# Patient Record
Sex: FEMALE | Race: BLACK OR AFRICAN AMERICAN | Employment: OTHER | URBAN - METROPOLITAN AREA
[De-identification: names, ages, dates, MRNs, and addresses within clinical notes are randomized per-mention and may not be internally consistent; named-entity substitution may affect disease eponyms.]

---

## 2018-05-14 ENCOUNTER — APPOINTMENT (OUTPATIENT)
Dept: GENERAL RADIOLOGY | Age: 43
End: 2018-05-14
Attending: EMERGENCY MEDICINE
Payer: MEDICARE

## 2018-05-14 ENCOUNTER — HOSPITAL ENCOUNTER (EMERGENCY)
Age: 43
Discharge: HOME | End: 2018-05-14
Attending: EMERGENCY MEDICINE
Payer: MEDICARE

## 2018-05-14 VITALS
DIASTOLIC BLOOD PRESSURE: 72 MMHG | WEIGHT: 293 LBS | OXYGEN SATURATION: 100 % | HEART RATE: 75 BPM | TEMPERATURE: 98 F | BODY MASS INDEX: 44.41 KG/M2 | SYSTOLIC BLOOD PRESSURE: 129 MMHG | HEIGHT: 68 IN | RESPIRATION RATE: 20 BRPM

## 2018-05-14 DIAGNOSIS — S90.122A CONTUSION OF SECOND TOE OF LEFT FOOT, INITIAL ENCOUNTER: Primary | ICD-10-CM

## 2018-05-14 DIAGNOSIS — S60.042A CONTUSION OF LEFT RING FINGER WITHOUT DAMAGE TO NAIL, INITIAL ENCOUNTER: ICD-10-CM

## 2018-05-14 LAB
ALBUMIN SERPL-MCNC: 3.6 G/DL (ref 3.5–5)
ALBUMIN/GLOB SERPL: 0.9 {RATIO} (ref 1.2–3.5)
ALP SERPL-CCNC: 65 U/L (ref 50–136)
ALT SERPL-CCNC: 22 U/L (ref 12–65)
ANION GAP SERPL CALC-SCNC: 9 MMOL/L (ref 7–16)
AST SERPL-CCNC: 15 U/L (ref 15–37)
BASOPHILS # BLD: 0 K/UL (ref 0–0.2)
BASOPHILS NFR BLD: 1 % (ref 0–2)
BILIRUB SERPL-MCNC: 0.8 MG/DL (ref 0.2–1.1)
BUN SERPL-MCNC: 5 MG/DL (ref 6–23)
CALCIUM SERPL-MCNC: 9.1 MG/DL (ref 8.3–10.4)
CHLORIDE SERPL-SCNC: 106 MMOL/L (ref 98–107)
CO2 SERPL-SCNC: 27 MMOL/L (ref 21–32)
CREAT SERPL-MCNC: 1.12 MG/DL (ref 0.6–1)
DIFFERENTIAL METHOD BLD: NORMAL
EOSINOPHIL # BLD: 0.1 K/UL (ref 0–0.8)
EOSINOPHIL NFR BLD: 2 % (ref 0.5–7.8)
ERYTHROCYTE [DISTWIDTH] IN BLOOD BY AUTOMATED COUNT: 14.3 % (ref 11.9–14.6)
GLOBULIN SER CALC-MCNC: 4.1 G/DL (ref 2.3–3.5)
GLUCOSE SERPL-MCNC: 57 MG/DL (ref 65–100)
HCT VFR BLD AUTO: 40.1 % (ref 35.8–46.3)
HGB BLD-MCNC: 13.3 G/DL (ref 11.7–15.4)
IMM GRANULOCYTES # BLD: 0 K/UL (ref 0–0.5)
IMM GRANULOCYTES NFR BLD AUTO: 0 % (ref 0–5)
LYMPHOCYTES # BLD: 1.7 K/UL (ref 0.5–4.6)
LYMPHOCYTES NFR BLD: 35 % (ref 13–44)
MCH RBC QN AUTO: 32.2 PG (ref 26.1–32.9)
MCHC RBC AUTO-ENTMCNC: 33.2 G/DL (ref 31.4–35)
MCV RBC AUTO: 97.1 FL (ref 79.6–97.8)
MONOCYTES # BLD: 0.4 K/UL (ref 0.1–1.3)
MONOCYTES NFR BLD: 8 % (ref 4–12)
NEUTS SEG # BLD: 2.6 K/UL (ref 1.7–8.2)
NEUTS SEG NFR BLD: 54 % (ref 43–78)
PLATELET # BLD AUTO: 266 K/UL (ref 150–450)
PMV BLD AUTO: 12 FL (ref 10.8–14.1)
POTASSIUM SERPL-SCNC: 3.8 MMOL/L (ref 3.5–5.1)
PROT SERPL-MCNC: 7.7 G/DL (ref 6.3–8.2)
RBC # BLD AUTO: 4.13 M/UL (ref 4.05–5.25)
SODIUM SERPL-SCNC: 142 MMOL/L (ref 136–145)
WBC # BLD AUTO: 4.8 K/UL (ref 4.3–11.1)

## 2018-05-14 PROCEDURE — 73630 X-RAY EXAM OF FOOT: CPT

## 2018-05-14 PROCEDURE — 73140 X-RAY EXAM OF FINGER(S): CPT

## 2018-05-14 PROCEDURE — 73610 X-RAY EXAM OF ANKLE: CPT

## 2018-05-14 PROCEDURE — 85025 COMPLETE CBC W/AUTO DIFF WBC: CPT | Performed by: EMERGENCY MEDICINE

## 2018-05-14 PROCEDURE — 99283 EMERGENCY DEPT VISIT LOW MDM: CPT | Performed by: EMERGENCY MEDICINE

## 2018-05-14 PROCEDURE — 80053 COMPREHEN METABOLIC PANEL: CPT | Performed by: EMERGENCY MEDICINE

## 2018-05-14 NOTE — ED TRIAGE NOTES
Patient states became dizzy and tripped and fell onto her left ankle and foot. Patient denies any head trauma and loc.

## 2018-05-15 NOTE — ED PROVIDER NOTES
HPI Comments: Patient states she was walking out of her house down the stairs and fell. States her vision became blurred causing her to fall. Was able to get up and walk back inside. Complains of pain in her left second toe and left ring finger. No LOC. No recent illness. Vision back to normal now. Patient is a 36 y.o. female presenting with fall. The history is provided by the patient and medical records. Fall   The accident occurred 3 to 5 hours ago. The fall occurred while walking. She fell from a height of ground level. The volume of blood lost was minimal. Pertinent negatives include no numbness.         Past Medical History:   Diagnosis Date    Arthritis     Bipolar affective disorder ([address] 11 yrs old    Cellulitis 2010    \"   \"     \"\"    Diabetes ([facility] dx:    Unknown A1C    Diabetes ([facility] 2010    Edema of both legs 2010    hospitalized 1 yr ago same at [facility]    GERD (gastroesophageal reflux disease)     Gout >10 yers    last episode May 09    Hypertension about 10 years    Morbid obesity ([address] lifetime    weight 10/09: 220, current 310    Non compliance with medical treatment long term    Orthopnea 2 weeks    Other ill-defined conditions(799.89)     cellulitis    Peripheral neuropathy 2 years    Peripheral vascular disease ([address]     currently being worked up for same    Psychiatric disorder     bipolar disorder    Renal insufficiency unknown    Renal insufficiency 2010    Unspecified sleep apnea     does not use cpap    Vertigo 3/8/2015       Past Surgical History:   Procedure Laterality Date    DELIVERY   14 and 10 yrs ago    both preemies    HX  SECTION      HX HERNIA REPAIR  age 10   [de-identified] ORTHOPAEDIC  10    left total hip replacement 2011    HX TUBAL LIGATION      10 yrs ago         Family History:   Problem Relation Age of Onset    Other Father      aneurysm age 62, now brain damaged    Other Son      24 week twin, LD    Cancer Sister      cervical    Cancer Sister      hodgkins       Social History     Social History    Marital status: SINGLE     Spouse name: N/A    Number of children: N/A    Years of education: N/A     Occupational History    Not on file. Social History Main Topics    Smoking status: Never Smoker    Smokeless tobacco: Never Used    Alcohol use No    Drug use: No    Sexual activity: Not Currently     Other Topics Concern    Not on file     Social History Narrative    Patient is single, lives with disabled father, 15year old disabled son and 8year old daughter. 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 She reports a lot of stress, including her 12year old daughter living in [place] with an aunt due to some DSS issue. She admits to being non-compliant with meds and diet in part due to stress. ALLERGIES: Ciprofloxacin and Sulfa (sulfonamide antibiotics)    Review of Systems   Constitutional: Negative. HENT: Negative. Eyes: Positive for visual disturbance (brief and resolved). Respiratory: Negative. Cardiovascular: Negative. Gastrointestinal: Negative. Musculoskeletal: Positive for arthralgias and myalgias. Skin: Positive for wound. Neurological: Negative. Negative for weakness and numbness. Hematological: Negative. Vitals:    05/14/18 1847   BP: 133/76   Pulse: 73   Resp: 18   Temp: 98.3 °F (36.8 °C)   SpO2: 100%   Weight: 142.4 kg (314 lb)   Height: 5' 8\" (1.727 m)            Physical Exam   Constitutional: She is oriented to person, place, and time. She appears well-developed and well-nourished. HENT:   Head: Normocephalic and atraumatic. Eyes: Conjunctivae and EOM are normal. Pupils are equal, round, and reactive to light. Neck: Normal range of motion. Neck supple. Cardiovascular: Normal rate, regular rhythm, normal heart sounds and intact distal pulses.     Pulmonary/Chest: Effort normal and breath sounds normal.   Musculoskeletal:   Left ring finger with mild swelling and tenderness of the PIP joint. No deformity. FROM. NV intact. Left second toe with superficial abrasion, swelling, tenderness to palpation. No deformity. NV intact. Foot and ankle with no tenderness or deformity. Right upper and lower extremity uninjured. Neurological: She is alert and oriented to person, place, and time. No cranial nerve deficit. She exhibits normal muscle tone. Nursing note and vitals reviewed.        [facility]      ED Course       Procedures      Contusion and superficial abrasion left second toe  Contusion of left PIP joint ring finger  Xrays no fracture   Labs reviewed  Results and instructions discussed with patient   Follow up with PCP  Return with new or worse symptom  Ice, elevation, ibuprofen

## 2018-05-15 NOTE — ED NOTES
I have reviewed discharge instructions with the patient. The patient verbalized understanding. Patient left ED via Discharge Method: ambulatory to Home with family. Opportunity for questions and clarification provided. Patient given 0 scripts. To continue your aftercare when you leave the hospital, you may receive an automated call from our care team to check in on how you are doing. This is a free service and part of our promise to provide the best care and service to meet your aftercare needs.  If you have questions, or wish to unsubscribe from this service please call [phone]. Thank you for Choosing our [address] [place] [address] Emergency Department.

## 2018-07-10 ENCOUNTER — HOSPITAL ENCOUNTER (EMERGENCY)
Age: 43
Discharge: HOME | End: 2018-07-10
Attending: EMERGENCY MEDICINE
Payer: MEDICARE

## 2018-07-10 VITALS
HEART RATE: 74 BPM | TEMPERATURE: 98 F | WEIGHT: 185 LBS | SYSTOLIC BLOOD PRESSURE: 140 MMHG | BODY MASS INDEX: 28.04 KG/M2 | DIASTOLIC BLOOD PRESSURE: 64 MMHG | HEIGHT: 68 IN | RESPIRATION RATE: 20 BRPM | OXYGEN SATURATION: 98 %

## 2018-07-10 DIAGNOSIS — N39.0 URINARY TRACT INFECTION WITHOUT HEMATURIA, SITE UNSPECIFIED: Primary | ICD-10-CM

## 2018-07-10 DIAGNOSIS — J02.9 ACUTE PHARYNGITIS, UNSPECIFIED ETIOLOGY: ICD-10-CM

## 2018-07-10 LAB
BACTERIA URNS QL MICRO: NORMAL /HPF
CASTS URNS QL MICRO: 0 /LPF
CRYSTALS URNS QL MICRO: 0 /LPF
EPI CELLS #/AREA URNS HPF: 0 /HPF
HCG UR QL: NEGATIVE
MUCOUS THREADS URNS QL MICRO: 0 /LPF
OTHER OBSERVATIONS,UCOM: NORMAL
RBC #/AREA URNS HPF: NORMAL /HPF
WBC URNS QL MICRO: >100 /HPF

## 2018-07-10 PROCEDURE — 99284 EMERGENCY DEPT VISIT MOD MDM: CPT | Performed by: EMERGENCY MEDICINE

## 2018-07-10 PROCEDURE — 81025 URINE PREGNANCY TEST: CPT

## 2018-07-10 PROCEDURE — 81003 URINALYSIS AUTO W/O SCOPE: CPT | Performed by: EMERGENCY MEDICINE

## 2018-07-10 PROCEDURE — 81015 MICROSCOPIC EXAM OF URINE: CPT | Performed by: EMERGENCY MEDICINE

## 2018-07-10 RX ORDER — PHENAZOPYRIDINE HYDROCHLORIDE 200 MG/1
200 TABLET, FILM COATED ORAL
Qty: 10 TAB | Refills: 0 | Status: SHIPPED | OUTPATIENT
Start: 2018-07-10 | End: 2018-11-09

## 2018-07-10 RX ORDER — CEPHALEXIN 500 MG/1
500 CAPSULE ORAL 3 TIMES DAILY
Qty: 21 CAP | Refills: 0 | Status: SHIPPED | OUTPATIENT
Start: 2018-07-10 | End: 2018-07-17

## 2018-07-11 NOTE — DISCHARGE INSTRUCTIONS
Sore Throat: Care Instructions  Your Care Instructions    Infection by bacteria or a virus causes most sore throats. Cigarette smoke, dry air, air pollution, allergies, and yelling can also cause a sore throat. Sore throats can be painful and annoying. Fortunately, most sore throats go away on their own. If you have a bacterial infection, your doctor may prescribe antibiotics. Follow-up care is a key part of your treatment and safety. Be sure to make and go to all appointments, and call your doctor if you are having problems. It's also a good idea to know your test results and keep a list of the medicines you take. How can you care for yourself at home? · If your doctor prescribed antibiotics, take them as directed. Do not stop taking them just because you feel better. You need to take the full course of antibiotics. · Gargle with warm salt water once an hour to help reduce swelling and relieve discomfort. Use 1 teaspoon of salt mixed in 1 cup of warm water. · Take an over-the-counter pain medicine, such as acetaminophen (Tylenol), ibuprofen (Advil, Motrin), or naproxen (Aleve). Read and follow all instructions on the label. · Be careful when taking over-the-counter cold or flu medicines and Tylenol at the same time. Many of these medicines have acetaminophen, which is Tylenol. Read the labels to make sure that you are not taking more than the recommended dose. Too much acetaminophen (Tylenol) can be harmful. · Drink plenty of fluids. Fluids may help soothe an irritated throat. Hot fluids, such as tea or soup, may help decrease throat pain. · Use over-the-counter throat lozenges to soothe pain. Regular cough drops or hard candy may also help. These should not be given to young children because of the risk of choking. · Do not smoke or allow others to smoke around you. If you need help quitting, talk to your doctor about stop-smoking programs and medicines.  These can increase your chances of quitting for good. · Use a vaporizer or humidifier to add moisture to your bedroom. Follow the directions for cleaning the machine. When should you call for help? Call your doctor now or seek immediate medical care if:    · You have new or worse trouble swallowing.     · Your sore throat gets much worse on one side.    Watch closely for changes in your health, and be sure to contact your doctor if you do not get better as expected. Where can you learn more? Go to http://violetta-maya.info/. Enter 062 441 80 [id number] in the search box to learn more about \"Sore Throat: Care Instructions. \"  Current as of: May 12, 2017  Content Version: 11.7  © 1492-1882 [employer]. Care instructions adapted under license by [employer] (which disclaims liability or warranty for this information). If you have questions about a medical condition or this instruction, always ask your healthcare professional. [place] [address] any warranty or liability for your use of this information. Urinary Tract Infection in Women: Care Instructions  Your Care Instructions    A urinary tract infection, or UTI, is a general term for an infection anywhere between the kidneys and the urethra (where urine comes out). Most UTIs are bladder infections. They often cause pain or burning when you urinate. UTIs are caused by bacteria and can be cured with antibiotics. Be sure to complete your treatment so that the infection goes away. Follow-up care is a key part of your treatment and safety. Be sure to make and go to all appointments, and call your doctor if you are having problems. It's also a good idea to know your test results and keep a list of the medicines you take. How can you care for yourself at home? · Take your antibiotics as directed. Do not stop taking them just because you feel better. You need to take the full course of antibiotics. · Drink extra water and other fluids for the next day or two. This may help wash out the bacteria that are causing the infection. (If you have kidney, heart, or liver disease and have to limit fluids, talk with your doctor before you increase your fluid intake.)  · Avoid drinks that are carbonated or have caffeine. They can irritate the bladder. · Urinate often. Try to empty your bladder each time. · To relieve pain, take a hot bath or lay a heating pad set on low over your lower belly or genital area. Never go to sleep with a heating pad in place. To prevent UTIs  · Drink plenty of water each day. This helps you urinate often, which clears bacteria from your system. (If you have kidney, heart, or liver disease and have to limit fluids, talk with your doctor before you increase your fluid intake.)  · Urinate when you need to. · Urinate right after you have sex. · Change sanitary pads often. · Avoid douches, bubble baths, feminine hygiene sprays, and other feminine hygiene products that have deodorants. · After going to the bathroom, wipe from front to back. When should you call for help? Call your doctor now or seek immediate medical care if:    · Symptoms such as fever, chills, nausea, or vomiting get worse or appear for the first time.     · You have new pain in your back just below your rib cage. This is called flank pain.     · There is new blood or pus in your urine.     · You have any problems with your antibiotic medicine.    Watch closely for changes in your health, and be sure to contact your doctor if:    · You are not getting better after taking an antibiotic for 2 days.     · Your symptoms go away but then come back. Where can you learn more? Go to http://violteta-maya.info/. Enter X655 in the search box to learn more about \"Urinary Tract Infection in Women: Care Instructions. \"  Current as of: May 12, 2017  Content Version: 11.7  © 8018-7012 [employer].  Care instructions adapted under license by [employer] (which disclaims liability or warranty for this information). If you have questions about a medical condition or this instruction, always ask your healthcare professional. [address] any warranty or liability for your use of this information.

## 2018-07-11 NOTE — ED PROVIDER NOTES
Patient is a 36 y.o. female presenting with urinary pain. The history is provided by the patient. Urinary Pain    This is a new problem. The current episode started more than 2 days ago. The problem occurs every urination. The problem has been gradually worsening. The quality of the pain is described as burning. The pain is at a severity of 2/10. There has been no fever. She is not sexually active. There is no history of pyelonephritis. Associated symptoms include frequency. Pertinent negatives include no chills, no sweats, no nausea, no vomiting, no discharge, no hematuria, no hesitancy, no urgency, no flank pain, no vaginal discharge, no abdominal pain and no back pain. She has tried nothing for the symptoms. The treatment provided no relief. Her past medical history does not include kidney stones, single kidney, urological procedure, recurrent UTIs, urinary stasis, catheterization or urinary catheter problem.         Past Medical History:   Diagnosis Date    Arthritis     Bipolar affective disorder ([facility] 11 yrs old    Cellulitis 2010    \"   \"     \"\"    Diabetes ([facility] dx:    Unknown A1C    Diabetes ([address] 2010    Edema of both legs 2010    hospitalized 1 yr ago same at [facility]    GERD (gastroesophageal reflux disease)     Gout >10 yers    last episode May 09    Hypertension about 10 years    Morbid obesity ([address] lifetime    weight 10/09: 220, current 310    Non compliance with medical treatment long term    Orthopnea 2 weeks    Other ill-defined conditions(799.89)     cellulitis    Peripheral neuropathy 2 years    Peripheral vascular disease ([facility]     currently being worked up for same    Psychiatric disorder     bipolar disorder    Renal insufficiency unknown    Renal insufficiency 2010    Unspecified sleep apnea     does not use cpap    Vertigo 3/8/2015       Past Surgical History:   Procedure Laterality Date    DELIVERY   14 and 10 yrs ago    both preemies    HX  SECTION      HX HERNIA REPAIR  age 9    HX ORTHOPAEDIC  10    left total hip replacement 2011    HX TUBAL LIGATION      10 yrs ago         Family History:   Problem Relation Age of Onset    Other Father      aneurysm age 62, now brain damaged    Other Son      25 week twin, LD    Cancer Sister      cervical    Cancer Sister      hodgkins       Social History     Social History    Marital status: SINGLE     Spouse name: N/A    Number of children: N/A    Years of education: N/A     Occupational History    Not on file. Social History Main Topics    Smoking status: Never Smoker    Smokeless tobacco: Never Used    Alcohol use No    Drug use: No    Sexual activity: Not Currently     Other Topics Concern    Not on file     Social History Narrative    Patient is single, lives with disabled father, 15year old disabled son and 8year old daughter. 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 She reports a lot of stress, including her 12year old daughter living in [place] [facility] with an aunt due to some DSS issue. She admits to being non-compliant with meds and diet in part due to stress. ALLERGIES: Ciprofloxacin and Sulfa (sulfonamide antibiotics)    Review of Systems   Constitutional: Negative for chills. HENT: Positive for sore throat. Negative for congestion. Gastrointestinal: Negative for abdominal pain, nausea and vomiting. Genitourinary: Positive for dysuria and frequency. Negative for flank pain, hematuria, hesitancy, urgency and vaginal discharge. Musculoskeletal: Negative for back pain. All other systems reviewed and are negative.       Vitals:    07/10/18 2050 07/10/18 2225   BP: 145/72 140/64   Pulse: 66 74   Resp: 16 20   Temp: 97.6 °F (36.4 °C) 98 °F (36.7 °C)   SpO2: 100% 98%   Weight: 83.9 kg (185 lb)    Height: 5' 8\" (1.727 m)             Physical Exam   Constitutional: She is oriented to person, place, and time. She appears well-developed and well-nourished. No distress. HENT:   Head: Normocephalic and atraumatic. Right Ear: Tympanic membrane and external ear normal.   Left Ear: Tympanic membrane and external ear normal.   Mouth/Throat: Oropharynx is clear and moist.   Eyes: Conjunctivae and EOM are normal. Pupils are equal, round, and reactive to light. Neck: Normal range of motion. Neck supple. No tracheal deviation present. Cardiovascular: Normal rate, regular rhythm, normal heart sounds and intact distal pulses. Exam reveals no gallop and no friction rub. No murmur heard. Pulmonary/Chest: Effort normal and breath sounds normal. No respiratory distress. She has no wheezes. Abdominal: Soft. Bowel sounds are normal. She exhibits no shifting dullness, no distension, no pulsatile liver, no fluid wave, no abdominal bruit, no pulsatile midline mass and no mass. There is no hepatosplenomegaly. There is tenderness (mild) in the suprapubic area. There is no rigidity, no rebound, no guarding, no CVA tenderness, no tenderness at McBurney's point and negative Orona's sign. No hernia. Musculoskeletal: Normal range of motion. She exhibits no edema. Lymphadenopathy:     She has no cervical adenopathy. Neurological: She is alert and oriented to person, place, and time. She displays normal reflexes. No cranial nerve deficit. Skin: Skin is warm and dry. No rash noted. She is not diaphoretic. No erythema. Psychiatric: She has a normal mood and affect. Nursing note and vitals reviewed.        MDM  Number of Diagnoses or Management Options  Acute pharyngitis, unspecified etiology: new and does not require workup  Urinary tract infection without hematuria, site unspecified: new and requires workup     Amount and/or Complexity of Data Reviewed  Clinical lab tests: ordered and reviewed  Review and summarize past medical records: yes    Risk of Complications, Morbidity, and/or Mortality  Presenting problems: low  Diagnostic procedures: minimal  Management options: low    Patient Progress  Patient progress: stable        ED Course       Procedures    The patient was observed in the ED. Results Reviewed:      Recent Results (from the past 24 hour(s))   HCG URINE, QL. - POC    Collection Time: 07/10/18  9:03 PM   Result Value Ref Range    Pregnancy test,urine (POC) NEGATIVE  NEG     URINE MICROSCOPIC    Collection Time: 07/10/18  9:09 PM   Result Value Ref Range    WBC >100 0 /hpf    RBC 20-50 0 /hpf    Epithelial cells 0 0 /hpf    Bacteria TRACE 0 /hpf    Casts 0 0 /lpf    Crystals, urine 0 0 /LPF    Mucus 0 0 /lpf    Other observations       MICROSCOPIC PERFORMED ON UNSPUN URINE DUE TO TOO MANY CELLS       I discussed the results of all labs, procedures, radiographs, and treatments with the patient and available family. Treatment plan is agreed upon and the patient is ready for discharge. All voiced understanding of the discharge plan and medication instructions or changes as appropriate. Questions about treatment in the ED were answered. All were encouraged to return should symptoms worsen or new problems develop.

## 2018-07-11 NOTE — ED NOTES
I have reviewed discharge instructions with the patient. The patient verbalized understanding. Patient left ED via Discharge Method: ambulatory to Home with (family). Opportunity for questions and clarification provided. Patient given 2 scripts. To continue your aftercare when you leave the hospital, you may receive an automated call from our care team to check in on how you are doing. This is a free service and part of our promise to provide the best care and service to meet your aftercare needs.  If you have questions, or wish to unsubscribe from this service please call [phone]. Thank you for Choosing our [facility].

## 2018-09-28 ENCOUNTER — HOSPITAL ENCOUNTER (EMERGENCY)
Age: 43
Discharge: HOME | End: 2018-09-28
Attending: STUDENT IN AN ORGANIZED HEALTH CARE EDUCATION/TRAINING PROGRAM
Payer: MEDICARE

## 2018-09-28 VITALS
HEIGHT: 68 IN | HEART RATE: 62 BPM | DIASTOLIC BLOOD PRESSURE: 88 MMHG | RESPIRATION RATE: 16 BRPM | WEIGHT: 220 LBS | OXYGEN SATURATION: 100 % | BODY MASS INDEX: 33.34 KG/M2 | SYSTOLIC BLOOD PRESSURE: 143 MMHG | TEMPERATURE: 98.1 F

## 2018-09-28 DIAGNOSIS — J35.8 TONSIL STONE: Primary | ICD-10-CM

## 2018-09-28 DIAGNOSIS — J02.9 PHARYNGITIS, UNSPECIFIED ETIOLOGY: ICD-10-CM

## 2018-09-28 LAB — DEPRECATED S PYO AG THROAT QL EIA: NEGATIVE

## 2018-09-28 PROCEDURE — 87880 STREP A ASSAY W/OPTIC: CPT

## 2018-09-28 PROCEDURE — 99283 EMERGENCY DEPT VISIT LOW MDM: CPT | Performed by: STUDENT IN AN ORGANIZED HEALTH CARE EDUCATION/TRAINING PROGRAM

## 2018-09-28 PROCEDURE — 74011250637 HC RX REV CODE- 250/637: Performed by: STUDENT IN AN ORGANIZED HEALTH CARE EDUCATION/TRAINING PROGRAM

## 2018-09-28 PROCEDURE — 87081 CULTURE SCREEN ONLY: CPT

## 2018-09-28 RX ORDER — DEXAMETHASONE SODIUM PHOSPHATE 100 MG/10ML
10 INJECTION INTRAMUSCULAR; INTRAVENOUS
Status: COMPLETED | OUTPATIENT
Start: 2018-09-28 | End: 2018-09-28

## 2018-09-28 RX ORDER — IBUPROFEN 800 MG/1
800 TABLET ORAL
Status: COMPLETED | OUTPATIENT
Start: 2018-09-28 | End: 2018-09-28

## 2018-09-28 RX ADMIN — IBUPROFEN 800 MG: 800 TABLET ORAL at 12:16

## 2018-09-28 RX ADMIN — DEXAMETHASONE SODIUM PHOSPHATE 10 MG: 10 INJECTION INTRAMUSCULAR; INTRAVENOUS at 12:16

## 2018-09-28 NOTE — Clinical Note
Drink plenty of clear liquids to drink hydration and help with discomfort in her throat. Perform salt water gargles several times daily as discussed. Treat pain in fever with Tylenol and Motrin as needed.

## 2018-09-28 NOTE — ED PROVIDER NOTES
HPI Comments: 42-year-old female patient presents with reports of sore throat and white patches on her tonsils. Patient states she developed these symptoms 2 days ago. She reports occasional cough and feeling that her right ear is plugged. Patient does report some upper respiratory congestion over the past several days as well. She denies fever, chills, nausea, vomiting or significant headache. Patient denies productivity with her cough. Denies history of recurrent strep throat previous or pharyngeal surgeries. Notes hardened appearing white patches in the throat that she has removed with tweezers. Reports foul smell to these stones. No reports of trouble swallowing, respiratory difficulty. Patient is a 36 y.o. female presenting with sore throat. The history is provided by the patient. Sore Throat This is a new problem. The current episode started more than 2 days ago. The problem has not changed since onset. There has been no fever. Associated symptoms include plugged ear sensation. Pertinent negatives include no diarrhea, no vomiting, no congestion, no drooling, no ear discharge, no ear pain, no headaches, no shortness of breath, no stridor, no swollen glands, no trouble swallowing, no stiff neck and no cough. Past Medical History:  
Diagnosis Date  Arthritis  Bipolar affective disorder ([facility] 11 yrs old  Cellulitis 1/8/2010 \"   \"     \"\"  Diabetes ([address] dx:1/09 Unknown A1C  Diabetes ([facility] 1/8/2010  Edema of both legs 1/8/2010  
 hospitalized 1 yr ago same at [facility]  GERD (gastroesophageal reflux disease)  Gout >10 yers  
 last episode May 09  Hypertension about 10 years  Morbid obesity ([address] lifetime  
 weight 10/09: 220, current 310  
 Non compliance with medical treatment long term  Orthopnea 2 weeks  Other ill-defined conditions(799.89)   
 cellulitis  Peripheral neuropathy 2 years  Peripheral vascular disease ([address] currently being worked up for same  Psychiatric disorder   
 bipolar disorder  Renal insufficiency unknown  Renal insufficiency 2010  Unspecified sleep apnea   
 does not use cpap  Vertigo 3/8/2015 Past Surgical History:  
Procedure Laterality Date  DELIVERY   15 and 10 yrs ago  
 both preemies  HX  SECTION    
 HX HERNIA REPAIR  age 8  
 HX ORTHOPAEDIC  10  
 left total hip replacement 2011  HX TUBAL LIGATION    
 11 yrs ago Family History:  
Problem Relation Age of Onset  Other Father   
  aneurysm age 62, now brain damaged  Other Son   
  24 week twin, LD  
 Cancer Sister   
  cervical  
 Cancer Sister   
  hodgkins Social History Social History  Marital status: SINGLE Spouse name: N/A  
 Number of children: N/A  
 Years of education: N/A Occupational History  Not on file. Social History Main Topics  Smoking status: Never Smoker  Smokeless tobacco: Never Used  Alcohol use No  
 Drug use: No  
 Sexual activity: Not Currently Other Topics Concern  Not on file Social History Narrative Patient is single, lives with disabled father, 15year old disabled son and 8year old daughter. 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 She reports a lot of stress, including her 12year old daughter living in [facility] with an aunt due to some DSS issue. She admits to being non-compliant with meds and diet in part due to stress. ALLERGIES: Ciprofloxacin and Sulfa (sulfonamide antibiotics) Review of Systems HENT: Positive for sore throat. Negative for congestion, drooling, ear discharge, ear pain and trouble swallowing. Respiratory: Negative for cough, shortness of breath and stridor. Gastrointestinal: Negative for diarrhea and vomiting. Neurological: Negative for headaches. All other systems reviewed and are negative. Vitals:  
 09/28/18 1144 09/28/18 1309 BP: 147/90 143/88 Pulse: (!) 59 62 Resp: 16 16 Temp: 97.8 °F (36.6 °C) 98.1 °F (36.7 °C) SpO2: 100% 100% Weight: 99.8 kg (220 lb) Height: 5' 8\" (1.727 m) Physical Exam  
Constitutional: She is oriented to person, place, and time. She appears well-developed and well-nourished. No distress. Alert and oriented to person, place and time. No acute distress. Speaks in clear, fluent sentences. HENT:  
Head: Normocephalic and atraumatic. Right Ear: External ear normal.  
Nose: Nose normal.  
Posterior oropharynx appears reddened and slightly inflamed without significant edema. There is evidence of tonsiliths noted on the right side. Eyes: Conjunctivae and EOM are normal. Pupils are equal, round, and reactive to light. Neck: Normal range of motion. Neck supple. No JVD present. No tracheal deviation present. Cardiovascular: Normal rate, regular rhythm, S1 normal, S2 normal, normal heart sounds and intact distal pulses. Exam reveals no gallop, no distant heart sounds and no friction rub. No murmur heard. Pulmonary/Chest: Effort normal and breath sounds normal. No accessory muscle usage or stridor. No tachypnea and no bradypnea. No respiratory distress. She has no decreased breath sounds. She has no wheezes. She has no rhonchi. She has no rales. She exhibits no tenderness. Abdominal: Soft. Normal appearance. Musculoskeletal: Normal range of motion. She exhibits no edema, tenderness or deformity. Neurological: She is alert and oriented to person, place, and time. No cranial nerve deficit. Skin: Skin is warm and dry. No rash noted. She is not diaphoretic. Psychiatric: She has a normal mood and affect. Her behavior is normal.  
Nursing note and vitals reviewed. MDM Number of Diagnoses or Management Options Pharyngitis, unspecified etiology: new and requires workup Tonsil stone: new and requires workup Diagnosis management comments: Strep negative, patient will be treated symptomatically and referred to ENT. Amount and/or Complexity of Data Reviewed Clinical lab tests: ordered and reviewed Tests in the medicine section of CPT®: ordered and reviewed Risk of Complications, Morbidity, and/or Mortality Presenting problems: low Diagnostic procedures: low Management options: low Patient Progress Patient progress: stable ED Course Procedures

## 2018-09-28 NOTE — ED NOTES
I have reviewed discharge instructions with the patient. The patient verbalized understanding. Patient left ED via Discharge Method: ambulatory to Home in no acute distress. Opportunity for questions and clarification provided. Patient given 0 scripts. To continue your aftercare when you leave the hospital, you may receive an automated call from our care team to check in on how you are doing. This is a free service and part of our promise to provide the best care and service to meet your aftercare needs.  If you have questions, or wish to unsubscribe from this service please call [phone]. Thank you for Choosing our [facility] Emergency Department.

## 2018-09-28 NOTE — ED TRIAGE NOTES
Patient with sore throat and white spots present per patient that she noticed this morning. Patient advises that she was pulling at them with tweezers.

## 2018-09-30 LAB
BACTERIA SPEC CULT: NORMAL
SERVICE CMNT-IMP: NORMAL

## 2018-11-05 DIAGNOSIS — J35.01 CHRONIC TONSILLITIS: Primary | ICD-10-CM

## 2018-11-06 ENCOUNTER — HOSPITAL ENCOUNTER (OUTPATIENT)
Dept: SURGERY | Age: 43
Discharge: HOME | End: 2018-11-06

## 2018-11-09 VITALS — WEIGHT: 185 LBS | HEIGHT: 66 IN | BODY MASS INDEX: 29.73 KG/M2

## 2018-11-09 NOTE — PERIOP NOTES
Patient verified name and . Order for consent found in EHR and matches case posting; patient verifies procedure. Type 1B surgery, phone assessment complete. Orders received. Labs per surgeon: none needed. Labs per anesthesia protocol: none needed. Patient answered medical/surgical history questions at their best of ability. All prior to admission medications documented in [facility]. Patient instructed to take the following medications the day of surgery according to anesthesia guidelines with a small sip of water: none. Hold all vitamins 7 days prior to surgery and NSAIDS 5 days prior to surgery. Medications to be held: motrin. Patient instructed on the following:  Arrive at A Entrance, time of arrival to be called the day before by 1700  NPO after midnight including gum, mints, and ice chips  Responsible adult must drive patient to the hospital, stay during surgery, and patient will need supervision 24 hours after anesthesia  Use dial antibacterial soap in shower 3 nights before surgery and on the morning of surgery  All piercings must be removed prior to arrival.    Leave all valuables (money and jewelry) at home but bring insurance card and ID on  DOS. Do not wear make-up, nail polish, lotions, cologne, perfumes, powders, or oil on skin. Patient teach back successful and patient demonstrates knowledge of instruction.

## 2018-11-12 ENCOUNTER — ANESTHESIA (OUTPATIENT)
Dept: SURGERY | Age: 43
End: 2018-11-12
Payer: MEDICARE

## 2018-11-12 ENCOUNTER — HOSPITAL ENCOUNTER (OUTPATIENT)
Age: 43
Setting detail: OUTPATIENT SURGERY
Discharge: HOME | End: 2018-11-12
Attending: OTOLARYNGOLOGY | Admitting: OTOLARYNGOLOGY
Payer: MEDICARE

## 2018-11-12 ENCOUNTER — ANESTHESIA EVENT (OUTPATIENT)
Dept: SURGERY | Age: 43
End: 2018-11-12
Payer: MEDICARE

## 2018-11-12 VITALS
SYSTOLIC BLOOD PRESSURE: 163 MMHG | RESPIRATION RATE: 16 BRPM | WEIGHT: 273 LBS | OXYGEN SATURATION: 100 % | BODY MASS INDEX: 44.06 KG/M2 | HEART RATE: 63 BPM | TEMPERATURE: 97.8 F | DIASTOLIC BLOOD PRESSURE: 95 MMHG

## 2018-11-12 DIAGNOSIS — J35.01 CHRONIC TONSILLITIS: ICD-10-CM

## 2018-11-12 PROCEDURE — 77030018836 HC SOL IRR NACL ICUM -A: Performed by: OTOLARYNGOLOGY

## 2018-11-12 PROCEDURE — 74011250636 HC RX REV CODE- 250/636: Performed by: ANESTHESIOLOGY

## 2018-11-12 PROCEDURE — 74011250637 HC RX REV CODE- 250/637: Performed by: ANESTHESIOLOGY

## 2018-11-12 PROCEDURE — 76010000154 HC OR TIME FIRST 0.5 HR: Performed by: OTOLARYNGOLOGY

## 2018-11-12 PROCEDURE — 77030020782 HC GWN BAIR PAWS FLX 3M -B: Performed by: ANESTHESIOLOGY

## 2018-11-12 PROCEDURE — 74011250636 HC RX REV CODE- 250/636

## 2018-11-12 PROCEDURE — 74011000250 HC RX REV CODE- 250

## 2018-11-12 PROCEDURE — 77030011267 HC ELECTRD BLD COVD -A: Performed by: OTOLARYNGOLOGY

## 2018-11-12 PROCEDURE — 77030012840 HC ELECTRD COAG SUC CNMD -C: Performed by: OTOLARYNGOLOGY

## 2018-11-12 PROCEDURE — 77030010509 HC AIRWY LMA MSK TELE -A: Performed by: ANESTHESIOLOGY

## 2018-11-12 PROCEDURE — 77030037088 HC TUBE ENDOTRACH ORAL NSL COVD-A: Performed by: ANESTHESIOLOGY

## 2018-11-12 PROCEDURE — 76210000016 HC OR PH I REC 1 TO 1.5 HR: Performed by: OTOLARYNGOLOGY

## 2018-11-12 PROCEDURE — 76060000032 HC ANESTHESIA 0.5 TO 1 HR: Performed by: OTOLARYNGOLOGY

## 2018-11-12 PROCEDURE — 77030039425 HC BLD LARYNG TRULITE DISP TELE -A: Performed by: ANESTHESIOLOGY

## 2018-11-12 RX ORDER — MIDAZOLAM HYDROCHLORIDE 1 MG/ML
2 INJECTION, SOLUTION INTRAMUSCULAR; INTRAVENOUS
Status: COMPLETED | OUTPATIENT
Start: 2018-11-12 | End: 2018-11-12

## 2018-11-12 RX ORDER — OXYCODONE HYDROCHLORIDE 5 MG/1
10 TABLET ORAL
Status: DISCONTINUED | OUTPATIENT
Start: 2018-11-12 | End: 2018-11-12 | Stop reason: HOSPADM

## 2018-11-12 RX ORDER — ROCURONIUM BROMIDE 10 MG/ML
INJECTION, SOLUTION INTRAVENOUS AS NEEDED
Status: DISCONTINUED | OUTPATIENT
Start: 2018-11-12 | End: 2018-11-12 | Stop reason: HOSPADM

## 2018-11-12 RX ORDER — FENTANYL CITRATE 50 UG/ML
INJECTION, SOLUTION INTRAMUSCULAR; INTRAVENOUS AS NEEDED
Status: DISCONTINUED | OUTPATIENT
Start: 2018-11-12 | End: 2018-11-12 | Stop reason: HOSPADM

## 2018-11-12 RX ORDER — SODIUM CHLORIDE 0.9 % (FLUSH) 0.9 %
5-10 SYRINGE (ML) INJECTION EVERY 8 HOURS
Status: DISCONTINUED | OUTPATIENT
Start: 2018-11-12 | End: 2018-11-12 | Stop reason: HOSPADM

## 2018-11-12 RX ORDER — SODIUM CHLORIDE 0.9 % (FLUSH) 0.9 %
5-10 SYRINGE (ML) INJECTION AS NEEDED
Status: DISCONTINUED | OUTPATIENT
Start: 2018-11-12 | End: 2018-11-12 | Stop reason: HOSPADM

## 2018-11-12 RX ORDER — LIDOCAINE HYDROCHLORIDE 20 MG/ML
INJECTION, SOLUTION EPIDURAL; INFILTRATION; INTRACAUDAL; PERINEURAL AS NEEDED
Status: DISCONTINUED | OUTPATIENT
Start: 2018-11-12 | End: 2018-11-12 | Stop reason: HOSPADM

## 2018-11-12 RX ORDER — ACETAMINOPHEN 500 MG
1000 TABLET ORAL ONCE
Status: COMPLETED | OUTPATIENT
Start: 2018-11-12 | End: 2018-11-12

## 2018-11-12 RX ORDER — SODIUM CHLORIDE, SODIUM LACTATE, POTASSIUM CHLORIDE, CALCIUM CHLORIDE 600; 310; 30; 20 MG/100ML; MG/100ML; MG/100ML; MG/100ML
100 INJECTION, SOLUTION INTRAVENOUS CONTINUOUS
Status: DISCONTINUED | OUTPATIENT
Start: 2018-11-12 | End: 2018-11-12 | Stop reason: HOSPADM

## 2018-11-12 RX ORDER — LIDOCAINE HYDROCHLORIDE 10 MG/ML
0.3 INJECTION INFILTRATION; PERINEURAL ONCE
Status: DISCONTINUED | OUTPATIENT
Start: 2018-11-12 | End: 2018-11-12 | Stop reason: HOSPADM

## 2018-11-12 RX ORDER — HYDROMORPHONE HYDROCHLORIDE 2 MG/ML
0.5 INJECTION, SOLUTION INTRAMUSCULAR; INTRAVENOUS; SUBCUTANEOUS
Status: DISCONTINUED | OUTPATIENT
Start: 2018-11-12 | End: 2018-11-12 | Stop reason: HOSPADM

## 2018-11-12 RX ORDER — PROPOFOL 10 MG/ML
INJECTION, EMULSION INTRAVENOUS AS NEEDED
Status: DISCONTINUED | OUTPATIENT
Start: 2018-11-12 | End: 2018-11-12 | Stop reason: HOSPADM

## 2018-11-12 RX ADMIN — LIDOCAINE HYDROCHLORIDE 60 MG: 20 INJECTION, SOLUTION EPIDURAL; INFILTRATION; INTRACAUDAL; PERINEURAL at 15:31

## 2018-11-12 RX ADMIN — SODIUM CHLORIDE, SODIUM LACTATE, POTASSIUM CHLORIDE, AND CALCIUM CHLORIDE: 600; 310; 30; 20 INJECTION, SOLUTION INTRAVENOUS at 15:49

## 2018-11-12 RX ADMIN — SODIUM CHLORIDE, SODIUM LACTATE, POTASSIUM CHLORIDE, AND CALCIUM CHLORIDE 100 ML/HR: 600; 310; 30; 20 INJECTION, SOLUTION INTRAVENOUS at 12:53

## 2018-11-12 RX ADMIN — ROCURONIUM BROMIDE 20 MG: 10 INJECTION, SOLUTION INTRAVENOUS at 15:31

## 2018-11-12 RX ADMIN — PROPOFOL 300 MG: 10 INJECTION, EMULSION INTRAVENOUS at 15:31

## 2018-11-12 RX ADMIN — FENTANYL CITRATE 100 MCG: 50 INJECTION, SOLUTION INTRAMUSCULAR; INTRAVENOUS at 15:31

## 2018-11-12 RX ADMIN — MIDAZOLAM 2 MG: 1 INJECTION INTRAMUSCULAR; INTRAVENOUS at 14:40

## 2018-11-12 RX ADMIN — ACETAMINOPHEN 1000 MG: 500 TABLET, FILM COATED ORAL at 12:53

## 2018-11-12 NOTE — PERIOP NOTES
Friend and ride home from surgery Kenisha Lara, not in building. Called number that Kenisha Lara left in [employer], [place]. Left voicemail message to return to [phone] to review D/C instructions.

## 2018-11-12 NOTE — ANESTHESIA POSTPROCEDURE EVALUATION
Procedure(s): 
TONSILLECTOMY. Anesthesia Post Evaluation Patient location during evaluation: bedside Patient participation: complete - patient participated Level of consciousness: responsive to verbal stimuli Pain management: satisfactory to patient Airway patency: patent Anesthetic complications: no 
Cardiovascular status: hemodynamically stable Respiratory status: spontaneous ventilation Hydration status: stable Visit Vitals /82 Pulse 80 Temp 36.6 °C (97.8 °F) Resp 20 Wt 123.8 kg (273 lb) SpO2 100% BMI 44.06 kg/m²

## 2018-11-12 NOTE — DISCHARGE INSTRUCTIONS
Instructions Following Ambulatory Surgery    Activity  · As tolerated and directed by your doctor  · Bathe or shower as directed by your doctor    Diet  · Clear liquids until no nausea or vomiting; then light diet for the first day  · Advance to regular diet on second day, unless your doctor orders otherwise  · If nausea and vomiting continues, call your doctor    Pain  · Take pain medication as directed by your doctor  ·  Call your doctor if pain is NOT relieved by medication  · DO NOT take aspirin or blood thinners until directed by your doctor    Follow-Up Phone Calls  · Will be made nursing staff  · If you have any problems, call your doctor as needed    Call your doctor if  · Excessive bleeding that does not stop after holding mild pressure over the area  · Temperature of 101 degrees F or above  · Redness,excessive swelling or bruising, and/or green or yellow, smelly discharge from incision     After Anesthesia  · For the first 24 hours: DO NOT Drive, Drink alcoholic beverages, or Make important decisions  · Be aware of dizziness following anesthesia and while taking pain medication        Tonsillectomy: What to Expect at Home  Your Recovery  A tonsillectomy is surgery to remove the tonsils. Sometimes the adenoids are removed during the same surgery. The tonsils and adenoids are in the throat. Your doctor did the surgery through your mouth. Most adults have a lot of throat pain for 1 to 2 weeks or longer. The pain may get worse before it gets better. The pain in your throat can also make your ears hurt. You may have good days and bad days. Most people find that they have the most pain in the first 8 days. You probably will feel tired for 1 to 2 weeks. You may have bad breath for up to 2 weeks. You may be able to go back to work or your usual routine in 1 to 2 weeks. There will be a white coating in your throat where the tonsils were.  The coating is like a scab, and it usually starts to come off in 5 to 10 days. It is usually gone in 10 to 16 days. You may see some blood in your spit as the coating comes off. After surgery, you may snore or breathe through your mouth at night. This usually gets better 1 to 2 weeks after surgery. Mouth breathing can make your mouth and throat dry or sore. Place a humidifier by your bed when you sleep. This may make it easier for you to breathe. Follow the directions for cleaning the machine. At first, your voice may sound different. Your voice probably will return to normal in 2 to 6 weeks. It is common for people to lose weight after this surgery, because it can hurt to swallow food at first. As long as you are drinking plenty of liquids, this is okay. You will probably gain the weight back when you begin to eat normally again. This care sheet gives you a general idea about how long it will take for you to recover. But each person recovers at a different pace. Follow the steps below to get better as quickly as possible. How can you care for yourself at home? Activity    · Rest when you feel tired. Getting enough sleep will help you recover.     · Try to walk each day. Start by walking a little more than you did the day before. Bit by bit, increase the amount you walk. Walking boosts blood flow and helps prevent pneumonia and constipation.     · Avoid strenuous activities, such as bicycle riding, jogging, weight lifting, or aerobic exercise, for 2 weeks or until your doctor says it is okay.     · For 2 weeks, avoid lifting anything that would make you strain. This may include a child, heavy grocery bags and milk containers, a heavy briefcase or backpack, cat litter or dog food bags, or a vacuum .     · Avoid dirt, dust, and heat for 2 weeks after surgery. These things can irritate your throat.     · For about 1 week, try to avoid crowds or people who you know have a cold or the flu.  This can help prevent you from getting an infection.     · You may bathe as usual.     · Ask your doctor when you can drive again.     · You will probably need to take 1 to 2 weeks off from work. It depends on the type of work you do and how you feel. Diet    · Drink plenty of fluids to avoid becoming dehydrated.     · If it is painful to swallow, start out with Popsicles, ice cream, or cold or room-temperature drinks. Do not eat or drink red food items, such as red juice or red Jell-O. The color may make you think you are bleeding. Avoid hot drinks, soda pop, orange or tomato juice, and other acidic foods that can sting the throat. These may make throat pain worse and cause bleeding.     · For 2 weeks, choose soft foods like pudding, yogurt, canned or cooked fruit, scrambled eggs, and mashed potatoes. Avoid eating hard or scratchy foods like chips or raw vegetables.     · You may notice that your bowel movements are not regular right after your surgery. This is common. Try to avoid constipation and straining with bowel movements. You may want to take a fiber supplement every day. If you have not had a bowel movement after a couple of days, ask your doctor about taking a mild laxative. Medicines    · Your doctor will tell you if and when you can restart your medicines. He or she will also give you instructions about taking any new medicines.     · If you take blood thinners, such as warfarin (Coumadin), clopidogrel (Plavix), or aspirin, be sure to talk to your doctor. He or she will tell you if and when to start taking those medicines again. Make sure that you understand exactly what your doctor wants you to do.     · Be safe with medicines. Take pain medicines exactly as directed. ? If the doctor gave you a prescription medicine for pain, take it as prescribed.   ? If you are not taking a prescription pain medicine, ask your doctor if you can take an over-the-counter medicine.     · If you think your pain medicine is making you sick to your stomach:  ? Take your pain medicine after meals (unless your doctor has told you not to). ? Ask your doctor for a different pain medicine.     · If your doctor prescribed antibiotics, take them as directed. Do not stop taking them just because you feel better. You need to take the full course of antibiotics. Follow-up care is a key part of your treatment and safety. Be sure to make and go to all appointments, and call your doctor if you are having problems. It's also a good idea to know your test results and keep a list of the medicines you take. When should you call for help? Call 911 anytime you think you may need emergency care. For example, call if:    · You passed out (lost consciousness).     · You have severe trouble breathing.     · You have a lot of bleeding.    Call your doctor now or seek immediate medical care if:    · You have signs of infection, such as:  ? Increased pain, swelling, warmth, or redness. ? Red streaks leading from the area. ? Pus draining from the area. ? A fever.     · You are bleeding.     · You are too sick to your stomach to drink any fluids.     · You cannot keep down fluids.     · You have new pain, or your pain gets worse.    Watch closely for changes in your health, and be sure to contact your doctor if:    · You do not get better as expected. Where can you learn more? Go to http://violetta-maya.info/. Enter E025 in the search box to learn more about \"Tonsillectomy: What to Expect at Home. \"  Current as of: March 28, 2018  Content Version: 11.8  © 5385-2294 [employer]. Care instructions adapted under license by [employer] (which disclaims liability or warranty for this information). If you have questions about a medical condition or this instruction, always ask your healthcare professional. [address] any warranty or liability for your use of this information.

## 2018-11-12 NOTE — ANESTHESIA PREPROCEDURE EVALUATION
Anesthetic History No history of anesthetic complications Review of Systems / Medical History Patient summary reviewed and pertinent labs reviewed Pulmonary Sleep apnea Comments: No longer uses CPAP Neuro/Psych CVA Comments: H/O multiple \"mini-strokes\" Cardiovascular Hypertension: well controlled GI/Hepatic/Renal 
  
 
 
 
 
 
 Endo/Other Diabetes: well controlled, type 2 Comments: Currently on no meds for DM Other Findings Physical Exam 
 
Airway Mallampati: II 
TM Distance: 4 - 6 cm Neck ROM: normal range of motion Mouth opening: Normal 
 
 Cardiovascular Rhythm: regular Dental 
No notable dental hx Pulmonary Breath sounds clear to auscultation Abdominal 
 
 
 
 Other Findings Anesthetic Plan ASA: 3 Anesthesia type: general 
 
 
 
 
 
Anesthetic plan and risks discussed with: Patient

## 2018-11-12 NOTE — PROGRESS NOTES
Pre-op prayer request received. Prayer and support given to patient and friend. Rev. Ole Goltz Chaplain

## 2018-11-13 NOTE — OP NOTES
[facility] REPORT    Vincent Lopez  MR#: [id number]  : 1975  ACCOUNT #: [de-identified]   DATE OF SERVICE: 2018    PREOPERATIVE DIAGNOSES:  Chronic tonsillitis, tonsillar hypertrophy. POSTOPERATIVE DIAGNOSES: Chronic tonsillitis, tonsillar hypertrophy. PROCEDURE PERFORMED:  Tonsillectomy. SURGEON:  Annie Cruz. DO Frances    ASSISTANT:  None. ANESTHESIA:  General.    COMPLICATIONS:  None. ESTIMATED BLOOD LOSS:  Less than 5 mL. HISTORY:  A 58-year-old female came to see me in the office with a complaint of recurrent tonsillitis and tonsillar hypertrophy. She keeps on getting tonsil stones giving her a bad taste inside her mouth and they smell bad. She can feel like something is stuck in the back of her throat. So, again, just new and worsening tonsil issues. Tonsils were 2-3+ cryptic tonsil stones. No sign of any active infections. Based on the history and physical exam, it was my recommendation that she undergo a tonsillectomy. The procedure, risks and benefits were discussed with her in the office. All questions were answered and she is agreeable to the surgery. SURGERY ITSELF:  The patient was identified in the preoperative waiting area. She was taken back to the operating room where she underwent general anesthesia. The bed was turned 90 degrees. A Al-Donta mouth gag was inserted and opened. A curved Allis was used to medialize the left tonsil which was removed using the Bovie and then tonsil fossa was cauterized using suction cautery. There was minimal bleeding from the left. The same thing was done on the right. A curved Allis was used to medialize the right tonsil which was removed using the Bovie and the tonsillar fossa was cauterized using suction cautery. There was minimal bleeding from the right side.   Once there was good hemostasis and the Al-Donta mouth gag was released and removed, the patient was awakened. She was taken to postop recovery room in stable condition.       DO JANIA Martin / [place]  D: 11/12/2018 15:53     T: 11/12/2018 21:08  JOB #: [id number]

## 2019-12-18 ENCOUNTER — HOSPITAL ENCOUNTER (EMERGENCY)
Age: 44
Discharge: HOME | End: 2019-12-18
Attending: EMERGENCY MEDICINE
Payer: MEDICARE

## 2019-12-18 VITALS
RESPIRATION RATE: 16 BRPM | TEMPERATURE: 97.9 F | HEIGHT: 66 IN | WEIGHT: 275 LBS | BODY MASS INDEX: 44.2 KG/M2 | HEART RATE: 73 BPM | DIASTOLIC BLOOD PRESSURE: 88 MMHG | OXYGEN SATURATION: 100 % | SYSTOLIC BLOOD PRESSURE: 150 MMHG

## 2019-12-18 DIAGNOSIS — M54.12 CERVICAL RADICULITIS: Primary | ICD-10-CM

## 2019-12-18 PROCEDURE — 99282 EMERGENCY DEPT VISIT SF MDM: CPT | Performed by: EMERGENCY MEDICINE

## 2019-12-18 RX ORDER — METHYLPREDNISOLONE 4 MG/1
4 TABLET ORAL
Qty: 1 DOSE PACK | Refills: 0 | Status: SHIPPED | OUTPATIENT
Start: 2019-12-18 | End: 2020-08-31 | Stop reason: ALTCHOICE

## 2019-12-18 NOTE — DISCHARGE INSTRUCTIONS
Ice your neck and shoulder for 20 minutes at a time, 4 times a day for the next 2-3 days. Take tylenol for pain. Use steroids as prescribed.

## 2019-12-18 NOTE — ED TRIAGE NOTES
Pt to ED c/o upper back pain and left arm pain that she noticed while driving about an hour ago. States she came to ED because \"pain just won't stop. \" States it's the same arm that she uses to walk her dog on leash. States when she takes a deep breath, she can feel the muscle pulling. No respiratory distress noted.

## 2019-12-18 NOTE — ED PROVIDER NOTES
[facility]     Radha Herbert is a 38 y.o. female seen on 12/18/2019 at 10:30 AM in the [facility] EMERGENCY DEPT in room ER05/05. Chief Complaint   Patient presents with    Back Pain    Arm Pain     HPI: 76-year-old female presenting to the emergency department complaining of pain beginning in her lower cervical neck radiating down her left arm. Pain began this morning. She thinks she may have pulled a muscle while she was walking her dog if she holds a leash with this arm. Sometimes the dog's will pull on the leash somewhat aggressively. She denies numbness or tingling or weakness in her extremity. She denies similar symptoms in the past.  She states the pain is worse with movement of her left upper extremity. The pain is primarily on the left side of her neck at the base at her shoulder. she states the pain seems to radiate down from her neck to her shoulder and into her arm. The pain is primarily in her upper arm. She has minimal symptoms in the forearm and hand. Historian: Patient    REVIEW OF SYSTEMS     Review of Systems   Constitutional: Negative for fever. HENT: Negative. Eyes: Negative. Respiratory: Negative for cough, chest tightness, shortness of breath and wheezing. Cardiovascular: Negative for chest pain. Gastrointestinal: Negative for abdominal distention, abdominal pain, constipation, diarrhea and vomiting. Endocrine: Negative. Genitourinary: Negative for dysuria, flank pain, frequency and urgency. Neurological: Negative for dizziness, syncope and headaches. Psychiatric/Behavioral: Negative. All other systems reviewed and are negative.       PAST MEDICAL HISTORY     Past Medical History:   Diagnosis Date    Anxiety     Arthritis     Bipolar affective disorder ([facility] 11 yrs old    Cellulitis 01/08/2010    denies currently     Diabetes ([facility] dx:1/09    Unknown A1C    Diabetes ([facility] 01/08/2010    had multiple TIA's from low bs in 2014 and hasn't been on any meds since    Edema of both legs 2010    ankles     GERD (gastroesophageal reflux disease)     denies    Gout     Hypertension     no meds    Morbid obesity (HCC) lifetime    Non compliance with medical treatment long term    Orthopnea     Other ill-defined conditions(799.89)     cellulitis    Peripheral neuropathy 2 years    Peripheral vascular disease ([facility]     Psychiatric disorder     bipolar disorder    Renal insufficiency unknown    Renal insufficiency 2010    Stroke ([facility]     multiple TIA's in ; no residual     Unspecified sleep apnea     does not use cpap    Vertigo 3/8/2015     Past Surgical History:   Procedure Laterality Date    DELIVERY   ,     both preemies    HX HEENT      Tonsillectomy/SFE/18    HX HERNIA REPAIR  age 10   [de-identified] HIP REPLACEMENT Left     Dr. Cristiane Ramachandran    HX TONSILLECTOMY      HX TUBAL LIGATION       Social History     Socioeconomic History    Marital status: SINGLE     Spouse name: Not on file    Number of children: Not on file    Years of education: Not on file    Highest education level: Not on file   Tobacco Use    Smoking status: Never Smoker    Smokeless tobacco: Never Used   Substance and Sexual Activity    Alcohol use: No    Drug use: No    Sexual activity: Not Currently   Social History Narrative    Patient is single, lives with disabled father, 15year old disabled son and 8year old daughter. 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 She reports a lot of stress, including her 12year old daughter living in [place] [place] [facility] with an aunt due to some DSS issue. She admits to being non-compliant with meds and diet in part due to stress. Prior to Admission Medications   Prescriptions Last Dose Informant Patient Reported? Taking?   ibuprofen (MOTRIN IB PO)   Yes No   Sig: Take 5 Tabs by mouth daily as needed for Pain. Facility-Administered Medications: None     Allergies   Allergen Reactions    Ciprofloxacin Itching    Sulfa (Sulfonamide Antibiotics) Itching        PHYSICAL EXAM       Vitals:    12/18/19 0937   BP: 150/88   Pulse: 73   Resp: 16   Temp: 97.9 °F (36.6 °C)   SpO2: 100%    Vital signs were reviewed. Physical Exam  Constitutional:       General: She is not in acute distress. Appearance: She is well-developed. HENT:      Head: Normocephalic and atraumatic. Eyes:      Pupils: Pupils are equal, round, and reactive to light. Neck:      Musculoskeletal: Normal range of motion. Comments: He has no midline tenderness to palpation, tenderness palpation at the base of the neck over the cervical component of the trapezius on the left  Pulmonary:      Effort: Pulmonary effort is normal.   Musculoskeletal: Normal range of motion. General: No swelling, deformity or signs of injury. Left shoulder: She exhibits tenderness. She exhibits normal range of motion, no bony tenderness, no swelling, no effusion, no crepitus, no deformity, no laceration, no spasm, normal pulse and normal strength. Comments: No erythema,    Neurological:      Mental Status: She is alert and oriented to person, place, and time. Cranial Nerves: Cranial nerves are intact. Sensory: Sensation is intact. No sensory deficit. Motor: Motor function is intact. No weakness or atrophy. Coordination: Coordination is intact. Psychiatric:         Behavior: Behavior normal.          MEDICAL DECISION MAKING     ED Course: Patient symptoms appear most consistent with cervical radiculitis. Recommended symptomatic care. The patient does not wish to take ibuprofen due to a history of heavy menstrual cycles and was instructed not to take this by her gynecologist.  She will take Tylenol for pain will place her on a short course of steroids.     Disposition: Discharge home   Diagnosis:  cervical earnesttis  ____________________________________________________________________  A portion of this note was generated using voice recognition dictation software. While the note has been reviewed for accuracy, please note certain words and phrases may not be transcribed as intended and some grammatical and/or typographical errors may be present.

## 2020-05-21 ENCOUNTER — HOSPITAL ENCOUNTER (OUTPATIENT)
Dept: MRI IMAGING | Age: 45
Discharge: HOME | End: 2020-05-21
Attending: ORTHOPAEDIC SURGERY
Payer: MEDICARE

## 2020-05-21 DIAGNOSIS — G89.29 CHRONIC HIP PAIN AFTER TOTAL REPLACEMENT OF LEFT HIP JOINT: ICD-10-CM

## 2020-05-21 DIAGNOSIS — Z96.642 CHRONIC HIP PAIN AFTER TOTAL REPLACEMENT OF LEFT HIP JOINT: ICD-10-CM

## 2020-05-21 DIAGNOSIS — M25.552 CHRONIC HIP PAIN AFTER TOTAL REPLACEMENT OF LEFT HIP JOINT: ICD-10-CM

## 2020-05-21 PROCEDURE — 73721 MRI JNT OF LWR EXTRE W/O DYE: CPT

## 2020-05-27 ENCOUNTER — HOSPITAL ENCOUNTER (OUTPATIENT)
Dept: NUCLEAR MEDICINE | Age: 45
Discharge: HOME | End: 2020-05-27
Attending: ORTHOPAEDIC SURGERY
Payer: MEDICARE

## 2020-05-27 ENCOUNTER — HOSPITAL ENCOUNTER (OUTPATIENT)
Dept: LAB | Age: 45
Discharge: HOME | End: 2020-05-27
Payer: MEDICARE

## 2020-05-27 DIAGNOSIS — G89.29 CHRONIC HIP PAIN AFTER TOTAL REPLACEMENT OF LEFT HIP JOINT: ICD-10-CM

## 2020-05-27 DIAGNOSIS — Z96.642 CHRONIC HIP PAIN AFTER TOTAL REPLACEMENT OF LEFT HIP JOINT: ICD-10-CM

## 2020-05-27 DIAGNOSIS — M25.552 CHRONIC HIP PAIN AFTER TOTAL REPLACEMENT OF LEFT HIP JOINT: ICD-10-CM

## 2020-05-27 LAB
BASOPHILS # BLD: 0 K/UL (ref 0–0.2)
BASOPHILS NFR BLD: 1 % (ref 0–2)
CRP SERPL-MCNC: <0.3 MG/DL (ref 0–0.9)
DIFFERENTIAL METHOD BLD: ABNORMAL
EOSINOPHIL # BLD: 0.1 K/UL (ref 0–0.8)
EOSINOPHIL NFR BLD: 3 % (ref 0.5–7.8)
ERYTHROCYTE [DISTWIDTH] IN BLOOD BY AUTOMATED COUNT: 13.2 % (ref 11.9–14.6)
ERYTHROCYTE [SEDIMENTATION RATE] IN BLOOD: 12 MM/HR (ref 0–20)
HCT VFR BLD AUTO: 35.2 % (ref 35.8–46.3)
HGB BLD-MCNC: 11.5 G/DL (ref 11.7–15.4)
IMM GRANULOCYTES # BLD AUTO: 0 K/UL (ref 0–0.5)
IMM GRANULOCYTES NFR BLD AUTO: 0 % (ref 0–5)
LYMPHOCYTES # BLD: 1.5 K/UL (ref 0.5–4.6)
LYMPHOCYTES NFR BLD: 35 % (ref 13–44)
MCH RBC QN AUTO: 30.8 PG (ref 26.1–32.9)
MCHC RBC AUTO-ENTMCNC: 32.7 G/DL (ref 31.4–35)
MCV RBC AUTO: 94.4 FL (ref 79.6–97.8)
MONOCYTES # BLD: 0.4 K/UL (ref 0.1–1.3)
MONOCYTES NFR BLD: 10 % (ref 4–12)
NEUTS SEG # BLD: 2.2 K/UL (ref 1.7–8.2)
NEUTS SEG NFR BLD: 52 % (ref 43–78)
NRBC # BLD: 0 K/UL (ref 0–0.2)
PLATELET # BLD AUTO: 296 K/UL (ref 150–450)
PMV BLD AUTO: 10.2 FL (ref 9.4–12.3)
RBC # BLD AUTO: 3.73 M/UL (ref 4.05–5.2)
WBC # BLD AUTO: 4.3 K/UL (ref 4.3–11.1)

## 2020-05-27 PROCEDURE — 78306 BONE IMAGING WHOLE BODY: CPT

## 2020-05-27 PROCEDURE — 85025 COMPLETE CBC W/AUTO DIFF WBC: CPT

## 2020-05-27 PROCEDURE — 85652 RBC SED RATE AUTOMATED: CPT

## 2020-05-27 PROCEDURE — 82495 ASSAY OF CHROMIUM: CPT

## 2020-05-27 PROCEDURE — 86140 C-REACTIVE PROTEIN: CPT

## 2020-05-27 PROCEDURE — 36415 COLL VENOUS BLD VENIPUNCTURE: CPT

## 2020-05-27 PROCEDURE — 83018 HEAVY METAL QUAN EACH NES: CPT

## 2020-05-28 LAB
COBALT SERPL-MCNC: 2.5 UG/L (ref 0–0.9)
CR SERPL-MCNC: 1.5 UG/L (ref 0.1–2.1)

## 2020-08-06 ENCOUNTER — APPOINTMENT (OUTPATIENT)
Dept: GENERAL RADIOLOGY | Age: 45
End: 2020-08-06
Attending: EMERGENCY MEDICINE
Payer: MEDICARE

## 2020-08-06 ENCOUNTER — HOSPITAL ENCOUNTER (EMERGENCY)
Age: 45
Discharge: HOME | End: 2020-08-06
Attending: EMERGENCY MEDICINE
Payer: MEDICARE

## 2020-08-06 VITALS
DIASTOLIC BLOOD PRESSURE: 81 MMHG | SYSTOLIC BLOOD PRESSURE: 156 MMHG | RESPIRATION RATE: 18 BRPM | OXYGEN SATURATION: 99 % | HEART RATE: 71 BPM

## 2020-08-06 DIAGNOSIS — I49.3 FREQUENT PVCS: ICD-10-CM

## 2020-08-06 DIAGNOSIS — R07.89 CHEST WALL PAIN: Primary | ICD-10-CM

## 2020-08-06 DIAGNOSIS — D64.9 ANEMIA, UNSPECIFIED TYPE: ICD-10-CM

## 2020-08-06 LAB
ANION GAP SERPL CALC-SCNC: 6 MMOL/L (ref 7–16)
ATRIAL RATE: 71 BPM
BASOPHILS # BLD: 0 K/UL (ref 0–0.2)
BASOPHILS NFR BLD: 1 % (ref 0–2)
BUN SERPL-MCNC: 11 MG/DL (ref 6–23)
CALCIUM SERPL-MCNC: 8.8 MG/DL (ref 8.3–10.4)
CALCULATED P AXIS, ECG09: 61 DEGREES
CALCULATED R AXIS, ECG10: 49 DEGREES
CALCULATED T AXIS, ECG11: 35 DEGREES
CHLORIDE SERPL-SCNC: 107 MMOL/L (ref 98–107)
CO2 SERPL-SCNC: 26 MMOL/L (ref 21–32)
CREAT SERPL-MCNC: 1.04 MG/DL (ref 0.6–1)
DIAGNOSIS, 93000: NORMAL
DIFFERENTIAL METHOD BLD: ABNORMAL
EOSINOPHIL # BLD: 0.2 K/UL (ref 0–0.8)
EOSINOPHIL NFR BLD: 4 % (ref 0.5–7.8)
ERYTHROCYTE [DISTWIDTH] IN BLOOD BY AUTOMATED COUNT: 13.5 % (ref 11.9–14.6)
GLUCOSE SERPL-MCNC: 87 MG/DL (ref 65–100)
HCT VFR BLD AUTO: 33.8 % (ref 35.8–46.3)
HGB BLD-MCNC: 10.8 G/DL (ref 11.7–15.4)
IMM GRANULOCYTES # BLD AUTO: 0 K/UL (ref 0–0.5)
IMM GRANULOCYTES NFR BLD AUTO: 0 % (ref 0–5)
LYMPHOCYTES # BLD: 1.6 K/UL (ref 0.5–4.6)
LYMPHOCYTES NFR BLD: 43 % (ref 13–44)
MCH RBC QN AUTO: 30.9 PG (ref 26.1–32.9)
MCHC RBC AUTO-ENTMCNC: 32 G/DL (ref 31.4–35)
MCV RBC AUTO: 96.8 FL (ref 79.6–97.8)
MONOCYTES # BLD: 0.5 K/UL (ref 0.1–1.3)
MONOCYTES NFR BLD: 13 % (ref 4–12)
NEUTS SEG # BLD: 1.4 K/UL (ref 1.7–8.2)
NEUTS SEG NFR BLD: 39 % (ref 43–78)
NRBC # BLD: 0 K/UL (ref 0–0.2)
P-R INTERVAL, ECG05: 154 MS
PLATELET # BLD AUTO: 220 K/UL (ref 150–450)
PMV BLD AUTO: 10.8 FL (ref 9.4–12.3)
POTASSIUM SERPL-SCNC: 4 MMOL/L (ref 3.5–5.1)
Q-T INTERVAL, ECG07: 424 MS
QRS DURATION, ECG06: 78 MS
QTC CALCULATION (BEZET), ECG08: 460 MS
RBC # BLD AUTO: 3.49 M/UL (ref 4.05–5.2)
SODIUM SERPL-SCNC: 139 MMOL/L (ref 136–145)
VENTRICULAR RATE, ECG03: 71 BPM
WBC # BLD AUTO: 3.6 K/UL (ref 4.3–11.1)

## 2020-08-06 PROCEDURE — 96375 TX/PRO/DX INJ NEW DRUG ADDON: CPT

## 2020-08-06 PROCEDURE — 80048 BASIC METABOLIC PNL TOTAL CA: CPT

## 2020-08-06 PROCEDURE — 99282 EMERGENCY DEPT VISIT SF MDM: CPT

## 2020-08-06 PROCEDURE — 93005 ELECTROCARDIOGRAM TRACING: CPT | Performed by: EMERGENCY MEDICINE

## 2020-08-06 PROCEDURE — 74011250636 HC RX REV CODE- 250/636: Performed by: EMERGENCY MEDICINE

## 2020-08-06 PROCEDURE — 96374 THER/PROPH/DIAG INJ IV PUSH: CPT

## 2020-08-06 PROCEDURE — 71046 X-RAY EXAM CHEST 2 VIEWS: CPT

## 2020-08-06 PROCEDURE — 85025 COMPLETE CBC W/AUTO DIFF WBC: CPT

## 2020-08-06 RX ORDER — HYDROCODONE BITARTRATE AND ACETAMINOPHEN 5; 325 MG/1; MG/1
1 TABLET ORAL
Qty: 15 TAB | Refills: 0 | Status: SHIPPED | OUTPATIENT
Start: 2020-08-06 | End: 2020-08-11

## 2020-08-06 RX ORDER — KETOROLAC TROMETHAMINE 30 MG/ML
15 INJECTION, SOLUTION INTRAMUSCULAR; INTRAVENOUS
Status: COMPLETED | OUTPATIENT
Start: 2020-08-06 | End: 2020-08-06

## 2020-08-06 RX ORDER — CYCLOBENZAPRINE HCL 10 MG
10 TABLET ORAL
Qty: 10 TAB | Refills: 0 | Status: SHIPPED | OUTPATIENT
Start: 2020-08-06 | End: 2020-08-16

## 2020-08-06 RX ORDER — MELOXICAM 15 MG/1
15 TABLET ORAL DAILY
Qty: 30 TAB | Refills: 0 | Status: SHIPPED | OUTPATIENT
Start: 2020-08-06 | End: 2020-11-25 | Stop reason: ALTCHOICE

## 2020-08-06 RX ORDER — MORPHINE SULFATE 4 MG/ML
4 INJECTION INTRAVENOUS
Status: DISCONTINUED | OUTPATIENT
Start: 2020-08-06 | End: 2020-08-06 | Stop reason: HOSPADM

## 2020-08-06 RX ADMIN — KETOROLAC TROMETHAMINE 15 MG: 30 INJECTION, SOLUTION INTRAMUSCULAR at 05:29

## 2020-08-06 NOTE — ED PROVIDER NOTES
Patient is a 48-year-old female presenting to the emergency department today complaining of neck pain and right-sided chest pain. The patient states that the pain started about a week ago and has been bothering her ever since but progressively getting worse. Patient denies any shortness of breath fevers or chills. She states that the pain in the neck is been there for years and she was supposed to be going to physical therapy but due to the coronavirus pandemic has not been able to do the physical therapy as directed. Patient has a history of severe arthritis and had a total hip replacement done 9 years ago at the age of 38. She says that she once was a diabetic but lost weight and is no longer taking diabetic medications. She also tells me that her orthopedic surgeon wants to have her lose 50 pounds so he can replace her total hip again because of increasing pain symptoms. 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            Past Medical History:   Diagnosis Date    Anxiety     Arthritis     Bipolar affective disorder ([address] 11 yrs old    Cellulitis 01/08/2010    denies currently     Diabetes ([address] dx:1/09    Unknown A1C    Diabetes ([address] 01/08/2010    had multiple TIA's from low bs in 2014 and hasn't been on any meds since    Edema of both legs 01/08/2010    ankles     GERD (gastroesophageal reflux disease)     denies    Gout     Hypertension     no meds    Morbid obesity ([address] lifetime    Non compliance with medical treatment long term    Orthopnea     Other ill-defined conditions(799.89)     cellulitis    Peripheral neuropathy 2 years    Peripheral vascular disease ([address]     Psychiatric disorder     bipolar disorder    Renal insufficiency unknown    Renal insufficiency 1/8/2010    Stroke ([address]     multiple TIA's in 2014; no residual     Unspecified sleep apnea     does not use cpap    Vertigo 3/8/2015       Past Surgical History:   Procedure Laterality Date  DELIVERY   ,     both preemies    HX HEENT      Tonsillectomy/SFE/18    HX HERNIA REPAIR  age 9    HX HIP REPLACEMENT Left     Dr. Karissa Mcgrath    HX TONSILLECTOMY      HX TUBAL LIGATION           Family History:   Problem Relation Age of Onset    No Known Problems Mother     Other Father         aneurysm age 62, now brain damaged    Heart Attack Father     Heart Disease Father     Other Son         25 week twin, LD    Cancer Sister         cervical    Cancer Sister         hodgkins       Social History     Socioeconomic History    Marital status: SINGLE     Spouse name: Not on file    Number of children: Not on file    Years of education: Not on file    Highest education level: Not on file   Occupational History    Not on file   Social Needs    Financial resource strain: Not on file    Food insecurity     Worry: Not on file     Inability: Not on file    Transportation needs     Medical: Not on file     Non-medical: Not on file   Tobacco Use    Smoking status: Never Smoker    Smokeless tobacco: Never Used   Substance and Sexual Activity    Alcohol use: No    Drug use: No    Sexual activity: Not Currently   Lifestyle    Physical activity     Days per week: Not on file     Minutes per session: Not on file    Stress: Not on file   Relationships    Social connections     Talks on phone: Not on file     Gets together: Not on file     Attends Druze service: Not on file     Active member of club or organization: Not on file     Attends meetings of clubs or organizations: Not on file     Relationship status: Not on file    Intimate partner violence     Fear of current or ex partner: Not on file     Emotionally abused: Not on file     Physically abused: Not on file     Forced sexual activity: Not on file   Other Topics Concern    Not on file   Social History Narrative    Patient is single, lives with disabled father, 15year old disabled son and 8year old daughter. 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 She reports a lot of stress, including her 12year old daughter living in [place] with an aunt due to some DSS issue. She admits to being non-compliant with meds and diet in part due to stress. ALLERGIES: Ciprofloxacin and Sulfa (sulfonamide antibiotics)    Review of Systems   Cardiovascular: Positive for chest pain. Musculoskeletal: Positive for neck pain. All other systems reviewed and are negative. Vitals:    08/06/20 0500   BP: 156/81   Pulse: 71   Resp: 18   SpO2: 99%            Physical Exam     GENERAL:The patient is morbidly obese, and well-hydrated. VITAL SIGNS: Heart rate, blood pressure, respiratory rate reviewed as recorded in  nurse's notes  EYES: Pupils reactive. Extraocular motion intact. No conjunctival redness or drainage. NECK: Supple, no meningeal signs. Trachea midline. No masses or thyromegaly. LUNGS: Breath sounds clear and equal bilaterally no accessory muscle use  CHEST: No deformity or crepitus. The patient refused to allow me to palpate the right side of her chest wall because it hurts to move take a deep breath or pushes on. CARDIOVASCULAR: Regular rate and rhythm  EXTREMITIES: No clubbing or cyanosis. No joint swelling. Normal muscle tone. No  restricted range of motion appreciated. NEUROLOGIC: Sensation is grossly intact. Cranial nerve exam reveals face is  symmetrical, tongue is midline speech is clear. SKIN: No rash or petechiae. Good skin turgor palpated. PSYCHIATRIC: Alert and oriented. Appropriate behavior and judgment.       MDM  Number of Diagnoses or Management Options  Diagnosis management comments: MI, coronary artery disease, unstable angina, coronary artery disease,    Atrial fibrillation, cardiac arrhythmia, PVC, medication induced palpitations, heart block,  electrolyte induced palpitations,    GERD, musculoskeletal pain, costochondritis, rib fracture, pleurisy,    Chronic neck pain, muscle spasm, pneumonia, rib pain, inhaled rib dysfunction,         Amount and/or Complexity of Data Reviewed  Clinical lab tests: reviewed and ordered  Tests in the radiology section of CPT®: ordered and reviewed  Tests in the medicine section of CPT®: ordered and reviewed  Review and summarize past medical records: yes  Independent visualization of images, tracings, or specimens: yes      ED Course as of Aug 06 0606   Thu Aug 06, 2020   0550 IMPRESSION: No acute process.    XR CHEST PA LAT [KH]      ED Course User Index  [KH] Tata Lu, DO       Procedures

## 2020-08-06 NOTE — ED TRIAGE NOTES
Pt states that she is having right shoulder right side of neck pain, right arm pain and going on around the right side of her breast. States that it has been going for \"GOOD MINUTE\"  States she thinks that this started over a year ago and getting better and then started hurting for the last week

## 2020-08-06 NOTE — DISCHARGE INSTRUCTIONS
I would recommend you following up with cardiology outpatient secondary to your frequent PVCs as you may need to be started on a new medication if the frequency persists. Take the meloxicam once daily in the morning with food for the next week then as needed. Do the stretching exercises like we discussed. Drink 1 to 2 L of water daily. Take the Flexeril at nighttime before going to bed as needed. Talk to your family doctor about getting back to the physical therapy to help decrease muscle tension. Risk of opioid analgesics include, but are not limited to: Overdose they can stop or slow your breathing and lead to death; fractures from falls; drowsiness leading to injury; tolerance, dependence and addiction. You should not operate any motorized vehicles or work from a height greater than ground level when taking opioid analgesics as they increase your fall risks.

## 2020-08-06 NOTE — ED NOTES
I have reviewed discharge instructions with the patient. The patient verbalized understanding. Patient left ED via Discharge Method: ambulatory to Home with  self). Opportunity for questions and clarification provided. Patient given 3 scripts. To continue your aftercare when you leave the hospital, you may receive an automated call from our care team to check in on how you are doing. This is a free service and part of our promise to provide the best care and service to meet your aftercare needs.  If you have questions, or wish to unsubscribe from this service please call [phone]. Thank you for Choosing our [facility] Emergency Department.

## 2020-08-31 PROBLEM — R07.89 OTHER CHEST PAIN: Status: ACTIVE | Noted: 2020-08-31

## 2020-09-30 ENCOUNTER — HOSPITAL ENCOUNTER (OUTPATIENT)
Dept: LAB | Age: 45
Discharge: HOME | End: 2020-09-30
Payer: MEDICARE

## 2020-09-30 DIAGNOSIS — E11.9 DIABETES MELLITUS TYPE II, NON INSULIN DEPENDENT (HCC): Chronic | ICD-10-CM

## 2020-09-30 DIAGNOSIS — I10 ESSENTIAL HYPERTENSION: Chronic | ICD-10-CM

## 2020-09-30 PROBLEM — R93.1 ABNORMAL NUCLEAR CARDIAC IMAGING TEST: Status: ACTIVE | Noted: 2020-09-30

## 2020-09-30 LAB
CHOLEST SERPL-MCNC: 196 MG/DL
HDLC SERPL-MCNC: 66 MG/DL (ref 40–60)
HDLC SERPL: 3 {RATIO}
LDLC SERPL CALC-MCNC: 121.4 MG/DL
LIPID PROFILE,FLP: ABNORMAL
TRIGL SERPL-MCNC: 43 MG/DL (ref 35–150)
VLDLC SERPL CALC-MCNC: 8.6 MG/DL (ref 6–23)

## 2020-09-30 PROCEDURE — 36415 COLL VENOUS BLD VENIPUNCTURE: CPT

## 2020-09-30 PROCEDURE — 80061 LIPID PANEL: CPT

## 2020-10-09 ENCOUNTER — HOSPITAL ENCOUNTER (OUTPATIENT)
Dept: CT IMAGING | Age: 45
Discharge: HOME | End: 2020-10-09
Attending: INTERNAL MEDICINE
Payer: MEDICARE

## 2020-10-09 DIAGNOSIS — R07.89 OTHER CHEST PAIN: ICD-10-CM

## 2020-10-09 DIAGNOSIS — R93.1 ABNORMAL NUCLEAR CARDIAC IMAGING TEST: ICD-10-CM

## 2020-10-09 DIAGNOSIS — E11.9 DIABETES MELLITUS TYPE II, NON INSULIN DEPENDENT (HCC): Chronic | ICD-10-CM

## 2020-10-09 PROCEDURE — 74011000636 HC RX REV CODE- 636: Performed by: INTERNAL MEDICINE

## 2020-10-09 PROCEDURE — 75574 CT ANGIO HRT W/3D IMAGE: CPT

## 2020-10-09 PROCEDURE — 74011000258 HC RX REV CODE- 258: Performed by: INTERNAL MEDICINE

## 2020-10-09 RX ORDER — SODIUM CHLORIDE 0.9 % (FLUSH) 0.9 %
10 SYRINGE (ML) INJECTION
Status: COMPLETED | OUTPATIENT
Start: 2020-10-09 | End: 2020-10-09

## 2020-10-09 RX ADMIN — Medication 10 ML: at 09:39

## 2020-10-09 RX ADMIN — IOPAMIDOL 100 ML: 755 INJECTION, SOLUTION INTRAVENOUS at 09:39

## 2020-10-09 RX ADMIN — SODIUM CHLORIDE 100 ML: 900 INJECTION, SOLUTION INTRAVENOUS at 09:39

## 2021-03-04 ENCOUNTER — APPOINTMENT (OUTPATIENT)
Dept: GENERAL RADIOLOGY | Age: 46
End: 2021-03-04
Attending: EMERGENCY MEDICINE
Payer: MEDICARE

## 2021-03-04 ENCOUNTER — HOSPITAL ENCOUNTER (EMERGENCY)
Age: 46
Discharge: HOME | End: 2021-03-04
Attending: EMERGENCY MEDICINE
Payer: MEDICARE

## 2021-03-04 VITALS
DIASTOLIC BLOOD PRESSURE: 79 MMHG | HEIGHT: 65 IN | TEMPERATURE: 98 F | BODY MASS INDEX: 48.82 KG/M2 | OXYGEN SATURATION: 96 % | SYSTOLIC BLOOD PRESSURE: 170 MMHG | HEART RATE: 96 BPM | RESPIRATION RATE: 16 BRPM | WEIGHT: 293 LBS

## 2021-03-04 DIAGNOSIS — J45.901 MODERATE ASTHMA WITH ACUTE EXACERBATION, UNSPECIFIED WHETHER PERSISTENT: Primary | ICD-10-CM

## 2021-03-04 LAB
ALBUMIN SERPL-MCNC: 3.9 G/DL (ref 3.5–5)
ALBUMIN/GLOB SERPL: 0.9 {RATIO} (ref 1.2–3.5)
ALP SERPL-CCNC: 70 U/L (ref 50–136)
ALT SERPL-CCNC: 23 U/L (ref 12–65)
ANION GAP SERPL CALC-SCNC: 5 MMOL/L (ref 7–16)
AST SERPL-CCNC: 13 U/L (ref 15–37)
ATRIAL RATE: 91 BPM
BASOPHILS # BLD: 0 K/UL (ref 0–0.2)
BASOPHILS NFR BLD: 1 % (ref 0–2)
BILIRUB SERPL-MCNC: 0.7 MG/DL (ref 0.2–1.1)
BNP SERPL-MCNC: 278 PG/ML (ref 5–125)
BUN SERPL-MCNC: 15 MG/DL (ref 6–23)
CALCIUM SERPL-MCNC: 9 MG/DL (ref 8.3–10.4)
CALCULATED P AXIS, ECG09: 65 DEGREES
CALCULATED R AXIS, ECG10: 56 DEGREES
CALCULATED T AXIS, ECG11: 63 DEGREES
CHLORIDE SERPL-SCNC: 107 MMOL/L (ref 98–107)
CO2 SERPL-SCNC: 29 MMOL/L (ref 21–32)
CREAT SERPL-MCNC: 1.18 MG/DL (ref 0.6–1)
DIAGNOSIS, 93000: NORMAL
DIFFERENTIAL METHOD BLD: ABNORMAL
EOSINOPHIL # BLD: 0.7 K/UL (ref 0–0.8)
EOSINOPHIL NFR BLD: 10 % (ref 0.5–7.8)
ERYTHROCYTE [DISTWIDTH] IN BLOOD BY AUTOMATED COUNT: 12.8 % (ref 11.9–14.6)
GLOBULIN SER CALC-MCNC: 4.2 G/DL (ref 2.3–3.5)
GLUCOSE SERPL-MCNC: 83 MG/DL (ref 65–100)
HCT VFR BLD AUTO: 38.7 % (ref 35.8–46.3)
HGB BLD-MCNC: 12.8 G/DL (ref 11.7–15.4)
IMM GRANULOCYTES # BLD AUTO: 0 K/UL (ref 0–0.5)
IMM GRANULOCYTES NFR BLD AUTO: 0 % (ref 0–5)
LYMPHOCYTES # BLD: 1.9 K/UL (ref 0.5–4.6)
LYMPHOCYTES NFR BLD: 29 % (ref 13–44)
MCH RBC QN AUTO: 30.9 PG (ref 26.1–32.9)
MCHC RBC AUTO-ENTMCNC: 33.1 G/DL (ref 31.4–35)
MCV RBC AUTO: 93.5 FL (ref 79.6–97.8)
MONOCYTES # BLD: 0.6 K/UL (ref 0.1–1.3)
MONOCYTES NFR BLD: 9 % (ref 4–12)
NEUTS SEG # BLD: 3.3 K/UL (ref 1.7–8.2)
NEUTS SEG NFR BLD: 51 % (ref 43–78)
NRBC # BLD: 0 K/UL (ref 0–0.2)
P-R INTERVAL, ECG05: 144 MS
PLATELET # BLD AUTO: 244 K/UL (ref 150–450)
PMV BLD AUTO: 10.8 FL (ref 9.4–12.3)
POTASSIUM SERPL-SCNC: 3.6 MMOL/L (ref 3.5–5.1)
PROT SERPL-MCNC: 8.1 G/DL (ref 6.3–8.2)
Q-T INTERVAL, ECG07: 376 MS
QRS DURATION, ECG06: 74 MS
QTC CALCULATION (BEZET), ECG08: 462 MS
RBC # BLD AUTO: 4.14 M/UL (ref 4.05–5.2)
SODIUM SERPL-SCNC: 141 MMOL/L (ref 136–145)
TROPONIN-HIGH SENSITIVITY: 15 PG/ML (ref 0–14)
VENTRICULAR RATE, ECG03: 91 BPM
WBC # BLD AUTO: 6.5 K/UL (ref 4.3–11.1)

## 2021-03-04 PROCEDURE — 80053 COMPREHEN METABOLIC PANEL: CPT

## 2021-03-04 PROCEDURE — 83880 ASSAY OF NATRIURETIC PEPTIDE: CPT

## 2021-03-04 PROCEDURE — 74011250636 HC RX REV CODE- 250/636: Performed by: PHYSICIAN ASSISTANT

## 2021-03-04 PROCEDURE — 94640 AIRWAY INHALATION TREATMENT: CPT

## 2021-03-04 PROCEDURE — 85025 COMPLETE CBC W/AUTO DIFF WBC: CPT

## 2021-03-04 PROCEDURE — 94664 DEMO&/EVAL PT USE INHALER: CPT

## 2021-03-04 PROCEDURE — 71046 X-RAY EXAM CHEST 2 VIEWS: CPT

## 2021-03-04 PROCEDURE — 77030013140 HC MSK NEB VYRM -A

## 2021-03-04 PROCEDURE — 74011000250 HC RX REV CODE- 250: Performed by: PHYSICIAN ASSISTANT

## 2021-03-04 PROCEDURE — 84484 ASSAY OF TROPONIN QUANT: CPT

## 2021-03-04 PROCEDURE — 93005 ELECTROCARDIOGRAM TRACING: CPT | Performed by: EMERGENCY MEDICINE

## 2021-03-04 PROCEDURE — 99284 EMERGENCY DEPT VISIT MOD MDM: CPT

## 2021-03-04 PROCEDURE — 96374 THER/PROPH/DIAG INJ IV PUSH: CPT

## 2021-03-04 RX ORDER — PREDNISONE 50 MG/1
50 TABLET ORAL DAILY
Qty: 3 TAB | Refills: 0 | Status: SHIPPED | OUTPATIENT
Start: 2021-03-04 | End: 2021-03-07

## 2021-03-04 RX ORDER — IPRATROPIUM BROMIDE AND ALBUTEROL SULFATE 2.5; .5 MG/3ML; MG/3ML
3 SOLUTION RESPIRATORY (INHALATION)
Status: COMPLETED | OUTPATIENT
Start: 2021-03-04 | End: 2021-03-04

## 2021-03-04 RX ORDER — ALBUTEROL SULFATE 0.83 MG/ML
2.5 SOLUTION RESPIRATORY (INHALATION) EVERY 8 HOURS
Status: DISCONTINUED | OUTPATIENT
Start: 2021-03-04 | End: 2021-03-05 | Stop reason: HOSPADM

## 2021-03-04 RX ADMIN — IPRATROPIUM BROMIDE AND ALBUTEROL SULFATE 3 ML: .5; 3 SOLUTION RESPIRATORY (INHALATION) at 21:19

## 2021-03-04 RX ADMIN — METHYLPREDNISOLONE SODIUM SUCCINATE 125 MG: 125 INJECTION, POWDER, FOR SOLUTION INTRAMUSCULAR; INTRAVENOUS at 21:28

## 2021-03-04 RX ADMIN — ALBUTEROL SULFATE 2.5 MG: 2.5 SOLUTION RESPIRATORY (INHALATION) at 21:55

## 2021-03-05 NOTE — ED PROVIDER NOTES
To ER for complains of shortness of breath and cough that started today about 1 PM.  Patient was cutting grass started to have symptoms. She is known asthmatic. She continued to finish her longer than used her at home nebulizer that cleared her symptoms. She states she went back outside today at about 7 PM symptoms returned and she came here. She did not use at home medications. She denies any fever. She has been seen by cardiology and had a negative CT of the chest and calcium study    The history is provided by the patient. Shortness of Breath  This is a new problem. The average episode lasts 8 hours. The current episode started 6 to 12 hours ago. The problem has been gradually worsening. Associated symptoms include cough, wheezing and chest pain. Precipitated by: cuttng grass. She has tried ipratropium inhalers for the symptoms. The treatment provided moderate relief. She has had no prior hospitalizations. She has had prior ED visits. She has had no prior ICU admissions. Associated medical issues include asthma. Cough  Associated symptoms include chest pain, shortness of breath and wheezing. Her past medical history is significant for asthma. Chest Pain   Associated symptoms include cough and shortness of breath.         Past Medical History:   Diagnosis Date    Anxiety     Arthritis     Bipolar affective disorder ([address] 11 yrs old    Cellulitis 01/08/2010    denies currently     Diabetes ([address] dx:1/09    Unknown A1C    Diabetes ([address] 01/08/2010    had multiple TIA's from low bs in 2014 and hasn't been on any meds since    Edema of both legs 01/08/2010    ankles     GERD (gastroesophageal reflux disease)     denies    Gout     Hypertension     no meds    Morbid obesity ([address] lifetime    Non compliance with medical treatment long term    Orthopnea     Other ill-defined conditions(799.89)     cellulitis    Peripheral neuropathy 2 years    Peripheral vascular disease ([address]     Psychiatric disorder     bipolar disorder    Renal insufficiency unknown    Renal insufficiency 2010    Stroke [facility])     multiple TIA's in 2014; no residual     Unspecified sleep apnea     does not use cpap    Vertigo 3/8/2015       Past Surgical History:   Procedure Laterality Date    DELIVERY   ,     both preemies    HX HEENT      Tonsillectomy/SFE/18    HX HERNIA REPAIR  age 9    HX HIP REPLACEMENT Left     Dr. Hitesh Lewis    HX TONSILLECTOMY      HX TUBAL LIGATION           Family History:   Problem Relation Age of Onset    No Known Problems Mother     Other Father         aneurysm age 62, now brain damaged    Heart Attack Father     Heart Disease Father     Other Son         25 week twin, LD    Cancer Sister         cervical    Cancer Sister         hodgkins       Social History     Socioeconomic History    Marital status: SINGLE     Spouse name: Not on file    Number of children: Not on file    Years of education: Not on file    Highest education level: Not on file   Occupational History    Not on file   Social Needs    Financial resource strain: Not on file    Food insecurity     Worry: Not on file     Inability: Not on file    Transportation needs     Medical: Not on file     Non-medical: Not on file   Tobacco Use    Smoking status: Never Smoker    Smokeless tobacco: Never Used   Substance and Sexual Activity    Alcohol use: No    Drug use: No    Sexual activity: Not Currently   Lifestyle    Physical activity     Days per week: Not on file     Minutes per session: Not on file    Stress: Not on file   Relationships    Social connections     Talks on phone: Not on file     Gets together: Not on file     Attends Orthodoxy service: Not on file     Active member of club or organization: Not on file     Attends meetings of clubs or organizations: Not on file     Relationship status: Not on file    Intimate partner violence     Fear of current or ex partner: Not on file     Emotionally abused: Not on file     Physically abused: Not on file     Forced sexual activity: Not on file   Other Topics Concern    Not on file   Social History Narrative    Patient is single, lives with disabled father, 15year old disabled son and 8year old daughter. 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 She reports a lot of stress, including her 12year old daughter living in [facility] with an aunt due to some DSS issue. She admits to being non-compliant with meds and diet in part due to stress. ALLERGIES: Ciprofloxacin and Sulfa (sulfonamide antibiotics)    Review of Systems   Respiratory: Positive for cough, shortness of breath and wheezing. Cardiovascular: Positive for chest pain. All other systems reviewed and are negative. Vitals:    03/04/21 2106 03/04/21 2119 03/04/21 2139 03/04/21 2155   BP:   (!) 151/76    Pulse:       Resp:       Temp:       SpO2: 99% 99% 98% 98%   Weight:       Height:                Physical Exam  Vitals signs and nursing note reviewed. Constitutional:       General: She is not in acute distress. Appearance: She is well-developed. She is obese. She is not diaphoretic. HENT:      Head: Normocephalic and atraumatic. Eyes:      Pupils: Pupils are equal, round, and reactive to light. Neck:      Musculoskeletal: Normal range of motion and neck supple. Cardiovascular:      Rate and Rhythm: Normal rate and regular rhythm. Pulmonary:      Effort: Pulmonary effort is normal.      Breath sounds: No stridor. Examination of the right-middle field reveals wheezing. Examination of the left-middle field reveals wheezing. Wheezing present. No rhonchi or rales. Chest:      Chest wall: No tenderness or crepitus. Abdominal:      General: Bowel sounds are normal.      Palpations: Abdomen is soft. Musculoskeletal: Normal range of motion. Right lower leg: No edema.       Left lower leg: No edema. Skin:     General: Skin is warm. Neurological:      General: No focal deficit present. Mental Status: She is alert and oriented to person, place, and time. Psychiatric:         Mood and Affect: Mood normal.         Behavior: Behavior normal.          MDM  Number of Diagnoses or Management Options  Diagnosis management comments: CBC CMP chest x-ray abnormal EKG is normal sinus rhythm no evidence of STEMI  Patient was given Solu-Medrol 125 IV DuoNeb, patient still has slight amount of wheezing she was given repeat albuterol nebulizer after which were clear.   Patient to use at home nebulizers every 4 hours as needed will discharge home short course of prednisone patient to see primary care for routine recheck       Amount and/or Complexity of Data Reviewed  Clinical lab tests: ordered and reviewed  Tests in the radiology section of CPT®: ordered and reviewed  Review and summarize past medical records: yes    Risk of Complications, Morbidity, and/or Mortality  Presenting problems: moderate  Diagnostic procedures: moderate  Management options: low    Patient Progress  Patient progress: improved         Procedures

## 2021-03-05 NOTE — ED TRIAGE NOTES
Pt states she was mowing her lawn today and started to feel SOB with chest tightness, she states it feels like something is pressing on the middle of her chest. Also has had a cough x 3-4 weeks but only starts when she lays down with increased SOB. Pt wheezing in triage. She is scheduled to see Dr Jesús Ferguson on 3/10.

## 2021-03-05 NOTE — ED NOTES
I have reviewed discharge instructions with the patient. The patient verbalized understanding. Patient left ED via Discharge Method: ambulatory to Home with self. Opportunity for questions and clarification provided. Patient given 0 scripts. Medications were escripted. The pt was in no acute distress at [place]. To continue your aftercare when you leave the hospital, you may receive an automated call from our care team to check in on how you are doing. This is a free service and part of our promise to provide the best care and service to meet your aftercare needs.  If you have questions, or wish to unsubscribe from this service please call [phone]. Thank you for Choosing our [facility] Emergency Department.

## 2021-03-05 NOTE — DISCHARGE INSTRUCTIONS
Use at home nebulizer every 4 hours as needed at home MDI if needed prednisone daily for the next 3 days see your primary care physician for routine recheck return to ER if symptoms not relieved with nebulizer treatments

## 2021-04-10 ENCOUNTER — HOSPITAL ENCOUNTER (EMERGENCY)
Age: 46
Discharge: HOME | End: 2021-04-10
Attending: EMERGENCY MEDICINE
Payer: MEDICARE

## 2021-04-10 ENCOUNTER — APPOINTMENT (OUTPATIENT)
Dept: GENERAL RADIOLOGY | Age: 46
End: 2021-04-10
Attending: EMERGENCY MEDICINE
Payer: MEDICARE

## 2021-04-10 VITALS
DIASTOLIC BLOOD PRESSURE: 84 MMHG | BODY MASS INDEX: 50.75 KG/M2 | WEIGHT: 293 LBS | HEART RATE: 92 BPM | TEMPERATURE: 98.3 F | OXYGEN SATURATION: 98 % | RESPIRATION RATE: 16 BRPM | SYSTOLIC BLOOD PRESSURE: 138 MMHG

## 2021-04-10 DIAGNOSIS — J45.901 ACUTE EXACERBATION OF EXTRINSIC ASTHMA: Primary | ICD-10-CM

## 2021-04-10 LAB
ATRIAL RATE: 113 BPM
CALCULATED P AXIS, ECG09: 63 DEGREES
CALCULATED R AXIS, ECG10: 63 DEGREES
CALCULATED T AXIS, ECG11: 36 DEGREES
DIAGNOSIS, 93000: NORMAL
P-R INTERVAL, ECG05: 134 MS
Q-T INTERVAL, ECG07: 364 MS
QRS DURATION, ECG06: 76 MS
QTC CALCULATION (BEZET), ECG08: 499 MS
VENTRICULAR RATE, ECG03: 113 BPM

## 2021-04-10 PROCEDURE — 94644 CONT INHLJ TX 1ST HOUR: CPT

## 2021-04-10 PROCEDURE — 99284 EMERGENCY DEPT VISIT MOD MDM: CPT

## 2021-04-10 PROCEDURE — 71046 X-RAY EXAM CHEST 2 VIEWS: CPT

## 2021-04-10 PROCEDURE — 74011250636 HC RX REV CODE- 250/636: Performed by: EMERGENCY MEDICINE

## 2021-04-10 PROCEDURE — 96372 THER/PROPH/DIAG INJ SC/IM: CPT

## 2021-04-10 PROCEDURE — 93005 ELECTROCARDIOGRAM TRACING: CPT | Performed by: EMERGENCY MEDICINE

## 2021-04-10 PROCEDURE — 74011000250 HC RX REV CODE- 250: Performed by: EMERGENCY MEDICINE

## 2021-04-10 PROCEDURE — 74011250637 HC RX REV CODE- 250/637: Performed by: EMERGENCY MEDICINE

## 2021-04-10 PROCEDURE — 94664 DEMO&/EVAL PT USE INHALER: CPT

## 2021-04-10 RX ORDER — ALBUTEROL SULFATE 0.83 MG/ML
10 SOLUTION RESPIRATORY (INHALATION)
Status: COMPLETED | OUTPATIENT
Start: 2021-04-10 | End: 2021-04-10

## 2021-04-10 RX ORDER — DEXAMETHASONE SODIUM PHOSPHATE 100 MG/10ML
10 INJECTION INTRAMUSCULAR; INTRAVENOUS
Status: COMPLETED | OUTPATIENT
Start: 2021-04-10 | End: 2021-04-10

## 2021-04-10 RX ORDER — MONTELUKAST SODIUM 10 MG/1
10 TABLET ORAL DAILY
Qty: 30 TAB | Refills: 0 | Status: SHIPPED | OUTPATIENT
Start: 2021-04-10

## 2021-04-10 RX ORDER — BUTALBITAL, ACETAMINOPHEN AND CAFFEINE 50; 325; 40 MG/1; MG/1; MG/1
1 TABLET ORAL
Qty: 10 TAB | Refills: 0 | Status: SHIPPED | OUTPATIENT
Start: 2021-04-10 | End: 2021-07-28 | Stop reason: CLARIF

## 2021-04-10 RX ORDER — HYDROCODONE BITARTRATE AND ACETAMINOPHEN 7.5; 325 MG/1; MG/1
1 TABLET ORAL
Status: COMPLETED | OUTPATIENT
Start: 2021-04-10 | End: 2021-04-10

## 2021-04-10 RX ORDER — PREDNISONE 20 MG/1
40 TABLET ORAL DAILY
Qty: 10 TAB | Refills: 0 | Status: SHIPPED | OUTPATIENT
Start: 2021-04-10 | End: 2021-04-15

## 2021-04-10 RX ADMIN — DEXAMETHASONE SODIUM PHOSPHATE 10 MG: 10 INJECTION INTRAMUSCULAR; INTRAVENOUS at 12:27

## 2021-04-10 RX ADMIN — ALBUTEROL SULFATE 10 MG: 2.5 SOLUTION RESPIRATORY (INHALATION) at 13:24

## 2021-04-10 RX ADMIN — HYDROCODONE BITARTRATE AND ACETAMINOPHEN 1 TABLET: 7.5; 325 TABLET ORAL at 12:28

## 2021-04-10 NOTE — ED TRIAGE NOTES
NEPHROLOGY PROGRESS NOTE    Patient: Jami Ivy               Sex: male          DOA: 8/6/2020 12:32 AM   YOB: 1940        Age:  79 y o         LOS:  LOS: 4 days   8/10/2020    REASON FOR THE CONSULTATION:      Chronic kidney disease stage 5    SUBJECTIVE     Patient is seen and examined next to the bedside  He is awake alert and in no distress  Abdominal pain appears to have resolved      CURRENT MEDICATIONS       Current Facility-Administered Medications:     acetaminophen (TYLENOL) tablet 975 mg, 975 mg, Oral, Q6H PRN, Konrad Singer PA-C    aluminum-magnesium hydroxide-simethicone (MYLANTA) 200-200-20 mg/5 mL oral suspension 30 mL, 30 mL, Oral, Q6H PRN, Konrad Singer PA-C, 30 mL at 08/07/20 2106    aspirin (ECOTRIN LOW STRENGTH) EC tablet 81 mg, 81 mg, Oral, Daily, Alberto Munoz PA-C, 81 mg at 08/10/20 0844    atorvastatin (LIPITOR) tablet 40 mg, 40 mg, Oral, QPM, Alberto Munoz PA-C, 40 mg at 08/09/20 1857    b complex-vitamin C-folic acid (NEPHROCAPS) capsule 1 capsule, 1 capsule, Oral, Daily With Dinner, Konrad Singer PA-C, 1 capsule at 08/09/20 1550    cholecalciferol (VITAMIN D3) tablet 1,000 Units, 1,000 Units, Oral, Daily, Konrad Singer PA-C, 1,000 Units at 08/10/20 0844    furosemide (LASIX) tablet 40 mg, 40 mg, Oral, Daily, Nallely Munoz PA-C, 40 mg at 08/10/20 0844    heparin (porcine) subcutaneous injection 5,000 Units, 5,000 Units, Subcutaneous, Q8H [facility], Ady Suggs DO, 5,000 Units at 08/10/20 0612    insulin detemir (LEVEMIR) subcutaneous injection 5 Units, 5 Units, Subcutaneous, HS, Ady Suggs DO, 5 Units at 08/09/20 2112    insulin lispro (HumaLOG) 100 units/mL subcutaneous injection 1-6 Units, 1-6 Units, Subcutaneous, TID AC, 2 Units at 08/10/20 1212 **AND** Fingerstick Glucose (POCT), , , RAEANN LEE, Alberto Munoz PA-C    insulin lispro (HumaLOG) 100 units/mL subcutaneous injection 1-6 Units, 1-6 Units, Subcutaneous, HS, Konrad Singer, Patient co cough and sob since yesterday pt hx asthma, states she has taken neb tx at home with no relief ROBBY, 2 Units at 08/09/20 2111    insulin lispro (HumaLOG) 100 units/mL subcutaneous injection 4 Units, 4 Units, Subcutaneous, TID AC, Burgess Charlene PA-C, 4 Units at 08/10/20 1212    iohexol (OMNIPAQUE) 240 MG/ML solution 50 mL, 50 mL, Oral, Once in imaging, Collette Ket, MD PhD    Lidocaine Viscous HCl (XYLOCAINE) 2 % mucosal solution 15 mL, 15 mL, Swish & Swallow, 4x Daily PRN, Joie Mcclendon PA-C, 15 mL at 08/08/20 0053    magnesium hydroxide (MILK OF MAGNESIA) 400 mg/5 mL oral suspension 30 mL, 30 mL, Oral, Daily PRN, Burgess Charlene PA-C    magnesium oxide (MAG-OX) tablet 400 mg, 400 mg, Oral, BID, Alberto Munoz PA-C, 400 mg at 08/10/20 0844    metoprolol tartrate (LOPRESSOR) partial tablet 12 5 mg, 12 5 mg, Oral, Q12H [address], Burgess Charlene PA-C, 12 5 mg at 08/10/20 0844    NIFEdipine (PROCARDIA XL) 24 hr tablet 60 mg, 60 mg, Oral, Daily, Alberto Munoz PA-C, 60 mg at 08/10/20 0844    ondansetron (ZOFRAN) injection 4 mg, 4 mg, Intravenous, Q6H PRN, Burgess Charlene PA-C    pantoprazole (PROTONIX) EC tablet 40 mg, 40 mg, Oral, Early Morning, Burgess Charlene PA-C, 40 mg at 08/10/20 9830    sodium bicarbonate tablet 650 mg, 650 mg, Oral, TID AC, Alberto Munoz PA-C, 650 mg at 08/10/20 1212    tamsulosin (FLOMAX) capsule 0 4 mg, 0 4 mg, Oral, Daily With Dinner, Burgess Charlene PA-C, 0 4 mg at 08/09/20 1550    REVIEW OF SYSTEMS     Review of Systems   Constitutional: Negative  HENT: Negative  Eyes: Negative  Respiratory: Negative  Cardiovascular: Negative  Gastrointestinal: Negative  Endocrine: Negative  Genitourinary: Negative  Musculoskeletal: Negative  Skin: Negative  Allergic/Immunologic: Negative  Neurological: Negative  Hematological: Negative  All other systems reviewed and are negative        OBJECTIVE     Current Weight: Weight - Scale: 81 2 kg (179 lb 0 2 oz)  Vitals:    08/10/20 0752   BP: 155/72   Pulse: 71   Resp: 18   Temp: 98 5 °F (36 9 °C)   SpO2: 95%     Body mass index is 27 22 kg/m²  Intake/Output Summary (Last 24 hours) at 8/10/2020 1232  Last data filed at 8/10/2020 0900  Gross per 24 hour   Intake 720 ml   Output 2900 ml   Net -2180 ml       PHYSICAL EXAMINATION     Physical Exam  HENT:      Head: Normocephalic and atraumatic  Eyes:      Pupils: Pupils are equal, round, and reactive to light  Neck:      Musculoskeletal: Neck supple  Vascular: No JVD  Cardiovascular:      Rate and Rhythm: Normal rate and regular rhythm  Heart sounds: Murmur present  No friction rub  Pulmonary:      Effort: Pulmonary effort is normal       Breath sounds: Normal breath sounds  Abdominal:      General: Bowel sounds are normal  There is no distension  Palpations: Abdomen is soft  Tenderness: There is no abdominal tenderness  There is no rebound  Musculoskeletal:         General: No tenderness  Skin:     General: Skin is dry  Findings: No rash  Neurological:      Mental Status: He is alert and oriented to person, place, and time             LAB RESULTS     Results from last 7 days   Lab Units 08/10/20  0442 08/09/20  0551 08/08/20  0522 08/07/20  0634 08/06/20  0444 08/06/20  0050   WBC Thousand/uL 6 85 7 72 8 93 7 91 7 76 8 74   HEMOGLOBIN g/dL 8 6* 8 7* 9 5* 8 5* 8 3* 8 6*   HEMATOCRIT % 26 3* 26 8* 29 3* 26 2* 25 2* 25 9*   PLATELETS Thousands/uL 133* 137* 163 139* 124* 136*   POTASSIUM mmol/L 4 1 4 1 4 4 4 2 3 9 3 5   CHLORIDE mmol/L 104 105 102 105 103 102   CO2 mmol/L 33* 30 27 27 28 26   BUN mg/dL 55* 56* 53* 51* 47* 47*   CREATININE mg/dL 4 06* 4 20* 4 15* 4 00* 3 66* 3 59*   EGFR ml/min/1 73sq m 13 12 13 13 15 15   CALCIUM mg/dL 8 3 8 6 8 4 8 1* 8 4 8 3   MAGNESIUM mg/dL  --   --   --   --  1 9  --            RADIOLOGY RESULTS      Results for orders placed during the hospital encounter of 08/06/20   XR chest 1 view portable    Narrative CHEST     INDICATION:   cp     COMPARISON:  4/22/2020    EXAM PERFORMED/VIEWS:  XR CHEST PORTABLE  1:10 AM      FINDINGS:    Cardiomediastinal silhouette appears unremarkable  The lungs are clear  No pneumothorax or pleural effusion  Osseous structures appear within normal limits for patient age  Impression No acute cardiopulmonary disease  Workstation performed: [id number]       Results for orders placed during the hospital encounter of 04/21/20   XR chest pa & lateral    Narrative CHEST     INDICATION:   questionable aspiration event  COMPARISON:  One view chest 4/21/2020    EXAM PERFORMED/VIEWS:  XR CHEST PA & LATERAL      FINDINGS:    Normal cardiac silhouette  Aortic calcification is present  Minimal residual albeit decreased left basilar subsegmental atelectasis  No pneumothorax, pulmonary edema, or gross pleural effusion  Degenerative changes bilateral shoulders  Partially visualized left nephroureteral stent  Impression Minimal residual albeit decreased left basilar subsegmental atelectasis  No other significant interval change  Workstation performed: [id number]         ASSESSMENT/PLAN     79-year-old male with past medical history of chronic kidney disease stage 5, metastatic neuroendocrine tumor, type 2 diabetes mellitus, hypertension, chronic urinary retention status post chronic indwelling Haile catheter who presents with chest pain and shortness of breath  1  Chronic kidney disease stage 5:  Baseline serum creatinine between 3 5-4   -current serum creatinine is 4 0 and stable with estimated GFR of 13   -no uremic symptoms at present time to suggest initiation of hemodialysis  2   Chest pain:  Acute coronary syndrome appears to have ruled out  V/Q scan is low probability for pulmonary embolism  3  Urinary retention:  Currently with the indwelling Haile catheter      4  Hypertension due to CKD stage 4:  Blood pressure fluctuating between 483-490 systolic   -on nifedipine 60 mg once daily as well as metoprolol 12 5 twice daily  5  Insulin-dependent diabetes mellitus:  Currently on Levemir insulin and short-acting NovoLog insulin with sliding scale  6  Anemia due to CKD:  Hemoglobin of 8 6  This is below target   -not a candidate for erythropoietin stimulating agents due to malignancy  Will sign off at this time  Patient should follow up with his nephrologist within a month      Felix Herbert MD  Nephrology  8/10/2020

## 2021-04-10 NOTE — ED PROVIDER NOTES
58-year-old female states she had a history of hypertension diabetes but not any medications as her symptoms doing proved with weight loss and not any medications. History of TIA in the past.  Also has a history of asthma and uses nebulizer and albuterol. Patient started 24 hours ago with cough congestion wheezing and chest pain with coughing. The central.  No back pain. No fever chills or rash. No vomiting or diarrhea. Some shortness of breath not relieved by use of her inhaler or nebulizer. Wheezing is worse today. Persistent coughing to the point where she has a frontal headache. The history is provided by the patient. Cough  This is a new problem. The current episode started 12 to 24 hours ago. The problem occurs every few minutes. The problem has not changed since onset. The cough is non-productive. There has been no fever. Associated symptoms include chest pain, headaches, shortness of breath and wheezing. Pertinent negatives include no chills, no rhinorrhea, no sore throat, no nausea and no vomiting. She has tried inhalers for the symptoms. She is not a smoker. Her past medical history is significant for asthma. Her past medical history does not include pneumonia, COPD or CHF. Shortness of Breath  Associated symptoms include headaches, cough, wheezing and chest pain. Pertinent negatives include no fever, no rhinorrhea, no sore throat, no vomiting and no leg swelling. Associated medical issues include asthma. Associated medical issues do not include COPD or pneumonia.         Past Medical History:   Diagnosis Date    Anxiety     Arthritis     Bipolar affective disorder ([facility] 11 yrs old    Cellulitis 01/08/2010    denies currently     Diabetes ([facility] [address] [facility] dx:1/09    Unknown A1C    Diabetes ([facility] [address] [facility] 01/08/2010    had multiple TIA's from low  in 2014 and hasn't been on any meds since    Edema of both legs 01/08/2010    ankles     GERD (gastroesophageal reflux disease)     denies    Gout     Hypertension     no meds    Morbid obesity (HCC) lifetime    Non compliance with medical treatment long term    Orthopnea     Other ill-defined conditions(799.89)     cellulitis    Peripheral neuropathy 2 years    Peripheral vascular disease ([facility] [address]     Psychiatric disorder     bipolar disorder    Renal insufficiency unknown    Renal insufficiency 2010    Stroke ([facility]     multiple TIA's in 2014; no residual     Unspecified sleep apnea     does not use cpap    Vertigo 3/8/2015       Past Surgical History:   Procedure Laterality Date    DELIVERY   ,     both preemies    HX HEENT      Tonsillectomy/SFE/18    HX HERNIA REPAIR  age 9    HX HIP REPLACEMENT Left     Dr. Александр Martinez    HX TONSILLECTOMY      [address]X [address]         Family History:   Problem Relation Age of Onset    No Known Problems Mother     Other Father         aneurysm age 62, now brain damaged    Heart Attack Father     Heart Disease Father     Other Son         25 week twin, LD    Cancer Sister         cervical    Cancer Sister         hodgkins       Social History     Socioeconomic History    Marital status: SINGLE     Spouse name: Not on file    Number of children: Not on file    Years of education: Not on file    Highest education level: Not on file   Occupational History    Not on file   Social Needs    Financial resource strain: Not on file    Food insecurity     Worry: Not on file     Inability: Not on file    Transportation needs     Medical: Not on file     Non-medical: Not on file   Tobacco Use    Smoking status: Never Smoker    Smokeless tobacco: Never Used   Substance and Sexual Activity    Alcohol use: No    Drug use: No    Sexual activity: Not Currently   Lifestyle    Physical activity     Days per week: Not on file     Minutes per session: Not on file    Stress: Not on file   Relationships    Social connections     Talks on phone: Not on file     Gets together: Not on file     Attends [employer] service: Not on file     Active member of club or organization: Not on file     Attends meetings of clubs or organizations: Not on file     Relationship status: Not on file    Intimate partner violence     Fear of current or ex partner: Not on file     Emotionally abused: Not on file     Physically abused: Not on file     Forced sexual activity: Not on file   Other Topics Concern    Not on file   Social History Narrative    Patient is single, lives with disabled father, 15year old disabled son and 8year old daughter. 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 She reports a lot of stress, including her 12year old daughter living in [facility] with an aunt due to some DSS issue. She admits to being non-compliant with meds and diet in part due to stress. ALLERGIES: Ciprofloxacin and Sulfa (sulfonamide antibiotics)    Review of Systems   Constitutional: Positive for fatigue. Negative for chills and fever. HENT: Negative for rhinorrhea and sore throat. Respiratory: Positive for cough, chest tightness, shortness of breath and wheezing. Cardiovascular: Positive for chest pain. Negative for palpitations and leg swelling. Gastrointestinal: Negative for nausea and vomiting. Neurological: Positive for headaches. All other systems reviewed and are negative. Vitals:    04/10/21 1119   BP: (!) 143/87   Pulse: (!) 101   Resp: 16   Temp: 98.4 °F (36.9 °C)   SpO2: 95%   Weight: 138.3 kg (305 lb)            Physical Exam  Vitals signs and nursing note reviewed. Constitutional:       General: She is not in acute distress. Appearance: She is well-developed. She is not ill-appearing. Comments: Appears uncomfortable   HENT:      Head: Normocephalic and atraumatic. Right Ear: External ear normal.      Left Ear: External ear normal.      Mouth/Throat:      Pharynx: No oropharyngeal exudate.    Eyes: Conjunctiva/sclera: Conjunctivae normal.      Pupils: Pupils are equal, round, and reactive to light. Neck:      Musculoskeletal: Normal range of motion and neck supple. Cardiovascular:      Rate and Rhythm: Normal rate and regular rhythm. Heart sounds: No murmur. Pulmonary:      Effort: No respiratory distress. Breath sounds: Wheezing present. Abdominal:      General: Bowel sounds are normal.      Palpations: Abdomen is soft. There is no mass. Tenderness: There is no abdominal tenderness. There is no guarding or rebound. Hernia: No hernia is present. Skin:     General: Skin is warm and dry. Neurological:      Mental Status: She is alert and oriented to person, place, and time. Gait: Gait normal.      Comments: Nl speech   Psychiatric:         Speech: Speech normal.          MDM  Number of Diagnoses or Management Options  Diagnosis management comments: Suspect exacerbation reactive airway disease. Chest x-ray to check for pneumothorax or infiltrates. Doubt congestive heart failure pulmonary embolism. Headache, I believe is due to coughing. No evidence to suggest meningitis or subarachnoid hemorrhage. Amount and/or Complexity of Data Reviewed  Tests in the radiology section of CPT®: ordered and reviewed  Tests in the medicine section of CPT®: ordered and reviewed  Review and summarize past medical records: yes (Patient had emergency department visit for exacerbation of asthma 1 month ago. Placed on prednisone.)  Independent visualization of images, tracings, or specimens: yes (My interpretation EKG reveals sinus tachycardia 110. Occasional uniform PVCs. No ST-T changes.   Normal QT interval.)    Risk of Complications, Morbidity, and/or Mortality  Presenting problems: moderate  Diagnostic procedures: minimal  Management options: low    Patient Progress  Patient progress: stable         Procedures      Xr Chest Pa Lat    Result Date: 4/10/2021  EXAM: XR CHEST PA LAT INDICATION: cough COMPARISON: 3/4/2021. FINDINGS: PA and lateral radiographs of the chest demonstrate clear lungs. The cardiac and mediastinal contours and pulmonary vascularity are normal. The bones and soft tissues are within normal limits. Normal chest.    I doubt pulmonary embolism.   Suspect exacerbation of asthma

## 2021-04-10 NOTE — DISCHARGE INSTRUCTIONS
Continue use of nebulizer or inhaler. Medication for headache as needed. Take prednisone until gone. Recheck sooner for worse breathing or other concerns.

## 2021-04-10 NOTE — ED NOTES
I have reviewed discharge instructions with the patient. The patient verbalized understanding. Patient left ED via Discharge Method: ambulatory to Home with herself. Opportunity for questions and clarification provided. Patient given 3 scripts. To continue your aftercare when you leave the hospital, you may receive an automated call from our care team to check in on how you are doing. This is a free service and part of our promise to provide the best care and service to meet your aftercare needs.  If you have questions, or wish to unsubscribe from this service please call [phone]. Thank you for Choosing our [address] [place] [address] Emergency Department.

## 2021-08-25 ENCOUNTER — HOSPITAL ENCOUNTER (EMERGENCY)
Age: 46
Discharge: HOME | End: 2021-08-25
Attending: EMERGENCY MEDICINE
Payer: MEDICARE

## 2021-08-25 VITALS
TEMPERATURE: 98.3 F | SYSTOLIC BLOOD PRESSURE: 147 MMHG | WEIGHT: 293 LBS | DIASTOLIC BLOOD PRESSURE: 78 MMHG | HEART RATE: 78 BPM | OXYGEN SATURATION: 99 % | RESPIRATION RATE: 18 BRPM | BODY MASS INDEX: 48.82 KG/M2 | HEIGHT: 65 IN

## 2021-08-25 DIAGNOSIS — N63.11 MASS OF UPPER OUTER QUADRANT OF RIGHT BREAST: Primary | ICD-10-CM

## 2021-08-25 PROCEDURE — 99284 EMERGENCY DEPT VISIT MOD MDM: CPT

## 2021-08-25 PROCEDURE — 99283 EMERGENCY DEPT VISIT LOW MDM: CPT

## 2021-08-25 NOTE — ED PROVIDER NOTES
13-year-old female presents to the ER with complaints of concern over a lump in her right breast.  Patient she found the area on Sunday  She is attempted to contact her doctor to have a follow-up mammogram but patient reports she has been unable to make contact. She has had prior mammograms to [facility]    No fever vomiting or diarrhea  She has noticed no nipple discharge or skin changes to the right breast    Patient reports she has a family history positive for breast cancer    The history is provided by the patient. Breast Mass   This is a new problem. The current episode started more than 2 days ago. The problem has not changed since onset. The problem is associated with an unknown factor. There has been no fever. Affected Location: Right breast. The patient is experiencing no pain. The pain has been constant since onset. Pertinent negatives include no itching, no pain and no weeping. She has tried nothing for the symptoms.         Past Medical History:   Diagnosis Date    Anxiety     Arthritis     Bipolar affective disorder ([address] 11 yrs old    Cellulitis 01/08/2010    denies currently     Diabetes ([address] dx:1/09    Unknown A1C    Diabetes ([address] 01/08/2010    had multiple TIA's from low bs in 2014 and hasn't been on any meds since    Edema of both legs 01/08/2010    ankles     GERD (gastroesophageal reflux disease)     denies    Gout     Hypertension     no meds    Morbid obesity ([address] lifetime    Non compliance with medical treatment long term    Orthopnea     Other ill-defined conditions(799.89)     cellulitis    Peripheral neuropathy 2 years    Peripheral vascular disease ([address]     Psychiatric disorder     bipolar disorder    Renal insufficiency unknown    Renal insufficiency 1/8/2010    Stroke ([address]     multiple TIA's in 2014; no residual     Unspecified sleep apnea     does not use cpap    Vertigo 3/8/2015       Past Surgical History:   Procedure Laterality Date    DELIVERY   ,     both preemies    HX HEENT      Tonsillectomy/SFE/18    HX HERNIA REPAIR  age 9    HX HIP REPLACEMENT Left     Dr. Arnold Ernandez    HX TONSILLECTOMY      [address]         Family History:   Problem Relation Age of Onset    No Known Problems Mother     Other Father         aneurysm age 62, now brain damaged    Heart Attack Father     Heart Disease Father     Other Son         25 week twin, LD    Cancer Sister         cervical    Cancer Sister         hodgkins       Social History     Socioeconomic History    Marital status: SINGLE     Spouse name: Not on file    Number of children: Not on file    Years of education: Not on file    Highest education level: Not on file   Occupational History    Not on file   Tobacco Use    Smoking status: Never Smoker    Smokeless tobacco: Never Used   Substance and Sexual Activity    Alcohol use: No    Drug use: No    Sexual activity: Not Currently   Other Topics Concern    Not on file   Social History Narrative    Patient is single, lives with disabled father, 15year old disabled son and 8year old daughter. Patient worked in [employer] service at [employer] for many years before becoming disabled about 4 years ago. She reports a lot of stress, including her 12year old daughter living in [facility] with an aunt due to some DSS issue. She admits to being non-compliant with meds and diet in part due to stress. Social Determinants of Health     Financial Resource Strain:     Difficulty of Paying Living Expenses:    Food Insecurity:     Worried About Running Out of Food in the Last Year:     [address] in the Last Year:    Transportation Needs:     Lack of Transportation (Medical):      Lack of Transportation (Non-Medical):    Physical Activity:     Days of Exercise per Week:     Minutes of Exercise per Session:    Stress:     Feeling of Stress :    Social Connections:     Frequency of Communication with Friends and Family:     Frequency of Social Gatherings with Friends and Family:     Attends Orthodox Services:     Active Member of Clubs or Organizations:     Attends Club or Organization Meetings:     Marital Status:    Intimate Partner Violence:     Fear of Current or Ex-Partner:     Emotionally Abused:     Physically Abused:     Sexually Abused: ALLERGIES: Ciprofloxacin and Sulfa (sulfonamide antibiotics)    Review of Systems   Constitutional: Negative for chills and fever. HENT: Negative for congestion, ear pain and rhinorrhea. Eyes: Negative for photophobia and discharge. Respiratory: Negative for cough and shortness of breath. Cardiovascular: Negative for chest pain and palpitations. Gastrointestinal: Negative for abdominal pain, constipation, diarrhea and vomiting. Endocrine: Negative for cold intolerance and heat intolerance. Genitourinary: Negative for dysuria and flank pain. Musculoskeletal: Negative for arthralgias, myalgias and neck pain. Skin: Negative for itching, rash and wound. Allergic/Immunologic: Negative for environmental allergies and food allergies. Neurological: Negative for syncope and headaches. Hematological: Negative for adenopathy. Does not bruise/bleed easily. Psychiatric/Behavioral: Negative for dysphoric mood. The patient is nervous/anxious. All other systems reviewed and are negative. Vitals:    08/25/21 0518   Temp: 98.3 °F (36.8 °C)            Physical Exam  Vitals and nursing note reviewed. Exam conducted with a chaperone present. Constitutional:       General: She is in acute distress. Appearance: Normal appearance. She is well-developed. She is obese. HENT:      Head: Normocephalic and atraumatic. Right Ear: External ear normal.      Left Ear: External ear normal.      Mouth/Throat:      Pharynx: No oropharyngeal exudate. Eyes:      Extraocular Movements: Extraocular movements intact.       Conjunctiva/sclera: Conjunctivae normal.      Pupils: Pupils are equal, round, and reactive to light. Neck:      Vascular: No JVD. Cardiovascular:      Rate and Rhythm: Normal rate and regular rhythm. Pulses: Normal pulses. Heart sounds: Normal heart sounds. No murmur heard. No friction rub. No gallop. Pulmonary:      Effort: Pulmonary effort is normal.      Breath sounds: Normal breath sounds. Chest:      Breasts:         Right: Mass present. No bleeding, nipple discharge, skin change or tenderness. Left: Normal.       Musculoskeletal:         General: No deformity or signs of injury. Normal range of motion. Cervical back: Normal range of motion and neck supple. Skin:     General: Skin is warm and dry. Capillary Refill: Capillary refill takes less than 2 seconds. Findings: No rash. Neurological:      General: No focal deficit present. Mental Status: She is alert and oriented to person, place, and time. Cranial Nerves: No cranial nerve deficit. Sensory: No sensory deficit. Gait: Gait normal.   Psychiatric:         Mood and Affect: Mood normal.         Speech: Speech normal.         Behavior: Behavior normal.         Thought Content:  Thought content normal.         Judgment: Judgment normal.          MDM  Number of Diagnoses or Management Options  Mass of upper outer quadrant of right breast: new and does not require workup  Diagnosis management comments: 79-year-old female with concerns of a right breast mass      5:42 AM  Reviewed findings with patient    We will attempt to order outpatient diagnostic mammogram  Strongly encourage patient to stay in close contact with her primary care doctor to continue to steer care for this new mass in the appropriate direction           Amount and/or Complexity of Data Reviewed  Review and summarize past medical records: yes    Risk of Complications, Morbidity, and/or Mortality  Presenting problems: low  Diagnostic procedures: minimal  Management options: low  General comments: Elements of this note have been dictated via voice recognition software. Text and phrases may be limited by the accuracy of the software. The chart has been reviewed, but errors may still be present.       Patient Progress  Patient progress: stable         Procedures

## 2021-08-25 NOTE — ED NOTES
I have reviewed discharge instructions with the patient. The patient verbalized understanding. Patient left ED via Discharge Method: ambulatory to Home with self. Opportunity for questions and clarification provided. Patient given 0 scripts. To continue your aftercare when you leave the hospital, you may receive an automated call from our care team to check in on how you are doing. This is a free service and part of our promise to provide the best care and service to meet your aftercare needs.  If you have questions, or wish to unsubscribe from this service please call [phone]. Thank you for Choosing our [facility] Emergency Department.

## 2021-08-25 NOTE — DISCHARGE INSTRUCTIONS
Radiology department will call you to schedule your mammogram  Please continue to contact your primary care doctor to discuss follow-up care for this breast mass  Monitor for fever, redness, nipple discharge or any other concerning symptoms    Return to ER for any worsening symptoms or new problems which may arise

## 2021-08-27 ENCOUNTER — TRANSCRIBE ORDER (OUTPATIENT)
Dept: SCHEDULING | Age: 46
End: 2021-08-27

## 2021-08-27 DIAGNOSIS — N63.11 MASS OF UPPER OUTER QUADRANT OF RIGHT BREAST: Primary | ICD-10-CM

## 2021-08-27 DIAGNOSIS — N63.10 BREAST MASS, RIGHT: Primary | ICD-10-CM

## 2021-08-27 DIAGNOSIS — N63.11 LUMP IN UPPER OUTER QUADRANT OF RIGHT BREAST: Primary | ICD-10-CM

## 2021-08-27 DIAGNOSIS — N63.10 MASS OF BREAST, RIGHT: Primary | ICD-10-CM

## 2021-08-27 NOTE — ED NOTES
12:37 PM  Fabi from [address] [place] [address] Dr. Hinson Gave radiology scheduler calling regarding order for outpatient mammogram. Pt was given written prescription by ER doctor, Dr. Joe Moise. Will fax mammogram form to number provided.    Fax [phone]

## 2021-09-02 ENCOUNTER — HOSPITAL ENCOUNTER (OUTPATIENT)
Dept: MAMMOGRAPHY | Age: 46
Discharge: HOME | End: 2021-09-02
Attending: PHYSICIAN ASSISTANT
Payer: MEDICARE

## 2021-09-02 DIAGNOSIS — N63.11 MASS OF UPPER OUTER QUADRANT OF RIGHT BREAST: ICD-10-CM

## 2021-09-02 DIAGNOSIS — N63.11 LUMP IN UPPER OUTER QUADRANT OF RIGHT BREAST: ICD-10-CM

## 2021-09-02 PROCEDURE — 76642 ULTRASOUND BREAST LIMITED: CPT

## 2021-09-02 PROCEDURE — 77066 DX MAMMO INCL CAD BI: CPT

## 2021-10-19 PROBLEM — R07.89 OTHER CHEST PAIN: Status: RESOLVED | Noted: 2020-08-31 | Resolved: 2021-10-19

## 2021-10-19 PROBLEM — R93.1 ABNORMAL NUCLEAR CARDIAC IMAGING TEST: Status: RESOLVED | Noted: 2020-09-30 | Resolved: 2021-10-19

## 2021-10-19 PROBLEM — J45.40 MODERATE PERSISTENT ASTHMA WITHOUT COMPLICATION: Status: ACTIVE | Noted: 2021-10-19

## 2022-03-19 PROBLEM — J45.40 MODERATE PERSISTENT ASTHMA WITHOUT COMPLICATION: Status: ACTIVE | Noted: 2021-10-19

## 2022-03-28 ENCOUNTER — HOSPITAL ENCOUNTER (EMERGENCY)
Age: 47
Discharge: HOME | End: 2022-03-28
Attending: EMERGENCY MEDICINE
Payer: MEDICARE

## 2022-03-28 VITALS
OXYGEN SATURATION: 100 % | TEMPERATURE: 97.9 F | DIASTOLIC BLOOD PRESSURE: 89 MMHG | RESPIRATION RATE: 18 BRPM | WEIGHT: 293 LBS | SYSTOLIC BLOOD PRESSURE: 127 MMHG | HEART RATE: 93 BPM | HEIGHT: 67 IN | BODY MASS INDEX: 45.99 KG/M2

## 2022-03-28 DIAGNOSIS — S91.351A DOG BITE OF RIGHT FOOT, INITIAL ENCOUNTER: Primary | ICD-10-CM
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
## 2022-03-28 PROCEDURE — 99283 EMERGENCY DEPT VISIT LOW MDM: CPT

## 2022-03-28 RX ORDER — AMOXICILLIN AND CLAVULANATE POTASSIUM 875; 125 MG/1; MG/1
1 TABLET, FILM COATED ORAL 2 TIMES DAILY
Qty: 14 TABLET | Refills: 0 | Status: SHIPPED | OUTPATIENT
Start: 2022-03-28 | End: 2022-04-04

## 2022-03-28 NOTE — ED TRIAGE NOTES
Patient ambulatory to triage with c/o being bit by her dog on Saturday morning. States she stepped on his paw and he bit her right foot. Patient has 3 marks and it is red and inflamed.

## 2022-03-28 NOTE — ED NOTES
I have reviewed discharge instructions with the patient. The patient verbalized understanding. Patient left ED via Discharge Method: ambulatory to Home with self     Opportunity for questions and clarification provided. Patient given 1 scripts. To continue your aftercare when you leave the hospital, you may receive an automated call from our care team to check in on how you are doing. This is a free service and part of our promise to provide the best care and service to meet your aftercare needs.  If you have questions, or wish to unsubscribe from this service please call [phone]. Thank you for Choosing our [facility] Emergency Department.

## 2022-03-28 NOTE — DISCHARGE INSTRUCTIONS
You need to take the antibiotics to completion. Return here for worsening worrisome symptoms or if you develop warmth, redness at the site.

## 2022-03-28 NOTE — ED PROVIDER NOTES
19-year-old female presents with chief complaint of dog bite to her right foot. This occurred Saturday into  morning. This was her own dog, she actually stepped on rhoades tails, started on it and it bit her foot. She did do a virtual visit however the provider at that time did not prescribe any antibiotics. She presented today with some redness and warmth to the area. Pain is moderate. Nonradiating. Does have some mild discharge from the area that is clear.   Denies fever chills, nausea vomiting diarrhea           Past Medical History:   Diagnosis Date    Anxiety     Arthritis     Bipolar affective disorder ([address] 11 yrs old    Cellulitis 2010    denies currently     Diabetes ([address] dx:    Unknown A1C    Diabetes ([address] 2010    had multiple TIA's from low bs in  and hasn't been on any meds since    Diabetes mellitus type II, non insulin dependent ([address] 3/8/2015    Edema of both legs 2010    ankles     Femur fracture, left (HCC) 3/8/2015    GERD (gastroesophageal reflux disease)     denies    Gout     Hypertension     no meds    Moderate persistent asthma without complication     Morbid obesity (HCC) lifetime    Non compliance with medical treatment long term    Orthopnea     Other ill-defined conditions(799.89)     cellulitis    Peripheral neuropathy 2 years    Peripheral vascular disease ([address]     Psychiatric disorder     bipolar disorder    Renal insufficiency unknown    Renal insufficiency 2010    Stroke ([address]     multiple TIA's in ; no residual     Unspecified sleep apnea     does not use cpap    Vertigo 3/8/2015       Past Surgical History:   Procedure Laterality Date    DELIVERY   ,     both preemies    HX HEENT      Tonsillectomy/SFE/18    HX HERNIA REPAIR  age 10   [facility] HIP REPLACEMENT Left     Dr. Stuart Simpson    HX TONSILLECTOMY      [address]         Family History:   Problem Relation Age of Onset    No Known Problems Mother     Other Father         aneurysm age 62, now brain damaged    Heart Attack Father     Heart Disease Father     Other Son         25 week twin, LD    Cancer Sister         cervical    Breast Cancer Sister     Cancer Sister         hodgkins    Breast Cancer Sister        Social History     Socioeconomic History    Marital status: SINGLE     Spouse name: Not on file    Number of children: Not on file    Years of education: Not on file    Highest education level: Not on file   Occupational History    Not on file   Tobacco Use    Smoking status: Never Smoker    Smokeless tobacco: Never Used   Substance and Sexual Activity    Alcohol use: No    Drug use: Yes     Types: Prescription    Sexual activity: Not Currently   Other Topics Concern    Not on file   Social History Narrative    Patient is single, lives with disabled father, 15year old disabled son and 8year old daughter. Patient worked in [employer] service at [employer] for many years before becoming disabled about 4 years ago. She reports a lot of stress, including her 12year old daughter living in [facility] with an aunt due to some DSS issue. She admits to being non-compliant with meds and diet in part due to stress. Social Determinants of Health     Financial Resource Strain:     Difficulty of Paying Living Expenses: Not on file   Food Insecurity:     Worried About Running Out of Food in the Last Year: Not on file    Naif of Food in the Last Year: Not on file   Transportation Needs:     Lack of Transportation (Medical): Not on file    Lack of Transportation (Non-Medical):  Not on file   Physical Activity:     Days of Exercise per Week: Not on file    Minutes of Exercise per Session: Not on file   Stress:     Feeling of Stress : Not on file   Social Connections:     Frequency of Communication with Friends and Family: Not on file    Frequency of Social Gatherings with Friends and Family: Not on file  Attends Rastafari Services: Not on file    Active Member of Clubs or Organizations: Not on file    Attends Club or Organization Meetings: Not on file    Marital Status: Not on file   Intimate Partner Violence:     Fear of Current or Ex-Partner: Not on file    Emotionally Abused: Not on file    Physically Abused: Not on file    Sexually Abused: Not on file   Housing Stability:     Unable to Pay for Housing in the Last Year: Not on file    Number of [place] in the Last Year: Not on file    Unstable Housing in the Last Year: Not on file         ALLERGIES: Ciprofloxacin and Sulfa (sulfonamide antibiotics)    Review of Systems   Constitutional: Negative for chills. HENT: Negative for sore throat. Respiratory: Negative for cough. Gastrointestinal: Negative for nausea and vomiting. Musculoskeletal: Negative for back pain. Skin: Positive for wound. All other systems reviewed and are negative. Vitals:    03/28/22 1253   BP: 127/89   Pulse: 93   Resp: 18   Temp: 97.9 °F (36.6 °C)   SpO2: 100%   Weight: 139.3 kg (307 lb)   Height: 5' 7\" (1.702 m)            Physical Exam  Vitals and nursing note reviewed. Constitutional:       General: She is not in acute distress. Appearance: Normal appearance. She is normal weight. She is not ill-appearing, toxic-appearing or diaphoretic. HENT:      Head: Normocephalic and atraumatic. Nose: Nose normal.      Mouth/Throat:      Mouth: Mucous membranes are moist.   Eyes:      Pupils: Pupils are equal, round, and reactive to light. Pulmonary:      Effort: Pulmonary effort is normal.      Breath sounds: Normal breath sounds. Abdominal:      General: Abdomen is flat. Skin:     Comments: Small puncture wound to top of foot secondary to dog bite. There is surrounding erythema. See attached media. Neurological:      Mental Status: She is alert.               MDM  Number of Diagnoses or Management Options  Dog bite of right foot, initial encounter  Diagnosis management comments: Will start on Augmentin. Wound cleansed here in the department. Patient understand indications which to return here to the department. Voice dictation software was used during the making of this note. This software is not perfect and grammatical and other typographical errors may be present. This note has been proofread, but may still contain errors.    Bárbara Bowers PA-C     Risk of Complications, Morbidity, and/or Mortality  Presenting problems: low  Diagnostic procedures: low  Management options: low           Procedures

## 2022-05-17 ENCOUNTER — HOSPITAL ENCOUNTER (OUTPATIENT)
Dept: MAMMOGRAPHY | Age: 47
Discharge: HOME | End: 2022-05-17
Attending: OBSTETRICS & GYNECOLOGY
Payer: MEDICARE

## 2022-05-17 DIAGNOSIS — N63.20 MASSES OF BOTH BREASTS: ICD-10-CM

## 2022-05-17 DIAGNOSIS — N63.10 MASSES OF BOTH BREASTS: ICD-10-CM

## 2022-05-17 PROCEDURE — 76642 ULTRASOUND BREAST LIMITED: CPT

## 2022-05-17 PROCEDURE — 77062 BREAST TOMOSYNTHESIS BI: CPT

## 2022-06-02 RX ORDER — OMEPRAZOLE 20 MG/1
20 CAPSULE, DELAYED RELEASE ORAL DAILY
Qty: 90 CAPSULE | Refills: 1 | Status: SHIPPED | OUTPATIENT
Start: 2022-06-02

## 2022-06-02 NOTE — TELEPHONE ENCOUNTER
Pt needs a refill of her omeprazole (PRILOSEC) 20 MG delayed release capsule medication sent to the [facility] on [address]. She has 1 pill left and it was previously prescribed by her last doctor.

## 2022-06-23 ENCOUNTER — TELEPHONE (OUTPATIENT)
Dept: INTERNAL MEDICINE CLINIC | Facility: CLINIC | Age: 47
End: 2022-06-23

## 2022-06-23 NOTE — TELEPHONE ENCOUNTER
----- Message from Pedro Conroy sent at 6/22/2022  4:54 PM EDT -----  Subject: Referral Request    QUESTIONS   Reason for referral request? Tatiana Person called to let the doctor know   that [address] will be faxing an order for new diabetic testing   supplies. Has the physician seen you for this condition before? No   Preferred Specialist (if applicable)? Do you already have an appointment scheduled? No  Additional Information for Provider?   ---------------------------------------------------------------------------  --------------  CALL BACK INFO  What is the best way for the office to contact you? OK to leave message on   voicemail  Preferred Call Back Phone Number? [phone]  ---------------------------------------------------------------------------  --------------  SCRIPT ANSWERS  Relationship to Patient?  Self

## 2022-06-25 ENCOUNTER — TELEPHONE (OUTPATIENT)
Dept: INTERNAL MEDICINE CLINIC | Facility: CLINIC | Age: 47
End: 2022-06-25

## 2022-06-28 ENCOUNTER — OFFICE VISIT (OUTPATIENT)
Dept: OBGYN CLINIC | Age: 47
End: 2022-06-28
Payer: MEDICARE

## 2022-06-28 ENCOUNTER — TELEPHONE (OUTPATIENT)
Dept: INTERNAL MEDICINE CLINIC | Facility: CLINIC | Age: 47
End: 2022-06-28

## 2022-06-28 VITALS — DIASTOLIC BLOOD PRESSURE: 80 MMHG | WEIGHT: 275 LBS | SYSTOLIC BLOOD PRESSURE: 120 MMHG | BODY MASS INDEX: 43.07 KG/M2

## 2022-06-28 DIAGNOSIS — N64.4 MASTALGIA IN FEMALE: ICD-10-CM

## 2022-06-28 DIAGNOSIS — N92.1 MENOMETRORRHAGIA: ICD-10-CM

## 2022-06-28 DIAGNOSIS — N60.02 BREAST CYST, LEFT: Primary | ICD-10-CM

## 2022-06-28 PROCEDURE — G8417 CALC BMI ABV UP PARAM F/U: HCPCS | Performed by: OBSTETRICS & GYNECOLOGY

## 2022-06-28 PROCEDURE — 19000 PUNCTURE ASPIR CYST BREAST: CPT | Performed by: OBSTETRICS & GYNECOLOGY

## 2022-06-28 PROCEDURE — 99214 OFFICE O/P EST MOD 30 MIN: CPT | Performed by: OBSTETRICS & GYNECOLOGY

## 2022-06-28 PROCEDURE — 1036F TOBACCO NON-USER: CPT | Performed by: OBSTETRICS & GYNECOLOGY

## 2022-06-28 PROCEDURE — G8427 DOCREV CUR MEDS BY ELIG CLIN: HCPCS | Performed by: OBSTETRICS & GYNECOLOGY

## 2022-06-28 NOTE — TELEPHONE ENCOUNTER
Pt had sent a message through My chart stating that she was at her OB/GYN this morning and she was having left breast pain along with left arm pain. She states that her OB suggested that she makes an appointment with her PCP just to make sure it was not her heart. I spoke with pt and advised her if she was currently having those symptoms that she needed to proceed to the ER. Pt states that it was a on again and off again pain and that she was at the OB/GYN due to the fact of the matter that she had a cyst on her left breast which they drained this morning. Pt states that after the drainage that she felt some relief. Pt denies any chest pain, or any SOB. Pt stated that she still wanted to make an appointment with Pelon Bird. An appointment was made. But I still advised that pt go to the ER if she developes worsening symptoms before her appointment.  Pt understood the plan of action if this matter gets worse

## 2022-06-29 ENCOUNTER — TELEPHONE (OUTPATIENT)
Dept: INTERNAL MEDICINE CLINIC | Facility: CLINIC | Age: 47
End: 2022-06-29

## 2022-06-29 NOTE — TELEPHONE ENCOUNTER
Called patient to verify that she had requested these supplies. Patient's record show that she is not a diabetic now, but was a long time ago. Last A1C was at a normal level, she was never instructed by Amber Cabrera to check her BS, therfore would not meet the criteria needed for a prescription for diabetic supplies. Per Amber Cabrera NP, she can feel free to purchase a glucometer a supplies by herself, but no prescription will be given at this time. Patient is upset, she wanted to speak to Ramos Smith and not her staff, states she may find another PCP to go to.

## 2022-06-29 NOTE — TELEPHONE ENCOUNTER
Received a call from [address] [place] [address], a fax was supossedly sent last week so that the patient could get her diabetic supplies - they will fax another

## 2022-06-29 NOTE — TELEPHONE ENCOUNTER
I called pt and told her that I was the Clinical Supervisor . She told me that she was told that [facility] would not fill her Diabetic supplies . I informed pt that if she is not diabetic her insurance would not cover them . She can go and buy out of pocket. She states that she is getting her Needles and lancets through [facility] every month. I informed pt that she can go and buy the meter. She states that she doesn't want to deal with Ms Blas Olvera anymore .  I told her when she comes in she will have Ezekiel as the MA for [facility]

## 2022-06-29 NOTE — TELEPHONE ENCOUNTER
[facility] called, patient made a request to speak with supervisor, in [address] absence she is told that the message will be given to our Clinical supervisor. Message given to Layton Cranker.

## 2022-07-06 RX ORDER — PROGESTERONE 100 MG/1
CAPSULE ORAL
Qty: 30 CAPSULE | Refills: 1 | Status: SHIPPED | OUTPATIENT
Start: 2022-07-06 | End: 2022-07-12 | Stop reason: SDUPTHER

## 2022-07-08 ENCOUNTER — TELEPHONE (OUTPATIENT)
Dept: OBGYN CLINIC | Age: 47
End: 2022-07-08

## 2022-07-08 NOTE — TELEPHONE ENCOUNTER
Information sent to pt via [employer] message. Pt responded to previous message on 7/5/22 stating \"No! I'm fine now. \"

## 2022-07-08 NOTE — TELEPHONE ENCOUNTER
----- Message from Emerald Finley MD sent at 6/30/2022  6:31 PM EDT -----  Regarding note below, as we discussed at her recent office visit her arm pain could be any number of things including musculoskeletal issues (h/o chronic pain) but also possibly cardiovascular issues. If her PCP can't see her advise her to go to the ER so she can have an EKG and any other testing  the ER feels is warranted. As we discussed she needs to mention her arm pain to her orthopedist, Dr. Griffin Duffy, see what he advises (could be coming from a disc issue, be arthritis in her shoulder or other back issues). \"Brown stuff when she pees\" could be old blood in her urine or old uterine blood. Advised  D & C hysteroscopy 5/2022, she declined. I still think the D & C hysteroscopy w/ placement of a mirena IUD would alleviate her VB problems and allow me to more completely evaluate the uterus for abnormal cells. I could see her July 7 @ 10:20 if that slot doesn't fill before. Her PCP or the ER can evaluate her urine (especially in light of her h/o CKD stage 3). Thanks. Cheo Haley  to Me      6/30/22 9:42 AM  My arm has really started back hurting me. It started over night and I'm wiping some brown stuff when I pee. It just started!  Could this be coming from the breast? I googled my arm could be hurting as a sign of an infection in the body

## 2022-07-12 ENCOUNTER — HOSPITAL ENCOUNTER (EMERGENCY)
Age: 47
Discharge: HOME | End: 2022-07-12
Attending: STUDENT IN AN ORGANIZED HEALTH CARE EDUCATION/TRAINING PROGRAM
Payer: MEDICARE

## 2022-07-12 ENCOUNTER — HOSPITAL ENCOUNTER (EMERGENCY)
Dept: GENERAL RADIOLOGY | Age: 47
Discharge: HOME | End: 2022-07-15
Payer: MEDICARE

## 2022-07-12 VITALS
TEMPERATURE: 98.4 F | OXYGEN SATURATION: 99 % | BODY MASS INDEX: 43 KG/M2 | HEART RATE: 66 BPM | WEIGHT: 274 LBS | SYSTOLIC BLOOD PRESSURE: 140 MMHG | HEIGHT: 67 IN | DIASTOLIC BLOOD PRESSURE: 83 MMHG | RESPIRATION RATE: 16 BRPM

## 2022-07-12 DIAGNOSIS — R07.89 ATYPICAL CHEST PAIN: Primary | ICD-10-CM

## 2022-07-12 LAB
ALBUMIN SERPL-MCNC: 3.6 G/DL (ref 3.5–5)
ALBUMIN/GLOB SERPL: 0.9 {RATIO} (ref 1.2–3.5)
ALP SERPL-CCNC: 60 U/L (ref 50–136)
ALT SERPL-CCNC: 29 U/L (ref 12–65)
ANION GAP SERPL CALC-SCNC: 6 MMOL/L (ref 7–16)
AST SERPL-CCNC: 17 U/L (ref 15–37)
BILIRUB SERPL-MCNC: 0.6 MG/DL (ref 0.2–1.1)
BUN SERPL-MCNC: 11 MG/DL (ref 6–23)
CALCIUM SERPL-MCNC: 8.9 MG/DL (ref 8.3–10.4)
CHLORIDE SERPL-SCNC: 107 MMOL/L (ref 98–107)
CO2 SERPL-SCNC: 26 MMOL/L (ref 21–32)
CREAT SERPL-MCNC: 1.02 MG/DL (ref 0.6–1)
ERYTHROCYTE [DISTWIDTH] IN BLOOD BY AUTOMATED COUNT: 15.3 % (ref 11.9–14.6)
GLOBULIN SER CALC-MCNC: 3.9 G/DL (ref 2.3–3.5)
GLUCOSE SERPL-MCNC: 86 MG/DL (ref 65–100)
HCT VFR BLD AUTO: 35.9 % (ref 35.8–46.3)
HGB BLD-MCNC: 11.5 G/DL (ref 11.7–15.4)
MCH RBC QN AUTO: 27.8 PG (ref 26.1–32.9)
MCHC RBC AUTO-ENTMCNC: 32 G/DL (ref 31.4–35)
MCV RBC AUTO: 86.9 FL (ref 79.6–97.8)
NRBC # BLD: 0 K/UL (ref 0–0.2)
PLATELET # BLD AUTO: 381 K/UL (ref 150–450)
PMV BLD AUTO: 9.9 FL (ref 9.4–12.3)
POTASSIUM SERPL-SCNC: 3.7 MMOL/L (ref 3.5–5.1)
PROT SERPL-MCNC: 7.5 G/DL (ref 6.3–8.2)
RBC # BLD AUTO: 4.13 M/UL (ref 4.05–5.2)
SODIUM SERPL-SCNC: 139 MMOL/L (ref 136–145)
TROPONIN I SERPL HS-MCNC: 5.8 PG/ML (ref 0–14)
TROPONIN I SERPL HS-MCNC: 6 PG/ML (ref 0–14)
WBC # BLD AUTO: 4.6 K/UL (ref 4.3–11.1)

## 2022-07-12 PROCEDURE — 93005 ELECTROCARDIOGRAM TRACING: CPT | Performed by: STUDENT IN AN ORGANIZED HEALTH CARE EDUCATION/TRAINING PROGRAM

## 2022-07-12 PROCEDURE — 84484 ASSAY OF TROPONIN QUANT: CPT

## 2022-07-12 PROCEDURE — 96374 THER/PROPH/DIAG INJ IV PUSH: CPT

## 2022-07-12 PROCEDURE — 71046 X-RAY EXAM CHEST 2 VIEWS: CPT

## 2022-07-12 PROCEDURE — 6360000002 HC RX W HCPCS: Performed by: PHYSICIAN ASSISTANT

## 2022-07-12 PROCEDURE — 85027 COMPLETE CBC AUTOMATED: CPT

## 2022-07-12 PROCEDURE — 99285 EMERGENCY DEPT VISIT HI MDM: CPT

## 2022-07-12 PROCEDURE — 80053 COMPREHEN METABOLIC PANEL: CPT

## 2022-07-12 RX ORDER — PROGESTERONE 100 MG/1
100 CAPSULE ORAL DAILY
Qty: 30 CAPSULE | Refills: 1 | Status: SHIPPED | OUTPATIENT
Start: 2022-07-12

## 2022-07-12 RX ORDER — NAPROXEN 500 MG/1
500 TABLET ORAL 2 TIMES DAILY WITH MEALS
Qty: 14 TABLET | Refills: 0 | Status: SHIPPED | OUTPATIENT
Start: 2022-07-12 | End: 2022-07-29

## 2022-07-12 RX ORDER — KETOROLAC TROMETHAMINE 30 MG/ML
30 INJECTION, SOLUTION INTRAMUSCULAR; INTRAVENOUS
Status: COMPLETED | OUTPATIENT
Start: 2022-07-12 | End: 2022-07-12

## 2022-07-12 RX ADMIN — KETOROLAC TROMETHAMINE 30 MG: 30 INJECTION, SOLUTION INTRAMUSCULAR at 20:15

## 2022-07-12 ASSESSMENT — PAIN DESCRIPTION - ORIENTATION: ORIENTATION: LEFT

## 2022-07-12 ASSESSMENT — PAIN DESCRIPTION - DESCRIPTORS: DESCRIPTORS: ACHING

## 2022-07-12 ASSESSMENT — PAIN SCALES - GENERAL: PAINLEVEL_OUTOF10: 6

## 2022-07-12 ASSESSMENT — ENCOUNTER SYMPTOMS
RHINORRHEA: 0
VOMITING: 0
SORE THROAT: 0
ABDOMINAL PAIN: 0
SHORTNESS OF BREATH: 0
NAUSEA: 0

## 2022-07-12 ASSESSMENT — PAIN - FUNCTIONAL ASSESSMENT: PAIN_FUNCTIONAL_ASSESSMENT: 0-10

## 2022-07-12 ASSESSMENT — PAIN DESCRIPTION - LOCATION: LOCATION: BREAST

## 2022-07-12 NOTE — ED TRIAGE NOTES
Left side chest, shoulder, back and arm pain that started a couple weeks ago. A few weeks ago OBGYN drained a \"fluid cyst\" from her left breast and told her doctor about the pain and she told her to contact her PCP. Never saw PCP but reports the pain stopped. Today it started really hurting so she took 2 aspirins today and came to the ED today. Denies fevers, cough, SOB. PE  Chest - TTP of entire left side chest wall  Lungs - CTAB, symmetric expansion  Heart - regular rate & rhythm    Patient evaluated initially in triage. Rapid Medical Evaluation was conducted and necessary orders have been placed. I have performed a medical screening exam.  Care will now be transferred to the provider in the back of the emergency department.   Parker Bliss PA-C 3:59 PM

## 2022-07-12 NOTE — ED TRIAGE NOTES
Pt c/o L chest/breast pain to shoulder arm and back pain x3wks that had stopped and returned today. L chest to axillary tender to palpation.   Pt dx with fluid cyst in breast that was drained a few weeks ago  (-)dyspnea, numbness/tingling, confusion, slurred speech  Pt reports @baseline speech without aphasia   A&OX4

## 2022-07-13 LAB
EKG ATRIAL RATE: 81 BPM
EKG DIAGNOSIS: NORMAL
EKG P AXIS: 57 DEGREES
EKG P-R INTERVAL: 142 MS
EKG Q-T INTERVAL: 388 MS
EKG QRS DURATION: 76 MS
EKG QTC CALCULATION (BAZETT): 450 MS
EKG R AXIS: 25 DEGREES
EKG T AXIS: 36 DEGREES
EKG VENTRICULAR RATE: 81 BPM

## 2022-07-13 NOTE — ED NOTES
I have reviewed discharge instructions with the patient. The patient verbalized understanding. Patient left ED via Discharge Method: ambulatory to Home with self. Opportunity for questions and clarification provided. Patient given 1 scripts. To continue your aftercare when you leave the hospital, you may receive an automated call from our care team to check in on how you are doing. This is a free service and part of our promise to provide the best care and service to meet your aftercare needs.  If you have questions, or wish to unsubscribe from this service please call [phone]. Thank you for Choosing our [address] [place] [address] Emergency Department.       David Mckenzie RN  07/12/22 2026

## 2022-07-13 NOTE — ED PROVIDER NOTES
[facility] Provider Note                   PCP:                JOSE Lindo - LIBIA               Age: 51 y.o. Sex: female       ICD-10-CM    1. Atypical chest pain  R07.89        DISPOSITION Decision To Discharge 07/12/2022 07:59:25 PM        MDM  Number of Diagnoses or Management Options  Atypical chest pain  Diagnosis management comments: Patient is a 50-year-old female presents to facility with atypical left-sided chest, shoulder and arm pain. On exam patient is well-appearing in no acute distress afebrile with remainder vitals normal.  Work-up is grossly unremarkable. Pain is reproducible across palpation of the chest wall. When patient would move or reposition in bed she would note the pain would return indicating likely musculoskeletal discomfort. At this time given lack of findings on exam, normal troponin and EKG. Clear chest x-ray and reproducible symptoms we will discharged with anti-inflammatories and encouraged close follow-up with her family doctor. Patient is understanding of the findings here today and agreeable to proceed as discussed. Patient ambulatory upon discharge out of the department in stable condition.        Amount and/or Complexity of Data Reviewed  Clinical lab tests: ordered and reviewed  Tests in the radiology section of CPT®: ordered and reviewed  Tests in the medicine section of CPT®: ordered and reviewed  Review and summarize past medical records: yes  Independent visualization of images, tracings, or specimens: yes    Risk of Complications, Morbidity, and/or Mortality  Presenting problems: moderate  Diagnostic procedures: moderate  Management options: moderate  General comments: ===================================  ED EKG Interpretation  EKG was interpreted in the absence of a cardiologist.    Rate: Rate: Normal  EKG Interpretation: EKG Interpretation: sinus rhythm  ST Segments: Nonspecific ST segments - NO STEMI    Tiffanie Martel PA-C 9:01 PM     XR CHEST (2 VW)   Final Result    No consolidation. The patient was observed in the ED.     Results Reviewed:      Recent Results (from the past 24 hour(s))  -CBC:   Collection Time: 07/12/22  3:48 PM       Result                      Value             Ref Range           WBC                         4.6               4.3 - 11.1 K*       RBC                         4.13              4.05 - 5.2 M*       Hemoglobin                  11.5 (L)          11.7 - 15.4 *       Hematocrit                  35.9              35.8 - 46.3 %       MCV                         86.9              79.6 - 97.8 *       MCH                         27.8              26.1 - 32.9 *       MCHC                        32.0              31.4 - 35.0 *       RDW                         15.3 (H)          11.9 - 14.6 %       Platelets                   381               150 - 450 K/*       MPV                         9.9               9.4 - 12.3 FL       nRBC                        0.00              0.0 - 0.2 K/*  -Comprehensive Metabolic Panel:   Collection Time: 07/12/22  3:48 PM       Result                      Value             Ref Range           Sodium                      139               136 - 145 mm*       Potassium                   3.7               3.5 - 5.1 mm*       Chloride                    107               98 - 107 mmo*       CO2                         26                21 - 32 mmol*       Anion Gap                   6 (L)             7 - 16 mmol/L       Glucose                     86                65 - 100 mg/*       BUN                         11                6 - 23 MG/DL        CREATININE                  1.02 (H)          0.6 - 1.0 MG*       GFR         >60               >60 ml/min/1*       GFR Non- Americ*     >60               >60 ml/min/1*       Calcium                     8.9               8.3 - 10.4 M*       Total Bilirubin             0.6               0.2 - 1.1 MG*       ALT 29                12 - 65 U/L         AST                         17                15 - 37 U/L         Alk Phosphatase             60                50 - 136 U/L        Total Protein               7.5               6.3 - 8.2 g/*       Albumin                     3.6               3.5 - 5.0 g/*       Globulin                    3.9 (H)           2.3 - 3.5 g/*       Albumin/Globulin Ratio      0.9 (L)           1.2 - 3.5      -Troponin:   Collection Time: 07/12/22  3:48 PM       Result                      Value             Ref Range           Troponin, High Sensiti*     5.8               0 - 14 pg/mL   -Troponin:   Collection Time: 07/12/22  6:12 PM       Result                      Value             Ref Range           Troponin, High Sensiti*     6.0               0 - 14 pg/mL     I discussed the results of all labs, procedures, radiographs, and treatments with the patient and available family. Treatment plan is agreed upon and the patient is ready for discharge. All voiced understanding of the discharge plan and medication instructions or changes as appropriate. Questions about treatment in the ED were answered. All were encouraged to return should symptoms worsen or new problems develop. Patient Progress  Patient progress: stable       Orders Placed This Encounter   Procedures    XR CHEST (2 VW)    CBC    Comprehensive Metabolic Panel    Troponin    Cardiac Monitor    Pulse Oximetry    EKG 12 Lead    Saline lock IV        Prescott Brittle is a 51 y.o. female who presents to the Emergency Department with chief complaint of    Chief Complaint   Patient presents with    Chest Pain    Back Pain    Arm Pain      Left side chest, shoulder, back and arm pain that started a couple weeks ago. A few weeks ago OBGYN drained a \"fluid cyst\" from her left breast and told her doctor about the pain and she told her to contact her PCP. Never saw PCP but reports the pain stopped.  Today it started really hurting so she took 2 aspirins today and came to the ED today. Denies fevers, cough, SOB. The history is provided by the patient. Review of Systems   Constitutional: Negative for chills and fever. HENT: Negative for congestion, rhinorrhea and sore throat. Respiratory: Negative for shortness of breath. Cardiovascular: Positive for chest pain. Gastrointestinal: Negative for abdominal pain, nausea and vomiting. Genitourinary: Negative for dysuria, frequency and urgency. Musculoskeletal: Positive for arthralgias (left shoulder). Negative for gait problem. Skin: Negative for rash and wound. Neurological: Negative for dizziness, syncope, weakness, light-headedness, numbness and headaches. Psychiatric/Behavioral: Negative for agitation and behavioral problems. All other systems reviewed and are negative.       Past Medical History:   Diagnosis Date    Anxiety     Arthritis     Bipolar affective disorder ([address] 11 yrs old    Cellulitis 01/08/2010    denies currently     CKD (chronic kidney disease) stage 3, GFR 30-59 ml/min ([facility])     Diabetes ([address] dx:1/09    Unknown A1C    Diabetes ([address] 01/08/2010    had multiple TIA's from low bs in 2014 and hasn't been on any meds since    Diabetes mellitus type II, non insulin dependent ([address] 03/08/2015    Edema of both legs 01/08/2010    ankles     Femur fracture, left ([facility]) 03/08/2015    GERD (gastroesophageal reflux disease)     denies    Gout     Hypertension     no meds    Moderate persistent asthma without complication 06/81/7470    Morbid obesity ([address] lifetime    Non compliance with medical treatment long term    Orthopnea     Other ill-defined conditions(799.89)     cellulitis    Peripheral neuropathy 2 years    Peripheral vascular disease ([address]     Psychiatric disorder     bipolar disorder    Renal insufficiency 01/08/2010    Stroke ([address]     multiple TIA's in 2014; no residual     Unspecified sleep apnea     does not use cpap    Vertigo 2015        Past Surgical History:   Procedure Laterality Date    DELIVERY   ,     both preemies    HEENT      Tonsillectomy/SFE/18    HERNIA REPAIR  age 9    Jamaal Cartwright Left     Dr. Charles Azul        Family History   Problem Relation Age of Onset    Cancer Sister         hodgkins    Breast Cancer Sister     Heart Attack Father     Other Father         aneurysm age 62, now brain damaged    Heart Disease Father     No Known Problems Mother     Breast Cancer Sister     Other Son         24 week twin, LD           Social Connections:     Frequency of Communication with Friends and Family: Not on file    Frequency of Social Gatherings with Friends and Family: Not on file    Attends Spiritism Services: Not on file    Active Member of Clubs or Organizations: Not on file    Attends Club or Organization Meetings: Not on file    Marital Status: Not on file        Allergies   Allergen Reactions    Ciprofloxacin Itching    Sulfa Antibiotics Itching        Vitals signs and nursing note reviewed. Patient Vitals for the past 4 hrs:   Pulse Resp BP SpO2   22 66 16 (!) 140/83 99 %   22 1937 66 14 (!) 147/76 100 %   22 1815 63 -- (!) 142/89 100 %          Physical Exam  Vitals and nursing note reviewed. Constitutional:       General: She is not in acute distress. Appearance: Normal appearance. She is not ill-appearing. HENT:      Head: Normocephalic and atraumatic. Right Ear: External ear normal.      Left Ear: External ear normal.   Eyes:      Extraocular Movements: Extraocular movements intact. Conjunctiva/sclera: Conjunctivae normal.   Cardiovascular:      Rate and Rhythm: Normal rate and regular rhythm. Pulses: Normal pulses. Heart sounds: Normal heart sounds.    Pulmonary:      Effort: Pulmonary effort is normal.      Breath sounds: Normal breath sounds. Abdominal:      General: Abdomen is flat. There is no distension. Tenderness: There is no abdominal tenderness. There is no guarding or rebound. Musculoskeletal:         General: Tenderness (left chest wall) present. No swelling. Normal range of motion. Cervical back: Normal range of motion. Skin:     General: Skin is warm and dry. Capillary Refill: Capillary refill takes less than 2 seconds. Neurological:      General: No focal deficit present. Mental Status: She is alert and oriented to person, place, and time. Psychiatric:         Mood and Affect: Mood normal.         Behavior: Behavior normal.          Procedures      Labs Reviewed   CBC - Abnormal; Notable for the following components:       Result Value    Hemoglobin 11.5 (*)     RDW 15.3 (*)     All other components within normal limits   COMPREHENSIVE METABOLIC PANEL - Abnormal; Notable for the following components:    Anion Gap 6 (*)     CREATININE 1.02 (*)     Globulin 3.9 (*)     Albumin/Globulin Ratio 0.9 (*)     All other components within normal limits   TROPONIN   TROPONIN        XR CHEST (2 VW)   Final Result   No consolidation. ED Course as of 07/12/22 2058 Tue Jul 12, 2022   1542 EKG interpretation: Sinus rhythm, rate of 81, normal axis, no ischemia. [BR]   1603 EKG interpretation: Sinus rhythm, rate of 81, normal axis, no ischemia. [BR]      ED Course User Index  [BR] Gabriella Brandon DO        Voice dictation software was used during the making of this note. This software is not perfect and grammatical and other typographical errors may be present. This note has not been completely proofread for errors.      [employer], PADawsonC  07/12/22 8412

## 2022-07-14 ENCOUNTER — TELEPHONE (OUTPATIENT)
Dept: INTERNAL MEDICINE CLINIC | Facility: CLINIC | Age: 47
End: 2022-07-14

## 2022-07-14 NOTE — TELEPHONE ENCOUNTER
----- Message from \[facility] [place] [facility]\"" CRISTINA Emery sent at 7/13/2022 12:52 PM EDT -----  Subject: Appointment Request    Reason for Call: Established Patient Appointment needed: Routine ED Follow   Up Visit    QUESTIONS    Reason for appointment request? Available appointments did not meet   patient need     Additional Information for Provider? The pt was seen in the [facility] ER   yesterday the 7/12/22 for very bad chest pain and needs to follow up and   be seen in the office within five days. there were no available appt dates   until August when searched.  The pt stated that she was informed she has an   infection around her heart cavity.  ---------------------------------------------------------------------------  --------------  Juan C SEVERINO  [phone]; OK to leave message on voicemail  ---------------------------------------------------------------------------  --------------  SCRIPT ANSWERS  COVID Screen: Madeleine Oates

## 2022-07-19 ENCOUNTER — OFFICE VISIT (OUTPATIENT)
Dept: INTERNAL MEDICINE CLINIC | Facility: CLINIC | Age: 47
End: 2022-07-19
Payer: MEDICARE

## 2022-07-19 VITALS
HEIGHT: 67 IN | BODY MASS INDEX: 42.53 KG/M2 | SYSTOLIC BLOOD PRESSURE: 137 MMHG | OXYGEN SATURATION: 99 % | DIASTOLIC BLOOD PRESSURE: 81 MMHG | WEIGHT: 271 LBS | HEART RATE: 79 BPM | TEMPERATURE: 97.3 F

## 2022-07-19 DIAGNOSIS — B37.0 THRUSH: ICD-10-CM

## 2022-07-19 DIAGNOSIS — Z09 HOSPITAL DISCHARGE FOLLOW-UP: Primary | ICD-10-CM

## 2022-07-19 DIAGNOSIS — N60.02 CYST OF LEFT BREAST: ICD-10-CM

## 2022-07-19 DIAGNOSIS — M25.512 ACUTE PAIN OF LEFT SHOULDER: ICD-10-CM

## 2022-07-19 PROCEDURE — G8419 CALC BMI OUT NRM PARAM NOF/U: HCPCS | Performed by: NURSE PRACTITIONER

## 2022-07-19 PROCEDURE — G8427 DOCREV CUR MEDS BY ELIG CLIN: HCPCS | Performed by: NURSE PRACTITIONER

## 2022-07-19 PROCEDURE — 1036F TOBACCO NON-USER: CPT | Performed by: NURSE PRACTITIONER

## 2022-07-19 PROCEDURE — 99213 OFFICE O/P EST LOW 20 MIN: CPT | Performed by: NURSE PRACTITIONER

## 2022-07-19 SDOH — ECONOMIC STABILITY: FOOD INSECURITY: WITHIN THE PAST 12 MONTHS, THE FOOD YOU BOUGHT JUST DIDN'T LAST AND YOU DIDN'T HAVE MONEY TO GET MORE.: NEVER TRUE

## 2022-07-19 SDOH — ECONOMIC STABILITY: FOOD INSECURITY: WITHIN THE PAST 12 MONTHS, YOU WORRIED THAT YOUR FOOD WOULD RUN OUT BEFORE YOU GOT MONEY TO BUY MORE.: NEVER TRUE

## 2022-07-19 ASSESSMENT — PATIENT HEALTH QUESTIONNAIRE - PHQ9
9. THOUGHTS THAT YOU WOULD BE BETTER OFF DEAD, OR OF HURTING YOURSELF: 0
4. FEELING TIRED OR HAVING LITTLE ENERGY: 0
1. LITTLE INTEREST OR PLEASURE IN DOING THINGS: 0
7. TROUBLE CONCENTRATING ON THINGS, SUCH AS READING THE NEWSPAPER OR WATCHING TELEVISION: 0
6. FEELING BAD ABOUT YOURSELF - OR THAT YOU ARE A FAILURE OR HAVE LET YOURSELF OR YOUR FAMILY DOWN: 0
SUM OF ALL RESPONSES TO PHQ9 QUESTIONS 1 & 2: 0
3. TROUBLE FALLING OR STAYING ASLEEP: 0
5. POOR APPETITE OR OVEREATING: 0
SUM OF ALL RESPONSES TO PHQ QUESTIONS 1-9: 0
2. FEELING DOWN, DEPRESSED OR HOPELESS: 0
10. IF YOU CHECKED OFF ANY PROBLEMS, HOW DIFFICULT HAVE THESE PROBLEMS MADE IT FOR YOU TO DO YOUR WORK, TAKE CARE OF THINGS AT HOME, OR GET ALONG WITH OTHER PEOPLE: 0
SUM OF ALL RESPONSES TO PHQ QUESTIONS 1-9: 0
8. MOVING OR SPEAKING SO SLOWLY THAT OTHER PEOPLE COULD HAVE NOTICED. OR THE OPPOSITE, BEING SO FIGETY OR RESTLESS THAT YOU HAVE BEEN MOVING AROUND A LOT MORE THAN USUAL: 0

## 2022-07-19 ASSESSMENT — ENCOUNTER SYMPTOMS
COUGH: 0
CHEST TIGHTNESS: 0
VOMITING: 0
NAUSEA: 0
WHEEZING: 0
SHORTNESS OF BREATH: 0

## 2022-07-19 ASSESSMENT — SOCIAL DETERMINANTS OF HEALTH (SDOH): HOW HARD IS IT FOR YOU TO PAY FOR THE VERY BASICS LIKE FOOD, HOUSING, MEDICAL CARE, AND HEATING?: NOT HARD AT ALL

## 2022-07-19 NOTE — PROGRESS NOTES
[facility]  Office Visit Note    Subjective:  Chief Complaint   Patient presents with    Follow-up     ER follow up for sharp pain in left breast/ neck/ back and arm       Patient ID: Nicole Burt is a 51 y.o. female presenting to the office for the above. 51year old female for ER follow up. She was seen at ER 7/12/22 with left-sided chest/shoulder/arm pain. Workup was unremarkable, and pain was reproducible with palpation. Normal ECG, CXR, labs. She was given naproxen and advised to follow up with her orthopedist, Dr. Moisés Espinosa. States she had a cyst of her left breast drained at her gynecology office, with improvement in her pain since her ER visit and naproxen. Denies breast pain/soreness, discharge, wounds. No chest pain, shortness of breath. She has orthopedics follow up scheduled. Previous history for reference:     76-year-old female for follow up. Was a patient of Dr. Suszanne Schlatter at [facility]. She has been on disability since 2001 due to mental health/bipolar disorder. Has a son Sunita Hill) with autism. Edgar [place]--  She has thrush. Treat with nystatin rinse. Also requests Diflucan. Discussed risks/benefits, alternatives, potential side effects, proper administration of medication. Hypertension--  Follows with [facility], Dr. Jatin Gallagher. Started losartan 50mg daily in November; tolerating well without report of side effects. Previous treatments: metoprolol  CAC score 0 in 2020. She has checked her BP at home occasionally since last visit, with readings <140/90. Denies chest pain, palpitations, shortness of breath, peripheral edema, severe headaches, dizziness. --Advise low sodium DASH diet, regular exercise, weight loss. Monitor BP regularly at home, and notify office if consistently >130/90. History of diabetes--  A1c 5.5% in October 2021. Diagnosed over 10 years ago. Was on metformin.   Had multiple TIAs from low blood glucose in 2014, and has not been on medication since. No residual deficits from TIAs. Does not check her sugar at home. Denies hypoglycemic episodes or symptoms. Has history of diabetic neuropathy. Gabapentin did not help. Lyrica was helping, but she has not taken it in quite some time; has not had issues with numbness/tingling for a while. --Advise healthy diet, regular exercise, weight loss. Chronic kidney disease--  Stage 3a. Stable. Advise to avoid NSAIDs, sodas, high sodium foods, etc.     Osteoarthritis--  Follows with [facility]. Primarily in knees and hips. Diagnosed with lumbar radiculopathy. Was referred to pain management. Asthma/allergies--  Stable with Singulair, Symbicort BID, prn albuterol. No recent exacerbations. --States she stopped using her Symbicort due to thrush infections. We reviewed procedure for using inhalers, and rinsing mouth afterwards. She would like to try a different inhaler. Will try Advair. Discussed risks/benefits, alternatives, potential side effects, proper administration of medication. GERD--  Stable with omeprazole. No red flag symptoms. Bilateral leg cramps--  HPI October: Worse in left leg. Pain comes to her left hip and down her left leg. Some cramping in bilateral calves. Ibuprofen provides moderate relief. Labs were unremarkable. She has been using compression stockings and leg massager. Leg swelling is gone, and cramping has improved. History of bipolar disorder--  HPI 10/19/21 at establish care visit:  No longer on medication. States she is doing very well off medication. Has not followed with psychiatry in a while, but has had previous admissions to [facility], and mandatory anger management classes. Denies thoughts of hurting herself or others. Declines psychiatry referral today, but will let me know if she opts to do this. 12/14/21:  She sent message on 12/1/21 stating she was having crying spells.   She was offered appointment at the time, but declined it, stating she did not want to talk and only wanted medication. She was advised of importance of seeing psychiatry and offered list of community resources to reach out to but declined this list.  Today she is visibly upset and tearful, does not make eye contact throughout the visit. Denies thoughts of hurting herself. States she does not want to talk about her problems but is upset that medication was not previously prescribed over the phone. Advised that medication cannot be prescribed without proper discussion of her history, symptoms, etc.; she responded that \"I don't want to talk. \"  Huey Dues her that due to her mental health history, it is important for her to reestablish with a mental health care professional for treatment and medication management, but she declines referral today. Again offered a list of community resources [facility], etc.), but she declined to take the list and walked out of the exam room stating she does not want to talk. Supplements: vitamins B12, D, and C, elderberry     Health Maintenance:  *Medicare Wellness: 11/16/21  *Colorectal cancer screening: Cologuard not covered by insurance. Denies family history of colorectal cancer, changes to bowel habits, blood in stool. *Mammogram: September 2021  *Pap: 11/16/21    *TDAP: 12/3/11; advised to get at pharmacy  *Flu: 9/11/21  Covid19: completed 10/10/21      History:   Allergies   Allergen Reactions    Ciprofloxacin Itching    Sulfa Antibiotics Itching       Past Medical History:   Diagnosis Date    Anxiety     Arthritis     Bipolar affective disorder ([facility] [address] 11 yrs old    Cellulitis 01/08/2010    denies currently     CKD (chronic kidney disease) stage 3, GFR 30-59 ml/min ([facility])     Diabetes ([facility] [address] dx:1/09    Unknown A1C    Diabetes ([facility] [address] 01/08/2010    had multiple TIA's from low bs in 2014 and hasn't been on any meds since    Diabetes mellitus type II, non insulin dependent (PROVENTIL) (2.5 MG/3ML) 0.083% nebulizer solution Inhale 2.5 mg into the lungs every 6 hours as needed      ascorbic acid (VITAMIN C) 500 MG tablet Take 500 mg by mouth daily      budesonide-formoterol (SYMBICORT) 160-4.5 MCG/ACT AERO TAKE 2 PUFFS BY MOUTH TWICE A DAY      cetirizine (ZYRTEC) 10 MG tablet TAKE 1 TABLET BY MOUTH EVERY DAY      diclofenac (VOLTAREN) 75 MG EC tablet Take 75 mg by mouth daily as needed      fluticasone (FLONASE) 50 MCG/ACT nasal spray 2 sprays by Nasal route daily      losartan (COZAAR) 50 MG tablet Take 50 mg by mouth daily      methocarbamol (ROBAXIN) 750 MG tablet Take 750 mg by mouth 4 times daily as needed      methylPREDNISolone (MEDROL DOSEPACK) 4 MG tablet FOLLOW PACKAGE INSTRUCTIONS      montelukast (SINGULAIR) 10 MG tablet Take 10 mg by mouth daily      nystatin (MYCOSTATIN) 563861 UNIT/ML suspension Take 500,000 Units by mouth 4 times daily      progesterone (PROMETRIUM) 200 MG CAPS capsule Take 200 mg by mouth daily       No current facility-administered medications for this visit. Review of Systems:  Review of Systems   Constitutional:  Positive for fatigue. Negative for activity change, appetite change and fever. Respiratory:  Negative for cough, chest tightness, shortness of breath and wheezing. Cardiovascular:  Negative for chest pain. Gastrointestinal:  Negative for nausea and vomiting. Musculoskeletal:  Positive for arthralgias. Skin:  Negative for pallor. Neurological:  Negative for seizures, syncope, weakness and headaches. See HPI for other pertinent positives and negatives. Objective:  Vitals:    07/19/22 1116   BP: 137/81   Site: Right Upper Arm   Position: Sitting   Cuff Size: Large Adult   Pulse: 79   Temp: 97.3 °F (36.3 °C)   TempSrc: Temporal   SpO2: 99%   Weight: 271 lb (122.9 kg)   Height: 5' 7\" (1.702 m)       Body mass index is 42.44 kg/m². Physical Exam  Vitals and nursing note reviewed.    Constitutional: General: She is not in acute distress. Appearance: Normal appearance. She is not ill-appearing or diaphoretic. HENT:      Head: Normocephalic and atraumatic. Eyes:      General: No scleral icterus. Right eye: No discharge. Left eye: No discharge. Cardiovascular:      Rate and Rhythm: Normal rate and regular rhythm. Pulses: Normal pulses. Heart sounds: Normal heart sounds. Pulmonary:      Effort: Pulmonary effort is normal. No respiratory distress. Breath sounds: Normal breath sounds. No wheezing, rhonchi or rales. Skin:     General: Skin is warm and dry. Neurological:      Mental Status: She is alert and oriented to person, place, and time.    Psychiatric:         Speech: Speech normal.                Lab Results   Component Value Date    WBC 4.6 07/12/2022    HGB 11.5 (L) 07/12/2022    HCT 35.9 07/12/2022    MCV 86.9 07/12/2022     07/12/2022   ,   Lab Results   Component Value Date/Time     07/12/2022 03:48 PM    K 3.7 07/12/2022 03:48 PM     07/12/2022 03:48 PM    CO2 26 07/12/2022 03:48 PM    BUN 11 07/12/2022 03:48 PM    CREATININE 1.02 07/12/2022 03:48 PM    GLUCOSE 86 07/12/2022 03:48 PM    CALCIUM 8.9 07/12/2022 03:48 PM    ,   Lab Results   Component Value Date    TSH 0.640 10/19/2021     Lab Results   Component Value Date    ALT 29 07/12/2022    AST 17 07/12/2022    ALKPHOS 60 07/12/2022    BILITOT 0.6 07/12/2022   ,   Lab Results   Component Value Date    LABA1C 5.5 10/19/2021     Lab Results   Component Value Date     10/19/2021   ,   Lab Results   Component Value Date    LABMICR Comment 10/19/2021       PHQ-9  7/19/2022   Little interest or pleasure in doing things 0   Feeling down, depressed, or hopeless 0   Trouble falling or staying asleep, or sleeping too much 0   Feeling tired or having little energy 0   Poor appetite or overeating 0   Feeling bad about yourself - or that you are a failure or have let yourself or your family down 0   Trouble concentrating on things, such as reading the newspaper or watching television 0   Moving or speaking so slowly that other people could have noticed. Or the opposite - being so fidgety or restless that you have been moving around a lot more than usual 0   Thoughts that you would be better off dead, or of hurting yourself in some way 0   PHQ-2 Score 0   PHQ-9 Total Score 0   If you checked off any problems, how difficult have these problems made it for you to do your work, take care of things at home, or get along with other people? 0        Assessment/Plan:      Health Maintenance Due   Topic Date Due    Pneumococcal 0-64 years Vaccine (1 - PCV) Never done    Diabetic foot exam  Never done    Depression Monitoring  Never done    HIV screen  Never done    Diabetic retinal exam  Never done    Hepatitis C screen  Never done    Hepatitis B vaccine (1 of 3 - Risk 3-dose series) Never done    Colorectal Cancer Screen  Never done          Alina Holland was seen today for follow-up. Diagnoses and all orders for this visit:    Hospital discharge follow-up    Cyst of left breast    Acute pain of left shoulder      Patient states she is otherwise doing well; has no further questions or concerns at this visit. Encouraged to contact office with any concerns prior to next visit. Return if symptoms worsen or fail to improve, for Next scheduled visit.     Vamsi Haynes, APRN - CNP

## 2022-07-28 ENCOUNTER — TELEPHONE (OUTPATIENT)
Dept: INTERNAL MEDICINE CLINIC | Facility: CLINIC | Age: 47
End: 2022-07-28

## 2022-07-28 NOTE — TELEPHONE ENCOUNTER
Pt came in stating she needed to speak with [facility]. I offered to leave a message for [facility] and the pt just wanted me to relay that she has been losing weight, hasn't had an appetite and that her stool has been black. She did say that she was recently prescribed a new medication to treat neuropathy in her legs. She also stated that she had sent a message to the staff about what was happening at 7:30 this morning. I spoke with Ezekiel on what the best thing for her to do would be and it was decided that she needed an appt to see [facility]. She wanted to be seen by [facility] today but there were no openings so I scheduled her for 8am 7/29/22.

## 2022-07-28 NOTE — PROGRESS NOTES
Mando Fermin  51 y.o.  1975  [id number]    Today:  2022    Patient presents today for:     1)  Breast pain, \"feels sore\":, radiated down her left arm then resolved. No CP, no SOB. Is not breastfeeding, no nipple drainage. 2022 nl mmg and breast US w/ bilateral breast cysts and chronically stable right breast mass @ 4:00, likely fibroadenoma. FH Breast cancer:  2 sisters      2) follow-up menometrorrhagia    1)  Breast pain, 5/10/22 ov PE findings:    Right breast (physical exam)  4:00  -->  2.5 cm nodule    Left breast (physical exam)  11:00-1:00--> Thickened ridge of prominent FC change  9:00 periareolar -->4 cm (likely cyst)  9:00 periareolar---> 6 mm small mass  2:00-3:00 periareolar -->5 cm cyst.    22 diagnostic MMG and breast US:  Dx'tic MMG:    R breast no significant interval change  L breast:  1:00 (subareoler) 5 cm cyst                  9:00 (subareoler)  1.9 cm round mass, likely cyst    Breast US:  R breast  4:00--> 1.8/1/2 likely fibroadenoma  L breast   9:00 (subareolar) --> 2.4/2/1 cyst                  11-12:00  several simple cysts                  1:00 subareolar 5/3/2 cm cyst                  2:00 subareolar simple cyst (no dimensions given)                  3:00  simple cyst (no dimensions given)    Review of Systems   Constitutional:         Cold intolerance   Genitourinary:         Nocturia     Musculoskeletal:  Positive for arthralgias. Breast ROS: positive for - mastalgia     Patient's last menstrual period was 2022 (approximate). Contraception: tubal ligation      3/31/2022 ov:  New pt to me      (twins)  Patient presents today with: Menometrorrhagia 3/1/2022- today (3/31/2022). Variable flow. Requires a maxi long pad, changes her pad q 4-5 times/day. Sometimes overflows. Denies:  Dysmenorrhea, dyspareunia, emotional lability, PCB, night sweats, hot flashes ( is always cold). Does NOT currently take hormones.   Reports occasional + orthostatic sx & equivocal HAs (sometimes takes Tylenol for )     No LMP recorded. Last pap smear 11/2021,  Normal with neg HPV  A/P:  1. Menometrorrhagia, BMI 45:  suboptimal exam, ultrasound and EMB with pre-medication. Prometrium 200 mg qd, increase to bid if vaginal bleeding becomes heavy. Discuss possible tx options at nv.     ? cbc, HCG, fsh  Typical cycles: regular/monthly last ~ 5 d, no menorrhagia, doesn't require double products, no dysmenorrhea. Took depo Provera for years, stopped it ~ 3 y ago. Addendum:  3/31/2022 cbc:  WBCs 4.6, Hgb 11.7, platelets 658K     79/4822  pap smear normal w/ neg HPV-->AE due 11/2022     2. Treatment options briefly discussed:  Hormonal tx (OCs, mirena), hysterectomy. 3.  Stage 3a CKD, 10/2021 Cr 1.15, Consuello Dus, NP    Lab Results   Component Value Date/Time     Creatinine 1.15 (H) 10/19/2021 02:51 PM     Creatinine 1.18 (H) 03/04/2021 08:56 PM     Creatinine 1.04 (H) 08/06/2020 05:19 AM     Creatinine 1.12 (H) 05/14/2018 06:49 PM     Creatinine 1.07 (H) 08/26/2015 03:00 AM     Creatinine 1.09 (H) 08/25/2015 09:58 PM     Creatinine 1.20 (H) 03/07/2015 09:30 PM     Creatinine 1.28 (H) 03/05/2015 09:15 AM      4. Cold intolerance--> tsh  (10/2021 normal:  tsh)     5. Vaginal discharge--> addendum -->Negative Nuswab     6. Clarify Ob hx nv:  Twins &  Son  (boy and girl twins delivered ~ 23 wk, boy survived. 7.  Goes to pain management clinic--> txd w/ KEVIN     8. Sees a hip doctor, has been having problems following a fall on her knee, having knee pain. 9.  Right anterior foot, sig erythema anteriorly, per pt her dog bit her after she accidentally stepped on the dog, was rxd antibiotics. Has 2 labs and a a cocker spaniel. 8.  Fiance had a hip replacement. 11.  Covid, flu and tdap vaccines current    4/28/2022 ov:                CC:  [place]   HPI:    Taking Prometrium (started ~4/1/2020), VB stopped yesterday.         3/31/2022 labs reviewed with patient: WBCs 4.6, Hgb 11.7, PLT 355k  Negative hCG, negative nu swab  FSH ordered (2022), not drawn         2022 US today:  HETEROGENOUS UT 8.4//, vol 80. ENDOMETRIUM= 3.3MM. MULTIPLE , TINY NABOTHIAN CYSTS IN THE CX.  LT OV- 3 SMALL, SIMPLE CYSTS. RT OV- 3 CYSTS: 1) LARGEST: COMPLEX WITH SEPTATIONS (HEMORRHAGIC APPEARANCE). 2.8 X 2.4 X 2.5CM. 2) SEMI-BLOOD FILLED, SEMI-FLUID FILLED CYST. 3) SIMPLE. BLOOD FLOW SEEN IN THE STROMA OF THE OV. NO FF. Left Ovarian Cyst             D1               D2                  D3               Avg. D                   Vol  Ovarian Cyst 1               1.56 cm       0.91 cm          1.26 cm       1.24 cm              0.938 cm³  Ovarian Cyst 2               1.62 cm      1.19 cm          1.25 cm       1.35 cm              1.252 cm³  Ovarian Cyst 3               1.39 cm      1.21 cm          0.92 cm       1.17 cm              0.806 cm³     Right Ov Cyst  Ovarian Cyst 1                2.82 cm      2.38 cm           2.54 cm       2.58 cm            8.927 cm³  Ovarian Cyst 2                2.54 cm      1.53 cm           1.98 cm       2.02 cm            4.020 cm³  Ovarian Cyst 3                1.88 cm      1.43 cm           1.14 cm       1.48 cm            1.600 cm³        A/P  1. Follow up:  50 yo, BMI 43,  (twins), Menometrorrhagia (daily VB 3/1-3/31/2022) on Prometrium. Typical cycles: regular/monthly last ~ 5 d, no menorrhagia, doesn't require double products, no dysmenorrhea. Took depo Provera for years, stopped it ~ 3 y ago. Needs FSH (never drawn). await EMB results, formulate treatment & F/U accordingly  Advised D&C hysteroscopy 2022        3. Patient asked me to check her right dorsal foot, s/p 3/28/2022 ER evaluation following a dog bite prescribed p.o. antibiotics per patient Tdap was \"not indicated\". Patient reports today that the swelling is considerably improved and tenderness has resolved.   Patient encouraged to explained at her recent office visit the benefit of the Mirena IUD is she will get less systemic (head to toe) progesterone. Systemic progesterone probably has a higher risk of breast cancer then the Mirena IUD. Given her BMI  her risk for uterine cancer is elevated so I still advise the D&C hysteroscopy but if she does not want the Mirena IUD then she can have the Depo-Provera shot and understand it may or may not affect her risk for breast cancer. Certainly she  does not have to have a D&C hysteroscopy that is also her choice. Weighing all her options and her clinical situation I think the D&C hysteroscopy with Mirena IUD is the best choice for her but it is her choice. Because she took  Depo-Provera for 16 years it is a good idea to check a bone density scan if she resumes it. 2. Difficult breast exam, marked FC change. Consider referral to [facility] for their opinion. Bilateral breast lumps: Today's exam, possible 2nd large left breast cyst.     FH:  2 sisters w/ breast cancer   Repeat MMG and bilateral breast US, assess for interval change. May benefit from breast MRI      3. Reassessed patient's right foot s/p dog bite, looks good, healing well, no evidence of infection. --> , daughter       --->Her daughter has 5 daughters, is pregnant    --> emergency c-s     --->Son---> survived, legally blind, autistic, needs daily assistance.     Daughter-->   --> c-s, daughter        --->Her daughter has  a 9yo and 3 m old      Allergies   Allergen Reactions    Ciprofloxacin Itching    Sulfa Antibiotics Itching       Current Outpatient Medications   Medication Sig    omeprazole (PRILOSEC) 20 MG delayed release capsule Take 1 capsule by mouth Daily TAKE 1 CAPSULE BY MOUTH EVERY DAY    albuterol sulfate  (90 Base) MCG/ACT inhaler TAKE 2 PUFFS BY MOUTH EVERY 4 HOURS AS NEEDED    albuterol (PROVENTIL) (2.5 MG/3ML) 0.083% complication     Morbid obesity ([facility] [address] lifetime    Non compliance with medical treatment long term    Orthopnea     Other ill-defined conditions(799.89)     cellulitis    Peripheral neuropathy 2 years    Peripheral vascular disease ([facility] [address]     Psychiatric disorder     bipolar disorder    Renal insufficiency 2010    Stroke ([facility] [address]     multiple TIA's in 2014; no residual     Unspecified sleep apnea     does not use cpap    Vertigo 2015       Past Surgical History:   Procedure Laterality Date    DELIVERY   ,     both preemies    HEENT      Tonsillectomy/SFE/18    HERNIA REPAIR  age 10    TONSILLECTOMY      TOTAL HIP ARTHROPLASTY Left     Dr. Ruby Plaza       OB History    Para Term  AB Living   3   1 1 0 3   SAB IAB Ectopic Molar Multiple Live Births                     Social History     Social History Narrative    Patient is single, lives with disabled father, 15year old disabled son and 8year old daughter. 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 She reports a lot of stress, including her 12year old       Family History   Problem Relation Age of Onset    Cancer Sister         hodgkins    Breast Cancer Sister     Heart Attack Father     Other Father         aneurysm age 62, now brain damaged    Heart Disease Father     No Known Problems Mother     Breast Cancer Sister     Other Son         25 week twin, LD         Physical Exam:  /80   Wt 275 lb (124.7 kg)   LMP 2022 (Approximate)   Breastfeeding No   BMI 43.07 kg/m²     Hedy Carrera is a 51 y.o. Janet Karst female who appears pleasant, well developed, well nourished, with good attention to hygiene and body habitus. Oriented to person, place and time. Mood and affect are normal and appropriate to the situation.   Hedy Carrera is in no apparent distress,    Head is normocephalic, atraumatic, without any gross head or neck masses. Neck: Neck exam reveals no abnormalities. Neck lymph nodes are normal.   No supraclavicular lymphadenopathy noted. No axillary lymphadenopathy noted. No groin lymphadenopathy noted. Breast: Breasts are bilaterally symmetric, no dimpling, retractions, adenopathy. No nipple drainage elicited. Bilateral FC change:  marked    Right breast   4:00  -->  2.5 cm nodule    Left breast   11:00-1:00--> Thickened ridge of prominent FC change  9:00 periareolar -->4 cm (likely cyst)  9:00 periareolar---> 6 mm small mass  2:00-3:00 periareolar -->5 cm cyst.  Procedure:  Breast Aspiration:  Left breast prepped w/ betadine. Numbed w/ 2% lidocaine w/o epi  18 gauge need used to aspirate the cyst.  No fluid aspirated h/w the cyst felt like it decompressed thereafter. A/P:  1. Breast problems:  Pain begins in the left breast, feels sore, radiates down her left arm . D/w pt, arm pain could be any number of things including musculoskeletal issues (h/o chronic pain) but also possibly cardiovascular issues. If her PCP can't see her advise her to go to the ER so she can have an EKG/complete work up of sx. As we discussed she needs to mention her arm pain to her orthopedist, Dr. Chapincito Roberts, see what he advises (could be coming from a disc issue,  arthritis in her shoulder or other back issues). FH breast cancer--> 2 sisters    Left breast cyst attempted aspiration w/o success. [place], if cyst reaccumulates-->refer to Dr. Matty Gomez     STUDY:  5/21/2022  Bilateral digital diagnostic mammogram        INDICATION: Upper outer quadrant right breast mass;  palpable periareolar right   breast mass; family history of 2 sisters with breast cancer.        COMPARISON:   Outside mammograms [address] 8/14/2020 and 7/1/2019   and outside right breast ultrasound 7/20/2019       FINDINGS: Bilateral digital diagnostic mammography was performed, and is   interpreted in conjunction with a computer assisted detection (CAD) system. The breast parenchyma is heterogeneously dense, which may limit detection of   small masses. Spot compression views demonstrate a palpable mass in the subareolar outer right   breast and a second stable mass in the medial inferior right breast at middle   depth. Typically benign-appearing calcifications are scattered throughout both   breasts. There is an asymmetry in the subareolar left breast as well. The patient was referred to sonography. BILATERAL BREAST ULTRASOUND: Sonography of the palpable subareolar 9:00 right   breast demonstrates a large 3.4 cm simple cyst. There is a second smoothly   bordered solid mass in the 4:00 right breast at 7 cm from the nipple. This   measures 2.5 cm in maximal diameter today compared 3 cm in maximal diameter on   the comparison ultrasound from July 2019. Imaging features suggest a benign   fibroadenoma. Sonography of the 1:00 subareolar left breast demonstrates a 1.9 cm simple cyst   and sonography of the 2:00 subareolar left breast demonstrates a large 4.8 cm   simple cyst.           1. 3 cm simple cyst in the periareolar right breast corresponding to the area of   palpable concern. Additional periareolar cyst are present in the left breast       2. Slight decrease in size of smoothly bordered 2.3 cm solid mass in the 4:00   right breast. Imaging features suggest a fibroadenoma. BI-RADS Assessment Category 2: Benign finding. A 1 yr screening mammogram is   recommended. A reminder letter will be sent to the patient. 2.  Menometrorrhagia, 5/2022 FSH-->14,   VB resolved on Prometrium. 5/2022--> advised D & C Hysteroscopy. FU 6 wk for recheck w/ pelvic US (FU 4/28/2022 pelvic US).   4/28/2022--> EMB      3. BMI 42.  4.  Chronic pain, Dr. Alana Neumann, had a back MRI  5.   Stage 3 A CKD    Addendum 7/8/2022, pt message-->\"Brown stuff when she pees\" could be old blood in her urine or old uterine blood. Advised  D & C hysteroscopy 5/2022, she declined. I still think the D & C hysteroscopy w/ placement of a mirena IUD would alleviate her VB problems and allow me to more completely evaluate the uterus for abnormal cells. I could see her July 7 @ 10:20 if that slot doesn't fill before. Her PCP or the ER can evaluate her urine. \"    Last pap smear 11/2021,  Normal with neg HPV  AE due  after 11/2022      Re ROS--> non gynecologic concerns referred back to her PCP or appropriate specialist.      Diagnosis Orders   1. Breast cyst, left  RI PUNC/ASPIR BREAST CYST      2. Mastalgia in female        3. Menometrorrhagia          More than 50% of this 30-35+  minute visit was spent addressing the above patient concerns including:  assessment, chart review and counseling the patient about the above concerns including evaluation plan, treatment strategies & documentation. Long conversation with patient, all her questions answered and addressed all her concerns. Dictated using voice recognition software.   Proofread but unrecognized errors may exist.    Mg Fuentes MD, [facility] [place]

## 2022-07-29 ENCOUNTER — OFFICE VISIT (OUTPATIENT)
Dept: INTERNAL MEDICINE CLINIC | Facility: CLINIC | Age: 47
End: 2022-07-29
Payer: MEDICARE

## 2022-07-29 VITALS
HEIGHT: 67 IN | HEART RATE: 64 BPM | DIASTOLIC BLOOD PRESSURE: 82 MMHG | OXYGEN SATURATION: 100 % | SYSTOLIC BLOOD PRESSURE: 129 MMHG | WEIGHT: 262 LBS | TEMPERATURE: 97.3 F | BODY MASS INDEX: 41.12 KG/M2

## 2022-07-29 DIAGNOSIS — I10 PRIMARY HYPERTENSION: ICD-10-CM

## 2022-07-29 DIAGNOSIS — Z11.3 SCREENING EXAMINATION FOR STD (SEXUALLY TRANSMITTED DISEASE): ICD-10-CM

## 2022-07-29 DIAGNOSIS — Z11.4 ENCOUNTER FOR SCREENING FOR HIV: ICD-10-CM

## 2022-07-29 DIAGNOSIS — R11.0 NAUSEA: ICD-10-CM

## 2022-07-29 DIAGNOSIS — G89.4 CHRONIC PAIN SYNDROME: ICD-10-CM

## 2022-07-29 DIAGNOSIS — N18.31 STAGE 3A CHRONIC KIDNEY DISEASE (HCC): ICD-10-CM

## 2022-07-29 DIAGNOSIS — R63.4 WEIGHT LOSS: ICD-10-CM

## 2022-07-29 DIAGNOSIS — I10 PRIMARY HYPERTENSION: Primary | ICD-10-CM

## 2022-07-29 DIAGNOSIS — R63.0 POOR APPETITE: ICD-10-CM

## 2022-07-29 LAB
ALBUMIN SERPL-MCNC: 3.8 G/DL (ref 3.5–5)
ALBUMIN/GLOB SERPL: 1 {RATIO} (ref 1.2–3.5)
ALP SERPL-CCNC: 63 U/L (ref 50–136)
ALT SERPL-CCNC: 38 U/L (ref 12–65)
ANION GAP SERPL CALC-SCNC: 5 MMOL/L (ref 7–16)
APPEARANCE UR: CLEAR
AST SERPL-CCNC: 19 U/L (ref 15–37)
BACTERIA URNS QL MICRO: ABNORMAL /HPF
BASOPHILS # BLD: 0 K/UL (ref 0–0.2)
BASOPHILS NFR BLD: 1 % (ref 0–2)
BILIRUB SERPL-MCNC: 1 MG/DL (ref 0.2–1.1)
BILIRUB UR QL: NEGATIVE
BUN SERPL-MCNC: 11 MG/DL (ref 6–23)
CALCIUM SERPL-MCNC: 9.3 MG/DL (ref 8.3–10.4)
CASTS URNS QL MICRO: 0 /LPF
CHLORIDE SERPL-SCNC: 108 MMOL/L (ref 98–107)
CO2 SERPL-SCNC: 27 MMOL/L (ref 21–32)
COLOR UR: ABNORMAL
CREAT SERPL-MCNC: 1.1 MG/DL (ref 0.6–1)
CRYSTALS URNS QL MICRO: 0 /LPF
DIFFERENTIAL METHOD BLD: ABNORMAL
EOSINOPHIL # BLD: 0.1 K/UL (ref 0–0.8)
EOSINOPHIL NFR BLD: 3 % (ref 0.5–7.8)
EPI CELLS #/AREA URNS HPF: ABNORMAL /HPF
ERYTHROCYTE [DISTWIDTH] IN BLOOD BY AUTOMATED COUNT: 15.8 % (ref 11.9–14.6)
GLOBULIN SER CALC-MCNC: 3.8 G/DL (ref 2.3–3.5)
GLUCOSE SERPL-MCNC: 80 MG/DL (ref 65–100)
GLUCOSE UR STRIP.AUTO-MCNC: NEGATIVE MG/DL
HCT VFR BLD AUTO: 38.8 % (ref 35.8–46.3)
HGB BLD-MCNC: 12 G/DL (ref 11.7–15.4)
HGB UR QL STRIP: ABNORMAL
HIV 1+2 AB+HIV1 P24 AG SERPL QL IA: NONREACTIVE
HIV 1/2 RESULT COMMENT: NORMAL
IMM GRANULOCYTES # BLD AUTO: 0 K/UL (ref 0–0.5)
IMM GRANULOCYTES NFR BLD AUTO: 0 % (ref 0–5)
KETONES UR QL STRIP.AUTO: NEGATIVE MG/DL
LEUKOCYTE ESTERASE UR QL STRIP.AUTO: ABNORMAL
LYMPHOCYTES # BLD: 1 K/UL (ref 0.5–4.6)
LYMPHOCYTES NFR BLD: 26 % (ref 13–44)
MCH RBC QN AUTO: 28.9 PG (ref 26.1–32.9)
MCHC RBC AUTO-ENTMCNC: 30.9 G/DL (ref 31.4–35)
MCV RBC AUTO: 93.5 FL (ref 79.6–97.8)
MONOCYTES # BLD: 0.3 K/UL (ref 0.1–1.3)
MONOCYTES NFR BLD: 9 % (ref 4–12)
MUCOUS THREADS URNS QL MICRO: 0 /LPF
NEUTS SEG # BLD: 2.3 K/UL (ref 1.7–8.2)
NEUTS SEG NFR BLD: 61 % (ref 43–78)
NITRITE UR QL STRIP.AUTO: NEGATIVE
NRBC # BLD: 0 K/UL (ref 0–0.2)
OTHER OBSERVATIONS: ABNORMAL
PH UR STRIP: 6.5 [PH] (ref 5–9)
PLATELET # BLD AUTO: 315 K/UL (ref 150–450)
PMV BLD AUTO: 10.6 FL (ref 9.4–12.3)
POTASSIUM SERPL-SCNC: 3.6 MMOL/L (ref 3.5–5.1)
PROT SERPL-MCNC: 7.6 G/DL (ref 6.3–8.2)
PROT UR STRIP-MCNC: 30 MG/DL
RBC # BLD AUTO: 4.15 M/UL (ref 4.05–5.2)
RBC #/AREA URNS HPF: >100 /HPF
SODIUM SERPL-SCNC: 140 MMOL/L (ref 136–145)
SP GR UR REFRACTOMETRY: 1.01 (ref 1–1.02)
TSH W FREE THYROID IF ABNORMAL: 0.81 UIU/ML (ref 0.36–3.74)
URINE CULTURE IF INDICATED: ABNORMAL
UROBILINOGEN UR QL STRIP.AUTO: 0.2 EU/DL (ref 0.2–1)
WBC # BLD AUTO: 3.6 K/UL (ref 4.3–11.1)
WBC URNS QL MICRO: ABNORMAL /HPF

## 2022-07-29 PROCEDURE — G8427 DOCREV CUR MEDS BY ELIG CLIN: HCPCS | Performed by: NURSE PRACTITIONER

## 2022-07-29 PROCEDURE — 1036F TOBACCO NON-USER: CPT | Performed by: NURSE PRACTITIONER

## 2022-07-29 PROCEDURE — G8417 CALC BMI ABV UP PARAM F/U: HCPCS | Performed by: NURSE PRACTITIONER

## 2022-07-29 PROCEDURE — 99214 OFFICE O/P EST MOD 30 MIN: CPT | Performed by: NURSE PRACTITIONER

## 2022-07-29 RX ORDER — PREGABALIN 75 MG/1
CAPSULE ORAL
COMMUNITY
Start: 2022-07-28

## 2022-07-29 RX ORDER — ONDANSETRON 4 MG/1
4 TABLET, ORALLY DISINTEGRATING ORAL 3 TIMES DAILY PRN
Qty: 30 TABLET | Refills: 0 | Status: SHIPPED | OUTPATIENT
Start: 2022-07-29

## 2022-07-29 RX ORDER — TOPIRAMATE 50 MG/1
50 TABLET, FILM COATED ORAL 2 TIMES DAILY
COMMUNITY
End: 2022-07-29

## 2022-07-29 ASSESSMENT — ENCOUNTER SYMPTOMS
BACK PAIN: 1
ANAL BLEEDING: 0
VOMITING: 0
NAUSEA: 1
BLOOD IN STOOL: 0
ABDOMINAL PAIN: 0
TROUBLE SWALLOWING: 0
COUGH: 0
SHORTNESS OF BREATH: 0
DIARRHEA: 0
WHEEZING: 0
CONSTIPATION: 0
ABDOMINAL DISTENTION: 0
PHOTOPHOBIA: 0
SORE THROAT: 0

## 2022-07-29 NOTE — PROGRESS NOTES
[facility]  Office Visit Note    Subjective:  Chief Complaint   Patient presents with    Weight Loss       Patient ID: Ros Roldan is a 51 y.o. female presenting to the office for the above. 51year old female to discuss weight loss. States she has noticed steady weight loss for past several months. She was 274lb in office on 7/12/22, and is 262lb in office today. Reports poor appetite; states she is able to eat salads/fruits without difficulty, but meats and foods like pasta cause nausea. She does feel hungry, but nausea prevents her from eating these foods. Also reports her stools are green. She has recently started OTC iron supplements; takes them with breakfast in the morning. She denies abdominal pain, fever/chills, vomiting or diarrhea, blood in stools. She saw her pain management physician yesterday, who suggested she may be suffering from depression. She recently stopped Topamax (this medication was not on our medication list, but was prescribed by her pain physician), and is starting Lyrica. --Check labs today. She also requests STD screening. --Advise to stop iron supplements for a week to see if her symptoms improve (she confirms that they did start after she began taking these supplements). --Zofran prn for nausea. --Foods as tolerated, encourage small meals. Stay well hydrated. --She would like to go ahead with a referral to GI. She had an uncle and an aunt pass away from colon cancer. Advised patient to contact office if they do not hear from the referral in the next 7-10 days; they express understanding. --She does have history of bipolar disorder and has been encouraged multiple times to reestablish with psychiatry, but continues to decline. Provided list of community resources and encouraged her to reach out.    --Advised of symptoms that would require reevaluation, or that would require immediate medical attention in the ER; patient expresses understanding. Previous history for reference:     43-year-old female for follow up. Was a patient of Dr. Beba Cristina at [facility]. She has been on disability since 2001 due to mental health/bipolar disorder. Has a son Josesito Michel) with autism. Elizabeth Oseguera--  She has thrush. Treat with nystatin rinse. Also requests Diflucan. Discussed risks/benefits, alternatives, potential side effects, proper administration of medication. Hypertension--  Follows with [facility], Dr. Kamar Finch. Started losartan 50mg daily in November; tolerating well without report of side effects. Previous treatments: metoprolol  CAC score 0 in 2020. She has checked her BP at home occasionally since last visit, with readings <140/90. Denies chest pain, palpitations, shortness of breath, peripheral edema, severe headaches, dizziness. --Advise low sodium DASH diet, regular exercise, weight loss. Monitor BP regularly at home, and notify office if consistently >130/90. History of diabetes--  A1c 5.5% in October 2021. Diagnosed over 10 years ago. Was on metformin. Had multiple TIAs from low blood glucose in 2014, and has not been on medication since. No residual deficits from TIAs. Does not check her sugar at home. Denies hypoglycemic episodes or symptoms. Has history of diabetic neuropathy. Gabapentin did not help. Lyrica was helping, but she has not taken it in quite some time; has not had issues with numbness/tingling for a while. --Advise healthy diet, regular exercise, weight loss. Chronic kidney disease--  Stage 3a. Stable. Advise to avoid NSAIDs, sodas, high sodium foods, etc.     Osteoarthritis--  Follows with [facility]. Primarily in knees and hips. Diagnosed with lumbar radiculopathy. Was referred to pain management. Asthma/allergies--  Stable with Singulair, Symbicort BID, prn albuterol. No recent exacerbations.   --States she stopped using her Symbicort due to thrush infections. We reviewed procedure for using inhalers, and rinsing mouth afterwards. She would like to try a different inhaler. Will try Advair. Discussed risks/benefits, alternatives, potential side effects, proper administration of medication. GERD--  Stable with omeprazole. No red flag symptoms. Bilateral leg cramps--  HPI October: Worse in left leg. Pain comes to her left hip and down her left leg. Some cramping in bilateral calves. Ibuprofen provides moderate relief. Labs were unremarkable. She has been using compression stockings and leg massager. Leg swelling is gone, and cramping has improved. History of bipolar disorder--  HPI 10/19/21 at [facility] care visit:  No longer on medication. States she is doing very well off medication. Has not followed with psychiatry in a while, but has had previous admissions to [facility], and mandatory anger management classes. Denies thoughts of hurting herself or others. Declines psychiatry referral today, but will let me know if she opts to do this. 12/14/21:  She sent message on 12/1/21 stating she was having crying spells. She was offered appointment at the time, but declined it, stating she did not want to talk and only wanted medication. She was advised of importance of seeing psychiatry and offered list of community resources to reach out to but declined this list.  Today she is visibly upset and tearful, does not make eye contact throughout the visit. Denies thoughts of hurting herself. States she does not want to talk about her problems but is upset that medication was not previously prescribed over the phone. Advised that medication cannot be prescribed without proper discussion of her history, symptoms, etc.; she responded that \"I don't want to talk. \"  Víctor Morales her that due to her mental health history, it is important for her to reestablish with a mental health care professional for treatment and medication management, but she Tonsillectomy/SFE/11-12-18    HERNIA REPAIR  age 10    TONSILLECTOMY      TOTAL HIP ARTHROPLASTY Left 2011    Dr. Huang Mcginnis       Family History   Problem Relation Age of Onset    No Known Problems Mother     Heart Attack Father     Other Father         aneurysm age 62, now brain damaged    Heart Disease Father     Cancer Sister         hodgkins    Breast Cancer Sister     Breast Cancer Sister     Diabetes Paternal Grandmother     Other Son         24 week twin, LD       Social History     Tobacco Use   Smoking Status Never   Smokeless Tobacco Never       Social History     Substance and Sexual Activity   Alcohol Use No       Current Outpatient Medications   Medication Sig Dispense Refill    pregabalin (LYRICA) 75 MG capsule       ondansetron (ZOFRAN-ODT) 4 MG disintegrating tablet Take 1 tablet by mouth 3 times daily as needed for Nausea or Vomiting 30 tablet 0    nystatin (MYCOSTATIN) 856330 UNIT/ML suspension Take 5 mLs by mouth in the morning and 5 mLs at noon and 5 mLs in the evening and 5 mLs before bedtime. Do all this for 10 days. Retain in mouth as long as possible.  200 mL 0    progesterone (PROMETRIUM) 100 MG CAPS capsule Take 1 capsule by mouth daily 30 capsule 1    omeprazole (PRILOSEC) 20 MG delayed release capsule Take 1 capsule by mouth Daily TAKE 1 CAPSULE BY MOUTH EVERY DAY 90 capsule 1    albuterol sulfate  (90 Base) MCG/ACT inhaler TAKE 2 PUFFS BY MOUTH EVERY 4 HOURS AS NEEDED      albuterol (PROVENTIL) (2.5 MG/3ML) 0.083% nebulizer solution Inhale 2.5 mg into the lungs every 6 hours as needed      ascorbic acid (VITAMIN C) 500 MG tablet Take 500 mg by mouth daily      budesonide-formoterol (SYMBICORT) 160-4.5 MCG/ACT AERO TAKE 2 PUFFS BY MOUTH TWICE A DAY      cetirizine (ZYRTEC) 10 MG tablet TAKE 1 TABLET BY MOUTH EVERY DAY      diclofenac (VOLTAREN) 75 MG EC tablet Take 75 mg by mouth daily as needed      fluticasone (FLONASE) 50 MCG/ACT nasal spray 2 sprays by Nasal route daily      losartan (COZAAR) 50 MG tablet Take 50 mg by mouth daily      methocarbamol (ROBAXIN) 750 MG tablet Take 750 mg by mouth 4 times daily as needed      montelukast (SINGULAIR) 10 MG tablet Take 10 mg by mouth daily      progesterone (PROMETRIUM) 200 MG CAPS capsule Take 200 mg by mouth daily       No current facility-administered medications for this visit. Review of Systems:  Review of Systems   Constitutional:  Positive for appetite change (decreased due to nausea with certain foods) and fatigue. Negative for fever. HENT:  Negative for congestion, sore throat and trouble swallowing. Eyes:  Negative for photophobia and visual disturbance. Respiratory:  Negative for cough, shortness of breath and wheezing. Cardiovascular:  Negative for chest pain and palpitations. Gastrointestinal:  Positive for nausea. Negative for abdominal distention, abdominal pain, anal bleeding, blood in stool, constipation, diarrhea and vomiting. Endocrine: Negative for polydipsia, polyphagia and polyuria. Genitourinary:  Negative for difficulty urinating, dysuria, flank pain, hematuria and pelvic pain. Musculoskeletal:  Positive for arthralgias, back pain and myalgias. Negative for joint swelling. Skin:  Negative for pallor. Neurological:  Negative for dizziness, seizures, syncope, weakness and headaches. See HPI for other pertinent positives and negatives. Objective:  Vitals:    07/29/22 0847   BP: 129/82   Site: Left Upper Arm   Position: Sitting   Cuff Size: Large Adult   Pulse: 64   Temp: 97.3 °F (36.3 °C)   TempSrc: Temporal   SpO2: 100%   Weight: 262 lb (118.8 kg)   Height: 5' 7\" (1.702 m)       Body mass index is 41.04 kg/m². Physical Exam  Vitals and nursing note reviewed. Constitutional:       General: She is not in acute distress. Appearance: She is obese. She is not diaphoretic. HENT:      Head: Normocephalic and atraumatic.       Mouth/Throat:      Mouth: Mucous membranes are moist.      Pharynx: Oropharynx is clear. No oropharyngeal exudate or posterior oropharyngeal erythema. Eyes:      General: No scleral icterus. Right eye: No discharge. Left eye: No discharge. Pupils: Pupils are equal, round, and reactive to light. Cardiovascular:      Rate and Rhythm: Normal rate and regular rhythm. Heart sounds: Normal heart sounds. Pulmonary:      Effort: Pulmonary effort is normal. No respiratory distress. Breath sounds: Normal breath sounds. No wheezing, rhonchi or rales. Abdominal:      General: Bowel sounds are normal. There is no distension. Palpations: Abdomen is soft. Tenderness: There is no abdominal tenderness. There is no right CVA tenderness, left CVA tenderness or guarding. Skin:     General: Skin is warm and dry. Neurological:      Mental Status: She is alert and oriented to person, place, and time.                 Lab Results   Component Value Date    WBC 4.6 07/12/2022    HGB 11.5 (L) 07/12/2022    HCT 35.9 07/12/2022    MCV 86.9 07/12/2022     07/12/2022   ,   Lab Results   Component Value Date/Time     07/12/2022 03:48 PM    K 3.7 07/12/2022 03:48 PM     07/12/2022 03:48 PM    CO2 26 07/12/2022 03:48 PM    BUN 11 07/12/2022 03:48 PM    CREATININE 1.02 07/12/2022 03:48 PM    GLUCOSE 86 07/12/2022 03:48 PM    CALCIUM 8.9 07/12/2022 03:48 PM    ,   Lab Results   Component Value Date    TSH 0.640 10/19/2021   ,  Lab Results   Component Value Date    ALT 29 07/12/2022    AST 17 07/12/2022    ALKPHOS 60 07/12/2022    BILITOT 0.6 07/12/2022   ,   Lab Results   Component Value Date    LABA1C 5.5 10/19/2021     Lab Results   Component Value Date     10/19/2021   ,   Lab Results   Component Value Date    LABMICR Comment 10/19/2021       PHQ-9  7/19/2022   Little interest or pleasure in doing things 0   Feeling down, depressed, or hopeless 0   Trouble falling or staying asleep, or sleeping too much 0   Feeling tired or having little energy 0   Poor appetite or overeating 0   Feeling bad about yourself - or that you are a failure or have let yourself or your family down 0   Trouble concentrating on things, such as reading the newspaper or watching television 0   Moving or speaking so slowly that other people could have noticed. Or the opposite - being so fidgety or restless that you have been moving around a lot more than usual 0   Thoughts that you would be better off dead, or of hurting yourself in some way 0   PHQ-2 Score 0   PHQ-9 Total Score 0   If you checked off any problems, how difficult have these problems made it for you to do your work, take care of things at home, or get along with other people? 0        Assessment/Plan:      Health Maintenance Due   Topic Date Due    Pneumococcal 0-64 years Vaccine (1 - PCV) Never done    Diabetic foot exam  Never done    HIV screen  Never done    Diabetic retinal exam  Never done    Hepatitis C screen  Never done    Hepatitis B vaccine (1 of 3 - Risk 3-dose series) Never done    Colorectal Cancer Screen  Never done          Peng Keyes was seen today for weight loss. Diagnoses and all orders for this visit:    Primary hypertension  -     CBC with Auto Differential; Future  -     Comprehensive Metabolic Panel; Future  -     TSH with Reflex; Future  -     Vitamin B12; Future  -     Urinalysis with Reflex to Culture; Future    Stage 3a chronic kidney disease (HCC)  -     CBC with Auto Differential; Future  -     Comprehensive Metabolic Panel; Future  -     TSH with Reflex; Future  -     Vitamin B12; Future  -     Urinalysis with Reflex to Culture; Future    Chronic pain syndrome    Weight loss  -     CBC with Auto Differential; Future  -     Comprehensive Metabolic Panel; Future  -     TSH with Reflex; Future  -     Vitamin B12; Future  -     Urinalysis with Reflex to Culture;  Future  -     [facility] - [facility]    Poor appetite  -     CBC with Auto Differential; Future  -     Comprehensive Metabolic Panel; Future  -     TSH with Reflex; Future  -     Vitamin B12; Future  -     Urinalysis with Reflex to Culture; Future  -     HIV 1/2 Ag/Ab, 4TH Generation,W Rflx Confirm; Future  -     RPR; Future  -     Hepatitis C Ab, Rflx to Qt by PCR; Future  -     [facility] - [facility]    Nausea  -     CBC with Auto Differential; Future  -     Comprehensive Metabolic Panel; Future  -     TSH with Reflex; Future  -     Vitamin B12; Future  -     Urinalysis with Reflex to Culture; Future  -     HIV 1/2 Ag/Ab, 4TH Generation,W Rflx Confirm; Future  -     RPR; Future  -     Hepatitis C Ab, Rflx to Qt by PCR; Future  -     [facility] - [facility]  -     ondansetron (ZOFRAN-ODT) 4 MG disintegrating tablet; Take 1 tablet by mouth 3 times daily as needed for Nausea or Vomiting    Screening examination for STD (sexually transmitted disease)  -     HIV 1/2 Ag/Ab, 4TH Generation,W Rflx Confirm; Future  -     RPR; Future  -     Hepatitis C Ab, Rflx to Qt by PCR; Future    Encounter for screening for HIV  -     HIV 1/2 Ag/Ab, 4TH Generation,W Rflx Confirm; Future      Patient states she is otherwise doing well; has no further questions or concerns at this visit. Encouraged to contact office with any concerns prior to next visit. Return if symptoms worsen or fail to improve, for Next scheduled visit.     JOSE Hathaway - CNP

## 2022-07-30 LAB — VIT B12 SERPL-MCNC: 5765 PG/ML (ref 193–986)

## 2022-07-31 LAB
HCV AB S/CO SERPL IA: <0.1 S/CO RATIO (ref 0–0.9)
HCV AB SERPL QL IA: NORMAL

## 2022-08-01 LAB — RPR SER QL: NONREACTIVE

## 2022-08-02 RX ORDER — LOSARTAN POTASSIUM 50 MG/1
50 TABLET ORAL DAILY
Qty: 90 TABLET | Refills: 1 | Status: SHIPPED | OUTPATIENT
Start: 2022-08-02

## 2022-08-02 RX ORDER — MONTELUKAST SODIUM 10 MG/1
10 TABLET ORAL DAILY
Qty: 90 TABLET | Refills: 1 | Status: SHIPPED | OUTPATIENT
Start: 2022-08-02

## 2022-08-09 ENCOUNTER — OFFICE VISIT (OUTPATIENT)
Dept: OBGYN CLINIC | Age: 47
End: 2022-08-09
Payer: MEDICARE

## 2022-08-09 VITALS
DIASTOLIC BLOOD PRESSURE: 76 MMHG | HEIGHT: 67 IN | BODY MASS INDEX: 42.69 KG/M2 | WEIGHT: 272 LBS | SYSTOLIC BLOOD PRESSURE: 122 MMHG

## 2022-08-09 DIAGNOSIS — N92.1 MENOMETRORRHAGIA: Primary | ICD-10-CM

## 2022-08-09 DIAGNOSIS — N60.02 BREAST CYST, LEFT: ICD-10-CM

## 2022-08-09 DIAGNOSIS — E66.01 MORBID OBESITY WITH BODY MASS INDEX (BMI) OF 40.0 TO 49.9 (HCC): ICD-10-CM

## 2022-08-09 DIAGNOSIS — R68.82 DECREASED LIBIDO: ICD-10-CM

## 2022-08-09 DIAGNOSIS — N63.10 UNSPECIFIED LUMP IN THE RIGHT BREAST, UNSPECIFIED QUADRANT: ICD-10-CM

## 2022-08-09 PROCEDURE — 76830 TRANSVAGINAL US NON-OB: CPT | Performed by: OBSTETRICS & GYNECOLOGY

## 2022-08-09 PROCEDURE — 1036F TOBACCO NON-USER: CPT | Performed by: OBSTETRICS & GYNECOLOGY

## 2022-08-09 PROCEDURE — G8427 DOCREV CUR MEDS BY ELIG CLIN: HCPCS | Performed by: OBSTETRICS & GYNECOLOGY

## 2022-08-09 PROCEDURE — 99214 OFFICE O/P EST MOD 30 MIN: CPT | Performed by: OBSTETRICS & GYNECOLOGY

## 2022-08-09 PROCEDURE — G8417 CALC BMI ABV UP PARAM F/U: HCPCS | Performed by: OBSTETRICS & GYNECOLOGY

## 2022-08-09 NOTE — PROGRESS NOTES
8.927 cm³  Ovarian Cyst 2                2.54 cm      1.53 cm           1.98 cm       2.02 cm            4.020 cm³  Ovarian Cyst 3                1.88 cm      1.43 cm           1.14 cm       1.48 cm            1.600 cm³     BC:   Tubal ligation      2022 US today:  HETEROGENOUS UT, ., vol 95  ENDOMETRIUM= 3.9MM. MULTIPLE TINY NABOTHIAN CYSTS IN THE CX.  LT OV- 2 SMALL CYSTS: 1) SIMPLE 2) COMPLEX. RT OV- POLYCYSTIC APPEARANCE. THICKEST SEPTATION= 2.0MM. LARGEST CYST ON THE OV= 2.7 X 1.7 X 2.2CM. NO FF. Left Ovarian Cyst           D1              D2              D3            Avg. D             Vol  Ovarian Cyst 1          1.27 cm         1.04 cm      1.27 cm     1.19 cm          0.873 cm³  Ovarian Cyst 2          1.13 cm         0.63 cm      0.99 cm     0.92 cm          0.369 cm³    Right Ov Cyst    Ovarian Cyst 1          2.67 cm    1.72 cm    2.23 cm    2.20 cm       5.340 cm³    BC:   tubal ligation    OB History          3    Para        Term   1       1    AB   0    Living   3         SAB        IAB        Ectopic        Molar        Multiple        Live Births                  No LMP recorded.   Past Surgical History:   Procedure Laterality Date    DELIVERY   ,     both preemies    HEENT      Tonsillectomy/SFE/18    HERNIA REPAIR  age 10    TONSILLECTOMY      TOTAL HIP ARTHROPLASTY Left     Dr. Ning Farrell     Past Medical History:   Diagnosis Date    Anxiety     Arthritis     Bipolar affective disorder ([facility] [address] 11 yrs old    Cellulitis 2010    denies currently     CKD (chronic kidney disease) stage 3, GFR 30-59 ml/min ([address]     Diabetes ([facility] [address] dx:    Unknown A1C    Diabetes ([facility] [address] 2010    had multiple TIA's from low bs in  and hasn't been on any meds since    Diabetes mellitus type II, non insulin dependent ([address] 2015    Edema of both legs 2010    ankles     Femur fracture, left ([address] 2015    GERD (gastroesophageal reflux disease)     denies    Gout     Hypertension     no meds    Moderate persistent asthma without complication 25/37/2746    Morbid obesity (HCC) lifetime    Non compliance with medical treatment long term    Orthopnea     Other ill-defined conditions(799.89)     cellulitis    Peripheral neuropathy 2 years    Peripheral vascular disease ([facility] [address]     Psychiatric disorder     bipolar disorder    Renal insufficiency 01/08/2010    Stroke ([facility] [address]     multiple TIA's in 2014; no residual     Unspecified sleep apnea     does not use cpap    Vertigo 03/08/2015     Family History   Problem Relation Age of Onset    No Known Problems Mother     Heart Attack Father     Other Father         aneurysm age 62, now brain damaged    Heart Disease Father     Cancer Sister         hodgkins    Breast Cancer Sister     Breast Cancer Sister     Diabetes Paternal Grandmother     Other Son         24 week twin, LD          Physical Exam:   /76   Ht 5' 7\" (1.702 m)   Wt 272 lb (123.4 kg)   BMI 42.60 kg/m²     Patient is a 51 y.o. female who appears pleasant, in no apparent distress, her given age, well developed, well nourished and with good attention to hygiene and body habitus. Oriented to person, place and time. Mood and affect normal and appropriate to situation. Head is normocephalic, atraumatic, without any gross head or neck masses. PE  Breasts:  bilateral marked FC change    Right breast--> 4:00 (~ 7 cm from the nipple--> solid mass 2-2.5 cm (fibroadenoma). Left breast -->4:00 mass feels less fluctuant/more sold      A/P:  1. Follow up:  decreased libido after she started taking Prometrium. Strongly advise mirena IUD for bleeding control so she can d/c Prometrium. 2.  Menorrhagia despite qd Prometrium, pt is leaning toward depo Provera. D/w pt US today is stable, no sig interval change.      4/2022 US--> 8.4/5/4, vol 80, endom'm 3.3 L ov 3 cysts all < 2 cm, R ov 3 cysts, largest 3/2/2 w/septation  8/2022 US--> 9/5.5/4, vol 95, endom'm 3.9, L ov  2 cysts both < 2 cm (simple and complex) 3/2/2,largest septation 2 mm     D/w pt theoretic concerns re depo provera's systemic impact (lipids, bone density and breast cancer risk & BMI-depo provera is metabolized slowly from fat cells) vs mirena IUD. Reiterated I think she'd have a more optimal systemic outcome w/ D & C hysteroscopy w/ myosure and IUD insertion. 3.  Bilateral breast cysts, refer to Dr. Nicolette Silva for opinion. Orders Placed This Encounter   Procedures    AMB POC US, TRANSVAGINAL        Diagnosis Orders   1. Menometrorrhagia  AMB POC US, TRANSVAGINAL        More than 50% of this 35+ minute visit was spent addressing the above patient concerns including:  assessment, extensive chart review and counseling the patient about the above concerns including evaluation plan, treatment strategies & documentation. Long conversation with patient, all her questions answered and addressed all her concerns.     Eva Barker MD, [facility]

## 2022-08-15 ENCOUNTER — CLINICAL DOCUMENTATION (OUTPATIENT)
Dept: OBGYN CLINIC | Age: 47
End: 2022-08-15

## 2022-08-15 ENCOUNTER — NURSE ONLY (OUTPATIENT)
Dept: OBGYN CLINIC | Age: 47
End: 2022-08-15
Payer: MEDICARE

## 2022-08-15 DIAGNOSIS — R30.9 PAINFUL URINATION: Primary | ICD-10-CM

## 2022-08-15 DIAGNOSIS — E66.01 MORBID OBESITY WITH BODY MASS INDEX (BMI) OF 40.0 TO 49.9 (HCC): ICD-10-CM

## 2022-08-15 LAB
BILIRUBIN, URINE, POC: NORMAL
BLOOD URINE, POC: NEGATIVE
CHOLEST SERPL-MCNC: 187 MG/DL
GLUCOSE URINE, POC: NORMAL
HDLC SERPL-MCNC: 73 MG/DL (ref 40–60)
HDLC SERPL: 2.6 {RATIO}
KETONES, URINE, POC: NORMAL
LDLC SERPL CALC-MCNC: 102.8 MG/DL
LEUKOCYTE ESTERASE, URINE, POC: NORMAL
NITRITE, URINE, POC: NEGATIVE
PH, URINE, POC: NORMAL (ref 4.6–8)
PROTEIN,URINE, POC: NORMAL
SPECIFIC GRAVITY, URINE, POC: NORMAL (ref 1–1.03)
TRIGL SERPL-MCNC: 56 MG/DL (ref 35–150)
URINALYSIS CLARITY, POC: NORMAL
URINALYSIS COLOR, POC: NORMAL
UROBILINOGEN, POC: NORMAL
VLDLC SERPL CALC-MCNC: 11.2 MG/DL (ref 6–23)

## 2022-08-15 PROCEDURE — 81003 URINALYSIS AUTO W/O SCOPE: CPT | Performed by: OBSTETRICS & GYNECOLOGY

## 2022-08-15 NOTE — PROGRESS NOTES
Patient here for labs. She also complains of pain when she urinates.     U/A- negative  Will send urine culture

## 2022-08-19 ENCOUNTER — TELEPHONE (OUTPATIENT)
Dept: OBGYN CLINIC | Age: 47
End: 2022-08-19

## 2022-08-19 LAB
BACTERIA SPEC CULT: ABNORMAL
BACTERIA SPEC CULT: ABNORMAL
SERVICE CMNT-IMP: ABNORMAL

## 2022-08-19 RX ORDER — AMOXICILLIN AND CLAVULANATE POTASSIUM 875; 125 MG/1; MG/1
1 TABLET, FILM COATED ORAL 2 TIMES DAILY
Qty: 14 TABLET | Refills: 0 | Status: SHIPPED | OUTPATIENT
Start: 2022-08-19 | End: 2022-08-26

## 2022-08-19 RX ORDER — PHENAZOPYRIDINE HYDROCHLORIDE 200 MG/1
200 TABLET, FILM COATED ORAL 3 TIMES DAILY PRN
Qty: 9 TABLET | Refills: 0 | Status: SHIPPED | OUTPATIENT
Start: 2022-08-19 | End: 2022-08-22

## 2022-08-19 RX ORDER — FLUCONAZOLE 150 MG/1
150 TABLET ORAL
Qty: 2 TABLET | Refills: 0 | Status: SHIPPED | OUTPATIENT
Start: 2022-08-19 | End: 2022-08-25

## 2022-08-19 NOTE — TELEPHONE ENCOUNTER
Phone call from patient inquiring about urine culture results. Instructed patient that she does have UTI and to  antibiotic and pyridium from the pharmacy. Educated on the importance of taking the antibiotic as instructed until gone. Diflucan also sent in per patients request of \"I always get a yeast infection after taking antibiotics\" All questions answered for patient.

## 2022-08-19 NOTE — RESULT ENCOUNTER NOTE
Lo,    Your urine culture did show a UTI.   Dr Paz Vivas has sent in Rx (2) to your pharmacy    If you have any questions, please let us know    Thank you    Blake Rubio CMA

## 2022-08-19 NOTE — PROGRESS NOTES
UTI     Orders Placed This Encounter   Medications    amoxicillin-clavulanate (AUGMENTIN) 875-125 MG per tablet     Sig: Take 1 tablet by mouth 2 times daily for 7 days     Dispense:  14 tablet     Refill:  0    phenazopyridine (PYRIDIUM) 200 MG tablet     Sig: Take 1 tablet by mouth 3 times daily as needed for Pain     Dispense:  9 tablet     Refill:  0

## 2022-08-30 ENCOUNTER — TRANSCRIBE ORDERS (OUTPATIENT)
Dept: SCHEDULING | Age: 47
End: 2022-08-30

## 2022-08-30 DIAGNOSIS — G35 MULTIPLE SCLEROSIS (HCC): Primary | ICD-10-CM

## 2022-08-31 ENCOUNTER — HOSPITAL ENCOUNTER (OUTPATIENT)
Dept: MRI IMAGING | Age: 47
Discharge: HOME | End: 2022-09-03
Payer: MEDICARE

## 2022-08-31 DIAGNOSIS — G35 MULTIPLE SCLEROSIS (HCC): ICD-10-CM

## 2022-08-31 PROCEDURE — A9579 GAD-BASE MR CONTRAST NOS,1ML: HCPCS | Performed by: SPECIALIST

## 2022-08-31 PROCEDURE — 6360000004 HC RX CONTRAST MEDICATION: Performed by: SPECIALIST

## 2022-08-31 PROCEDURE — 70553 MRI BRAIN STEM W/O & W/DYE: CPT

## 2022-08-31 RX ADMIN — GADOTERIDOL 24 ML: 279.3 INJECTION, SOLUTION INTRAVENOUS at 15:50

## 2022-09-09 ENCOUNTER — OFFICE VISIT (OUTPATIENT)
Dept: INTERNAL MEDICINE CLINIC | Facility: CLINIC | Age: 47
End: 2022-09-09
Payer: MEDICARE

## 2022-09-09 VITALS
BODY MASS INDEX: 44.39 KG/M2 | TEMPERATURE: 97.9 F | OXYGEN SATURATION: 100 % | HEIGHT: 67 IN | DIASTOLIC BLOOD PRESSURE: 82 MMHG | WEIGHT: 282.8 LBS | SYSTOLIC BLOOD PRESSURE: 130 MMHG | HEART RATE: 65 BPM

## 2022-09-09 DIAGNOSIS — E66.01 MORBID OBESITY (HCC): ICD-10-CM

## 2022-09-09 DIAGNOSIS — R74.8 ELEVATED VITAMIN B12 LEVEL: ICD-10-CM

## 2022-09-09 DIAGNOSIS — I10 PRIMARY HYPERTENSION: ICD-10-CM

## 2022-09-09 DIAGNOSIS — N18.31 STAGE 3A CHRONIC KIDNEY DISEASE (HCC): ICD-10-CM

## 2022-09-09 DIAGNOSIS — M79.89 LEG SWELLING: ICD-10-CM

## 2022-09-09 DIAGNOSIS — I10 PRIMARY HYPERTENSION: Primary | ICD-10-CM

## 2022-09-09 DIAGNOSIS — J45.40 MODERATE PERSISTENT ASTHMA WITHOUT COMPLICATION: ICD-10-CM

## 2022-09-09 DIAGNOSIS — F31.9 BIPOLAR AFFECTIVE DISORDER, REMISSION STATUS UNSPECIFIED (HCC): ICD-10-CM

## 2022-09-09 LAB — VIT B12 SERPL-MCNC: 1795 PG/ML (ref 193–986)

## 2022-09-09 PROCEDURE — G8417 CALC BMI ABV UP PARAM F/U: HCPCS | Performed by: NURSE PRACTITIONER

## 2022-09-09 PROCEDURE — 99214 OFFICE O/P EST MOD 30 MIN: CPT | Performed by: NURSE PRACTITIONER

## 2022-09-09 PROCEDURE — 1036F TOBACCO NON-USER: CPT | Performed by: NURSE PRACTITIONER

## 2022-09-09 PROCEDURE — G8427 DOCREV CUR MEDS BY ELIG CLIN: HCPCS | Performed by: NURSE PRACTITIONER

## 2022-09-09 RX ORDER — DULOXETIN HYDROCHLORIDE 30 MG/1
CAPSULE, DELAYED RELEASE ORAL
COMMUNITY
Start: 2022-09-09 | End: 2022-11-01 | Stop reason: DRUGHIGH

## 2022-09-09 RX ORDER — TOPIRAMATE 50 MG/1
TABLET, FILM COATED ORAL
COMMUNITY
Start: 2022-08-03

## 2022-09-09 RX ORDER — ALBUTEROL SULFATE 90 UG/1
2 AEROSOL, METERED RESPIRATORY (INHALATION) EVERY 6 HOURS PRN
Qty: 18 G | Refills: 5 | Status: SHIPPED | OUTPATIENT
Start: 2022-09-09

## 2022-09-09 RX ORDER — FLUTICASONE PROPIONATE AND SALMETEROL 100; 50 UG/1; UG/1
1 POWDER RESPIRATORY (INHALATION) EVERY 12 HOURS
Qty: 1 EACH | Refills: 5 | Status: SHIPPED | OUTPATIENT
Start: 2022-09-09

## 2022-09-09 RX ORDER — BUDESONIDE AND FORMOTEROL FUMARATE DIHYDRATE 160; 4.5 UG/1; UG/1
AEROSOL RESPIRATORY (INHALATION)
Qty: 10.2 G | Status: CANCELLED | OUTPATIENT
Start: 2022-09-09

## 2022-09-09 ASSESSMENT — PATIENT HEALTH QUESTIONNAIRE - PHQ9
7. TROUBLE CONCENTRATING ON THINGS, SUCH AS READING THE NEWSPAPER OR WATCHING TELEVISION: 0
SUM OF ALL RESPONSES TO PHQ QUESTIONS 1-9: 0
2. FEELING DOWN, DEPRESSED OR HOPELESS: 0
6. FEELING BAD ABOUT YOURSELF - OR THAT YOU ARE A FAILURE OR HAVE LET YOURSELF OR YOUR FAMILY DOWN: 0
4. FEELING TIRED OR HAVING LITTLE ENERGY: 0
5. POOR APPETITE OR OVEREATING: 0
8. MOVING OR SPEAKING SO SLOWLY THAT OTHER PEOPLE COULD HAVE NOTICED. OR THE OPPOSITE, BEING SO FIGETY OR RESTLESS THAT YOU HAVE BEEN MOVING AROUND A LOT MORE THAN USUAL: 0
1. LITTLE INTEREST OR PLEASURE IN DOING THINGS: 0
SUM OF ALL RESPONSES TO PHQ9 QUESTIONS 1 & 2: 0
SUM OF ALL RESPONSES TO PHQ QUESTIONS 1-9: 0
SUM OF ALL RESPONSES TO PHQ QUESTIONS 1-9: 0
9. THOUGHTS THAT YOU WOULD BE BETTER OFF DEAD, OR OF HURTING YOURSELF: 0
3. TROUBLE FALLING OR STAYING ASLEEP: 0
10. IF YOU CHECKED OFF ANY PROBLEMS, HOW DIFFICULT HAVE THESE PROBLEMS MADE IT FOR YOU TO DO YOUR WORK, TAKE CARE OF THINGS AT HOME, OR GET ALONG WITH OTHER PEOPLE: 0
SUM OF ALL RESPONSES TO PHQ QUESTIONS 1-9: 0

## 2022-09-09 ASSESSMENT — ENCOUNTER SYMPTOMS
SHORTNESS OF BREATH: 0
WHEEZING: 0
COUGH: 0
PHOTOPHOBIA: 0
TROUBLE SWALLOWING: 0
ABDOMINAL PAIN: 0
VOMITING: 0
DIARRHEA: 0
NAUSEA: 0

## 2022-09-09 ASSESSMENT — ANXIETY QUESTIONNAIRES
4. TROUBLE RELAXING: 0
2. NOT BEING ABLE TO STOP OR CONTROL WORRYING: 0
GAD7 TOTAL SCORE: 0
IF YOU CHECKED OFF ANY PROBLEMS ON THIS QUESTIONNAIRE, HOW DIFFICULT HAVE THESE PROBLEMS MADE IT FOR YOU TO DO YOUR WORK, TAKE CARE OF THINGS AT HOME, OR GET ALONG WITH OTHER PEOPLE: NOT DIFFICULT AT ALL
1. FEELING NERVOUS, ANXIOUS, OR ON EDGE: 0
3. WORRYING TOO MUCH ABOUT DIFFERENT THINGS: 0
7. FEELING AFRAID AS IF SOMETHING AWFUL MIGHT HAPPEN: 0
6. BECOMING EASILY ANNOYED OR IRRITABLE: 0
5. BEING SO RESTLESS THAT IT IS HARD TO SIT STILL: 0

## 2022-09-09 NOTE — PROGRESS NOTES
office for UTI. She is feeling better.   -She does have history of bipolar disorder and has been encouraged multiple times to reestablish with psychiatry, but continues to decline. Provided list of community resources and encouraged her to reach out. She is now taking Cymbalta through pain management. [place] [employer]--  She has had intermittent bouts of thrush. Treated with nystatin rinse. Hypertension--  Follows with [facility], Dr. Alisson Uriostegui. Started losartan 50mg daily in November 2021; tolerating well without report of side effects. Previous treatments: metoprolol  CAC score 0 in 2020. She has checked her BP at home occasionally since last visit, with readings <140/90. Denies chest pain, palpitations, shortness of breath, peripheral edema, severe headaches, dizziness. --Advise low sodium DASH diet, regular exercise, weight loss. Monitor BP regularly at home, and notify office if consistently >130/90. --She has intermittent swelling in lower legs, nonpitting. Requests prescription for compression stockings. History of diabetes--  A1c 5.5% in October 2021. Diagnosed over 10 years ago. Was on metformin. Had multiple TIAs from low blood glucose in 2014, and has not been on medication since. No residual deficits from TIAs. Does not check her sugar at home. Denies hypoglycemic episodes or symptoms. Has history of diabetic neuropathy. Gabapentin did not help. Lyrica was helping, but she has not taken it in quite some time; has not had issues with numbness/tingling for a while. --Advise healthy diet, regular exercise, weight loss. Chronic kidney disease--  Stage 3a. Stable. Advise to avoid NSAIDs, sodas, high sodium foods, etc.     Osteoarthritis--  Follows with [facility]. Primarily in knees and hips. Diagnosed with lumbar radiculopathy. Was referred to pain management. Now on Lyrica and Cymbalta.       Asthma/allergies--  Stable with Singulair, Advair, prn albuterol. No recent exacerbations. GERD--  Stable with omeprazole. No red flag symptoms. Bilateral leg cramps--  HPI October: Worse in left leg. Pain comes to her left hip and down her left leg. Some cramping in bilateral calves. Ibuprofen provides moderate relief. Labs were unremarkable. She has been using compression stockings and leg massager. Leg swelling is gone, and cramping has improved. History of bipolar disorder--  HPI 10/19/21 at [facility] care visit:  No longer on medication. States she is doing very well off medication. Has not followed with psychiatry in a while, but has had previous admissions to [facility], and mandatory anger management classes. Denies thoughts of hurting herself or others. Declines psychiatry referral today, but will let me know if she opts to do this. 12/14/21:  She sent message on 12/1/21 stating she was having crying spells. She was offered appointment at the time, but declined it, stating she did not want to talk and only wanted medication. She was advised of importance of seeing psychiatry and offered list of community resources to reach out to but declined this list.  Today she is visibly upset and tearful, does not make eye contact throughout the visit. Denies thoughts of hurting herself. States she does not want to talk about her problems but is upset that medication was not previously prescribed over the phone. Advised that medication cannot be prescribed without proper discussion of her history, symptoms, etc.; she responded that \"I don't want to talk. \"  Jeb Trotter her that due to her mental health history, it is important for her to reestablish with a mental health care professional for treatment and medication management, but she declines referral today.   Again offered a list of community resources [facility], [facility] but she declined to take the list and walked out of the exam room stating she does not want to talk.  22: She was prescribed Cymbalta by pain management. States she is feeling \"10 times better. \"      Supplements: vitamins B12, D, and C, elderberry     Health Maintenance:  *Medicare Wellness: 21  *Colorectal cancer screening: colonoscopy scheduled through GI   *Mammogram: May 2022    *Pap: 21    *TDAP: 12/3/11; advised to get at pharmacy  *Flu: advise to get   Covid19: completed 10/10/21      History:   Allergies   Allergen Reactions    Ciprofloxacin Itching    Sulfa Antibiotics Itching       Past Medical History:   Diagnosis Date    Anxiety     Arthritis     Bipolar affective disorder ([facility] [address] 11 yrs old    Cellulitis 2010    denies currently     CKD (chronic kidney disease) stage 3, GFR 30-59 ml/min ([facility])     Diabetes ([facility] [address] dx:    Unknown A1C    Diabetes ([facility] [address] 2010    had multiple TIA's from low bs in  and hasn't been on any meds since    Diabetes mellitus type II, non insulin dependent ([address] 2015    Edema of both legs 2010    ankles     Femur fracture, left ([address] 2015    GERD (gastroesophageal reflux disease)     denies    Gout     Hypertension     no meds    Moderate persistent asthma without complication     Morbid obesity ([address] lifetime    Non compliance with medical treatment long term    Orthopnea     Other ill-defined conditions(799.89)     cellulitis    Peripheral neuropathy 2 years    Peripheral vascular disease ([address]     Psychiatric disorder     bipolar disorder    Renal insufficiency 2010    Stroke ([address]     multiple TIA's in ; no residual     Unspecified sleep apnea     does not use cpap    Vertigo 2015       Past Surgical History:   Procedure Laterality Date    DELIVERY   ,     both preemies    HEENT      Tonsillectomy/SFE/18    HERNIA REPAIR  age 10    TONSILLECTOMY      TOTAL HIP ARTHROPLASTY Left     Dr. Beba Cheung       Family History   Problem Relation Age of Onset No Known Problems Mother     Heart Attack Father     Other Father         aneurysm age 62, now brain damaged    Heart Disease Father     Cancer Sister         hodgkins    Breast Cancer Sister     Breast Cancer Sister     Diabetes Paternal Grandmother     Other Son         24 week twin, LD       Social History     Tobacco Use   Smoking Status Never   Smokeless Tobacco Never       Social History     Substance and Sexual Activity   Alcohol Use No       Current Outpatient Medications   Medication Sig Dispense Refill    DULoxetine (CYMBALTA) 30 MG extended release capsule       albuterol sulfate HFA (PROVENTIL;VENTOLIN;PROAIR) 108 (90 Base) MCG/ACT inhaler Inhale 2 puffs into the lungs every 6 hours as needed for Wheezing or Shortness of Breath TAKE 2 PUFFS BY MOUTH EVERY 4 HOURS AS NEEDED 18 g 5    fluticasone-salmeterol (ADVIAR) 100-50 MCG/ACT AEPB diskus inhaler Inhale 1 puff into the lungs in the morning and 1 puff in the evening. 1 each 5    Compression Stockings MISC by Does not apply route 2 each 0    losartan (COZAAR) 50 MG tablet Take 1 tablet by mouth in the morning. 90 tablet 1    montelukast (SINGULAIR) 10 MG tablet Take 1 tablet by mouth in the morning.  90 tablet 1    pregabalin (LYRICA) 75 MG capsule       ondansetron (ZOFRAN-ODT) 4 MG disintegrating tablet Take 1 tablet by mouth 3 times daily as needed for Nausea or Vomiting 30 tablet 0    progesterone (PROMETRIUM) 100 MG CAPS capsule Take 1 capsule by mouth daily 30 capsule 1    omeprazole (PRILOSEC) 20 MG delayed release capsule Take 1 capsule by mouth Daily TAKE 1 CAPSULE BY MOUTH EVERY DAY 90 capsule 1    albuterol (PROVENTIL) (2.5 MG/3ML) 0.083% nebulizer solution Inhale 2.5 mg into the lungs every 6 hours as needed      ascorbic acid (VITAMIN C) 500 MG tablet Take 500 mg by mouth daily      cetirizine (ZYRTEC) 10 MG tablet TAKE 1 TABLET BY MOUTH EVERY DAY      diclofenac (VOLTAREN) 75 MG EC tablet Take 75 mg by mouth daily as needed      fluticasone (FLONASE) 50 MCG/ACT nasal spray 2 sprays by Nasal route daily      methocarbamol (ROBAXIN) 750 MG tablet Take 750 mg by mouth 4 times daily as needed      nystatin (MYCOSTATIN) 602278 UNIT/ML suspension PLEASE SEE ATTACHED FOR DETAILED DIRECTIONS      topiramate (TOPAMAX) 50 MG tablet TAKE 1 TABLET BY MOUTH TWICE A DAY       No current facility-administered medications for this visit. Review of Systems:  Review of Systems   Constitutional:  Negative for activity change, appetite change, fever and unexpected weight change. HENT:  Negative for congestion and trouble swallowing. Eyes:  Negative for photophobia and visual disturbance. Respiratory:  Negative for cough, shortness of breath and wheezing. Cardiovascular:  Negative for chest pain, palpitations and leg swelling. Gastrointestinal:  Negative for abdominal pain, diarrhea, nausea and vomiting. Musculoskeletal:  Positive for arthralgias and myalgias. Skin:  Negative for pallor and rash. Neurological:  Negative for dizziness, seizures, syncope, weakness and headaches. See HPI for other pertinent positives and negatives. Objective:  Vitals:    09/09/22 0944 09/09/22 0947 09/09/22 1011   BP: (!) 144/92 (!) 143/88 130/82   Site: Right Upper Arm Right Upper Arm    Position: Sitting Sitting    Cuff Size: Large Adult Large Adult    Pulse: 63 65    Temp: 97.9 °F (36.6 °C)     TempSrc: Temporal     SpO2: 100%     Weight: 282 lb 12.8 oz (128.3 kg)     Height: 5' 7\" (1.702 m)         Body mass index is 44.29 kg/m². Physical Exam  Vitals and nursing note reviewed. Constitutional:       General: She is not in acute distress. Appearance: Normal appearance. She is obese. She is not ill-appearing or diaphoretic. HENT:      Head: Normocephalic and atraumatic. Mouth/Throat:      Mouth: Mucous membranes are moist.      Pharynx: Oropharynx is clear. No oropharyngeal exudate or posterior oropharyngeal erythema.    Eyes: General: No scleral icterus. Right eye: No discharge. Left eye: No discharge. Pupils: Pupils are equal, round, and reactive to light. Cardiovascular:      Rate and Rhythm: Normal rate and regular rhythm. Pulses: Normal pulses. Heart sounds: Normal heart sounds. Pulmonary:      Effort: Pulmonary effort is normal. No respiratory distress. Breath sounds: Normal breath sounds. No wheezing, rhonchi or rales. Abdominal:      General: Bowel sounds are normal.      Palpations: Abdomen is soft. Musculoskeletal:      Cervical back: No rigidity. Lymphadenopathy:      Cervical: No cervical adenopathy. Skin:     General: Skin is warm and dry. Neurological:      Mental Status: She is alert and oriented to person, place, and time.    Psychiatric:         Mood and Affect: Mood normal.         Speech: Speech normal.         Behavior: Behavior normal.                Lab Results   Component Value Date    WBC 3.6 (L) 07/29/2022    HGB 12.0 07/29/2022    HCT 38.8 07/29/2022    MCV 93.5 07/29/2022     07/29/2022   ,   Lab Results   Component Value Date/Time     07/29/2022 08:36 AM    K 3.6 07/29/2022 08:36 AM     07/29/2022 08:36 AM    CO2 27 07/29/2022 08:36 AM    BUN 11 07/29/2022 08:36 AM    CREATININE 1.10 07/29/2022 08:36 AM    GLUCOSE 80 07/29/2022 08:36 AM    CALCIUM 9.3 07/29/2022 08:36 AM    ,   Lab Results   Component Value Date    TSH 0.640 10/19/2021   ,  Lab Results   Component Value Date    ALT 38 07/29/2022    AST 19 07/29/2022    ALKPHOS 63 07/29/2022    BILITOT 1.0 07/29/2022   ,   Lab Results   Component Value Date    LABA1C 5.5 10/19/2021     Lab Results   Component Value Date     10/19/2021   ,   Lab Results   Component Value Date    LABMICR Comment 10/19/2021       PHQ-9  9/9/2022   Little interest or pleasure in doing things 0   Feeling down, depressed, or hopeless 0   Trouble falling or staying asleep, or sleeping too much 0   Feeling tired or having little energy 0   Poor appetite or overeating 0   Feeling bad about yourself - or that you are a failure or have let yourself or your family down 0   Trouble concentrating on things, such as reading the newspaper or watching television 0   Moving or speaking so slowly that other people could have noticed. Or the opposite - being so fidgety or restless that you have been moving around a lot more than usual 0   Thoughts that you would be better off dead, or of hurting yourself in some way 0   PHQ-2 Score 0   PHQ-9 Total Score 0   If you checked off any problems, how difficult have these problems made it for you to do your work, take care of things at home, or get along with other people? 0        Assessment/Plan:      Health Maintenance Due   Topic Date Due    Pneumococcal 0-64 years Vaccine (1 - PCV) Never done    Diabetic foot exam  Never done    Diabetic retinal exam  Never done    Hepatitis B vaccine (1 of 3 - Risk 3-dose series) Never done    Colorectal Cancer Screen  Never done    Flu vaccine (1) 09/01/2022          Hugo Mandel was seen today for hypertension. Diagnoses and all orders for this visit:    Primary hypertension  -     CBC with Auto Differential; Future    Moderate persistent asthma without complication  -     albuterol sulfate HFA (PROVENTIL;VENTOLIN;PROAIR) 108 (90 Base) MCG/ACT inhaler; Inhale 2 puffs into the lungs every 6 hours as needed for Wheezing or Shortness of Breath TAKE 2 PUFFS BY MOUTH EVERY 4 HOURS AS NEEDED  -     fluticasone-salmeterol (ADVIAR) 100-50 MCG/ACT AEPB diskus inhaler; Inhale 1 puff into the lungs in the morning and 1 puff in the evening.     Stage 3a chronic kidney disease (HCC)    Elevated vitamin B12 level  -     Vitamin B12; Future    Bipolar affective disorder, remission status unspecified (HCC)    Morbid obesity (HCC)    Leg swelling  -     Compression Stockings MISC; by Does not apply route      Patient states she is otherwise doing well; has no further questions or concerns at this visit. Encouraged to contact office with any concerns prior to next visit. Return in about 2 months (around 11/9/2022), or if symptoms worsen or fail to improve, for MWV.     Chase De Anda, APRN - CNP

## 2022-09-10 LAB
BASOPHILS # BLD: 0 K/UL (ref 0–0.2)
BASOPHILS NFR BLD: 1 % (ref 0–2)
DIFFERENTIAL METHOD BLD: ABNORMAL
EOSINOPHIL # BLD: 0.2 K/UL (ref 0–0.8)
EOSINOPHIL NFR BLD: 5 % (ref 0.5–7.8)
ERYTHROCYTE [DISTWIDTH] IN BLOOD BY AUTOMATED COUNT: 14 % (ref 11.9–14.6)
HCT VFR BLD AUTO: 37.4 % (ref 35.8–46.3)
HGB BLD-MCNC: 10.9 G/DL (ref 11.7–15.4)
IMM GRANULOCYTES # BLD AUTO: 0 K/UL (ref 0–0.5)
IMM GRANULOCYTES NFR BLD AUTO: 0 % (ref 0–5)
LYMPHOCYTES # BLD: 1.2 K/UL (ref 0.5–4.6)
LYMPHOCYTES NFR BLD: 36 % (ref 13–44)
MCH RBC QN AUTO: 27.9 PG (ref 26.1–32.9)
MCHC RBC AUTO-ENTMCNC: 29.1 G/DL (ref 31.4–35)
MCV RBC AUTO: 95.9 FL (ref 79.6–97.8)
MONOCYTES # BLD: 0.4 K/UL (ref 0.1–1.3)
MONOCYTES NFR BLD: 12 % (ref 4–12)
NEUTS SEG # BLD: 1.5 K/UL (ref 1.7–8.2)
NEUTS SEG NFR BLD: 46 % (ref 43–78)
NRBC # BLD: 0 K/UL (ref 0–0.2)
PLATELET # BLD AUTO: 306 K/UL (ref 150–450)
PMV BLD AUTO: 11.3 FL (ref 9.4–12.3)
RBC # BLD AUTO: 3.9 M/UL (ref 4.05–5.2)
WBC # BLD AUTO: 3.3 K/UL (ref 4.3–11.1)

## 2022-09-13 NOTE — DISCHARGE INSTRUCTIONS
Sore Throat: Care Instructions  Your Care Instructions    Infection by bacteria or a virus causes most sore throats. Cigarette smoke, dry air, air pollution, allergies, and yelling can also cause a sore throat. Sore throats can be painful and annoying. Fortunately, most sore throats go away on their own. If you have a bacterial infection, your doctor may prescribe antibiotics. Follow-up care is a key part of your treatment and safety. Be sure to make and go to all appointments, and call your doctor if you are having problems. It's also a good idea to know your test results and keep a list of the medicines you take. How can you care for yourself at home? · If your doctor prescribed antibiotics, take them as directed. Do not stop taking them just because you feel better. You need to take the full course of antibiotics. · Gargle with warm salt water once an hour to help reduce swelling and relieve discomfort. Use 1 teaspoon of salt mixed in 1 cup of warm water. · Take an over-the-counter pain medicine, such as acetaminophen (Tylenol), ibuprofen (Advil, Motrin), or naproxen (Aleve). Read and follow all instructions on the label. · Be careful when taking over-the-counter cold or flu medicines and Tylenol at the same time. Many of these medicines have acetaminophen, which is Tylenol. Read the labels to make sure that you are not taking more than the recommended dose. Too much acetaminophen (Tylenol) can be harmful. · Drink plenty of fluids. Fluids may help soothe an irritated throat. Hot fluids, such as tea or soup, may help decrease throat pain. · Use over-the-counter throat lozenges to soothe pain. Regular cough drops or hard candy may also help. These should not be given to young children because of the risk of choking. · Do not smoke or allow others to smoke around you. If you need help quitting, talk to your doctor about stop-smoking programs and medicines.  These can increase your chances of quitting for good. · Use a vaporizer or humidifier to add moisture to your bedroom. Follow the directions for cleaning the machine. When should you call for help? Call your doctor now or seek immediate medical care if:    · You have new or worse trouble swallowing.     · Your sore throat gets much worse on one side.    Watch closely for changes in your health, and be sure to contact your doctor if you do not get better as expected. Where can you learn more? Go to http://violetta-maya.info/. Enter [phone] [id number] in the search box to learn more about \"Sore Throat: Care Instructions. \"  Current as of: May 12, 2017  Content Version: 11.7  © 5972-8092 [employer]. Care instructions adapted under license by [employer] (which disclaims liability or warranty for this information). If you have questions about a medical condition or this instruction, always ask your healthcare professional. [address] any warranty or liability for your use of this information. Neuro

## 2022-09-14 ENCOUNTER — TELEPHONE (OUTPATIENT)
Dept: INTERNAL MEDICINE CLINIC | Facility: CLINIC | Age: 47
End: 2022-09-14

## 2022-09-14 ENCOUNTER — TELEPHONE (OUTPATIENT)
Dept: ORTHOPEDIC SURGERY | Age: 47
End: 2022-09-14

## 2022-09-14 DIAGNOSIS — D72.819 LEUKOPENIA, UNSPECIFIED TYPE: Primary | ICD-10-CM

## 2022-09-14 NOTE — TELEPHONE ENCOUNTER
Patient called with the following problems:    1) Patient has a dental appointment to day at 10:30. Lena Cabrera is unable to remember what medication Dr. Faustino Locke wants her to take before dental work and wondered if we could call in what she needed. Informed that Alberta Del Cid would not be in the office until 10am,it might be best for her to call POA. Lena Cabrera states that she has already placed the call. Dental office is unable to prescribe anything for her until she is seen. Advised her togo to appointment since she is in pain, even if they are unable to do any procedures today. 2) Thrush - inquired is patient swished her mouth with water after each inhaler use. Lena Cabrera wasn't aware that she needed to do that with her albuterol. Patient told that message would be given to Alberta Del Cid when she comes in, bit may not be in time for her dental appointment.

## 2022-09-14 NOTE — TELEPHONE ENCOUNTER
Hello Im checking to see if my test results look a little better than before? Are things moving in the right direction?

## 2022-09-14 NOTE — TELEPHONE ENCOUNTER
I was not aware of her requiring premedication for dental procedures. What was the indication for this, and who started her on it?

## 2022-09-14 NOTE — TELEPHONE ENCOUNTER
I have called the patient this afternoon, the patient did go to the dental appointment that she had this morning. She stated that she was put on 3 medications from them and that she goes back Friday for an extraction. She stated that she has an infection and that they are treating her for it.

## 2022-09-19 ENCOUNTER — NURSE ONLY (OUTPATIENT)
Dept: INTERNAL MEDICINE CLINIC | Facility: CLINIC | Age: 47
End: 2022-09-19

## 2022-09-19 ENCOUNTER — HOSPITAL ENCOUNTER (OUTPATIENT)
Dept: MRI IMAGING | Age: 47
Discharge: HOME | End: 2022-09-22
Payer: MEDICARE

## 2022-09-19 DIAGNOSIS — M54.16 LUMBAR RADICULOPATHY: ICD-10-CM

## 2022-09-19 DIAGNOSIS — D72.819 LEUKOPENIA, UNSPECIFIED TYPE: ICD-10-CM

## 2022-09-19 DIAGNOSIS — M50.00 INTERVERTEBRAL CERVICAL DISC DISORDER WITH MYELOPATHY, CERVICAL REGION: ICD-10-CM

## 2022-09-19 DIAGNOSIS — M51.04 INTERVERTEBRAL THORACIC DISC DISORDER WITH MYELOPATHY, THORACIC REGION: ICD-10-CM

## 2022-09-19 LAB
ALBUMIN SERPL-MCNC: 3.5 G/DL (ref 3.5–5)
ALBUMIN/GLOB SERPL: 1.1 {RATIO} (ref 1.2–3.5)
ALP SERPL-CCNC: 60 U/L (ref 50–136)
ALT SERPL-CCNC: 30 U/L (ref 12–65)
AST SERPL-CCNC: 18 U/L (ref 15–37)
BASOPHILS # BLD: 0 K/UL (ref 0–0.2)
BASOPHILS NFR BLD: 1 % (ref 0–2)
BILIRUB DIRECT SERPL-MCNC: 0.2 MG/DL
BILIRUB SERPL-MCNC: 0.6 MG/DL (ref 0.2–1.1)
DIFFERENTIAL METHOD BLD: ABNORMAL
EOSINOPHIL # BLD: 0.1 K/UL (ref 0–0.8)
EOSINOPHIL NFR BLD: 4 % (ref 0.5–7.8)
ERYTHROCYTE [DISTWIDTH] IN BLOOD BY AUTOMATED COUNT: 14.1 % (ref 11.9–14.6)
GLOBULIN SER CALC-MCNC: 3.3 G/DL (ref 2.3–3.5)
HCT VFR BLD AUTO: 36.5 % (ref 35.8–46.3)
HGB BLD-MCNC: 11.1 G/DL (ref 11.7–15.4)
IMM GRANULOCYTES # BLD AUTO: 0 K/UL (ref 0–0.5)
IMM GRANULOCYTES NFR BLD AUTO: 0 % (ref 0–5)
LYMPHOCYTES # BLD: 1.3 K/UL (ref 0.5–4.6)
LYMPHOCYTES NFR BLD: 37 % (ref 13–44)
MCH RBC QN AUTO: 28.5 PG (ref 26.1–32.9)
MCHC RBC AUTO-ENTMCNC: 30.4 G/DL (ref 31.4–35)
MCV RBC AUTO: 93.8 FL (ref 79.6–97.8)
MONOCYTES # BLD: 0.4 K/UL (ref 0.1–1.3)
MONOCYTES NFR BLD: 12 % (ref 4–12)
NEUTS SEG # BLD: 1.6 K/UL (ref 1.7–8.2)
NEUTS SEG NFR BLD: 46 % (ref 43–78)
NRBC # BLD: 0 K/UL (ref 0–0.2)
PLATELET # BLD AUTO: 288 K/UL (ref 150–450)
PMV BLD AUTO: 11.5 FL (ref 9.4–12.3)
PROT SERPL-MCNC: 6.8 G/DL (ref 6.3–8.2)
RBC # BLD AUTO: 3.89 M/UL (ref 4.05–5.2)
WBC # BLD AUTO: 3.6 K/UL (ref 4.3–11.1)

## 2022-09-19 PROCEDURE — 72148 MRI LUMBAR SPINE W/O DYE: CPT

## 2022-09-20 ENCOUNTER — TELEPHONE (OUTPATIENT)
Dept: INTERNAL MEDICINE CLINIC | Facility: CLINIC | Age: 47
End: 2022-09-20

## 2022-09-21 LAB — PATH REV BLD -IMP: NORMAL

## 2022-09-26 ENCOUNTER — HOSPITAL ENCOUNTER (OUTPATIENT)
Dept: MRI IMAGING | Age: 47
Discharge: HOME | End: 2022-09-29
Payer: MEDICARE

## 2022-09-26 DIAGNOSIS — M50.00 INTERVERTEBRAL CERVICAL DISC DISORDER WITH MYELOPATHY, CERVICAL REGION: ICD-10-CM

## 2022-09-26 DIAGNOSIS — M54.16 LUMBAR RADICULOPATHY: ICD-10-CM

## 2022-09-26 DIAGNOSIS — M51.04 INTERVERTEBRAL THORACIC DISC DISORDER WITH MYELOPATHY, THORACIC REGION: ICD-10-CM

## 2022-09-26 PROCEDURE — 72146 MRI CHEST SPINE W/O DYE: CPT

## 2022-09-26 PROCEDURE — 72141 MRI NECK SPINE W/O DYE: CPT

## 2022-10-10 ENCOUNTER — NURSE ONLY (OUTPATIENT)
Dept: INTERNAL MEDICINE CLINIC | Facility: CLINIC | Age: 47
End: 2022-10-10

## 2022-10-10 DIAGNOSIS — D72.819 LEUKOPENIA, UNSPECIFIED TYPE: Primary | ICD-10-CM

## 2022-10-10 DIAGNOSIS — D72.819 LEUKOPENIA, UNSPECIFIED TYPE: ICD-10-CM

## 2022-10-11 ENCOUNTER — TELEPHONE (OUTPATIENT)
Dept: INTERNAL MEDICINE CLINIC | Facility: CLINIC | Age: 47
End: 2022-10-11

## 2022-10-11 DIAGNOSIS — D72.819 LEUKOPENIA, UNSPECIFIED TYPE: Primary | ICD-10-CM

## 2022-10-11 LAB
BASOPHILS # BLD: 0 K/UL (ref 0–0.2)
BASOPHILS NFR BLD: 1 % (ref 0–2)
DIFFERENTIAL METHOD BLD: ABNORMAL
EOSINOPHIL # BLD: 0.2 K/UL (ref 0–0.8)
EOSINOPHIL NFR BLD: 4 % (ref 0.5–7.8)
ERYTHROCYTE [DISTWIDTH] IN BLOOD BY AUTOMATED COUNT: 14.4 % (ref 11.9–14.6)
HCT VFR BLD AUTO: 35.2 % (ref 35.8–46.3)
HGB BLD-MCNC: 10.7 G/DL (ref 11.7–15.4)
IMM GRANULOCYTES # BLD AUTO: 0 K/UL (ref 0–0.5)
IMM GRANULOCYTES NFR BLD AUTO: 0 % (ref 0–5)
LYMPHOCYTES # BLD: 1.5 K/UL (ref 0.5–4.6)
LYMPHOCYTES NFR BLD: 37 % (ref 13–44)
MCH RBC QN AUTO: 28.2 PG (ref 26.1–32.9)
MCHC RBC AUTO-ENTMCNC: 30.4 G/DL (ref 31.4–35)
MCV RBC AUTO: 92.6 FL (ref 79.6–97.8)
MONOCYTES # BLD: 0.4 K/UL (ref 0.1–1.3)
MONOCYTES NFR BLD: 10 % (ref 4–12)
NEUTS SEG # BLD: 1.9 K/UL (ref 1.7–8.2)
NEUTS SEG NFR BLD: 48 % (ref 43–78)
NRBC # BLD: 0 K/UL (ref 0–0.2)
PLATELET # BLD AUTO: 305 K/UL (ref 150–450)
PMV BLD AUTO: 10.7 FL (ref 9.4–12.3)
RBC # BLD AUTO: 3.8 M/UL (ref 4.05–5.2)
WBC # BLD AUTO: 3.9 K/UL (ref 4.3–11.1)

## 2022-10-11 NOTE — RESULT ENCOUNTER NOTE
WBC count remains mildly low, but stable. HIV and hepatitis C screenings were negative July 2022. Pathology review September 2022 showed decreased WBC, but morphologically unremarkable. As it has remained low for several months, I am placing referral to hematology for further evaluation.

## 2022-10-11 NOTE — TELEPHONE ENCOUNTER
I have called the patient this morning and let her know the reasoning on why she was being referred to oncology.

## 2022-10-11 NOTE — TELEPHONE ENCOUNTER
----- Message from Bhargav Yoder sent at 10/11/2022 10:56 AM EDT -----  Subject: Message to Provider    QUESTIONS  Information for Provider? patient would like to discuss her last lab   results. she got a call from a hematologist to schedule but wants to talk   to PCP first.  ---------------------------------------------------------------------------  --------------  [id number]  [phone]; Do not leave any message, patient will call back for answer  ---------------------------------------------------------------------------  --------------  SCRIPT ANSWERS  Relationship to Patient?  Self

## 2022-10-13 NOTE — TELEPHONE ENCOUNTER
----- Message from Eduardo Holliday sent at 10/13/2022  4:49 PM EDT -----  Subject: Refill Request    QUESTIONS  Name of Medication? nystatin (MYCOSTATIN) 193935 UNIT/ML suspension  Patient-reported dosage and instructions? rinse mouth out 4 times a day   How many days do you have left? 0  Preferred Pharmacy? [id number]  Pharmacy phone number (if available)? [phone]  Additional Information for Provider? 7-10 day supply. pt still has Thrush   ---------------------------------------------------------------------------  --------------  CALL BACK INFO  What is the best way for the office to contact you? OK to respond with   electronic message via [employer] portal (only for patients who have   registered [employer] account)  Preferred Call Back Phone Number? [phone]  ---------------------------------------------------------------------------  --------------  SCRIPT ANSWERS  Relationship to Patient?  Self

## 2022-10-31 ENCOUNTER — TELEPHONE (OUTPATIENT)
Dept: INTERNAL MEDICINE CLINIC | Facility: CLINIC | Age: 47
End: 2022-10-31

## 2022-10-31 RX ORDER — PROGESTERONE 100 MG/1
CAPSULE ORAL
Qty: 30 CAPSULE | Refills: 1 | OUTPATIENT
Start: 2022-10-31

## 2022-10-31 NOTE — TELEPHONE ENCOUNTER
Received letter 10/31/2022 stating that the cologuard order that was ordered last year has exceeded 365 days from the initial order date.      Order has

## 2022-10-31 NOTE — PROGRESS NOTES
4.6 3.6 (L) 3.3 (L) 3.6 (L) 3.9 (L)   RBC 3.73 (L) 4.06 4.13 4.15 3.90 (L) 3.89 (L) 3.80 (L)   Hemoglobin Quant 11.0 (L) 11.7 11.5 (L) 12.0 10.9 (L) 11.1 (L) 10.7 (L)   Hematocrit 33.8 (L) 36.2 35.9 38.8 37.4 36.5 35.2 (L)   MCV 91 89 86.9 93.5 95.9 93.8 92.6   MCH 29.5 28.8 27.8 28.9 27.9 28.5 28.2   MCHC 32.5 32.3 32.0 30.9 (L) 29.1 (L) 30.4 (L) 30.4 (L)   MPV   9.9 10.6 11.3 11.5 10.7   RDW 13.5 13.9 15.3 (H) 15.8 (H) 14.0 14.1 14.4   Platelet Count 755 645 381 315 306 288 305   Neutrophils % 58         Lymphocyte % 29         Absolute Mono #    0.3 0.4 0.4 0.4   Monocytes % 9         Eosinophils % 3   3 5 4 4   Basophils % 1         Neutrophils Absolute 2.9         Lymphocytes Absolute 1.5         Monocytes Absolute 0.5         Eosinophils Absolute 0.1         Basophils Absolute 0.0   0.0 0.0 0.0 0.0   Differential Type    AUTOMATED AUTOMATED AUTOMATED AUTOMATED   Seg Neutrophils    61 46 46 48   GRANULOCYTE ABSOLUTE COUNT 0.0         Segs Absolute    2.3 1.5 (L) 1.6 (L) 1.9   Lymphocytes    26 36 37 37   Absolute Lymph #    1.0 1.2 1.3 1.5   Monocytes    9 12 12 10   Absolute Eos #    0.1 0.2 0.1 0.2   Basophils    1 1 1 1   Immature Granulocytes 0   0 0 0 0   Nucleated Red Blood Cells   0.00 0.00 0.00 0.00 0.00   Absolute Immature Granulocyte    0.0 0.0 0.0 0.0     PATHOLOGIST REVIEW 9/19/22  Collected: 09/19/22 0906   Result status: Final   Resulting lab: [facility]   Value: (NOTE)   Comment: RBCs: Normochromic normocytic anemia, no evidence of schistocytes. WBCs: Morphologically unremarkable and decreased in number. PLTs: Morphologically unremarkable.      Pathologist: Fredi Daniels MD, PhD      Notes from Referring Provider: None    Other Pertinent Information:         Presented at Tumor Board: N/A

## 2022-11-01 ENCOUNTER — HOSPITAL ENCOUNTER (OUTPATIENT)
Dept: LAB | Age: 47
Discharge: HOME | End: 2022-11-04
Payer: MEDICARE

## 2022-11-01 ENCOUNTER — OFFICE VISIT (OUTPATIENT)
Dept: ONCOLOGY | Age: 47
End: 2022-11-01
Payer: MEDICARE

## 2022-11-01 VITALS
DIASTOLIC BLOOD PRESSURE: 77 MMHG | TEMPERATURE: 97.9 F | RESPIRATION RATE: 14 BRPM | HEART RATE: 84 BPM | WEIGHT: 291.9 LBS | SYSTOLIC BLOOD PRESSURE: 119 MMHG | OXYGEN SATURATION: 100 % | HEIGHT: 67 IN | BODY MASS INDEX: 45.81 KG/M2

## 2022-11-01 DIAGNOSIS — D47.1 MPN (MYELOPROLIFERATIVE NEOPLASM) (HCC): Primary | ICD-10-CM

## 2022-11-01 DIAGNOSIS — D47.1 MPN (MYELOPROLIFERATIVE NEOPLASM) (HCC): ICD-10-CM

## 2022-11-01 DIAGNOSIS — D64.9 ANEMIA, UNSPECIFIED TYPE: ICD-10-CM

## 2022-11-01 DIAGNOSIS — Z12.12 ENCOUNTER FOR COLORECTAL CANCER SCREENING: Primary | ICD-10-CM

## 2022-11-01 DIAGNOSIS — Z12.11 ENCOUNTER FOR COLORECTAL CANCER SCREENING: Primary | ICD-10-CM

## 2022-11-01 DIAGNOSIS — D72.819 LEUKOPENIA, UNSPECIFIED TYPE: ICD-10-CM

## 2022-11-01 LAB
25(OH)D3 SERPL-MCNC: 33.9 NG/ML (ref 30–100)
ALBUMIN SERPL-MCNC: 3.5 G/DL (ref 3.5–5)
ALBUMIN/GLOB SERPL: 1 {RATIO} (ref 0.4–1.6)
ALP SERPL-CCNC: 59 U/L (ref 50–136)
ALT SERPL-CCNC: 29 U/L (ref 12–65)
ANION GAP SERPL CALC-SCNC: 2 MMOL/L (ref 2–11)
AST SERPL-CCNC: 17 U/L (ref 15–37)
BASOPHILS # BLD: 0 K/UL (ref 0–0.2)
BASOPHILS NFR BLD: 0 % (ref 0–2)
BILIRUB SERPL-MCNC: 0.7 MG/DL (ref 0.2–1.1)
BUN SERPL-MCNC: 14 MG/DL (ref 6–23)
CALCIUM SERPL-MCNC: 9.3 MG/DL (ref 8.3–10.4)
CHLORIDE SERPL-SCNC: 104 MMOL/L (ref 101–110)
CO2 SERPL-SCNC: 31 MMOL/L (ref 21–32)
CREAT SERPL-MCNC: 1.1 MG/DL (ref 0.6–1)
CRP SERPL-MCNC: <0.3 MG/DL (ref 0–0.9)
DAT POLY-SP REAG RBC QL: NORMAL
DIFFERENTIAL METHOD BLD: ABNORMAL
EOSINOPHIL # BLD: 0.1 K/UL (ref 0–0.8)
EOSINOPHIL NFR BLD: 1 % (ref 0.5–7.8)
ERYTHROCYTE [DISTWIDTH] IN BLOOD BY AUTOMATED COUNT: 14.6 % (ref 11.9–14.6)
ERYTHROCYTE [SEDIMENTATION RATE] IN BLOOD: 5 MM/HR (ref 0–20)
FERRITIN SERPL-MCNC: 7 NG/ML (ref 8–388)
GLOBULIN SER CALC-MCNC: 3.4 G/DL (ref 2.8–4.5)
GLUCOSE SERPL-MCNC: 92 MG/DL (ref 65–100)
HAPTOGLOB SERPL-MCNC: 57 MG/DL (ref 30–200)
HCT VFR BLD AUTO: 36 % (ref 35.8–46.3)
HGB BLD-MCNC: 11.1 G/DL (ref 11.7–15.4)
IMM GRANULOCYTES # BLD AUTO: 0 K/UL (ref 0–0.5)
IMM GRANULOCYTES NFR BLD AUTO: 0 % (ref 0–5)
LYMPHOCYTES # BLD: 1.2 K/UL (ref 0.5–4.6)
LYMPHOCYTES NFR BLD: 25 % (ref 13–44)
Lab: NORMAL
Lab: NORMAL
MCH RBC QN AUTO: 28 PG (ref 26.1–32.9)
MCHC RBC AUTO-ENTMCNC: 30.8 G/DL (ref 31.4–35)
MCV RBC AUTO: 90.9 FL (ref 82–102)
MONOCYTES # BLD: 0.5 K/UL (ref 0.1–1.3)
MONOCYTES NFR BLD: 10 % (ref 4–12)
NEUTS SEG # BLD: 3.1 K/UL (ref 1.7–8.2)
NEUTS SEG NFR BLD: 64 % (ref 43–78)
NRBC # BLD: 0 K/UL (ref 0–0.2)
PLATELET # BLD AUTO: 283 K/UL (ref 150–450)
PMV BLD AUTO: 9.5 FL (ref 9.4–12.3)
POTASSIUM SERPL-SCNC: 4.3 MMOL/L (ref 3.5–5.1)
PROT SERPL-MCNC: 6.9 G/DL (ref 6.3–8.2)
RBC # BLD AUTO: 3.96 M/UL (ref 4.05–5.2)
REFERENCE LAB: NORMAL
SODIUM SERPL-SCNC: 137 MMOL/L (ref 133–143)
VIT B12 SERPL-MCNC: 1351 PG/ML (ref 193–986)
WBC # BLD AUTO: 4.9 K/UL (ref 4.3–11.1)

## 2022-11-01 PROCEDURE — 82306 VITAMIN D 25 HYDROXY: CPT

## 2022-11-01 PROCEDURE — 36415 COLL VENOUS BLD VENIPUNCTURE: CPT

## 2022-11-01 PROCEDURE — 99205 OFFICE O/P NEW HI 60 MIN: CPT | Performed by: INTERNAL MEDICINE

## 2022-11-01 PROCEDURE — 85025 COMPLETE CBC W/AUTO DIFF WBC: CPT

## 2022-11-01 PROCEDURE — G8417 CALC BMI ABV UP PARAM F/U: HCPCS | Performed by: INTERNAL MEDICINE

## 2022-11-01 PROCEDURE — 86880 COOMBS TEST DIRECT: CPT

## 2022-11-01 PROCEDURE — 86140 C-REACTIVE PROTEIN: CPT

## 2022-11-01 PROCEDURE — 83010 ASSAY OF HAPTOGLOBIN QUANT: CPT

## 2022-11-01 PROCEDURE — 80053 COMPREHEN METABOLIC PANEL: CPT

## 2022-11-01 PROCEDURE — G8484 FLU IMMUNIZE NO ADMIN: HCPCS | Performed by: INTERNAL MEDICINE

## 2022-11-01 PROCEDURE — 3074F SYST BP LT 130 MM HG: CPT | Performed by: INTERNAL MEDICINE

## 2022-11-01 PROCEDURE — 3078F DIAST BP <80 MM HG: CPT | Performed by: INTERNAL MEDICINE

## 2022-11-01 PROCEDURE — 1036F TOBACCO NON-USER: CPT | Performed by: INTERNAL MEDICINE

## 2022-11-01 PROCEDURE — 82728 ASSAY OF FERRITIN: CPT

## 2022-11-01 PROCEDURE — G8427 DOCREV CUR MEDS BY ELIG CLIN: HCPCS | Performed by: INTERNAL MEDICINE

## 2022-11-01 PROCEDURE — 85652 RBC SED RATE AUTOMATED: CPT

## 2022-11-01 PROCEDURE — 82607 VITAMIN B-12: CPT

## 2022-11-01 RX ORDER — DOCUSATE SODIUM 100 MG/1
100 CAPSULE, LIQUID FILLED ORAL 2 TIMES DAILY
COMMUNITY

## 2022-11-01 RX ORDER — ACETAMINOPHEN 500 MG
500 TABLET ORAL EVERY 6 HOURS PRN
COMMUNITY

## 2022-11-01 RX ORDER — ACETAMINOPHEN 160 MG
TABLET,DISINTEGRATING ORAL
COMMUNITY

## 2022-11-01 RX ORDER — DULOXETIN HYDROCHLORIDE 60 MG/1
CAPSULE, DELAYED RELEASE ORAL
COMMUNITY
Start: 2022-10-10

## 2022-11-01 RX ORDER — GLUCOSAMINE/D3/BOSWELLIA SERRA 1500MG-400
TABLET ORAL
COMMUNITY

## 2022-11-01 RX ORDER — IBUPROFEN 200 MG
200 TABLET ORAL EVERY 6 HOURS PRN
COMMUNITY

## 2022-11-01 ASSESSMENT — PATIENT HEALTH QUESTIONNAIRE - PHQ9
2. FEELING DOWN, DEPRESSED OR HOPELESS: 2
SUM OF ALL RESPONSES TO PHQ QUESTIONS 1-9: 2

## 2022-11-01 NOTE — PATIENT INSTRUCTIONS
Patient Instructions from Today's Visit    Reason for Visit:  New Patient    Diagnosis Information:  [email or web address]    Plan: You will have some blood work done today  We will look for any underlying blood conditions or micronutrient deficiencies. Follow Up: We will bring you back in a few weeks for a follow up with  and lab work prior. Recent Lab Results:  Lab work done today downstairs    Treatment Summary has been discussed and given to patient: n/a        -------------------------------------------------------------------------------------------------------------------  Please call our office at [phone] if you have any  of the following symptoms:   Fever of 100.5 or greater  Chills  Shortness of breath  Swelling or pain in one leg    After office hours an answering service is available and will contact a provider for emergencies or if you are experiencing any of the above symptoms. Patient has My Chart. My Chart log in information explained on the after visit summary printout at the [address] desk.     [facility] [place]

## 2022-11-01 NOTE — PROGRESS NOTES
[address]  Phone: [phone]        11/1/2022  Fabi Estrada  1975  [id number]      Fabi Estrada is a 51year old -American female who has been sent to my clinic by Michelle Cheng NP for evaluation of Leukopenia and Anemia. ALLERGIES:    Ciprofloxacin and sulfa drugs. FAMILY HISTORY:     Her sister had Hodgkin Lymphoma. SOCIAL HISTORY:    She is single and lives with her son. She used to work as a  for [employer]. She denies ever using any tobacco products. PAST MEDICAL HISTORY:     Hypertension, Asthma, Bipolar Disorder, Osteoarthritis, renal insufficiency, CVA, Diabetes Mellitus, GERD, Iron Deficiency Anemia and Neuropathy. ROS:  The patient complained of fatigue and arthralgias; all other systems negative. PHYSICAL EXAM:   The patient was alert, awake and oriented, no acute distress was noted. Oral examination did not reveal any mucosal lesions. Lymph node examination did not reveal any adenopathy. Neck examination revealed a supple neck, no thyromegaly or masses were noted. Chest examination revealed normal vesicular breath sounds. Heart examination revealed S-1 and S-2 without any murmurs. Abdominal examination revealed a non-tender abdomen, bowel sounds were positive, no organomegaly could be appreciated. Examination of the extremities did not reveal any tenderness or erythema, 1+ bilateral pedal edema was noted. Examination of the skin did not reveal any lesions. KPS:     70. LABORATORY INVESTIGATIONS:  CBC showed a WBC count of 4.9, ANC was 3.1, Hemoglobin was 11.1 and Platelets were 648; her Ferritin level was only 7. Medical problems and test results were reviewed with the patient today. ASSESSMENT:    MPN; Leukopenia; Anemia. I spent a total of 62 minutes on the day of the visit, managing the care of this patient.       PLAN:   She may benefit from a course of Venofer, her Myeloid Disorders Profile is pending. Later she will need to undergo an EGD and a Colonoscopy; at her next clinic visit I will re-check her CBC, CMP and Ferritin level. Thank you for allowing me to participate in the care of this patient.       Jaelyn Kimbrough MD  Hematology/BMT

## 2022-11-04 LAB
ALBUMIN SERPL ELPH-MCNC: 3.4 G/DL (ref 2.9–4.4)
ALBUMIN/GLOB SERPL: 1.2 {RATIO} (ref 0.7–1.7)
ALPHA1 GLOB SERPL ELPH-MCNC: 0.2 G/DL (ref 0–0.4)
ALPHA2 GLOB SERPL ELPH-MCNC: 0.5 G/DL (ref 0.4–1)
B-GLOBULIN SERPL ELPH-MCNC: 1.2 G/DL (ref 0.7–1.3)
GAMMA GLOB SERPL ELPH-MCNC: 1 G/DL (ref 0.4–1.8)
GLOBULIN SER-MCNC: 2.9 G/DL (ref 2.2–3.9)
IGA SERPL-MCNC: 273 MG/DL (ref 87–352)
IGG SERPL-MCNC: 1187 MG/DL (ref 586–1602)
IGM SERPL-MCNC: 66 MG/DL (ref 26–217)
INTERPRETATION SERPL IEP-IMP: NORMAL
M PROTEIN SERPL ELPH-MCNC: NORMAL G/DL
PROT SERPL-MCNC: 6.3 G/DL (ref 6–8.5)

## 2022-11-15 RX ORDER — PROGESTERONE 200 MG/1
CAPSULE ORAL
Qty: 30 CAPSULE | Refills: 1 | OUTPATIENT
Start: 2022-11-15

## 2022-11-21 DIAGNOSIS — D72.819 LEUKOPENIA, UNSPECIFIED TYPE: Primary | ICD-10-CM

## 2022-12-01 ENCOUNTER — PATIENT MESSAGE (OUTPATIENT)
Dept: OBGYN CLINIC | Age: 47
End: 2022-12-01

## 2022-12-01 DIAGNOSIS — N92.1 MENOMETRORRHAGIA: Primary | ICD-10-CM

## 2022-12-02 RX ORDER — PROGESTERONE 100 MG/1
100 CAPSULE ORAL DAILY
Qty: 30 CAPSULE | Refills: 1 | OUTPATIENT
Start: 2022-12-02

## 2022-12-08 RX ORDER — OMEPRAZOLE 20 MG/1
20 CAPSULE, DELAYED RELEASE ORAL DAILY
Qty: 90 CAPSULE | Refills: 1 | OUTPATIENT
Start: 2022-12-08

## 2022-12-08 RX ORDER — PROGESTERONE 100 MG/1
100 CAPSULE ORAL NIGHTLY
Qty: 30 CAPSULE | Refills: 1 | Status: SHIPPED | OUTPATIENT
Start: 2022-12-08

## 2022-12-09 NOTE — TELEPHONE ENCOUNTER
I reviewed her chart, needs a FSH, repeat US and a f/u ov. Am concerned that we may be masking worrisome pathology w/  po Prometrium. I will refill her Prometrium to get her to a f/u ov w/ me, won't refill it after that if she has not come in for an ov. Have her come in Jan 19 at 11:20 and put her in the 11:00 US spot.     Please make sure she has her [address] drawn before that ov (schedule lab only ov) for me to review prior to seeing her      Orders Placed This Encounter   Medications    progesterone (PROMETRIUM) 100 MG CAPS capsule     Sig: Take 1 capsule by mouth nightly     Dispense:  30 capsule     Refill:  1

## 2022-12-12 ENCOUNTER — HOSPITAL ENCOUNTER (EMERGENCY)
Dept: GENERAL RADIOLOGY | Age: 47
Discharge: HOME | End: 2022-12-15
Payer: MEDICARE

## 2022-12-12 VITALS
WEIGHT: 290 LBS | HEART RATE: 92 BPM | RESPIRATION RATE: 18 BRPM | DIASTOLIC BLOOD PRESSURE: 82 MMHG | HEIGHT: 67 IN | SYSTOLIC BLOOD PRESSURE: 122 MMHG | BODY MASS INDEX: 45.52 KG/M2 | TEMPERATURE: 99.3 F | OXYGEN SATURATION: 98 %

## 2022-12-12 PROCEDURE — 81025 URINE PREGNANCY TEST: CPT

## 2022-12-12 PROCEDURE — 71045 X-RAY EXAM CHEST 1 VIEW: CPT

## 2022-12-12 ASSESSMENT — PAIN DESCRIPTION - LOCATION: LOCATION: LEG

## 2022-12-12 ASSESSMENT — LIFESTYLE VARIABLES
HOW OFTEN DO YOU HAVE A DRINK CONTAINING ALCOHOL: NEVER
HOW MANY STANDARD DRINKS CONTAINING ALCOHOL DO YOU HAVE ON A TYPICAL DAY: PATIENT DOES NOT DRINK

## 2022-12-12 ASSESSMENT — PAIN DESCRIPTION - ORIENTATION: ORIENTATION: RIGHT;LEFT

## 2022-12-12 ASSESSMENT — PAIN - FUNCTIONAL ASSESSMENT: PAIN_FUNCTIONAL_ASSESSMENT: 0-10

## 2022-12-12 ASSESSMENT — PAIN DESCRIPTION - FREQUENCY: FREQUENCY: CONTINUOUS

## 2022-12-12 ASSESSMENT — PAIN SCALES - GENERAL: PAINLEVEL_OUTOF10: 9

## 2022-12-12 ASSESSMENT — PAIN DESCRIPTION - ONSET: ONSET: SUDDEN

## 2022-12-12 ASSESSMENT — PAIN DESCRIPTION - PAIN TYPE: TYPE: CHRONIC PAIN

## 2022-12-13 ENCOUNTER — HOSPITAL ENCOUNTER (EMERGENCY)
Age: 47
Discharge: LEFT BEFORE DISCHARGE | End: 2022-12-13
Payer: MEDICARE

## 2022-12-13 ENCOUNTER — HOSPITAL ENCOUNTER (EMERGENCY)
Dept: ULTRASOUND IMAGING | Age: 47
Discharge: HOME | End: 2022-12-16
Payer: MEDICARE

## 2022-12-13 ENCOUNTER — HOSPITAL ENCOUNTER (EMERGENCY)
Dept: GENERAL RADIOLOGY | Age: 47
Discharge: HOME | End: 2022-12-16
Payer: MEDICARE

## 2022-12-13 ENCOUNTER — HOSPITAL ENCOUNTER (EMERGENCY)
Age: 47
Discharge: HOME | End: 2022-12-13
Attending: EMERGENCY MEDICINE
Payer: MEDICARE

## 2022-12-13 VITALS
TEMPERATURE: 98.1 F | SYSTOLIC BLOOD PRESSURE: 121 MMHG | RESPIRATION RATE: 18 BRPM | HEART RATE: 98 BPM | BODY MASS INDEX: 45.52 KG/M2 | WEIGHT: 290 LBS | DIASTOLIC BLOOD PRESSURE: 76 MMHG | HEIGHT: 67 IN | OXYGEN SATURATION: 99 %

## 2022-12-13 DIAGNOSIS — I82.4Y2 ACUTE DEEP VEIN THROMBOSIS (DVT) OF PROXIMAL VEIN OF LEFT LOWER EXTREMITY (HCC): Primary | ICD-10-CM

## 2022-12-13 DIAGNOSIS — R60.0 BILATERAL LOWER EXTREMITY EDEMA: ICD-10-CM

## 2022-12-13 DIAGNOSIS — M10.9 ACUTE GOUT OF RIGHT FOOT, UNSPECIFIED CAUSE: ICD-10-CM

## 2022-12-13 DIAGNOSIS — Z53.21 PATIENT LEFT WITHOUT BEING SEEN: Primary | ICD-10-CM

## 2022-12-13 LAB
ALBUMIN SERPL-MCNC: 3.3 G/DL (ref 3.5–5)
ALBUMIN/GLOB SERPL: 0.9 {RATIO} (ref 0.4–1.6)
ALP SERPL-CCNC: 67 U/L (ref 50–136)
ALT SERPL-CCNC: 31 U/L (ref 12–65)
ANION GAP SERPL CALC-SCNC: 4 MMOL/L (ref 2–11)
AST SERPL-CCNC: 21 U/L (ref 15–37)
BASOPHILS # BLD: 0 K/UL (ref 0–0.2)
BASOPHILS NFR BLD: 1 % (ref 0–2)
BILIRUB SERPL-MCNC: 0.5 MG/DL (ref 0.2–1.1)
BUN SERPL-MCNC: 10 MG/DL (ref 6–23)
CALCIUM SERPL-MCNC: 8.6 MG/DL (ref 8.3–10.4)
CHLORIDE SERPL-SCNC: 106 MMOL/L (ref 101–110)
CO2 SERPL-SCNC: 30 MMOL/L (ref 21–32)
CREAT SERPL-MCNC: 1.09 MG/DL (ref 0.6–1)
DIFFERENTIAL METHOD BLD: ABNORMAL
EKG ATRIAL RATE: 67 BPM
EKG DIAGNOSIS: NORMAL
EKG P AXIS: 62 DEGREES
EKG P-R INTERVAL: 152 MS
EKG Q-T INTERVAL: 414 MS
EKG QRS DURATION: 82 MS
EKG QTC CALCULATION (BAZETT): 437 MS
EKG R AXIS: 43 DEGREES
EKG T AXIS: 59 DEGREES
EKG VENTRICULAR RATE: 67 BPM
EOSINOPHIL # BLD: 0.2 K/UL (ref 0–0.8)
EOSINOPHIL NFR BLD: 4 % (ref 0.5–7.8)
ERYTHROCYTE [DISTWIDTH] IN BLOOD BY AUTOMATED COUNT: 14.8 % (ref 11.9–14.6)
GLOBULIN SER CALC-MCNC: 3.6 G/DL (ref 2.8–4.5)
GLUCOSE SERPL-MCNC: 90 MG/DL (ref 65–100)
HCG UR QL: NEGATIVE
HCT VFR BLD AUTO: 33.8 % (ref 35.8–46.3)
HGB BLD-MCNC: 10.5 G/DL (ref 11.7–15.4)
IMM GRANULOCYTES # BLD AUTO: 0 K/UL (ref 0–0.5)
IMM GRANULOCYTES NFR BLD AUTO: 0 % (ref 0–5)
LACTATE SERPL-SCNC: 0.4 MMOL/L (ref 0.4–2)
LYMPHOCYTES # BLD: 1.8 K/UL (ref 0.5–4.6)
LYMPHOCYTES NFR BLD: 36 % (ref 13–44)
MCH RBC QN AUTO: 27.9 PG (ref 26.1–32.9)
MCHC RBC AUTO-ENTMCNC: 31.1 G/DL (ref 31.4–35)
MCV RBC AUTO: 89.9 FL (ref 82–102)
MONOCYTES # BLD: 0.6 K/UL (ref 0.1–1.3)
MONOCYTES NFR BLD: 13 % (ref 4–12)
NEUTS SEG # BLD: 2.3 K/UL (ref 1.7–8.2)
NEUTS SEG NFR BLD: 46 % (ref 43–78)
NRBC # BLD: 0 K/UL (ref 0–0.2)
NT PRO BNP: 89 PG/ML (ref 5–125)
PLATELET # BLD AUTO: 331 K/UL (ref 150–450)
PMV BLD AUTO: 9.7 FL (ref 9.4–12.3)
POTASSIUM SERPL-SCNC: 4.2 MMOL/L (ref 3.5–5.1)
PROCALCITONIN SERPL-MCNC: <0.05 NG/ML (ref 0–0.49)
PROT SERPL-MCNC: 6.9 G/DL (ref 6.3–8.2)
RBC # BLD AUTO: 3.76 M/UL (ref 4.05–5.2)
SODIUM SERPL-SCNC: 140 MMOL/L (ref 133–143)
WBC # BLD AUTO: 5 K/UL (ref 4.3–11.1)

## 2022-12-13 PROCEDURE — 83880 ASSAY OF NATRIURETIC PEPTIDE: CPT

## 2022-12-13 PROCEDURE — 96372 THER/PROPH/DIAG INJ SC/IM: CPT

## 2022-12-13 PROCEDURE — 84145 PROCALCITONIN (PCT): CPT

## 2022-12-13 PROCEDURE — 6370000000 HC RX 637 (ALT 250 FOR IP): Performed by: PHYSICIAN ASSISTANT

## 2022-12-13 PROCEDURE — 80053 COMPREHEN METABOLIC PANEL: CPT

## 2022-12-13 PROCEDURE — 83605 ASSAY OF LACTIC ACID: CPT

## 2022-12-13 PROCEDURE — 87205 SMEAR GRAM STAIN: CPT

## 2022-12-13 PROCEDURE — 85025 COMPLETE CBC W/AUTO DIFF WBC: CPT

## 2022-12-13 PROCEDURE — 73620 X-RAY EXAM OF FOOT: CPT

## 2022-12-13 PROCEDURE — 87040 BLOOD CULTURE FOR BACTERIA: CPT

## 2022-12-13 PROCEDURE — 87150 DNA/RNA AMPLIFIED PROBE: CPT

## 2022-12-13 PROCEDURE — 99284 EMERGENCY DEPT VISIT MOD MDM: CPT

## 2022-12-13 PROCEDURE — 6360000002 HC RX W HCPCS: Performed by: PHYSICIAN ASSISTANT

## 2022-12-13 PROCEDURE — 93005 ELECTROCARDIOGRAM TRACING: CPT | Performed by: EMERGENCY MEDICINE

## 2022-12-13 PROCEDURE — 93970 EXTREMITY STUDY: CPT

## 2022-12-13 RX ORDER — PREDNISONE 50 MG/1
50 TABLET ORAL DAILY
Qty: 5 TABLET | Refills: 0 | Status: SHIPPED | OUTPATIENT
Start: 2022-12-13 | End: 2022-12-18

## 2022-12-13 RX ORDER — KETOROLAC TROMETHAMINE 30 MG/ML
30 INJECTION, SOLUTION INTRAMUSCULAR; INTRAVENOUS ONCE
Status: COMPLETED | OUTPATIENT
Start: 2022-12-13 | End: 2022-12-13

## 2022-12-13 RX ADMIN — KETOROLAC TROMETHAMINE 30 MG: 30 INJECTION, SOLUTION INTRAMUSCULAR; INTRAVENOUS at 16:43

## 2022-12-13 RX ADMIN — RIVAROXABAN 15 MG: 15 TABLET, FILM COATED ORAL at 20:09

## 2022-12-13 ASSESSMENT — PAIN SCALES - GENERAL: PAINLEVEL_OUTOF10: 9

## 2022-12-13 ASSESSMENT — ENCOUNTER SYMPTOMS
GASTROINTESTINAL NEGATIVE: 1
RESPIRATORY NEGATIVE: 1

## 2022-12-13 ASSESSMENT — PAIN DESCRIPTION - ORIENTATION: ORIENTATION: RIGHT;LEFT

## 2022-12-13 ASSESSMENT — PAIN DESCRIPTION - LOCATION: LOCATION: LEG

## 2022-12-13 ASSESSMENT — PAIN - FUNCTIONAL ASSESSMENT: PAIN_FUNCTIONAL_ASSESSMENT: 0-10

## 2022-12-13 NOTE — ED TRIAGE NOTES
Pt arrives A&Ox4 ambulatory. Pt c/o bilateral leg swelling, redness, and pain for several weeks. Pt hx gout. Pt denies chest pain and SOB.

## 2022-12-13 NOTE — ED TRIAGE NOTES
Patient reports bilateral lower extremity swelling and pain, pain worse with ambulation. Patient began visit last night but left prior to being seen by provider.

## 2022-12-13 NOTE — ED PROVIDER NOTES
Emergency Department Provider Note                   PCP:                JOSE Bustamante CNP               Age: 49 y.o. Sex: female       ICD-10-CM    1. Acute deep vein thrombosis (DVT) of proximal vein of left lower extremity (HCC)  I82.4Y2       2. Bilateral lower extremity edema  R60.0       3. Acute gout of right foot, unspecified cause  M10.9           DISPOSITION Decision To Discharge 12/13/2022 07:57:12 PM        MDM  Number of Diagnoses or Management Options  Acute deep vein thrombosis (DVT) of proximal vein of left lower extremity (HCC)  Bilateral lower extremity edema  Diagnosis management comments: Patient is a 26-year-old female who presents with bilateral lower extremity for few weeks. Came in last night but left before being seen. I reviewed her labs and imaging. X-ray of right foot today shows no acute abnormalities. Bilateral Doppler study shows acute left posterior tibial DVT. We will treat with Xarelto as we have free 30-day pack coupon for her. She also feels like she is having acute gouty flare of her right foot. We will treat with prednisone for a few days. She is to follow-up closely with her primary doctor. She not having any chest pain or shortness of breath at this time. Very strict return precautions given if she does develop chest pain or shortness of breath or any worsening symptoms. She expressed understanding. Encouraged her to elevate her legs.        Amount and/or Complexity of Data Reviewed  Discuss the patient with other providers: yes (Dr. Madeline Osuna)    Risk of Complications, Morbidity, and/or Mortality  Presenting problems: moderate  Diagnostic procedures: moderate  Management options: moderate    Patient Progress  Patient progress: stable             Orders Placed This Encounter   Procedures    XR FOOT RIGHT (2 VIEWS)    Vascular duplex lower extremity venous bilateral        Medications   rivaroxaban (XARELTO) tablet 15 mg (has no administration in time range)   ketorolac (TORADOL) injection 30 mg (30 mg IntraMUSCular Given 12/13/22 2673)       New Prescriptions    PREDNISONE (DELTASONE) 50 MG TABLET    Take 1 tablet by mouth daily for 5 days    RIVAROXABAN (XARELTO) 15 MG TABS TABLET    Take 1 tablet by mouth daily (with breakfast) for 21 days        Sathya Schofield is a 49 y.o. female who presents to the Emergency Department with chief complaint of    Chief Complaint   Patient presents with    Leg Pain      Patient 40-year-old female who presents with 3 to 4 weeks of bilateral lower extremity edema. She reports history of gout and says that the pain and swelling in her right foot feels like gout. She says her swelling was way worse around Thanksgiving and it was all the way up to her knees bilaterally but it has gone down because she has been using some type of compression device at home. She says she has had swelling in the past and has been on Lasix in the past, but she is having trouble getting to the bathroom in time due to the pain so she is not sure if she wants to be on Lasix again. She went to see her doctor recently but she was late so she was denied that visit. She denies chest pain or shortness of breath. She does have some redness to the left anterior lower leg that she says feels like burning. Has not had any fever or injuries. She came last night/early this morning and had blood work chest x-ray, but left before being seen. I reviewed all of those labs. Review of Systems   Constitutional: Negative. HENT: Negative. Respiratory: Negative. Cardiovascular:  Positive for leg swelling. Gastrointestinal: Negative. Genitourinary: Negative. All other systems reviewed and are negative.     Past Medical History:   Diagnosis Date    Anxiety     Arthritis     Bipolar affective disorder ([facility] 11 yrs old    Cellulitis 01/08/2010    denies currently     CKD (chronic kidney disease) stage 3, GFR 30-59 ml/min ([facility])     Diabetes [facility]) dx:    Unknown A1C    Diabetes ([facility] 2010    had multiple TIA's from low bs in  and hasn't been on any meds since    Diabetes mellitus type II, non insulin dependent ([facility] 2015    Edema of both legs 2010    ankles     Femur fracture, left ([facility] 2015    GERD (gastroesophageal reflux disease)     denies    Gout     Hypertension     no meds    Moderate persistent asthma without complication     Morbid obesity ([facility] lifetime    Non compliance with medical treatment long term    Orthopnea     Other ill-defined conditions(799.89)     cellulitis    Peripheral neuropathy 2 years    Peripheral vascular disease ([facility]     Psychiatric disorder     bipolar disorder    Renal insufficiency 2010    Stroke ([facility]     multiple TIA's in ; no residual     Unspecified sleep apnea     does not use cpap    Vertigo 2015        Past Surgical History:   Procedure Laterality Date    DELIVERY   ,     both preemies    HEENT      Tonsillectomy/SFE/18    HERNIA REPAIR  age 10    TONSILLECTOMY      TOTAL HIP ARTHROPLASTY Left     Dr. Ava Hinson        Family History   Problem Relation Age of Onset    No Known Problems Mother     Heart Attack Father     Other Father         aneurysm age 62, now brain damaged    Heart Disease Father     Cancer Sister         hodgkins    Breast Cancer Sister     Breast Cancer Sister     Diabetes Paternal Grandmother     Other Son         24 week twin, LD        Social History     Socioeconomic History    Marital status: Single   Tobacco Use    Smoking status: Never    Smokeless tobacco: Never   Substance and Sexual Activity    Alcohol use: No    Drug use: Yes     Types: Prescription   Social History Narrative    Patient is single, lives with disabled father, 15year old disabled son and 8year old daughter. Patient worked in [employer] service at [address] [place] [address] for many years before becoming disabled about 4 years ago. She reports a lot of stress, including her 12year old     Social Determinants of Health     Financial Resource Strain: Low Risk     Difficulty of Paying Living Expenses: Not hard at all   Food Insecurity: No Food Insecurity    Worried About Running Out of Food in the Last Year: Never true    Ran Out of Food in the Last Year: Never true         Ciprofloxacin and Sulfa antibiotics     Previous Medications    ACETAMINOPHEN (TYLENOL) 500 MG TABLET    Take 500 mg by mouth every 6 hours as needed for Pain    ALBUTEROL (PROVENTIL) (2.5 MG/3ML) 0.083% NEBULIZER SOLUTION    Inhale 2.5 mg into the lungs every 6 hours as needed    ALBUTEROL SULFATE HFA (PROVENTIL;VENTOLIN;PROAIR) 108 (90 BASE) MCG/ACT INHALER    Inhale 2 puffs into the lungs every 6 hours as needed for Wheezing or Shortness of Breath TAKE 2 PUFFS BY MOUTH EVERY 4 HOURS AS NEEDED    ASCORBIC ACID (VITAJOY DAILY C GUMMIES) 125 MG CHEW    Take by mouth    ASCORBIC ACID (VITAMIN C) 500 MG TABLET    Take 500 mg by mouth daily    CETIRIZINE (ZYRTEC) 10 MG TABLET    TAKE 1 TABLET BY MOUTH EVERY DAY    CHOLECALCIFEROL (VITAMIN D3) 50 MCG (2000 UT) CAPS    Take by mouth    COMPRESSION STOCKINGS MISC    by Does not apply route    DICLOFENAC (VOLTAREN) 75 MG EC TABLET    Take 75 mg by mouth daily as needed    DOCUSATE SODIUM (COLACE) 100 MG CAPSULE    Take 100 mg by mouth 2 times daily    DULOXETINE (CYMBALTA) 60 MG EXTENDED RELEASE CAPSULE    TAKE 1 CAPSULE (60 MG) BY MOUTH DAILY ONCE DONE WITH 30MG    FLUTICASONE (FLONASE) 50 MCG/ACT NASAL SPRAY    2 sprays by Nasal route daily    FLUTICASONE-SALMETEROL (ADVIAR) 100-50 MCG/ACT AEPB DISKUS INHALER    Inhale 1 puff into the lungs in the morning and 1 puff in the evening. IBUPROFEN (ADVIL;MOTRIN) 200 MG TABLET    Take 200 mg by mouth every 6 hours as needed for Pain    LOSARTAN (COZAAR) 50 MG TABLET    Take 1 tablet by mouth in the morning.     METHOCARBAMOL (ROBAXIN) 750 MG TABLET    Take 750 mg by mouth 4 times daily as needed    MONTELUKAST (SINGULAIR) 10 MG TABLET    Take 1 tablet by mouth in the morning. NYSTATIN (MYCOSTATIN) 428675 UNIT/ML SUSPENSION    Take 5 mLs by mouth 3 times daily as needed (thrush) Swish and spit    OMEPRAZOLE (PRILOSEC) 20 MG DELAYED RELEASE CAPSULE    Take 1 capsule by mouth Daily TAKE 1 CAPSULE BY MOUTH EVERY DAY    ONDANSETRON (ZOFRAN-ODT) 4 MG DISINTEGRATING TABLET    Take 1 tablet by mouth 3 times daily as needed for Nausea or Vomiting    PREGABALIN (LYRICA) 75 MG CAPSULE        PROGESTERONE (PROMETRIUM) 100 MG CAPS CAPSULE    Take 1 capsule by mouth daily    PROGESTERONE (PROMETRIUM) 100 MG CAPS CAPSULE    Take 1 capsule by mouth nightly    TOPIRAMATE (TOPAMAX) 50 MG TABLET    TAKE 1 TABLET BY MOUTH TWICE A DAY        Vitals signs and nursing note reviewed. No data found. Physical Exam  Vitals and nursing note reviewed. Constitutional:       General: She is not in acute distress. Appearance: She is well-developed. She is obese. She is not ill-appearing or toxic-appearing. HENT:      Head: Normocephalic. Eyes:      Extraocular Movements: Extraocular movements intact. Cardiovascular:      Rate and Rhythm: Normal rate and regular rhythm. Pulses: Normal pulses. Heart sounds: Normal heart sounds. Pulmonary:      Effort: Pulmonary effort is normal.      Breath sounds: Normal breath sounds. Musculoskeletal:         General: Normal range of motion. Cervical back: Normal range of motion and neck supple. Right lower leg: Edema present. Left lower leg: Edema present. Lymphadenopathy:      Cervical: No cervical adenopathy. Skin:     General: Skin is warm and dry. Findings: Erythema present. No rash. Comments: Tiny amount of redness to the left anterior lower leg. Appears consistent with venous stasis dermatitis. Tender bilateral lower legs. Right dorsal aspect of the distal forefoot is swollen and tender.    Neurological:      General: No focal deficit present. Mental Status: She is alert. Mental status is at baseline. Psychiatric:         Mood and Affect: Mood normal.         Behavior: Behavior normal.         Thought Content: Thought content normal.        Procedures    Results for orders placed or performed during the hospital encounter of 12/13/22   XR FOOT RIGHT (2 VIEWS)    Narrative    Exam: XR FOOT RIGHT (2 VIEWS) on 12/13/2022 4:40 PM    Clinical History: The Female patient is 49years old  presenting for pain and  swelling. Comparison:  none    Findings:    3 views of the right foot were obtained. No fracture or dislocation is identified. Joint spaces are well-maintained. There is mild hallux valgus deformity. Diffuse soft tissue swelling is noted particularly throughout the dorsum of the  foot. Impression    1. No acute osseous or joint abnormalities. Vascular duplex lower extremity venous bilateral    Narrative    Bilateral lower extremity venous ultrasound    INDICATION:  Pain and swelling, both legs for about 4 weeks, subacute and  moderate, difficulty walking    COMPARISON: 10/31/2008 and 1/7/2010    Technique: Grayscale, color Doppler, and spectral analysis were performed on the  deep veins of each lower extremity. FINDINGS: There is no evidence of Romeo's cyst.     Right: There is normal flow in the common femoral, deep femoral, greater  saphenous, femoral and popliteal veins. Normal compression and augmentation  demonstrated. The proximal calf veins are not well seen due to overlying edema. Left: There is normal flow in the common femoral, deep femoral, greater  saphenous, femoral and popliteal veins. Normal compression and augmentation  demonstrated. The proximal calf veins are notable for nonocclusive thrombus of  the posterior tibial vein, peroneal vein not well visualized. Impression    1. No evidence of above knee deep venous thrombosis in either lower extremity.      2. Nonocclusive left posterior tibial vein thrombus below the knee. Results for orders placed or performed during the hospital encounter of 12/13/22   XR CHEST PORTABLE    Narrative    EXAM: Chest x-ray. INDICATION: Dyspnea and swelling. COMPARISON: Prior chest x-ray on July 12, 2022. TECHNIQUE: Single frontal view. FINDINGS: The lungs are clear. The cardiac size, mediastinal contour and  pulmonary vasculature are normal. No pneumothorax or pleural effusion is seen. The bones are intact. Impression    Normal chest x-ray.    Lactate, Sepsis   Result Value Ref Range    Lactic Acid, Sepsis 0.4 0.4 - 2.0 MMOL/L   CBC with Auto Differential   Result Value Ref Range    WBC 5.0 4.3 - 11.1 K/uL    RBC 3.76 (L) 4.05 - 5.2 M/uL    Hemoglobin 10.5 (L) 11.7 - 15.4 g/dL    Hematocrit 33.8 (L) 35.8 - 46.3 %    MCV 89.9 82.0 - 102.0 FL    MCH 27.9 26.1 - 32.9 PG    MCHC 31.1 (L) 31.4 - 35.0 g/dL    RDW 14.8 (H) 11.9 - 14.6 %    Platelets 904 823 - 290 K/uL    MPV 9.7 9.4 - 12.3 FL    nRBC 0.00 0.0 - 0.2 K/uL    Differential Type AUTOMATED      Seg Neutrophils 46 43 - 78 %    Lymphocytes 36 13 - 44 %    Monocytes 13 (H) 4.0 - 12.0 %    Eosinophils % 4 0.5 - 7.8 %    Basophils 1 0.0 - 2.0 %    Immature Granulocytes 0 0.0 - 5.0 %    Segs Absolute 2.3 1.7 - 8.2 K/UL    Absolute Lymph # 1.8 0.5 - 4.6 K/UL    Absolute Mono # 0.6 0.1 - 1.3 K/UL    Absolute Eos # 0.2 0.0 - 0.8 K/UL    Basophils Absolute 0.0 0.0 - 0.2 K/UL    Absolute Immature Granulocyte 0.0 0.0 - 0.5 K/UL   CMP   Result Value Ref Range    Sodium 140 133 - 143 mmol/L    Potassium 4.2 3.5 - 5.1 mmol/L    Chloride 106 101 - 110 mmol/L    CO2 30 21 - 32 mmol/L    Anion Gap 4 2 - 11 mmol/L    Glucose 90 65 - 100 mg/dL    BUN 10 6 - 23 MG/DL    Creatinine 1.09 (H) 0.6 - 1.0 MG/DL    Est, Glom Filt Rate >60 >60 ml/min/1.73m2    Calcium 8.6 8.3 - 10.4 MG/DL    Total Bilirubin 0.5 0.2 - 1.1 MG/DL    ALT 31 12 - 65 U/L    AST 21 15 - 37 U/L    Alk Phosphatase 67 50 - 136 U/L    Total Protein 6.9 6.3 - 8.2 g/dL    Albumin 3.3 (L) 3.5 - 5.0 g/dL    Globulin 3.6 2.8 - 4.5 g/dL    Albumin/Globulin Ratio 0.9 0.4 - 1.6     Procalcitonin   Result Value Ref Range    Procalcitonin <0.05 0.00 - 0.49 ng/mL   Brain Natriuretic Peptide   Result Value Ref Range    NT Pro-BNP 89 5 - 125 PG/ML   POC Pregnancy Urine Qual   Result Value Ref Range    Preg Test, Ur Negative NEG     EKG 12 Lead   Result Value Ref Range    Ventricular Rate 67 BPM    Atrial Rate 67 BPM    P-R Interval 152 ms    QRS Duration 82 ms    Q-T Interval 414 ms    QTc Calculation (Bazett) 437 ms    P Axis 62 degrees    R Axis 43 degrees    T Axis 59 degrees    Diagnosis Normal sinus rhythm         Vascular duplex lower extremity venous bilateral   Final Result   1. No evidence of above knee deep venous thrombosis in either lower extremity. 2.  Nonocclusive left posterior tibial vein thrombus below the knee. XR FOOT RIGHT (2 VIEWS)   Final Result      1. No acute osseous or joint abnormalities. NIH Stroke Scale  Interval: Baseline  Level of Consciousness (1a): Alert  LOC Questions (1b): Answers both correctly  LOC Commands (1c): Performs both tasks correctly  Best Gaze (2): Normal  Visual (3): No visual loss  Facial Palsy (4): Normal symmetrical movement  Motor Arm, Left (5a): No drift  Motor Arm, Right (5b): No drift  Motor Leg, Left (6a): No drift  Motor Leg, Right (6b): No drift  Limb Ataxia (7): Absent  Sensory (8): Normal  Best Language (9): No aphasia  Dysarthria (10): Normal  Extinction and Inattention (11): No abnormality  Total: 0              Voice dictation software was used during the making of this note. This software is not perfect and grammatical and other typographical errors may be present. This note has not been completely proofread for errors.         Guilherme Nj  12/13/22 2006

## 2022-12-14 LAB
ACCESSION NUMBER, LLC1M: ABNORMAL
ACINETOBACTER CALCOAC BAUMANNII COMPLEX BY PCR: NOT DETECTED
BACTEROIDES FRAGILIS BY PCR: NOT DETECTED
BIOFIRE TEST COMMENT: ABNORMAL
C ALBICANS DNA BLD POS QL NAA+NON-PROBE: NOT DETECTED
C GLABRATA DNA BLD POS QL NAA+NON-PROBE: NOT DETECTED
C KRUSEI DNA BLD POS QL NAA+NON-PROBE: NOT DETECTED
C PARAP DNA BLD POS QL NAA+NON-PROBE: NOT DETECTED
C TROPICLS DNA BLD POS QL NAA+NON-PROBE: NOT DETECTED
CANDIDA AURIS BY PCR: NOT DETECTED
CRYPTOCOCCUS NEOFORMANS/GATTII BY PCR: NOT DETECTED
E CLOAC COMP DNA BLD POS NAA+NON-PROBE: NOT DETECTED
E COLI DNA BLD POS QL NAA+NON-PROBE: NOT DETECTED
ENTEROBACTERALES BY PCR: NOT DETECTED
ENTEROCOCCUS FAECALIS BY PCR: NOT DETECTED
ENTEROCOCCUS FAECIUM BY PCR: NOT DETECTED
GP B STREP DNA BLD POS QL NAA+NON-PROBE: NOT DETECTED
HAEM INFLU DNA BLD POS QL NAA+NON-PROBE: NOT DETECTED
K OXYTOCA DNA BLD POS QL NAA+NON-PROBE: NOT DETECTED
KLEBSIELLA AEROGENES BY PCR: NOT DETECTED
KLEBSIELLA PNEUMONIAE GROUP BY PCR: NOT DETECTED
L MONOCYTOG DNA BLD POS QL NAA+NON-PROBE: NOT DETECTED
MECA+MECC ISLT/SPM QL: NOT DETECTED
N MEN DNA BLD POS QL NAA+NON-PROBE: NOT DETECTED
P AERUGINOSA DNA BLD POS NAA+NON-PROBE: NOT DETECTED
PROTEUS SP DNA BLD POS QL NAA+NON-PROBE: NOT DETECTED
RESISTANT GENE TARGETS: ABNORMAL
S AUREUS DNA BLD POS QL NAA+NON-PROBE: NOT DETECTED
S AUREUS+CONS DNA BLD POS NAA+NON-PROBE: DETECTED
S MARCESCENS DNA BLD POS NAA+NON-PROBE: NOT DETECTED
S PNEUM DNA BLD POS QL NAA+NON-PROBE: NOT DETECTED
S PYO DNA BLD POS QL NAA+NON-PROBE: NOT DETECTED
SALMONELLA SPECIES BY PCR: NOT DETECTED
STAPHYLOCOCCUS EPIDERMIDIS BY PCR: DETECTED
STAPHYLOCOCCUS LUGDUNENSIS BY PCR: NOT DETECTED
STENOTROPHOMONAS MALTOPHILIA BY PCR: NOT DETECTED
STREPTOCOCCUS DNA BLD POS NAA+NON-PROBE: NOT DETECTED

## 2022-12-14 NOTE — DISCHARGE INSTRUCTIONS
Call your doctor in the morning for close follow up. Return if worsening. [employer] Pregnancy And Lactation Text: This medication is Pregnancy Category X and contraindicated in pregnancy and in women who may become pregnant. It is unknown if this medication is excreted in breast milk.

## 2022-12-14 NOTE — ED NOTES
Received call from lab at 4 AM concerning gram-positive cocci in clusters from blood culture. Results forwarded to clinical pharmacist for follow-up.      Roxy Villarreal MD  12/14/22 1988

## 2022-12-14 NOTE — ED NOTES
I have reviewed discharge instructions with the patient. The patient verbalized understanding. Patient left ED via Discharge Method: ambulatory to Home with self. Opportunity for questions and clarification provided. Patient given 2 scripts. To continue your aftercare when you leave the hospital, you may receive an automated call from our care team to check in on how you are doing. This is a free service and part of our promise to provide the best care and service to meet your aftercare needs.  If you have questions, or wish to unsubscribe from this service please call [phone]. Thank you for Choosing our [facility] Emergency Department.         Terell Bonilla RN  12/13/22 2013

## 2022-12-14 NOTE — ED PROVIDER NOTES
Notified of positive blood culture yesterday. Lab reports staph epidermis. Contaminant. Patient with symptoms for many weeks. No fever. Normal white count. Found to have nonocclusive superficial phlebitis along with gout. Try to contact patient. Voicemail box was full. Will reattempt later today.      Asuncion Louis MD  12/14/22 0688

## 2022-12-15 LAB
BACTERIA SPEC CULT: ABNORMAL
BACTERIA SPEC CULT: ABNORMAL
GRAM STN SPEC: ABNORMAL
SERVICE CMNT-IMP: ABNORMAL

## 2022-12-19 ENCOUNTER — TELEPHONE (OUTPATIENT)
Dept: INTERNAL MEDICINE CLINIC | Facility: CLINIC | Age: 47
End: 2022-12-19

## 2022-12-19 DIAGNOSIS — B37.0 ORAL THRUSH: Primary | ICD-10-CM

## 2022-12-19 NOTE — TELEPHONE ENCOUNTER
Refill of nystatin mouth rinse sent to pharmacy. Due to her recurrent infections and mouth pain, I also placed ENT referral for further evaluation.

## 2022-12-19 NOTE — TELEPHONE ENCOUNTER
Patient called and states that she has Thrush from the inhaler that she is using .  She wanted to know can [facility] Tracy Listen send her something in

## 2022-12-21 ENCOUNTER — CLINICAL DOCUMENTATION (OUTPATIENT)
Dept: ORTHOPEDIC SURGERY | Age: 47
End: 2022-12-21

## 2022-12-21 NOTE — PROGRESS NOTES
Telephone call, spoke w/ pt re notes below:  Synopsis:  11/20/2021   normal Pap, negative HPV  3/31/2022 menometrorrhagia 3/1-31/2022, BMI 45, no HRT  Equivocal orthostatic sx/HAs  Prometrium qd-bid  CBC: WBCs 4.6 hemoglobin 11 platelets 603 K  Stage 3a CKD, creatinine 1.15  Took Depo-Provera 16 years--> stopped ~2019 4/28/2022: EMB normal (MINUTE STRIPS AND FRAGMENTS   OF INACTIVE ENDOMETRIUM. NO HYPERPLASIA OR CARCINOMA.), continue Prometrium, advise D&C hysteroscopy  US: Bilateral 3 small ovarian cysts/ovary (6 total)  5/10/2022 FSH  14  6/28/2022 FH: Breast cancer-2 sisters, complains of breast pain  Menorrhagia resolved on Prometrium, discussed again/advised needs D&C hysteroscopy  8/9/2022: C/o decreased sex drive with Prometrium. Dyspareunia & vaginal dryness improved  BC: Tubal  US 9/6/4, 2 small left ovarian cysts, 1 small right ovarian cyst, concerns regarding chronic Depo-Provera use-->systemic effects discussed at length with patient    12/21/2022  All of the above d/w pt. concerns regarding masking worrisome pathology with p.o. Prometrium discussed. Need for D&C hysteroscopy reiterated. At a minimum needs a repeat: Yayo Henley.    12/1/2022  Beatriz Bustamante MD  Gynecology Menometrorrhagia  Dx     Conversation: Medicine   [facility])  December 1, 2022  Apolinar Juarez  to [address] [place] [address] Staff (supporting Keila Torres MD)      8:18 PM  Hello I sent over a request for a refill of my Progesterone 100mg capsule. I am completely out and there was no refill. My number is [phone]. Thank you   December 5, 2022  Enedina Flores, Kareem Linton  to Apolinar Juarez    LN    8:34 AM  Keith Banks,     It looks like this was last prescribed back in July for a 30 day supply with one refill. Also, you are due for your annual exam.  I will ask Dr Peyton Lorenzo about refilling this. Would you like to make your appointment?      Thank you     Paras Blue CMA    Last read by Mani White at 10:06 AM on 12/6/2022. Minus Men  to [address] Staff (supporting Desire Porter MD)      9:27 AM  Yes an appointment can be set for me. Morning is preferred but not first thing about 10 or 11am. I have been taking my medicine faithfully. This week would not be good because my cycle has come on and I dont want to come in to see her right now. Is there any way I can still get the prescription now? December 6, 2022  Yecenia Rodriguez MA  to [facility]    LN    10:06 AM  Sebastián Anderson,     I have a left a message for Dr Braxton Hill. I am waiting on a reply from her. Thank you     Virginia Jean CMA    Last read by Mani White at 10:06 AM on 12/6/2022. Minus Men  to [address] Staff (supporting Desire Porter MD)      10:07 AM  Okay thank you   Yecenia Rodriguez MA  to Me    LN    10:08 AM  Dr Braxton Hill,     This patient is asking for a refill on progesterone. Per her chart, last one was give back in July with one refill. I did inform her she is past due for her annual exam.  Will make an appt. She wants to know if she can get the refill. Thanks     [facility]   December 8, 2022  Me     SK    8:20 PM  Note   Expand All Collapse All  I reviewed her chart, needs a FSH, repeat US and a f/u ov. Am concerned that we may be masking worrisome pathology w/  po Prometrium. I will refill her Prometrium to get her to a f/u ov w/ me, won't refill it after that if she has not come in for an ov. Have her come in Jan 19 at 11:20 and put her in the 11:00 US spot.      Please make sure she has her [address] drawn before that ov (schedule lab only ov) for me to review prior to seeing her       Encounter Medications

## 2022-12-22 ENCOUNTER — TELEPHONE (OUTPATIENT)
Dept: INTERNAL MEDICINE CLINIC | Facility: CLINIC | Age: 47
End: 2022-12-22

## 2022-12-22 PROCEDURE — 99283 EMERGENCY DEPT VISIT LOW MDM: CPT

## 2022-12-22 NOTE — TELEPHONE ENCOUNTER
Pt states she has a yeast infection and asks if something could be sent in to treat it.  If it could be sent to [facility] on [facility]

## 2022-12-22 NOTE — TELEPHONE ENCOUNTER
Patient called again and stated that she talked to her pharmacist and he told her that the itching was not a yeast infection ,but was a side effect caused by the blood thinner she was taking. She wants to know if you can call something in for the itching. She said she has whelps from scratching so bad. She did say that if she takes antibiotics they will cause a yeast infection. Please contact and advise patient @ [phone]  [facility] on [facility].

## 2022-12-23 ENCOUNTER — HOSPITAL ENCOUNTER (EMERGENCY)
Age: 47
Discharge: HOME | End: 2022-12-23
Attending: EMERGENCY MEDICINE
Payer: MEDICARE

## 2022-12-23 VITALS
SYSTOLIC BLOOD PRESSURE: 130 MMHG | HEART RATE: 83 BPM | OXYGEN SATURATION: 99 % | TEMPERATURE: 97.9 F | HEIGHT: 67 IN | DIASTOLIC BLOOD PRESSURE: 65 MMHG | RESPIRATION RATE: 16 BRPM | WEIGHT: 292 LBS | BODY MASS INDEX: 45.83 KG/M2

## 2022-12-23 DIAGNOSIS — R21 RASH AND OTHER NONSPECIFIC SKIN ERUPTION: Primary | ICD-10-CM

## 2022-12-23 LAB
APPEARANCE UR: CLEAR
BILIRUB UR QL: NEGATIVE
COLOR UR: ABNORMAL
GLUCOSE UR STRIP.AUTO-MCNC: NEGATIVE MG/DL
HCG UR QL: NEGATIVE
HGB UR QL STRIP: NEGATIVE
KETONES UR QL STRIP.AUTO: ABNORMAL MG/DL
LEUKOCYTE ESTERASE UR QL STRIP.AUTO: NEGATIVE
NITRITE UR QL STRIP.AUTO: NEGATIVE
PH UR STRIP: 6 [PH] (ref 5–9)
PROT UR STRIP-MCNC: NEGATIVE MG/DL
SP GR UR REFRACTOMETRY: 1.03 (ref 1–1.02)
UROBILINOGEN UR QL STRIP.AUTO: 1 EU/DL (ref 0.2–1)

## 2022-12-23 PROCEDURE — 81003 URINALYSIS AUTO W/O SCOPE: CPT

## 2022-12-23 PROCEDURE — 81025 URINE PREGNANCY TEST: CPT

## 2022-12-23 PROCEDURE — 6370000000 HC RX 637 (ALT 250 FOR IP): Performed by: EMERGENCY MEDICINE

## 2022-12-23 RX ORDER — PREDNISONE 20 MG/1
40 TABLET ORAL DAILY
Qty: 6 TABLET | Refills: 0 | Status: SHIPPED | OUTPATIENT
Start: 2022-12-23 | End: 2022-12-26

## 2022-12-23 RX ORDER — HYDROXYZINE HYDROCHLORIDE 25 MG/1
50 TABLET, FILM COATED ORAL
Status: COMPLETED | OUTPATIENT
Start: 2022-12-23 | End: 2022-12-23

## 2022-12-23 RX ADMIN — HYDROXYZINE HYDROCHLORIDE 50 MG: 25 TABLET, FILM COATED ORAL at 02:23

## 2022-12-23 RX ADMIN — PREDNISONE 60 MG: 10 TABLET ORAL at 02:23

## 2022-12-23 ASSESSMENT — PAIN - FUNCTIONAL ASSESSMENT
PAIN_FUNCTIONAL_ASSESSMENT: NONE - DENIES PAIN
PAIN_FUNCTIONAL_ASSESSMENT: NONE - DENIES PAIN

## 2022-12-23 NOTE — ED TRIAGE NOTES
Pt. Ambulatory with limp to triage. Pt. C/o itching starting today. Pt. Presents with rad rash on arm and legs. Pt. States being told to come to ER by pharmacist due to having a reaction to xarelto prescribed 7days prior. Pt. C/o frequent urination.

## 2022-12-23 NOTE — ED NOTES
I have reviewed discharge instructions with the patient. The patient verbalized understanding. Patient left ED via Discharge Method: ambulatory to Home with self  Opportunity for questions and clarification provided. Patient given 2 scripts. To continue your aftercare when you leave the hospital, you may receive an automated call from our care team to check in on how you are doing. This is a free service and part of our promise to provide the best care and service to meet your aftercare needs.  If you have questions, or wish to unsubscribe from this service please call [phone]. Thank you for Choosing our [facility] Emergency Department.         Inocencia Harper RN  12/23/22 [id number]

## 2022-12-23 NOTE — DISCHARGE INSTRUCTIONS
Switch from Xarelto, to Eliquis twice a day for the blood thinner  Discuss with your doctor how long to take the blood thinner for the blood clot in the leg    Switch from Benadryl to hydroxyzine for the stronger anti-itch medicine    Take 2 prednisone pills for 40 mg a night for the next 3 nights

## 2022-12-24 RX ORDER — HYDROXYZINE 50 MG/1
50 TABLET, FILM COATED ORAL NIGHTLY
Qty: 30 TABLET | Refills: 0 | Status: SHIPPED | OUTPATIENT
Start: 2022-12-24 | End: 2023-01-23

## 2022-12-24 ASSESSMENT — ENCOUNTER SYMPTOMS
TROUBLE SWALLOWING: 0
SHORTNESS OF BREATH: 0
STRIDOR: 0
SORE THROAT: 0
WHEEZING: 0

## 2022-12-24 NOTE — ED PROVIDER NOTES
[facility] Emergency Department Provider Note                   PCP:                JOSE Mares CNP               Age: 49 y.o. Sex: female       ICD-10-CM    1. Rash and other nonspecific skin eruption  R21           DISPOSITION Decision To Discharge 12/23/2022 01:50:58 AM       Discharge Medication List as of 12/23/2022  2:51 AM        START taking these medications    Details   predniSONE (DELTASONE) 20 MG tablet Take 2 tablets by mouth daily for 3 days, Disp-6 tablet, R-0Normal      apixaban (ELIQUIS) 5 MG TABS tablet Take 1 tablet by mouth 2 times daily, Disp-60 tablet, R-0Normal             Orders Placed This Encounter   Procedures    Urinalysis    Pregnancy, Urine         Sera Nuñez is a 49 y.o. female who presents to the Emergency Department with chief complaint of    Chief Complaint   Patient presents with    Pruritis      Chief complaint : Rash    HISTORY OF PRESENT ILLNESS :  Location : Diffuse    Quality : Pruritic, patient describes hives at home though I do not see any here    Quantity : Constant    Timing : Earlier today    Severity : Mild to moderate    Context : Recently started Xarelto about a week ago    Alleviating / exacerbating factors : No help with Benadryl    Associated Symptoms : No breathing or swallowing difficulties    -------------------------------    SOCIAL HISTORY : Single, non-smoker, nondrinker      Review of Systems   Constitutional:  Negative for chills and fever. HENT:  Negative for sore throat and trouble swallowing. Respiratory:  Negative for shortness of breath, wheezing and stridor. Cardiovascular:  Positive for leg swelling. Skin:  Positive for rash. Negative for wound. All other systems reviewed and are negative. All other systems reviewed and are negative.       Past Medical History:   Diagnosis Date    Anxiety     Arthritis     Bipolar affective disorder ([facility] 11 yrs old    Cellulitis 01/08/2010    denies currently     CKD (chronic kidney disease) stage 3, GFR 30-59 ml/min ([facility])     Diabetes ([facility] dx:    Unknown A1C    Diabetes ([facility] 2010    had multiple TIA's from low bs in  and hasn't been on any meds since    Diabetes mellitus type II, non insulin dependent ([facility] 2015    Edema of both legs 2010    ankles     Femur fracture, left ([facility] 2015    GERD (gastroesophageal reflux disease)     denies    Gout     Hypertension     no meds    Moderate persistent asthma without complication     Morbid obesity ([facility] lifetime    Non compliance with medical treatment long term    Orthopnea     Other ill-defined conditions(799.89)     cellulitis    Peripheral neuropathy 2 years    Peripheral vascular disease ([facility]     Psychiatric disorder     bipolar disorder    Renal insufficiency 2010    Stroke ([facility]     multiple TIA's in ; no residual     Unspecified sleep apnea     does not use cpap    Vertigo 2015        Past Surgical History:   Procedure Laterality Date    DELIVERY   ,     both preemies    HEENT      Tonsillectomy/SFE/18    HERNIA REPAIR  age 10    TONSILLECTOMY      TOTAL HIP ARTHROPLASTY Left     Dr. Josefina Sales        Family History   Problem Relation Age of Onset    No Known Problems Mother     Heart Attack Father     Other Father         aneurysm age 62, now brain damaged    Heart Disease Father     Cancer Sister         hodgkins    Breast Cancer Sister     Breast Cancer Sister     Diabetes Paternal Grandmother     Other Son         24 week twin, LD        Social Connections: Not on file        Allergies   Allergen Reactions    Ciprofloxacin Itching    Sulfa Antibiotics Itching        Vitals signs and nursing note reviewed. No data found. Physical Exam  Vitals and nursing note reviewed. Constitutional:       General: She is not in acute distress. Appearance: Normal appearance.  She is not ill-appearing, toxic-appearing or diaphoretic. HENT:      Head: Normocephalic and atraumatic. Right Ear: External ear normal.      Left Ear: External ear normal.      Nose: Nose normal. No rhinorrhea. Eyes:      General: No scleral icterus. Right eye: No discharge. Left eye: No discharge. Extraocular Movements: Extraocular movements intact. Conjunctiva/sclera: Conjunctivae normal.   Cardiovascular:      Rate and Rhythm: Normal rate and regular rhythm. Pulmonary:      Effort: Pulmonary effort is normal. No respiratory distress. Breath sounds: Normal breath sounds. No stridor. No wheezing. Musculoskeletal:         General: No deformity or signs of injury. Normal range of motion. Cervical back: Normal range of motion and neck supple. No rigidity. Skin:     General: Skin is warm and dry. Coloration: Skin is not jaundiced or pale. Findings: Erythema and rash present. Neurological:      General: No focal deficit present. Mental Status: She is alert and oriented to person, place, and time. Psychiatric:         Mood and Affect: Mood normal.         Behavior: Behavior normal.        MDM  Number of Diagnoses or Management Options  Rash and other nonspecific skin eruption: new, no workup  Diagnosis management comments:  With itching, possible allergic reaction, not really urticarial here  Switch from Xarelto to Eliquis give few days of steroids and bump her Benadryl up to hydroxyzine       Amount and/or Complexity of Data Reviewed  Decide to obtain previous medical records or to obtain history from someone other than the patient: yes    Risk of Complications, Morbidity, and/or Mortality  Presenting problems: moderate  Diagnostic procedures: minimal  Management options: low    Patient Progress  Patient progress: improved      Procedures    Labs Reviewed   URINALYSIS - Abnormal; Notable for the following components:       Result Value    Specific Gravity, UA 1.028 (*)     Ketones, Urine TRACE (*)     All other components within normal limits   PREGNANCY, URINE        No orders to display            Katelynn Coma Scale  Eye Opening: Spontaneous  Best Verbal Response: Oriented  Best Motor Response: Obeys commands  Katelynn Coma Scale Score: 15                     Voice dictation software was used during the making of this note. This software is not perfect and grammatical and other typographical errors may be present. This note has not been completely proofread for errors.        Isreal Leon MD  12/24/22 [id number]

## 2022-12-27 ENCOUNTER — TELEPHONE (OUTPATIENT)
Dept: OBGYN CLINIC | Age: 47
End: 2022-12-27

## 2022-12-27 NOTE — TELEPHONE ENCOUNTER
At the request of Dr. Nathan Ortiz called Natalia Luo to schedule an appt. Phone was busy, I will continue to try to call her.

## 2022-12-27 NOTE — TELEPHONE ENCOUNTER
Called, was able to leave message for patient to call office and schedule a follow up appointment with Dr. Deo Pizarro.

## 2022-12-29 NOTE — TELEPHONE ENCOUNTER
I have been unsuccessful in reaching this patient to schedule an appt. I tried again this morning and her voice mail is full. I have left one message on her voice mail, sent one mychart message and today drafted a letter to mail her asking her to call and schedule an appt.

## 2023-01-03 NOTE — TELEPHONE ENCOUNTER
Looks like she went to the ER and was switched from Xarelto to Eliquis, and given steroids and hydroxyzine. She has follow up scheduled here on Thursday.

## 2023-01-09 ENCOUNTER — OFFICE VISIT (OUTPATIENT)
Dept: OBGYN CLINIC | Age: 48
End: 2023-01-09
Payer: MEDICARE

## 2023-01-09 VITALS — BODY MASS INDEX: 45.89 KG/M2 | DIASTOLIC BLOOD PRESSURE: 78 MMHG | WEIGHT: 293 LBS | SYSTOLIC BLOOD PRESSURE: 110 MMHG

## 2023-01-09 DIAGNOSIS — R60.0 BILATERAL LOWER EXTREMITY EDEMA: ICD-10-CM

## 2023-01-09 DIAGNOSIS — R01.1 HEART MURMUR: ICD-10-CM

## 2023-01-09 DIAGNOSIS — N92.1 MENOMETRORRHAGIA: Primary | ICD-10-CM

## 2023-01-09 DIAGNOSIS — Z76.89 ENCOUNTER TO ESTABLISH CARE WITH NEW DOCTOR: ICD-10-CM

## 2023-01-09 DIAGNOSIS — R53.83 FATIGUE, UNSPECIFIED TYPE: ICD-10-CM

## 2023-01-09 DIAGNOSIS — N18.30 STAGE 3 CHRONIC KIDNEY DISEASE, UNSPECIFIED WHETHER STAGE 3A OR 3B CKD (HCC): ICD-10-CM

## 2023-01-09 PROCEDURE — 3078F DIAST BP <80 MM HG: CPT | Performed by: OBSTETRICS & GYNECOLOGY

## 2023-01-09 PROCEDURE — G8427 DOCREV CUR MEDS BY ELIG CLIN: HCPCS | Performed by: OBSTETRICS & GYNECOLOGY

## 2023-01-09 PROCEDURE — 99215 OFFICE O/P EST HI 40 MIN: CPT | Performed by: OBSTETRICS & GYNECOLOGY

## 2023-01-09 PROCEDURE — G8484 FLU IMMUNIZE NO ADMIN: HCPCS | Performed by: OBSTETRICS & GYNECOLOGY

## 2023-01-09 PROCEDURE — 3074F SYST BP LT 130 MM HG: CPT | Performed by: OBSTETRICS & GYNECOLOGY

## 2023-01-09 PROCEDURE — G8417 CALC BMI ABV UP PARAM F/U: HCPCS | Performed by: OBSTETRICS & GYNECOLOGY

## 2023-01-09 PROCEDURE — 1036F TOBACCO NON-USER: CPT | Performed by: OBSTETRICS & GYNECOLOGY

## 2023-01-09 NOTE — PROGRESS NOTES
Promise Tirado  49 y.o.  1975  [id number]    Today:  2023    Chief Complaint   Patient presents with    Medication Refill       Subjective:  Jeny Carbone is a 49 y.o. female,  presents today to refill her Prometrium. Since her last ov-->ER c/o bilateral LE swelling--> nonocclusive left posterior tibial vein thrombosis below the knee  C/o severe fatigue    Patient's last menstrual period was 2022 (approximate). Synopsis:  2021   normal Pap, negative HPV    2022   Tracy, referral to pain mangement    3/31/2022 menometrorrhagia 3/1-, BMI 45, no HRT  Equivocal orthostatic sx/HAs  Prometrium qd-bid  CBC: WBCs 4.6 hemoglobin 11 platelets 765 K  Stage 3a CKD, creatinine 1.15  Took Depo-Provera 16 years--> stopped ~2022 ov & US:  HETEROGENOUS UT 8.4/5/4, vol 80. ENDOMETRIUM= 3.3MM. MULTIPLE , TINY NABOTHIAN CYSTS IN THE CX.  LT OV- 3 SMALL, SIMPLE CYSTS. RT OV- 3 CYSTS: 1) LARGEST: COMPLEX WITH SEPTATIONS (HEMORRHAGIC APPEARANCE). 2.8 X 2.4 X 2.5CM. 2) SEMI-BLOOD FILLED, SEMI-FLUID FILLED CYST. 3) SIMPLE. BLOOD FLOW SEEN IN THE STROMA OF THE OV. NO FF.      Left Ovarian Cyst             D1               D2                  D3               Avg.                    Vol  Ovarian Cyst 1               1.56 cm       0.91 cm          1.26 cm       1.24 cm              0.938 cm³  Ovarian Cyst 2               1.62 cm      1.19 cm          1.25 cm       1.35 cm              1.252 cm³  Ovarian Cyst 3               1.39 cm      1.21 cm          0.92 cm       1.17 cm              0.806 cm³        Right Ov Cyst  Ovarian Cyst 1                2.82 cm      2.38 cm           2.54 cm       2.58 cm            8.927 cm³  Ovarian Cyst 2                2.54 cm      1.53 cm           1.98 cm       2.02 cm            4.020 cm³  Ovarian Cyst 3                1.88 cm      1.43 cm           1.14 cm       1.48 cm            1.600 cm    EMB normal (MINUTE STRIPS AND FRAGMENTS   OF INACTIVE ENDOMETRIUM. NO HYPERPLASIA OR CARCINOMA.), continue Prometrium, advise D&C hysteroscopy  US: Bilateral 3 small ovarian cysts/ovary (6 total)    5/10/2022 FSH  14    6/28/2022 FH: Breast cancer-2 sisters, complains of breast pain  Menorrhagia resolved on Prometrium, discussed again/advised needs D&C hysteroscopy    8/9/2022 US & ov today:  HETEROGENOUS UT, 9/5.5/4, vol 95  ENDOMETRIUM= 3.9MM. MULTIPLE TINY NABOTHIAN CYSTS IN THE CX.  LT OV- 2 SMALL CYSTS: 1) SIMPLE 2) COMPLEX. RT OV- POLYCYSTIC APPEARANCE. THICKEST SEPTATION= 2.0MM. LARGEST CYST ON THE OV= 2.7 X 1.7 X 2.2CM. NO FF. Left Ovarian Cyst           D1              D2              D3            Avg. D             Vol  Ovarian Cyst 1          1.27 cm         1.04 cm      1.27 cm     1.19 cm          0.873 cm³  Ovarian Cyst 2          1.13 cm         0.63 cm      0.99 cm     0.92 cm          0.369 cm³     Right Ov Cyst    Ovarian Cyst 1          2.67 cm    1.72 cm    2.23 cm    2.20 cm       5.340 cm³      C/o decreased sex drive with Prometrium. Dyspareunia & vaginal dryness improved  BC: Tubal  concerns regarding chronic Depo-Provera use-->systemic effects discussed at length with patient    11/1/2022, Dr. Manas Gardner, hematology    CBC:  WBC 4.9  ANC 3.1  Hemoglobin 11.1  Platelets 272  Ferritin  7  Leukopenia; Anemia:  may benefit from a course of Venofer, her Myeloid Disorders Profile is pending. Later she will need to undergo an EGD and a Colonoscopy;     12/13/2022, Dr. Aman Levy to have nonocclusive superficial phlebitis along with gout  Xarelto    12/21/2022  All of the above d/w pt. concerns regarding masking worrisome pathology with p.o. Prometrium discussed. Need for D&C hysteroscopy reiterated. At a minimum needs a repeat: [address]    12/23/2022, ER: pruritis, rash, advised to d/c Xarelto and start taking Eliquis. Also short course of steroids for itching and take hydroxyzine for itching.         OB History  Para Term  AB Living   3 0 1 1 0 3   SAB IAB Ectopic Molar Multiple Live Births   0 0 0 0 0 0   Obstetric Comments   --> , daughter          55, twins --> emergency c-s -->Son---> survived, legally blind, autistic, needs daily assistance. Daughter-->    --> c-s, daughter               Allergies   Allergen Reactions    Ciprofloxacin Itching    Sulfa Antibiotics Itching    Xarelto [Rivaroxaban] Itching       Current Outpatient Medications   Medication Sig    hydrOXYzine HCl (ATARAX) 50 MG tablet Take 1 tablet by mouth nightly    apixaban (ELIQUIS) 5 MG TABS tablet Take 1 tablet by mouth 2 times daily    progesterone (PROMETRIUM) 100 MG CAPS capsule Take 1 capsule by mouth nightly    Cholecalciferol (VITAMIN D3) 50 MCG (2000) CAPS Take by mouth    docusate sodium (COLACE) 100 MG capsule Take 100 mg by mouth 2 times daily    ibuprofen (ADVIL;MOTRIN) 200 MG tablet Take 200 mg by mouth every 6 hours as needed for Pain    acetaminophen (TYLENOL) 500 MG tablet Take 500 mg by mouth every 6 hours as needed for Pain    Ascorbic Acid (VITAJOY DAILY C GUMMIES) 125 MG CHEW Take by mouth    albuterol sulfate HFA (PROVENTIL;VENTOLIN;PROAIR) 108 (90 Base) MCG/ACT inhaler Inhale 2 puffs into the lungs every 6 hours as needed for Wheezing or Shortness of Breath TAKE 2 PUFFS BY MOUTH EVERY 4 HOURS AS NEEDED    fluticasone-salmeterol (ADVIAR) 100-50 MCG/ACT AEPB diskus inhaler Inhale 1 puff into the lungs in the morning and 1 puff in the evening. losartan (COZAAR) 50 MG tablet Take 1 tablet by mouth in the morning.    montelukast (SINGULAIR) 10 MG tablet Take 1 tablet by mouth in the morning.     omeprazole (PRILOSEC) 20 MG delayed release capsule Take 1 capsule by mouth Daily TAKE 1 CAPSULE BY MOUTH EVERY DAY    albuterol (PROVENTIL) (2.5 MG/3ML) 0.083% nebulizer solution Inhale 2.5 mg into the lungs every 6 hours as needed    ascorbic acid (VITAMIN C) 500 MG tablet Take 500 mg by mouth daily cetirizine (ZYRTEC) 10 MG tablet TAKE 1 TABLET BY MOUTH EVERY DAY    fluticasone (FLONASE) 50 MCG/ACT nasal spray 2 sprays by Nasal route daily    methocarbamol (ROBAXIN) 750 MG tablet Take 750 mg by mouth 4 times daily as needed    nystatin (MYCOSTATIN) 960696 UNIT/ML suspension Take 5 mLs by mouth 3 times daily as needed (thrush) Swish and spit    DULoxetine (CYMBALTA) 60 MG extended release capsule TAKE 1 CAPSULE (60 MG) BY MOUTH DAILY ONCE DONE WITH 30MG (Patient not taking: Reported on 1/9/2023)    topiramate (TOPAMAX) 50 MG tablet TAKE 1 TABLET BY MOUTH TWICE A DAY (Patient not taking: Reported on 11/1/2022)    Compression Stockings MISC by Does not apply route    ondansetron (ZOFRAN-ODT) 4 MG disintegrating tablet Take 1 tablet by mouth 3 times daily as needed for Nausea or Vomiting (Patient not taking: Reported on 11/1/2022)    diclofenac (VOLTAREN) 75 MG EC tablet Take 75 mg by mouth daily as needed     No current facility-administered medications for this visit.        Past Medical History:   Diagnosis Date    Anxiety     Arthritis     Bipolar affective disorder ([facility] 11 yrs old    Cellulitis 01/08/2010    denies currently     CKD (chronic kidney disease) stage 3, GFR 30-59 ml/min ([facility])     Diabetes ([address] dx:1/09    Unknown A1C    Diabetes ([facility] 01/08/2010    had multiple TIA's from low  in 2014 and hasn't been on any meds since    Diabetes mellitus type II, non insulin dependent ([address] 03/08/2015    Edema of both legs 01/08/2010    ankles     Femur fracture, left ([facility]) 03/08/2015    GERD (gastroesophageal reflux disease)     denies    Gout     Hypertension     no meds    Moderate persistent asthma without complication 08/02/6582    Morbid obesity ([address] lifetime    Non compliance with medical treatment long term    Orthopnea     Other ill-defined conditions(799.89)     cellulitis    Peripheral neuropathy 2 years    Peripheral vascular disease ([address]     Psychiatric disorder     bipolar disorder    Renal insufficiency 2010    Stroke ([address]     multiple TIA's in 2014; no residual     Unspecified sleep apnea     does not use cpap    Vertigo 2015       Past Surgical History:   Procedure Laterality Date    DELIVERY   ,     both preemies    HEENT      Tonsillectomy/SFE/18    HERNIA REPAIR  age 10    TONSILLECTOMY      TOTAL HIP ARTHROPLASTY Left     Dr. Leahy [facility]       Social History     Socioeconomic History    Marital status: Single     Spouse name: None    Number of children: None    Years of education: None    Highest education level: None   Tobacco Use    Smoking status: Never    Smokeless tobacco: Never   Substance and Sexual Activity    Alcohol use: No    Drug use: Yes     Types: Prescription   Social History Narrative    Patient is single, lives with disabled father, 15year old disabled son and 8year old daughter. 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  She reports a lot of stress, including her 12year old     Social Determinants of Health     Financial Resource Strain: Low Risk     Difficulty of Paying Living Expenses: Not hard at all   Food Insecurity: No Food Insecurity    Worried About Running Out of Food in the Last Year: Never true    Ran Out of Food in the Last Year: Never true       Family History   Problem Relation Age of Onset    No Known Problems Mother     Heart Attack Father     Other Father         aneurysm age 62, now brain damaged    Heart Disease Father     Cancer Sister         hodgkins    Breast Cancer Sister     Breast Cancer Sister     Diabetes Paternal Grandmother     Other Son         24 week twin, LD         Review of Systems      Component Ref Range & Units 22 0020 22 1515 10/10/22 1440 22 0906 22 1019 22 0836 22 1548   WBC 4.3 - 11.1 K/uL 5.0  4.9  3.9 Low   3.6 Low   3.3 Low   3.6 Low   4.6    RBC 4.05 - 5.2 M/uL 3.76 Low   3.96 Low 3.80 Low   3.89 Low   3.90 Low   4.15  4.13    Hemoglobin 11.7 - 15.4 g/dL 10.5 Low   11.1 Low   10.7 Low   11.1 Low   10.9 Low   12.0  11.5 Low     Hematocrit 35.8 - 46.3 % 33.8 Low   36.0  35.2 Low   36.5  37.4  38.8  35.9    MCV 82.0 - 102.0 FL 89.9  90.9  92.6 R  93.8 R  95.9 R  93.5 R  86.9 R    MCH 26.1 - 32.9 PG 27.9  28.0  28.2  28.5  27.9  28.9  27.8    MCHC 31.4 - 35.0 g/dL 31.1 Low   30.8 Low   30.4 Low   30.4 Low   29.1 Low   30.9 Low   32.0    RDW 11.9 - 14.6 % 14.8 High   14.6  14.4  14.1  14.0  15.8 High   15.3 High     Platelets 262 - 437 K/uL 331  283  305  288  306  315  381         Objective: /78   Wt 293 lb (132.9 kg)   LMP 12/30/2022 (Approximate)   BMI 45.89 kg/m²   Ronit Jutsice is a 49 y.o. female who appears fatigued, well developed, well nourished, with good attention to hygiene and body habitus. In no acute distress. Psych:  Oriented to person, place and time. Mood and affect are normal and appropriate to the situation. Short-term memory and understanding intact    Eyes: Sclera are clear. Conjunctiva and lids within normal limits. No icterus. Ears and Nose: no gross deformities to visual inspection, gross hearing intact   Neck: Neck exam reveals no abnormalities. Supple, trachea midline, no appreciable thyromegaly/abnormality. Lymph Nodes:  Neck lymph nodes are normal  No supraclavicular lymphadenopathy noted  No axillary lymphadenopathy noted. No groin lymphadenopathy noted. Skin: No skin rash, subcutaneous nodules, lesions or ulcers observed  Respiratory:   Breathing is non-labored. Lungs clear to auscultation without wheezing or rhonchi   Cardiovascular:  2/6 blowing murmur  Abdomen: Soft.  No masses, nontender, no guarding or rebound  Bilateral lower extremities:  Calves bilaterally symmetric,   edema 1 -2+  External genitalia: normal appearing, minimal erythema, no lesions  Vagina: pink with normal rugations, no lesions  Cervix: normal appearing, no lesions, masses, nontender, no guarding or rebound  Bilateral lower extremities:  Calves bilaterally symmetric,   edema 1 -2+  External genitalia: normal appearing, minimal erythema, no lesions  Vagina: pink with normal rugations, no lesions  Cervix: normal appearing, no lesions,  no exudate, no CMT  Uterus: midline, normal size, shape and contour  Adnexa: bilaterally  no masses, non tender      A/P:    1)  Menometrorrhagia, controlled w/ Prometrium, reiterated need for D&C hysteroscopy with MyoSure possible Mirena IUD insertion versus endometrial ablation. LMP end of December lasted 3-4 days    Consider hysterectomy/definitive tx   Prefer not to do an endometrial ablation on a 79-year-old, CKD, DM, HTN.       2)  Extreme fatigue, low ferritin, 12/13/22 Hgb 10.5 (stable since 9/9/2022)  Bilateral lower extremity swelling, per patient since Thanksgiving  Previously saw Dr. Kimberley Aviles. 2-3/6 blowing murmur, needs to f/u w/ Dr. Jessica Bentley for a possible cardiac component. 3) Needs to f/u w/ Dr. Berny Means (last 2 WBCs wnl)    4)  Patient states she gets up in the morning takes her morning medication and immediately wants to go back to bed. Has been having itching. Patient seemed out of it today, at times stuttered, was moving slowly may have been due to the recent increase in her pain medicine. Her pain management doctor recently increased her   Robaxin 750 mg from 750 mg po qid to    1500 mg in the morning 1500 mg midday and 1500 nightly. Was previously taking 750 mg 4 times daily as needed. Long discussion with Immanuel Taylor the increased Robaxin dose is likely causing her fatigue. Of note she also takes ibuprofen 200 mg every 6 as needed pain. Suggested she discuss that further with her pain management doctor given her diagnosis of stage III chronic kidney disease, typically NSAIDs are contraindicated. Advised Lauren to stop the Motrin and avoid all other NSAIDs for now.     No stroke red flag signs:  Face is symmetric, symmetric smile, no drooping  No tongue deviation  Raises both arms with ease/symmetric    5. States her PCP dismissed her for noncompliance. Patient stated she got mad when she showed up late \"only 2 minutes outside the window\" and was told she could not be seen. Refer to another PCP. Importance of compliance and timely arrival for appointments was discussed. Gary Beltranl that she has multiple medical problems and she needs to establish and maintain care with a PCP. Re ROS--> non gynecologic concerns referred back to her PCP or appropriate specialist.          ICD-10-CM    1. Menometrorrhagia  N92.1 progesterone (PROMETRIUM) 100 MG CAPS capsule      2. Heart murmur  R01.1 CANCELED: [address]      3. Fatigue, unspecified type  R53.83       4. Bilateral lower extremity edema  R60.0 CANCELED: [address]      5. Stage 3 chronic kidney disease, unspecified whether stage 3a or 3b CKD ([facility]  N18.30 CANCELED: [address]      6. Encounter to establish care with new doctor  Z76.89 CANCELED: [address]           More than 50% of this 50+  minute visit was spent addressing the above patient concerns including:  assessment, extensive chart review, physical exam and counseling the patient about the above concerns including evaluation plan and treatment strategies. Long conversation with patient, all her questions answered and addressed all her concerns.   Personally reviewed every single medication (23 medications) she is taking for their side effects with special attention to fatigue    Marivel Enriquez MD, Angelica Youssef

## 2023-01-13 DIAGNOSIS — Z76.89 ENCOUNTER TO ESTABLISH CARE: Primary | ICD-10-CM

## 2023-01-17 RX ORDER — FLUCONAZOLE 150 MG/1
150 TABLET ORAL
Qty: 2 TABLET | Refills: 0 | Status: SHIPPED | OUTPATIENT
Start: 2023-01-17 | End: 2023-01-23

## 2023-01-18 ENCOUNTER — TELEPHONE (OUTPATIENT)
Dept: INTERNAL MEDICINE CLINIC | Facility: CLINIC | Age: 48
End: 2023-01-18

## 2023-01-18 NOTE — TELEPHONE ENCOUNTER
I have called the patient this morning, the number went straight to . I did leave a  for the patient to return my call.

## 2023-01-18 NOTE — TELEPHONE ENCOUNTER
----- Message from [address] sent at 1/18/2023  9:25 AM EST -----  Subject: Message to Provider    QUESTIONS  Information for Provider? new patient needs ED follow-up for blood clots   has 3/15 appointment please call with any earlier appoinments  ---------------------------------------------------------------------------  --------------  [id number]  [phone]; OK to leave message on voicemail  ---------------------------------------------------------------------------  --------------  SCRIPT ANSWERS  Relationship to Patient?  Self

## 2023-01-19 NOTE — TELEPHONE ENCOUNTER
I have called the patient this afternoon, the patient did not answer. I did leave a VM for the patient to return my call.

## 2023-01-22 RX ORDER — PROGESTERONE 100 MG/1
100 CAPSULE ORAL NIGHTLY
Qty: 30 CAPSULE | Refills: 1 | Status: SHIPPED | OUTPATIENT
Start: 2023-01-22 | End: 2023-01-24

## 2023-01-23 ENCOUNTER — PREP FOR PROCEDURE (OUTPATIENT)
Dept: OBGYN CLINIC | Age: 48
End: 2023-01-23

## 2023-01-23 ENCOUNTER — CLINICAL DOCUMENTATION (OUTPATIENT)
Dept: OBGYN CLINIC | Age: 48
End: 2023-01-23

## 2023-01-23 PROBLEM — N92.1 MENOMETRORRHAGIA: Status: ACTIVE | Noted: 2023-01-23

## 2023-01-23 NOTE — PROGRESS NOTES
Name: Ronit Justice     : 1975  Insurance: Address:   Home:   Work:    Today:  2023    Physicians Information    Recommended Surgery:    Fractional D & C hysteroscopy w/ myosure and mirena IUD insertion        Place:     When: coordinate w/ pt    Diagnosis:   Diagnosis Orders   1. Menometrorrhagia  progesterone (PROMETRIUM) 100 MG CAPS capsule      2. Encounter to establish care with new doctor  [address] [place] [address]      3. Stage 3 chronic kidney disease, unspecified whether stage 3a or 3b CKD ([address]  [address]      4. No energy        5. Heart murmur  [address]      6.  Bilateral lower extremity edema  [address]        Time Needed:  1 hour  Shahab Burgess MD, MD  Assistant Needed: none  Special Request:    Surgery Department Information    Date Scheduled: _____________________ Hospital Pre-Op: ______________________        Time Scheduled : ____________________ Surgery Entered: _____________________    Facility: ____________________________ Patient Notified: ______________________    Pre-Op: _______________________ Orders Completed: ___________________    Essence Jain MD, FACOG

## 2023-01-23 NOTE — PROGRESS NOTES
Ramila Tirado  49 y.o.  1975  [id number]    Today:  2023    Chief Complaint   Patient presents with    Medication Refill       Subjective:  Garfield Kent is a 49 y.o. female,  presents today to refill her Prometrium. Since her last ov--> went to the ER c/o bilateral LE swelling--> diagnosed with: Nonocclusive left posterior tibial vein thrombosis below the knee. Complains of pain under her thigh  C/o severe fatigue    Patient's last menstrual period was 2022 (approximate). Synopsis:  2021   normal Pap, negative HPV    2022   Tracy, referral to pain mangement    3/31/2022 menometrorrhagia 3/1-, BMI 45, no HRT  Equivocal orthostatic sx/HAs  Prometrium qd-bid  CBC: WBCs 4.6 hemoglobin 11 platelets 637 K  Stage 3a CKD, creatinine 1.15  Took Depo-Provera 16 years--> stopped ~2022 ov & US:  HETEROGENOUS UT 8.4/5/4, vol 80. ENDOMETRIUM= 3.3MM. MULTIPLE , TINY NABOTHIAN CYSTS IN THE CX.  LT OV- 3 SMALL, SIMPLE CYSTS. RT OV- 3 CYSTS: 1) LARGEST: COMPLEX WITH SEPTATIONS (HEMORRHAGIC APPEARANCE). 2.8 X 2.4 X 2.5CM. 2) SEMI-BLOOD FILLED, SEMI-FLUID FILLED CYST. 3) SIMPLE. BLOOD FLOW SEEN IN THE STROMA OF THE OV. NO FF.      Left Ovarian Cyst             D1               D2                  D3               Avg.                    Vol  Ovarian Cyst 1               1.56 cm       0.91 cm          1.26 cm       1.24 cm              0.938 cm³  Ovarian Cyst 2               1.62 cm      1.19 cm          1.25 cm       1.35 cm              1.252 cm³  Ovarian Cyst 3               1.39 cm      1.21 cm          0.92 cm       1.17 cm              0.806 cm³        Right Ov Cyst  Ovarian Cyst 1                2.82 cm      2.38 cm           2.54 cm       2.58 cm            8.927 cm³  Ovarian Cyst 2                2.54 cm      1.53 cm           1.98 cm       2.02 cm            4.020 cm³  Ovarian Cyst 3                1.88 cm      1.43 cm           1.14 cm       1.48 cm 1.600 cm    EMB normal (MINUTE STRIPS AND FRAGMENTS   OF INACTIVE ENDOMETRIUM. NO HYPERPLASIA OR CARCINOMA.), continue Prometrium, advise D&C hysteroscopy  US: Bilateral 3 small ovarian cysts/ovary (6 total)    5/10/2022 FSH  14    6/28/2022 FH: Breast cancer-2 sisters, complains of breast pain  Menorrhagia resolved on Prometrium, discussed again/advised needs D&C hysteroscopy    8/9/2022 US & ov today:  HETEROGENOUS UT, 9/5.5/4, vol 95  ENDOMETRIUM= 3.9MM. MULTIPLE TINY NABOTHIAN CYSTS IN THE CX.  LT OV- 2 SMALL CYSTS: 1) SIMPLE 2) COMPLEX. RT OV- POLYCYSTIC APPEARANCE. THICKEST SEPTATION= 2.0MM. LARGEST CYST ON THE OV= 2.7 X 1.7 X 2.2CM. NO FF. Left Ovarian Cyst           D1              D2              D3            Avg. D             Vol  Ovarian Cyst 1          1.27 cm         1.04 cm      1.27 cm     1.19 cm          0.873 cm³  Ovarian Cyst 2          1.13 cm         0.63 cm      0.99 cm     0.92 cm          0.369 cm³     Right Ov Cyst    Ovarian Cyst 1          2.67 cm    1.72 cm    2.23 cm    2.20 cm       5.340 cm³      C/o decreased sex drive with Prometrium. Dyspareunia & vaginal dryness improved  BC: Tubal  concerns regarding chronic Depo-Provera use-->systemic effects discussed at length with patient    11/1/2022, Dr. Jamir Gauthier, hematology    CBC:  WBC 4.9  ANC 3.1  Hemoglobin 11.1  Platelets 638  Ferritin  7  Leukopenia; Anemia:  may benefit from a course of Venofer, her Myeloid Disorders Profile is pending. Later she will need to undergo an EGD and a Colonoscopy;     12/13/2022, Dr. Jeri Muñiz to have nonocclusive superficial phlebitis along with gout  Xarelto    12/21/2022  All of the above d/w pt. concerns regarding masking worrisome pathology with p.o. Prometrium discussed. Need for D&C hysteroscopy reiterated. At a minimum needs a repeat: [address]    12/23/2022, ER: pruritis, rash, advised to d/c Xarelto and start taking Eliquis.   Also short course of steroids for itching and take hydroxyzine for itching.        OB History    Para Term  AB Living   3 0 1 1 0 3   SAB IAB Ectopic Molar Multiple Live Births   0 0 0 0 0 0   Obstetric Comments   --> , daughter          , twins --> emergency c-s -->Son---> survived, legally blind, autistic, needs daily assistance.    Daughter-->    --> c-s, daughter               Allergies   Allergen Reactions   • Ciprofloxacin Itching   • Sulfa Antibiotics Itching   • Xarelto [Rivaroxaban] Itching       Current Outpatient Medications   Medication Sig   • progesterone (PROMETRIUM) 100 MG CAPS capsule Take 1 capsule by mouth nightly   • hydrOXYzine HCl (ATARAX) 50 MG tablet Take 1 tablet by mouth nightly   • apixaban (ELIQUIS) 5 MG TABS tablet Take 1 tablet by mouth 2 times daily   • Cholecalciferol (VITAMIN D3) 50 MCG (2000) CAPS Take by mouth   • docusate sodium (COLACE) 100 MG capsule Take 100 mg by mouth 2 times daily   • ibuprofen (ADVIL;MOTRIN) 200 MG tablet Take 200 mg by mouth every 6 hours as needed for Pain   • acetaminophen (TYLENOL) 500 MG tablet Take 500 mg by mouth every 6 hours as needed for Pain   • Ascorbic Acid (VITAJOY DAILY C GUMMIES) 125 MG CHEW Take by mouth   • albuterol sulfate HFA (PROVENTIL;VENTOLIN;PROAIR) 108 (90 Base) MCG/ACT inhaler Inhale 2 puffs into the lungs every 6 hours as needed for Wheezing or Shortness of Breath TAKE 2 PUFFS BY MOUTH EVERY 4 HOURS AS NEEDED   • fluticasone-salmeterol (ADVIAR) 100-50 MCG/ACT AEPB diskus inhaler Inhale 1 puff into the lungs in the morning and 1 puff in the evening.   • losartan (COZAAR) 50 MG tablet Take 1 tablet by mouth in the morning.   • montelukast (SINGULAIR) 10 MG tablet Take 1 tablet by mouth in the morning.   • omeprazole (PRILOSEC) 20 MG delayed release capsule Take 1 capsule by mouth Daily TAKE 1 CAPSULE BY MOUTH EVERY DAY   • albuterol (PROVENTIL) (2.5 MG/3ML) 0.083% nebulizer solution Inhale 2.5 mg into the lungs every 6 hours as needed   ascorbic acid (VITAMIN C) 500 MG tablet Take 500 mg by mouth daily    cetirizine (ZYRTEC) 10 MG tablet TAKE 1 TABLET BY MOUTH EVERY DAY    fluticasone (FLONASE) 50 MCG/ACT nasal spray 2 sprays by Nasal route daily    methocarbamol (ROBAXIN) 750 MG tablet Take 750 mg by mouth 4 times daily as needed    fluconazole (DIFLUCAN) 150 MG tablet Take 1 tablet by mouth every 72 hours for 6 days    nystatin (MYCOSTATIN) 720718 UNIT/ML suspension Take 5 mLs by mouth 3 times daily as needed (thrush) Swish and spit    DULoxetine (CYMBALTA) 60 MG extended release capsule TAKE 1 CAPSULE (60 MG) BY MOUTH DAILY ONCE DONE WITH 30MG (Patient not taking: Reported on 1/9/2023)    topiramate (TOPAMAX) 50 MG tablet TAKE 1 TABLET BY MOUTH TWICE A DAY (Patient not taking: Reported on 11/1/2022)    Compression Stockings MISC by Does not apply route    ondansetron (ZOFRAN-ODT) 4 MG disintegrating tablet Take 1 tablet by mouth 3 times daily as needed for Nausea or Vomiting (Patient not taking: Reported on 11/1/2022)    diclofenac (VOLTAREN) 75 MG EC tablet Take 75 mg by mouth daily as needed     No current facility-administered medications for this visit.        Past Medical History:   Diagnosis Date    Anxiety     Arthritis     Bipolar affective disorder ([facility] 11 yrs old    Cellulitis 01/08/2010    denies currently     CKD (chronic kidney disease) stage 3, GFR 30-59 ml/min ([facility])     Diabetes ([facility] dx:1/09    Unknown A1C    Diabetes ([facility] 01/08/2010    had multiple TIA's from [facility] in 2014 and hasn't been on any meds since    Diabetes mellitus type II, non insulin dependent ([facility] 03/08/2015    Edema of both legs 01/08/2010    ankles     Femur fracture, left ([facility]) 03/08/2015    GERD (gastroesophageal reflux disease)     denies    Gout     Hypertension     no meds    Moderate persistent asthma without complication 63/43/3157    Morbid obesity ([facility]) lifetime    Non compliance with medical treatment long term    Orthopnea     Other ill-defined conditions(799.89)     cellulitis    Peripheral neuropathy 2 years    Peripheral vascular disease ([facility]     Psychiatric disorder     bipolar disorder    Renal insufficiency 2010    Stroke ([facility]     multiple TIA's in ; no residual     Unspecified sleep apnea     does not use cpap    Vertigo 2015       Past Surgical History:   Procedure Laterality Date    DELIVERY   ,     both preemies    HEENT      Tonsillectomy/SFE/18    HERNIA REPAIR  age 8    TONSILLECTOMY      TOTAL HIP ARTHROPLASTY Left     Dr. Pato Gutierrez       Social History     Socioeconomic History    Marital status: Single     Spouse name: None    Number of children: None    Years of education: None    Highest education level: None   Tobacco Use    Smoking status: Never    Smokeless tobacco: Never   Substance and Sexual Activity    Alcohol use: No    Drug use: Yes     Types: Prescription   Social History Narrative    Patient is single, lives with disabled father, 15year old disabled son and 8year old daughter. 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  She reports a lot of stress, including her 12year old        0:  lives with her 31 yo special needs son and her daughter (? Serenity) and her daughter's child      Social Determinants of Health     Financial Resource Strain: Low Risk     Difficulty of Paying Living Expenses: Not hard at all   Food Insecurity: No Food Insecurity    Worried About Running Out of Food in the Last Year: Never true    Lara of Food in the Last Year: Never true       Family History   Problem Relation Age of Onset    No Known Problems Mother     Heart Attack Father     Other Father         aneurysm age 62, now brain damaged    Heart Disease Father     Cancer Sister         hodgkins    Breast Cancer Sister     Breast Cancer Sister    Josefina [facility] Diabetes Paternal Grandmother     Other Son         24 week twin, LD         Review of Systems      Component Ref Range & Units 12/13/22 0020 11/1/22 1515 10/10/22 1440 9/19/22 0906 9/9/22 1019 7/29/22 0836 7/12/22 1548   WBC 4.3 - 11.1 K/uL 5.0  4.9  3.9 Low   3.6 Low   3.3 Low   3.6 Low   4.6    RBC 4.05 - 5.2 M/uL 3.76 Low   3.96 Low   3.80 Low   3.89 Low   3.90 Low   4.15  4.13    Hemoglobin 11.7 - 15.4 g/dL 10.5 Low   11.1 Low   10.7 Low   11.1 Low   10.9 Low   12.0  11.5 Low     Hematocrit 35.8 - 46.3 % 33.8 Low   36.0  35.2 Low   36.5  37.4  38.8  35.9    MCV 82.0 - 102.0 FL 89.9  90.9  92.6 R  93.8 R  95.9 R  93.5 R  86.9 R    MCH 26.1 - 32.9 PG 27.9  28.0  28.2  28.5  27.9  28.9  27.8    MCHC 31.4 - 35.0 g/dL 31.1 Low   30.8 Low   30.4 Low   30.4 Low   29.1 Low   30.9 Low   32.0    RDW 11.9 - 14.6 % 14.8 High   14.6  14.4  14.1  14.0  15.8 High   15.3 High     Platelets 776 - 208 K/uL 331  283  305  288  306  315  381         Objective: /78   Wt 293 lb (132.9 kg)   LMP 12/30/2022 (Approximate)   BMI 45.89 kg/m²   Eugenio Patel is a 49 y.o. female who appears fatigued, well developed, well nourished, with good attention to hygiene and body habitus. In no acute distress. Psych:  Oriented to person, place and time. Mood and affect are normal and appropriate to the situation. Short-term memory and understanding intact    Eyes: Sclera are clear. Conjunctiva and lids within normal limits. No icterus. Ears and Nose: no gross deformities to visual inspection, gross hearing intact   Neck: Neck exam reveals no abnormalities. Supple, trachea midline, no appreciable thyromegaly/abnormality. Lymph Nodes:  Neck lymph nodes are normal  No supraclavicular lymphadenopathy noted  No axillary lymphadenopathy noted. No groin lymphadenopathy noted. Skin: No skin rash, subcutaneous nodules, lesions or ulcers observed  Respiratory:   Breathing is non-labored.  Lungs clear to auscultation without wheezing or rhonchi   Cardiovascular:  2/6 blowing murmur  Abdomen: Soft. No masses, nontender, no guarding or rebound  Bilateral lower extremities:  Calves bilaterally symmetric,   edema 1 -2+  External genitalia: normal appearing, minimal erythema, no lesions  Vagina: pink with normal rugations, no lesions  Cervix: normal appearing, no lesions,  no exudate, no CMT  Uterus: midline, normal size, shape and contour  Adnexa: bilaterally  no masses, non tender      A/P:    1)  Menometrorrhagia, controlled w/ Prometrium, reiterated need for D&C hysteroscopy with [facility] possible Mirena IUD insertion versus endometrial ablation. LMP end of December lasted 3-4 days    Consider hysterectomy/definitive tx   Prefer not to do an endometrial ablation on a 48-year-old, CKD, DM, HTN.       2)  Extreme fatigue, low ferritin, 12/13/22 Hgb 10.5 (stable since 9/9/2022)  Bilateral lower extremity swelling, per patient since Thanksgiving  Previously saw Dr. Jennifer Reddy. 2-3/6 blowing murmur, needs to f/u w/ Dr. Jimmy Avila for a possible cardiac component. 3) Needs to f/u w/ Dr. Mark Dawn (last 2 WBCs wnl)    4)  Patient states she gets up in the morning takes her morning medication and immediately wants to go back to bed. Has been having itching. Patient seemed out of it today, at times stuttered, was moving slowly may have been due to the recent increase in her pain medicine. Her pain management doctor recently increased her   Robaxin 750 mg from 750 mg po qid to    1500 mg in the morning 1500 mg midday and 1500 nightly. Was previously taking 750 mg 4 times daily as needed. Long discussion with Natividad Hilton the increased Robaxin dose is likely causing her fatigue. Of note she also takes ibuprofen 200 mg every 6 as needed pain. Suggested she discuss that further with her pain management doctor given her diagnosis of stage III chronic kidney disease, typically NSAIDs are contraindicated.   Advised Lauren to stop the Motrin and avoid all other NSAIDs for now. No stroke red flag signs:  Face is symmetric, symmetric smile, no drooping  No tongue deviation  Raises both arms with ease/symmetric    5. States her PCP dismissed her for noncompliance. Patient stated she got mad when she showed up late \"only 2 minutes outside the window\" and was told she could not be seen. Refer to another PCP. Importance of compliance and timely arrival for appointments was discussed. Kathya Sosaon that she has multiple medical problems and she needs to establish and maintain care with a PCP. Re ROS--> non gynecologic concerns referred back to her PCP or appropriate specialist.          ICD-10-CM    1. Menometrorrhagia  N92.1 progesterone (PROMETRIUM) 100 MG CAPS capsule      2. Heart murmur  R01.1 CANCELED: [address]      3. Fatigue, unspecified type  R53.83       4. Bilateral lower extremity edema  R60.0 CANCELED: [address]      5. Stage 3 chronic kidney disease, unspecified whether stage 3a or 3b CKD ([facility]  N18.30 CANCELED: [address]      6. Encounter to establish care with new doctor  Z76.89 CANCELED: [address]           More than 50% of this 50+  minute visit was spent addressing the above patient concerns including:  assessment, extensive chart review, physical exam and counseling the patient about the above concerns including evaluation plan and treatment strategies. Long conversation with patient, all her questions answered and addressed all her concerns.       Carrillo Stone MD, Yarelis Fraction

## 2023-01-23 NOTE — PROGRESS NOTES
Attempted to contact patient to discuss possible dates for recommended surgery per Dr Lupillo Mann [facility] with [facility]). N/A, L/M on voicemail for patient to return my call at her earliest convenience.

## 2023-01-24 ENCOUNTER — TELEPHONE (OUTPATIENT)
Dept: ONCOLOGY | Age: 48
End: 2023-01-24

## 2023-01-24 ENCOUNTER — TELEPHONE (OUTPATIENT)
Dept: FAMILY MEDICINE CLINIC | Facility: CLINIC | Age: 48
End: 2023-01-24

## 2023-01-24 ENCOUNTER — OFFICE VISIT (OUTPATIENT)
Dept: FAMILY MEDICINE CLINIC | Facility: CLINIC | Age: 48
End: 2023-01-24
Payer: MEDICARE

## 2023-01-24 VITALS
SYSTOLIC BLOOD PRESSURE: 124 MMHG | HEART RATE: 100 BPM | OXYGEN SATURATION: 98 % | BODY MASS INDEX: 45.99 KG/M2 | DIASTOLIC BLOOD PRESSURE: 70 MMHG | TEMPERATURE: 98.1 F | WEIGHT: 293 LBS | HEIGHT: 67 IN

## 2023-01-24 DIAGNOSIS — E66.01 MORBID OBESITY (HCC): ICD-10-CM

## 2023-01-24 DIAGNOSIS — I10 PRIMARY HYPERTENSION: ICD-10-CM

## 2023-01-24 DIAGNOSIS — D72.819 LEUKOPENIA, UNSPECIFIED TYPE: ICD-10-CM

## 2023-01-24 DIAGNOSIS — N18.31 STAGE 3A CHRONIC KIDNEY DISEASE (HCC): ICD-10-CM

## 2023-01-24 DIAGNOSIS — R25.2 LEG CRAMPS: ICD-10-CM

## 2023-01-24 DIAGNOSIS — I82.4Y2 ACUTE DEEP VEIN THROMBOSIS (DVT) OF PROXIMAL VEIN OF LEFT LOWER EXTREMITY (HCC): Primary | ICD-10-CM

## 2023-01-24 DIAGNOSIS — R25.2 LEG CRAMPS: Primary | ICD-10-CM

## 2023-01-24 LAB
ALBUMIN SERPL-MCNC: 3.5 G/DL (ref 3.5–5)
ALBUMIN/GLOB SERPL: 1 (ref 0.4–1.6)
ALP SERPL-CCNC: 70 U/L (ref 50–136)
ALT SERPL-CCNC: 28 U/L (ref 12–65)
ANION GAP SERPL CALC-SCNC: 8 MMOL/L (ref 2–11)
AST SERPL-CCNC: 22 U/L (ref 15–37)
BASOPHILS # BLD: 0 K/UL (ref 0–0.2)
BASOPHILS NFR BLD: 1 % (ref 0–2)
BILIRUB SERPL-MCNC: 0.7 MG/DL (ref 0.2–1.1)
BUN SERPL-MCNC: 9 MG/DL (ref 6–23)
CALCIUM SERPL-MCNC: 9.7 MG/DL (ref 8.3–10.4)
CHLORIDE SERPL-SCNC: 104 MMOL/L (ref 101–110)
CO2 SERPL-SCNC: 28 MMOL/L (ref 21–32)
CREAT SERPL-MCNC: 1.2 MG/DL (ref 0.6–1)
DIFFERENTIAL METHOD BLD: ABNORMAL
EOSINOPHIL # BLD: 0.1 K/UL (ref 0–0.8)
EOSINOPHIL NFR BLD: 3 % (ref 0.5–7.8)
ERYTHROCYTE [DISTWIDTH] IN BLOOD BY AUTOMATED COUNT: 15.4 % (ref 11.9–14.6)
FERRITIN SERPL-MCNC: 9 NG/ML (ref 8–388)
GLOBULIN SER CALC-MCNC: 3.5 G/DL (ref 2.8–4.5)
GLUCOSE SERPL-MCNC: 83 MG/DL (ref 65–100)
HCT VFR BLD AUTO: 36.7 % (ref 35.8–46.3)
HGB BLD-MCNC: 11.2 G/DL (ref 11.7–15.4)
IMM GRANULOCYTES # BLD AUTO: 0 K/UL (ref 0–0.5)
IMM GRANULOCYTES NFR BLD AUTO: 0 % (ref 0–5)
LYMPHOCYTES # BLD: 1.3 K/UL (ref 0.5–4.6)
LYMPHOCYTES NFR BLD: 29 % (ref 13–44)
MAGNESIUM SERPL-MCNC: 1.9 MG/DL (ref 1.8–2.4)
MCH RBC QN AUTO: 28 PG (ref 26.1–32.9)
MCHC RBC AUTO-ENTMCNC: 30.5 G/DL (ref 31.4–35)
MCV RBC AUTO: 91.8 FL (ref 82–102)
MONOCYTES # BLD: 0.4 K/UL (ref 0.1–1.3)
MONOCYTES NFR BLD: 8 % (ref 4–12)
NEUTS SEG # BLD: 2.6 K/UL (ref 1.7–8.2)
NEUTS SEG NFR BLD: 59 % (ref 43–78)
NRBC # BLD: 0 K/UL (ref 0–0.2)
PLATELET # BLD AUTO: 361 K/UL (ref 150–450)
PMV BLD AUTO: 9.9 FL (ref 9.4–12.3)
POTASSIUM SERPL-SCNC: 4.4 MMOL/L (ref 3.5–5.1)
PROT SERPL-MCNC: 7 G/DL (ref 6.3–8.2)
RBC # BLD AUTO: 4 M/UL (ref 4.05–5.2)
SODIUM SERPL-SCNC: 140 MMOL/L (ref 133–143)
WBC # BLD AUTO: 4.4 K/UL (ref 4.3–11.1)

## 2023-01-24 PROCEDURE — 1036F TOBACCO NON-USER: CPT | Performed by: NURSE PRACTITIONER

## 2023-01-24 PROCEDURE — 3078F DIAST BP <80 MM HG: CPT | Performed by: NURSE PRACTITIONER

## 2023-01-24 PROCEDURE — G8484 FLU IMMUNIZE NO ADMIN: HCPCS | Performed by: NURSE PRACTITIONER

## 2023-01-24 PROCEDURE — G8427 DOCREV CUR MEDS BY ELIG CLIN: HCPCS | Performed by: NURSE PRACTITIONER

## 2023-01-24 PROCEDURE — 99214 OFFICE O/P EST MOD 30 MIN: CPT | Performed by: NURSE PRACTITIONER

## 2023-01-24 PROCEDURE — 3074F SYST BP LT 130 MM HG: CPT | Performed by: NURSE PRACTITIONER

## 2023-01-24 PROCEDURE — G8417 CALC BMI ABV UP PARAM F/U: HCPCS | Performed by: NURSE PRACTITIONER

## 2023-01-24 ASSESSMENT — PATIENT HEALTH QUESTIONNAIRE - PHQ9
SUM OF ALL RESPONSES TO PHQ9 QUESTIONS 1 & 2: 1
SUM OF ALL RESPONSES TO PHQ QUESTIONS 1-9: 1
SUM OF ALL RESPONSES TO PHQ QUESTIONS 1-9: 1
2. FEELING DOWN, DEPRESSED OR HOPELESS: 1
SUM OF ALL RESPONSES TO PHQ QUESTIONS 1-9: 1
SUM OF ALL RESPONSES TO PHQ QUESTIONS 1-9: 1
1. LITTLE INTEREST OR PLEASURE IN DOING THINGS: 0

## 2023-01-24 ASSESSMENT — ENCOUNTER SYMPTOMS
RESPIRATORY NEGATIVE: 1
GASTROINTESTINAL NEGATIVE: 1
EYES NEGATIVE: 1
ALLERGIC/IMMUNOLOGIC NEGATIVE: 1

## 2023-01-24 NOTE — TELEPHONE ENCOUNTER
Please call Dr. Morena Delgado office and get pt in urgently. She is his patient as well and needs a follow-up since recent DVT. Let me know the date please    Thanks

## 2023-01-24 NOTE — TELEPHONE ENCOUNTER
Tried Dr Roddy Leblanc ([phone]) office twice, unable to get through. Last recording stated unable to complete this call and to call back. Called pt and asked pt to call Dr. Lavon Anderson and set up an urgent appt regarding her DVT per [facility]. Gave pt # to call.

## 2023-01-24 NOTE — TELEPHONE ENCOUNTER
Message sent to pod ma on when to schedule. I will call pt back when I know when to schedule her.  KIKO

## 2023-01-24 NOTE — TELEPHONE ENCOUNTER
OBGYN surgery on 2/8. Pt had bloodclot in Dec, been on 2 different blood thinners (taking eloquis now), NP today said pt should see Dr Tanna Cruz in case pt shouldn't have surgery due to the DVT.

## 2023-01-24 NOTE — PROGRESS NOTES
[address]  [address] [place] [address], [address] [facility]   (ph) [phone] (fax) [phone]  Nenita GARCIA, FNP-C      Chief Complaint   Patient presents with    Follow-Up from Hospital    Leg Swelling     Left leg       Patient is a 76-year-old female comes in to establish care with a new provider. She has been to [place] but wants to change to our office. She presented to the ER on 12/13  with bilateral lower extremity for few weeks. .  Bilateral Doppler study showed  acute left posterior tibial DVT. She was started on Xarelto and was to follow-up with her PCP. In the meantime, she is changing PCPs and has not followed up with Primary Care. She then returned to ER on 12/23 with a rash that they contributed to the Xarelto. It was stopped and Eliquis started bid. She is now to have a D&C and Pain management surgery and I am questioning if it is too soon, given her recent DVT. She is a pt of Dr. Marah Miner and we will get her back in to see him to determine cause of the DVT, length of time for Eliquis, and to determine if it can be stopped for surgical procedures.        Allergies   Allergen Reactions    Ciprofloxacin Itching    Sulfa Antibiotics Itching    Xarelto [Rivaroxaban] Itching       Past Medical History:   Diagnosis Date    Anxiety     Arthritis     Bipolar affective disorder ([address] 11 yrs old    Cellulitis 01/08/2010    denies currently     CKD (chronic kidney disease) stage 3, GFR 30-59 ml/min ([facility])     Diabetes ([address] dx:1/09    Unknown A1C    Diabetes ([address] 01/08/2010    had multiple TIA's from low bs in 2014 and hasn't been on any meds since    Diabetes mellitus type II, non insulin dependent ([address] 03/08/2015    Edema of both legs 01/08/2010    ankles     Femur fracture, left ([facility]) 03/08/2015    GERD (gastroesophageal reflux disease)     denies    Gout     Hypertension     no meds    Moderate persistent asthma without complication 26/02/4545    Morbid obesity (HCC) lifetime    Non compliance with medical treatment long term    Orthopnea     Other ill-defined conditions(799.89)     cellulitis    Peripheral neuropathy 2 years    Peripheral vascular disease ([facility]     Psychiatric disorder     bipolar disorder    Renal insufficiency 01/08/2010    Stroke ([facility]     multiple TIA's in 2014; no residual     Unspecified sleep apnea     does not use cpap    Vertigo 03/08/2015       Family History   Problem Relation Age of Onset    No Known Problems Mother     Heart Attack Father     Other Father         aneurysm age 62, now brain damaged    Heart Disease Father     Cancer Sister         hodgkins    Breast Cancer Sister     Breast Cancer Sister     Diabetes Paternal Grandmother     Other Son         24 week twin, LD       Social History     Socioeconomic History    Marital status: Single     Spouse name: Not on file    Number of children: Not on file    Years of education: Not on file    Highest education level: Not on file   Occupational History    Not on file   Tobacco Use    Smoking status: Never    Smokeless tobacco: Never   Substance and Sexual Activity    Alcohol use: No    Drug use: Yes     Types: Prescription    Sexual activity: Not on file   Other Topics Concern    Not on file   Social History Narrative    Patient is single, lives with disabled father, 15year old disabled son and 8year old daughter. 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  She reports a lot of stress, including her 12year old        0:  lives with her 31 yo special needs son and her daughter (? Serenity) and her daughter's child      Social Determinants of Health     Financial Resource Strain: Low Risk     Difficulty of Paying Living Expenses: Not hard at all   Food Insecurity: No Food Insecurity    Worried About Running Out of Food in the Last Year: Never true    [address] in the Last Year: Never true   Transportation Needs: Not on file   Physical Activity: Not on file   Stress: Not on file   Social Connections: Not on file   Intimate Partner Violence: Not on file   Housing Stability: Not on file       OB History          3    Para        Term   1       1    AB   0    Living   3         SAB        IAB        Ectopic        Molar        Multiple        Live Births              Obstetric Comments   --> , daughter         55, twins --> emergency c-s -->Son---> survived, legally blind, autistic, needs daily assistance.     Daughter-->   --> c-s, daughter                         Past Surgical History:   Procedure Laterality Date    DELIVERY   ,     both preemies    HEENT      Tonsillectomy/SFE/18    HERNIA REPAIR  age 10    TONSILLECTOMY      TOTAL HIP ARTHROPLASTY Left     Dr. Deyvi Sands Maintenance   Topic Date Due    Pneumococcal 0-64 years Vaccine (1 - PCV) Never done    Diabetic retinal exam  Never done    Colorectal Cancer Screen  Never done    COVID-19 Vaccine (4 - Booster for Moderna series) 05/10/2022    Breast cancer screen  2023    GFR test (Diabetes, CKD 3-4, OR last GFR 15-59)  2023    Depression Monitoring  2024    Cervical cancer screen  2026    Lipids  08/15/2027    DTaP/Tdap/Td vaccine (3 - Td or Tdap) 11/15/2031    Flu vaccine  Completed    Hepatitis C screen  Completed    HIV screen  Completed    Hepatitis A vaccine  Aged Out    Hib vaccine  Aged Out    Meningococcal (ACWY) vaccine  Aged Out         Current Outpatient Medications:     apixaban (ELIQUIS) 5 MG TABS tablet, Take 1 tablet by mouth 2 times daily, Disp: 60 tablet, Rfl: 1    nystatin (MYCOSTATIN) 155588 UNIT/ML suspension, Take 5 mLs by mouth 3 times daily as needed (thrush) Swish and spit, Disp: 1 each, Rfl: 1    Cholecalciferol (VITAMIN D3) 50 MCG (2000) CAPS, Take by mouth, Disp: , Rfl:     docusate sodium (COLACE) 100 MG capsule, Take 100 mg by mouth 2 times daily, Disp: , Rfl:     acetaminophen (TYLENOL) 500 MG tablet, Take 500 mg by mouth every 6 hours as needed for Pain, Disp: , Rfl:     Ascorbic Acid (VITAJOY DAILY C GUMMIES) 125 MG CHEW, Take by mouth, Disp: , Rfl:     albuterol sulfate HFA (PROVENTIL;VENTOLIN;PROAIR) 108 (90 Base) MCG/ACT inhaler, Inhale 2 puffs into the lungs every 6 hours as needed for Wheezing or Shortness of Breath TAKE 2 PUFFS BY MOUTH EVERY 4 HOURS AS NEEDED, Disp: 18 g, Rfl: 5    fluticasone-salmeterol (ADVIAR) 100-50 MCG/ACT AEPB diskus inhaler, Inhale 1 puff into the lungs in the morning and 1 puff in the evening., Disp: 1 each, Rfl: 5    Compression Stockings MISC, by Does not apply route, Disp: 2 each, Rfl: 0    losartan (COZAAR) 50 MG tablet, Take 1 tablet by mouth in the morning., Disp: 90 tablet, Rfl: 1    montelukast (SINGULAIR) 10 MG tablet, Take 1 tablet by mouth in the morning., Disp: 90 tablet, Rfl: 1    ondansetron (ZOFRAN-ODT) 4 MG disintegrating tablet, Take 1 tablet by mouth 3 times daily as needed for Nausea or Vomiting (Patient not taking: No sig reported), Disp: 30 tablet, Rfl: 0    omeprazole (PRILOSEC) 20 MG delayed release capsule, Take 1 capsule by mouth Daily TAKE 1 CAPSULE BY MOUTH EVERY DAY, Disp: 90 capsule, Rfl: 1    albuterol (PROVENTIL) (2.5 MG/3ML) 0.083% nebulizer solution, Inhale 2.5 mg into the lungs every 6 hours as needed, Disp: , Rfl:     ascorbic acid (VITAMIN C) 500 MG tablet, Take 500 mg by mouth daily, Disp: , Rfl:     cetirizine (ZYRTEC) 10 MG tablet, TAKE 1 TABLET BY MOUTH EVERY DAY, Disp: , Rfl:     fluticasone (FLONASE) 50 MCG/ACT nasal spray, 2 sprays by Nasal route daily, Disp: , Rfl:     methocarbamol (ROBAXIN) 750 MG tablet, Take 750 mg by mouth 4 times daily as needed, Disp: , Rfl:     Review of Systems   Constitutional: Negative. HENT: Negative. Eyes: Negative. Respiratory: Negative. Cardiovascular: Negative. Gastrointestinal: Negative.     Endocrine: Negative. Genitourinary: Negative. Musculoskeletal:         Leg cramps   Skin: Negative. Allergic/Immunologic: Negative. Neurological: Negative. Hematological: Negative. Psychiatric/Behavioral: Negative. Vitals:    01/24/23 1134   BP: 124/70   Pulse: 100   Temp: 98.1 °F (36.7 °C)   SpO2: 98%        Physical Exam  Constitutional:       Appearance: Normal appearance. HENT:      Head: Normocephalic. Nose: Nose normal.   Eyes:      Extraocular Movements: Extraocular movements intact. Pupils: Pupils are equal, round, and reactive to light. Cardiovascular:      Rate and Rhythm: Normal rate and regular rhythm. Pulmonary:      Effort: Pulmonary effort is normal.      Breath sounds: Normal breath sounds. Abdominal:      General: Abdomen is flat. Palpations: Abdomen is soft. Musculoskeletal:         General: Normal range of motion. Cervical back: Normal range of motion and neck supple. Right lower leg: Edema present. Left lower leg: Edema present. Comments: chronic   Skin:     General: Skin is warm and dry. Neurological:      General: No focal deficit present. Mental Status: She is alert and oriented to person, place, and time. Psychiatric:         Mood and Affect: Mood normal.         Behavior: Behavior normal.      PHQ-9=1  12/13/22: Bilateral Doppler study shows acute left posterior tibial DVT      Assessment & Plan:    1. Acute deep vein thrombosis (DVT) of proximal vein of left lower extremity ([address]  Will get her back in with Dr. Sj Webb for evaluation of recent DVT. Remain on Eliquis 5 mg po bid     2. Leg cramps  - Magnesium  - CBC with Auto Differential  - Comprehensive Metabolic Panel    3. Leukopenia, unspecified type  - Ferritin  - Comprehensive Metabolic Panel  - CBC with Auto Differential    4. Primary hypertension  Stable; continue med    5. Stage 3a chronic kidney disease (HCC)  Avoid NSAIDS; remain hydrated    6.  Morbid obesity ([facility]  Weight loss will help    Greater than 50% counseling and/or coordination of care: the treatment regimen is extensive; detailed review. Will notify of labs. Recheck in 2-3 weeks. This note was dictated using dragon voice recognition software. It has been proofread, but there may still exist voice recognition errors that the author did not detect.       Signed By: Dionicio Stewart, JOSE - CNP     January 24, 2023

## 2023-01-27 ENCOUNTER — PATIENT MESSAGE (OUTPATIENT)
Dept: FAMILY MEDICINE CLINIC | Facility: CLINIC | Age: 48
End: 2023-01-27

## 2023-01-27 DIAGNOSIS — K21.9 GASTROESOPHAGEAL REFLUX DISEASE, UNSPECIFIED WHETHER ESOPHAGITIS PRESENT: ICD-10-CM

## 2023-01-27 DIAGNOSIS — L29.9 ITCHING: Primary | ICD-10-CM

## 2023-01-27 RX ORDER — OMEPRAZOLE 20 MG/1
20 CAPSULE, DELAYED RELEASE ORAL DAILY
Qty: 90 CAPSULE | Refills: 1 | Status: CANCELLED | OUTPATIENT
Start: 2023-01-27

## 2023-01-27 RX ORDER — MONTELUKAST SODIUM 10 MG/1
10 TABLET ORAL DAILY
Qty: 90 TABLET | Refills: 1 | Status: CANCELLED | OUTPATIENT
Start: 2023-01-27

## 2023-01-27 RX ORDER — OMEPRAZOLE 20 MG/1
20 CAPSULE, DELAYED RELEASE ORAL DAILY
Qty: 90 CAPSULE | Refills: 0 | Status: SHIPPED | OUTPATIENT
Start: 2023-01-27

## 2023-01-27 RX ORDER — OMEPRAZOLE 20 MG/1
20 CAPSULE, DELAYED RELEASE ORAL DAILY
Qty: 90 CAPSULE | Refills: 1 | Status: SHIPPED | OUTPATIENT
Start: 2023-01-27 | End: 2023-01-27 | Stop reason: SDUPTHER

## 2023-01-27 RX ORDER — HYDROXYZINE 50 MG/1
50 TABLET, FILM COATED ORAL EVERY 8 HOURS PRN
Qty: 30 TABLET | Refills: 0 | Status: SHIPPED | OUTPATIENT
Start: 2023-01-27

## 2023-01-27 NOTE — TELEPHONE ENCOUNTER
From: Shanice Ceron  To: Nenita Beatty  Sent: 1/27/2023 3:42 AM EST  Subject: Refills Urgent     I need a refill on the medicine the ER prescribed me for itching on 12/23/2022 @2:23 am Benadryl was not working at all and he give me this and it has been working great. I need this for itching in my feet and leg still please. I tried to copy and paste but it would not work on here for some reason. And I put in for a refill to and I see it went to Ms. Deloris Bustillo instead of your office. Its for heart burns. I need a refill please.  Thank e

## 2023-01-27 NOTE — TELEPHONE ENCOUNTER
Omeprazole was last sent in 6/2/2022 for 90 days and 1 refill     Next appointment: canceled appointment on 2/6/2023 and is transferring to another provider.  That appointment is 2/23/2023

## 2023-01-30 ENCOUNTER — HOSPITAL ENCOUNTER (OUTPATIENT)
Dept: LAB | Age: 48
Discharge: HOME | End: 2023-02-02
Payer: MEDICARE

## 2023-01-30 ENCOUNTER — OFFICE VISIT (OUTPATIENT)
Dept: ONCOLOGY | Age: 48
End: 2023-01-30

## 2023-01-30 VITALS
HEART RATE: 107 BPM | TEMPERATURE: 98 F | HEIGHT: 67 IN | OXYGEN SATURATION: 100 % | RESPIRATION RATE: 16 BRPM | BODY MASS INDEX: 45.99 KG/M2 | WEIGHT: 293 LBS | SYSTOLIC BLOOD PRESSURE: 136 MMHG | DIASTOLIC BLOOD PRESSURE: 88 MMHG

## 2023-01-30 DIAGNOSIS — D50.8 OTHER IRON DEFICIENCY ANEMIA: Primary | ICD-10-CM

## 2023-01-30 DIAGNOSIS — I82.4Z2 DEEP VEIN THROMBOSIS (DVT) OF DISTAL VEIN OF LEFT LOWER EXTREMITY, UNSPECIFIED CHRONICITY (HCC): ICD-10-CM

## 2023-01-30 DIAGNOSIS — D64.9 ANEMIA, UNSPECIFIED TYPE: Primary | ICD-10-CM

## 2023-01-30 DIAGNOSIS — D64.9 ANEMIA, UNSPECIFIED TYPE: ICD-10-CM

## 2023-01-30 DIAGNOSIS — D50.8 OTHER IRON DEFICIENCY ANEMIA: ICD-10-CM

## 2023-01-30 DIAGNOSIS — K21.9 CHRONIC GERD: ICD-10-CM

## 2023-01-30 DIAGNOSIS — R68.89 OTHER GENERAL SYMPTOMS AND SIGNS: ICD-10-CM

## 2023-01-30 LAB
ALBUMIN SERPL-MCNC: 3.8 G/DL (ref 3.5–5)
ALBUMIN/GLOB SERPL: 0.9 (ref 0.4–1.6)
ALP SERPL-CCNC: 71 U/L (ref 50–136)
ALT SERPL-CCNC: 34 U/L (ref 12–65)
ANION GAP SERPL CALC-SCNC: 7 MMOL/L (ref 2–11)
AST SERPL-CCNC: 20 U/L (ref 15–37)
BASOPHILS # BLD: 0 K/UL (ref 0–0.2)
BASOPHILS NFR BLD: 1 % (ref 0–2)
BILIRUB SERPL-MCNC: 0.6 MG/DL (ref 0.2–1.1)
BUN SERPL-MCNC: 11 MG/DL (ref 6–23)
CALCIUM SERPL-MCNC: 9.3 MG/DL (ref 8.3–10.4)
CHLORIDE SERPL-SCNC: 107 MMOL/L (ref 101–110)
CO2 SERPL-SCNC: 27 MMOL/L (ref 21–32)
CREAT SERPL-MCNC: 1.3 MG/DL (ref 0.6–1)
DIFFERENTIAL METHOD BLD: ABNORMAL
EOSINOPHIL # BLD: 0.2 K/UL (ref 0–0.8)
EOSINOPHIL NFR BLD: 3 % (ref 0.5–7.8)
ERYTHROCYTE [DISTWIDTH] IN BLOOD BY AUTOMATED COUNT: 15.7 % (ref 11.9–14.6)
FERRITIN SERPL-MCNC: 7 NG/ML (ref 8–388)
GLOBULIN SER CALC-MCNC: 4.1 G/DL (ref 2.8–4.5)
GLUCOSE SERPL-MCNC: 90 MG/DL (ref 65–100)
HCT VFR BLD AUTO: 38 % (ref 35.8–46.3)
HGB BLD-MCNC: 11.9 G/DL (ref 11.7–15.4)
IMM GRANULOCYTES # BLD AUTO: 0 K/UL (ref 0–0.5)
IMM GRANULOCYTES NFR BLD AUTO: 0 % (ref 0–5)
LYMPHOCYTES # BLD: 1.7 K/UL (ref 0.5–4.6)
LYMPHOCYTES NFR BLD: 27 % (ref 13–44)
Lab: NORMAL
Lab: NORMAL
MCH RBC QN AUTO: 27.9 PG (ref 26.1–32.9)
MCHC RBC AUTO-ENTMCNC: 31.3 G/DL (ref 31.4–35)
MCV RBC AUTO: 89 FL (ref 82–102)
MONOCYTES # BLD: 0.5 K/UL (ref 0.1–1.3)
MONOCYTES NFR BLD: 8 % (ref 4–12)
NEUTS SEG # BLD: 3.7 K/UL (ref 1.7–8.2)
NEUTS SEG NFR BLD: 61 % (ref 43–78)
NRBC # BLD: 0 K/UL (ref 0–0.2)
PLATELET # BLD AUTO: 350 K/UL (ref 150–450)
PMV BLD AUTO: 9.1 FL (ref 9.4–12.3)
POTASSIUM SERPL-SCNC: 4 MMOL/L (ref 3.5–5.1)
PROT SERPL-MCNC: 7.9 G/DL (ref 6.3–8.2)
RBC # BLD AUTO: 4.27 M/UL (ref 4.05–5.2)
REFERENCE LAB: NORMAL
SODIUM SERPL-SCNC: 141 MMOL/L (ref 133–143)
WBC # BLD AUTO: 6.1 K/UL (ref 4.3–11.1)

## 2023-01-30 PROCEDURE — 36415 COLL VENOUS BLD VENIPUNCTURE: CPT

## 2023-01-30 PROCEDURE — 86147 CARDIOLIPIN ANTIBODY EA IG: CPT

## 2023-01-30 PROCEDURE — 80053 COMPREHEN METABOLIC PANEL: CPT

## 2023-01-30 PROCEDURE — 86148 ANTI-PHOSPHOLIPID ANTIBODY: CPT

## 2023-01-30 PROCEDURE — 83520 IMMUNOASSAY QUANT NOS NONAB: CPT

## 2023-01-30 PROCEDURE — 82728 ASSAY OF FERRITIN: CPT

## 2023-01-30 PROCEDURE — 85025 COMPLETE CBC W/AUTO DIFF WBC: CPT

## 2023-01-30 PROCEDURE — 83090 ASSAY OF HOMOCYSTEINE: CPT

## 2023-01-30 RX ORDER — SODIUM CHLORIDE 0.9 % (FLUSH) 0.9 %
5-40 SYRINGE (ML) INJECTION PRN
OUTPATIENT
Start: 2023-02-07

## 2023-01-30 RX ORDER — SODIUM CHLORIDE 9 MG/ML
INJECTION, SOLUTION INTRAVENOUS CONTINUOUS
OUTPATIENT
Start: 2023-02-07

## 2023-01-30 RX ORDER — ALBUTEROL SULFATE 90 UG/1
4 AEROSOL, METERED RESPIRATORY (INHALATION) PRN
OUTPATIENT
Start: 2023-02-07

## 2023-01-30 RX ORDER — ACETAMINOPHEN 325 MG/1
650 TABLET ORAL
OUTPATIENT
Start: 2023-02-07

## 2023-01-30 RX ORDER — HEPARIN SODIUM (PORCINE) LOCK FLUSH IV SOLN 100 UNIT/ML 100 UNIT/ML
500 SOLUTION INTRAVENOUS PRN
OUTPATIENT
Start: 2023-02-07

## 2023-01-30 RX ORDER — DULOXETIN HYDROCHLORIDE 30 MG/1
CAPSULE, DELAYED RELEASE ORAL
COMMUNITY
Start: 2023-01-11

## 2023-01-30 RX ORDER — DIPHENHYDRAMINE HYDROCHLORIDE 50 MG/ML
50 INJECTION INTRAMUSCULAR; INTRAVENOUS
OUTPATIENT
Start: 2023-02-07

## 2023-01-30 RX ORDER — EPINEPHRINE 1 MG/ML
0.3 INJECTION, SOLUTION, CONCENTRATE INTRAVENOUS PRN
OUTPATIENT
Start: 2023-02-07

## 2023-01-30 RX ORDER — FAMOTIDINE 10 MG/ML
20 INJECTION, SOLUTION INTRAVENOUS
OUTPATIENT
Start: 2023-02-07

## 2023-01-30 RX ORDER — MONTELUKAST SODIUM 10 MG/1
10 TABLET ORAL DAILY
Qty: 90 TABLET | Refills: 0 | Status: SHIPPED | OUTPATIENT
Start: 2023-01-30

## 2023-01-30 RX ORDER — SODIUM CHLORIDE 9 MG/ML
5-250 INJECTION, SOLUTION INTRAVENOUS PRN
OUTPATIENT
Start: 2023-02-07

## 2023-01-30 RX ORDER — ONDANSETRON 2 MG/ML
8 INJECTION INTRAMUSCULAR; INTRAVENOUS
OUTPATIENT
Start: 2023-02-07

## 2023-01-30 ASSESSMENT — PATIENT HEALTH QUESTIONNAIRE - PHQ9
SUM OF ALL RESPONSES TO PHQ QUESTIONS 1-9: 0
2. FEELING DOWN, DEPRESSED OR HOPELESS: 0
SUM OF ALL RESPONSES TO PHQ QUESTIONS 1-9: 0

## 2023-01-30 NOTE — PATIENT INSTRUCTIONS
Patient Instructions from Today's Visit    Reason for Visit:  Follow Up    Diagnosis Information:  [email or web address] net/about-us/asco-answers-patient-education-materials/rdvz-vqkjrcp-bcmn-sheets    Plan:  Lab work looks stable, but Iron level is coming down again  We will schedule you for IV Iron Treatments (2)  We did not find any evidence of a bone marrow issue in past blood work  We would like you to have additional blood work today so that we can figure out why you developed a DVT    Follow Up: We will bring you back in April for a follow up with  and lab work prior.     Recent Lab Results:  Hospital Outpatient Visit on 01/30/2023   Component Date Value Ref Range Status    WBC 01/30/2023 6.1  4.3 - 11.1 K/uL Final    RBC 01/30/2023 4.27  4.05 - 5.2 M/uL Final    Hemoglobin 01/30/2023 11.9  11.7 - 15.4 g/dL Final    Hematocrit 01/30/2023 38.0  35.8 - 46.3 % Final    MCV 01/30/2023 89.0  82.0 - 102.0 FL Final    MCH 01/30/2023 27.9  26.1 - 32.9 PG Final    MCHC 01/30/2023 31.3 (A)  31.4 - 35.0 g/dL Final    RDW 01/30/2023 15.7 (A)  11.9 - 14.6 % Final    Platelets 31/84/7450 350  150 - 450 K/uL Final    MPV 01/30/2023 9.1 (A)  9.4 - 12.3 FL Final    nRBC 01/30/2023 0.00  0.0 - 0.2 K/uL Final    **Note: Absolute NRBC parameter is now reported with Hemogram**    Seg Neutrophils 01/30/2023 61  43 - 78 % Final    Lymphocytes 01/30/2023 27  13 - 44 % Final    Monocytes 01/30/2023 8  4.0 - 12.0 % Final    Eosinophils % 01/30/2023 3  0.5 - 7.8 % Final    Basophils 01/30/2023 1  0.0 - 2.0 % Final    Immature Granulocytes 01/30/2023 0  0.0 - 5.0 % Final    Segs Absolute 01/30/2023 3.7  1.7 - 8.2 K/UL Final    Absolute Lymph # 01/30/2023 1.7  0.5 - 4.6 K/UL Final    Absolute Mono # 01/30/2023 0.5  0.1 - 1.3 K/UL Final    Absolute Eos # 01/30/2023 0.2  0.0 - 0.8 K/UL Final    Basophils Absolute 01/30/2023 0.0  0.0 - 0.2 K/UL Final    Absolute Immature Granulocyte 01/30/2023 0.0  0.0 - 0.5 K/UL Final Differential Type 01/30/2023 AUTOMATED    Final    Sodium 01/30/2023 141  133 - 143 mmol/L Final    Potassium 01/30/2023 4.0  3.5 - 5.1 mmol/L Final    Chloride 01/30/2023 107  101 - 110 mmol/L Final    CO2 01/30/2023 27  21 - 32 mmol/L Final    Anion Gap 01/30/2023 7  2 - 11 mmol/L Final    Glucose 01/30/2023 90  65 - 100 mg/dL Final    BUN 01/30/2023 11  6 - 23 MG/DL Final    Creatinine 01/30/2023 1.30 (A)  0.6 - 1.0 MG/DL Final    Est, Glom Filt Rate 01/30/2023 51 (A)  >60 ml/min/1.73m2 Final    Comment:      Pediatric calculator link: [employer].at. org/professionals/kdoqi/gfr_calculatorped       These results are not intended for use in patients <25years of age. eGFR results are calculated without a race factor using  the 2021 CKD-EPI equation. Careful clinical correlation is recommended, particularly when comparing to results calculated using previous equations. The CKD-EPI equation is less accurate in patients with extremes of muscle mass, extra-renal metabolism of creatinine, excessive creatine ingestion, or following therapy that affects renal tubular secretion.       Calcium 01/30/2023 9.3  8.3 - 10.4 MG/DL Final    Total Bilirubin 01/30/2023 0.6  0.2 - 1.1 MG/DL Final    ALT 01/30/2023 34  12 - 65 U/L Final    AST 01/30/2023 20  15 - 37 U/L Final    Alk Phosphatase 01/30/2023 71  50 - 136 U/L Final    Total Protein 01/30/2023 7.9  6.3 - 8.2 g/dL Final    Albumin 01/30/2023 3.8  3.5 - 5.0 g/dL Final    Globulin 01/30/2023 4.1  2.8 - 4.5 g/dL Final    Albumin/Globulin Ratio 01/30/2023 0.9  0.4 - 1.6   Final    Ferritin 01/30/2023 7 (A)  8 - 388 NG/ML Final         Treatment Summary has been discussed and given to patient: n/a        -------------------------------------------------------------------------------------------------------------------  Please call our office at [phone] if you have any  of the following symptoms:   Fever of 100.5 or greater  Chills  Shortness of breath  Swelling or pain in one leg    After office hours an answering service is available and will contact a provider for emergencies or if you are experiencing any of the above symptoms. Patient has My Chart. My Chart log in information explained on the after visit summary printout at the [facility] [address] desk.     Jer Hayes MA

## 2023-01-30 NOTE — PROGRESS NOTES
[address]  Phone: [phone]           1/30/2023  Parul Riley  1975  [id number]        Parul Riley is a 49year old -American female who has returned to my clinic for a follow-up visit; she was initially referred to me by Ottoniel Brady NP for evaluation of Leukopenia and Iron Deficiency Anemia; her Myeloid Disorders Profile was unremarkable. In 12/2022 she was found to have an unprovoked left leg DVT and is now on Eliquis 5 mg BID. ALLERGIES:    Ciprofloxacin and sulfa drugs. FAMILY HISTORY:     Her sister had Hodgkin Lymphoma. SOCIAL HISTORY:    She is single and lives with her son. She used to work as a  for [employer]. She denies ever using any tobacco products. PAST MEDICAL HISTORY:     Hypertension, Asthma, Bipolar Disorder, Osteoarthritis, renal insufficiency, CVA, left leg DVT, Diabetes Mellitus, GERD, Iron Deficiency Anemia and Neuropathy. ROS:  The patient complained of fatigue and arthralgias; all other systems negative. PHYSICAL EXAM:   The patient was alert, awake and oriented, no acute distress was noted. Oral examination did not reveal any mucosal lesions. Lymph node examination did not reveal any adenopathy. Neck examination revealed a supple neck, no thyromegaly or masses were noted. Chest examination revealed normal vesicular breath sounds. Heart examination revealed S-1 and S-2 without any murmurs. Abdominal examination revealed a non-tender abdomen, bowel sounds were positive, no organomegaly could be appreciated. Examination of the extremities did not reveal any tenderness or erythema, 1+ bilateral pedal edema was noted. Examination of the skin did not reveal any lesions. KPS:     70. LABORATORY INVESTIGATIONS:  CBC showed a WBC count of 6.1, ANC was 3.7, Hemoglobin was 11.9 and Platelets were 149; her Ferritin level was only 7.  Medical problems and test results were reviewed with the patient today. ASSESSMENT:    Iron Deficiency Anemia; left leg DVT; chronic GERD. I spent a total of 42 minutes on the day of the visit, managing the care of this patient. PLAN:   She should continue taking Omeprazole and Eliquis 5 mg BID. I will arrange for her to receive a course of Venofer, her partial Hypercoagulable work-up is pending. Later she will need to undergo an EGD and a Colonoscopy; at her next clinic visit I will re-check her CBC, CMP, D-dimers and Ferritin level.         Anya Hughes MD  Hematology/BMT

## 2023-01-31 LAB — HCYS SERPL-SCNC: 15.7 UMOL/L (ref 0–14.5)

## 2023-02-02 ENCOUNTER — OFFICE VISIT (OUTPATIENT)
Dept: OBGYN CLINIC | Age: 48
End: 2023-02-02
Payer: MEDICARE

## 2023-02-02 VITALS
BODY MASS INDEX: 45.99 KG/M2 | SYSTOLIC BLOOD PRESSURE: 122 MMHG | HEIGHT: 67 IN | WEIGHT: 293 LBS | DIASTOLIC BLOOD PRESSURE: 82 MMHG

## 2023-02-02 DIAGNOSIS — N92.1 MENOMETRORRHAGIA: Primary | ICD-10-CM

## 2023-02-02 DIAGNOSIS — I82.542: ICD-10-CM

## 2023-02-02 LAB — F5 P.R506Q BLD/T QL: NORMAL

## 2023-02-02 PROCEDURE — 1036F TOBACCO NON-USER: CPT | Performed by: OBSTETRICS & GYNECOLOGY

## 2023-02-02 PROCEDURE — G8417 CALC BMI ABV UP PARAM F/U: HCPCS | Performed by: OBSTETRICS & GYNECOLOGY

## 2023-02-02 PROCEDURE — 3078F DIAST BP <80 MM HG: CPT | Performed by: OBSTETRICS & GYNECOLOGY

## 2023-02-02 PROCEDURE — G8427 DOCREV CUR MEDS BY ELIG CLIN: HCPCS | Performed by: OBSTETRICS & GYNECOLOGY

## 2023-02-02 PROCEDURE — 99214 OFFICE O/P EST MOD 30 MIN: CPT | Performed by: OBSTETRICS & GYNECOLOGY

## 2023-02-02 PROCEDURE — 3074F SYST BP LT 130 MM HG: CPT | Performed by: OBSTETRICS & GYNECOLOGY

## 2023-02-02 PROCEDURE — G8484 FLU IMMUNIZE NO ADMIN: HCPCS | Performed by: OBSTETRICS & GYNECOLOGY

## 2023-02-03 ENCOUNTER — CLINICAL DOCUMENTATION (OUTPATIENT)
Dept: OBGYN CLINIC | Age: 48
End: 2023-02-03

## 2023-02-03 NOTE — PROGRESS NOTES
Received message from Dr Cat Brice to cancel surgery scheduled for 2/8/23 due to recent diagnosis of DVT. Pt will be rescheduled when clearance is granted. Surgery has been cancelled.

## 2023-02-07 ENCOUNTER — HOSPITAL ENCOUNTER (OUTPATIENT)
Dept: INFUSION THERAPY | Age: 48
Discharge: HOME | End: 2023-02-07
Payer: MEDICARE

## 2023-02-07 VITALS
HEART RATE: 105 BPM | SYSTOLIC BLOOD PRESSURE: 133 MMHG | TEMPERATURE: 97.6 F | RESPIRATION RATE: 18 BRPM | OXYGEN SATURATION: 97 % | DIASTOLIC BLOOD PRESSURE: 72 MMHG

## 2023-02-07 DIAGNOSIS — D50.8 OTHER IRON DEFICIENCY ANEMIAS: Primary | ICD-10-CM

## 2023-02-07 PROCEDURE — 2580000003 HC RX 258: Performed by: INTERNAL MEDICINE

## 2023-02-07 PROCEDURE — 96365 THER/PROPH/DIAG IV INF INIT: CPT

## 2023-02-07 PROCEDURE — 6360000002 HC RX W HCPCS: Performed by: INTERNAL MEDICINE

## 2023-02-07 PROCEDURE — 96366 THER/PROPH/DIAG IV INF ADDON: CPT

## 2023-02-07 RX ORDER — ACETAMINOPHEN 325 MG/1
650 TABLET ORAL
OUTPATIENT
Start: 2023-02-21

## 2023-02-07 RX ORDER — SODIUM CHLORIDE 9 MG/ML
5-250 INJECTION, SOLUTION INTRAVENOUS PRN
OUTPATIENT
Start: 2023-02-21

## 2023-02-07 RX ORDER — SODIUM CHLORIDE 0.9 % (FLUSH) 0.9 %
5-40 SYRINGE (ML) INJECTION PRN
OUTPATIENT
Start: 2023-02-21

## 2023-02-07 RX ORDER — SODIUM CHLORIDE 0.9 % (FLUSH) 0.9 %
5-40 SYRINGE (ML) INJECTION PRN
Status: DISCONTINUED | OUTPATIENT
Start: 2023-02-07 | End: 2023-02-08 | Stop reason: HOSPADM

## 2023-02-07 RX ORDER — SODIUM CHLORIDE 9 MG/ML
INJECTION, SOLUTION INTRAVENOUS CONTINUOUS
Status: DISCONTINUED | OUTPATIENT
Start: 2023-02-07 | End: 2023-02-08 | Stop reason: HOSPADM

## 2023-02-07 RX ORDER — ALBUTEROL SULFATE 90 UG/1
4 AEROSOL, METERED RESPIRATORY (INHALATION) PRN
OUTPATIENT
Start: 2023-02-21

## 2023-02-07 RX ORDER — SODIUM CHLORIDE 9 MG/ML
INJECTION, SOLUTION INTRAVENOUS CONTINUOUS
OUTPATIENT
Start: 2023-02-21

## 2023-02-07 RX ORDER — EPINEPHRINE 1 MG/ML
0.3 INJECTION, SOLUTION, CONCENTRATE INTRAVENOUS PRN
OUTPATIENT
Start: 2023-02-21

## 2023-02-07 RX ORDER — HEPARIN SODIUM (PORCINE) LOCK FLUSH IV SOLN 100 UNIT/ML 100 UNIT/ML
500 SOLUTION INTRAVENOUS PRN
OUTPATIENT
Start: 2023-02-21

## 2023-02-07 RX ORDER — ONDANSETRON 2 MG/ML
8 INJECTION INTRAMUSCULAR; INTRAVENOUS
OUTPATIENT
Start: 2023-02-21

## 2023-02-07 RX ORDER — DIPHENHYDRAMINE HYDROCHLORIDE 50 MG/ML
50 INJECTION INTRAMUSCULAR; INTRAVENOUS
OUTPATIENT
Start: 2023-02-21

## 2023-02-07 RX ADMIN — SODIUM CHLORIDE, PRESERVATIVE FREE 10 ML: 5 INJECTION INTRAVENOUS at 14:19

## 2023-02-07 RX ADMIN — IRON SUCROSE 500 MG: 20 INJECTION, SOLUTION INTRAVENOUS at 14:29

## 2023-02-07 RX ADMIN — SODIUM CHLORIDE: 9 INJECTION, SOLUTION INTRAVENOUS at 14:20

## 2023-02-07 ASSESSMENT — PAIN DESCRIPTION - LOCATION: LOCATION: GENERALIZED

## 2023-02-07 ASSESSMENT — PAIN SCALES - GENERAL: PAINLEVEL_OUTOF10: 7

## 2023-02-07 ASSESSMENT — PAIN DESCRIPTION - PAIN TYPE: TYPE: CHRONIC PAIN

## 2023-02-07 NOTE — PROGRESS NOTES
Patient ambulatory with cane to infusion. Venofer infusion completed. Monitored x30 min. Discharged ambulatory.

## 2023-02-14 PROBLEM — I82.542: Status: ACTIVE | Noted: 2023-02-14

## 2023-02-14 NOTE — PROGRESS NOTES
Esequiel Daily Candida  49 y.o.  1975  [id number]    Today:  23                *    Chief Complaint   Patient presents with    Follow-up       Subjective:  Guerline Butler is a 49 y.o. female, No obstetric history on file. , presents today with concerns regarding:     Was scheduled for surgery due to 48 yo w/ menometrorrhagia, bmi 49. Recently dxd w/ a dvt, is on Eliquis for 6 m, advised not to under go surgery. Pt stopped progesterone    Sees \"slight blood  w/ wiping\"     Patient's last menstrual period was 2023 (approximate). OB History    Para Term  AB Living   3 0 1 1 0 3   SAB IAB Ectopic Molar Multiple Live Births   0 0 0 0 0 0   Obstetric Comments   --> , daughter          55, twins --> emergency c-s -->Son---> survived, legally blind, autistic, needs daily assistance.     Daughter-->    --> c-s, daughter               Allergies   Allergen Reactions    Ciprofloxacin Itching    Sulfa Antibiotics Itching    Xarelto [Rivaroxaban] Itching       Current Outpatient Medications   Medication Sig    DULoxetine (CYMBALTA) 30 MG extended release capsule     montelukast (SINGULAIR) 10 MG tablet Take 1 tablet by mouth daily    hydrOXYzine HCl (ATARAX) 50 MG tablet Take 1 tablet by mouth every 8 hours as needed for Itching    omeprazole (PRILOSEC) 20 MG delayed release capsule Take 1 capsule by mouth Daily TAKE 1 CAPSULE BY MOUTH EVERY DAY    apixaban (ELIQUIS) 5 MG TABS tablet Take 1 tablet by mouth 2 times daily    Cholecalciferol (VITAMIN D3) 50 MCG ( UT) CAPS Take by mouth    docusate sodium (COLACE) 100 MG capsule Take 100 mg by mouth 2 times daily    acetaminophen (TYLENOL) 500 MG tablet Take 500 mg by mouth every 6 hours as needed for Pain    Ascorbic Acid (VITAJOY DAILY C GUMMIES) 125 MG CHEW Take by mouth    albuterol sulfate HFA (PROVENTIL;VENTOLIN;PROAIR) 108 (90 Base) MCG/ACT inhaler Inhale 2 puffs into the lungs every 6 hours as needed for Wheezing or Shortness of Breath TAKE 2 PUFFS BY MOUTH EVERY 4 HOURS AS NEEDED    fluticasone-salmeterol (ADVIAR) 100-50 MCG/ACT AEPB diskus inhaler Inhale 1 puff into the lungs in the morning and 1 puff in the evening. Compression Stockings MISC by Does not apply route    losartan (COZAAR) 50 MG tablet Take 1 tablet by mouth in the morning. albuterol (PROVENTIL) (2.5 MG/3ML) 0.083% nebulizer solution Inhale 2.5 mg into the lungs every 6 hours as needed    ascorbic acid (VITAMIN C) 500 MG tablet Take 500 mg by mouth daily    cetirizine (ZYRTEC) 10 MG tablet TAKE 1 TABLET BY MOUTH EVERY DAY    fluticasone (FLONASE) 50 MCG/ACT nasal spray 2 sprays by Nasal route daily    methocarbamol (ROBAXIN) 750 MG tablet Take 750 mg by mouth 4 times daily as needed    nystatin (MYCOSTATIN) 144125 UNIT/ML suspension Take 5 mLs by mouth 3 times daily as needed (thrush) Swish and spit (Patient not taking: Reported on 2/2/2023)    ondansetron (ZOFRAN-ODT) 4 MG disintegrating tablet Take 1 tablet by mouth 3 times daily as needed for Nausea or Vomiting (Patient not taking: No sig reported)     No current facility-administered medications for this visit.        Past Medical History:   Diagnosis Date    Anxiety     Arthritis     Bipolar affective disorder ([facility] 11 yrs old    Cellulitis 01/08/2010    denies currently     CKD (chronic kidney disease) stage 3, GFR 30-59 ml/min ([facility])     Diabetes ([facility] dx:1/09    Unknown A1C    Diabetes ([facility] 01/08/2010    had multiple TIA's from low bs in 2014 and hasn't been on any meds since    Diabetes mellitus type II, non insulin dependent ([address] 03/08/2015    Edema of both legs 01/08/2010    ankles     Femur fracture, left ([facility]) 03/08/2015    GERD (gastroesophageal reflux disease)     denies    Gout     Hypertension     no meds    Moderate persistent asthma without complication 69/56/1296    Morbid obesity ([address] lifetime    Non compliance with medical treatment long term    Orthopnea     Other ill-defined conditions(799.89)     cellulitis    Peripheral neuropathy 2 years    Peripheral vascular disease ([facility]     Psychiatric disorder     bipolar disorder    Renal insufficiency 2010    Stroke ([facility]     multiple TIA's in 2014; no residual     Unspecified sleep apnea     does not use cpap    Vertigo 2015       Past Surgical History:   Procedure Laterality Date    DELIVERY   ,     both preemies    HEENT      Tonsillectomy/SFE/18    HERNIA REPAIR  age 10    TONSILLECTOMY      TOTAL HIP ARTHROPLASTY Left 2011    Dr. Funmi Humphries History     Socioeconomic History    Marital status: Single     Spouse name: None    Number of children: None    Years of education: None    Highest education level: None   Tobacco Use    Smoking status: Never    Smokeless tobacco: Never   Substance and Sexual Activity    Alcohol use: No    Drug use: Yes     Types: Prescription   Social History Narrative    Patient is single, lives with disabled father, 15year old disabled son and 8year old daughter. 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  She reports a lot of stress, including her 12year old        0:  lives with her 31 yo special needs son and her daughter (? Serenity) and her daughter's child      Social Determinants of Health     Financial Resource Strain: Low Risk     Difficulty of Paying Living Expenses: Not hard at all   Food Insecurity: No Food Insecurity    Worried About Running Out of Food in the Last Year: Never true    Ran Out of Food in the Last Year: Never true       Family History   Problem Relation Age of Onset    No Known Problems Mother     Heart Attack Father     Other Father         aneurysm age [id number], now brain damaged    Heart Disease Father     Cancer Sister         hodgkins    Breast Cancer Sister     Breast Cancer Sister     Diabetes Paternal Grandmother     Other Son         24 week twin, LD Review of Systems    Objective: /82   Ht 5' 7\" (1.702 m)   Wt (!) 320 lb (145.2 kg)   LMP 01/14/2023 (Approximate)   BMI 50.12 kg/m²   Brittany Gonzalez is a 49 y.o. female who appears pleasant, well developed, well nourished, with good attention to hygiene and body habitus. In no acute distress. Psych:  Oriented to person, place and time. Mood and affect are normal and appropriate to the situation. Short-term memory and understanding intact    Eyes: Sclera are clear. Conjunctiva and lids within normal limits. No icterus. Ears and Nose: no gross deformities to visual inspection, gross hearing intact     PE not repeated. A/P:  12/202 doppler:   1. No evidence of above knee deep venous thrombosis in either lower extremity. 2.  Nonocclusive left posterior tibial vein thrombus below the knee. cannot proceed w/ surgery until ~ 7/2023. May need pre-op cardiac clearance (dig though chart to see if it was done. No pap smear found on pt's chart-->needs AE. Significant comorbidities:        F/u 3m-assess response to tx    Re ROS--> non gynecologic concerns referred back to her PCP or appropriate specialist.            ICD-10-CM    1. Menometrorrhagia  N92.1       2. BMI 50.0-59.9, adult ([facility]  Z68.43       3. 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 Follow up office visit:  2/23/2023    More than 50% of this 35+ minute visit was spent addressing the above patient concerns including:  assessment, extensive chart review, physical exam and counseling the patient about the above concerns including evaluation plan and treatment strategies. Long conversation with patient, all her questions answered and addressed all her concerns.     Mg Fuentes MD, [place] [place]

## 2023-02-22 ENCOUNTER — HOSPITAL ENCOUNTER (OUTPATIENT)
Dept: INFUSION THERAPY | Age: 48
Discharge: HOME | End: 2023-02-22
Payer: MEDICARE

## 2023-02-22 VITALS
TEMPERATURE: 97.3 F | RESPIRATION RATE: 18 BRPM | OXYGEN SATURATION: 98 % | HEART RATE: 104 BPM | SYSTOLIC BLOOD PRESSURE: 144 MMHG | DIASTOLIC BLOOD PRESSURE: 88 MMHG

## 2023-02-22 DIAGNOSIS — D50.8 OTHER IRON DEFICIENCY ANEMIAS: Primary | ICD-10-CM

## 2023-02-22 PROCEDURE — 2580000003 HC RX 258: Performed by: INTERNAL MEDICINE

## 2023-02-22 PROCEDURE — 96366 THER/PROPH/DIAG IV INF ADDON: CPT

## 2023-02-22 PROCEDURE — 96365 THER/PROPH/DIAG IV INF INIT: CPT

## 2023-02-22 PROCEDURE — 6360000002 HC RX W HCPCS: Performed by: INTERNAL MEDICINE

## 2023-02-22 RX ORDER — ACETAMINOPHEN 325 MG/1
650 TABLET ORAL
OUTPATIENT
Start: 2023-02-22

## 2023-02-22 RX ORDER — DIPHENHYDRAMINE HYDROCHLORIDE 50 MG/ML
50 INJECTION INTRAMUSCULAR; INTRAVENOUS
OUTPATIENT
Start: 2023-02-22

## 2023-02-22 RX ORDER — EPINEPHRINE 1 MG/ML
0.3 INJECTION, SOLUTION, CONCENTRATE INTRAVENOUS PRN
OUTPATIENT
Start: 2023-02-22

## 2023-02-22 RX ORDER — ONDANSETRON 2 MG/ML
8 INJECTION INTRAMUSCULAR; INTRAVENOUS
OUTPATIENT
Start: 2023-02-22

## 2023-02-22 RX ORDER — HEPARIN SODIUM (PORCINE) LOCK FLUSH IV SOLN 100 UNIT/ML 100 UNIT/ML
500 SOLUTION INTRAVENOUS PRN
OUTPATIENT
Start: 2023-02-22

## 2023-02-22 RX ORDER — ONDANSETRON 2 MG/ML
8 INJECTION INTRAMUSCULAR; INTRAVENOUS
Status: DISCONTINUED | OUTPATIENT
Start: 2023-02-22 | End: 2023-02-23 | Stop reason: HOSPADM

## 2023-02-22 RX ORDER — SODIUM CHLORIDE 0.9 % (FLUSH) 0.9 %
5-40 SYRINGE (ML) INJECTION PRN
Status: DISCONTINUED | OUTPATIENT
Start: 2023-02-22 | End: 2023-02-23 | Stop reason: HOSPADM

## 2023-02-22 RX ORDER — ALBUTEROL SULFATE 90 UG/1
4 AEROSOL, METERED RESPIRATORY (INHALATION) PRN
OUTPATIENT
Start: 2023-02-22

## 2023-02-22 RX ORDER — SODIUM CHLORIDE 9 MG/ML
INJECTION, SOLUTION INTRAVENOUS CONTINUOUS
Status: DISCONTINUED | OUTPATIENT
Start: 2023-02-22 | End: 2023-02-23 | Stop reason: HOSPADM

## 2023-02-22 RX ORDER — SODIUM CHLORIDE 9 MG/ML
5-250 INJECTION, SOLUTION INTRAVENOUS PRN
OUTPATIENT
Start: 2023-02-22

## 2023-02-22 RX ORDER — SODIUM CHLORIDE 9 MG/ML
INJECTION, SOLUTION INTRAVENOUS CONTINUOUS
OUTPATIENT
Start: 2023-02-22

## 2023-02-22 RX ORDER — DIPHENHYDRAMINE HYDROCHLORIDE 50 MG/ML
50 INJECTION INTRAMUSCULAR; INTRAVENOUS
Status: DISCONTINUED | OUTPATIENT
Start: 2023-02-22 | End: 2023-02-23 | Stop reason: HOSPADM

## 2023-02-22 RX ORDER — SODIUM CHLORIDE 0.9 % (FLUSH) 0.9 %
5-40 SYRINGE (ML) INJECTION PRN
OUTPATIENT
Start: 2023-02-22

## 2023-02-22 RX ADMIN — SODIUM CHLORIDE: 9 INJECTION, SOLUTION INTRAVENOUS at 12:40

## 2023-02-22 RX ADMIN — IRON SUCROSE 500 MG: 20 INJECTION, SOLUTION INTRAVENOUS at 12:58

## 2023-02-22 ASSESSMENT — PAIN DESCRIPTION - PAIN TYPE: TYPE: CHRONIC PAIN

## 2023-02-22 ASSESSMENT — PAIN DESCRIPTION - LOCATION: LOCATION: HIP;LEG

## 2023-02-22 ASSESSMENT — PAIN SCALES - GENERAL: PAINLEVEL_OUTOF10: 7

## 2023-02-22 NOTE — PROGRESS NOTES
Arrived to the infusion center. Venofer completed without signs of adverse reaction. No new issues or complaints offered during the visit. No further infusions scheduled at this time.  Discharged ambulatory with a cane in satisfactory condition

## 2023-02-23 ENCOUNTER — OFFICE VISIT (OUTPATIENT)
Dept: FAMILY MEDICINE CLINIC | Facility: CLINIC | Age: 48
End: 2023-02-23
Payer: MEDICARE

## 2023-02-23 VITALS
HEIGHT: 67 IN | BODY MASS INDEX: 45.99 KG/M2 | TEMPERATURE: 97.8 F | WEIGHT: 293 LBS | OXYGEN SATURATION: 98 % | SYSTOLIC BLOOD PRESSURE: 110 MMHG | DIASTOLIC BLOOD PRESSURE: 60 MMHG | HEART RATE: 85 BPM

## 2023-02-23 DIAGNOSIS — I82.542: ICD-10-CM

## 2023-02-23 DIAGNOSIS — I10 PRIMARY HYPERTENSION: ICD-10-CM

## 2023-02-23 DIAGNOSIS — D64.9 ANEMIA, UNSPECIFIED TYPE: ICD-10-CM

## 2023-02-23 DIAGNOSIS — L29.9 ITCHING: ICD-10-CM

## 2023-02-23 DIAGNOSIS — N18.31 STAGE 3A CHRONIC KIDNEY DISEASE (HCC): ICD-10-CM

## 2023-02-23 DIAGNOSIS — R79.89 ELEVATED SERUM CREATININE: Primary | ICD-10-CM

## 2023-02-23 LAB
ALBUMIN SERPL-MCNC: 3.6 G/DL (ref 3.5–5)
ALBUMIN/GLOB SERPL: 1 (ref 0.4–1.6)
ALP SERPL-CCNC: 71 U/L (ref 50–136)
ALT SERPL-CCNC: 25 U/L (ref 12–65)
ANION GAP SERPL CALC-SCNC: 5 MMOL/L (ref 2–11)
AST SERPL-CCNC: 20 U/L (ref 15–37)
BASOPHILS # BLD: 0 K/UL (ref 0–0.2)
BASOPHILS NFR BLD: 1 % (ref 0–2)
BILIRUB SERPL-MCNC: 0.7 MG/DL (ref 0.2–1.1)
BUN SERPL-MCNC: 8 MG/DL (ref 6–23)
CALCIUM SERPL-MCNC: 9.5 MG/DL (ref 8.3–10.4)
CHLORIDE SERPL-SCNC: 109 MMOL/L (ref 101–110)
CO2 SERPL-SCNC: 26 MMOL/L (ref 21–32)
CREAT SERPL-MCNC: 1.1 MG/DL (ref 0.6–1)
DIFFERENTIAL METHOD BLD: ABNORMAL
EOSINOPHIL # BLD: 0.3 K/UL (ref 0–0.8)
EOSINOPHIL NFR BLD: 6 % (ref 0.5–7.8)
ERYTHROCYTE [DISTWIDTH] IN BLOOD BY AUTOMATED COUNT: 15.9 % (ref 11.9–14.6)
GLOBULIN SER CALC-MCNC: 3.5 G/DL (ref 2.8–4.5)
GLUCOSE SERPL-MCNC: 92 MG/DL (ref 65–100)
HCT VFR BLD AUTO: 40.2 % (ref 35.8–46.3)
HGB BLD-MCNC: 12.3 G/DL (ref 11.7–15.4)
IMM GRANULOCYTES # BLD AUTO: 0 K/UL (ref 0–0.5)
IMM GRANULOCYTES NFR BLD AUTO: 0 % (ref 0–5)
LYMPHOCYTES # BLD: 1.3 K/UL (ref 0.5–4.6)
LYMPHOCYTES NFR BLD: 29 % (ref 13–44)
MCH RBC QN AUTO: 28.5 PG (ref 26.1–32.9)
MCHC RBC AUTO-ENTMCNC: 30.6 G/DL (ref 31.4–35)
MCV RBC AUTO: 93.1 FL (ref 82–102)
MONOCYTES # BLD: 0.5 K/UL (ref 0.1–1.3)
MONOCYTES NFR BLD: 10 % (ref 4–12)
NEUTS SEG # BLD: 2.4 K/UL (ref 1.7–8.2)
NEUTS SEG NFR BLD: 54 % (ref 43–78)
NRBC # BLD: 0 K/UL (ref 0–0.2)
PLATELET # BLD AUTO: 297 K/UL (ref 150–450)
PMV BLD AUTO: 10 FL (ref 9.4–12.3)
POTASSIUM SERPL-SCNC: 4.2 MMOL/L (ref 3.5–5.1)
PROT SERPL-MCNC: 7.1 G/DL (ref 6.3–8.2)
RBC # BLD AUTO: 4.32 M/UL (ref 4.05–5.2)
SODIUM SERPL-SCNC: 140 MMOL/L (ref 133–143)
WBC # BLD AUTO: 4.5 K/UL (ref 4.3–11.1)

## 2023-02-23 PROCEDURE — 99214 OFFICE O/P EST MOD 30 MIN: CPT | Performed by: NURSE PRACTITIONER

## 2023-02-23 PROCEDURE — G8427 DOCREV CUR MEDS BY ELIG CLIN: HCPCS | Performed by: NURSE PRACTITIONER

## 2023-02-23 PROCEDURE — G8417 CALC BMI ABV UP PARAM F/U: HCPCS | Performed by: NURSE PRACTITIONER

## 2023-02-23 PROCEDURE — 3078F DIAST BP <80 MM HG: CPT | Performed by: NURSE PRACTITIONER

## 2023-02-23 PROCEDURE — 1036F TOBACCO NON-USER: CPT | Performed by: NURSE PRACTITIONER

## 2023-02-23 PROCEDURE — G8484 FLU IMMUNIZE NO ADMIN: HCPCS | Performed by: NURSE PRACTITIONER

## 2023-02-23 PROCEDURE — 3074F SYST BP LT 130 MM HG: CPT | Performed by: NURSE PRACTITIONER

## 2023-02-23 RX ORDER — HYDROXYZINE HYDROCHLORIDE 25 MG/1
25 TABLET, FILM COATED ORAL EVERY 8 HOURS PRN
Qty: 30 TABLET | Refills: 0 | Status: SHIPPED | OUTPATIENT
Start: 2023-02-23 | End: 2023-03-05

## 2023-02-23 RX ORDER — HYDROXYZINE 50 MG/1
50 TABLET, FILM COATED ORAL EVERY 8 HOURS PRN
Qty: 30 TABLET | Refills: 0 | Status: CANCELLED | OUTPATIENT
Start: 2023-02-23

## 2023-02-23 SDOH — ECONOMIC STABILITY: HOUSING INSECURITY
IN THE LAST 12 MONTHS, WAS THERE A TIME WHEN YOU DID NOT HAVE A STEADY PLACE TO SLEEP OR SLEPT IN A SHELTER (INCLUDING NOW)?: NO

## 2023-02-23 SDOH — ECONOMIC STABILITY: FOOD INSECURITY: WITHIN THE PAST 12 MONTHS, YOU WORRIED THAT YOUR FOOD WOULD RUN OUT BEFORE YOU GOT MONEY TO BUY MORE.: NEVER TRUE

## 2023-02-23 SDOH — ECONOMIC STABILITY: FOOD INSECURITY: WITHIN THE PAST 12 MONTHS, THE FOOD YOU BOUGHT JUST DIDN'T LAST AND YOU DIDN'T HAVE MONEY TO GET MORE.: NEVER TRUE

## 2023-02-23 SDOH — ECONOMIC STABILITY: INCOME INSECURITY: HOW HARD IS IT FOR YOU TO PAY FOR THE VERY BASICS LIKE FOOD, HOUSING, MEDICAL CARE, AND HEATING?: NOT HARD AT ALL

## 2023-02-23 ASSESSMENT — ENCOUNTER SYMPTOMS
ROS SKIN COMMENTS: URTICARIA
RESPIRATORY NEGATIVE: 1
EYES NEGATIVE: 1
ALLERGIC/IMMUNOLOGIC NEGATIVE: 1
GASTROINTESTINAL NEGATIVE: 1

## 2023-02-23 ASSESSMENT — PATIENT HEALTH QUESTIONNAIRE - PHQ9
1. LITTLE INTEREST OR PLEASURE IN DOING THINGS: 0
SUM OF ALL RESPONSES TO PHQ QUESTIONS 1-9: 0

## 2023-02-23 NOTE — PROGRESS NOTES
[address]  [address] [place] [address], [address] [facility]   (ph) [phone] (fax) [phone]  Nenita GARCIA, FNP-C      Chief Complaint   Patient presents with    Follow-up    Medication Refill       Patient is a 41-year-old female comes in as a recheck. She presented to the ER on 12/13  with bilateral lower extremity for few weeks. .  Bilateral Doppler study showed  acute left posterior tibial DVT. She was started on Xarelto and was to follow-up with her PCP. In the meantime, she iwas changing PCPs and had not followed up with Primary Care. She then returned to ER on 12/23 with a rash that they contributed to the Xarelto. It was stopped and Eliquis started bid. At last visit, she was contemplating having a D&C, I advised that she see hematology first.  She is now to postpone surgeries and have a D&C and Pain management surgery later this year 07/23 after discussing with Dr. Jinny Welsh. Dr. Mile Sutherland is ok with this and has seen pt recently.      Medication Refill      Allergies   Allergen Reactions    Ciprofloxacin Itching    Sulfa Antibiotics Itching    Xarelto [Rivaroxaban] Itching       Past Medical History:   Diagnosis Date    Anxiety     Arthritis     Bipolar affective disorder ([address] 11 yrs old    Cellulitis 01/08/2010    denies currently     CKD (chronic kidney disease) stage 3, GFR 30-59 ml/min ([facility]     Diabetes ([address] dx:1/09    Unknown A1C    Diabetes ([address] 01/08/2010    had multiple TIA's from low bs in 2014 and hasn't been on any meds since    Diabetes mellitus type II, non insulin dependent ([address] 03/08/2015    Edema of both legs 01/08/2010    ankles     Femur fracture, left ([facility]) 03/08/2015    GERD (gastroesophageal reflux disease)     denies    Gout     Hypertension     no meds    Moderate persistent asthma without complication 83/16/5522    Morbid obesity ([address] lifetime    Non compliance with medical treatment long term    Orthopnea     Other ill-defined conditions(799.89)     cellulitis    Peripheral neuropathy 2 years    Peripheral vascular disease ([facility]     Psychiatric disorder     bipolar disorder    Renal insufficiency 01/08/2010    Stroke ([facility]     multiple TIA's in 2014; no residual     Unspecified sleep apnea     does not use cpap    Vertigo 03/08/2015       Family History   Problem Relation Age of Onset    No Known Problems Mother     Heart Attack Father     Other Father         aneurysm age 62, now brain damaged    Heart Disease Father     Cancer Sister         hodgkins    Breast Cancer Sister     Breast Cancer Sister     Diabetes Paternal Grandmother     Other Son         24 week twin, LD       Social History     Socioeconomic History    Marital status: Single     Spouse name: Not on file    Number of children: Not on file    Years of education: Not on file    Highest education level: Not on file   Occupational History    Not on file   Tobacco Use    Smoking status: Never    Smokeless tobacco: Never   Substance and Sexual Activity    Alcohol use: No    Drug use: Yes     Types: Prescription    Sexual activity: Not on file   Other Topics Concern    Not on file   Social History Narrative    Patient is single, lives with disabled father, 15year old disabled son and 8year old daughter. 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 She reports a lot of stress, including her 12year old        0:  lives with her 33 yo special needs son and her daughter (? Serenity) and her daughter's child      Social Determinants of Health     Financial Resource Strain: Low Risk     Difficulty of Paying Living Expenses: Not hard at all   Food Insecurity: No Food Insecurity    Worried About Running Out of Food in the Last Year: Never true    [address] in the Last Year: Never true   Transportation Needs: Unknown    Lack of Transportation (Medical): Not on file    Lack of Transportation (Non-Medical):  No Physical Activity: Not on file   Stress: Not on file   Social Connections: Not on file   Intimate Partner Violence: Not on file   Housing Stability: Unknown    Unable to Pay for Housing in the Last Year: Not on file    Number of Places Lived in the Last Year: Not on file    Unstable Housing in the Last Year: No       OB History          3    Para        Term   1       1    AB   0    Living   3         SAB        IAB        Ectopic        Molar        Multiple        Live Births              Obstetric Comments   --> , daughter         55, twins --> emergency c-s -->Son---> survived, legally blind, autistic, needs daily assistance.     Daughter-->   --> c-s, daughter                         Past Surgical History:   Procedure Laterality Date    DELIVERY   ,     both preemies    HEENT      Tonsillectomy/SFE/-    HERNIA REPAIR  age 10    TONSILLECTOMY      TOTAL HIP ARTHROPLASTY Left     Dr. Archie Tovar Maintenance   Topic Date Due    Pneumococcal 0-64 years Vaccine (1 - PCV) Never done    Diabetic retinal exam  Never done    Colorectal Cancer Screen  Never done    COVID-19 Vaccine (4 - Booster for Judithann Headings series) 05/10/2022    Breast cancer screen  2023    Depression Monitoring  2024    GFR test (Diabetes, CKD 3-4, OR last GFR 15-59)  2024    Cervical cancer screen  2026    Lipids  08/15/2027    DTaP/Tdap/Td vaccine (3 - Td or Tdap) 11/15/2031    Flu vaccine  Completed    Hepatitis C screen  Completed    HIV screen  Completed    Hepatitis A vaccine  Aged Out    Hib vaccine  Aged Out    Meningococcal (ACWY) vaccine  Aged Out         Current Outpatient Medications:     hydrOXYzine HCl (ATARAX) 25 MG tablet, Take 1 tablet by mouth every 8 hours as needed for Itching, Disp: 30 tablet, Rfl: 0    apixaban (ELIQUIS) 5 MG TABS tablet, Take 1 tablet by mouth 2 times daily, Disp: 60 tablet, Rfl: 3    DULoxetine (CYMBALTA) 30 MG extended release capsule, , Disp: , Rfl:     montelukast (SINGULAIR) 10 MG tablet, Take 1 tablet by mouth daily, Disp: 90 tablet, Rfl: 0    omeprazole (PRILOSEC) 20 MG delayed release capsule, Take 1 capsule by mouth Daily TAKE 1 CAPSULE BY MOUTH EVERY DAY, Disp: 90 capsule, Rfl: 0    Cholecalciferol (VITAMIN D3) 50 MCG (2000 UT) CAPS, Take by mouth, Disp: , Rfl:     docusate sodium (COLACE) 100 MG capsule, Take 100 mg by mouth 2 times daily, Disp: , Rfl:     Ascorbic Acid (VITAJOY DAILY C GUMMIES) 125 MG CHEW, Take by mouth, Disp: , Rfl:     albuterol sulfate HFA (PROVENTIL;VENTOLIN;PROAIR) 108 (90 Base) MCG/ACT inhaler, Inhale 2 puffs into the lungs every 6 hours as needed for Wheezing or Shortness of Breath TAKE 2 PUFFS BY MOUTH EVERY 4 HOURS AS NEEDED, Disp: 18 g, Rfl: 5    fluticasone-salmeterol (ADVIAR) 100-50 MCG/ACT AEPB diskus inhaler, Inhale 1 puff into the lungs in the morning and 1 puff in the evening., Disp: 1 each, Rfl: 5    losartan (COZAAR) 50 MG tablet, Take 1 tablet by mouth in the morning., Disp: 90 tablet, Rfl: 1    albuterol (PROVENTIL) (2.5 MG/3ML) 0.083% nebulizer solution, Inhale 2.5 mg into the lungs every 6 hours as needed, Disp: , Rfl:     cetirizine (ZYRTEC) 10 MG tablet, TAKE 1 TABLET BY MOUTH EVERY DAY, Disp: , Rfl:     fluticasone (FLONASE) 50 MCG/ACT nasal spray, 2 sprays by Nasal route daily, Disp: , Rfl:     methocarbamol (ROBAXIN) 750 MG tablet, Take 750 mg by mouth 4 times daily as needed, Disp: , Rfl:     nystatin (MYCOSTATIN) 517776 UNIT/ML suspension, Take 5 mLs by mouth 3 times daily as needed (thrush) Swish and spit (Patient not taking: No sig reported), Disp: 1 each, Rfl: 1    acetaminophen (TYLENOL) 500 MG tablet, Take 500 mg by mouth every 6 hours as needed for Pain, Disp: , Rfl:     Compression Stockings MISC, by Does not apply route, Disp: 2 each, Rfl: 0    ondansetron (ZOFRAN-ODT) 4 MG disintegrating tablet, Take 1 tablet by mouth 3 times daily as needed for Nausea or Vomiting (Patient not taking: No sig reported), Disp: 30 tablet, Rfl: 0    ascorbic acid (VITAMIN C) 500 MG tablet, Take 500 mg by mouth daily, Disp: , Rfl:     Review of Systems   Constitutional: Negative. HENT: Negative. Eyes: Negative. Respiratory: Negative. Cardiovascular: Negative. Gastrointestinal: Negative. Endocrine: Negative. Genitourinary: Negative. Musculoskeletal: Negative. Skin: Negative. urticaria   Allergic/Immunologic: Negative. Neurological: Negative. Hematological: Negative. Psychiatric/Behavioral: Negative. Vitals:    02/23/23 1436   BP: 110/60   Pulse: 85   Temp: 97.8 °F (36.6 °C)   SpO2: 98%        Physical Exam  Constitutional:       Appearance: Normal appearance. HENT:      Head: Normocephalic. Nose: Nose normal.   Eyes:      Extraocular Movements: Extraocular movements intact. Pupils: Pupils are equal, round, and reactive to light. Cardiovascular:      Rate and Rhythm: Normal rate and regular rhythm. Pulmonary:      Effort: Pulmonary effort is normal.      Breath sounds: Normal breath sounds. Abdominal:      General: Abdomen is flat. Palpations: Abdomen is soft. Musculoskeletal:         General: Normal range of motion. Cervical back: Normal range of motion and neck supple. Comments: Uses cane for stability   Skin:     General: Skin is warm and dry. Neurological:      General: No focal deficit present. Mental Status: She is alert and oriented to person, place, and time. Psychiatric:         Mood and Affect: Mood normal.         Behavior: Behavior normal.          PHQ-9=0    Assessment & Plan:    1. Itching  - hydrOXYzine HCl (ATARAX) 25 MG tablet; Take 1 tablet by mouth every 8 hours as needed for Itching  Dispense: 30 tablet; Refill: 0    2. Elevated serum creatinine  Avoid NSAIDS and remain hydrated- Comprehensive Metabolic Panel; Future  - Comprehensive Metabolic Panel    3. Stage 3a chronic kidney disease (HCC)  Avoid NSAIDS and remain hydrated    4. Primary hypertension  Continue med and DASH diet    5. 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 Robby Piper  - apixaban (ELIQUIS) 5 MG TABS; Take 1 tablet by mouth 2 times daily  Dispense: 60 tablet; Refill: 3    6. Anemia, unspecified type  - CBC with Auto Differential; Future  - CBC with Auto Differential    Greater than 50% counseling and/or coordination of care: the treatment regimen is extensive; detailed review. Will notify of labs. Recheck in 3 months. This note was dictated using dragon voice recognition software. It has been proofread, but there may still exist voice recognition errors that the author did not detect.       Signed By: Shashi Gutierrez APRN - CNP     February 23, 2023

## 2023-02-24 DIAGNOSIS — R79.89 ELEVATED SERUM CREATININE: Primary | ICD-10-CM

## 2023-02-24 NOTE — RESULT ENCOUNTER NOTE
Results to patient please. Creatinine is elevated. Please avoid NSAIDs and remain hydrated. Has she ever seen nephrology?   Liver enzymes are normal.  Hemoglobin is back to normal.  Thanks

## 2023-02-28 RX ORDER — HYDROXYZINE 50 MG/1
50 TABLET, FILM COATED ORAL NIGHTLY
Qty: 30 TABLET | Refills: 0 | Status: SHIPPED | OUTPATIENT
Start: 2023-02-28 | End: 2023-04-06

## 2023-03-08 ENCOUNTER — OFFICE VISIT (OUTPATIENT)
Dept: FAMILY MEDICINE CLINIC | Facility: CLINIC | Age: 48
End: 2023-03-08

## 2023-03-08 ENCOUNTER — TELEPHONE (OUTPATIENT)
Dept: FAMILY MEDICINE CLINIC | Facility: CLINIC | Age: 48
End: 2023-03-08

## 2023-03-08 VITALS
HEIGHT: 67 IN | SYSTOLIC BLOOD PRESSURE: 130 MMHG | BODY MASS INDEX: 52.12 KG/M2 | TEMPERATURE: 98.6 F | HEART RATE: 86 BPM | DIASTOLIC BLOOD PRESSURE: 86 MMHG | OXYGEN SATURATION: 99 %

## 2023-03-08 DIAGNOSIS — R79.89 ELEVATED SERUM CREATININE: ICD-10-CM

## 2023-03-08 DIAGNOSIS — E11.9 TYPE 2 DIABETES MELLITUS WITHOUT COMPLICATION, WITHOUT LONG-TERM CURRENT USE OF INSULIN (HCC): Primary | ICD-10-CM
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
## 2023-03-08 LAB — HBA1C MFR BLD: 5.5 %

## 2023-03-08 RX ORDER — AMOXICILLIN AND CLAVULANATE POTASSIUM 875; 125 MG/1; MG/1
1 TABLET, FILM COATED ORAL 2 TIMES DAILY
Qty: 20 TABLET | Refills: 0 | Status: SHIPPED | OUTPATIENT
Start: 2023-03-08 | End: 2023-03-18

## 2023-03-08 RX ORDER — FLUCONAZOLE 150 MG/1
150 TABLET ORAL ONCE
Qty: 1 TABLET | Refills: 0 | Status: SHIPPED | OUTPATIENT
Start: 2023-03-08 | End: 2023-03-09 | Stop reason: SDUPTHER

## 2023-03-08 ASSESSMENT — ENCOUNTER SYMPTOMS
RESPIRATORY NEGATIVE: 1
EYES NEGATIVE: 1
GASTROINTESTINAL NEGATIVE: 1
ALLERGIC/IMMUNOLOGIC NEGATIVE: 1

## 2023-03-08 NOTE — PROGRESS NOTES
[address]  [address] [facility] [address], [address] [facility]   (ph) [phone] (fax) [phone]  Nenita GARCIA, FNP-C      Chief Complaint   Patient presents with    Animal Bite     Dog bite on pt right inside ankle, bit on 3-7-23  Hurts when pressure on foot  Dog is up-to-date on shots    Chills     Chills started once pt got out of shower today    Dizziness       50 yo female comes in c/o a dog bite to her right ankle. She reports it happened last evening and now her ankle and lower leg is red and swollen. Reports the dog is hers and that it has had all of his vaccinations. Tetanus was given in 2021.      Animal Bite     Dizziness      Allergies   Allergen Reactions    Ciprofloxacin Itching    Sulfa Antibiotics Itching    Xarelto [Rivaroxaban] Itching       Past Medical History:   Diagnosis Date    Anxiety     Arthritis     Bipolar affective disorder ([address] 11 yrs old    Cellulitis 01/08/2010    denies currently     CKD (chronic kidney disease) stage 3, GFR 30-59 ml/min ([facility])     Diabetes ([address] dx:1/09    Unknown A1C    Diabetes ([address] 01/08/2010    had multiple TIA's from low bs in 2014 and hasn't been on any meds since    Diabetes mellitus type II, non insulin dependent ([address] 03/08/2015    Edema of both legs 01/08/2010    ankles     Femur fracture, left ([address] 03/08/2015    GERD (gastroesophageal reflux disease)     denies    Gout     Hypertension     no meds    Moderate persistent asthma without complication 08/35/3934    Morbid obesity ([address] lifetime    Non compliance with medical treatment long term    Orthopnea     Other ill-defined conditions(799.89)     cellulitis    Peripheral neuropathy 2 years    Peripheral vascular disease ([address]     Psychiatric disorder     bipolar disorder    Renal insufficiency 01/08/2010    Stroke ([address]     multiple TIA's in 2014; no residual     Unspecified sleep apnea     does not use cpap    Vertigo 03/08/2015       Family History Problem Relation Age of Onset    No Known Problems Mother     Heart Attack Father     Other Father         aneurysm age 62, now brain damaged    Heart Disease Father     Cancer Sister         hodgkins    Breast Cancer Sister     Breast Cancer Sister     Diabetes Paternal Grandmother     Other Son         24 week twin, LD       Social History     Socioeconomic History    Marital status: Single     Spouse name: Not on file    Number of children: Not on file    Years of education: Not on file    Highest education level: Not on file   Occupational History    Not on file   Tobacco Use    Smoking status: Never    Smokeless tobacco: Never   Substance and Sexual Activity    Alcohol use: No    Drug use: Yes     Types: Prescription    Sexual activity: Not on file   Other Topics Concern    Not on file   Social History Narrative    Patient is single, lives with disabled father, 15year old disabled son and 8year old daughter. Patient worked in [employer] at [employer] for many years before becoming disabled about 4 years ago. She reports a lot of stress, including her 12year old        0:  lives with her 31 yo special needs son and her daughter (? Serenity) and her daughter's child      Social Determinants of Health     Financial Resource Strain: Low Risk     Difficulty of Paying Living Expenses: Not hard at all   Food Insecurity: No Food Insecurity    Worried About Running Out of Food in the Last Year: Never true    [address] in the Last Year: Never true   Transportation Needs: Unknown    Lack of Transportation (Medical): Not on file    Lack of Transportation (Non-Medical):  No   Physical Activity: Not on file   Stress: Not on file   Social Connections: Not on file   Intimate Partner Violence: Not on file   Housing Stability: Unknown    Unable to Pay for Housing in the Last Year: Not on file    Number of Places Lived in the Last Year: Not on file    Unstable Housing in the Last Year: No       OB History    3    Para        Term   1       1    AB   0    Living   3         SAB        IAB        Ectopic        Molar        Multiple        Live Births              Obstetric Comments   --> , daughter         55, twins --> emergency c-s -->Son---> survived, legally blind, autistic, needs daily assistance.     Daughter-->   --> c-s, daughter                         Past Surgical History:   Procedure Laterality Date    DELIVERY   ,     both preemies    HEENT      Tonsillectomy/SFE/--17    HERNIA REPAIR  age 10    TONSILLECTOMY      TOTAL HIP ARTHROPLASTY Left     Dr. Schuler Splinter Maintenance   Topic Date Due    Pneumococcal 0-64 years Vaccine (1 - PCV) Never done    Diabetic retinal exam  Never done    Colorectal Cancer Screen  Never done    COVID-19 Vaccine (4 - Booster for Moderna series) 05/10/2022    Diabetic Alb to Cr ratio (uACR) test  10/19/2022    Diabetic foot exam  Never done    Breast cancer screen  2023    Depression Monitoring  2024    GFR test (Diabetes, CKD 3-4, OR last GFR 15-59)  2024    Cervical cancer screen  2026    Lipids  08/15/2027    DTaP/Tdap/Td vaccine (3 - Td or Tdap) 11/15/2031    Flu vaccine  Completed    Hepatitis C screen  Completed    HIV screen  Completed    Hepatitis A vaccine  Aged Out    Hib vaccine  Aged Out    Meningococcal (ACWY) vaccine  Aged Out         Current Outpatient Medications:     amoxicillin-clavulanate (AUGMENTIN) 875-125 MG per tablet, Take 1 tablet by mouth 2 times daily for 10 days, Disp: 20 tablet, Rfl: 0    fluconazole (DIFLUCAN) 150 MG tablet, Take 1 tablet by mouth once for 1 dose, Disp: 1 tablet, Rfl: 0    hydrOXYzine HCl (ATARAX) 50 MG tablet, Take 1 tablet by mouth nightly, Disp: 30 tablet, Rfl: 0    apixaban (ELIQUIS) 5 MG TABS tablet, Take 1 tablet by mouth 2 times daily, Disp: 60 tablet, Rfl: 3    DULoxetine (CYMBALTA) 30 MG extended release capsule, , Disp: , Rfl:     montelukast (SINGULAIR) 10 MG tablet, Take 1 tablet by mouth daily, Disp: 90 tablet, Rfl: 0    omeprazole (PRILOSEC) 20 MG delayed release capsule, Take 1 capsule by mouth Daily TAKE 1 CAPSULE BY MOUTH EVERY DAY, Disp: 90 capsule, Rfl: 0    nystatin (MYCOSTATIN) 871829 UNIT/ML suspension, Take 5 mLs by mouth 3 times daily as needed (thrush) Swish and spit (Patient not taking: No sig reported), Disp: 1 each, Rfl: 1    Cholecalciferol (VITAMIN D3) 50 MCG (2000 UT) CAPS, Take by mouth, Disp: , Rfl:     docusate sodium (COLACE) 100 MG capsule, Take 100 mg by mouth 2 times daily, Disp: , Rfl:     acetaminophen (TYLENOL) 500 MG tablet, Take 500 mg by mouth every 6 hours as needed for Pain, Disp: , Rfl:     Ascorbic Acid (VITAJOY DAILY C GUMMIES) 125 MG CHEW, Take by mouth, Disp: , Rfl:     albuterol sulfate HFA (PROVENTIL;VENTOLIN;PROAIR) 108 (90 Base) MCG/ACT inhaler, Inhale 2 puffs into the lungs every 6 hours as needed for Wheezing or Shortness of Breath TAKE 2 PUFFS BY MOUTH EVERY 4 HOURS AS NEEDED, Disp: 18 g, Rfl: 5    fluticasone-salmeterol (ADVIAR) 100-50 MCG/ACT AEPB diskus inhaler, Inhale 1 puff into the lungs in the morning and 1 puff in the evening., Disp: 1 each, Rfl: 5    Compression Stockings MISC, by Does not apply route, Disp: 2 each, Rfl: 0    losartan (COZAAR) 50 MG tablet, Take 1 tablet by mouth in the morning., Disp: 90 tablet, Rfl: 1    ondansetron (ZOFRAN-ODT) 4 MG disintegrating tablet, Take 1 tablet by mouth 3 times daily as needed for Nausea or Vomiting (Patient not taking: No sig reported), Disp: 30 tablet, Rfl: 0    albuterol (PROVENTIL) (2.5 MG/3ML) 0.083% nebulizer solution, Inhale 2.5 mg into the lungs every 6 hours as needed, Disp: , Rfl:     ascorbic acid (VITAMIN C) 500 MG tablet, Take 500 mg by mouth daily, Disp: , Rfl:     cetirizine (ZYRTEC) 10 MG tablet, TAKE 1 TABLET BY MOUTH EVERY DAY, Disp: , Rfl:     fluticasone (FLONASE) 50 MCG/ACT nasal spray, 2 sprays by Nasal route daily, Disp: , Rfl:     methocarbamol (ROBAXIN) 750 MG tablet, Take 750 mg by mouth 4 times daily as needed, Disp: , Rfl:     Review of Systems   Constitutional: Negative. HENT: Negative. Eyes: Negative. Respiratory: Negative. Cardiovascular: Negative. Gastrointestinal: Negative. Endocrine: Negative. Genitourinary: Negative. Musculoskeletal: Negative. Skin:  Positive for wound. Allergic/Immunologic: Negative. Neurological:  Positive for dizziness. Hematological: Negative. Psychiatric/Behavioral: Negative. Vitals:    03/08/23 1601   BP: 130/86   Pulse: 86   Temp: 98.6 °F (37 °C)   SpO2: 99%        Physical Exam  Constitutional:       Appearance: Normal appearance. HENT:      Head: Normocephalic. Nose: Nose normal.   Eyes:      Extraocular Movements: Extraocular movements intact. Pupils: Pupils are equal, round, and reactive to light. Cardiovascular:      Rate and Rhythm: Normal rate and regular rhythm. Pulmonary:      Effort: Pulmonary effort is normal.      Breath sounds: Normal breath sounds. Abdominal:      General: Abdomen is flat. Palpations: Abdomen is soft. Musculoskeletal:         General: Normal range of motion. Cervical back: Normal range of motion and neck supple. Skin:     General: Skin is warm and dry. Findings: Erythema present. Comments: Right ankle with puncture wound. Site very warm to touch and erythematous. Wound cleansed well and sterile bandage applied with KAY. Neurological:      General: No focal deficit present. Mental Status: She is alert and oriented to person, place, and time. Psychiatric:         Mood and Affect: Mood normal.         Behavior: Behavior normal.          PHQ Scores 2/23/2023 1/30/2023 1/24/2023 11/1/2022 9/9/2022 7/19/2022 5/10/2022   PHQ2 Score - - 1 - 0 0 0   PHQ9 Score 0 0 1 2 0 0 0        Assessment & Plan:    1.  Type 2 diabetes mellitus without complication, without long-term current use of insulin (HCC)  A1C=5.5; continue diet  - AMB POC HEMOGLOBIN A1C    2. Elevated serum creatinine  Avoid NSAIDS and remain hydrated  - [facility] - Marion Victoria MD, Nephrology, [place]    3. Dog bite, initial encounter  Monitor for improvement; call for worsening symptoms. - amoxicillin-clavulanate (AUGMENTIN) 875-125 MG per tablet; Take 1 tablet by mouth 2 times daily for 10 days  Dispense: 20 tablet; Refill: 0  - fluconazole (DIFLUCAN) 150 MG tablet; Take 1 tablet by mouth once for 1 dose  Dispense: 1 tablet; Refill: 0      [facility] form will be filled out and sent in. Greater than 50% counseling and/or coordination of care: the treatment regimen is extensive; detailed review. Recheck in 1 week. Keep site cleansed. This note was dictated using dragon voice recognition software. It has been proofread, but there may still exist voice recognition errors that the author did not detect.       Signed By: Dimas Kline, JOSE - CNP     March 8, 2023

## 2023-03-09 ENCOUNTER — TELEPHONE (OUTPATIENT)
Dept: FAMILY MEDICINE CLINIC | Facility: CLINIC | Age: 48
End: 2023-03-09
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
## 2023-03-09 RX ORDER — FLUCONAZOLE 150 MG/1
TABLET ORAL
Qty: 1 TABLET | Refills: 0 | Status: SHIPPED | OUTPATIENT
Start: 2023-03-09

## 2023-03-09 NOTE — TELEPHONE ENCOUNTER
Pt states that she usually takes (2) diflucan tabs for her yeast infections. Takes 1st dose as scheduled and takes 2nd dose 72 hrs after 1st dose. Please advised.

## 2023-03-09 NOTE — TELEPHONE ENCOUNTER
Faxed Animal Incident Report to [facility]. Confirmation received and sent to scanning.

## 2023-03-09 NOTE — TELEPHONE ENCOUNTER
[address]  [address] [facility], [address] [facility]   (ph) [phone] (fax) [phone]  GIOVANNY Bruce-C      Chief Complaint   Patient presents with    Animal Bite       @HPI@    Allergies   Allergen Reactions    Ciprofloxacin Itching    Sulfa Antibiotics Itching    Xarelto [Rivaroxaban] Itching       Past Medical History:   Diagnosis Date    Anxiety     Arthritis     Bipolar affective disorder ([facility] 11 yrs old    Cellulitis 01/08/2010    denies currently     CKD (chronic kidney disease) stage 3, GFR 30-59 ml/min ([facility])     Diabetes ([facility] dx:1/09    Unknown A1C    Diabetes ([facility] 01/08/2010    had multiple TIA's from low bs in 2014 and hasn't been on any meds since    Diabetes mellitus type II, non insulin dependent ([facility] 03/08/2015    Edema of both legs 01/08/2010    ankles     Femur fracture, left ([facility]) 03/08/2015    GERD (gastroesophageal reflux disease)     denies    Gout     Hypertension     no meds    Moderate persistent asthma without complication 76/83/4030    Morbid obesity ([facility]) lifetime    Non compliance with medical treatment long term    Orthopnea     Other ill-defined conditions(799.89)     cellulitis    Peripheral neuropathy 2 years    Peripheral vascular disease ([facility]     Psychiatric disorder     bipolar disorder    Renal insufficiency 01/08/2010    Stroke ([facility]     multiple TIA's in 2014; no residual     Unspecified sleep apnea     does not use cpap    Vertigo 03/08/2015       Family History   Problem Relation Age of Onset    No Known Problems Mother     Heart Attack Father     Other Father         aneurysm age 62, now brain damaged    Heart Disease Father     Cancer Sister         hodgkins    Breast Cancer Sister     Breast Cancer Sister     Diabetes Paternal Grandmother     Other Son         24 week twin, LD       Social History     Socioeconomic History    Marital status: Single     Spouse name: Not on file    Number of children: Not on file    Years of education: Not on file    Highest education level: Not on file   Occupational History    Not on file   Tobacco Use    Smoking status: Never    Smokeless tobacco: Never   Substance and Sexual Activity    Alcohol use: No    Drug use: Yes     Types: Prescription    Sexual activity: Not on file   Other Topics Concern    Not on file   Social History Narrative    Patient is single, lives with disabled father, 15year old disabled son and 8year old daughter. 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 She reports a lot of stress, including her 12year old        0:  lives with her 31 yo special needs son and her daughter (? Serenity) and her daughter's child      Social Determinants of Health     Financial Resource Strain: Low Risk     Difficulty of Paying Living Expenses: Not hard at all   Food Insecurity: No Food Insecurity    Worried About Running Out of Food in the Last Year: Never true    [address] in the Last Year: Never true   Transportation Needs: Unknown    Lack of Transportation (Medical): Not on file    Lack of Transportation (Non-Medical): No   Physical Activity: Not on file   Stress: Not on file   Social Connections: Not on file   Intimate Partner Violence: Not on file   Housing Stability: Unknown    Unable to Pay for Housing in the Last Year: Not on file    Number of Places Lived in the Last Year: Not on file    Unstable Housing in the Last Year: No       OB History          3    Para        Term   1       1    AB   0    Living   3         SAB        IAB        Ectopic        Molar        Multiple        Live Births              Obstetric Comments   --> , daughter         55, twins --> emergency c-s -->Son---> survived, legally blind, autistic, needs daily assistance.     Daughter-->   --> c-s, daughter                         Past Surgical History:   Procedure Laterality Date    DELIVERY   ,     both preemies    HEENT      Tonsillectomy/SFE/18    HERNIA REPAIR  age 10    TONSILLECTOMY      TOTAL HIP ARTHROPLASTY Left     Dr. Stuart Allison Maintenance   Topic Date Due    Pneumococcal 0-64 years Vaccine (1 - PCV) Never done    Diabetic retinal exam  Never done    Colorectal Cancer Screen  Never done    COVID-19 Vaccine (4 - Booster for Moderna series) 05/10/2022    Diabetic Alb to Cr ratio (uACR) test  10/19/2022    Diabetic foot exam  Never done    Breast cancer screen  2023    Depression Monitoring  2024    GFR test (Diabetes, CKD 3-4, OR last GFR 15-59)  2024    Cervical cancer screen  2026    Lipids  08/15/2027    DTaP/Tdap/Td vaccine (3 - Td or Tdap) 11/15/2031    Flu vaccine  Completed    Hepatitis C screen  Completed    HIV screen  Completed    Hepatitis A vaccine  Aged Out    Hib vaccine  Aged Out    Meningococcal (ACWY) vaccine  Aged Out         Current Outpatient Medications:     amoxicillin-clavulanate (AUGMENTIN) 875-125 MG per tablet, Take 1 tablet by mouth 2 times daily for 10 days, Disp: 20 tablet, Rfl: 0    fluconazole (DIFLUCAN) 150 MG tablet, Take 1 tablet by mouth once for 1 dose, Disp: 1 tablet, Rfl: 0    hydrOXYzine HCl (ATARAX) 50 MG tablet, Take 1 tablet by mouth nightly, Disp: 30 tablet, Rfl: 0    apixaban (ELIQUIS) 5 MG TABS tablet, Take 1 tablet by mouth 2 times daily, Disp: 60 tablet, Rfl: 3    DULoxetine (CYMBALTA) 30 MG extended release capsule, , Disp: , Rfl:     montelukast (SINGULAIR) 10 MG tablet, Take 1 tablet by mouth daily, Disp: 90 tablet, Rfl: 0    omeprazole (PRILOSEC) 20 MG delayed release capsule, Take 1 capsule by mouth Daily TAKE 1 CAPSULE BY MOUTH EVERY DAY, Disp: 90 capsule, Rfl: 0    nystatin (MYCOSTATIN) 226456 UNIT/ML suspension, Take 5 mLs by mouth 3 times daily as needed (thrush) Swish and spit (Patient not taking: No sig reported), Disp: 1 each, Rfl: 1    Cholecalciferol (VITAMIN D3) 50 MCG (2000 UT) CAPS, Take by mouth, Disp: , Rfl:     docusate sodium (COLACE) 100 MG capsule, Take 100 mg by mouth 2 times daily, Disp: , Rfl:     acetaminophen (TYLENOL) 500 MG tablet, Take 500 mg by mouth every 6 hours as needed for Pain, Disp: , Rfl:     Ascorbic Acid (VITAJOY DAILY C GUMMIES) 125 MG CHEW, Take by mouth, Disp: , Rfl:     albuterol sulfate HFA (PROVENTIL;VENTOLIN;PROAIR) 108 (90 Base) MCG/ACT inhaler, Inhale 2 puffs into the lungs every 6 hours as needed for Wheezing or Shortness of Breath TAKE 2 PUFFS BY MOUTH EVERY 4 HOURS AS NEEDED, Disp: 18 g, Rfl: 5    fluticasone-salmeterol (ADVIAR) 100-50 MCG/ACT AEPB diskus inhaler, Inhale 1 puff into the lungs in the morning and 1 puff in the evening., Disp: 1 each, Rfl: 5    Compression Stockings MISC, by Does not apply route, Disp: 2 each, Rfl: 0    losartan (COZAAR) 50 MG tablet, Take 1 tablet by mouth in the morning., Disp: 90 tablet, Rfl: 1    ondansetron (ZOFRAN-ODT) 4 MG disintegrating tablet, Take 1 tablet by mouth 3 times daily as needed for Nausea or Vomiting (Patient not taking: No sig reported), Disp: 30 tablet, Rfl: 0    albuterol (PROVENTIL) (2.5 MG/3ML) 0.083% nebulizer solution, Inhale 2.5 mg into the lungs every 6 hours as needed, Disp: , Rfl:     ascorbic acid (VITAMIN C) 500 MG tablet, Take 500 mg by mouth daily, Disp: , Rfl:     cetirizine (ZYRTEC) 10 MG tablet, TAKE 1 TABLET BY MOUTH EVERY DAY, Disp: , Rfl:     fluticasone (FLONASE) 50 MCG/ACT nasal spray, 2 sprays by Nasal route daily, Disp: , Rfl:     methocarbamol (ROBAXIN) 750 MG tablet, Take 750 mg by mouth 4 times daily as needed, Disp: , Rfl:     [unfilled]     [unfilled]     [unfilled]       [facility] 2/23/2023 1/30/2023 1/24/2023 11/1/2022 9/9/2022 7/19/2022 5/10/2022   PHQ2 Score - - 1 - 0 0 0   PHQ9 Score 0 0 1 2 0 0 0        Assessment & Plan:    There are no diagnoses linked to this encounter.     \"\"\" minutes face to face time spent with the patient; Greater than 50% counseling and/or coordination of care: the treatment regimen is extensive; detailed review. Will notify of labs. This note was dictated using dragon voice recognition software. It has been proofread, but there may still exist voice recognition errors that the author did not detect.       Signed By: JOSE Sosa - CNP     March 8, 2023

## 2023-03-16 ENCOUNTER — HOSPITAL ENCOUNTER (EMERGENCY)
Age: 48
Discharge: HOME | End: 2023-03-16
Attending: EMERGENCY MEDICINE
Payer: MEDICARE

## 2023-03-16 ENCOUNTER — APPOINTMENT (OUTPATIENT)
Dept: GENERAL RADIOLOGY | Age: 48
End: 2023-03-16
Payer: MEDICARE

## 2023-03-16 VITALS
RESPIRATION RATE: 20 BRPM | HEIGHT: 66 IN | TEMPERATURE: 97.2 F | SYSTOLIC BLOOD PRESSURE: 142 MMHG | OXYGEN SATURATION: 99 % | WEIGHT: 293 LBS | DIASTOLIC BLOOD PRESSURE: 88 MMHG | BODY MASS INDEX: 47.09 KG/M2 | HEART RATE: 87 BPM
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
## 2023-03-16 DIAGNOSIS — L03.115 CELLULITIS OF RIGHT ANKLE: Primary | ICD-10-CM

## 2023-03-16 LAB
ANION GAP SERPL CALC-SCNC: 7 MMOL/L (ref 2–11)
BASOPHILS # BLD: 0 K/UL (ref 0–0.2)
BASOPHILS NFR BLD: 1 % (ref 0–2)
BUN SERPL-MCNC: 6 MG/DL (ref 6–23)
CALCIUM SERPL-MCNC: 9.3 MG/DL (ref 8.3–10.4)
CHLORIDE SERPL-SCNC: 104 MMOL/L (ref 101–110)
CO2 SERPL-SCNC: 28 MMOL/L (ref 21–32)
CREAT SERPL-MCNC: 1.04 MG/DL (ref 0.6–1)
DIFFERENTIAL METHOD BLD: ABNORMAL
EOSINOPHIL # BLD: 0.3 K/UL (ref 0–0.8)
EOSINOPHIL NFR BLD: 5 % (ref 0.5–7.8)
ERYTHROCYTE [DISTWIDTH] IN BLOOD BY AUTOMATED COUNT: 15 % (ref 11.9–14.6)
GLUCOSE SERPL-MCNC: 100 MG/DL (ref 65–100)
HCT VFR BLD AUTO: 35.3 % (ref 35.8–46.3)
HGB BLD-MCNC: 11.1 G/DL (ref 11.7–15.4)
IMM GRANULOCYTES # BLD AUTO: 0 K/UL (ref 0–0.5)
IMM GRANULOCYTES NFR BLD AUTO: 1 % (ref 0–5)
LYMPHOCYTES # BLD: 1.6 K/UL (ref 0.5–4.6)
LYMPHOCYTES NFR BLD: 26 % (ref 13–44)
MCH RBC QN AUTO: 28.3 PG (ref 26.1–32.9)
MCHC RBC AUTO-ENTMCNC: 31.4 G/DL (ref 31.4–35)
MCV RBC AUTO: 90.1 FL (ref 82–102)
MONOCYTES # BLD: 0.6 K/UL (ref 0.1–1.3)
MONOCYTES NFR BLD: 10 % (ref 4–12)
NEUTS SEG # BLD: 3.5 K/UL (ref 1.7–8.2)
NEUTS SEG NFR BLD: 57 % (ref 43–78)
NRBC # BLD: 0 K/UL (ref 0–0.2)
PLATELET # BLD AUTO: 395 K/UL (ref 150–450)
PMV BLD AUTO: 8.8 FL (ref 9.4–12.3)
POTASSIUM SERPL-SCNC: 3.7 MMOL/L (ref 3.5–5.1)
RBC # BLD AUTO: 3.92 M/UL (ref 4.05–5.2)
SODIUM SERPL-SCNC: 139 MMOL/L (ref 133–143)
WBC # BLD AUTO: 6.1 K/UL (ref 4.3–11.1)

## 2023-03-16 PROCEDURE — 99284 EMERGENCY DEPT VISIT MOD MDM: CPT

## 2023-03-16 PROCEDURE — 80048 BASIC METABOLIC PNL TOTAL CA: CPT

## 2023-03-16 PROCEDURE — 96365 THER/PROPH/DIAG IV INF INIT: CPT

## 2023-03-16 PROCEDURE — 2500000003 HC RX 250 WO HCPCS: Performed by: EMERGENCY MEDICINE

## 2023-03-16 PROCEDURE — 73610 X-RAY EXAM OF ANKLE: CPT

## 2023-03-16 PROCEDURE — 85025 COMPLETE CBC W/AUTO DIFF WBC: CPT

## 2023-03-16 RX ORDER — CLINDAMYCIN HYDROCHLORIDE 300 MG/1
300 CAPSULE ORAL 2 TIMES DAILY
Qty: 14 CAPSULE | Refills: 0 | Status: SHIPPED | OUTPATIENT
Start: 2023-03-16 | End: 2023-03-23

## 2023-03-16 RX ORDER — CLINDAMYCIN PHOSPHATE 900 MG/50ML
900 INJECTION INTRAVENOUS ONCE
Status: COMPLETED | OUTPATIENT
Start: 2023-03-16 | End: 2023-03-16

## 2023-03-16 RX ORDER — CLINDAMYCIN PHOSPHATE 900 MG/50ML
900 INJECTION, SOLUTION INTRAVENOUS
Status: DISCONTINUED | OUTPATIENT
Start: 2023-03-16 | End: 2023-03-16 | Stop reason: SDUPTHER

## 2023-03-16 RX ADMIN — CLINDAMYCIN PHOSPHATE 900 MG: 900 INJECTION, SOLUTION INTRAVENOUS at 02:37

## 2023-03-16 NOTE — ED PROVIDER NOTES
Emergency Department Provider Note                   PCP:                JOSE Stout CNP               Age: 49 y.o. Sex: female     DISPOSITION       No diagnosis found. MEDICAL DECISION MAKING  Complexity of Problems Addressed:  Given the history of animal bite without improvement with antibiotics as well as history of diabetes IV antibiotic will be given and evaluation for possible foreign body and significant infection with CBC and BMP. Patient is currently taking an anticoagulant so further evaluation for DVT will not be performed. Data Reviewed and Analyzed:  Category 1:     I ordered each unique test.  I reviewed the results of each unique test.        Category 2:       Category 3: Discussion of management or test interpretation. Blood test results are within normal limits. X-ray per my interpretation showing no evidence of foreign body in the area of the bite wound. No evidence of bony injury and radiologist notes vascular calcifications distal from the site of injury. Risk of Complications and/or Morbidity of Patient Management:  Prescription drug management performed     Penny Smith is a 49 y.o. female who presents to the Emergency Department with chief complaint of    Chief Complaint   Patient presents with    Animal Bite      Patient presents complaining of pain and swelling and redness of the right leg and ankle. She was bitten by her pet dog a week ago and has been taking amoxicillin and states that the symptoms are not improving. She has been on amoxicillin for about 5 days now. She denies any fever or chills. Pain is worse with standing. The patient is currently also on an anticoagulant, Eliquis for history of venous thrombosis. Medical records were reviewed and ultrasound performed in December shows venous thrombosis in the left lower extremity distal to the knee only. No evidence of venous thrombosis noted in the right lower extremity.     The history is provided by the patient and medical records.      Review of Systems   Constitutional:  Negative for chills and fever.   Cardiovascular:  Positive for leg swelling.   Skin:  Positive for wound.   All other systems reviewed and are negative.    Vitals signs and nursing note reviewed.   Patient Vitals for the past 4 hrs:   Temp Pulse Resp BP SpO2   03/16/23 0122 97.2 °F (36.2 °C) 87 20 (!) 142/88 99 %          Physical Exam  Vitals and nursing note reviewed.   Constitutional:       General: She is not in acute distress.     Appearance: Normal appearance. She is not ill-appearing, toxic-appearing or diaphoretic.   HENT:      Head: Normocephalic and atraumatic.   Eyes:      Extraocular Movements: Extraocular movements intact.      Conjunctiva/sclera: Conjunctivae normal.      Pupils: Pupils are equal, round, and reactive to light.   Musculoskeletal:         General: Tenderness and signs of injury present.      Comments: Examination of the right lower extremity demonstrates edema that is pitting in nature also pitting edema noted in the left lower extremity.  Tenderness noted around the medial malleolus where there is a puncture wound.  Skin is erythematous in this area as well.  No significant warmth is noted however in comparison to the left side.   Skin:     General: Skin is warm and dry.      Capillary Refill: Capillary refill takes less than 2 seconds.      Findings: Erythema present.   Neurological:      General: No focal deficit present.      Mental Status: She is alert and oriented to person, place, and time. Mental status is at baseline.   Psychiatric:         Mood and Affect: Mood normal.         Behavior: Behavior normal.        Procedures     Orders Placed This Encounter   Procedures    XR ANKLE RIGHT (MIN 3 VIEWS)    CBC with Auto Differential    Basic Metabolic Panel        Medications   clindamycin (CLEOCIN) 900 mg in dextrose 5 % 50 mL IVPB (900 mg IntraVENous New Bag 3/16/23 0237)       New  Prescriptions    No medications on file        Past Medical History:   Diagnosis Date    Anxiety     Arthritis     Bipolar affective disorder ([facility] 11 yrs old    Cellulitis 2010    denies currently     CKD (chronic kidney disease) stage 3, GFR 30-59 ml/min ([facility])     Diabetes ([facility] dx:    Unknown A1C    Diabetes ([facility] 2010    had multiple TIA's from low bs in  and hasn't been on any meds since    Diabetes mellitus type II, non insulin dependent ([facility] 2015    Disease of blood and blood forming organ     Edema of both legs 2010    ankles     Femur fracture, left ([facility] 2015    GERD (gastroesophageal reflux disease)     denies    Gout     Hypertension     no meds    Moderate persistent asthma without complication     Morbid obesity ([facility] lifetime    Non compliance with medical treatment long term    Orthopnea     Other ill-defined conditions(799.89)     cellulitis    Peripheral neuropathy 2 years    Peripheral vascular disease ([facility]     Psychiatric disorder     bipolar disorder    Renal insufficiency 2010    Stroke ([facility]     multiple TIA's in ; no residual     Unspecified sleep apnea     does not use cpap    Vertigo 2015        Past Surgical History:   Procedure Laterality Date    DELIVERY   ,     both preemies    HEENT      Tonsillectomy/SFE/18    HERNIA REPAIR  age 10    TONSILLECTOMY      TOTAL HIP ARTHROPLASTY Left     Dr. Ava Hinson        Family History   Problem Relation Age of Onset    No Known Problems Mother     Heart Attack Father     Other Father         aneurysm age 62, now brain damaged    Heart Disease Father     Cancer Sister         hodgkins    Breast Cancer Sister     Breast Cancer Sister     Diabetes Paternal Grandmother     Other Son         24 week twin, LD        Social History     Socioeconomic History    Marital status: Single   Tobacco Use    Smoking status: Never    Smokeless tobacco: Never   Substance and Sexual Activity    Alcohol use: No    Drug use: Yes     Types: Prescription   Social History Narrative    Patient is single, lives with disabled father, 15year old disabled son and 8year old daughter. Patient worked in [employer] at [address] [place] [address] for many years before becoming disabled about 4 years ago.  She reports a lot of stress, including her 12year old        0:  lives with her 33 yo special needs son and her daughter (? Serenity) and her daughter's child      Social Determinants of Health     Financial Resource Strain: Low Risk     Difficulty of Paying Living Expenses: Not hard at all   Food Insecurity: No Food Insecurity    Worried About Running Out of Food in the Last Year: Never true    Ran Out of Food in the Last Year: Never true   Transportation Needs: Unknown    Lack of Transportation (Non-Medical): No   Housing Stability: Unknown    Unstable Housing in the Last Year: No        Allergies: Ciprofloxacin, Sulfa antibiotics, and Xarelto [rivaroxaban]    Previous Medications    ACETAMINOPHEN (TYLENOL) 500 MG TABLET    Take 500 mg by mouth every 6 hours as needed for Pain    ALBUTEROL (PROVENTIL) (2.5 MG/3ML) 0.083% NEBULIZER SOLUTION    Inhale 2.5 mg into the lungs every 6 hours as needed    ALBUTEROL SULFATE HFA (PROVENTIL;VENTOLIN;PROAIR) 108 (90 BASE) MCG/ACT INHALER    Inhale 2 puffs into the lungs every 6 hours as needed for Wheezing or Shortness of Breath TAKE 2 PUFFS BY MOUTH EVERY 4 HOURS AS NEEDED    AMOXICILLIN-CLAVULANATE (AUGMENTIN) 875-125 MG PER TABLET    Take 1 tablet by mouth 2 times daily for 10 days    APIXABAN (ELIQUIS) 5 MG TABS TABLET    Take 1 tablet by mouth 2 times daily    ASCORBIC ACID (VITAJOY DAILY C GUMMIES) 125 MG CHEW    Take by mouth    ASCORBIC ACID (VITAMIN C) 500 MG TABLET    Take 500 mg by mouth daily    CETIRIZINE (ZYRTEC) 10 MG TABLET    TAKE 1 TABLET BY MOUTH EVERY DAY    CHOLECALCIFEROL (VITAMIN D3) 50 MCG (2000 UT) CAPS    Take by mouth COMPRESSION STOCKINGS MISC    by Does not apply route    DOCUSATE SODIUM (COLACE) 100 MG CAPSULE    Take 100 mg by mouth 2 times daily    DULOXETINE (CYMBALTA) 30 MG EXTENDED RELEASE CAPSULE        FLUCONAZOLE (DIFLUCAN) 150 MG TABLET    Take at least 72 hrs after first dose; preferably at completion of ATB    FLUTICASONE (FLONASE) 50 MCG/ACT NASAL SPRAY    2 sprays by Nasal route daily    FLUTICASONE-SALMETEROL (ADVIAR) 100-50 MCG/ACT AEPB DISKUS INHALER    Inhale 1 puff into the lungs in the morning and 1 puff in the evening. HYDROXYZINE HCL (ATARAX) 50 MG TABLET    Take 1 tablet by mouth nightly    LOSARTAN (COZAAR) 50 MG TABLET    Take 1 tablet by mouth in the morning. METHOCARBAMOL (ROBAXIN) 750 MG TABLET    Take 750 mg by mouth 4 times daily as needed    MONTELUKAST (SINGULAIR) 10 MG TABLET    Take 1 tablet by mouth daily    NYSTATIN (MYCOSTATIN) 371486 UNIT/ML SUSPENSION    Take 5 mLs by mouth 3 times daily as needed (thrush) Swish and spit    OMEPRAZOLE (PRILOSEC) 20 MG DELAYED RELEASE CAPSULE    Take 1 capsule by mouth Daily TAKE 1 CAPSULE BY MOUTH EVERY DAY    ONDANSETRON (ZOFRAN-ODT) 4 MG DISINTEGRATING TABLET    Take 1 tablet by mouth 3 times daily as needed for Nausea or Vomiting        Results for orders placed or performed during the hospital encounter of 03/16/23   XR ANKLE RIGHT (MIN 3 VIEWS)    Narrative    3 views right ankle    Indication:  Dog bite, wound, nonhealing    Comparison:  None    Findings:    No acute fracture or dislocation seen. There is prominent soft tissue swelling   of the ankle. There are multiple soft tissue calcifications. There is a   corticated, lobulated appearance of the distal tip of the fibula, consistent   with old injury. There is Achilles enthesopathy on the calcaneus. Impression    Impression:    No acute focal bony abnormality seen. Nonacute bony findings as above. Prominent soft tissue swelling of the ankle with multiple small calcifications. Given the reported nonhealing wound, consider further evaluation with   cross-sectional imaging with CT with IV contrast or MRI with and without IV   contrast.     Zahra Rizzo M.D.   3/16/2023 2:35:00 AM   CBC with Auto Differential   Result Value Ref Range    WBC 6.1 4.3 - 11.1 K/uL    RBC 3.92 (L) 4.05 - 5.2 M/uL    Hemoglobin 11.1 (L) 11.7 - 15.4 g/dL    Hematocrit 35.3 (L) 35.8 - 46.3 %    MCV 90.1 82.0 - 102.0 FL    MCH 28.3 26.1 - 32.9 PG    MCHC 31.4 31.4 - 35.0 g/dL    RDW 15.0 (H) 11.9 - 14.6 %    Platelets 819 356 - 233 K/uL    MPV 8.8 (L) 9.4 - 12.3 FL    nRBC 0.00 0.0 - 0.2 K/uL    Differential Type AUTOMATED      Seg Neutrophils 57 43 - 78 %    Lymphocytes 26 13 - 44 %    Monocytes 10 4.0 - 12.0 %    Eosinophils % 5 0.5 - 7.8 %    Basophils 1 0.0 - 2.0 %    Immature Granulocytes 1 0.0 - 5.0 %    Segs Absolute 3.5 1.7 - 8.2 K/UL    Absolute Lymph # 1.6 0.5 - 4.6 K/UL    Absolute Mono # 0.6 0.1 - 1.3 K/UL    Absolute Eos # 0.3 0.0 - 0.8 K/UL    Basophils Absolute 0.0 0.0 - 0.2 K/UL    Absolute Immature Granulocyte 0.0 0.0 - 0.5 K/UL   Basic Metabolic Panel   Result Value Ref Range    Sodium 139 133 - 143 mmol/L    Potassium 3.7 3.5 - 5.1 mmol/L    Chloride 104 101 - 110 mmol/L    CO2 28 21 - 32 mmol/L    Anion Gap 7 2 - 11 mmol/L    Glucose 100 65 - 100 mg/dL    BUN 6 6 - 23 MG/DL    Creatinine 1.04 (H) 0.6 - 1.0 MG/DL    Est, Glom Filt Rate >60 >60 ml/min/1.73m2    Calcium 9.3 8.3 - 10.4 MG/DL        XR ANKLE RIGHT (MIN 3 VIEWS)   Final Result   Impression:      No acute focal bony abnormality seen. Nonacute bony findings as above. Prominent soft tissue swelling of the ankle with multiple small calcifications.        Given the reported nonhealing wound, consider further evaluation with    cross-sectional imaging with CT with IV contrast or MRI with and without IV    contrast.       Zahra Rizzo M.D.    3/16/2023 2:35:00 AM                        Voice dictation software was used during the making of this note. This software is not perfect and grammatical and other typographical errors may be present. This note has not been completely proofread for errors.      Marbella Schneider DO  03/16/23 0591

## 2023-03-16 NOTE — ED NOTES
I have reviewed discharge instructions with the patient. The patient verbalized understanding. Patient left ED via Discharge Method: ambulatory to Home with self    Opportunity for questions and clarification provided. Patient given 0 scripts. Px sent to pharmacy          To continue your aftercare when you leave the hospital, you may receive an automated call from our care team to check in on how you are doing. This is a free service and part of our promise to provide the best care and service to meet your aftercare needs.  If you have questions, or wish to unsubscribe from this service please call [phone]. Thank you for Choosing our [facility] Emergency Department.         Benny Pack RN  03/16/23 2908

## 2023-03-16 NOTE — ED TRIAGE NOTES
Pt bit by dog left ankle 3/8, has been on Amoxicillian since 3/9, states wound is not getting better

## 2023-03-30 DIAGNOSIS — N92.1 MENOMETRORRHAGIA: ICD-10-CM

## 2023-03-30 RX ORDER — PROGESTERONE 100 MG/1
CAPSULE ORAL
Qty: 30 CAPSULE | Refills: 1 | OUTPATIENT
Start: 2023-03-30

## 2023-03-31 ENCOUNTER — TELEPHONE (OUTPATIENT)
Dept: FAMILY MEDICINE CLINIC | Facility: CLINIC | Age: 48
End: 2023-03-31

## 2023-03-31 NOTE — TELEPHONE ENCOUNTER
----- Message from Nay Crawford sent at 3/31/2023  9:37 AM EDT -----  Subject: Refill Request    QUESTIONS  Name of Medication? methocarbamol (ROBAXIN) 750 MG tablet  Patient-reported dosage and instructions? twice a day  How many days do you have left? 2  Preferred Pharmacy? [id number]  Pharmacy phone number (if available)? [phone]  ---------------------------------------------------------------------------  --------------  Matt SEVERINO  What is the best way for the office to contact you? OK to leave message on   voicemail  Preferred Call Back Phone Number? [phone]  ---------------------------------------------------------------------------  --------------  SCRIPT ANSWERS  Relationship to Patient?  Self

## 2023-03-31 NOTE — TELEPHONE ENCOUNTER
Patient called back with correct information needs her Atarax  filled she is on her last dosage.  Thank you

## 2023-03-31 NOTE — TELEPHONE ENCOUNTER
----- Message from Brenton Chang sent at 3/31/2023  9:37 AM EDT -----  Subject: Refill Request    QUESTIONS  Name of Medication? methocarbamol (ROBAXIN) 750 MG tablet  Patient-reported dosage and instructions? twice a day  How many days do you have left? 2  Preferred Pharmacy? [id number]  Pharmacy phone number (if available)? [phone]  ---------------------------------------------------------------------------  --------------  Elizabeth SEVERINO  What is the best way for the office to contact you? OK to leave message on   voicemail  Preferred Call Back Phone Number? [phone]  ---------------------------------------------------------------------------  --------------  SCRIPT ANSWERS  Relationship to Patient?  Self

## 2023-03-31 NOTE — TELEPHONE ENCOUNTER
Patient last seen 3/8/23. Methocarbamol 750mg QID prn verified in that office note. Do not see where our office has filled that for patient. Called [facility] pharmacy & spoke to Tatiana, who states that this medication was last sent in by Diego Fontanez on 9/1/22 for #180 w/ 1 RF. Called patient, no answer. [facility] requesting a return call. [employer] also sent to patient, notifying her that she should contact Fransico's office to obtain refill.

## 2023-04-02 RX ORDER — METHOCARBAMOL 750 MG/1
750 TABLET, FILM COATED ORAL 3 TIMES DAILY
Qty: 60 TABLET | Refills: 0 | Status: SHIPPED | OUTPATIENT
Start: 2023-04-02

## 2023-04-04 ENCOUNTER — TELEPHONE (OUTPATIENT)
Dept: FAMILY MEDICINE CLINIC | Facility: CLINIC | Age: 48
End: 2023-04-04

## 2023-04-05 NOTE — TELEPHONE ENCOUNTER
Called patient, no answer. [facility] requesting return call. SPC number was left on voicemail.

## 2023-04-06 DIAGNOSIS — D50.8 OTHER IRON DEFICIENCY ANEMIA: Primary | ICD-10-CM

## 2023-04-06 DIAGNOSIS — I82.4Z2 DEEP VEIN THROMBOSIS (DVT) OF DISTAL VEIN OF LEFT LOWER EXTREMITY, UNSPECIFIED CHRONICITY (HCC): ICD-10-CM

## 2023-04-06 RX ORDER — CETIRIZINE HYDROCHLORIDE 10 MG/1
10 TABLET ORAL DAILY PRN
Qty: 30 TABLET | Refills: 1 | Status: SHIPPED | OUTPATIENT
Start: 2023-04-06

## 2023-04-06 RX ORDER — HYDROXYZINE 50 MG/1
50 TABLET, FILM COATED ORAL NIGHTLY
Qty: 30 TABLET | Refills: 1 | Status: SHIPPED | OUTPATIENT
Start: 2023-04-06

## 2023-04-07 NOTE — TELEPHONE ENCOUNTER
Called patient, no answer. 2nd message left, requesting a return call. SPC number was left on voicemail.

## 2023-04-11 ENCOUNTER — TELEPHONE (OUTPATIENT)
Dept: ONCOLOGY | Age: 48
End: 2023-04-11

## 2023-04-13 ENCOUNTER — HOSPITAL ENCOUNTER (OUTPATIENT)
Dept: LAB | Age: 48
Discharge: HOME | End: 2023-04-16
Payer: MEDICARE

## 2023-04-13 DIAGNOSIS — I82.4Z2 DEEP VEIN THROMBOSIS (DVT) OF DISTAL VEIN OF LEFT LOWER EXTREMITY, UNSPECIFIED CHRONICITY (HCC): ICD-10-CM

## 2023-04-13 DIAGNOSIS — D50.8 OTHER IRON DEFICIENCY ANEMIA: ICD-10-CM

## 2023-04-13 PROBLEM — E11.9 TYPE 2 DIABETES MELLITUS (HCC): Status: ACTIVE | Noted: 2023-04-13

## 2023-04-13 PROBLEM — D47.1 MPN (MYELOPROLIFERATIVE NEOPLASM) (HCC): Status: ACTIVE | Noted: 2023-04-13

## 2023-04-13 LAB
ALBUMIN SERPL-MCNC: 3.4 G/DL (ref 3.5–5)
ALBUMIN/GLOB SERPL: 0.9 (ref 0.4–1.6)
ALP SERPL-CCNC: 73 U/L (ref 50–136)
ALT SERPL-CCNC: 22 U/L (ref 12–65)
ANION GAP SERPL CALC-SCNC: 5 MMOL/L (ref 2–11)
AST SERPL-CCNC: 12 U/L (ref 15–37)
BASOPHILS # BLD: 0 K/UL (ref 0–0.2)
BASOPHILS NFR BLD: 1 % (ref 0–2)
BILIRUB SERPL-MCNC: 0.4 MG/DL (ref 0.2–1.1)
BUN SERPL-MCNC: 11 MG/DL (ref 6–23)
CALCIUM SERPL-MCNC: 9.2 MG/DL (ref 8.3–10.4)
CHLORIDE SERPL-SCNC: 108 MMOL/L (ref 101–110)
CO2 SERPL-SCNC: 27 MMOL/L (ref 21–32)
CREAT SERPL-MCNC: 1.1 MG/DL (ref 0.6–1)
D DIMER PPP FEU-MCNC: 2.61 UG/ML(FEU)
DIFFERENTIAL METHOD BLD: ABNORMAL
EOSINOPHIL # BLD: 0.2 K/UL (ref 0–0.8)
EOSINOPHIL NFR BLD: 5 % (ref 0.5–7.8)
ERYTHROCYTE [DISTWIDTH] IN BLOOD BY AUTOMATED COUNT: 15.3 % (ref 11.9–14.6)
FERRITIN SERPL-MCNC: 42 NG/ML (ref 8–388)
GLOBULIN SER CALC-MCNC: 4 G/DL (ref 2.8–4.5)
GLUCOSE SERPL-MCNC: 95 MG/DL (ref 65–100)
HCT VFR BLD AUTO: 33.8 % (ref 35.8–46.3)
HGB BLD-MCNC: 10.7 G/DL (ref 11.7–15.4)
IMM GRANULOCYTES # BLD AUTO: 0 K/UL (ref 0–0.5)
IMM GRANULOCYTES NFR BLD AUTO: 1 % (ref 0–5)
LYMPHOCYTES # BLD: 1 K/UL (ref 0.5–4.6)
LYMPHOCYTES NFR BLD: 22 % (ref 13–44)
MCH RBC QN AUTO: 28.5 PG (ref 26.1–32.9)
MCHC RBC AUTO-ENTMCNC: 31.7 G/DL (ref 31.4–35)
MCV RBC AUTO: 89.9 FL (ref 82–102)
MONOCYTES # BLD: 0.4 K/UL (ref 0.1–1.3)
MONOCYTES NFR BLD: 9 % (ref 4–12)
NEUTS SEG # BLD: 2.9 K/UL (ref 1.7–8.2)
NEUTS SEG NFR BLD: 63 % (ref 43–78)
NRBC # BLD: 0 K/UL (ref 0–0.2)
PLATELET # BLD AUTO: 291 K/UL (ref 150–450)
PMV BLD AUTO: 9.3 FL (ref 9.4–12.3)
POTASSIUM SERPL-SCNC: 4 MMOL/L (ref 3.5–5.1)
PROT SERPL-MCNC: 7.4 G/DL (ref 6.3–8.2)
RBC # BLD AUTO: 3.76 M/UL (ref 4.05–5.2)
SODIUM SERPL-SCNC: 140 MMOL/L (ref 133–143)
WBC # BLD AUTO: 4.6 K/UL (ref 4.3–11.1)

## 2023-04-13 PROCEDURE — 36415 COLL VENOUS BLD VENIPUNCTURE: CPT

## 2023-04-13 PROCEDURE — 82728 ASSAY OF FERRITIN: CPT

## 2023-04-13 PROCEDURE — 85379 FIBRIN DEGRADATION QUANT: CPT

## 2023-04-13 PROCEDURE — 80053 COMPREHEN METABOLIC PANEL: CPT

## 2023-04-13 PROCEDURE — 85025 COMPLETE CBC W/AUTO DIFF WBC: CPT

## 2023-05-06 ENCOUNTER — HOSPITAL ENCOUNTER (OUTPATIENT)
Dept: INFUSION THERAPY | Age: 48
Discharge: HOME | End: 2023-05-06
Payer: MEDICARE

## 2023-05-06 VITALS
DIASTOLIC BLOOD PRESSURE: 66 MMHG | TEMPERATURE: 97.7 F | HEART RATE: 85 BPM | RESPIRATION RATE: 18 BRPM | OXYGEN SATURATION: 98 % | SYSTOLIC BLOOD PRESSURE: 127 MMHG

## 2023-05-06 DIAGNOSIS — D50.8 OTHER IRON DEFICIENCY ANEMIAS: Primary | ICD-10-CM

## 2023-05-06 PROCEDURE — 2580000003 HC RX 258: Performed by: INTERNAL MEDICINE

## 2023-05-06 PROCEDURE — 6360000002 HC RX W HCPCS: Performed by: INTERNAL MEDICINE

## 2023-05-06 PROCEDURE — 96365 THER/PROPH/DIAG IV INF INIT: CPT

## 2023-05-06 PROCEDURE — 96366 THER/PROPH/DIAG IV INF ADDON: CPT

## 2023-05-06 RX ORDER — SODIUM CHLORIDE 0.9 % (FLUSH) 0.9 %
5-40 SYRINGE (ML) INJECTION PRN
Status: DISCONTINUED | OUTPATIENT
Start: 2023-05-06 | End: 2023-05-07 | Stop reason: HOSPADM

## 2023-05-06 RX ORDER — ALBUTEROL SULFATE 90 UG/1
4 AEROSOL, METERED RESPIRATORY (INHALATION) PRN
OUTPATIENT
Start: 2023-05-20

## 2023-05-06 RX ORDER — SODIUM CHLORIDE 9 MG/ML
INJECTION, SOLUTION INTRAVENOUS CONTINUOUS
OUTPATIENT
Start: 2023-05-20

## 2023-05-06 RX ORDER — SODIUM CHLORIDE 9 MG/ML
5-250 INJECTION, SOLUTION INTRAVENOUS PRN
OUTPATIENT
Start: 2023-05-20

## 2023-05-06 RX ORDER — SODIUM CHLORIDE 9 MG/ML
5-250 INJECTION, SOLUTION INTRAVENOUS PRN
Status: DISCONTINUED | OUTPATIENT
Start: 2023-05-06 | End: 2023-05-07 | Stop reason: HOSPADM

## 2023-05-06 RX ORDER — SODIUM CHLORIDE 0.9 % (FLUSH) 0.9 %
5-40 SYRINGE (ML) INJECTION PRN
OUTPATIENT
Start: 2023-05-20

## 2023-05-06 RX ORDER — ONDANSETRON 2 MG/ML
8 INJECTION INTRAMUSCULAR; INTRAVENOUS
OUTPATIENT
Start: 2023-05-20

## 2023-05-06 RX ORDER — HEPARIN SODIUM (PORCINE) LOCK FLUSH IV SOLN 100 UNIT/ML 100 UNIT/ML
500 SOLUTION INTRAVENOUS PRN
OUTPATIENT
Start: 2023-05-20

## 2023-05-06 RX ORDER — EPINEPHRINE 1 MG/ML
0.3 INJECTION, SOLUTION, CONCENTRATE INTRAVENOUS PRN
OUTPATIENT
Start: 2023-05-20

## 2023-05-06 RX ORDER — DIPHENHYDRAMINE HYDROCHLORIDE 50 MG/ML
50 INJECTION INTRAMUSCULAR; INTRAVENOUS
OUTPATIENT
Start: 2023-05-20

## 2023-05-06 RX ORDER — ACETAMINOPHEN 325 MG/1
650 TABLET ORAL
OUTPATIENT
Start: 2023-05-20

## 2023-05-06 RX ADMIN — IRON SUCROSE 500 MG: 20 INJECTION, SOLUTION INTRAVENOUS at 12:35

## 2023-05-06 RX ADMIN — SODIUM CHLORIDE, PRESERVATIVE FREE 10 ML: 5 INJECTION INTRAVENOUS at 12:30

## 2023-05-06 NOTE — PROGRESS NOTES
Arrived to the [facility]. 500 mg Venofer infusion over 3.5 hr and 30-min wait completed. Patient tolerated well. Any issues or concerns during appointment: no  Patient aware of next infusion appointment on 5/13/2023 (date) at 80 (time). Patient instructed to call provider with temperature of 100.4 or greater or nausea/vomiting/ diarrhea or pain not controlled by medications  Discharged via wheelchair.

## 2023-05-09 RX ORDER — MONTELUKAST SODIUM 10 MG/1
TABLET ORAL
Qty: 90 TABLET | Refills: 0 | Status: SHIPPED | OUTPATIENT
Start: 2023-05-09

## 2023-05-18 ENCOUNTER — PATIENT MESSAGE (OUTPATIENT)
Dept: OBGYN CLINIC | Age: 48
End: 2023-05-18

## 2023-05-18 DIAGNOSIS — B36.9 FUNGAL INFECTION OF SKIN: Primary | ICD-10-CM

## 2023-05-19 RX ORDER — FLUCONAZOLE 150 MG/1
TABLET ORAL
Qty: 2 TABLET | Refills: 0 | Status: SHIPPED | OUTPATIENT
Start: 2023-05-19

## 2023-05-19 NOTE — TELEPHONE ENCOUNTER
Rx sent to pharmacy. Pt advised to come in for appt if not cleared with treatment. Gastroenterology Consultation:    Patient is a 68y old  Male who presents with a chief complaint of       Admitted on: 10-06-22      HPI: Patient is a 67 y/o with PMHx of DM, HTN, Obesity, Glaucoma, Covid PNA c/b DVT, CKD, prior Nephrectomy, and HLD who presented to [facility] with abdominal pain, nausea, vomiting. patient tachycardic, afebrile.  labs showed leukocytosis 17K, Hct 40, metabolic acidosis present. K 6.2 hemolyzed, BATOOL on top of CKD, LFTs showed ALP: 117, , . Bili: 1, lactate 3.5, lipase 2000. TG normal. US abd Very limited study. Large fatty liver. Sludge with echogenic material   within the gallbladder. CT abdomen  1. No evidence of acute intra-abdominal pathology.    2. Status post right nephrectomy. Unchanged left staghorn calculus,   measuring approximately 3 x 2.5 x 1 cm.    Patient seen 1 year ago by GI team for choledocholithiasis. at that time MRCP was done and showed No biliary distention, filling defect, or stricture. Cholelithiasis. post gastric lap band surgery.    Patient given broad spectrum antibiotics. he is on /hr        Prior EGD:    Prior Colonoscopy:      PAST MEDICAL & SURGICAL HISTORY:  Diabetes mellitus      HTN (hypertension)      Glaucoma      DVT (deep venous thrombosis)      CKD (chronic kidney disease)      HLD (hyperlipidemia)      H/O right nephrectomy            FAMILY HISTORY:  FH: lung cancer  Father        Social History:  Tobacco:  Alcohol:  Drugs:    Home Medications:  amlodipine-olmesartan 5 mg-40 mg oral tablet: 1 tab(s) orally once a day (06 Jul 2021 04:22)  atorvastatin 20 mg oral tablet: 1 tab(s) orally once a day (17 Dec 2020 18:32)  Cosopt 2.23%-0.68% ophthalmic solution: 1 drop(s) to each affected eye 2 times a day (17 Dec 2020 18:32)  HumaLOG 100 units/mL subcutaneous solution: 30 unit(s) subcutaneous 3 times a day (before meals) (17 Dec 2020 18:32)  insulin glargine 100 units/mL subcutaneous solution: 70 units in morning and 40 units at night (17 Dec 2020 18:32)  Lumigan 0.01% ophthalmic solution: 1 drop(s) to each affected eye once a day (in the evening) (06 Jul 2021 04:23)        MEDICATIONS  (STANDING):  lactated ringers Bolus 250 milliLiter(s) IV Bolus once    MEDICATIONS  (PRN):      Allergies  No Known Allergies      Review of Systems:   Constitutional:  No Fever, No Chills  ENT/Mouth:  No Hearing Changes,  No Difficulty Swallowing  Eyes:  No Eye Pain, No Vision Changes  Cardiovascular:  No Chest Pain, No Palpitations  Respiratory:  No Cough, No Dyspnea  Gastrointestinal:  As described in HPI  Musculoskeletal:  No Joint Swelling, No Back Pain  Skin:  No Skin Lesions, No Jaundice  Neuro:  No Syncope, No Dizziness  Heme/Lymph:  No Bruising, No Bleeding.          Physical Examination:  T(C): 36.8 (10-06-22 @ 06:57), Max: 36.8 (10-06-22 @ 06:57)  HR: 115 (10-06-22 @ 06:57) (115 - 115)  BP: 136/68 (10-06-22 @ 06:57) (136/68 - 136/68)  RR: 18 (10-06-22 @ 06:57) (18 - 18)  SpO2: 98% (10-06-22 @ 06:57) (98% - 98%)  Height (cm): 177.8 (10-06-22 @ 06:57)        GENERAL:  Appears stated age, well-groomed, well-nourished, no distress  HEENT:  NC/AT,  conjunctivae clear and pink, no thyromegaly, nodules, adenopathy, sclera -anicteric  CHEST:  Full & symmetric excursion, no increased effort, breath sounds clear  HEART:  Regular rhythm, S1, S2, no murmur/rub/S3/S4, no abdominal bruit, no edema  ABDOMEN:  Soft, non-tender, non-distended, normoactive bowel sounds,  no masses ,no hepato-splenomegaly, no signs of chronic liver disease  EXTEREMITIES:  no cyanosis,clubbing or edema  SKIN:  No rash/erythema/ecchymoses/petechiae/wounds/abscess/warm/dry  NEURO:  Alert, oriented, no asterixis, no tremor, no encephalopathy          Data:                        13.8   17.44 )-----------( 168      ( 06 Oct 2022 07:56 )             40.8     Hgb Trend:  13.8  10-06-22 @ 07:56      10-06    133<L>  |  100  |  51<H>  ----------------------------<  440<H>  6.2<HH>   |  17  |  2.4<H>    Ca    8.9      06 Oct 2022 07:56    TPro  7.2  /  Alb  4.0  /  TBili  1.0  /  DBili  <0.2  /  AST  141<H>  /  ALT  140<H>  /  AlkPhos  117<H>  10-06    Liver panel trend:  TBili 1.0   /      /      /   AlkP 117   /   Tptn 7.2   /   Alb 4.0    /   DBili <0.2      10-06      PT/INR - ( 06 Oct 2022 07:56 )   PT: 12.30 sec;   INR: 1.08 ratio         PTT - ( 06 Oct 2022 07:56 )  PTT:28.8 sec        Radiology:  CT Abdomen and Pelvis w/ Oral Cont:   ACC: [id number] EXAM:  CT ABDOMEN AND PELVIS OC                          PROCEDURE DATE:  10/06/2022          INTERPRETATION:  CLINICAL STATEMENT: Abdominal pain.    TECHNIQUE: Contiguous axial CT images were obtained from the lower chest   to the pubic symphysis without intravenous contrast.  Oral contrast was   administered.  Reformatted images in the coronal and sagittal planes were   acquired.    Comparison made with CT abdomen and pelvis 7/5/2021, MR abdomen 7/9/2021.    FINDINGS:    LOWER CHEST: Calcified coronary arteries. Partially imaged calcified   right hilar lymph nodes. Elevated right hemidiaphragm with subsegmental   atelectasis.    HEPATOBILIARY: Cholelithiasis. Unenhanced liver unremarkable. No biliary   dilation.    SPLEEN: Unchanged splenic artery embolization coils.    PANCREAS: Unremarkable.    ADRENAL GLANDS: Unremarkable.    KIDNEYS: Status post right nephrectomy. Unchanged left staghorn calculus,   measuring approximately 3 x 2.5 x 1 cm (average Hounsfield units 500). No   left hydronephrosis. Left lower pole cyst.    ABDOMINOPELVIC NODES: No lymphadenopathy.    PELVIC ORGANS: Unremarkable.    PERITONEUM/MESENTERY/BOWEL: Post gastric banding with associated tubings   and port within the right anterior mid abdominal wall. No bowel   obstruction, ascites or intraperitoneal free air. Normal appendix.    BONES/SOFT TISSUES: Degenerative change of the spine and bilateral hips.   Small fat-containing umbilical and bilateral inguinal hernias.    OTHER: Atherosclerotic calcification.    IMPRESSION:    1. No evidence of acute intra-abdominal pathology.    2. Status post right nephrectomy. Unchanged left staghorn calculus,   measuring approximately 3 x 2.5 x 1 cm.    --- End of Report ---            RORY VALADEZ MD; Attending Radiologist  This document has been electronically signed. Oct  6 2022 10:21AM (10-06-22 @ 10:14)    US Abdomen Upper Quadrant Right:   ACC: [id number] EXAM:  US ABDOMEN RT UPR QUADRANT                          PROCEDURE DATE:  10/06/2022          INTERPRETATION:  CLINICAL INFORMATION: Right upper quadrant pain    COMPARISON: Abdominal ultrasound from July 7, 2021    TECHNIQUE: Sonography of the right upper quadrant.    FINDINGS:    Very limited study due to patient body habitus.  Liver: Large fatty liver. Liver measures 24 cm in length.  Bile ducts: Normal caliber. Common bile duct measures 5 mm.  Gallbladder: Large with possible small stones within the gallbladder.  Pancreas: Obscured by overlying bowel gas.  Right kidney: Status post right nephrectomy. Not applicable.  Ascites: None.  IVC: Not well visualized    IMPRESSION:  Very limited study. Large fatty liver. Sludge with echogenic material   within the gallbladder.        --- End of Report ---            GERARDO NJ MD; Attending Radiologist  This document has been electronically signed. Oct  6 2022  9:03AM (10-06-22 @ 08:59)     Gastroenterology Consultation:    Patient is a 68y old  Male who presents with a chief complaint of       Admitted on: 10-06-22      HPI: Patient is a 67 y/o with PMHx of DM, HTN, Obesity, Glaucoma, Covid PNA c/b DVT, CKD, prior Nephrectomy, HLD, hx of gastric band insertion many years ago who presented to [facility] with abdominal pain, nausea, vomiting. Patient reports that last night after dinner, he started having epigastric pain radiating to RUQ and LUQ associated with nausea and 3 episodes of nonbloody nonbilious vomiting. no other symptoms. no fever. patient tachycardic, afebrile.  labs showed leukocytosis 17K, Hct 40, metabolic acidosis present. K 6.2 hemolyzed, BATOOL on top of CKD, LFTs showed ALP: 117, , . Bili: 1, lactate 3.5, lipase 2000. TG normal. US abd very limited study. Large fatty liver. Sludge with echogenic material within the gallbladder. CT abdomen No evidence of acute intra-abdominal pathology. Status post right nephrectomy. Unchanged left staghorn calculus, measuring approximately 3 x 2.5 x 1 cm.    Patient seen 1 year ago by GI team for choledocholithiasis. at that time MRCP was done and showed No biliary distention, filling defect, or stricture. Cholelithiasis present. Patient was supposed to get cholecystectomy but it was not done yet. Patient is also supposed to get his gastric band removed due to dysphagia but procedure not done yet    In ED, Patient given broad spectrum antibiotics. he is on /hr after 2 liters fluid resuscitation.        Prior EGD: per patient 5 years ago normal    Prior Colonoscopy: per patient 5 years ago normal      PAST MEDICAL & SURGICAL HISTORY:  Diabetes mellitus      HTN (hypertension)      Glaucoma      DVT (deep venous thrombosis)      CKD (chronic kidney disease)      HLD (hyperlipidemia)      H/O right nephrectomy            FAMILY HISTORY:  FH: lung cancer  Father        Social History:  Tobacco:  Alcohol:  Drugs:    Home Medications:  amlodipine-olmesartan 5 mg-40 mg oral tablet: 1 tab(s) orally once a day (06 Jul 2021 04:22)  atorvastatin 20 mg oral tablet: 1 tab(s) orally once a day (17 Dec 2020 18:32)  Cosopt 2.23%-0.68% ophthalmic solution: 1 drop(s) to each affected eye 2 times a day (17 Dec 2020 18:32)  HumaLOG 100 units/mL subcutaneous solution: 30 unit(s) subcutaneous 3 times a day (before meals) (17 Dec 2020 18:32)  insulin glargine 100 units/mL subcutaneous solution: 70 units in morning and 40 units at night (17 Dec 2020 18:32)  Lumigan 0.01% ophthalmic solution: 1 drop(s) to each affected eye once a day (in the evening) (06 Jul 2021 04:23)        MEDICATIONS  (STANDING):  lactated ringers Bolus 250 milliLiter(s) IV Bolus once    MEDICATIONS  (PRN):      Allergies  No Known Allergies      Review of Systems:   Constitutional:  No Fever, No Chills  ENT/Mouth:  No Hearing Changes,  No Difficulty Swallowing  Eyes:  No Eye Pain, No Vision Changes  Cardiovascular:  No Chest Pain, No Palpitations  Respiratory:  No Cough, No Dyspnea  Gastrointestinal:  As described in HPI  Musculoskeletal:  No Joint Swelling, No Back Pain  Skin:  No Skin Lesions, No Jaundice  Neuro:  No Syncope, No Dizziness  Heme/Lymph:  No Bruising, No Bleeding.          Physical Examination:  T(C): 36.8 (10-06-22 @ 06:57), Max: 36.8 (10-06-22 @ 06:57)  HR: 115 (10-06-22 @ 06:57) (115 - 115)  BP: 136/68 (10-06-22 @ 06:57) (136/68 - 136/68)  RR: 18 (10-06-22 @ 06:57) (18 - 18)  SpO2: 98% (10-06-22 @ 06:57) (98% - 98%)  Height (cm): 177.8 (10-06-22 @ 06:57)        GENERAL:  Appears stated age, well-groomed, well-nourished, no distress  HEENT:  NC/AT,  conjunctivae clear and pink, no thyromegaly, nodules, adenopathy, sclera -anicteric  CHEST:  Full & symmetric excursion, no increased effort, breath sounds clear  HEART:  Regular rhythm, S1, S2, no murmur/rub/S3/S4, no abdominal bruit, no edema  ABDOMEN:  Soft, non-tender, non-distended, normoactive bowel sounds,  no masses ,no hepato-splenomegaly, no signs of chronic liver disease  EXTEREMITIES:  no cyanosis,clubbing or edema  SKIN:  No rash/erythema/ecchymoses/petechiae/wounds/abscess/warm/dry  NEURO:  Alert, oriented, no asterixis, no tremor, no encephalopathy          Data:                        13.8   17.44 )-----------( 168      ( 06 Oct 2022 07:56 )             40.8     Hgb Trend:  13.8  10-06-22 @ 07:56      10-06    133<L>  |  100  |  51<H>  ----------------------------<  440<H>  6.2<HH>   |  17  |  2.4<H>    Ca    8.9      06 Oct 2022 07:56    TPro  7.2  /  Alb  4.0  /  TBili  1.0  /  DBili  <0.2  /  AST  141<H>  /  ALT  140<H>  /  AlkPhos  117<H>  10-06    Liver panel trend:  TBili 1.0   /      /      /   AlkP 117   /   Tptn 7.2   /   Alb 4.0    /   DBili <0.2      10-06      PT/INR - ( 06 Oct 2022 07:56 )   PT: 12.30 sec;   INR: 1.08 ratio         PTT - ( 06 Oct 2022 07:56 )  PTT:28.8 sec        Radiology:  CT Abdomen and Pelvis w/ Oral Cont:   ACC: [id number] EXAM:  CT ABDOMEN AND PELVIS OC                          PROCEDURE DATE:  10/06/2022          INTERPRETATION:  CLINICAL STATEMENT: Abdominal pain.    TECHNIQUE: Contiguous axial CT images were obtained from the lower chest   to the pubic symphysis without intravenous contrast.  Oral contrast was   administered.  Reformatted images in the coronal and sagittal planes were   acquired.    Comparison made with CT abdomen and pelvis 7/5/2021, MR abdomen 7/9/2021.    FINDINGS:    LOWER CHEST: Calcified coronary arteries. Partially imaged calcified   right hilar lymph nodes. Elevated right hemidiaphragm with subsegmental   atelectasis.    HEPATOBILIARY: Cholelithiasis. Unenhanced liver unremarkable. No biliary   dilation.    SPLEEN: Unchanged splenic artery embolization coils.    PANCREAS: Unremarkable.    ADRENAL GLANDS: Unremarkable.    KIDNEYS: Status post right nephrectomy. Unchanged left staghorn calculus,   measuring approximately 3 x 2.5 x 1 cm (average Hounsfield units 500). No   left hydronephrosis. Left lower pole cyst.    ABDOMINOPELVIC NODES: No lymphadenopathy.    PELVIC ORGANS: Unremarkable.    PERITONEUM/MESENTERY/BOWEL: Post gastric banding with associated tubings   and port within the right anterior mid abdominal wall. No bowel   obstruction, ascites or intraperitoneal free air. Normal appendix.    BONES/SOFT TISSUES: Degenerative change of the spine and bilateral hips.   Small fat-containing umbilical and bilateral inguinal hernias.    OTHER: Atherosclerotic calcification.    IMPRESSION:    1. No evidence of acute intra-abdominal pathology.    2. Status post right nephrectomy. Unchanged left staghorn calculus,   measuring approximately 3 x 2.5 x 1 cm.    --- End of Report ---            RORY VALADEZ MD; Attending Radiologist  This document has been electronically signed. Oct  6 2022 10:21AM (10-06-22 @ 10:14)    US Abdomen Upper Quadrant Right:   ACC: [id number] EXAM:  US ABDOMEN RT UPR QUADRANT                          PROCEDURE DATE:  10/06/2022          INTERPRETATION:  CLINICAL INFORMATION: Right upper quadrant pain    COMPARISON: Abdominal ultrasound from July 7, 2021    TECHNIQUE: Sonography of the right upper quadrant.    FINDINGS:    Very limited study due to patient body habitus.  Liver: Large fatty liver. Liver measures 24 cm in length.  Bile ducts: Normal caliber. Common bile duct measures 5 mm.  Gallbladder: Large with possible small stones within the gallbladder.  Pancreas: Obscured by overlying bowel gas.  Right kidney: Status post right nephrectomy. Not applicable.  Ascites: None.  IVC: Not well visualized    IMPRESSION:  Very limited study. Large fatty liver. Sludge with echogenic material   within the gallbladder.        --- End of Report ---            GERARDO NJ MD; Attending Radiologist  This document has been electronically signed. Oct  6 2022  9:03AM (10-06-22 @ 08:59)     Gastroenterology Consultation:    Patient is a 68y old  Male who presents with a chief complaint of       Admitted on: 10-06-22      HPI: Patient is a 69 y/o with PMHx of DM, HTN, Obesity, Glaucoma, Covid PNA c/b DVT, CKD, prior Nephrectomy, HLD, hx of gastric band insertion many years ago who presented to [facility] with abdominal pain, nausea, vomiting. Patient reports that last night after dinner, he started having epigastric pain radiating to RUQ and LUQ associated with nausea and 3 episodes of nonbloody nonbilious vomiting. no other symptoms. no fever. patient tachycardic, afebrile.  labs showed leukocytosis 17K, Hct 40, metabolic acidosis present. K 6.2 hemolyzed, BATOOL on top of CKD, LFTs showed ALP: 117, , . Bili: 1, lactate 3.5, lipase 2000. TG normal. US abd very limited study. Large fatty liver. Sludge with echogenic material within the gallbladder. CT abdomen No evidence of acute intra-abdominal pathology. Status post right nephrectomy. Unchanged left staghorn calculus, measuring approximately 3 x 2.5 x 1 cm.    Patient seen 1 year ago by GI team for choledocholithiasis. at that time MRCP was done and showed No biliary distention, filling defect, or stricture. Cholelithiasis present. Patient was supposed to get cholecystectomy but it was not done yet. Patient is also supposed to get his gastric band removed due to dysphagia but procedure not done yet    In ED, Patient given broad spectrum antibiotics. he is on /hr after 2 liters fluid resuscitation.        Prior EGD: per patient 5 years ago normal    Prior Colonoscopy: per patient 5 years ago normal      PAST MEDICAL & SURGICAL HISTORY:  Diabetes mellitus      HTN (hypertension)      Glaucoma      DVT (deep venous thrombosis)      CKD (chronic kidney disease)      HLD (hyperlipidemia)      H/O right nephrectomy            FAMILY HISTORY:  FH: lung cancer  Father        Social History:  Tobacco:  Alcohol:  Drugs:    Home Medications:  amlodipine-olmesartan 5 mg-40 mg oral tablet: 1 tab(s) orally once a day (06 Jul 2021 04:22)  atorvastatin 20 mg oral tablet: 1 tab(s) orally once a day (17 Dec 2020 18:32)  Cosopt 2.23%-0.68% ophthalmic solution: 1 drop(s) to each affected eye 2 times a day (17 Dec 2020 18:32)  HumaLOG 100 units/mL subcutaneous solution: 30 unit(s) subcutaneous 3 times a day (before meals) (17 Dec 2020 18:32)  insulin glargine 100 units/mL subcutaneous solution: 70 units in morning and 40 units at night (17 Dec 2020 18:32)  Lumigan 0.01% ophthalmic solution: 1 drop(s) to each affected eye once a day (in the evening) (06 Jul 2021 04:23)        MEDICATIONS  (STANDING):  lactated ringers Bolus 250 milliLiter(s) IV Bolus once    MEDICATIONS  (PRN):      Allergies  No Known Allergies      Review of Systems:   Constitutional:  No Fever, No Chills  ENT/Mouth:  No Hearing Changes,  No Difficulty Swallowing  Eyes:  No Eye Pain, No Vision Changes  Cardiovascular:  No Chest Pain, No Palpitations  Respiratory:  No Cough, No Dyspnea  Gastrointestinal:  As described in HPI  Musculoskeletal:  No Joint Swelling, No Back Pain  Skin:  No Skin Lesions, No Jaundice  Neuro:  No Syncope, No Dizziness  Heme/Lymph:  No Bruising, No Bleeding.          Physical Examination:  T(C): 36.8 (10-06-22 @ 06:57), Max: 36.8 (10-06-22 @ 06:57)  HR: 115 (10-06-22 @ 06:57) (115 - 115)  BP: 136/68 (10-06-22 @ 06:57) (136/68 - 136/68)  RR: 18 (10-06-22 @ 06:57) (18 - 18)  SpO2: 98% (10-06-22 @ 06:57) (98% - 98%)  Height (cm): 177.8 (10-06-22 @ 06:57)        GENERAL:  Appears stated age, well-groomed, well-nourished, no distress. obese  HEENT:  NC/AT,  conjunctivae clear and pink, no thyromegaly, nodules, adenopathy, sclera -anicteric  CHEST:  Full & symmetric excursion, no increased effort, breath sounds clear  HEART:  Regular rhythm, S1, S2, no murmur/rub/S3/S4, no abdominal bruit, no edema  ABDOMEN:  Soft, epigastric tenderness. juvencio negative  EXTEREMITIES:  no cyanosis,clubbing or edema  SKIN:  No rash/erythema/ecchymoses/petechiae/wounds/abscess/warm/dry  NEURO:  Alert, oriented, no asterixis, no tremor, no encephalopathy          Data:                        13.8   17.44 )-----------( 168      ( 06 Oct 2022 07:56 )             40.8     Hgb Trend:  13.8  10-06-22 @ 07:56      10-06    133<L>  |  100  |  51<H>  ----------------------------<  440<H>  6.2<HH>   |  17  |  2.4<H>    Ca    8.9      06 Oct 2022 07:56    TPro  7.2  /  Alb  4.0  /  TBili  1.0  /  DBili  <0.2  /  AST  141<H>  /  ALT  140<H>  /  AlkPhos  117<H>  10-06    Liver panel trend:  TBili 1.0   /      /      /   AlkP 117   /   Tptn 7.2   /   Alb 4.0    /   DBili <0.2      10-06      PT/INR - ( 06 Oct 2022 07:56 )   PT: 12.30 sec;   INR: 1.08 ratio         PTT - ( 06 Oct 2022 07:56 )  PTT:28.8 sec        Radiology:  CT Abdomen and Pelvis w/ Oral Cont:   ACC: [id number] EXAM:  CT ABDOMEN AND PELVIS OC                          PROCEDURE DATE:  10/06/2022          INTERPRETATION:  CLINICAL STATEMENT: Abdominal pain.    TECHNIQUE: Contiguous axial CT images were obtained from the lower chest   to the pubic symphysis without intravenous contrast.  Oral contrast was   administered.  Reformatted images in the coronal and sagittal planes were   acquired.    Comparison made with CT abdomen and pelvis 7/5/2021, MR abdomen 7/9/2021.    FINDINGS:    LOWER CHEST: Calcified coronary arteries. Partially imaged calcified   right hilar lymph nodes. Elevated right hemidiaphragm with subsegmental   atelectasis.    HEPATOBILIARY: Cholelithiasis. Unenhanced liver unremarkable. No biliary   dilation.    SPLEEN: Unchanged splenic artery embolization coils.    PANCREAS: Unremarkable.    ADRENAL GLANDS: Unremarkable.    KIDNEYS: Status post right nephrectomy. Unchanged left staghorn calculus,   measuring approximately 3 x 2.5 x 1 cm (average Hounsfield units 500). No   left hydronephrosis. Left lower pole cyst.    ABDOMINOPELVIC NODES: No lymphadenopathy.    PELVIC ORGANS: Unremarkable.    PERITONEUM/MESENTERY/BOWEL: Post gastric banding with associated tubings   and port within the right anterior mid abdominal wall. No bowel   obstruction, ascites or intraperitoneal free air. Normal appendix.    BONES/SOFT TISSUES: Degenerative change of the spine and bilateral hips.   Small fat-containing umbilical and bilateral inguinal hernias.    OTHER: Atherosclerotic calcification.    IMPRESSION:    1. No evidence of acute intra-abdominal pathology.    2. Status post right nephrectomy. Unchanged left staghorn calculus,   measuring approximately 3 x 2.5 x 1 cm.    --- End of Report ---            RORY VALADEZ MD; Attending Radiologist  This document has been electronically signed. Oct  6 2022 10:21AM (10-06-22 @ 10:14)    US Abdomen Upper Quadrant Right:   ACC: [id number] EXAM:  US ABDOMEN RT UPR QUADRANT                          PROCEDURE DATE:  10/06/2022          INTERPRETATION:  CLINICAL INFORMATION: Right upper quadrant pain    COMPARISON: Abdominal ultrasound from July 7, 2021    TECHNIQUE: Sonography of the right upper quadrant.    FINDINGS:    Very limited study due to patient body habitus.  Liver: Large fatty liver. Liver measures 24 cm in length.  Bile ducts: Normal caliber. Common bile duct measures 5 mm.  Gallbladder: Large with possible small stones within the gallbladder.  Pancreas: Obscured by overlying bowel gas.  Right kidney: Status post right nephrectomy. Not applicable.  Ascites: None.  IVC: Not well visualized    IMPRESSION:  Very limited study. Large fatty liver. Sludge with echogenic material   within the gallbladder.        --- End of Report ---            GERARDO NJ MD; Attending Radiologist  This document has been electronically signed. Oct  6 2022  9:03AM (10-06-22 @ 08:59)     Gastroenterology Consultation:    Patient is a 68y old  Male who presents with a chief complaint of       Admitted on: 10-06-22      HPI: Patient is a 69 y/o with PMHx of DM, HTN, Obesity, Glaucoma, Covid PNA c/b DVT, CKD, prior Nephrectomy, HLD, hx of gastric band insertion many years ago who presented to [facility] with abdominal pain, nausea, vomiting. Patient reports that last night after dinner, he started having epigastric pain radiating to RUQ and LUQ associated with nausea and 3 episodes of nonbloody nonbilious vomiting. no other symptoms. no fever. patient tachycardic, afebrile.  labs showed leukocytosis 17K, Hct 40, metabolic acidosis present. K 6.2 hemolyzed, BATOOL on top of CKD, LFTs showed ALP: 117, , . Bili: 1, lactate 3.5, lipase 2000. TG normal. US abd very limited study. Large fatty liver. Sludge with echogenic material within the gallbladder. CT abdomen No evidence of acute intra-abdominal pathology. Status post right nephrectomy. Unchanged left staghorn calculus, measuring approximately 3 x 2.5 x 1 cm.    Patient seen 1 year ago by GI team to rule out choledocholithiasis,  at that time MRCP was done and showed No biliary distention, filling defect, or stricture. Cholelithiasis present. Patient was supposed to get cholecystectomy but it was not done yet. Patient is also supposed to get his gastric band removed due to dysphagia but procedure not done yet    In ED, Patient given broad spectrum antibiotics, he is on /hr after 2 liters fluid resuscitation.        Prior EGD: per patient 5 years ago normal    Prior Colonoscopy: per patient 5 years ago normal      PAST MEDICAL & SURGICAL HISTORY:  Diabetes mellitus      HTN (hypertension)      Glaucoma      DVT (deep venous thrombosis)      CKD (chronic kidney disease)      HLD (hyperlipidemia)      H/O right nephrectomy            FAMILY HISTORY:  FH: lung cancer  Father        Social History:  Tobacco: denies   Drugs: denies     Home Medications:  amlodipine-olmesartan 5 mg-40 mg oral tablet: 1 tab(s) orally once a day (06 Jul 2021 04:22)  atorvastatin 20 mg oral tablet: 1 tab(s) orally once a day (17 Dec 2020 18:32)  Cosopt 2.23%-0.68% ophthalmic solution: 1 drop(s) to each affected eye 2 times a day (17 Dec 2020 18:32)  HumaLOG 100 units/mL subcutaneous solution: 30 unit(s) subcutaneous 3 times a day (before meals) (17 Dec 2020 18:32)  insulin glargine 100 units/mL subcutaneous solution: 70 units in morning and 40 units at night (17 Dec 2020 18:32)  Lumigan 0.01% ophthalmic solution: 1 drop(s) to each affected eye once a day (in the evening) (06 Jul 2021 04:23)        MEDICATIONS  (STANDING):  lactated ringers Bolus 250 milliLiter(s) IV Bolus once    MEDICATIONS  (PRN):      Allergies  No Known Allergies      Review of Systems:   Constitutional:  No Fever, No Chills  ENT/Mouth:  No Hearing Changes,  No Difficulty Swallowing  Eyes:  No Eye Pain, No Vision Changes  Cardiovascular:  No Chest Pain, No Palpitations  Respiratory:  No Cough, No Dyspnea  Gastrointestinal:  As described in HPI  Musculoskeletal:  No Joint Swelling, No Back Pain  Skin:  No Skin Lesions, No Jaundice  Neuro:  No Syncope, No Dizziness  Heme/Lymph:  No Bruising, No Bleeding.          Physical Examination:  T(C): 36.8 (10-06-22 @ 06:57), Max: 36.8 (10-06-22 @ 06:57)  HR: 115 (10-06-22 @ 06:57) (115 - 115)  BP: 136/68 (10-06-22 @ 06:57) (136/68 - 136/68)  RR: 18 (10-06-22 @ 06:57) (18 - 18)  SpO2: 98% (10-06-22 @ 06:57) (98% - 98%)  Height (cm): 177.8 (10-06-22 @ 06:57)        GENERAL:  Appears stated age, well-groomed, well-nourished, no distress, obese  HEENT:   conjunctivae clear and pink, sclera -anicteric  CHEST:  Full & symmetric excursion, no increased effort, breath sounds clear  HEART:  Regular rhythm, S1, S2, no murmur  ABDOMEN:  Soft, epigastric tenderness. juvencio negative  EXTEREMITIES:  no cyanosis,clubbing or edema  SKIN:  No rash/erythema  NEURO:  Alert, oriented, no asterixis, no tremor, no encephalopathy          Data:                        13.8   17.44 )-----------( 168      ( 06 Oct 2022 07:56 )             40.8     Hgb Trend:  13.8  10-06-22 @ 07:56      10-06    133<L>  |  100  |  51<H>  ----------------------------<  440<H>  6.2<HH>   |  17  |  2.4<H>    Ca    8.9      06 Oct 2022 07:56    TPro  7.2  /  Alb  4.0  /  TBili  1.0  /  DBili  <0.2  /  AST  141<H>  /  ALT  140<H>  /  AlkPhos  117<H>  10-06    Liver panel trend:  TBili 1.0   /      /      /   AlkP 117   /   Tptn 7.2   /   Alb 4.0    /   DBili <0.2      10-06      PT/INR - ( 06 Oct 2022 07:56 )   PT: 12.30 sec;   INR: 1.08 ratio         PTT - ( 06 Oct 2022 07:56 )  PTT:28.8 sec        Radiology:  CT Abdomen and Pelvis w/ Oral Cont:   ACC: [id number] EXAM:  CT ABDOMEN AND PELVIS OC                          PROCEDURE DATE:  10/06/2022          INTERPRETATION:  CLINICAL STATEMENT: Abdominal pain.    TECHNIQUE: Contiguous axial CT images were obtained from the lower chest   to the pubic symphysis without intravenous contrast.  Oral contrast was   administered.  Reformatted images in the coronal and sagittal planes were   acquired.    Comparison made with CT abdomen and pelvis 7/5/2021, MR abdomen 7/9/2021.    FINDINGS:    LOWER CHEST: Calcified coronary arteries. Partially imaged calcified   right hilar lymph nodes. Elevated right hemidiaphragm with subsegmental   atelectasis.    HEPATOBILIARY: Cholelithiasis. Unenhanced liver unremarkable. No biliary   dilation.    SPLEEN: Unchanged splenic artery embolization coils.    PANCREAS: Unremarkable.    ADRENAL GLANDS: Unremarkable.    KIDNEYS: Status post right nephrectomy. Unchanged left staghorn calculus,   measuring approximately 3 x 2.5 x 1 cm (average Hounsfield units 500). No   left hydronephrosis. Left lower pole cyst.    ABDOMINOPELVIC NODES: No lymphadenopathy.    PELVIC ORGANS: Unremarkable.    PERITONEUM/MESENTERY/BOWEL: Post gastric banding with associated tubings   and port within the right anterior mid abdominal wall. No bowel   obstruction, ascites or intraperitoneal free air. Normal appendix.    BONES/SOFT TISSUES: Degenerative change of the spine and bilateral hips.   Small fat-containing umbilical and bilateral inguinal hernias.    OTHER: Atherosclerotic calcification.    IMPRESSION:    1. No evidence of acute intra-abdominal pathology.    2. Status post right nephrectomy. Unchanged left staghorn calculus,   measuring approximately 3 x 2.5 x 1 cm.    --- End of Report ---            RORY VALADEZ MD; Attending Radiologist  This document has been electronically signed. Oct  6 2022 10:21AM (10-06-22 @ 10:14)    US Abdomen Upper Quadrant Right:   ACC: [id number] EXAM:  US ABDOMEN RT UPR QUADRANT                          PROCEDURE DATE:  10/06/2022          INTERPRETATION:  CLINICAL INFORMATION: Right upper quadrant pain    COMPARISON: Abdominal ultrasound from July 7, 2021    TECHNIQUE: Sonography of the right upper quadrant.    FINDINGS:    Very limited study due to patient body habitus.  Liver: Large fatty liver. Liver measures 24 cm in length.  Bile ducts: Normal caliber. Common bile duct measures 5 mm.  Gallbladder: Large with possible small stones within the gallbladder.  Pancreas: Obscured by overlying bowel gas.  Right kidney: Status post right nephrectomy. Not applicable.  Ascites: None.  IVC: Not well visualized    IMPRESSION:  Very limited study. Large fatty liver. Sludge with echogenic material   within the gallbladder.        --- End of Report ---            GERARDO NJ MD; Attending Radiologist  This document has been electronically signed. Oct  6 2022  9:03AM (10-06-22 @ 08:59)

## 2023-05-19 NOTE — TELEPHONE ENCOUNTER
From: Millie Jimenez  To: Dr. Cole Flatness: 5/18/2023 2:25 PM EDT  Subject: Yeast infection     Hello this is Shayan Reason I am itching under my breast up under my stomach and my vagina area. I need something as soon as possible please     Thank you!

## 2023-05-25 ENCOUNTER — OFFICE VISIT (OUTPATIENT)
Dept: ONCOLOGY | Age: 48
End: 2023-05-25
Payer: MEDICARE

## 2023-05-25 ENCOUNTER — HOSPITAL ENCOUNTER (OUTPATIENT)
Dept: LAB | Age: 48
Discharge: HOME | End: 2023-05-25
Payer: MEDICARE

## 2023-05-25 VITALS
SYSTOLIC BLOOD PRESSURE: 142 MMHG | BODY MASS INDEX: 45.99 KG/M2 | OXYGEN SATURATION: 100 % | RESPIRATION RATE: 14 BRPM | WEIGHT: 293 LBS | TEMPERATURE: 97.9 F | DIASTOLIC BLOOD PRESSURE: 79 MMHG | HEART RATE: 82 BPM | HEIGHT: 67 IN

## 2023-05-25 DIAGNOSIS — D50.8 OTHER IRON DEFICIENCY ANEMIA: ICD-10-CM

## 2023-05-25 DIAGNOSIS — E88.89 OTHER SPECIFIED METABOLIC DISORDERS (HCC): ICD-10-CM

## 2023-05-25 DIAGNOSIS — D50.8 OTHER IRON DEFICIENCY ANEMIA: Primary | ICD-10-CM

## 2023-05-25 DIAGNOSIS — K21.9 CHRONIC GERD: ICD-10-CM

## 2023-05-25 DIAGNOSIS — I82.4Z2 DEEP VEIN THROMBOSIS (DVT) OF DISTAL VEIN OF LEFT LOWER EXTREMITY, UNSPECIFIED CHRONICITY (HCC): ICD-10-CM

## 2023-05-25 DIAGNOSIS — D68.59 PRIMARY HYPERCOAGULABLE STATE (HCC): Primary | ICD-10-CM

## 2023-05-25 LAB
ALBUMIN SERPL-MCNC: 3.7 G/DL (ref 3.5–5)
ALBUMIN/GLOB SERPL: 0.9 (ref 0.4–1.6)
ALP SERPL-CCNC: 73 U/L (ref 50–136)
ALT SERPL-CCNC: 28 U/L (ref 12–65)
ANION GAP SERPL CALC-SCNC: 5 MMOL/L (ref 2–11)
AST SERPL-CCNC: 15 U/L (ref 15–37)
BASOPHILS # BLD: 0 K/UL (ref 0–0.2)
BASOPHILS NFR BLD: 1 % (ref 0–2)
BILIRUB SERPL-MCNC: 0.6 MG/DL (ref 0.2–1.1)
BUN SERPL-MCNC: 13 MG/DL (ref 6–23)
CALCIUM SERPL-MCNC: 9.3 MG/DL (ref 8.3–10.4)
CHLORIDE SERPL-SCNC: 106 MMOL/L (ref 101–110)
CO2 SERPL-SCNC: 27 MMOL/L (ref 21–32)
CREAT SERPL-MCNC: 1.2 MG/DL (ref 0.6–1)
D DIMER PPP FEU-MCNC: 0.56 UG/ML(FEU)
DIFFERENTIAL METHOD BLD: ABNORMAL
EOSINOPHIL # BLD: 0.1 K/UL (ref 0–0.8)
EOSINOPHIL NFR BLD: 3 % (ref 0.5–7.8)
ERYTHROCYTE [DISTWIDTH] IN BLOOD BY AUTOMATED COUNT: 15.2 % (ref 11.9–14.6)
FERRITIN SERPL-MCNC: 112 NG/ML (ref 8–388)
GLOBULIN SER CALC-MCNC: 4.2 G/DL (ref 2.8–4.5)
GLUCOSE SERPL-MCNC: 98 MG/DL (ref 65–100)
HCT VFR BLD AUTO: 41.4 % (ref 35.8–46.3)
HGB BLD-MCNC: 13.1 G/DL (ref 11.7–15.4)
IMM GRANULOCYTES # BLD AUTO: 0 K/UL (ref 0–0.5)
IMM GRANULOCYTES NFR BLD AUTO: 0 % (ref 0–5)
LYMPHOCYTES # BLD: 1.4 K/UL (ref 0.5–4.6)
LYMPHOCYTES NFR BLD: 36 % (ref 13–44)
MCH RBC QN AUTO: 28.5 PG (ref 26.1–32.9)
MCHC RBC AUTO-ENTMCNC: 31.6 G/DL (ref 31.4–35)
MCV RBC AUTO: 90 FL (ref 82–102)
MONOCYTES # BLD: 0.4 K/UL (ref 0.1–1.3)
MONOCYTES NFR BLD: 10 % (ref 4–12)
NEUTS SEG # BLD: 2 K/UL (ref 1.7–8.2)
NEUTS SEG NFR BLD: 50 % (ref 43–78)
NRBC # BLD: 0 K/UL (ref 0–0.2)
PLATELET # BLD AUTO: 258 K/UL (ref 150–450)
PMV BLD AUTO: 10.3 FL (ref 9.4–12.3)
POTASSIUM SERPL-SCNC: 4.2 MMOL/L (ref 3.5–5.1)
PROT SERPL-MCNC: 7.9 G/DL (ref 6.3–8.2)
RBC # BLD AUTO: 4.6 M/UL (ref 4.05–5.2)
SODIUM SERPL-SCNC: 138 MMOL/L (ref 133–143)
WBC # BLD AUTO: 3.8 K/UL (ref 4.3–11.1)

## 2023-05-25 PROCEDURE — 85379 FIBRIN DEGRADATION QUANT: CPT

## 2023-05-25 PROCEDURE — 83090 ASSAY OF HOMOCYSTEINE: CPT

## 2023-05-25 PROCEDURE — 3078F DIAST BP <80 MM HG: CPT | Performed by: INTERNAL MEDICINE

## 2023-05-25 PROCEDURE — 99215 OFFICE O/P EST HI 40 MIN: CPT | Performed by: INTERNAL MEDICINE

## 2023-05-25 PROCEDURE — 82728 ASSAY OF FERRITIN: CPT

## 2023-05-25 PROCEDURE — 36415 COLL VENOUS BLD VENIPUNCTURE: CPT

## 2023-05-25 PROCEDURE — 85025 COMPLETE CBC W/AUTO DIFF WBC: CPT

## 2023-05-25 PROCEDURE — 80053 COMPREHEN METABOLIC PANEL: CPT

## 2023-05-25 PROCEDURE — G8427 DOCREV CUR MEDS BY ELIG CLIN: HCPCS | Performed by: INTERNAL MEDICINE

## 2023-05-25 PROCEDURE — 3077F SYST BP >= 140 MM HG: CPT | Performed by: INTERNAL MEDICINE

## 2023-05-25 PROCEDURE — G8417 CALC BMI ABV UP PARAM F/U: HCPCS | Performed by: INTERNAL MEDICINE

## 2023-05-25 PROCEDURE — 1036F TOBACCO NON-USER: CPT | Performed by: INTERNAL MEDICINE

## 2023-05-25 ASSESSMENT — PATIENT HEALTH QUESTIONNAIRE - PHQ9
SUM OF ALL RESPONSES TO PHQ QUESTIONS 1-9: 0
SUM OF ALL RESPONSES TO PHQ9 QUESTIONS 1 & 2: 0
1. LITTLE INTEREST OR PLEASURE IN DOING THINGS: 0
2. FEELING DOWN, DEPRESSED OR HOPELESS: 0
SUM OF ALL RESPONSES TO PHQ QUESTIONS 1-9: 0

## 2023-05-25 NOTE — PROGRESS NOTES
[address], [address] [place] [address]  Phone: [phone]           5/25/2023  Rita Nevarez  1975  [id number]        Rita Nevarez is a 49year old -American female who has returned to my clinic for a follow-up visit; she was initially referred to me by Jaime De La Cruz NP for evaluation of Leukopenia and Iron Deficiency Anemia; her Myeloid Disorders Profile was unremarkable. In 12/2022 she was found to have an unprovoked left leg DVT, her partial Hypercoagulable work-up revealed Hyperhomocysteinemia and is now on Eliquis 5 mg BID ( till 8/2023 ). She received parenteral Iron infusions in 2/2023 and 4/2023. ALLERGIES:    Ciprofloxacin and sulfa drugs. FAMILY HISTORY:     Her sister had Hodgkin Lymphoma. SOCIAL HISTORY:    She is single and lives with her son. She used to work as a  for [employer]. She denies ever using any tobacco products. PAST MEDICAL HISTORY:     Hypertension, Asthma, Bipolar Disorder, Osteoarthritis, renal insufficiency, CVA, left leg DVT, Hypercoagulable Disorder, Diabetes Mellitus, GERD, Iron Deficiency Anemia and Neuropathy. ROS:  The patient complained of fatigue and arthralgias; all other systems negative. PHYSICAL EXAM:   The patient was alert, awake and oriented, no acute distress was noted. Oral examination did not reveal any mucosal lesions. Lymph node examination did not reveal any adenopathy. Neck examination revealed a supple neck, no thyromegaly or masses were noted. Chest examination revealed normal vesicular breath sounds. Heart examination revealed S-1 and S-2 without any murmurs. Abdominal examination revealed a non-tender abdomen, bowel sounds were positive, no organomegaly could be appreciated. Examination of the extremities did not reveal any tenderness or erythema, 1+ bilateral pedal edema was noted. Examination of the skin did not reveal any lesions.         KPS:

## 2023-05-25 NOTE — PATIENT INSTRUCTIONS
Patient Instructions from Today's Visit    Reason for Visit:  Follow Up    Diagnosis Information:  [email or web address].com/. net/about-us/asco-answers-patient-education-materials/xdqp-zisddur-ehqd-sheets    Plan:  Lab work looks stable today  You responded really well to the IV Iron  We will refer you to Gastroenterology for an EGD and Colonoscopy  We will send in a prescription for Foltix  We will discuss taking you off of the blood thinner at your next follow up  Follow Up: We will bring you back in August for a follow up with  and lab work prior.     Recent Lab Results:  Hospital Outpatient Visit on 05/25/2023   Component Date Value Ref Range Status    WBC 05/25/2023 3.8 (L)  4.3 - 11.1 K/uL Final    RBC 05/25/2023 4.60  4.05 - 5.2 M/uL Final    Hemoglobin 05/25/2023 13.1  11.7 - 15.4 g/dL Final    Hematocrit 05/25/2023 41.4  35.8 - 46.3 % Final    MCV 05/25/2023 90.0  82.0 - 102.0 FL Final    MCH 05/25/2023 28.5  26.1 - 32.9 PG Final    MCHC 05/25/2023 31.6  31.4 - 35.0 g/dL Final    RDW 05/25/2023 15.2 (H)  11.9 - 14.6 % Final    Platelets 71/55/0862 258  150 - 450 K/uL Final    MPV 05/25/2023 10.3  9.4 - 12.3 FL Final    nRBC 05/25/2023 0.00  0.0 - 0.2 K/uL Final    **Note: Absolute NRBC parameter is now reported with Hemogram**    Neutrophils % 05/25/2023 50  43 - 78 % Final    Lymphocytes % 05/25/2023 36  13 - 44 % Final    Monocytes % 05/25/2023 10  4.0 - 12.0 % Final    Eosinophils % 05/25/2023 3  0.5 - 7.8 % Final    Basophils % 05/25/2023 1  0.0 - 2.0 % Final    Immature Granulocytes 05/25/2023 0  0.0 - 5.0 % Final    Neutrophils Absolute 05/25/2023 2.0  1.7 - 8.2 K/UL Final    Lymphocytes Absolute 05/25/2023 1.4  0.5 - 4.6 K/UL Final    Monocytes Absolute 05/25/2023 0.4  0.1 - 1.3 K/UL Final    Eosinophils Absolute 05/25/2023 0.1  0.0 - 0.8 K/UL Final    Basophils Absolute 05/25/2023 0.0  0.0 - 0.2 K/UL Final    Absolute Immature Granulocyte 05/25/2023 0.0  0.0 - 0.5 K/UL Final    Differential Type

## 2023-05-27 LAB — HCYS SERPL-SCNC: 13.2 UMOL/L (ref 0–14.5)

## 2023-06-05 DIAGNOSIS — B36.9 FUNGAL INFECTION OF SKIN: ICD-10-CM

## 2023-06-06 DIAGNOSIS — B37.31 YEAST VAGINITIS: Primary | ICD-10-CM

## 2023-06-06 DIAGNOSIS — B36.9 FUNGAL INFECTION OF SKIN: ICD-10-CM

## 2023-06-06 RX ORDER — CETIRIZINE HYDROCHLORIDE 10 MG/1
TABLET ORAL
Qty: 30 TABLET | Refills: 0 | OUTPATIENT
Start: 2023-06-06

## 2023-06-06 RX ORDER — HYDROXYZINE 50 MG/1
50 TABLET, FILM COATED ORAL NIGHTLY
Qty: 30 TABLET | Refills: 0 | OUTPATIENT
Start: 2023-06-06

## 2023-06-07 RX ORDER — FLUCONAZOLE 150 MG/1
TABLET ORAL
Qty: 2 TABLET | Refills: 0 | OUTPATIENT
Start: 2023-06-07

## 2023-06-07 RX ORDER — FLUCONAZOLE 200 MG/1
TABLET ORAL
Qty: 2 TABLET | Refills: 0 | Status: SHIPPED | OUTPATIENT
Start: 2023-06-07

## 2023-06-08 NOTE — TELEPHONE ENCOUNTER
Please let pt know I have been out of town, got back to work today. In the future it is better for her to call the office if she sends a message in and it is not responded to. Orders Placed This Encounter   Medications    terconazole (TERAZOL 7) 0.4 % vaginal cream     Si applicator intravaginal nightly x 7 nights. Also apply it externally to the outer and inner labia, just inside the vagina, the perineum (area between the vagina and anus) and the perianal region     Dispense:  45 g     Refill:  2    fluconazole (DIFLUCAN) 200 MG tablet     Si po then repeat in 3 days.      Dispense:  2 tablet     Refill:  0

## 2023-06-09 RX ORDER — HYDROXYZINE 50 MG/1
50 TABLET, FILM COATED ORAL NIGHTLY
Qty: 30 TABLET | Refills: 0 | OUTPATIENT
Start: 2023-06-09

## 2023-06-09 RX ORDER — CETIRIZINE HYDROCHLORIDE 10 MG/1
TABLET ORAL
Qty: 30 TABLET | Refills: 0 | OUTPATIENT
Start: 2023-06-09

## 2023-06-09 NOTE — TELEPHONE ENCOUNTER
Patient last seen 3/8/23. Medications verified in that office note. Patient has an appt next week. Provider to refill medications at that visit.

## 2023-06-20 ENCOUNTER — TELEMEDICINE (OUTPATIENT)
Dept: FAMILY MEDICINE CLINIC | Facility: CLINIC | Age: 48
End: 2023-06-20
Payer: MEDICARE

## 2023-06-20 DIAGNOSIS — L03.115 CELLULITIS OF RIGHT LOWER EXTREMITY: Primary | ICD-10-CM

## 2023-06-20 DIAGNOSIS — L50.9 URTICARIA: ICD-10-CM

## 2023-06-20 PROCEDURE — 99213 OFFICE O/P EST LOW 20 MIN: CPT | Performed by: NURSE PRACTITIONER

## 2023-06-20 PROCEDURE — G8427 DOCREV CUR MEDS BY ELIG CLIN: HCPCS | Performed by: NURSE PRACTITIONER

## 2023-06-20 ASSESSMENT — PATIENT HEALTH QUESTIONNAIRE - PHQ9
SUM OF ALL RESPONSES TO PHQ QUESTIONS 1-9: 4
SUM OF ALL RESPONSES TO PHQ QUESTIONS 1-9: 4
2. FEELING DOWN, DEPRESSED OR HOPELESS: 0
SUM OF ALL RESPONSES TO PHQ QUESTIONS 1-9: 4
SUM OF ALL RESPONSES TO PHQ QUESTIONS 1-9: 4
1. LITTLE INTEREST OR PLEASURE IN DOING THINGS: 0
3. TROUBLE FALLING OR STAYING ASLEEP: 0
8. MOVING OR SPEAKING SO SLOWLY THAT OTHER PEOPLE COULD HAVE NOTICED. OR THE OPPOSITE, BEING SO FIGETY OR RESTLESS THAT YOU HAVE BEEN MOVING AROUND A LOT MORE THAN USUAL: 0
10. IF YOU CHECKED OFF ANY PROBLEMS, HOW DIFFICULT HAVE THESE PROBLEMS MADE IT FOR YOU TO DO YOUR WORK, TAKE CARE OF THINGS AT HOME, OR GET ALONG WITH OTHER PEOPLE: 0
SUM OF ALL RESPONSES TO PHQ9 QUESTIONS 1 & 2: 0
4. FEELING TIRED OR HAVING LITTLE ENERGY: 3
6. FEELING BAD ABOUT YOURSELF - OR THAT YOU ARE A FAILURE OR HAVE LET YOURSELF OR YOUR FAMILY DOWN: 0
9. THOUGHTS THAT YOU WOULD BE BETTER OFF DEAD, OR OF HURTING YOURSELF: 0
7. TROUBLE CONCENTRATING ON THINGS, SUCH AS READING THE NEWSPAPER OR WATCHING TELEVISION: 0
5. POOR APPETITE OR OVEREATING: 1

## 2023-06-20 NOTE — PROGRESS NOTES
[address]  [address] [facility], [address] [facility]   (ph) [phone] (fax) [phone]  Nenita GARCIA, MIRIAMP-C      Chief Complaint   Patient presents with    Follow-up     1 week fu    Diabetes       Patient is a 54-year-old female VV to follow-up on cellulitis. She was started on Keflex at last visit and reports cellulitis has almost cleared. She reports getting a yeast infection; however, has a diflucan to take. She is waiting until she completes ATB and then will take diflucan.      Diabetes      Allergies   Allergen Reactions    Ciprofloxacin Itching    Sulfa Antibiotics Itching    Xarelto [Rivaroxaban] Itching       Past Medical History:   Diagnosis Date    Anxiety     Arthritis     Bipolar affective disorder ([address] 11 yrs old    Cellulitis 01/08/2010    denies currently     CKD (chronic kidney disease) stage 3, GFR 30-59 ml/min ([facility])     Diabetes ([address] dx:1/09    Unknown A1C    Diabetes ([address] 01/08/2010    had multiple TIA's from low bs in 2014 and hasn't been on any meds since    Diabetes mellitus type II, non insulin dependent ([address] 03/08/2015    Disease of blood and blood forming organ     Edema of both legs 01/08/2010    ankles     Femur fracture, left ([facility]) 03/08/2015    GERD (gastroesophageal reflux disease)     denies    Gout     Hypertension     no meds    Moderate persistent asthma without complication 42/17/2609    Morbid obesity (HCC) lifetime    Non compliance with medical treatment long term    Orthopnea     Other ill-defined conditions(799.89)     cellulitis    Peripheral neuropathy 2 years    Peripheral vascular disease ([address]     Psychiatric disorder     bipolar disorder    Renal insufficiency 01/08/2010    Stroke ([address]     multiple TIA's in 2014; no residual     Unspecified sleep apnea     does not use cpap    Vertigo 03/08/2015       Family History   Problem Relation Age of Onset    No Known Problems Mother     Heart Attack Father

## 2023-06-21 DIAGNOSIS — R79.89 ELEVATED SERUM CREATININE: ICD-10-CM

## 2023-06-21 LAB
ALBUMIN SERPL-MCNC: 3.5 G/DL (ref 3.5–5)
ALBUMIN/GLOB SERPL: 0.9 (ref 0.4–1.6)
ALP SERPL-CCNC: 80 U/L (ref 50–136)
ALT SERPL-CCNC: 32 U/L (ref 12–65)
ANION GAP SERPL CALC-SCNC: 3 MMOL/L (ref 2–11)
AST SERPL-CCNC: 19 U/L (ref 15–37)
BILIRUB SERPL-MCNC: 0.7 MG/DL (ref 0.2–1.1)
BUN SERPL-MCNC: 9 MG/DL (ref 6–23)
CALCIUM SERPL-MCNC: 9 MG/DL (ref 8.3–10.4)
CHLORIDE SERPL-SCNC: 105 MMOL/L (ref 101–110)
CO2 SERPL-SCNC: 30 MMOL/L (ref 21–32)
CREAT SERPL-MCNC: 1.1 MG/DL (ref 0.6–1)
GLOBULIN SER CALC-MCNC: 3.8 G/DL (ref 2.8–4.5)
GLUCOSE SERPL-MCNC: 87 MG/DL (ref 65–100)
POTASSIUM SERPL-SCNC: 4.1 MMOL/L (ref 3.5–5.1)
PROT SERPL-MCNC: 7.3 G/DL (ref 6.3–8.2)
SODIUM SERPL-SCNC: 138 MMOL/L (ref 133–143)

## 2023-06-27 ENCOUNTER — TELEPHONE (OUTPATIENT)
Dept: FAMILY MEDICINE CLINIC | Facility: CLINIC | Age: 48
End: 2023-06-27

## 2023-06-27 DIAGNOSIS — R32 URINARY INCONTINENCE, UNSPECIFIED TYPE: Primary | ICD-10-CM

## 2023-06-29 ENCOUNTER — OFFICE VISIT (OUTPATIENT)
Dept: OBGYN CLINIC | Age: 48
End: 2023-06-29
Payer: MEDICARE

## 2023-06-29 VITALS
WEIGHT: 293 LBS | HEIGHT: 66 IN | DIASTOLIC BLOOD PRESSURE: 86 MMHG | SYSTOLIC BLOOD PRESSURE: 138 MMHG | BODY MASS INDEX: 47.09 KG/M2

## 2023-06-29 DIAGNOSIS — N92.1 MENOMETRORRHAGIA: Primary | ICD-10-CM

## 2023-06-29 DIAGNOSIS — R32 URINARY INCONTINENCE, UNSPECIFIED TYPE: ICD-10-CM

## 2023-06-29 PROCEDURE — 1036F TOBACCO NON-USER: CPT | Performed by: OBSTETRICS & GYNECOLOGY

## 2023-06-29 PROCEDURE — 76830 TRANSVAGINAL US NON-OB: CPT | Performed by: OBSTETRICS & GYNECOLOGY

## 2023-06-29 PROCEDURE — 3078F DIAST BP <80 MM HG: CPT | Performed by: OBSTETRICS & GYNECOLOGY

## 2023-06-29 PROCEDURE — G8427 DOCREV CUR MEDS BY ELIG CLIN: HCPCS | Performed by: OBSTETRICS & GYNECOLOGY

## 2023-06-29 PROCEDURE — 3074F SYST BP LT 130 MM HG: CPT | Performed by: OBSTETRICS & GYNECOLOGY

## 2023-06-29 PROCEDURE — G8417 CALC BMI ABV UP PARAM F/U: HCPCS | Performed by: OBSTETRICS & GYNECOLOGY

## 2023-06-29 PROCEDURE — 99214 OFFICE O/P EST MOD 30 MIN: CPT | Performed by: OBSTETRICS & GYNECOLOGY

## 2023-07-20 ENCOUNTER — TELEMEDICINE (OUTPATIENT)
Dept: FAMILY MEDICINE CLINIC | Facility: CLINIC | Age: 48
End: 2023-07-20
Payer: MEDICARE

## 2023-07-20 ENCOUNTER — TELEPHONE (OUTPATIENT)
Dept: FAMILY MEDICINE CLINIC | Facility: CLINIC | Age: 48
End: 2023-07-20

## 2023-07-20 ENCOUNTER — HOSPITAL ENCOUNTER (OUTPATIENT)
Dept: GENERAL RADIOLOGY | Age: 48
Discharge: HOME | End: 2023-07-20
Payer: MEDICARE

## 2023-07-20 DIAGNOSIS — M25.562 LEFT KNEE PAIN, UNSPECIFIED CHRONICITY: ICD-10-CM

## 2023-07-20 DIAGNOSIS — J30.9 ALLERGIC RHINITIS, UNSPECIFIED SEASONALITY, UNSPECIFIED TRIGGER: ICD-10-CM

## 2023-07-20 DIAGNOSIS — K21.9 GASTROESOPHAGEAL REFLUX DISEASE, UNSPECIFIED WHETHER ESOPHAGITIS PRESENT: ICD-10-CM

## 2023-07-20 DIAGNOSIS — L50.9 URTICARIA: Primary | ICD-10-CM

## 2023-07-20 DIAGNOSIS — Z12.11 COLON CANCER SCREENING: ICD-10-CM

## 2023-07-20 DIAGNOSIS — Z12.39 BREAST SCREENING: ICD-10-CM

## 2023-07-20 PROCEDURE — 73562 X-RAY EXAM OF KNEE 3: CPT

## 2023-07-20 PROCEDURE — G8427 DOCREV CUR MEDS BY ELIG CLIN: HCPCS | Performed by: NURSE PRACTITIONER

## 2023-07-20 PROCEDURE — 99213 OFFICE O/P EST LOW 20 MIN: CPT | Performed by: NURSE PRACTITIONER

## 2023-07-20 RX ORDER — HYDROXYZINE 50 MG/1
50 TABLET, FILM COATED ORAL NIGHTLY
Qty: 30 TABLET | Refills: 2 | Status: SHIPPED | OUTPATIENT
Start: 2023-07-20

## 2023-07-20 RX ORDER — CETIRIZINE HYDROCHLORIDE 10 MG/1
10 TABLET ORAL DAILY PRN
Qty: 30 TABLET | Refills: 2 | Status: SHIPPED | OUTPATIENT
Start: 2023-07-20 | End: 2023-07-20 | Stop reason: ALTCHOICE

## 2023-07-20 RX ORDER — METHOCARBAMOL 750 MG/1
750 TABLET, FILM COATED ORAL 3 TIMES DAILY
Qty: 60 TABLET | Refills: 0 | Status: CANCELLED | OUTPATIENT
Start: 2023-07-20

## 2023-07-20 RX ORDER — LORATADINE 10 MG/1
TABLET ORAL
Qty: 90 TABLET | Refills: 0 | Status: SHIPPED | OUTPATIENT
Start: 2023-07-20

## 2023-07-20 RX ORDER — OMEPRAZOLE 20 MG/1
20 CAPSULE, DELAYED RELEASE ORAL DAILY
Qty: 90 CAPSULE | Refills: 0 | Status: SHIPPED | OUTPATIENT
Start: 2023-07-20

## 2023-07-20 RX ORDER — MONTELUKAST SODIUM 10 MG/1
10 TABLET ORAL DAILY
Qty: 90 TABLET | Refills: 0 | Status: SHIPPED | OUTPATIENT
Start: 2023-07-20

## 2023-07-20 ASSESSMENT — PATIENT HEALTH QUESTIONNAIRE - PHQ9
6. FEELING BAD ABOUT YOURSELF - OR THAT YOU ARE A FAILURE OR HAVE LET YOURSELF OR YOUR FAMILY DOWN: 0
1. LITTLE INTEREST OR PLEASURE IN DOING THINGS: 0
SUM OF ALL RESPONSES TO PHQ9 QUESTIONS 1 & 2: 0
10. IF YOU CHECKED OFF ANY PROBLEMS, HOW DIFFICULT HAVE THESE PROBLEMS MADE IT FOR YOU TO DO YOUR WORK, TAKE CARE OF THINGS AT HOME, OR GET ALONG WITH OTHER PEOPLE: 0
SUM OF ALL RESPONSES TO PHQ QUESTIONS 1-9: 5
2. FEELING DOWN, DEPRESSED OR HOPELESS: 0
5. POOR APPETITE OR OVEREATING: 1
SUM OF ALL RESPONSES TO PHQ QUESTIONS 1-9: 5
9. THOUGHTS THAT YOU WOULD BE BETTER OFF DEAD, OR OF HURTING YOURSELF: 0
8. MOVING OR SPEAKING SO SLOWLY THAT OTHER PEOPLE COULD HAVE NOTICED. OR THE OPPOSITE, BEING SO FIGETY OR RESTLESS THAT YOU HAVE BEEN MOVING AROUND A LOT MORE THAN USUAL: 0
7. TROUBLE CONCENTRATING ON THINGS, SUCH AS READING THE NEWSPAPER OR WATCHING TELEVISION: 0
SUM OF ALL RESPONSES TO PHQ QUESTIONS 1-9: 5
3. TROUBLE FALLING OR STAYING ASLEEP: 1
SUM OF ALL RESPONSES TO PHQ QUESTIONS 1-9: 5
4. FEELING TIRED OR HAVING LITTLE ENERGY: 3

## 2023-07-20 NOTE — PROGRESS NOTES
voice recognition errors that the author did not detect. Heladio Louis was evaluated through a synchronous (real-time) audio-video encounter. The patient (or guardian if applicable) is aware that this is a billable service, which includes applicable co-pays. This Virtual Visit was conducted with patient's (and/or legal guardian's) consent. Patient identification was verified, and a caregiver was present when appropriate. The patient was located at Home: [address] [place] [address]  Provider was located at [facility] ([address] [place], [place] [address]t): [address] [place] [address]      Total Time: 15.        Signed By: JOSE Heredia - LIBIA     July 20, 2023

## 2023-07-20 NOTE — TELEPHONE ENCOUNTER
Please call pharmacy for patient on loratadine (CLARITIN) 10 MG tablet . The pharmacy has questions.  Thanks

## 2023-07-31 DIAGNOSIS — K21.9 GASTROESOPHAGEAL REFLUX DISEASE, UNSPECIFIED WHETHER ESOPHAGITIS PRESENT: ICD-10-CM

## 2023-07-31 RX ORDER — OMEPRAZOLE 20 MG/1
20 CAPSULE, DELAYED RELEASE ORAL DAILY
Qty: 90 CAPSULE | Refills: 1 | OUTPATIENT
Start: 2023-07-31

## 2023-08-07 DIAGNOSIS — J30.9 ALLERGIC RHINITIS, UNSPECIFIED SEASONALITY, UNSPECIFIED TRIGGER: ICD-10-CM

## 2023-08-07 RX ORDER — MONTELUKAST SODIUM 10 MG/1
TABLET ORAL
Qty: 90 TABLET | Refills: 0 | OUTPATIENT
Start: 2023-08-07

## 2023-08-07 NOTE — TELEPHONE ENCOUNTER
Patient's most recent visit was on 7/20/23. This was a virtual visit. Montelukast 10mg QD was verified in that office note. Per our record, this medication was sent in on that date for a 90 day supply w/ 0 RF. She is not currently due for a refill. RX denied.

## 2023-08-16 ENCOUNTER — TELEPHONE (OUTPATIENT)
Dept: ONCOLOGY | Age: 48
End: 2023-08-16

## 2023-08-25 ENCOUNTER — TELEPHONE (OUTPATIENT)
Dept: FAMILY MEDICINE CLINIC | Facility: CLINIC | Age: 48
End: 2023-08-25

## 2023-08-25 DIAGNOSIS — I10 PRIMARY HYPERTENSION: Primary | ICD-10-CM

## 2023-08-25 RX ORDER — LOSARTAN POTASSIUM 50 MG/1
50 TABLET ORAL DAILY
Qty: 90 TABLET | Refills: 1 | Status: SHIPPED | OUTPATIENT
Start: 2023-08-25

## 2023-08-25 NOTE — TELEPHONE ENCOUNTER
Pt called- Suzanne Oseguera pt- out of meds- requesting refill  losartan (COZAAR) 50 MG tablet [[id number]]     Order Details  Dose: 50 mg Route: Oral Frequency: DAILY   Dispense Quantity: 90 tablet Refills: 1

## 2023-08-28 ENCOUNTER — TELEPHONE (OUTPATIENT)
Dept: FAMILY MEDICINE CLINIC | Facility: CLINIC | Age: 48
End: 2023-08-28

## 2023-08-28 DIAGNOSIS — I82.542: ICD-10-CM

## 2023-08-28 NOTE — TELEPHONE ENCOUNTER
Patient called requesting a work in appt for sores on her legs that she has had for over a week. Please advise work in date/time.  Thanks

## 2023-08-28 NOTE — TELEPHONE ENCOUNTER
Pt called requesting a refill on Eliquis, she is out and needs one to take tonight. Kaitlin Treviño is out of the office today.

## 2023-08-29 ENCOUNTER — HOSPITAL ENCOUNTER (EMERGENCY)
Age: 48
Discharge: HOME | End: 2023-08-29
Attending: EMERGENCY MEDICINE
Payer: MEDICARE

## 2023-08-29 VITALS
TEMPERATURE: 98.5 F | OXYGEN SATURATION: 98 % | BODY MASS INDEX: 45.99 KG/M2 | RESPIRATION RATE: 18 BRPM | SYSTOLIC BLOOD PRESSURE: 125 MMHG | HEIGHT: 67 IN | WEIGHT: 293 LBS | HEART RATE: 94 BPM | DIASTOLIC BLOOD PRESSURE: 83 MMHG

## 2023-08-29 DIAGNOSIS — R60.0 BILATERAL LEG EDEMA: Primary | ICD-10-CM

## 2023-08-29 LAB
ALBUMIN SERPL-MCNC: 3.5 G/DL (ref 3.5–5)
ALBUMIN/GLOB SERPL: 0.8 (ref 0.4–1.6)
ALP SERPL-CCNC: 92 U/L (ref 50–130)
ALT SERPL-CCNC: 27 U/L (ref 12–65)
ANION GAP SERPL CALC-SCNC: 4 MMOL/L (ref 2–11)
AST SERPL-CCNC: 17 U/L (ref 15–37)
BILIRUB SERPL-MCNC: 0.4 MG/DL (ref 0.2–1.1)
BUN SERPL-MCNC: 5 MG/DL (ref 6–23)
CALCIUM SERPL-MCNC: 8.9 MG/DL (ref 8.3–10.4)
CHLORIDE SERPL-SCNC: 104 MMOL/L (ref 101–110)
CO2 SERPL-SCNC: 32 MMOL/L (ref 21–32)
CREAT SERPL-MCNC: 1.11 MG/DL (ref 0.6–1)
ERYTHROCYTE [DISTWIDTH] IN BLOOD BY AUTOMATED COUNT: 14.9 % (ref 11.9–14.6)
GLOBULIN SER CALC-MCNC: 4.5 G/DL (ref 2.8–4.5)
GLUCOSE SERPL-MCNC: 95 MG/DL (ref 65–100)
HCT VFR BLD AUTO: 40.1 % (ref 35.8–46.3)
HGB BLD-MCNC: 13 G/DL (ref 11.7–15.4)
MCH RBC QN AUTO: 29.7 PG (ref 26.1–32.9)
MCHC RBC AUTO-ENTMCNC: 32.4 G/DL (ref 31.4–35)
MCV RBC AUTO: 91.6 FL (ref 82–102)
NRBC # BLD: 0 K/UL (ref 0–0.2)
NT PRO BNP: 114 PG/ML (ref 5–125)
PLATELET # BLD AUTO: 321 K/UL (ref 150–450)
PMV BLD AUTO: 9.5 FL (ref 9.4–12.3)
POTASSIUM SERPL-SCNC: 4.1 MMOL/L (ref 3.5–5.1)
PROT SERPL-MCNC: 8 G/DL (ref 6.3–8.2)
RBC # BLD AUTO: 4.38 M/UL (ref 4.05–5.2)
SODIUM SERPL-SCNC: 140 MMOL/L (ref 133–143)
WBC # BLD AUTO: 4.9 K/UL (ref 4.3–11.1)

## 2023-08-29 PROCEDURE — 83880 ASSAY OF NATRIURETIC PEPTIDE: CPT

## 2023-08-29 PROCEDURE — 85027 COMPLETE CBC AUTOMATED: CPT

## 2023-08-29 PROCEDURE — 80053 COMPREHEN METABOLIC PANEL: CPT

## 2023-08-29 PROCEDURE — 99283 EMERGENCY DEPT VISIT LOW MDM: CPT

## 2023-08-29 ASSESSMENT — PAIN - FUNCTIONAL ASSESSMENT
PAIN_FUNCTIONAL_ASSESSMENT: 0-10
PAIN_FUNCTIONAL_ASSESSMENT: 0-10

## 2023-08-29 ASSESSMENT — PAIN SCALES - GENERAL
PAINLEVEL_OUTOF10: 5
PAINLEVEL_OUTOF10: 9

## 2023-08-29 ASSESSMENT — PAIN DESCRIPTION - ORIENTATION: ORIENTATION: RIGHT;LEFT

## 2023-08-29 ASSESSMENT — LIFESTYLE VARIABLES
HOW MANY STANDARD DRINKS CONTAINING ALCOHOL DO YOU HAVE ON A TYPICAL DAY: PATIENT DOES NOT DRINK
HOW OFTEN DO YOU HAVE A DRINK CONTAINING ALCOHOL: NEVER

## 2023-08-29 ASSESSMENT — PAIN DESCRIPTION - LOCATION: LOCATION: LEG

## 2023-08-30 ASSESSMENT — ENCOUNTER SYMPTOMS: SHORTNESS OF BREATH: 0

## 2023-08-30 NOTE — ED PROVIDER NOTES
[facility] Provider Note                   PCP:                Rosa Davis, APRN - LIBIA               Age: 49 y.o. Sex: female     MEDICAL DECISION MAKING  Complexity of Problems Addressed:   1 or more chronic illness with an exacerbation or progression    Data Reviewed and Analyzed:  Category 1:    I have reviewed outside records from an external source for any pertinent PMH, ED visits, primary care visits, specialist visits, labs, EKG, and/or radiologic studies. Category 2:       I independently ordered and reviewed the labs. There was no radiologic studies ordered. Category 3:     Discussion of management or test interpretation:    MDM  Number of Diagnoses or Management Options  Bilateral leg edema  Diagnosis management comments: Patient who has chronic peripheral edema and known DVTs presents for peripheral edema and pain for the past 3 months. Patient's CBC, CMP, and BMP showed no significant abnormalities. Patient is afebrile with a normal white count and I do not suspect cellulitis or any other infection. Patient has a prescription for Lasix but is not taking it on a regular basis so I recommend she start taking it daily for the next week. Patient is also with pain management and is on Lyrica, muscle relaxer, and hydrocodone for pain. She has known DVTs but is anticoagulated. I do not feel that she needs repeat ultrasound today in the ED. Patient was discharged home with hematology follow-up. Based on patient's symptoms, exam, and a thorough evaluation, I do not suspect vascular ischemia, necrotizing fasciitis, deep space abscess, fracture, septic arthritis, compartment syndrome, rhabdomyolysis, or any other acute, emergent extremity process.              Rock Randall is a 49 y.o. female who presents to the Emergency Department with chief complaint of    Chief Complaint   Patient presents with    Leg Swelling      Patient presents for evaluation

## 2023-08-30 NOTE — ED TRIAGE NOTES
Legs swelling and oozing. Is on blood thinners for DVT's.  Swelling been going on for over 1 month.  1-2+ edema

## 2023-09-05 ENCOUNTER — OFFICE VISIT (OUTPATIENT)
Dept: FAMILY MEDICINE CLINIC | Facility: CLINIC | Age: 48
End: 2023-09-05
Payer: MEDICARE

## 2023-09-05 VITALS
SYSTOLIC BLOOD PRESSURE: 124 MMHG | DIASTOLIC BLOOD PRESSURE: 82 MMHG | WEIGHT: 293 LBS | HEIGHT: 67 IN | OXYGEN SATURATION: 93 % | HEART RATE: 86 BPM | BODY MASS INDEX: 45.99 KG/M2 | TEMPERATURE: 97.8 F

## 2023-09-05 DIAGNOSIS — E11.9 TYPE 2 DIABETES MELLITUS WITHOUT COMPLICATION, WITHOUT LONG-TERM CURRENT USE OF INSULIN (HCC): ICD-10-CM

## 2023-09-05 DIAGNOSIS — L03.119 CELLULITIS OF LOWER EXTREMITY, UNSPECIFIED LATERALITY: Primary | ICD-10-CM

## 2023-09-05 DIAGNOSIS — N18.31 STAGE 3A CHRONIC KIDNEY DISEASE (HCC): ICD-10-CM

## 2023-09-05 LAB — HBA1C MFR BLD: 5.7 %

## 2023-09-05 PROCEDURE — 3046F HEMOGLOBIN A1C LEVEL >9.0%: CPT | Performed by: NURSE PRACTITIONER

## 2023-09-05 PROCEDURE — G8417 CALC BMI ABV UP PARAM F/U: HCPCS | Performed by: NURSE PRACTITIONER

## 2023-09-05 PROCEDURE — G8427 DOCREV CUR MEDS BY ELIG CLIN: HCPCS | Performed by: NURSE PRACTITIONER

## 2023-09-05 PROCEDURE — 1036F TOBACCO NON-USER: CPT | Performed by: NURSE PRACTITIONER

## 2023-09-05 PROCEDURE — 2022F DILAT RTA XM EVC RTNOPTHY: CPT | Performed by: NURSE PRACTITIONER

## 2023-09-05 PROCEDURE — 83036 HEMOGLOBIN GLYCOSYLATED A1C: CPT | Performed by: NURSE PRACTITIONER

## 2023-09-05 PROCEDURE — 99214 OFFICE O/P EST MOD 30 MIN: CPT | Performed by: NURSE PRACTITIONER

## 2023-09-05 PROCEDURE — 3079F DIAST BP 80-89 MM HG: CPT | Performed by: NURSE PRACTITIONER

## 2023-09-05 PROCEDURE — 3074F SYST BP LT 130 MM HG: CPT | Performed by: NURSE PRACTITIONER

## 2023-09-05 RX ORDER — FLUCONAZOLE 150 MG/1
150 TABLET ORAL ONCE
Qty: 1 TABLET | Refills: 0 | Status: SHIPPED | OUTPATIENT
Start: 2023-09-05 | End: 2023-09-05

## 2023-09-05 RX ORDER — CEPHALEXIN 500 MG/1
500 CAPSULE ORAL 3 TIMES DAILY
Qty: 21 CAPSULE | Refills: 0 | Status: SHIPPED | OUTPATIENT
Start: 2023-09-05 | End: 2023-09-12

## 2023-09-05 RX ORDER — NYSTATIN 100000 [USP'U]/G
POWDER TOPICAL
Qty: 15 G | Refills: 0 | Status: SHIPPED | OUTPATIENT
Start: 2023-09-05

## 2023-09-05 ASSESSMENT — ENCOUNTER SYMPTOMS
RESPIRATORY NEGATIVE: 1
GASTROINTESTINAL NEGATIVE: 1
ALLERGIC/IMMUNOLOGIC NEGATIVE: 1
EYES NEGATIVE: 1

## 2023-09-05 NOTE — PROGRESS NOTES
[address] [facility]  [address] [facility], [address] [place] [address]   () [phone] (fax) [phone]  GIOVANNY Bruce-AUSTIN      Chief Complaint   Patient presents with    Follow-up     Pt presents today for an ED F/U. Pt has bilateral leg swelling. 50 yo patient presents for evaluation of bilateral leg pain, swelling, and redness. Patient states that she has been having pain and swelling to her bilateral feet and lower legs since June. She has been having some redness with some open sores that leak clear fluid. Patient has known bilateral DVTs and is taking a blood thinner. Patient denies any recent fever, chest pain, or shortness of breath. Patient has been treated with an antibiotic without any improvement of her leg swelling. Patient states that she used to be on Lasix on a regular basis but it was discontinued. ER physician told her to restart her Lasix.        Allergies   Allergen Reactions    Ciprofloxacin Itching    Sulfa Antibiotics Itching    Xarelto [Rivaroxaban] Itching       Past Medical History:   Diagnosis Date    Anxiety     Arthritis     Bipolar affective disorder ([address] [facility]) 11 yrs old    Cellulitis 01/08/2010    denies currently     CKD (chronic kidney disease) stage 3, GFR 30-59 ml/min ([facility])     Deep vein thrombosis ([address]) 12/13/2022    Depression     Diabetes ([address] [facility]) dx:1/09    Unknown A1C    Diabetes ([address] [facility]) 01/08/2010    had multiple TIA's from low bs in 2014 and hasn't been on any meds since    Diabetes mellitus type II, non insulin dependent ([address]) 03/08/2015    Disease of blood and blood forming organ     Edema of both legs 01/08/2010    ankles     Femur fracture, left ([facility]) 03/08/2015    GERD (gastroesophageal reflux disease)     denies    Gout     Hypertension     no meds    Moderate persistent asthma without complication 76/03/0307    Morbid obesity ([address] ) lifetime    Non compliance with medical treatment long term    Orthopnea     Other

## 2023-09-07 ENCOUNTER — TELEPHONE (OUTPATIENT)
Dept: FAMILY MEDICINE CLINIC | Facility: CLINIC | Age: 48
End: 2023-09-07

## 2023-09-15 ENCOUNTER — TELEPHONE (OUTPATIENT)
Dept: ONCOLOGY | Age: 48
End: 2023-09-15

## 2023-09-15 DIAGNOSIS — D68.59 PRIMARY HYPERCOAGULABLE STATE (HCC): ICD-10-CM

## 2023-09-15 DIAGNOSIS — I82.4Z2 DEEP VEIN THROMBOSIS (DVT) OF DISTAL VEIN OF LEFT LOWER EXTREMITY, UNSPECIFIED CHRONICITY (HCC): ICD-10-CM

## 2023-09-15 DIAGNOSIS — D50.8 OTHER IRON DEFICIENCY ANEMIA: Primary | ICD-10-CM

## 2023-09-15 DIAGNOSIS — E88.89 OTHER SPECIFIED METABOLIC DISORDERS (HCC): ICD-10-CM

## 2023-09-28 ENCOUNTER — TELEPHONE (OUTPATIENT)
Dept: ONCOLOGY | Age: 48
End: 2023-09-28

## 2023-10-18 ENCOUNTER — HOSPITAL ENCOUNTER (EMERGENCY)
Age: 48
Discharge: HOME | End: 2023-10-19
Attending: EMERGENCY MEDICINE
Payer: MEDICARE

## 2023-10-18 ENCOUNTER — APPOINTMENT (OUTPATIENT)
Dept: GENERAL RADIOLOGY | Age: 48
End: 2023-10-18
Payer: MEDICARE

## 2023-10-18 DIAGNOSIS — L03.116 CELLULITIS OF LEFT LOWER EXTREMITY: Primary | ICD-10-CM

## 2023-10-18 DIAGNOSIS — N30.00 ACUTE CYSTITIS WITHOUT HEMATURIA: ICD-10-CM

## 2023-10-18 LAB
ALBUMIN SERPL-MCNC: 3.7 G/DL (ref 3.5–5)
ALBUMIN/GLOB SERPL: 0.9 (ref 0.4–1.6)
ALP SERPL-CCNC: 76 U/L (ref 50–136)
ALT SERPL-CCNC: 25 U/L (ref 12–65)
ANION GAP SERPL CALC-SCNC: 5 MMOL/L (ref 2–11)
APPEARANCE UR: CLEAR
AST SERPL-CCNC: 18 U/L (ref 15–37)
BACTERIA URNS QL MICRO: ABNORMAL /HPF
BASOPHILS # BLD: 0 K/UL (ref 0–0.2)
BASOPHILS NFR BLD: 1 % (ref 0–2)
BILIRUB SERPL-MCNC: 0.5 MG/DL (ref 0.2–1.1)
BILIRUB UR QL: NEGATIVE
BUN SERPL-MCNC: 11 MG/DL (ref 6–23)
CALCIUM SERPL-MCNC: 9.5 MG/DL (ref 8.3–10.4)
CHLORIDE SERPL-SCNC: 107 MMOL/L (ref 101–110)
CO2 SERPL-SCNC: 28 MMOL/L (ref 21–32)
COLOR UR: ABNORMAL
CREAT SERPL-MCNC: 1.13 MG/DL (ref 0.6–1)
DIFFERENTIAL METHOD BLD: ABNORMAL
EOSINOPHIL # BLD: 0.2 K/UL (ref 0–0.8)
EOSINOPHIL NFR BLD: 4 % (ref 0.5–7.8)
EPI CELLS #/AREA URNS HPF: ABNORMAL /HPF
ERYTHROCYTE [DISTWIDTH] IN BLOOD BY AUTOMATED COUNT: 13.8 % (ref 11.9–14.6)
GLOBULIN SER CALC-MCNC: 4.2 G/DL (ref 2.8–4.5)
GLUCOSE SERPL-MCNC: 94 MG/DL (ref 65–100)
GLUCOSE UR STRIP.AUTO-MCNC: NEGATIVE MG/DL
HCT VFR BLD AUTO: 37.3 % (ref 35.8–46.3)
HGB BLD-MCNC: 11.8 G/DL (ref 11.7–15.4)
HGB UR QL STRIP: NEGATIVE
IMM GRANULOCYTES # BLD AUTO: 0 K/UL (ref 0–0.5)
IMM GRANULOCYTES NFR BLD AUTO: 0 % (ref 0–5)
KETONES UR QL STRIP.AUTO: NEGATIVE MG/DL
LACTATE SERPL-SCNC: 1.7 MMOL/L (ref 0.4–2)
LEUKOCYTE ESTERASE UR QL STRIP.AUTO: ABNORMAL
LYMPHOCYTES # BLD: 1.6 K/UL (ref 0.5–4.6)
LYMPHOCYTES NFR BLD: 33 % (ref 13–44)
MCH RBC QN AUTO: 29.7 PG (ref 26.1–32.9)
MCHC RBC AUTO-ENTMCNC: 31.6 G/DL (ref 31.4–35)
MCV RBC AUTO: 94 FL (ref 82–102)
MONOCYTES # BLD: 0.6 K/UL (ref 0.1–1.3)
MONOCYTES NFR BLD: 11 % (ref 4–12)
NEUTS SEG # BLD: 2.5 K/UL (ref 1.7–8.2)
NEUTS SEG NFR BLD: 51 % (ref 43–78)
NITRITE UR QL STRIP.AUTO: POSITIVE
NRBC # BLD: 0 K/UL (ref 0–0.2)
PH UR STRIP: 6.5 (ref 5–9)
PLATELET # BLD AUTO: 298 K/UL (ref 150–450)
PMV BLD AUTO: 9.8 FL (ref 9.4–12.3)
POTASSIUM SERPL-SCNC: 4.7 MMOL/L (ref 3.5–5.1)
PROCALCITONIN SERPL-MCNC: <0.05 NG/ML (ref 0–0.49)
PROT SERPL-MCNC: 7.9 G/DL (ref 6.3–8.2)
PROT UR STRIP-MCNC: NEGATIVE MG/DL
RBC # BLD AUTO: 3.97 M/UL (ref 4.05–5.2)
RBC #/AREA URNS HPF: ABNORMAL /HPF
SODIUM SERPL-SCNC: 140 MMOL/L (ref 133–143)
SP GR UR REFRACTOMETRY: 1.02 (ref 1–1.02)
UROBILINOGEN UR QL STRIP.AUTO: 1 EU/DL (ref 0.2–1)
WBC # BLD AUTO: 5 K/UL (ref 4.3–11.1)
WBC URNS QL MICRO: >100 /HPF

## 2023-10-18 PROCEDURE — 90714 TD VACC NO PRESV 7 YRS+ IM: CPT

## 2023-10-18 PROCEDURE — 80053 COMPREHEN METABOLIC PANEL: CPT

## 2023-10-18 PROCEDURE — 84145 PROCALCITONIN (PCT): CPT

## 2023-10-18 PROCEDURE — 72170 X-RAY EXAM OF PELVIS: CPT

## 2023-10-18 PROCEDURE — 81001 URINALYSIS AUTO W/SCOPE: CPT

## 2023-10-18 PROCEDURE — 6360000002 HC RX W HCPCS

## 2023-10-18 PROCEDURE — 99285 EMERGENCY DEPT VISIT HI MDM: CPT

## 2023-10-18 PROCEDURE — 2580000003 HC RX 258

## 2023-10-18 PROCEDURE — 73590 X-RAY EXAM OF LOWER LEG: CPT

## 2023-10-18 PROCEDURE — 87086 URINE CULTURE/COLONY COUNT: CPT

## 2023-10-18 PROCEDURE — 85025 COMPLETE CBC W/AUTO DIFF WBC: CPT

## 2023-10-18 PROCEDURE — 96365 THER/PROPH/DIAG IV INF INIT: CPT

## 2023-10-18 PROCEDURE — 83605 ASSAY OF LACTIC ACID: CPT

## 2023-10-18 PROCEDURE — 71045 X-RAY EXAM CHEST 1 VIEW: CPT

## 2023-10-18 PROCEDURE — 93005 ELECTROCARDIOGRAM TRACING: CPT

## 2023-10-18 PROCEDURE — 90471 IMMUNIZATION ADMIN: CPT

## 2023-10-18 PROCEDURE — 87040 BLOOD CULTURE FOR BACTERIA: CPT

## 2023-10-18 RX ORDER — TETANUS AND DIPHTHERIA TOXOIDS ADSORBED 2; 2 [LF]/.5ML; [LF]/.5ML
0.5 INJECTION INTRAMUSCULAR
Status: COMPLETED | OUTPATIENT
Start: 2023-10-18 | End: 2023-10-18

## 2023-10-18 RX ORDER — FLUCONAZOLE 150 MG/1
150 TABLET ORAL ONCE
Qty: 1 TABLET | Refills: 0 | Status: SHIPPED | OUTPATIENT
Start: 2023-10-18 | End: 2023-10-18

## 2023-10-18 RX ORDER — SULFAMETHOXAZOLE AND TRIMETHOPRIM 800; 160 MG/1; MG/1
1 TABLET ORAL 2 TIMES DAILY
Qty: 20 TABLET | Refills: 0 | Status: SHIPPED | OUTPATIENT
Start: 2023-10-18 | End: 2023-10-28

## 2023-10-18 RX ADMIN — CEFTRIAXONE 1000 MG: 1 INJECTION, POWDER, FOR SOLUTION INTRAMUSCULAR; INTRAVENOUS at 21:37

## 2023-10-18 RX ADMIN — TETANUS AND DIPHTHERIA TOXOIDS ADSORBED 0.5 ML: 2; 2 INJECTION INTRAMUSCULAR at 21:21

## 2023-10-18 ASSESSMENT — PAIN SCALES - GENERAL
PAINLEVEL_OUTOF10: 10
PAINLEVEL_OUTOF10: 10

## 2023-10-18 ASSESSMENT — ENCOUNTER SYMPTOMS
SHORTNESS OF BREATH: 0
VOMITING: 0
NAUSEA: 0
ABDOMINAL PAIN: 0
COUGH: 0

## 2023-10-18 NOTE — ED PROVIDER NOTES
tablet, R-0Normal      loratadine (CLARITIN) 10 MG tablet Use instead of zyrtec for allergies, Disp-90 tablet, R-0Normal      omeprazole (PRILOSEC) 20 MG delayed release capsule Take 1 capsule by mouth Daily, Disp-90 capsule, R-0Normal      terconazole (TERAZOL 7) 0.4 % vaginal cream 1 applicator intravaginal nightly x 7 nights. Also apply it externally to the outer and inner labia, just inside the vagina, the perineum (area between the vagina and anus) and the perianal region, Disp-45 g, R-2Normal      !! fluconazole (DIFLUCAN) 200 MG tablet 1 po then repeat in 3 days. , Disp-2 tablet, R-0Normal      LYRICA 75 MG capsule TAKE 2 CAPSULES BY MOUTH TWICE DAILY AND 3 EVERY DAY AT BEDTIME, DAWHistorical Med      furosemide (LASIX) 20 MG tablet Take 1 tablet by mouth as needed (take 1 pill as needed), Disp-20 tablet, [id number]      folic acid-pyridoxine-cyancobalamin 2.5-25-2 mg tablet (FOLBIC) 2.5-25-2 MG TABS Take 1 tablet by mouth daily, Disp-30 tablet, R-11Normal      methocarbamol (ROBAXIN) 750 MG tablet Take 1 tablet by mouth 3 times daily, Disp-60 tablet, R-0Normal      DULoxetine (CYMBALTA) 30 MG extended release capsule Historical Med      Cholecalciferol (VITAMIN D3) 50 MCG (2000 UT) CAPS Take by mouthHistorical Med      docusate sodium (COLACE) 100 MG capsule Take 1 capsule by mouth 2 times dailyHistorical Med      albuterol sulfate HFA (PROVENTIL;VENTOLIN;PROAIR) 108 (90 Base) MCG/ACT inhaler Inhale 2 puffs into the lungs every 6 hours as needed for Wheezing or Shortness of Breath TAKE 2 PUFFS BY MOUTH EVERY 4 HOURS AS NEEDED, Disp-18 g, R-5Normal      ascorbic acid (VITAMIN C) 500 MG tablet Take 1 tablet by mouth dailyHistorical Med      fluticasone (FLONASE) 50 MCG/ACT nasal spray 2 sprays by Nasal route dailyHistorical Med       !! - Potential duplicate medications found. Please discuss with provider.            Results for orders placed or performed during the hospital encounter of 10/18/23   Blood Culture 1    Specimen: Blood   Result Value Ref Range    Special Requests RIGHT  Antecubital        Culture NO GROWTH 2 DAYS     Blood Culture 2    Specimen: Blood   Result Value Ref Range    Special Requests NO SPECIAL REQUESTS  RIGHT  Antecubital        Culture NO GROWTH 2 DAYS     Culture, Urine    Specimen: Urine, clean catch   Result Value Ref Range    Special Requests NO SPECIAL REQUESTS      Culture <10,000 COLONIES/mL NORMAL SKIN KELLI ISOLATED     XR CHEST PORTABLE    Narrative    Exam:  Single view chest    Date: October 18, 2023    History: Sepsis    Comparison: December 12, 2022    Technique: Single frontal radiograph of the chest was obtained    Findings: The heart is borderline in size. Mediastinum and pulmonary vasculature are   unremarkable. The lungs are free of focal airspace consolidation. There is no   pneumothorax or sizable pleural fluid collection. Impression    1. No acute intrathoracic process. Matthew Parker M.D.   10/18/2023 9:11:00 PM   XR PELVIS (1-2 VIEWS)    Narrative    EXAM: Pelvis radiographs    DATE: October 18, 2023    HISTORY: Pelvic pain    COMPARISON: None available    TECHNIQUE: 2 radiographs are obtained of the pelvis    FINDINGS:    The osseous structures are demineralized. There is a well-positioned left total   hip arthroplasty. There is no acute fracture or dislocation. Impression    1. No acute osseous abnormality. Matthew Parker M.D.   10/18/2023 9:10:00 PM   XR TIBIA FIBULA LEFT (2 VIEWS)    Narrative    EXAM: Left tibia and fibula radiographs    DATE: October 18, 2023    HISTORY: Left leg pain    COMPARISON: July 28, 2021    TECHNIQUE: 4 radiographs are obtained of the left tibia and fibula    FINDINGS:    The osseous structures are demineralized. There are scattered soft tissue   calcifications. There are moderate tricompartmental degenerative changes in the   left knee. There is generalized mottling of the subcutaneous fat.  There is no   acute

## 2023-10-18 NOTE — ED TRIAGE NOTES
Pt presents to ED with c/o left leg redness, swelling and blisters. Pt states she also fell earlier when trying to get ready to come to the ED so she has some generalized pain.

## 2023-10-19 ENCOUNTER — PATIENT MESSAGE (OUTPATIENT)
Dept: FAMILY MEDICINE CLINIC | Facility: CLINIC | Age: 48
End: 2023-10-19

## 2023-10-19 VITALS
SYSTOLIC BLOOD PRESSURE: 127 MMHG | DIASTOLIC BLOOD PRESSURE: 72 MMHG | TEMPERATURE: 98.8 F | HEIGHT: 67 IN | BODY MASS INDEX: 45.99 KG/M2 | RESPIRATION RATE: 20 BRPM | WEIGHT: 293 LBS | HEART RATE: 94 BPM | OXYGEN SATURATION: 100 %

## 2023-10-19 LAB
EKG ATRIAL RATE: 86 BPM
EKG DIAGNOSIS: NORMAL
EKG P AXIS: 67 DEGREES
EKG P-R INTERVAL: 144 MS
EKG Q-T INTERVAL: 383 MS
EKG QRS DURATION: 78 MS
EKG QTC CALCULATION (BAZETT): 456 MS
EKG R AXIS: 66 DEGREES
EKG T AXIS: 54 DEGREES
EKG VENTRICULAR RATE: 85 BPM

## 2023-10-19 PROCEDURE — 93010 ELECTROCARDIOGRAM REPORT: CPT | Performed by: INTERNAL MEDICINE

## 2023-10-19 NOTE — DISCHARGE INSTRUCTIONS
Please take all the antibiotics as discussed even if you are feeling better. You may take Benadryl or other allergy medication to help with any kind of itching. Keep the wound clean and dry. Wash daily with unscented Dove soap. Pat dry afterwards and place clean bandage over top. If your symptoms change or worsen, return immediately to the emergency department. Please make an appointment with your primary care doctor for close follow-up.

## 2023-10-20 NOTE — TELEPHONE ENCOUNTER
I spoke with pt and informed pt we will need a face to face to refer for home health. Pt has an apt on 10/24 and can get it moved up if any availability next week. Per Aquiles Bhakta ok to give pt supplies until she's able to come in.

## 2023-10-21 LAB
BACTERIA SPEC CULT: NORMAL
SERVICE CMNT-IMP: NORMAL

## 2023-10-22 LAB
BACTERIA SPEC CULT: NORMAL
BACTERIA SPEC CULT: NORMAL
SERVICE CMNT-IMP: NORMAL
SERVICE CMNT-IMP: NORMAL

## 2023-10-24 ENCOUNTER — OFFICE VISIT (OUTPATIENT)
Dept: FAMILY MEDICINE CLINIC | Facility: CLINIC | Age: 48
End: 2023-10-24
Payer: MEDICARE

## 2023-10-24 VITALS
OXYGEN SATURATION: 99 % | SYSTOLIC BLOOD PRESSURE: 136 MMHG | HEART RATE: 107 BPM | HEIGHT: 67 IN | WEIGHT: 293 LBS | BODY MASS INDEX: 45.99 KG/M2 | TEMPERATURE: 98.2 F | DIASTOLIC BLOOD PRESSURE: 66 MMHG

## 2023-10-24 DIAGNOSIS — I83.008 VENOUS STASIS ULCER OF OTHER PART OF LOWER LEG, UNSPECIFIED LATERALITY, UNSPECIFIED ULCER STAGE, UNSPECIFIED WHETHER VARICOSE VEINS PRESENT (HCC): Primary | ICD-10-CM

## 2023-10-24 DIAGNOSIS — J30.9 ALLERGIC RHINITIS, UNSPECIFIED SEASONALITY, UNSPECIFIED TRIGGER: ICD-10-CM

## 2023-10-24 DIAGNOSIS — R94.31 ABNORMAL EKG: ICD-10-CM

## 2023-10-24 DIAGNOSIS — Z12.39 BREAST SCREENING: ICD-10-CM

## 2023-10-24 DIAGNOSIS — L97.809 VENOUS STASIS ULCER OF OTHER PART OF LOWER LEG, UNSPECIFIED LATERALITY, UNSPECIFIED ULCER STAGE, UNSPECIFIED WHETHER VARICOSE VEINS PRESENT (HCC): Primary | ICD-10-CM

## 2023-10-24 DIAGNOSIS — R06.02 SOB (SHORTNESS OF BREATH): Primary | ICD-10-CM

## 2023-10-24 DIAGNOSIS — Z86.718 HX OF DEEP VENOUS THROMBOSIS: ICD-10-CM

## 2023-10-24 DIAGNOSIS — R60.9 EDEMA, UNSPECIFIED TYPE: ICD-10-CM

## 2023-10-24 DIAGNOSIS — R06.02 SOB (SHORTNESS OF BREATH): ICD-10-CM

## 2023-10-24 PROCEDURE — G8417 CALC BMI ABV UP PARAM F/U: HCPCS | Performed by: NURSE PRACTITIONER

## 2023-10-24 PROCEDURE — 3075F SYST BP GE 130 - 139MM HG: CPT | Performed by: NURSE PRACTITIONER

## 2023-10-24 PROCEDURE — 1036F TOBACCO NON-USER: CPT | Performed by: NURSE PRACTITIONER

## 2023-10-24 PROCEDURE — G8484 FLU IMMUNIZE NO ADMIN: HCPCS | Performed by: NURSE PRACTITIONER

## 2023-10-24 PROCEDURE — 3078F DIAST BP <80 MM HG: CPT | Performed by: NURSE PRACTITIONER

## 2023-10-24 PROCEDURE — 99214 OFFICE O/P EST MOD 30 MIN: CPT | Performed by: NURSE PRACTITIONER

## 2023-10-24 PROCEDURE — G8427 DOCREV CUR MEDS BY ELIG CLIN: HCPCS | Performed by: NURSE PRACTITIONER

## 2023-10-24 RX ORDER — FUROSEMIDE 20 MG/1
20 TABLET ORAL PRN
Qty: 30 TABLET | Refills: 1 | Status: SHIPPED | OUTPATIENT
Start: 2023-10-24

## 2023-10-24 RX ORDER — LORATADINE 10 MG/1
TABLET ORAL
Qty: 90 TABLET | Refills: 0 | Status: SHIPPED | OUTPATIENT
Start: 2023-10-24

## 2023-10-24 ASSESSMENT — ENCOUNTER SYMPTOMS
EYES NEGATIVE: 1
GASTROINTESTINAL NEGATIVE: 1
ALLERGIC/IMMUNOLOGIC NEGATIVE: 1
SHORTNESS OF BREATH: 1

## 2023-10-24 NOTE — PROGRESS NOTES
pulses. Heart sounds: Normal heart sounds. No murmur heard. No friction rub. No gallop. Pulmonary:      Effort: Pulmonary effort is normal. No respiratory distress. Breath sounds: Normal breath sounds. No wheezing or rales. Abdominal:      General: Abdomen is flat. There is no distension. Palpations: Abdomen is soft. Tenderness: There is no abdominal tenderness. There is no right CVA tenderness, left CVA tenderness or guarding. Musculoskeletal:         General: Normal range of motion. Cervical back: Normal range of motion and neck supple. Right lower leg: weeping edema present. Left lower leg: Weeping edema present. Skin:     General: Skin is warm and dry. Capillary Refill: Capillary refill takes less than 2 seconds. Findings: Rash present. Comments: Large open draining areas of cellulitis to patient's left calf. No crepitus or blisters are currently present. Neurological:      General: No focal deficit present. Mental Status: She is alert and oriented to person, place, and time. Mental status is at baseline. Psychiatric:         Mood and Affect: Mood normal.         Behavior: Behavior normal.         Thought Content: Thought content normal.         Judgment: Judgment normal.    Diabetic foot exam:           7/20/2023    11:01 AM 6/20/2023     2:32 PM 6/14/2023    11:51 AM 6/12/2023    11:50 AM 5/25/2023     4:16 PM 2/23/2023     2:41 PM 1/30/2023     4:06 PM   PHQ Scores   PHQ2 Score 0 0 0 0 0     PHQ9 Score 5 4 12 12 0 0 0      EKG at ER recently showed NSR with possible left atrial enlargement  Assessment & Plan:    1. Venous stasis ulcer of other part of lower leg, unspecified laterality, unspecified ulcer stage, unspecified whether varicose veins present ([address])  Legs wrapped and urgent referral to the wound clinic  - [facility] - [address]    2.  SOB (shortness of breath)  Lab was not done prior to pt leaving; therefore, can do at cardiology

## 2023-10-25 ENCOUNTER — TELEPHONE (OUTPATIENT)
Dept: FAMILY MEDICINE CLINIC | Facility: CLINIC | Age: 48
End: 2023-10-25

## 2023-10-25 NOTE — TELEPHONE ENCOUNTER
Pt states that her provider sent in a referral for her to go to a wound clinic, but they don't have an opening until next week. Pt states that she doesn't want to wait that long so she wants to know what is the next step for her.

## 2023-10-25 NOTE — TELEPHONE ENCOUNTER
Was not able to reach wound care. Pt will like to stay within [facility]. Will call wound clinic in a.m. to try getting a sooner appt.

## 2023-10-26 ENCOUNTER — TELEPHONE (OUTPATIENT)
Dept: FAMILY MEDICINE CLINIC | Facility: CLINIC | Age: 48
End: 2023-10-26

## 2023-10-26 DIAGNOSIS — L03.119 CELLULITIS OF LOWER EXTREMITY, UNSPECIFIED LATERALITY: Primary | ICD-10-CM

## 2023-10-26 NOTE — TELEPHONE ENCOUNTER
Called the [address] and asked them to look at [facility] of pt.'s legs. They recommended ABD pads for drainage, keep legs elevated, and wrap with Kerlix. Go forward with HH ([facility]) in case we need lymphedema treatment as later date. Please  notify pt and referral to [facility] placed. She is to keep appt on 11/01/23 with Wound Center.     Thanks

## 2023-10-26 NOTE — TELEPHONE ENCOUNTER
Called the [address] and asked them to look at [facility] of pt.'s legs. They recommended ABD pads for drainage, keep legs elevated, and wrap with Kerlix. Go forward with HH ([facility]) in case we need lymphedema treatment as later date. Will notify pt and referral to [facility] placed. She is to keep appt on 11/01/23 with Wound Center.

## 2023-10-30 ENCOUNTER — PATIENT MESSAGE (OUTPATIENT)
Dept: FAMILY MEDICINE CLINIC | Facility: CLINIC | Age: 48
End: 2023-10-30

## 2023-10-30 ENCOUNTER — TRANSCRIBE ORDERS (OUTPATIENT)
Dept: SCHEDULING | Age: 48
End: 2023-10-30

## 2023-10-30 DIAGNOSIS — L97.809 VENOUS STASIS ULCER OF OTHER PART OF LOWER LEG, UNSPECIFIED LATERALITY, UNSPECIFIED ULCER STAGE, UNSPECIFIED WHETHER VARICOSE VEINS PRESENT (HCC): Primary | ICD-10-CM

## 2023-10-30 DIAGNOSIS — I83.008 VENOUS STASIS ULCER OF OTHER PART OF LOWER LEG, UNSPECIFIED LATERALITY, UNSPECIFIED ULCER STAGE, UNSPECIFIED WHETHER VARICOSE VEINS PRESENT (HCC): Primary | ICD-10-CM

## 2023-10-30 DIAGNOSIS — Z12.31 SCREENING MAMMOGRAM FOR BREAST CANCER: Primary | ICD-10-CM

## 2023-10-30 RX ORDER — DOXYCYCLINE 100 MG/1
100 CAPSULE ORAL 2 TIMES DAILY
Qty: 20 CAPSULE | Refills: 0 | Status: SHIPPED | OUTPATIENT
Start: 2023-10-30

## 2023-10-30 NOTE — TELEPHONE ENCOUNTER
Diagnoses and all orders for this visit:    Venous stasis ulcer of other part of lower leg, unspecified laterality, unspecified ulcer stage, unspecified whether varicose veins present (HCC)  -     doxycycline monohydrate (MONODOX) 100 MG capsule;  Take 1 capsule by mouth 2 times daily

## 2023-11-01 ENCOUNTER — TELEPHONE (OUTPATIENT)
Dept: ONCOLOGY | Age: 48
End: 2023-11-01

## 2023-11-01 ENCOUNTER — HOSPITAL ENCOUNTER (OUTPATIENT)
Dept: WOUND CARE | Age: 48
Discharge: HOME | End: 2023-11-01
Payer: MEDICARE

## 2023-11-01 VITALS
WEIGHT: 293 LBS | SYSTOLIC BLOOD PRESSURE: 134 MMHG | DIASTOLIC BLOOD PRESSURE: 75 MMHG | HEIGHT: 67 IN | BODY MASS INDEX: 45.99 KG/M2 | TEMPERATURE: 99.3 F

## 2023-11-01 DIAGNOSIS — L97.922 CHRONIC ULCER OF LEFT LOWER EXTREMITY WITH FAT LAYER EXPOSED (HCC): ICD-10-CM

## 2023-11-01 DIAGNOSIS — I89.0 LYMPHEDEMA OF BOTH LOWER EXTREMITIES: ICD-10-CM

## 2023-11-01 DIAGNOSIS — L97.312 CHRONIC ULCER OF RIGHT ANKLE WITH FAT LAYER EXPOSED (HCC): ICD-10-CM

## 2023-11-01 DIAGNOSIS — I87.303 CHRONIC VENOUS HYPERTENSION INVOLVING BOTH SIDES: ICD-10-CM

## 2023-11-01 PROBLEM — E11.9 TYPE 2 DIABETES MELLITUS (HCC): Chronic | Status: ACTIVE | Noted: 2023-04-13

## 2023-11-01 PROCEDURE — 99203 OFFICE O/P NEW LOW 30 MIN: CPT

## 2023-11-01 PROCEDURE — 99213 OFFICE O/P EST LOW 20 MIN: CPT

## 2023-11-01 ASSESSMENT — PAIN DESCRIPTION - DESCRIPTORS: DESCRIPTORS: BURNING;THROBBING

## 2023-11-01 ASSESSMENT — PAIN DESCRIPTION - FREQUENCY: FREQUENCY: CONTINUOUS

## 2023-11-01 ASSESSMENT — PAIN DESCRIPTION - ORIENTATION: ORIENTATION: RIGHT;LEFT

## 2023-11-01 ASSESSMENT — PAIN DESCRIPTION - LOCATION: LOCATION: LEG

## 2023-11-01 ASSESSMENT — PAIN DESCRIPTION - PAIN TYPE: TYPE: CHRONIC PAIN

## 2023-11-01 ASSESSMENT — PAIN SCALES - GENERAL: PAINLEVEL_OUTOF10: 10

## 2023-11-01 NOTE — FLOWSHEET NOTE
Mechanically debrided using gauze & saline     11/01/23 0948   Right Leg Edema Point of Measurement   Leg circumference 59 cm   Ankle circumference 30 cm   Foot circumference 26.5 cm   Compression Therapy Compression ordered   Left Leg Edema Point of Measurement   Leg circumference 58.5 cm   Ankle circumference 32 cm   Foot circumference 25.5 cm   Compression Therapy Compression ordered   RLE Neurovascular Assessment   Capillary Refill Less than/Equal to 3 seconds   Color Appropriate for Ethnicity   Temperature Cool   RLE Sensation  Full sensation;Numbness;Tingling   R Pedal Pulse +2   LLE Neurovascular Assessment   Capillary Refill Less than/Equal to 3 seconds   Color Appropriate for Ethnicity   Temperature Cool   LLE Sensation  Full sensation;Numbness;Tingling   L Pedal Pulse +2   Wound 11/01/23 Leg Left;Posterior #2   Date First Assessed/Time First Assessed: 11/01/23 0954   Present on Original Admission: Yes  Wound Approximate Age at First Assessment (Weeks): 12 weeks  Primary Wound Type: Venous Ulcer  Location: Leg  Wound Location Orientation: Left;Posterior  Woun. .. Wound Image    Wound Etiology Venous   Dressing Status New dressing applied   Wound Cleansed Soap and water;Cleansed with saline; Vashe   Wound Length (cm) 10 cm   Wound Width (cm) 10 cm   Wound Depth (cm) 0.1 cm   Wound Surface Area (cm^2) 100 cm^2   Wound Volume (cm^3) 10 cm^3   Wound Assessment Fluid filled blister;Pink/red   Drainage Amount Copious (>75 % saturated)   Drainage Description Serous   Odor None   Pati-wound Assessment Edematous   Margins Attached edges   Wound Thickness Description not for Pressure Injury Full thickness   Wound 11/01/23 Leg Posterior;Right #1   Date First Assessed/Time First Assessed: 11/01/23 0954   Present on Original Admission: Yes  Wound Approximate Age at First Assessment (Weeks): 12 weeks  Primary Wound Type: Venous Ulcer  Location: Leg  Wound Location Orientation: Posterior;Right  Wou. ..    Wound Image

## 2023-11-01 NOTE — DISCHARGE INSTRUCTIONS
Jody Glez RN  Registered Nurse  Wound Care     Signed  Date of Service:  11/1/2023  9:00 AM     Signed                                                                                                                                                                              Discharge Instructions for  Allan Cruz nayeli  [address], [address] [place] [address]  Phone [phone]   Fax [phone]        NAME:  Gentry Ireland  YOB: 1975  MEDICAL RECORD NUMBER:  [id number]  DATE:  11/1/2023     Return Appointment:   2 weeks with Lizet Ignacio DO     Instructions: Bilateral lower legs:  OK to shower with Dove soap. Apply alginate. Cut alginate sheet to wound size and apply to wound bed. Cover with XL Exudry pad or super absorptive dressing, rolled gauze, & Tubigrip stocking size \"G\". Please wear Tubigrip stockinet from toes to knees- applies light compression to leg to reduce swelling. Goal is to reduce circulatory congestion, discomfort,  & resistance to wound closure. May wear 24 hours per day. Wash, dry, & reuse stockinet. Elevate legs when sitting to reduce swelling. Swelling interferes with wound healing. [facility] to change dressing 3 times per week until next appointment date. Please increase dietary protein to at least 60 grams per day to support cell rejuvenation. May use supplements such as Ensure Max, Jase, & Glucerna (samples & coupons provided at first visit). Be sure to spread intake throughout the day for improved absorption. Should you experience increased redness, swelling, pain, foul odor, size of wound(s), or have a temperature over 101 degrees please contact the [id number] at [phone] or if after hours contact your primary care physician or go to the hospital emergency department.      PLEASE NOTE: IF YOU ARE UNABLE TO OBTAIN WOUND SUPPLIES, CONTINUE TO USE THE SUPPLIES YOU HAVE

## 2023-11-01 NOTE — WOUND CARE
Discharge Instructions for  [address], [address]  Phone [phone]   Fax [phone]      NAME:  Giovana Marina  YOB: 1975  MEDICAL RECORD NUMBER:  [id number]  DATE:  11/1/2023    Return Appointment:   2 weeks with Robbie Bonner DO    Instructions: Bilateral lower legs:  OK to shower with Dove soap. Apply alginate. Cut alginate sheet to wound size and apply to wound bed. Cover with XL Exudry pad or super absorptive dressing, rolled gauze, & Tubigrip stocking size \"G\". Please wear Tubigrip stockinet from toes to knees- applies light compression to leg to reduce swelling. Goal is to reduce circulatory congestion, discomfort,  & resistance to wound closure. May wear 24 hours per day. Wash, dry, & reuse stockinet. Elevate legs when sitting to reduce swelling. Swelling interferes with wound healing. [facility] to change dressing 3 times per week until next appointment date. Please increase dietary protein to at least 60 grams per day to support cell rejuvenation. May use supplements such as Ensure Max, Jase, & Glucerna (samples & coupons provided at first visit). Be sure to spread intake throughout the day for improved absorption. Should you experience increased redness, swelling, pain, foul odor, size of wound(s), or have a temperature over 101 degrees please contact the [facility] [address] at [phone] or if after hours contact your primary care physician or go to the hospital emergency department. PLEASE NOTE: IF YOU ARE UNABLE TO OBTAIN WOUND SUPPLIES, CONTINUE TO USE THE SUPPLIES YOU HAVE AVAILABLE UNTIL YOU ARE ABLE TO REACH US. IT IS MOST IMPORTANT TO KEEP THE WOUND COVERED AT ALL TIMES.     Electronically signed Andrzej Brito RN on 11/1/2023 at 10:02 AM

## 2023-11-07 ENCOUNTER — TELEPHONE (OUTPATIENT)
Dept: WOUND CARE | Age: 48
End: 2023-11-07

## 2023-11-07 ENCOUNTER — HOSPITAL ENCOUNTER (INPATIENT)
Age: 48
LOS: 3 days | Discharge: HOME | End: 2023-11-10
Attending: EMERGENCY MEDICINE | Admitting: FAMILY MEDICINE
Payer: MEDICARE

## 2023-11-07 DIAGNOSIS — L03.116 BILATERAL LOWER LEG CELLULITIS: ICD-10-CM

## 2023-11-07 DIAGNOSIS — S81.801A OPEN WOUND OF BOTH LOWER EXTREMITIES, INITIAL ENCOUNTER: ICD-10-CM

## 2023-11-07 DIAGNOSIS — L03.115 BILATERAL LOWER LEG CELLULITIS: ICD-10-CM

## 2023-11-07 DIAGNOSIS — L03.119 CELLULITIS OF LOWER EXTREMITY, UNSPECIFIED LATERALITY: Primary | ICD-10-CM

## 2023-11-07 DIAGNOSIS — S81.802A OPEN WOUND OF BOTH LOWER EXTREMITIES, INITIAL ENCOUNTER: ICD-10-CM

## 2023-11-07 LAB
ALBUMIN SERPL-MCNC: 3.6 G/DL (ref 3.5–5)
ALBUMIN/GLOB SERPL: 0.8 (ref 0.4–1.6)
ALP SERPL-CCNC: 97 U/L (ref 50–136)
ALT SERPL-CCNC: 23 U/L (ref 12–65)
ANION GAP SERPL CALC-SCNC: 5 MMOL/L (ref 2–11)
AST SERPL-CCNC: 19 U/L (ref 15–37)
BASOPHILS # BLD: 0 K/UL (ref 0–0.2)
BASOPHILS NFR BLD: 1 % (ref 0–2)
BILIRUB SERPL-MCNC: 0.6 MG/DL (ref 0.2–1.1)
BUN SERPL-MCNC: 10 MG/DL (ref 6–23)
CALCIUM SERPL-MCNC: 9.3 MG/DL (ref 8.3–10.4)
CHLORIDE SERPL-SCNC: 105 MMOL/L (ref 101–110)
CO2 SERPL-SCNC: 28 MMOL/L (ref 21–32)
CREAT SERPL-MCNC: 1.08 MG/DL (ref 0.6–1)
D DIMER PPP FEU-MCNC: 0.62 UG/ML(FEU)
DIFFERENTIAL METHOD BLD: ABNORMAL
EOSINOPHIL # BLD: 0.2 K/UL (ref 0–0.8)
EOSINOPHIL NFR BLD: 5 % (ref 0.5–7.8)
ERYTHROCYTE [DISTWIDTH] IN BLOOD BY AUTOMATED COUNT: 13.5 % (ref 11.9–14.6)
GLOBULIN SER CALC-MCNC: 4.5 G/DL (ref 2.8–4.5)
GLUCOSE SERPL-MCNC: 105 MG/DL (ref 65–100)
HCT VFR BLD AUTO: 37 % (ref 35.8–46.3)
HGB BLD-MCNC: 12 G/DL (ref 11.7–15.4)
IMM GRANULOCYTES # BLD AUTO: 0 K/UL (ref 0–0.5)
IMM GRANULOCYTES NFR BLD AUTO: 0 % (ref 0–5)
LACTATE SERPL-SCNC: 0.9 MMOL/L (ref 0.4–2)
LYMPHOCYTES # BLD: 1.7 K/UL (ref 0.5–4.6)
LYMPHOCYTES NFR BLD: 34 % (ref 13–44)
MCH RBC QN AUTO: 29.7 PG (ref 26.1–32.9)
MCHC RBC AUTO-ENTMCNC: 32.4 G/DL (ref 31.4–35)
MCV RBC AUTO: 91.6 FL (ref 82–102)
MONOCYTES # BLD: 0.6 K/UL (ref 0.1–1.3)
MONOCYTES NFR BLD: 11 % (ref 4–12)
NEUTS SEG # BLD: 2.4 K/UL (ref 1.7–8.2)
NEUTS SEG NFR BLD: 49 % (ref 43–78)
NRBC # BLD: 0 K/UL (ref 0–0.2)
NT PRO BNP: 47 PG/ML (ref 5–125)
PLATELET # BLD AUTO: 345 K/UL (ref 150–450)
PMV BLD AUTO: 9.4 FL (ref 9.4–12.3)
POTASSIUM SERPL-SCNC: 3.6 MMOL/L (ref 3.5–5.1)
PROCALCITONIN SERPL-MCNC: <0.05 NG/ML (ref 0–0.49)
PROT SERPL-MCNC: 8.1 G/DL (ref 6.3–8.2)
RBC # BLD AUTO: 4.04 M/UL (ref 4.05–5.2)
SODIUM SERPL-SCNC: 138 MMOL/L (ref 133–143)
WBC # BLD AUTO: 4.8 K/UL (ref 4.3–11.1)

## 2023-11-07 PROCEDURE — 6360000002 HC RX W HCPCS: Performed by: EMERGENCY MEDICINE

## 2023-11-07 PROCEDURE — 83605 ASSAY OF LACTIC ACID: CPT

## 2023-11-07 PROCEDURE — 2580000003 HC RX 258: Performed by: EMERGENCY MEDICINE

## 2023-11-07 PROCEDURE — 96374 THER/PROPH/DIAG INJ IV PUSH: CPT

## 2023-11-07 PROCEDURE — 84145 PROCALCITONIN (PCT): CPT

## 2023-11-07 PROCEDURE — 1100000000 HC RM PRIVATE

## 2023-11-07 PROCEDURE — 80053 COMPREHEN METABOLIC PANEL: CPT

## 2023-11-07 PROCEDURE — 87040 BLOOD CULTURE FOR BACTERIA: CPT

## 2023-11-07 PROCEDURE — 96375 TX/PRO/DX INJ NEW DRUG ADDON: CPT

## 2023-11-07 PROCEDURE — 85025 COMPLETE CBC W/AUTO DIFF WBC: CPT

## 2023-11-07 PROCEDURE — 99285 EMERGENCY DEPT VISIT HI MDM: CPT

## 2023-11-07 PROCEDURE — 83880 ASSAY OF NATRIURETIC PEPTIDE: CPT

## 2023-11-07 PROCEDURE — 85379 FIBRIN DEGRADATION QUANT: CPT

## 2023-11-07 RX ORDER — FLUTICASONE PROPIONATE 50 MCG
2 SPRAY, SUSPENSION (ML) NASAL DAILY
Status: DISCONTINUED | OUTPATIENT
Start: 2023-11-08 | End: 2023-11-10 | Stop reason: HOSPADM

## 2023-11-07 RX ORDER — SODIUM CHLORIDE 9 MG/ML
INJECTION, SOLUTION INTRAVENOUS CONTINUOUS
Status: ACTIVE | OUTPATIENT
Start: 2023-11-08 | End: 2023-11-10

## 2023-11-07 RX ORDER — MONTELUKAST SODIUM 10 MG/1
10 TABLET ORAL DAILY
Status: DISCONTINUED | OUTPATIENT
Start: 2023-11-08 | End: 2023-11-10 | Stop reason: HOSPADM

## 2023-11-07 RX ORDER — POTASSIUM CHLORIDE 20 MEQ/1
40 TABLET, EXTENDED RELEASE ORAL PRN
Status: DISCONTINUED | OUTPATIENT
Start: 2023-11-07 | End: 2023-11-10 | Stop reason: HOSPADM

## 2023-11-07 RX ORDER — ONDANSETRON 2 MG/ML
4 INJECTION INTRAMUSCULAR; INTRAVENOUS EVERY 6 HOURS PRN
Status: DISCONTINUED | OUTPATIENT
Start: 2023-11-07 | End: 2023-11-10 | Stop reason: HOSPADM

## 2023-11-07 RX ORDER — 0.9 % SODIUM CHLORIDE 0.9 %
500 INTRAVENOUS SOLUTION INTRAVENOUS
Status: COMPLETED | OUTPATIENT
Start: 2023-11-07 | End: 2023-11-07

## 2023-11-07 RX ORDER — ENOXAPARIN SODIUM 100 MG/ML
40 INJECTION SUBCUTANEOUS 2 TIMES DAILY
Status: DISCONTINUED | OUTPATIENT
Start: 2023-11-08 | End: 2023-11-08 | Stop reason: SDUPTHER

## 2023-11-07 RX ORDER — ONDANSETRON 4 MG/1
4 TABLET, ORALLY DISINTEGRATING ORAL EVERY 8 HOURS PRN
Status: DISCONTINUED | OUTPATIENT
Start: 2023-11-07 | End: 2023-11-10 | Stop reason: HOSPADM

## 2023-11-07 RX ORDER — MAGNESIUM SULFATE IN WATER 40 MG/ML
2000 INJECTION, SOLUTION INTRAVENOUS PRN
Status: DISCONTINUED | OUTPATIENT
Start: 2023-11-07 | End: 2023-11-10 | Stop reason: HOSPADM

## 2023-11-07 RX ORDER — CLINDAMYCIN PHOSPHATE 600 MG/50ML
600 INJECTION INTRAVENOUS EVERY 8 HOURS
Status: DISCONTINUED | OUTPATIENT
Start: 2023-11-07 | End: 2023-11-08 | Stop reason: SDUPTHER

## 2023-11-07 RX ORDER — KETOROLAC TROMETHAMINE 30 MG/ML
30 INJECTION, SOLUTION INTRAMUSCULAR; INTRAVENOUS
Status: COMPLETED | OUTPATIENT
Start: 2023-11-07 | End: 2023-11-07

## 2023-11-07 RX ORDER — MORPHINE SULFATE 2 MG/ML
2 INJECTION, SOLUTION INTRAMUSCULAR; INTRAVENOUS
Status: COMPLETED | OUTPATIENT
Start: 2023-11-07 | End: 2023-11-07

## 2023-11-07 RX ORDER — PANTOPRAZOLE SODIUM 40 MG/1
40 TABLET, DELAYED RELEASE ORAL
Status: DISCONTINUED | OUTPATIENT
Start: 2023-11-08 | End: 2023-11-10 | Stop reason: HOSPADM

## 2023-11-07 RX ORDER — SODIUM CHLORIDE 0.9 % (FLUSH) 0.9 %
5-40 SYRINGE (ML) INJECTION PRN
Status: DISCONTINUED | OUTPATIENT
Start: 2023-11-07 | End: 2023-11-10 | Stop reason: HOSPADM

## 2023-11-07 RX ORDER — LOSARTAN POTASSIUM 50 MG/1
50 TABLET ORAL DAILY
Status: DISCONTINUED | OUTPATIENT
Start: 2023-11-08 | End: 2023-11-10 | Stop reason: HOSPADM

## 2023-11-07 RX ORDER — METHOCARBAMOL 750 MG/1
750 TABLET, FILM COATED ORAL 3 TIMES DAILY
Status: DISCONTINUED | OUTPATIENT
Start: 2023-11-08 | End: 2023-11-10 | Stop reason: HOSPADM

## 2023-11-07 RX ORDER — POTASSIUM CHLORIDE 7.45 MG/ML
10 INJECTION INTRAVENOUS PRN
Status: DISCONTINUED | OUTPATIENT
Start: 2023-11-07 | End: 2023-11-10 | Stop reason: HOSPADM

## 2023-11-07 RX ORDER — HYDROXYZINE HYDROCHLORIDE 25 MG/1
50 TABLET, FILM COATED ORAL NIGHTLY
Status: DISCONTINUED | OUTPATIENT
Start: 2023-11-08 | End: 2023-11-10 | Stop reason: HOSPADM

## 2023-11-07 RX ORDER — POLYETHYLENE GLYCOL 3350 17 G/17G
17 POWDER, FOR SOLUTION ORAL DAILY PRN
Status: DISCONTINUED | OUTPATIENT
Start: 2023-11-07 | End: 2023-11-10 | Stop reason: HOSPADM

## 2023-11-07 RX ORDER — SODIUM CHLORIDE 9 MG/ML
INJECTION, SOLUTION INTRAVENOUS PRN
Status: DISCONTINUED | OUTPATIENT
Start: 2023-11-07 | End: 2023-11-10 | Stop reason: HOSPADM

## 2023-11-07 RX ORDER — SODIUM CHLORIDE 0.9 % (FLUSH) 0.9 %
5-40 SYRINGE (ML) INJECTION EVERY 12 HOURS SCHEDULED
Status: DISCONTINUED | OUTPATIENT
Start: 2023-11-08 | End: 2023-11-10 | Stop reason: HOSPADM

## 2023-11-07 RX ORDER — ACETAMINOPHEN 650 MG/1
650 SUPPOSITORY RECTAL EVERY 6 HOURS PRN
Status: DISCONTINUED | OUTPATIENT
Start: 2023-11-07 | End: 2023-11-10 | Stop reason: HOSPADM

## 2023-11-07 RX ORDER — DOCUSATE SODIUM 100 MG/1
100 CAPSULE, LIQUID FILLED ORAL 2 TIMES DAILY
Status: DISCONTINUED | OUTPATIENT
Start: 2023-11-08 | End: 2023-11-10 | Stop reason: HOSPADM

## 2023-11-07 RX ORDER — PREGABALIN 50 MG/1
50 CAPSULE ORAL 3 TIMES DAILY
Status: DISCONTINUED | OUTPATIENT
Start: 2023-11-08 | End: 2023-11-08

## 2023-11-07 RX ORDER — ACETAMINOPHEN 325 MG/1
650 TABLET ORAL EVERY 6 HOURS PRN
Status: DISCONTINUED | OUTPATIENT
Start: 2023-11-07 | End: 2023-11-10 | Stop reason: HOSPADM

## 2023-11-07 RX ORDER — FUROSEMIDE 20 MG/1
20 TABLET ORAL PRN
Status: DISCONTINUED | OUTPATIENT
Start: 2023-11-07 | End: 2023-11-10 | Stop reason: HOSPADM

## 2023-11-07 RX ORDER — CETIRIZINE HYDROCHLORIDE 10 MG/1
10 TABLET ORAL DAILY
Status: DISCONTINUED | OUTPATIENT
Start: 2023-11-08 | End: 2023-11-10 | Stop reason: HOSPADM

## 2023-11-07 RX ADMIN — PIPERACILLIN AND TAZOBACTAM 4500 MG: 4; .5 INJECTION, POWDER, LYOPHILIZED, FOR SOLUTION INTRAVENOUS at 23:21

## 2023-11-07 RX ADMIN — SODIUM CHLORIDE 500 ML: 9 INJECTION, SOLUTION INTRAVENOUS at 21:51

## 2023-11-07 RX ADMIN — Medication 2500 MG: at 20:15

## 2023-11-07 RX ADMIN — MORPHINE SULFATE 2 MG: 2 INJECTION, SOLUTION INTRAMUSCULAR; INTRAVENOUS at 21:43

## 2023-11-07 RX ADMIN — KETOROLAC TROMETHAMINE 30 MG: 30 INJECTION, SOLUTION INTRAMUSCULAR at 21:43

## 2023-11-07 ASSESSMENT — ENCOUNTER SYMPTOMS
COUGH: 0
CONSTIPATION: 0
SORE THROAT: 0
BACK PAIN: 0
DIARRHEA: 0
COLOR CHANGE: 1
SHORTNESS OF BREATH: 0
VOMITING: 0
NAUSEA: 0
ABDOMINAL PAIN: 0
RHINORRHEA: 0

## 2023-11-07 ASSESSMENT — PAIN DESCRIPTION - ONSET: ONSET: ON-GOING

## 2023-11-07 ASSESSMENT — PAIN DESCRIPTION - FREQUENCY: FREQUENCY: CONTINUOUS

## 2023-11-07 ASSESSMENT — PAIN SCALES - GENERAL: PAINLEVEL_OUTOF10: 10

## 2023-11-07 ASSESSMENT — PAIN DESCRIPTION - ORIENTATION: ORIENTATION: LEFT;RIGHT

## 2023-11-07 ASSESSMENT — PAIN - FUNCTIONAL ASSESSMENT: PAIN_FUNCTIONAL_ASSESSMENT: 0-10

## 2023-11-07 NOTE — ED TRIAGE NOTES
Pt arrives A&Ox4 ambulatory w/ cane. Pt has bilateral ongoing cellulitis in lower extremities. Pt states drainage has changed colors. Pt reports on 3rd round of abx. Pt concerned for blood clots.

## 2023-11-07 NOTE — TELEPHONE ENCOUNTER
Pt called concerned due to increased drainage, redness, increased warm and swelling to legs, pt states she is currently on ant biotics but is finished this week. Pt has history of DVT and has not taken her temperature today to see if she has a fever. Recommended patient go to the ER for evaluation. Pt verbalized understanding.

## 2023-11-08 LAB
ALBUMIN SERPL-MCNC: 3 G/DL (ref 3.5–5)
ALBUMIN/GLOB SERPL: 0.8 (ref 0.4–1.6)
ALP SERPL-CCNC: 84 U/L (ref 50–136)
ALT SERPL-CCNC: 19 U/L (ref 12–65)
ANION GAP SERPL CALC-SCNC: 7 MMOL/L (ref 2–11)
AST SERPL-CCNC: 12 U/L (ref 15–37)
BASOPHILS # BLD: 0 K/UL (ref 0–0.2)
BASOPHILS NFR BLD: 1 % (ref 0–2)
BILIRUB SERPL-MCNC: 0.5 MG/DL (ref 0.2–1.1)
BUN SERPL-MCNC: 11 MG/DL (ref 6–23)
CALCIUM SERPL-MCNC: 8.4 MG/DL (ref 8.3–10.4)
CHLORIDE SERPL-SCNC: 109 MMOL/L (ref 101–110)
CO2 SERPL-SCNC: 27 MMOL/L (ref 21–32)
CREAT SERPL-MCNC: 1.11 MG/DL (ref 0.6–1)
DIFFERENTIAL METHOD BLD: ABNORMAL
EOSINOPHIL # BLD: 0.3 K/UL (ref 0–0.8)
EOSINOPHIL NFR BLD: 5 % (ref 0.5–7.8)
ERYTHROCYTE [DISTWIDTH] IN BLOOD BY AUTOMATED COUNT: 13.5 % (ref 11.9–14.6)
GLOBULIN SER CALC-MCNC: 3.9 G/DL (ref 2.8–4.5)
GLUCOSE SERPL-MCNC: 99 MG/DL (ref 65–100)
HCT VFR BLD AUTO: 33.2 % (ref 35.8–46.3)
HGB BLD-MCNC: 10.6 G/DL (ref 11.7–15.4)
IMM GRANULOCYTES # BLD AUTO: 0 K/UL (ref 0–0.5)
IMM GRANULOCYTES NFR BLD AUTO: 0 % (ref 0–5)
LYMPHOCYTES # BLD: 1.5 K/UL (ref 0.5–4.6)
LYMPHOCYTES NFR BLD: 26 % (ref 13–44)
MCH RBC QN AUTO: 29.6 PG (ref 26.1–32.9)
MCHC RBC AUTO-ENTMCNC: 31.9 G/DL (ref 31.4–35)
MCV RBC AUTO: 92.7 FL (ref 82–102)
MONOCYTES # BLD: 1 K/UL (ref 0.1–1.3)
MONOCYTES NFR BLD: 17 % (ref 4–12)
NEUTS SEG # BLD: 3 K/UL (ref 1.7–8.2)
NEUTS SEG NFR BLD: 51 % (ref 43–78)
NRBC # BLD: 0 K/UL (ref 0–0.2)
PLATELET # BLD AUTO: 289 K/UL (ref 150–450)
PMV BLD AUTO: 9.2 FL (ref 9.4–12.3)
POTASSIUM SERPL-SCNC: 3.7 MMOL/L (ref 3.5–5.1)
PROT SERPL-MCNC: 6.9 G/DL (ref 6.3–8.2)
RBC # BLD AUTO: 3.58 M/UL (ref 4.05–5.2)
SODIUM SERPL-SCNC: 143 MMOL/L (ref 133–143)
WBC # BLD AUTO: 5.9 K/UL (ref 4.3–11.1)

## 2023-11-08 PROCEDURE — 2580000003 HC RX 258: Performed by: STUDENT IN AN ORGANIZED HEALTH CARE EDUCATION/TRAINING PROGRAM

## 2023-11-08 PROCEDURE — 2580000003 HC RX 258: Performed by: FAMILY MEDICINE

## 2023-11-08 PROCEDURE — 1100000000 HC RM PRIVATE

## 2023-11-08 PROCEDURE — 36415 COLL VENOUS BLD VENIPUNCTURE: CPT

## 2023-11-08 PROCEDURE — 6370000000 HC RX 637 (ALT 250 FOR IP): Performed by: STUDENT IN AN ORGANIZED HEALTH CARE EDUCATION/TRAINING PROGRAM

## 2023-11-08 PROCEDURE — 6360000002 HC RX W HCPCS: Performed by: STUDENT IN AN ORGANIZED HEALTH CARE EDUCATION/TRAINING PROGRAM

## 2023-11-08 PROCEDURE — 85025 COMPLETE CBC W/AUTO DIFF WBC: CPT

## 2023-11-08 PROCEDURE — 80053 COMPREHEN METABOLIC PANEL: CPT

## 2023-11-08 PROCEDURE — 6360000002 HC RX W HCPCS: Performed by: FAMILY MEDICINE

## 2023-11-08 PROCEDURE — 6370000000 HC RX 637 (ALT 250 FOR IP): Performed by: FAMILY MEDICINE

## 2023-11-08 RX ORDER — PREGABALIN 150 MG/1
150 CAPSULE ORAL 3 TIMES DAILY
Status: DISCONTINUED | OUTPATIENT
Start: 2023-11-08 | End: 2023-11-10 | Stop reason: HOSPADM

## 2023-11-08 RX ORDER — CLINDAMYCIN PHOSPHATE 600 MG/50ML
600 INJECTION INTRAVENOUS EVERY 8 HOURS
Status: DISCONTINUED | OUTPATIENT
Start: 2023-11-08 | End: 2023-11-08 | Stop reason: SDUPTHER

## 2023-11-08 RX ORDER — MORPHINE SULFATE 2 MG/ML
2 INJECTION, SOLUTION INTRAMUSCULAR; INTRAVENOUS
Status: DISCONTINUED | OUTPATIENT
Start: 2023-11-08 | End: 2023-11-10 | Stop reason: HOSPADM

## 2023-11-08 RX ORDER — CLINDAMYCIN PHOSPHATE 600 MG/50ML
600 INJECTION, SOLUTION INTRAVENOUS EVERY 8 HOURS
Status: DISCONTINUED | OUTPATIENT
Start: 2023-11-08 | End: 2023-11-08

## 2023-11-08 RX ADMIN — METHOCARBAMOL 750 MG: 750 TABLET ORAL at 09:29

## 2023-11-08 RX ADMIN — LOSARTAN POTASSIUM 50 MG: 50 TABLET, FILM COATED ORAL at 09:28

## 2023-11-08 RX ADMIN — SODIUM CHLORIDE: 9 INJECTION, SOLUTION INTRAVENOUS at 00:41

## 2023-11-08 RX ADMIN — SODIUM CHLORIDE, PRESERVATIVE FREE 10 ML: 5 INJECTION INTRAVENOUS at 22:26

## 2023-11-08 RX ADMIN — MORPHINE SULFATE 2 MG: 2 INJECTION, SOLUTION INTRAMUSCULAR; INTRAVENOUS at 06:27

## 2023-11-08 RX ADMIN — MONTELUKAST 10 MG: 10 TABLET, FILM COATED ORAL at 09:30

## 2023-11-08 RX ADMIN — APIXABAN 5 MG: 5 TABLET, FILM COATED ORAL at 22:04

## 2023-11-08 RX ADMIN — VANCOMYCIN HYDROCHLORIDE 1000 MG: 1 INJECTION, POWDER, LYOPHILIZED, FOR SOLUTION INTRAVENOUS at 22:26

## 2023-11-08 RX ADMIN — CLINDAMYCIN PHOSPHATE 600 MG: 600 INJECTION, SOLUTION INTRAVENOUS at 01:21

## 2023-11-08 RX ADMIN — DOCUSATE SODIUM 100 MG: 100 CAPSULE, LIQUID FILLED ORAL at 22:04

## 2023-11-08 RX ADMIN — VANCOMYCIN HYDROCHLORIDE 1000 MG: 1 INJECTION, POWDER, LYOPHILIZED, FOR SOLUTION INTRAVENOUS at 09:58

## 2023-11-08 RX ADMIN — MORPHINE SULFATE 2 MG: 2 INJECTION, SOLUTION INTRAMUSCULAR; INTRAVENOUS at 01:05

## 2023-11-08 RX ADMIN — PREGABALIN 150 MG: 150 CAPSULE ORAL at 13:13

## 2023-11-08 RX ADMIN — MORPHINE SULFATE 2 MG: 2 INJECTION, SOLUTION INTRAMUSCULAR; INTRAVENOUS at 22:04

## 2023-11-08 RX ADMIN — MORPHINE SULFATE 2 MG: 2 INJECTION, SOLUTION INTRAMUSCULAR; INTRAVENOUS at 13:13

## 2023-11-08 RX ADMIN — PREGABALIN 150 MG: 150 CAPSULE ORAL at 22:04

## 2023-11-08 RX ADMIN — HYDROXYZINE HYDROCHLORIDE 50 MG: 25 TABLET, FILM COATED ORAL at 22:04

## 2023-11-08 RX ADMIN — FLUTICASONE PROPIONATE 2 SPRAY: 50 SPRAY, METERED NASAL at 09:36

## 2023-11-08 RX ADMIN — PREGABALIN 50 MG: 50 CAPSULE ORAL at 09:30

## 2023-11-08 RX ADMIN — DOCUSATE SODIUM 100 MG: 100 CAPSULE, LIQUID FILLED ORAL at 09:29

## 2023-11-08 RX ADMIN — METHOCARBAMOL 750 MG: 750 TABLET ORAL at 22:04

## 2023-11-08 RX ADMIN — APIXABAN 5 MG: 5 TABLET, FILM COATED ORAL at 09:29

## 2023-11-08 RX ADMIN — PANTOPRAZOLE SODIUM 40 MG: 40 TABLET, DELAYED RELEASE ORAL at 06:00

## 2023-11-08 RX ADMIN — CETIRIZINE HYDROCHLORIDE 10 MG: 10 TABLET, FILM COATED ORAL at 09:29

## 2023-11-08 RX ADMIN — METHOCARBAMOL 750 MG: 750 TABLET ORAL at 13:13

## 2023-11-08 RX ADMIN — SODIUM CHLORIDE, PRESERVATIVE FREE 10 ML: 5 INJECTION INTRAVENOUS at 09:35

## 2023-11-08 RX ADMIN — MORPHINE SULFATE 2 MG: 2 INJECTION, SOLUTION INTRAMUSCULAR; INTRAVENOUS at 10:32

## 2023-11-08 RX ADMIN — SODIUM CHLORIDE: 9 INJECTION, SOLUTION INTRAVENOUS at 22:25

## 2023-11-08 ASSESSMENT — PAIN DESCRIPTION - LOCATION
LOCATION: LEG

## 2023-11-08 ASSESSMENT — PAIN DESCRIPTION - DESCRIPTORS
DESCRIPTORS: ACHING
DESCRIPTORS: ACHING

## 2023-11-08 ASSESSMENT — PAIN SCALES - GENERAL
PAINLEVEL_OUTOF10: 10
PAINLEVEL_OUTOF10: 10
PAINLEVEL_OUTOF10: 7
PAINLEVEL_OUTOF10: 7

## 2023-11-08 ASSESSMENT — PAIN DESCRIPTION - ORIENTATION
ORIENTATION: RIGHT;LEFT
ORIENTATION: RIGHT;LEFT

## 2023-11-08 NOTE — ED NOTES
This RN called to give report, was told they would call back.      Virginia Seaman RN  11/07/23 9248

## 2023-11-08 NOTE — PROGRESS NOTES
Occupational Therapy Note:    Attempted to see patient this AM for occupational therapy evaluation session. Patient was waiting on wound care to dress wounds (reported to be coming in 30 min), noted by RN to have weeping of cellulitis. OT will hold until after wound care. Will follow and re-attempt as schedule permits/patient available.  Thank you,    Mario Alberto Brown, OT    Rehab Caseload Tracker

## 2023-11-08 NOTE — CARE COORDINATION
CM spoke with patient at bedside for discharge planning. Demographics verified. Patient lives in home w/ her adult son. She is independent w/ ADLs; ambulates with cane; drives. Patient is current with [facility]  for wound care and with [address] [place] [address] (next appt 11/17). Resumption orders sent to [facility]. PT/OT evals are pending. CM following. 11/08/23 1402   Service Assessment   Patient Orientation Alert and Oriented   Cognition Alert   History Provided By Patient   Primary [facility] Family Members;Friends/Neighbors   PCP Verified by CM Yes   Prior Functional Level Independent in ADLs/IADLs   Current Functional Level Independent in ADLs/IADLs   Can patient return to prior living arrangement Yes   Ability to make needs known: Good   Family able to assist with home care needs: Yes   Would you like for me to discuss the discharge plan with any other family members/significant others, and if so, who?  No

## 2023-11-08 NOTE — PROGRESS NOTES
TRANSFER - IN REPORT:    Verbal report received from [facility] on Doralee Clubs  being received from [facility] ED for routine progression of patient care      Report consisted of patient's Situation, Background, Assessment and   Recommendations(SBAR). Information from the following report(s) ED SBAR was reviewed with the receiving nurse. Opportunity for questions and clarification was provided. Assessment completed upon patient's arrival to unit and care assumed.

## 2023-11-08 NOTE — WOUND CARE
Pt seen for BLE wounds. Pt is current with wound clinic at [facility]. Recommend to apply xeroform to all open areas cover with ABD pads and secure with rolled gauze  Recommend change dressings every shift and as needed for drainage. Will hold off on compression at this time due to infection. Wound team will continue to follow while in acute care setting.

## 2023-11-08 NOTE — ED NOTES
TRANSFER - OUT REPORT:    Verbal report given to [facility] RN on [employer]  being transferred to [address] [place] [address] for routine progression of patient care       Report consisted of patient's Situation, Background, Assessment and   Recommendations(SBAR). Information from the following report(s) ED SBAR was reviewed with the receiving nurse. Lines:   Peripheral IV 11/07/23 Right Antecubital (Active)       Peripheral IV 11/07/23 Left;Posterior Hand (Active)   Site Assessment Clean, dry & intact 11/07/23 2042   Line Status Flushed;Brisk blood return 11/07/23 2042   Phlebitis Assessment No symptoms 11/07/23 2042   Infiltration Assessment 0 11/07/23 2042        Opportunity for questions and clarification was provided.       Patient transported with:  Registered Nurse      Laurel Ricci RN  11/07/23 6038

## 2023-11-08 NOTE — H&P
[facility] Hospitalist Initial History and Physical Note    Patient: Elmer Roy Date: 11/8/2023  female, 49 y.o. Admit Date: 11/7/2023  Attending: Nat Taveras MD     ASSESSMENT AND PLAN:     Principal Problem:    Bilateral lower leg cellulitis  Plan: In context of chronic leg wound. Refractory to treatment with doxycycline. IV Clinda. Blood cultures pending. Wound consult. DVT Prophylaxis: Lovenox      Code Status: FULL CODE      Disposition: Admit to med/surg for evaluation and treatment as per above.       Anticipated discharge: 2-3 days     CHIEF COMPLAINT:  leg redness    HISTORY OF PRESENT ILLNESS:      Patient Active Problem List   Diagnosis    Non compliance with medical treatment    Vertigo    Status post hip replacement    Peripheral neuropathy    CKD (chronic kidney disease) stage 3, GFR 30-59 ml/min ([facility])    Osteoarthritis of hip    Hypertension    Morbid obesity ([facility])    Moderate persistent asthma without complication    Bipolar affective disorder ([facility])    Menometrorrhagia    Other iron deficiency anemias    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 female, with a history of  has a past medical history of Anxiety, Arthritis, Bipolar affective disorder ([address]), Cellulitis, CKD (chronic kidney disease) stage 3, GFR 30-59 ml/min ([facility]), Deep vein thrombosis ([address]), Depression, Diabetes ([address]), Diabetes ([address]), Diabetes mellitus type II, non insulin dependent ([address]), Disease of blood and blood forming organ, Edema of both legs, Femur fracture, left ([address]), GERD (gastroesophageal reflux disease), Gout, Hypertension, Moderate persistent asthma without complication, Morbid obesity ([address]), Non compliance with medical treatment, Orthopnea, Other ill-defined conditions(799.89), Peripheral neuropathy, Peripheral vascular disease ([address]),  delivery,  labor, Psychiatric disorder, Renal insufficiency, Stroke ([address]), Unspecified sleep apnea, and Vertigo. ,  has a past surgical history that includes hernia repair (age 10); Total hip arthroplasty (Left, 2011); heent; delivery  (, ); Tubal ligation (); Tonsillectomy; and  section. who presents to the ER with report of worsening bilateal lower extremity redness and swelling for the past week. She has chronic draining wounds over her bilateral posterior legs and was started on doxycyclin about 6 days ago for cellulitis but has had no improvement. Denies any fevers, chills, nausea, vomiting. Allergy  Allergies   Allergen Reactions    Ciprofloxacin Itching    Sulfa Antibiotics Itching    Xarelto [Rivaroxaban] Itching       Medication list  Medications Prior to Admission: doxycycline monohydrate (MONODOX) 100 MG capsule, Take 1 capsule by mouth 2 times daily  furosemide (LASIX) 20 MG tablet, Take 1 tablet by mouth as needed (take 1 pill as needed)  loratadine (CLARITIN) 10 MG tablet, Use instead of zyrtec for allergies  nystatin (MYCOSTATIN) 487919 UNIT/GM powder, Apply 3 times daily. apixaban (ELIQUIS) 5 MG TABS tablet, Take 1 tablet by mouth 2 times daily  losartan (COZAAR) 50 MG tablet, Take 1 tablet by mouth daily  hydrOXYzine HCl (ATARAX) 50 MG tablet, Take 1 tablet by mouth nightly  montelukast (SINGULAIR) 10 MG tablet, Take 1 tablet by mouth daily  omeprazole (PRILOSEC) 20 MG delayed release capsule, Take 1 capsule by mouth Daily  terconazole (TERAZOL 7) 0.4 % vaginal cream, 1 applicator intravaginal nightly x 7 nights.   Also apply it externally to the outer and inner labia, just inside the vagina, the perineum (area between the vagina and anus) and the

## 2023-11-08 NOTE — PROGRESS NOTES
Hospitalist Progress Note   Admit Date:  2023  6:52 PM   Name:  Promise Gomez   Age:  49 y.o. Sex:  female  :  1975   MRN:  [id number]   Room:  [facility]    Presenting/Chief Complaint: Leg Pain     Reason(s) for Admission: Bilateral lower leg cellulitis [L03.116, L03.115]  Cellulitis of lower extremity, unspecified laterality [L16.519]  Open wound of both lower extremities, initial encounter [S81.801A, S81.802A]     Hospital Course:   Promise Gomez is a 49 y.o. female with medical history of anxiety, bipolar disorder, CKD stage III, diabetes in remission, GERD, hypertension, peripheral neuropathy, stroke without residual deficit, sleep apnea, vertigo who presented with bilateral open leg wounds. Patient was seen in outpatient setting and is currently on third round of antibiotics currently taking doxycycline. Did states she saw some improvement to her wounds first round of antibiotics. With doxycycline patient stated she did not see any improvement and felt things were getting worse. Patient stated she had increased drainage and swelling from both lower extremities including increasing pain and erythema and warmth. Patient started on IV antibiotics for possible cellulitis with open leg wounds. Follows with wound clinic in outpatient setting. Subjective & 24hr Events:   Patient was seen and examined at bedside. No overnight events. Family at bedside. Patient states feeling slightly better this morning. Still with some pain to bilateral lower extremities. Wound care to follow-up with patient.       Assessment & Plan:   Bilateral lower leg cellulitis  -Failed outpatient antibiotic therapy  - Initially started on IV clindamycin, changed to vancomycin   - Follow-up blood cultures  - Wound care consulted, appreciate recommendations    History of A-fib  - Continue Eliquis  - patient currently rate controlled  -We will need follow-up with cardiology outpatient    Hypertension  -

## 2023-11-08 NOTE — ED PROVIDER NOTES
Overall Financial Resource Strain (CARDIA)     Difficulty of Paying Living Expenses: Not hard at all   Food Insecurity: No Food Insecurity (2/23/2023)    Hunger Vital Sign     Worried About Running Out of Food in the Last Year: Never true     Ran Out of Food in the Last Year: Never true   Transportation Needs: Unknown (2/23/2023)    PRAPARE - Transportation     Lack of Transportation (Non-Medical): No   Housing Stability: Unknown (2/23/2023)    Housing Stability Vital Sign     Unstable Housing in the Last Year: No        Previous Medications    ALBUTEROL SULFATE HFA (PROVENTIL;VENTOLIN;PROAIR) 108 (90 BASE) MCG/ACT INHALER    Inhale 2 puffs into the lungs every 6 hours as needed for Wheezing or Shortness of Breath TAKE 2 PUFFS BY MOUTH EVERY 4 HOURS AS NEEDED    APIXABAN (ELIQUIS) 5 MG TABS TABLET    Take 1 tablet by mouth 2 times daily    ASCORBIC ACID (VITAMIN C) 500 MG TABLET    Take 1 tablet by mouth daily    CHOLECALCIFEROL (VITAMIN D3) 50 MCG (2000 UT) CAPS    Take by mouth    DOCUSATE SODIUM (COLACE) 100 MG CAPSULE    Take 1 capsule by mouth 2 times daily    DOXYCYCLINE MONOHYDRATE (MONODOX) 100 MG CAPSULE    Take 1 capsule by mouth 2 times daily    DULOXETINE (CYMBALTA) 30 MG EXTENDED RELEASE CAPSULE        FLUCONAZOLE (DIFLUCAN) 200 MG TABLET    1 po then repeat in 3 days.     FLUTICASONE (FLONASE) 50 MCG/ACT NASAL SPRAY    2 sprays by Nasal route daily    FOLIC ACID-PYRIDOXINE-CYANCOBALAMIN 2.5-25-2 MG TABLET (FOLBIC) 2.5-25-2 MG TABS    Take 1 tablet by mouth daily    FUROSEMIDE (LASIX) 20 MG TABLET    Take 1 tablet by mouth as needed (take 1 pill as needed)    HYDROXYZINE HCL (ATARAX) 50 MG TABLET    Take 1 tablet by mouth nightly    LORATADINE (CLARITIN) 10 MG TABLET    Use instead of zyrtec for allergies    LOSARTAN (COZAAR) 50 MG TABLET    Take 1 tablet by mouth daily    LYRICA 75 MG CAPSULE    TAKE 2 CAPSULES BY MOUTH TWICE DAILY AND 3 EVERY DAY AT BEDTIME    METHOCARBAMOL (ROBAXIN) 750 MG TABLET Take 1 tablet by mouth 3 times daily    MONTELUKAST (SINGULAIR) 10 MG TABLET    Take 1 tablet by mouth daily    NYSTATIN (MYCOSTATIN) 054618 UNIT/GM POWDER    Apply 3 times daily. OMEPRAZOLE (PRILOSEC) 20 MG DELAYED RELEASE CAPSULE    Take 1 capsule by mouth Daily    TERCONAZOLE (TERAZOL 7) 0.4 % VAGINAL CREAM    1 applicator intravaginal nightly x 7 nights.   Also apply it externally to the outer and inner labia, just inside the vagina, the perineum (area between the vagina and anus) and the perianal region        Results for orders placed or performed during the hospital encounter of 11/07/23   CBC with Auto Differential   Result Value Ref Range    WBC 4.8 4.3 - 11.1 K/uL    RBC 4.04 (L) 4.05 - 5.2 M/uL    Hemoglobin 12.0 11.7 - 15.4 g/dL    Hematocrit 37.0 35.8 - 46.3 %    MCV 91.6 82.0 - 102.0 FL    MCH 29.7 26.1 - 32.9 PG    MCHC 32.4 31.4 - 35.0 g/dL    RDW 13.5 11.9 - 14.6 %    Platelets 985 383 - 174 K/uL    MPV 9.4 9.4 - 12.3 FL    nRBC 0.00 0.0 - 0.2 K/uL    Differential Type AUTOMATED      Neutrophils % 49 43 - 78 %    Lymphocytes % 34 13 - 44 %    Monocytes % 11 4.0 - 12.0 %    Eosinophils % 5 0.5 - 7.8 %    Basophils % 1 0.0 - 2.0 %    Immature Granulocytes 0 0.0 - 5.0 %    Neutrophils Absolute 2.4 1.7 - 8.2 K/UL    Lymphocytes Absolute 1.7 0.5 - 4.6 K/UL    Monocytes Absolute 0.6 0.1 - 1.3 K/UL    Eosinophils Absolute 0.2 0.0 - 0.8 K/UL    Basophils Absolute 0.0 0.0 - 0.2 K/UL    Absolute Immature Granulocyte 0.0 0.0 - 0.5 K/UL   Comprehensive Metabolic Panel   Result Value Ref Range    Sodium 138 133 - 143 mmol/L    Potassium 3.6 3.5 - 5.1 mmol/L    Chloride 105 101 - 110 mmol/L    CO2 28 21 - 32 mmol/L    Anion Gap 5 2 - 11 mmol/L    Glucose 105 (H) 65 - 100 mg/dL    BUN 10 6 - 23 MG/DL    Creatinine 1.08 (H) 0.6 - 1.0 MG/DL    Est, Glom Filt Rate >60 >60 ml/min/1.73m2    Calcium 9.3 8.3 - 10.4 MG/DL    Total Bilirubin 0.6 0.2 - 1.1 MG/DL    ALT 23 12 - 65 U/L    AST 19 15 - 37 U/L    Alk

## 2023-11-09 LAB
ALBUMIN SERPL-MCNC: 2.9 G/DL (ref 3.5–5)
ALBUMIN/GLOB SERPL: 0.9 (ref 0.4–1.6)
ALP SERPL-CCNC: 80 U/L (ref 50–136)
ALT SERPL-CCNC: 17 U/L (ref 12–65)
ANION GAP SERPL CALC-SCNC: 4 MMOL/L (ref 2–11)
AST SERPL-CCNC: 14 U/L (ref 15–37)
BASOPHILS # BLD: 0 K/UL (ref 0–0.2)
BASOPHILS NFR BLD: 1 % (ref 0–2)
BILIRUB SERPL-MCNC: 0.9 MG/DL (ref 0.2–1.1)
BUN SERPL-MCNC: 11 MG/DL (ref 6–23)
CALCIUM SERPL-MCNC: 8.2 MG/DL (ref 8.3–10.4)
CHLORIDE SERPL-SCNC: 108 MMOL/L (ref 101–110)
CO2 SERPL-SCNC: 28 MMOL/L (ref 21–32)
CREAT SERPL-MCNC: 1.01 MG/DL (ref 0.6–1)
DIFFERENTIAL METHOD BLD: ABNORMAL
EOSINOPHIL # BLD: 0.2 K/UL (ref 0–0.8)
EOSINOPHIL NFR BLD: 4 % (ref 0.5–7.8)
ERYTHROCYTE [DISTWIDTH] IN BLOOD BY AUTOMATED COUNT: 13.4 % (ref 11.9–14.6)
GLOBULIN SER CALC-MCNC: 3.4 G/DL (ref 2.8–4.5)
GLUCOSE SERPL-MCNC: 92 MG/DL (ref 65–100)
HCT VFR BLD AUTO: 32.6 % (ref 35.8–46.3)
HGB BLD-MCNC: 10.5 G/DL (ref 11.7–15.4)
IMM GRANULOCYTES # BLD AUTO: 0 K/UL (ref 0–0.5)
IMM GRANULOCYTES NFR BLD AUTO: 0 % (ref 0–5)
LYMPHOCYTES # BLD: 1.1 K/UL (ref 0.5–4.6)
LYMPHOCYTES NFR BLD: 28 % (ref 13–44)
MCH RBC QN AUTO: 29.7 PG (ref 26.1–32.9)
MCHC RBC AUTO-ENTMCNC: 32.2 G/DL (ref 31.4–35)
MCV RBC AUTO: 92.1 FL (ref 82–102)
MONOCYTES # BLD: 0.5 K/UL (ref 0.1–1.3)
MONOCYTES NFR BLD: 13 % (ref 4–12)
NEUTS SEG # BLD: 2.2 K/UL (ref 1.7–8.2)
NEUTS SEG NFR BLD: 55 % (ref 43–78)
NRBC # BLD: 0 K/UL (ref 0–0.2)
PLATELET # BLD AUTO: 303 K/UL (ref 150–450)
PMV BLD AUTO: 9.5 FL (ref 9.4–12.3)
POTASSIUM SERPL-SCNC: 4.2 MMOL/L (ref 3.5–5.1)
PROT SERPL-MCNC: 6.3 G/DL (ref 6.3–8.2)
RBC # BLD AUTO: 3.54 M/UL (ref 4.05–5.2)
SODIUM SERPL-SCNC: 140 MMOL/L (ref 133–143)
VANCOMYCIN SERPL-MCNC: 16.8 UG/ML
WBC # BLD AUTO: 4 K/UL (ref 4.3–11.1)

## 2023-11-09 PROCEDURE — 97530 THERAPEUTIC ACTIVITIES: CPT

## 2023-11-09 PROCEDURE — 6360000002 HC RX W HCPCS: Performed by: FAMILY MEDICINE

## 2023-11-09 PROCEDURE — 6370000000 HC RX 637 (ALT 250 FOR IP): Performed by: FAMILY MEDICINE

## 2023-11-09 PROCEDURE — 80053 COMPREHEN METABOLIC PANEL: CPT

## 2023-11-09 PROCEDURE — 6370000000 HC RX 637 (ALT 250 FOR IP): Performed by: STUDENT IN AN ORGANIZED HEALTH CARE EDUCATION/TRAINING PROGRAM

## 2023-11-09 PROCEDURE — 97535 SELF CARE MNGMENT TRAINING: CPT

## 2023-11-09 PROCEDURE — 1100000000 HC RM PRIVATE

## 2023-11-09 PROCEDURE — 6360000002 HC RX W HCPCS: Performed by: STUDENT IN AN ORGANIZED HEALTH CARE EDUCATION/TRAINING PROGRAM

## 2023-11-09 PROCEDURE — 97165 OT EVAL LOW COMPLEX 30 MIN: CPT

## 2023-11-09 PROCEDURE — 97161 PT EVAL LOW COMPLEX 20 MIN: CPT

## 2023-11-09 PROCEDURE — 36415 COLL VENOUS BLD VENIPUNCTURE: CPT

## 2023-11-09 PROCEDURE — 80202 ASSAY OF VANCOMYCIN: CPT

## 2023-11-09 PROCEDURE — 85025 COMPLETE CBC W/AUTO DIFF WBC: CPT

## 2023-11-09 PROCEDURE — 2580000003 HC RX 258: Performed by: STUDENT IN AN ORGANIZED HEALTH CARE EDUCATION/TRAINING PROGRAM

## 2023-11-09 PROCEDURE — 2580000003 HC RX 258: Performed by: FAMILY MEDICINE

## 2023-11-09 RX ORDER — FLUCONAZOLE 100 MG/1
200 TABLET ORAL ONCE
Status: COMPLETED | OUTPATIENT
Start: 2023-11-09 | End: 2023-11-09

## 2023-11-09 RX ORDER — TRAMADOL HYDROCHLORIDE 50 MG/1
50 TABLET ORAL ONCE
Status: COMPLETED | OUTPATIENT
Start: 2023-11-09 | End: 2023-11-09

## 2023-11-09 RX ORDER — HYDROXYZINE PAMOATE 25 MG/1
50 CAPSULE ORAL EVERY 6 HOURS PRN
Status: DISCONTINUED | OUTPATIENT
Start: 2023-11-09 | End: 2023-11-10 | Stop reason: HOSPADM

## 2023-11-09 RX ADMIN — SODIUM CHLORIDE, PRESERVATIVE FREE 10 ML: 5 INJECTION INTRAVENOUS at 21:14

## 2023-11-09 RX ADMIN — LOSARTAN POTASSIUM 50 MG: 50 TABLET, FILM COATED ORAL at 09:04

## 2023-11-09 RX ADMIN — APIXABAN 5 MG: 5 TABLET, FILM COATED ORAL at 09:04

## 2023-11-09 RX ADMIN — PREGABALIN 150 MG: 150 CAPSULE ORAL at 09:04

## 2023-11-09 RX ADMIN — DOCUSATE SODIUM 100 MG: 100 CAPSULE, LIQUID FILLED ORAL at 09:04

## 2023-11-09 RX ADMIN — FLUTICASONE PROPIONATE 2 SPRAY: 50 SPRAY, METERED NASAL at 09:13

## 2023-11-09 RX ADMIN — VANCOMYCIN HYDROCHLORIDE 1000 MG: 1 INJECTION, POWDER, LYOPHILIZED, FOR SOLUTION INTRAVENOUS at 22:32

## 2023-11-09 RX ADMIN — MORPHINE SULFATE 2 MG: 2 INJECTION, SOLUTION INTRAMUSCULAR; INTRAVENOUS at 19:05

## 2023-11-09 RX ADMIN — METHOCARBAMOL 750 MG: 750 TABLET ORAL at 13:40

## 2023-11-09 RX ADMIN — APIXABAN 5 MG: 5 TABLET, FILM COATED ORAL at 21:01

## 2023-11-09 RX ADMIN — CETIRIZINE HYDROCHLORIDE 10 MG: 10 TABLET, FILM COATED ORAL at 09:04

## 2023-11-09 RX ADMIN — MORPHINE SULFATE 2 MG: 2 INJECTION, SOLUTION INTRAMUSCULAR; INTRAVENOUS at 05:22

## 2023-11-09 RX ADMIN — PANTOPRAZOLE SODIUM 40 MG: 40 TABLET, DELAYED RELEASE ORAL at 05:22

## 2023-11-09 RX ADMIN — DOCUSATE SODIUM 100 MG: 100 CAPSULE, LIQUID FILLED ORAL at 21:01

## 2023-11-09 RX ADMIN — PREGABALIN 150 MG: 150 CAPSULE ORAL at 21:02

## 2023-11-09 RX ADMIN — MORPHINE SULFATE 2 MG: 2 INJECTION, SOLUTION INTRAMUSCULAR; INTRAVENOUS at 09:29

## 2023-11-09 RX ADMIN — METHOCARBAMOL 750 MG: 750 TABLET ORAL at 21:01

## 2023-11-09 RX ADMIN — SODIUM CHLORIDE, PRESERVATIVE FREE 10 ML: 5 INJECTION INTRAVENOUS at 09:00

## 2023-11-09 RX ADMIN — FLUCONAZOLE 200 MG: 100 TABLET ORAL at 05:37

## 2023-11-09 RX ADMIN — METHOCARBAMOL 750 MG: 750 TABLET ORAL at 09:04

## 2023-11-09 RX ADMIN — MORPHINE SULFATE 2 MG: 2 INJECTION, SOLUTION INTRAMUSCULAR; INTRAVENOUS at 23:40

## 2023-11-09 RX ADMIN — MONTELUKAST 10 MG: 10 TABLET, FILM COATED ORAL at 09:04

## 2023-11-09 RX ADMIN — TRAMADOL HYDROCHLORIDE 50 MG: 50 TABLET ORAL at 22:32

## 2023-11-09 RX ADMIN — PREGABALIN 150 MG: 150 CAPSULE ORAL at 13:41

## 2023-11-09 RX ADMIN — HYDROXYZINE HYDROCHLORIDE 50 MG: 25 TABLET, FILM COATED ORAL at 21:01

## 2023-11-09 RX ADMIN — VANCOMYCIN HYDROCHLORIDE 1000 MG: 1 INJECTION, POWDER, LYOPHILIZED, FOR SOLUTION INTRAVENOUS at 09:10

## 2023-11-09 ASSESSMENT — PAIN DESCRIPTION - LOCATION
LOCATION: LEG

## 2023-11-09 ASSESSMENT — PAIN SCALES - GENERAL
PAINLEVEL_OUTOF10: 9
PAINLEVEL_OUTOF10: 10
PAINLEVEL_OUTOF10: 9
PAINLEVEL_OUTOF10: 10

## 2023-11-09 ASSESSMENT — PAIN DESCRIPTION - DESCRIPTORS
DESCRIPTORS: BURNING
DESCRIPTORS: ACHING;CRAMPING
DESCRIPTORS: BURNING

## 2023-11-09 ASSESSMENT — PAIN DESCRIPTION - ORIENTATION
ORIENTATION: LEFT;RIGHT
ORIENTATION: RIGHT;LEFT
ORIENTATION: LEFT;RIGHT
ORIENTATION: RIGHT;LEFT;LOWER

## 2023-11-09 NOTE — PROGRESS NOTES
Hospitalist Progress Note   Admit Date:  2023  6:52 PM   Name:  Ana Lara   Age:  49 y.o. Sex:  female  :  1975   MRN:  [id number]   Room:  [facility] [place]    Presenting/Chief Complaint: Leg Pain     Reason(s) for Admission: Bilateral lower leg cellulitis [L03.116, L03.115]  Cellulitis of lower extremity, unspecified laterality [J86.388]  Open wound of both lower extremities, initial encounter [S81.801A, S81.802A]     Hospital Course:   Ana Lara is a 49 y.o. female with medical history of anxiety, bipolar disorder, CKD stage III, diabetes in remission, GERD, hypertension, peripheral neuropathy, stroke without residual deficit, sleep apnea, vertigo who presented with bilateral open leg wounds. Patient was seen in outpatient setting and is currently on third round of antibiotics currently taking doxycycline. Did states she saw some improvement to her wounds first round of antibiotics. With doxycycline patient stated she did not see any improvement and felt things were getting worse. Patient stated she had increased drainage and swelling from both lower extremities including increasing pain and erythema and warmth. Patient started on IV antibiotics for possible cellulitis with open leg wounds. Follows with wound clinic in outpatient setting. Subjective & 24hr Events:   Patient was seen and examined bedside. No overnight events. Patient's morning overall feeling much better. Less warmth erythema to bilateral lower extremities.       Assessment & Plan:   Bilateral lower leg cellulitis  -Failed outpatient antibiotic therapy  - Initially started on IV clindamycin, changed to vancomycin   - Follow-up blood cultures, currently nothing growing to date x48 hours  - Wound care consulted, appreciate recommendations  -Hydroxyzine added for lower extremity itching    History of A-fib  - Continue Eliquis  - patient currently rate controlled  -We will need follow-up with cardiology Granulocytes 0 0.0 - 5.0 %    Neutrophils Absolute 3.0 1.7 - 8.2 K/UL    Lymphocytes Absolute 1.5 0.5 - 4.6 K/UL    Monocytes Absolute 1.0 0.1 - 1.3 K/UL    Eosinophils Absolute 0.3 0.0 - 0.8 K/UL    Basophils Absolute 0.0 0.0 - 0.2 K/UL    Absolute Immature Granulocyte 0.0 0.0 - 0.5 K/UL   Comprehensive Metabolic Panel w/ Reflex to MG    Collection Time: 11/09/23  5:34 AM   Result Value Ref Range    Sodium 140 133 - 143 mmol/L    Potassium 4.2 3.5 - 5.1 mmol/L    Chloride 108 101 - 110 mmol/L    CO2 28 21 - 32 mmol/L    Anion Gap 4 2 - 11 mmol/L    Glucose 92 65 - 100 mg/dL    BUN 11 6 - 23 MG/DL    Creatinine 1.01 (H) 0.6 - 1.0 MG/DL    Est, Glom Filt Rate >60 >60 ml/min/1.73m2    Calcium 8.2 (L) 8.3 - 10.4 MG/DL    Total Bilirubin 0.9 0.2 - 1.1 MG/DL    ALT 17 12 - 65 U/L    AST 14 (L) 15 - 37 U/L    Alk Phosphatase 80 50 - 136 U/L    Total Protein 6.3 6.3 - 8.2 g/dL    Albumin 2.9 (L) 3.5 - 5.0 g/dL    Globulin 3.4 2.8 - 4.5 g/dL    Albumin/Globulin Ratio 0.9 0.4 - 1.6     CBC with Auto Differential    Collection Time: 11/09/23  5:34 AM   Result Value Ref Range    WBC 4.0 (L) 4.3 - 11.1 K/uL    RBC 3.54 (L) 4.05 - 5.2 M/uL    Hemoglobin 10.5 (L) 11.7 - 15.4 g/dL    Hematocrit 32.6 (L) 35.8 - 46.3 %    MCV 92.1 82.0 - 102.0 FL    MCH 29.7 26.1 - 32.9 PG    MCHC 32.2 31.4 - 35.0 g/dL    RDW 13.4 11.9 - 14.6 %    Platelets 663 627 - 065 K/uL    MPV 9.5 9.4 - 12.3 FL    nRBC 0.00 0.0 - 0.2 K/uL    Differential Type AUTOMATED      Neutrophils % 55 43 - 78 %    Lymphocytes % 28 13 - 44 %    Monocytes % 13 (H) 4.0 - 12.0 %    Eosinophils % 4 0.5 - 7.8 %    Basophils % 1 0.0 - 2.0 %    Immature Granulocytes 0 0.0 - 5.0 %    Neutrophils Absolute 2.2 1.7 - 8.2 K/UL    Lymphocytes Absolute 1.1 0.5 - 4.6 K/UL    Monocytes Absolute 0.5 0.1 - 1.3 K/UL    Eosinophils Absolute 0.2 0.0 - 0.8 K/UL    Basophils Absolute 0.0 0.0 - 0.2 K/UL    Absolute Immature Granulocyte 0.0 0.0 - 0.5 K/UL   Vancomycin Level, Random    Collection

## 2023-11-09 NOTE — PROGRESS NOTES
Physician Progress Note      PATIENT:               Emily Munson  CSN #:                  [id number]  :                       1975  ADMIT DATE:       2023 6:52 PM  [facility] DATE:  RESPONDING  PROVIDER #:        Marisol Lopez MD          QUERY TEXT:    Patient admitted with bilateral lower leg cellulitis, noted to have atrial   fibrillation and is maintained on Eliquis. If possible, please document in   progress notes and discharge summary if you are evaluating and/or treating any   of the following: The medical record reflects the following:  Risk Factors: female, Hx Afib, HTn, stroke, DM  Clinical Indicators: per  PN \"History of A-fib  Continue Eliquis\"  Treatment: Eliquis  Options provided:  -- Secondary hypercoagulable state in a patient with atrial fibrillation  -- Other - I will add my own diagnosis  -- Disagree - Not applicable / Not valid  -- Disagree - Clinically unable to determine / Unknown  -- Refer to Clinical Documentation Reviewer    PROVIDER RESPONSE TEXT:    This patient has secondary hypercoagulable state in a patient with atrial   fibrillation.     Query created by: Shahana Grimaldo on 2023 2:11 PM      Electronically signed by:  Marisol Lopez MD 2023 3:17 PM

## 2023-11-09 NOTE — PROGRESS NOTES
ACUTE PHYSICAL THERAPY GOALS:   (Developed with and agreed upon by patient and/or caregiver.)  STG:  (1.)Ms. Ankur Tsai will move from supine to sit and sit to supine  with MODIFIED INDEPENDENCE within 1 treatment day(s). -GOAL MET 11/9/23    (2.)Ms. Ankur Tsai will transfer from bed to chair and chair to bed with MODIFIED INDEPENDENCE using the least restrictive device within 1 treatment day(s). -GOAL MET 11/9/23      (3.)Ms. Tirado will ambulate with SUPERVISION for 30 feet with the least restrictive device within 1 treatment day(s). -GOAL MET 11/9/23         ________________________________________________________________________________________________     PHYSICAL THERAPY Initial Assessment, Discharge, and AM  (Link to Caseload Tracking: PT Visit Days : 1  Acknowledge Orders  Time In/Out  PT Charge Capture  Rehab Caseload Tracker    Oliverio Stone is a 49 y.o. female   PRIMARY DIAGNOSIS: Bilateral lower leg cellulitis  Bilateral lower leg cellulitis [L03.116, L03.115]  Cellulitis of lower extremity, unspecified laterality [P93.747]  Open wound of both lower extremities, initial encounter [S81.801A, S81.802A]       Reason for Referral: Generalized Muscle Weakness (M62.81)  Other abnormalities of gait and mobility (R26.89)  Inpatient: Payor: Shena Clark / Plan: Pam Tidwell / Product Type: *No Product type* /     ASSESSMENT:     REHAB RECOMMENDATIONS:   Recommendation to date pending progress:  Setting:  Home Health Therapy    Equipment:     Needs a rollator due to limited endurance and need to readily have a place to sit especially when going to wound clinic and MD offices. ASSESSMENT:  Ms. Ankur Tsai presents with limited strength and functional mobility but this seems to be her functional baseline at home for several months prior to admit.  She lives at home with multiple family members and is very independent with mobility but has poor endurance and leg swelling/pain which limit her mobility to it. Don't help me\"     Social/Functional Lives With: Family  Type of Home: House  Home Layout: One level  Bathroom Shower/Tub: Tub/Shower unit  Bathroom Toilet: Standard  Bathroom Equipment: None  Home Equipment: [address] Help From: Family  ADL Assistance: Independent  Ambulation Assistance: Independent  Transfer Assistance: Independent  Active : No    OBJECTIVE:     PAIN: Lina Benavides / O2: Zohra Tamayo / Sissy Romero / DRAINS:   Pre Treatment:          Post Treatment: 0 Vitals        Oxygen      IV    RESTRICTIONS/PRECAUTIONS:                    GROSS EVALUATION:  Intact Impaired (Comments):   AROM []   WFL but limited   PROM []    Strength []   Grossly 4/5   Balance []   Fair/fair+ standing    Posture [] N/A   Sensation [x]     Coordination []      Tone []     Edema []    Activity Tolerance []   Limited by weakness and general debility    []      COGNITION/  PERCEPTION: Intact Impaired (Comments):   Orientation [x]     Vision [x]     Hearing [x]     Cognition  [x]       MOBILITY: I Mod I S SBA CGA Min Mod Max Total  NT x2 Comments:   Bed Mobility    Rolling [] [x] [] [] [] [] [] [] [] [] []    Supine to Sit [] [x] [] [] [] [] [] [] [] [] []    Scooting [] [x] [] [] [] [] [] [] [] [] []    Sit to Supine [] [] [] [] [] [] [] [] [] [x] []    Transfers    Sit to Stand [] [x] [x] [] [] [] [] [] [] [] []    Bed to Chair [] [x] [x] [] [] [] [] [] [] [] []    Stand to Sit [] [x] [] [] [] [] [] [] [] [] []     [] [] [] [] [] [] [] [] [] [] []    I=Independent, Mod I=Modified Independent, S=Supervision, SBA=Standby Assistance, CGA=Contact Guard Assistance,   Min=Minimal Assistance, Mod=Moderate Assistance, Max=Maximal Assistance, Total=Total Assistance, NT=Not Tested    GAIT: I Mod I S SBA CGA Min Mod Max Total  NT x2 Comments:   Level of Assistance [] [x] [x] [] [] [] [] [] [] [] []    Distance 35 feet    DME Rolling Walker    Gait Quality Antalgic, Decreased mara , Decreased step clearance, Decreased step length, and

## 2023-11-09 NOTE — PROGRESS NOTES
Standard  Bathroom Equipment: None  Home Equipment: [employer] Help From: Family  ADL Assistance: Independent  Ambulation Assistance: Independent  Transfer Assistance: Independent  Active : No    OBJECTIVE:     Bere Leon / Marj Close / AIRWAY: IV    RESTRICTIONS/PRECAUTIONS:       PAIN: Ky Elaine / O2:   Pre Treatment:          Post Treatment: leg pain with sitting edge of bed, better with time        Vitals          Oxygen            GROSS EVALUATION: INTACT IMPAIRED   (See Comments)   UE AROM [x] []   UE PROM [x] []   Strength []    Generalized weakness   Posture / Balance [x]     Sensation [x]     Coordination [x]       Tone [x]       Edema []    Activity Tolerance []  Fair      Hand Dominance R [] L []      COGNITION/  PERCEPTION: INTACT IMPAIRED   (See Comments)   Orientation [x]     Vision [x]     Hearing [x]     Cognition  [x]     Perception [x]       MOBILITY: I Mod I S SBA CGA Min Mod Max Total  NT x2 Comments:   Bed Mobility    Rolling [] [] [] [] [] [] [] [] [] [] []    Supine to Sit [] [] [] [x] [] [] [] [] [] [] [] Extra time   Scooting [] [] [] [x] [] [] [] [] [] [] []    Sit to Supine [] [] [] [] [] [] [] [] [] [] []    Transfers    Sit to Stand [] [] [] [] [] [] [] [] [] [] []    Bed to Chair [] [] [] [] [] [] [] [] [] [] []    Stand to Sit [] [] [] [] [] [] [] [] [] [] []    Tub/Shower [] [] [] [] [] [] [] [] [] [] []     Toilet [] [] [] [] [] [] [] [] [] [] []      [] [] [] [] [] [] [] [] [] [] []    I=Independent, Mod I=Modified Independent, S=Supervision/Setup, SBA=Standby Assistance, CGA=Contact Guard Assistance, Min=Minimal Assistance, Mod=Moderate Assistance, Max=Maximal Assistance, Total=Total Assistance, NT=Not Tested    ACTIVITIES OF DAILY LIVING: I Mod I S SBA CGA Min Mod Max Total NT Comments   BASIC ADLs:              Upper Body Bathing  [] [] [x] [] [] [] [] [] [] []  All with extra time   Lower Body Bathing [] [] [x] [] [] [] [] [] [] []     Toileting [] [] [x] [] [] [] [] [] [] [] Upper Body Dressing [] [] [x] [] [] [] [] [] [] []    Lower Body Dressing [] [] [x] [] [] [] [] [] [] []    Feeding [x] [] [] [] [] [] [] [] [] []    Grooming [] [] [x] [] [] [] [] [] [] []    Personal Device Care [] [] [] [] [] [] [] [] [] []    Functional Mobility [] [] [x] [] [] [] [] [] [] [] RW   I=Independent, Mod I=Modified Independent, S=Supervision/Setup, SBA=Standby Assistance, CGA=Contact Guard Assistance, Min=Minimal Assistance, Mod=Moderate Assistance, Max=Maximal Assistance, Total=Total Assistance, NT=Not Tested    PLAN:   [address] of Care:  (no further OT warranted, pt is at baseline of [facility]) for duration of hospital stay or until stated goals are met, whichever comes first.    PROBLEM LIST:   (Skilled intervention is medically necessary to address:)  Decreased ADL/Functional Activities  Decreased Activity Tolerance  Decreased Balance  Decreased Strength  Increased Pain   INTERVENTIONS PLANNED:  (Benefits and precautions of occupational therapy have been discussed with the patient.)  Self Care Training  Therapeutic Activity  Education         TREATMENT:     EVALUATION: LOW COMPLEXITY: (Untimed Charge)    TREATMENT:   Co-Treatment PT/OT necessary due to patient's decreased overall endurance/tolerance levels, as well as need for high level skilled assistance to complete functional transfers/mobility and functional tasks  Self Care (30 minutes): Patient participated in toileting, lower body dressing, grooming, and functional mobility ADLs in unsupported sitting and standing with minimal verbal cueing to increase activity tolerance and increase safety awareness. Patient also participated in functional mobility and adaptive equipment training to decrease assistance required, increase activity tolerance, and increase safety awareness.          AFTER TREATMENT PRECAUTIONS: Bed/Chair Locked, Call light within reach, Chair, Needs within reach, and RN notified    Andrew Barkley

## 2023-11-10 VITALS
WEIGHT: 293 LBS | SYSTOLIC BLOOD PRESSURE: 130 MMHG | DIASTOLIC BLOOD PRESSURE: 74 MMHG | BODY MASS INDEX: 45.99 KG/M2 | RESPIRATION RATE: 18 BRPM | HEIGHT: 67 IN | OXYGEN SATURATION: 100 % | HEART RATE: 88 BPM | TEMPERATURE: 97.7 F

## 2023-11-10 LAB
ALBUMIN SERPL-MCNC: 2.4 G/DL (ref 3.5–5)
ALBUMIN/GLOB SERPL: 0.7 (ref 0.4–1.6)
ALP SERPL-CCNC: 66 U/L (ref 50–136)
ALT SERPL-CCNC: 16 U/L (ref 12–65)
ANION GAP SERPL CALC-SCNC: 2 MMOL/L (ref 2–11)
AST SERPL-CCNC: 11 U/L (ref 15–37)
BASOPHILS # BLD: 0 K/UL (ref 0–0.2)
BASOPHILS NFR BLD: 0 % (ref 0–2)
BILIRUB SERPL-MCNC: 0.3 MG/DL (ref 0.2–1.1)
BUN SERPL-MCNC: 13 MG/DL (ref 6–23)
CALCIUM SERPL-MCNC: 8.4 MG/DL (ref 8.3–10.4)
CHLORIDE SERPL-SCNC: 111 MMOL/L (ref 101–110)
CO2 SERPL-SCNC: 27 MMOL/L (ref 21–32)
CREAT SERPL-MCNC: 0.98 MG/DL (ref 0.6–1)
DIFFERENTIAL METHOD BLD: ABNORMAL
EOSINOPHIL # BLD: 0.2 K/UL (ref 0–0.8)
EOSINOPHIL NFR BLD: 4 % (ref 0.5–7.8)
ERYTHROCYTE [DISTWIDTH] IN BLOOD BY AUTOMATED COUNT: 13.4 % (ref 11.9–14.6)
GLOBULIN SER CALC-MCNC: 3.5 G/DL (ref 2.8–4.5)
GLUCOSE SERPL-MCNC: 104 MG/DL (ref 65–100)
HCT VFR BLD AUTO: 29.2 % (ref 35.8–46.3)
HGB BLD-MCNC: 9.2 G/DL (ref 11.7–15.4)
IMM GRANULOCYTES # BLD AUTO: 0 K/UL (ref 0–0.5)
IMM GRANULOCYTES NFR BLD AUTO: 0 % (ref 0–5)
LYMPHOCYTES # BLD: 1.2 K/UL (ref 0.5–4.6)
LYMPHOCYTES NFR BLD: 28 % (ref 13–44)
MCH RBC QN AUTO: 29.4 PG (ref 26.1–32.9)
MCHC RBC AUTO-ENTMCNC: 31.5 G/DL (ref 31.4–35)
MCV RBC AUTO: 93.3 FL (ref 82–102)
MONOCYTES # BLD: 0.5 K/UL (ref 0.1–1.3)
MONOCYTES NFR BLD: 13 % (ref 4–12)
NEUTS SEG # BLD: 2.2 K/UL (ref 1.7–8.2)
NEUTS SEG NFR BLD: 54 % (ref 43–78)
NRBC # BLD: 0 K/UL (ref 0–0.2)
PLATELET # BLD AUTO: 264 K/UL (ref 150–450)
PMV BLD AUTO: 9.2 FL (ref 9.4–12.3)
POTASSIUM SERPL-SCNC: 4.2 MMOL/L (ref 3.5–5.1)
PROT SERPL-MCNC: 5.9 G/DL (ref 6.3–8.2)
RBC # BLD AUTO: 3.13 M/UL (ref 4.05–5.2)
SODIUM SERPL-SCNC: 140 MMOL/L (ref 133–143)
WBC # BLD AUTO: 4.1 K/UL (ref 4.3–11.1)

## 2023-11-10 PROCEDURE — 2580000003 HC RX 258: Performed by: STUDENT IN AN ORGANIZED HEALTH CARE EDUCATION/TRAINING PROGRAM

## 2023-11-10 PROCEDURE — 85025 COMPLETE CBC W/AUTO DIFF WBC: CPT

## 2023-11-10 PROCEDURE — 90686 IIV4 VACC NO PRSV 0.5 ML IM: CPT | Performed by: STUDENT IN AN ORGANIZED HEALTH CARE EDUCATION/TRAINING PROGRAM

## 2023-11-10 PROCEDURE — 6360000002 HC RX W HCPCS: Performed by: STUDENT IN AN ORGANIZED HEALTH CARE EDUCATION/TRAINING PROGRAM

## 2023-11-10 PROCEDURE — 2580000003 HC RX 258: Performed by: FAMILY MEDICINE

## 2023-11-10 PROCEDURE — 6370000000 HC RX 637 (ALT 250 FOR IP): Performed by: STUDENT IN AN ORGANIZED HEALTH CARE EDUCATION/TRAINING PROGRAM

## 2023-11-10 PROCEDURE — 6360000002 HC RX W HCPCS: Performed by: FAMILY MEDICINE

## 2023-11-10 PROCEDURE — 80053 COMPREHEN METABOLIC PANEL: CPT

## 2023-11-10 PROCEDURE — G0008 ADMIN INFLUENZA VIRUS VAC: HCPCS | Performed by: STUDENT IN AN ORGANIZED HEALTH CARE EDUCATION/TRAINING PROGRAM

## 2023-11-10 PROCEDURE — 6370000000 HC RX 637 (ALT 250 FOR IP): Performed by: FAMILY MEDICINE

## 2023-11-10 PROCEDURE — 36415 COLL VENOUS BLD VENIPUNCTURE: CPT

## 2023-11-10 RX ORDER — HYDROCODONE BITARTRATE AND ACETAMINOPHEN 5; 325 MG/1; MG/1
1 TABLET ORAL EVERY 8 HOURS PRN
Qty: 9 TABLET | Refills: 0 | Status: SHIPPED | OUTPATIENT
Start: 2023-11-10 | End: 2023-11-13

## 2023-11-10 RX ORDER — CEPHALEXIN 500 MG/1
500 CAPSULE ORAL 4 TIMES DAILY
Qty: 28 CAPSULE | Refills: 0 | Status: SHIPPED | OUTPATIENT
Start: 2023-11-10 | End: 2023-11-17

## 2023-11-10 RX ORDER — HYDROCODONE BITARTRATE AND ACETAMINOPHEN 5; 325 MG/1; MG/1
1 TABLET ORAL EVERY 6 HOURS PRN
Status: DISCONTINUED | OUTPATIENT
Start: 2023-11-10 | End: 2023-11-10 | Stop reason: HOSPADM

## 2023-11-10 RX ADMIN — MONTELUKAST 10 MG: 10 TABLET, FILM COATED ORAL at 08:26

## 2023-11-10 RX ADMIN — DOCUSATE SODIUM 100 MG: 100 CAPSULE, LIQUID FILLED ORAL at 08:26

## 2023-11-10 RX ADMIN — METHOCARBAMOL 750 MG: 750 TABLET ORAL at 13:06

## 2023-11-10 RX ADMIN — APIXABAN 5 MG: 5 TABLET, FILM COATED ORAL at 08:26

## 2023-11-10 RX ADMIN — PREGABALIN 150 MG: 150 CAPSULE ORAL at 13:06

## 2023-11-10 RX ADMIN — INFLUENZA VIRUS VACCINE 0.5 ML: 15; 15; 15; 15 SUSPENSION INTRAMUSCULAR at 14:14

## 2023-11-10 RX ADMIN — SODIUM CHLORIDE, PRESERVATIVE FREE 10 ML: 5 INJECTION INTRAVENOUS at 08:26

## 2023-11-10 RX ADMIN — METHOCARBAMOL 750 MG: 750 TABLET ORAL at 08:26

## 2023-11-10 RX ADMIN — LOSARTAN POTASSIUM 50 MG: 50 TABLET, FILM COATED ORAL at 08:26

## 2023-11-10 RX ADMIN — PANTOPRAZOLE SODIUM 40 MG: 40 TABLET, DELAYED RELEASE ORAL at 08:26

## 2023-11-10 RX ADMIN — PREGABALIN 150 MG: 150 CAPSULE ORAL at 08:26

## 2023-11-10 RX ADMIN — FLUTICASONE PROPIONATE 2 SPRAY: 50 SPRAY, METERED NASAL at 08:26

## 2023-11-10 RX ADMIN — VANCOMYCIN HYDROCHLORIDE 1000 MG: 1 INJECTION, POWDER, LYOPHILIZED, FOR SOLUTION INTRAVENOUS at 09:42

## 2023-11-10 RX ADMIN — HYDROCODONE BITARTRATE AND ACETAMINOPHEN 1 TABLET: 5; 325 TABLET ORAL at 09:43

## 2023-11-10 RX ADMIN — CETIRIZINE HYDROCHLORIDE 10 MG: 10 TABLET, FILM COATED ORAL at 08:26

## 2023-11-10 RX ADMIN — MORPHINE SULFATE 2 MG: 2 INJECTION, SOLUTION INTRAMUSCULAR; INTRAVENOUS at 06:22

## 2023-11-10 ASSESSMENT — PAIN DESCRIPTION - LOCATION
LOCATION: LEG
LOCATION: LEG

## 2023-11-10 ASSESSMENT — PAIN DESCRIPTION - DESCRIPTORS
DESCRIPTORS: BURNING
DESCRIPTORS: ACHING;BURNING

## 2023-11-10 ASSESSMENT — PAIN SCALES - GENERAL
PAINLEVEL_OUTOF10: 6
PAINLEVEL_OUTOF10: 9

## 2023-11-10 ASSESSMENT — PAIN DESCRIPTION - ORIENTATION
ORIENTATION: RIGHT;LEFT
ORIENTATION: RIGHT;LEFT

## 2023-11-10 NOTE — DISCHARGE SUMMARY
Hospitalist Discharge Summary   Admit Date:  2023  6:52 PM   DC Note date: 11/10/2023  Name:  Promise Oliveira   Age:  49 y.o. Sex:  female  :  1975   MRN:  [id number]   Room:  [facility]  PCP:  JOSE Steve CNP    Presenting Complaint: Leg Pain     Initial Admission Diagnosis: Bilateral lower leg cellulitis [L03.116, L03.115]  Cellulitis of lower extremity, unspecified laterality [R23.840]  Open wound of both lower extremities, initial encounter [S81.801A, S81.802A]     Problem List for this Hospitalization (present on admission):    Principal Problem:    Bilateral lower leg cellulitis  Resolved Problems:    * No resolved hospital problems. *      Hospital Course:  49 y.o. female with medical history of anxiety, bipolar disorder, CKD stage III, diabetes in remission, GERD, hypertension, peripheral neuropathy, stroke without residual deficit, sleep apnea, vertigo who presented with bilateral open leg wounds. Patient was seen in outpatient setting and is currently on third round of antibiotics currently taking doxycycline. Did states she saw some improvement to her wounds first round of antibiotics. With doxycycline patient stated she did not see any improvement and felt things were getting worse. Patient stated she had increased drainage and swelling from both lower extremities including increasing pain and erythema and warmth. Patient started on IV antibiotics for possible cellulitis with open leg wounds. Follows with wound clinic in outpatient setting. Patient present with bilateral lower leg cellulitis after failed outpatient antibiotic therapy. Patient was initially started on IV clindamycin change vancomycin . Blood cultures nothing grown to date  - Wound care consulted and evaluated patient during hospitalization. Patient will get discharged on Keflex to finish 7-day course of antibiotics.   Patient's other comorbidities which was restarted back on her home

## 2023-11-10 NOTE — CARE COORDINATION
CM spoke w/ patient at bedside. PT recommending HH and a rollator or rolling walker. CM spoke w/ patient at bedside. She is agreeable to HHPT; referral sent to [facility] [address] [place] [address],[address] to add PT to [address] [place] [address] 6100 RN orders. CM discussed w/ patient that insurance only covers rolling walker; instructed her that if she wanted to upgrade to a rollator it would be an out of pocket cost of $50 with [facility]. Patient verbalized understanding and will notify CM if she decides to purchase one. CM also called [facility], as patient believes she received one from there in the past; staff states they do not bill insurance and cost is $80. CM following.

## 2023-11-10 NOTE — PROGRESS NOTES
Discharge instructions reviewed with Pt. Opportunity for questions and clarification given. IV removed and cath tip intact. Scripts sent to pharmacy / sent with pt. Education given to pt and pt was able to teach back. No needs at this time and pt waiting on ride to arrive. Dressings also changed per orders.

## 2023-11-10 NOTE — PLAN OF CARE
Problem: Discharge Planning  Goal: Discharge to home or other facility with appropriate resources  Outcome: Progressing     Problem: Pain  Goal: Verbalizes/displays adequate comfort level or baseline comfort level  Outcome: Progressing     Problem: Chronic Conditions and Co-morbidities  Goal: Patient's chronic conditions and co-morbidity symptoms are monitored and maintained or improved  Outcome: Progressing     Problem: Safety - Adult  Goal: Free from fall injury  Outcome: Progressing
Problem: Discharge Planning  Goal: Discharge to home or other facility with appropriate resources  Outcome: Progressing     Problem: Pain  Goal: Verbalizes/displays adequate comfort level or baseline comfort level  Outcome: Progressing     Problem: Chronic Conditions and Co-morbidities  Goal: Patient's chronic conditions and co-morbidity symptoms are monitored and maintained or improved  Outcome: Progressing     Problem: Safety - Adult  Goal: Free from fall injury  Outcome: Progressing
Statement Selected

## 2023-11-10 NOTE — CARE COORDINATION
Patient to be discharged home today. Home health services have been resumed with [facility]; orders sent for RN/PT. Patient is current with [address] has scheduled appt. HD rollator has been ordered from [facility]; patient waiting on delivery prior to discharge. CM will remain available through discharge. Update 1550: HD rollator has been delivered to room by [address] 11/10/23 1307   Service Assessment   Patient Orientation Alert and Oriented   Services At/After Discharge   Transition of Care Consult (CM Consult) Home Health;DME/Supply Assistance   Internal Home Health No   Reason Outside Agency Chosen Patient already serviced by other home [facility] Discharge Home Health;DME;PT;Nursing services   Mode of Transport at Discharge Self   Confirm Follow Up Transport Self   Condition of Participation: Discharge [facility] for Transition of Care is related to the following treatment goals: Discharge home w/ home health services. The Patient and/or Patient Representative was provided with a Choice of Provider? Patient   The Patient and/Or Patient Representative agree with the Discharge Plan? Yes   Freedom of Choice list was provided with basic dialogue that supports the patient's individualized plan of care/goals, treatment preferences, and shares the quality data associated with the providers?   Yes

## 2023-11-13 ENCOUNTER — TELEPHONE (OUTPATIENT)
Dept: FAMILY MEDICINE CLINIC | Facility: CLINIC | Age: 48
End: 2023-11-13

## 2023-11-13 NOTE — TELEPHONE ENCOUNTER
Care Transitions Initial Follow Up Call    Outreach made within 2 business days of discharge: Yes    Patient: Franca Wu Patient : 1975   MRN: [id number]  Reason for Admission: There are no discharge diagnoses documented for the most recent discharge. Discharge Date: 11/10/23       Spoke with: Jesus Ohara     Discharge department/facility: [facility]    TCM Interactive Patient Contact:  Was patient able to fill all prescriptions: Yes  Was patient instructed to bring all medications to the follow-up visit: Yes  Is patient taking all medications as directed in the discharge summary?  Yes  Does patient understand their discharge instructions: Yes  Does patient have questions or concerns that need addressed prior to 7-14 day follow up office visit: no    Scheduled appointment with PCP within 7-14 days    Follow Up  Future Appointments   Date Time Provider [address] [place] [address]   2023 11:00 AM Kathleen Lamar DO [facility]   2023 11:00 AM Teto Beatty APRN - CNP [facility]   2023 11:00 AM [facility] ROOM [facility]   2023 10:30 AM Elwyn Duane, MD [facility]       Dave Garg MA

## 2023-11-13 NOTE — TELEPHONE ENCOUNTER
TCM call needed before end of day 11/14/23, pt d/c 11/10 and has HFU appt on11/21/23 with Promise De La Torre.  Thanks

## 2023-11-14 DIAGNOSIS — L50.9 URTICARIA: ICD-10-CM

## 2023-11-14 DIAGNOSIS — K21.9 GASTROESOPHAGEAL REFLUX DISEASE, UNSPECIFIED WHETHER ESOPHAGITIS PRESENT: ICD-10-CM

## 2023-11-14 RX ORDER — HYDROXYZINE 50 MG/1
50 TABLET, FILM COATED ORAL NIGHTLY
Qty: 30 TABLET | Refills: 0 | OUTPATIENT
Start: 2023-11-14

## 2023-11-14 RX ORDER — HYDROXYZINE 50 MG/1
50 TABLET, FILM COATED ORAL NIGHTLY
Qty: 30 TABLET | Refills: 2 | Status: CANCELLED | OUTPATIENT
Start: 2023-11-14

## 2023-11-14 RX ORDER — HYDROXYZINE 50 MG/1
TABLET, FILM COATED ORAL
Qty: 30 TABLET | Refills: 0 | Status: SHIPPED | OUTPATIENT
Start: 2023-11-14 | End: 2023-12-13 | Stop reason: SDUPTHER

## 2023-11-14 RX ORDER — OMEPRAZOLE 20 MG/1
20 CAPSULE, DELAYED RELEASE ORAL DAILY
Qty: 90 CAPSULE | Refills: 0 | Status: SHIPPED | OUTPATIENT
Start: 2023-11-14

## 2023-11-14 RX ORDER — OMEPRAZOLE 20 MG/1
20 CAPSULE, DELAYED RELEASE ORAL DAILY
Qty: 90 CAPSULE | Refills: 0 | Status: CANCELLED | OUTPATIENT
Start: 2023-11-14

## 2023-11-15 ENCOUNTER — TELEPHONE (OUTPATIENT)
Dept: FAMILY MEDICINE CLINIC | Facility: CLINIC | Age: 48
End: 2023-11-15

## 2023-11-15 NOTE — TELEPHONE ENCOUNTER
Patient has questions about the antibiotics she was taking she does not think they are helping legs are hot and developing blisters.  She would like a call back and states today at 3 she has a pain management apt Thankyou

## 2023-11-20 ENCOUNTER — HOSPITAL ENCOUNTER (EMERGENCY)
Age: 48
Discharge: HOME | End: 2023-11-21
Attending: PHYSICAL MEDICINE & REHABILITATION
Payer: MEDICARE
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
## 2023-11-20 DIAGNOSIS — G89.29 CHRONIC PAIN OF BOTH LOWER EXTREMITIES: ICD-10-CM

## 2023-11-20 DIAGNOSIS — M79.604 CHRONIC PAIN OF BOTH LOWER EXTREMITIES: ICD-10-CM

## 2023-11-20 DIAGNOSIS — M79.605 CHRONIC PAIN OF BOTH LOWER EXTREMITIES: ICD-10-CM

## 2023-11-20 DIAGNOSIS — S60.221A CONTUSION OF RIGHT HAND, INITIAL ENCOUNTER: ICD-10-CM

## 2023-11-20 PROCEDURE — 99284 EMERGENCY DEPT VISIT MOD MDM: CPT

## 2023-11-20 ASSESSMENT — PAIN - FUNCTIONAL ASSESSMENT: PAIN_FUNCTIONAL_ASSESSMENT: 0-10

## 2023-11-21 ENCOUNTER — APPOINTMENT (OUTPATIENT)
Dept: GENERAL RADIOLOGY | Age: 48
End: 2023-11-21
Payer: MEDICARE

## 2023-11-21 ENCOUNTER — APPOINTMENT (OUTPATIENT)
Dept: CT IMAGING | Age: 48
End: 2023-11-21
Payer: MEDICARE

## 2023-11-21 VITALS
RESPIRATION RATE: 16 BRPM | HEART RATE: 94 BPM | OXYGEN SATURATION: 99 % | DIASTOLIC BLOOD PRESSURE: 78 MMHG | SYSTOLIC BLOOD PRESSURE: 132 MMHG | TEMPERATURE: 98.6 F

## 2023-11-21 LAB
ALBUMIN SERPL-MCNC: 3 G/DL (ref 3.5–5)
ALBUMIN/GLOB SERPL: 0.6 (ref 0.4–1.6)
ALP SERPL-CCNC: 90 U/L (ref 50–136)
ALT SERPL-CCNC: 25 U/L (ref 12–65)
ANION GAP SERPL CALC-SCNC: 5 MMOL/L (ref 2–11)
APPEARANCE UR: CLEAR
ARTERIAL PATENCY WRIST A: ABNORMAL
AST SERPL-CCNC: 17 U/L (ref 15–37)
BASE EXCESS BLDV CALC-SCNC: 4.7 MMOL/L
BASOPHILS # BLD: 0 K/UL (ref 0–0.2)
BASOPHILS NFR BLD: 1 % (ref 0–2)
BILIRUB SERPL-MCNC: 0.5 MG/DL (ref 0.2–1.1)
BILIRUB UR QL: NEGATIVE
BUN SERPL-MCNC: 12 MG/DL (ref 6–23)
CALCIUM SERPL-MCNC: 9 MG/DL (ref 8.3–10.4)
CHLORIDE SERPL-SCNC: 101 MMOL/L (ref 101–110)
CO2 SERPL-SCNC: 30 MMOL/L (ref 21–32)
COLOR UR: NORMAL
CREAT SERPL-MCNC: 1.12 MG/DL (ref 0.6–1)
DIFFERENTIAL METHOD BLD: ABNORMAL
EOSINOPHIL # BLD: 0.3 K/UL (ref 0–0.8)
EOSINOPHIL NFR BLD: 6 % (ref 0.5–7.8)
ERYTHROCYTE [DISTWIDTH] IN BLOOD BY AUTOMATED COUNT: 13.2 % (ref 11.9–14.6)
GLOBULIN SER CALC-MCNC: 4.8 G/DL (ref 2.8–4.5)
GLUCOSE SERPL-MCNC: 105 MG/DL (ref 65–100)
GLUCOSE UR STRIP.AUTO-MCNC: NEGATIVE MG/DL
HCO3 BLDV-SCNC: 31.3 MMOL/L (ref 23–28)
HCT VFR BLD AUTO: 34.3 % (ref 35.8–46.3)
HGB BLD-MCNC: 11 G/DL (ref 11.7–15.4)
HGB UR QL STRIP: NEGATIVE
IMM GRANULOCYTES # BLD AUTO: 0.1 K/UL (ref 0–0.5)
IMM GRANULOCYTES NFR BLD AUTO: 1 % (ref 0–5)
KETONES UR QL STRIP.AUTO: NEGATIVE MG/DL
LACTATE SERPL-SCNC: 0.9 MMOL/L (ref 0.4–2)
LEUKOCYTE ESTERASE UR QL STRIP.AUTO: NEGATIVE
LYMPHOCYTES # BLD: 1.6 K/UL (ref 0.5–4.6)
LYMPHOCYTES NFR BLD: 29 % (ref 13–44)
MCH RBC QN AUTO: 29.1 PG (ref 26.1–32.9)
MCHC RBC AUTO-ENTMCNC: 32.1 G/DL (ref 31.4–35)
MCV RBC AUTO: 90.7 FL (ref 82–102)
MONOCYTES # BLD: 0.8 K/UL (ref 0.1–1.3)
MONOCYTES NFR BLD: 14 % (ref 4–12)
NEUTS SEG # BLD: 2.7 K/UL (ref 1.7–8.2)
NEUTS SEG NFR BLD: 49 % (ref 43–78)
NITRITE UR QL STRIP.AUTO: NEGATIVE
NRBC # BLD: 0 K/UL (ref 0–0.2)
PCO2 BLDV: 55.1 MMHG (ref 41–51)
PH BLDV: 7.36 (ref 7.32–7.42)
PH UR STRIP: 5.5 (ref 5–9)
PLATELET # BLD AUTO: 392 K/UL (ref 150–450)
PMV BLD AUTO: 9.4 FL (ref 9.4–12.3)
PO2 BLDV: 22 MMHG
POTASSIUM SERPL-SCNC: 4.2 MMOL/L (ref 3.5–5.1)
PROCALCITONIN SERPL-MCNC: <0.05 NG/ML (ref 0–0.49)
PROT SERPL-MCNC: 7.8 G/DL (ref 6.3–8.2)
PROT UR STRIP-MCNC: NEGATIVE MG/DL
RBC # BLD AUTO: 3.78 M/UL (ref 4.05–5.2)
SAO2 % BLDV: 34.4 % (ref 65–88)
SERVICE CMNT-IMP: ABNORMAL
SODIUM SERPL-SCNC: 136 MMOL/L (ref 133–143)
SP GR UR REFRACTOMETRY: 1.02 (ref 1–1.02)
SPECIMEN TYPE: ABNORMAL
UROBILINOGEN UR QL STRIP.AUTO: 0.2 EU/DL (ref 0.2–1)
WBC # BLD AUTO: 5.5 K/UL (ref 4.3–11.1)

## 2023-11-21 PROCEDURE — 82803 BLOOD GASES ANY COMBINATION: CPT

## 2023-11-21 PROCEDURE — 81003 URINALYSIS AUTO W/O SCOPE: CPT

## 2023-11-21 PROCEDURE — 87040 BLOOD CULTURE FOR BACTERIA: CPT

## 2023-11-21 PROCEDURE — 2580000003 HC RX 258: Performed by: PHYSICIAN ASSISTANT

## 2023-11-21 PROCEDURE — 6370000000 HC RX 637 (ALT 250 FOR IP): Performed by: STUDENT IN AN ORGANIZED HEALTH CARE EDUCATION/TRAINING PROGRAM

## 2023-11-21 PROCEDURE — 85025 COMPLETE CBC W/AUTO DIFF WBC: CPT

## 2023-11-21 PROCEDURE — 71046 X-RAY EXAM CHEST 2 VIEWS: CPT

## 2023-11-21 PROCEDURE — 73130 X-RAY EXAM OF HAND: CPT

## 2023-11-21 PROCEDURE — 83605 ASSAY OF LACTIC ACID: CPT

## 2023-11-21 PROCEDURE — 70450 CT HEAD/BRAIN W/O DYE: CPT

## 2023-11-21 PROCEDURE — 80053 COMPREHEN METABOLIC PANEL: CPT

## 2023-11-21 PROCEDURE — 84145 PROCALCITONIN (PCT): CPT

## 2023-11-21 RX ORDER — ACETAMINOPHEN 500 MG
1000 TABLET ORAL
Status: COMPLETED | OUTPATIENT
Start: 2023-11-21 | End: 2023-11-21

## 2023-11-21 RX ORDER — DOXYCYCLINE HYCLATE 100 MG
100 TABLET ORAL 2 TIMES DAILY
Qty: 20 TABLET | Refills: 0 | Status: SHIPPED | OUTPATIENT
Start: 2023-11-21 | End: 2023-12-01

## 2023-11-21 RX ORDER — SODIUM CHLORIDE 0.9 % (FLUSH) 0.9 %
5-40 SYRINGE (ML) INJECTION EVERY 12 HOURS SCHEDULED
Status: DISCONTINUED | OUTPATIENT
Start: 2023-11-21 | End: 2023-11-21 | Stop reason: HOSPADM

## 2023-11-21 RX ORDER — SODIUM CHLORIDE 0.9 % (FLUSH) 0.9 %
5-40 SYRINGE (ML) INJECTION PRN
Status: DISCONTINUED | OUTPATIENT
Start: 2023-11-21 | End: 2023-11-21 | Stop reason: HOSPADM

## 2023-11-21 RX ORDER — OXYCODONE HYDROCHLORIDE 5 MG/1
5 TABLET ORAL
Status: COMPLETED | OUTPATIENT
Start: 2023-11-21 | End: 2023-11-21

## 2023-11-21 RX ORDER — SODIUM CHLORIDE 9 MG/ML
INJECTION, SOLUTION INTRAVENOUS PRN
Status: DISCONTINUED | OUTPATIENT
Start: 2023-11-21 | End: 2023-11-21 | Stop reason: HOSPADM

## 2023-11-21 RX ORDER — 0.9 % SODIUM CHLORIDE 0.9 %
1000 INTRAVENOUS SOLUTION INTRAVENOUS ONCE
Status: COMPLETED | OUTPATIENT
Start: 2023-11-21 | End: 2023-11-21

## 2023-11-21 RX ADMIN — SODIUM CHLORIDE 1000 ML: 9 INJECTION, SOLUTION INTRAVENOUS at 01:30

## 2023-11-21 RX ADMIN — ACETAMINOPHEN 1000 MG: 500 TABLET, FILM COATED ORAL at 02:22

## 2023-11-21 RX ADMIN — OXYCODONE HYDROCHLORIDE 5 MG: 5 TABLET ORAL at 02:15

## 2023-11-21 ASSESSMENT — PAIN DESCRIPTION - LOCATION: LOCATION: LEG

## 2023-11-21 ASSESSMENT — PAIN DESCRIPTION - ORIENTATION: ORIENTATION: RIGHT;LEFT

## 2023-11-21 ASSESSMENT — PAIN SCALES - GENERAL: PAINLEVEL_OUTOF10: 10

## 2023-11-21 NOTE — ED PROVIDER NOTES
Emergency Department Provider Signout / Continuation of Care Note     DISPOSITION Decision To Discharge 11/21/2023 02:40:31 AM       ICD-10-CM    1. Fall, initial encounter  Extension Annmarie Wilkins. XXXA       2. Chronic pain of both lower extremities  M79.604     M79.605     G89.29       3. Contusion of right hand, initial encounter  H49.078A         The patient's care was signed out to me at shift change. Final Plan      Received handoff of care from [facility]. Blood work within normal limits with negative lactic and procalcitonin; no significant leukocytosis. CT head negative. X-ray imaging negative for injury. Did obtain carboxyhemoglobin level which was normal.  She appears back to baseline per family. She has not noted much improvement following the antibiotics so we will transition her to doxycycline until she can follow-up with wound care this next week. She is requesting discharge home. Encouraged to follow-up with PCP.   Return precautions given        Jorge A Dodge MD  11/21/23 5297
Pain at base of thumb after fall. Findings: There is no fracture, subluxation, or dislocation. The joint spaces are within normal limits. Impression    Impression:    No fracture, subluxation, or dislocation. Lalita Shipman D.O.   11/21/2023 12:57:00 AM   CT HEAD WO CONTRAST    Narrative    EXAMINATION: CT HEAD WITHOUT CONTRAST    DATE: 11/21/2023 12:47 AM    INDICATION: Ground-level fall, pain    COMPARISON: None available at the time of this dictation. TECHNIQUE: Noncontrast imaging obtained from the vertex to the skull base. CT   dose lowering techniques were used, to include: automated exposure control,   adjustment for patient size, and or use of iterative reconstruction. ?    FINDINGS:    Soft Tissues: No significant soft tissue abnormality. Skull: No underlying skull fracture or radiopaque foreign body. Sinuses: Paranasal sinuses are clear. Mastoids: Mastoid air cells are clear. Globes and Orbits: Globes and orbits are intact. Brain: No acute hemorrhage. No midline shift, masses, or mass effect. No   evidence of acute infarct by noncontrast CT. Ventricles and Cisterns: Ventricular size and configuration is within normal   limits. Basal cisterns are patent. No abnormal extra-axial fluid collection. Senescent Changes: None. Impression    No acute intracranial abnormality.        Abimbola Villafuerte M.D.   11/21/2023 1:14:00 AM   Lactate, Sepsis   Result Value Ref Range    Lactic Acid, Sepsis 0.9 0.4 - 2.0 MMOL/L   Procalcitonin   Result Value Ref Range    Procalcitonin <0.05 0.00 - 0.49 ng/mL   CBC with Auto Differential   Result Value Ref Range    WBC 5.5 4.3 - 11.1 K/uL    RBC 3.78 (L) 4.05 - 5.2 M/uL    Hemoglobin 11.0 (L) 11.7 - 15.4 g/dL    Hematocrit 34.3 (L) 35.8 - 46.3 %    MCV 90.7 82.0 - 102.0 FL    MCH 29.1 26.1 - 32.9 PG    MCHC 32.1 31.4 - 35.0 g/dL    RDW 13.2 11.9 - 14.6 %    Platelets 260 635 - 704 K/uL    MPV 9.4 9.4 - 12.3 FL    nRBC 0.00

## 2023-11-21 NOTE — DISCHARGE INSTRUCTIONS
Continue to take your medications as directed. Elevate your legs when sitting or lying down. Please follow-up with your primary care doctor in the next 1 to 2 weeks for recheck of your symptoms. I recommend NOT using the oven to assist in heating your house as this can lead to carbon monoxide poisoning.   Return to the ER if any new or worsening symptoms

## 2023-11-21 NOTE — ED TRIAGE NOTES
Per ems report pt fell asleep at the kitchen table and fell on floor, pt daughter states she was slow to wake up, pt c/o leg pain, pt recetly d/c from hospital for abscess

## 2023-11-22 ENCOUNTER — OFFICE VISIT (OUTPATIENT)
Dept: FAMILY MEDICINE CLINIC | Facility: CLINIC | Age: 48
End: 2023-11-22

## 2023-11-22 VITALS
WEIGHT: 293 LBS | OXYGEN SATURATION: 98 % | HEART RATE: 118 BPM | DIASTOLIC BLOOD PRESSURE: 88 MMHG | HEIGHT: 67 IN | BODY MASS INDEX: 45.99 KG/M2 | SYSTOLIC BLOOD PRESSURE: 141 MMHG | TEMPERATURE: 98.1 F

## 2023-11-22 DIAGNOSIS — R79.89 ELEVATED SERUM CREATININE: ICD-10-CM

## 2023-11-22 DIAGNOSIS — L03.119 CELLULITIS OF LOWER EXTREMITY, UNSPECIFIED LATERALITY: ICD-10-CM

## 2023-11-22 DIAGNOSIS — I87.2 VENOUS STASIS ULCER OF OTHER PART OF LOWER LEG WITHOUT VARICOSE VEINS, UNSPECIFIED LATERALITY, UNSPECIFIED ULCER STAGE (HCC): ICD-10-CM

## 2023-11-22 DIAGNOSIS — G47.30 SLEEP APNEA, UNSPECIFIED TYPE: ICD-10-CM

## 2023-11-22 DIAGNOSIS — L97.809 VENOUS STASIS ULCER OF OTHER PART OF LOWER LEG WITHOUT VARICOSE VEINS, UNSPECIFIED LATERALITY, UNSPECIFIED ULCER STAGE (HCC): ICD-10-CM

## 2023-11-22 DIAGNOSIS — Z09 HOSPITAL DISCHARGE FOLLOW-UP: Primary | ICD-10-CM

## 2023-11-22 LAB
ALBUMIN SERPL-MCNC: 3.2 G/DL (ref 3.5–5)
ALBUMIN/GLOB SERPL: 0.8 (ref 0.4–1.6)
ALP SERPL-CCNC: 89 U/L (ref 50–136)
ALT SERPL-CCNC: 25 U/L (ref 12–65)
ANION GAP SERPL CALC-SCNC: 6 MMOL/L (ref 2–11)
AST SERPL-CCNC: 18 U/L (ref 15–37)
BILIRUB SERPL-MCNC: 0.5 MG/DL (ref 0.2–1.1)
BUN SERPL-MCNC: 9 MG/DL (ref 6–23)
CALCIUM SERPL-MCNC: 9 MG/DL (ref 8.3–10.4)
CHLORIDE SERPL-SCNC: 100 MMOL/L (ref 101–110)
CO2 SERPL-SCNC: 31 MMOL/L (ref 21–32)
CREAT SERPL-MCNC: 1.1 MG/DL (ref 0.6–1)
GLOBULIN SER CALC-MCNC: 4.2 G/DL (ref 2.8–4.5)
GLUCOSE SERPL-MCNC: 88 MG/DL (ref 65–100)
POTASSIUM SERPL-SCNC: 4.3 MMOL/L (ref 3.5–5.1)
PROT SERPL-MCNC: 7.4 G/DL (ref 6.3–8.2)
SODIUM SERPL-SCNC: 137 MMOL/L (ref 133–143)

## 2023-11-22 RX ORDER — LACTOSE-REDUCED FOOD
1 POWDER (GRAM) ORAL 2 TIMES DAILY
Qty: 5 BOX | Refills: 0 | Status: SHIPPED | OUTPATIENT
Start: 2023-11-22 | End: 2023-11-28 | Stop reason: SDUPTHER

## 2023-11-22 ASSESSMENT — PATIENT HEALTH QUESTIONNAIRE - PHQ9
SUM OF ALL RESPONSES TO PHQ9 QUESTIONS 1 & 2: 0
8. MOVING OR SPEAKING SO SLOWLY THAT OTHER PEOPLE COULD HAVE NOTICED. OR THE OPPOSITE, BEING SO FIGETY OR RESTLESS THAT YOU HAVE BEEN MOVING AROUND A LOT MORE THAN USUAL: 1
5. POOR APPETITE OR OVEREATING: 1
6. FEELING BAD ABOUT YOURSELF - OR THAT YOU ARE A FAILURE OR HAVE LET YOURSELF OR YOUR FAMILY DOWN: 0
SUM OF ALL RESPONSES TO PHQ QUESTIONS 1-9: 0
10. IF YOU CHECKED OFF ANY PROBLEMS, HOW DIFFICULT HAVE THESE PROBLEMS MADE IT FOR YOU TO DO YOUR WORK, TAKE CARE OF THINGS AT HOME, OR GET ALONG WITH OTHER PEOPLE: 1
7. TROUBLE CONCENTRATING ON THINGS, SUCH AS READING THE NEWSPAPER OR WATCHING TELEVISION: 0
3. TROUBLE FALLING OR STAYING ASLEEP: 2
SUM OF ALL RESPONSES TO PHQ QUESTIONS 1-9: 0
2. FEELING DOWN, DEPRESSED OR HOPELESS: 0
1. LITTLE INTEREST OR PLEASURE IN DOING THINGS: 0
SUM OF ALL RESPONSES TO PHQ QUESTIONS 1-9: 8
SUM OF ALL RESPONSES TO PHQ QUESTIONS 1-9: 0
9. THOUGHTS THAT YOU WOULD BE BETTER OFF DEAD, OR OF HURTING YOURSELF: 0
2. FEELING DOWN, DEPRESSED OR HOPELESS: 1
SUM OF ALL RESPONSES TO PHQ QUESTIONS 1-9: 8
SUM OF ALL RESPONSES TO PHQ QUESTIONS 1-9: 8
SUM OF ALL RESPONSES TO PHQ QUESTIONS 1-9: 0
SUM OF ALL RESPONSES TO PHQ QUESTIONS 1-9: 8
4. FEELING TIRED OR HAVING LITTLE ENERGY: 3

## 2023-11-22 ASSESSMENT — COLUMBIA-SUICIDE SEVERITY RATING SCALE - C-SSRS
5. HAVE YOU STARTED TO WORK OUT OR WORKED OUT THE DETAILS OF HOW TO KILL YOURSELF? DO YOU INTEND TO CARRY OUT THIS PLAN?: NO
3. HAVE YOU BEEN THINKING ABOUT HOW YOU MIGHT KILL YOURSELF?: NO
4. HAVE YOU HAD THESE THOUGHTS AND HAD SOME INTENTION OF ACTING ON THEM?: NO
7. DID THIS OCCUR IN THE LAST THREE MONTHS: NO

## 2023-11-22 NOTE — PROGRESS NOTES
[facility]  [address] [facility], [address]  [facility]   () [phone] (fax) [phone]  ROLAND Bruce       Post-Discharge Transitional Care Follow Up      Sangeetha Harris   YOB: 1975    Date of Office Visit:  11/22/2023  Date of Hospital Admission: 11/20/23  Date of Hospital Discharge: 11/21/23  Readmission Risk Score (high >=14%. Medium >=10%):Readmission Risk Score: 14.1      Care management risk score Rising risk (score 2-5) and Complex Care (Scores >=6): No Risk Score On File     Non face to face  following discharge, date last encounter closed (first attempt may have been earlier): 11/13/2023     Call initiated 2 business days of discharge: Yes     Hospital discharge follow-up  -     [facility] DISCHARGE MEDS RECONCILED W/ CURRENT OUTPATIENT MED LIST  Sleep apnea, unspecified type  -     [facility]  Elevated serum creatinine  -     Comprehensive Metabolic Panel; Future  Cellulitis of lower extremity, unspecified laterality  Venous stasis ulcer of other part of lower leg without varicose veins, unspecified laterality, unspecified ulcer stage [facility])    Medical Decision Making: moderate complexity  Return in about 3 months (around 2/22/2024). Subjective:   49 y.o. female with medical history of anxiety, bipolar disorder, CKD stage III, diabetes in remission, GERD, hypertension, peripheral neuropathy, stroke without residual deficit, sleep apnea, vertigo who presented  to ER on 11/20 with bilateral open leg wounds. Patient had been seen in outpatient setting and was currently on third round of antibiotics, was currently taking doxycycline. Per hospital notes, stated she saw some improvement to her wounds first round of antibiotics. With doxycycline patient stated she did not see any improvement and felt things were getting worse.   Patient stated she had increased drainage and swelling from both

## 2023-11-25 DIAGNOSIS — L50.9 URTICARIA: ICD-10-CM

## 2023-11-25 DIAGNOSIS — I82.542: ICD-10-CM

## 2023-11-25 DIAGNOSIS — I10 PRIMARY HYPERTENSION: ICD-10-CM

## 2023-11-27 RX ORDER — LACTOSE-REDUCED FOOD
1 POWDER (GRAM) ORAL 2 TIMES DAILY
Qty: 5 BOX | Refills: 0 | OUTPATIENT
Start: 2023-11-27

## 2023-11-27 RX ORDER — FUROSEMIDE 20 MG/1
20 TABLET ORAL PRN
Qty: 30 TABLET | Refills: 1 | OUTPATIENT
Start: 2023-11-27

## 2023-11-27 RX ORDER — HYDROXYZINE 50 MG/1
TABLET, FILM COATED ORAL
Qty: 30 TABLET | Refills: 0 | OUTPATIENT
Start: 2023-11-27

## 2023-11-27 RX ORDER — LOSARTAN POTASSIUM 50 MG/1
50 TABLET ORAL DAILY
Qty: 90 TABLET | Refills: 1 | OUTPATIENT
Start: 2023-11-27

## 2023-11-27 NOTE — TELEPHONE ENCOUNTER
Losartan sent in on 8/25/23 for a 6 months supply. Should have enough until next appt in December. Eliquis was sent to pharmacy on 8/28/23 for a 4 month supply & this should last until her appt next month as well. RX denied.

## 2023-11-27 NOTE — TELEPHONE ENCOUNTER
Medications were prescribed during last visit with enough refills to last until patient's December appt. RX denied.

## 2023-11-28 RX ORDER — LACTOSE-REDUCED FOOD
1 POWDER (GRAM) ORAL 2 TIMES DAILY
Qty: 5 BOX | Refills: 0 | Status: SHIPPED | OUTPATIENT
Start: 2023-11-28

## 2023-11-29 NOTE — RESULT ENCOUNTER NOTE
To pt. Cr+ still slightly elevated. Please continue to avoid NSAIDS and remain hydrated. Will continue to monitor.   Thanks

## 2023-11-30 RX ORDER — NUTRITIONAL SUPPLEMENT
1 POWDER (GRAM) ORAL 2 TIMES DAILY
Qty: 60 EACH | Refills: 1 | Status: SHIPPED | OUTPATIENT
Start: 2023-11-30 | End: 2023-12-13 | Stop reason: SDUPTHER

## 2023-12-06 ENCOUNTER — TELEPHONE (OUTPATIENT)
Dept: RADIATION ONCOLOGY | Age: 48
End: 2023-12-06

## 2023-12-06 ENCOUNTER — HOSPITAL ENCOUNTER (OUTPATIENT)
Dept: WOUND CARE | Age: 48
Discharge: HOME | End: 2023-12-06
Payer: MEDICARE

## 2023-12-06 VITALS
TEMPERATURE: 97.9 F | SYSTOLIC BLOOD PRESSURE: 126 MMHG | HEIGHT: 67 IN | BODY MASS INDEX: 45.99 KG/M2 | WEIGHT: 293 LBS | RESPIRATION RATE: 18 BRPM | DIASTOLIC BLOOD PRESSURE: 62 MMHG | HEART RATE: 107 BPM

## 2023-12-06 DIAGNOSIS — I82.542: ICD-10-CM

## 2023-12-06 PROCEDURE — 99213 OFFICE O/P EST LOW 20 MIN: CPT

## 2023-12-06 RX ORDER — APIXABAN 5 MG/1
5 TABLET, FILM COATED ORAL 2 TIMES DAILY
Qty: 60 TABLET | Refills: 0 | OUTPATIENT
Start: 2023-12-06

## 2023-12-06 ASSESSMENT — PAIN SCALES - GENERAL: PAINLEVEL_OUTOF10: 10

## 2023-12-06 ASSESSMENT — PAIN DESCRIPTION - DESCRIPTORS: DESCRIPTORS: ACHING;BURNING

## 2023-12-06 ASSESSMENT — PAIN DESCRIPTION - ORIENTATION: ORIENTATION: RIGHT;LEFT

## 2023-12-06 ASSESSMENT — PAIN DESCRIPTION - LOCATION: LOCATION: LEG

## 2023-12-06 NOTE — TELEPHONE ENCOUNTER
Pt states she has cramping/burning pain in both legs below the knee, she wanted to go to ER last night but couldn't drive.

## 2023-12-06 NOTE — WOUND CARE
[facility]  [facility]  [address], [address]  Phone [phone]   Fax [phone]      NAME:  Devin Nunez  YOB: 1975  MEDICAL RECORD NUMBER:  [id number]  DATE:  12/6/2023    Return Appointment:   2 weeks with [place] OfficerDO    Instructions: Bilateral lower legs:  OK to shower with Dove soap. Vashe Wound Solution (hypochlorous acid) soak placed on wound bed for a minimum of 60 seconds prior to dressing application. This solution removes pathogens, bioburden, and biofilms from wounds, but is not cytotoxic. Xeroform- apply to wound bed. Cover with ABD or absorptive dressing, rolled gauze, & Tubigrip stocking size \"G\". Change 3 times a week. Please wear Tubigrip stockinet from toes to knees- applies light compression to leg to reduce swelling. Goal is to reduce circulatory congestion, discomfort,  & resistance to wound closure. May wear 24 hours per day. Wash, dry, & reuse stockinet. Elevate legs when sitting to reduce swelling. Swelling interferes with wound healing. Interim HH to change dressing 3 times per week until next appointment date. OK for Physical Therapy to order shower chair. Please increase dietary protein to at least 60 grams per day to support cell rejuvenation. May use supplements such as Ensure Max, Jase, & Glucerna (samples & coupons provided at first visit). Be sure to spread intake throughout the day for improved absorption. Should you experience increased redness, swelling, pain, foul odor, size of wound(s), or have a temperature over 101 degrees please contact the [address] at [phone] or if after hours contact your primary care physician or go to the hospital emergency department. PLEASE NOTE: IF YOU ARE UNABLE TO OBTAIN WOUND SUPPLIES, CONTINUE TO USE THE SUPPLIES YOU HAVE AVAILABLE UNTIL YOU ARE ABLE TO REACH US.  IT IS MOST IMPORTANT TO

## 2023-12-06 NOTE — TELEPHONE ENCOUNTER
Subjective    Chief Complaint: BLE pain  Details of Complaint: Patient called regarding extreme BLE pain. Patients legs are currently wrapped and patient seen by wound clinic. Patient states she's also on Lasix. Objective    Date of last Office Visit: 5/25/23   Date of last labs: 11/22/23   Date of last imaging: na  Date of last treatment, if applicable:na      Plan/Intervention    Proposed Plan: Instructed patient to hydrate well with electrolyte replacement due to cramping may be r/t hypokalemia. Instructed to follow-up with PCP and also to make a f/u appt with Dr. Louisa Maradiaga due to patient has been cancelling appointments. Message sent to  to facilitate.   Patient verbalized understanding of plan: YES/NO: Yes  Patient voiced intended compliance with plan: Verbalized/ Refused: Verbalized intended compliance

## 2023-12-06 NOTE — FLOWSHEET NOTE
12/06/23 1428   Peripheral Vascular   RLE Edema Pitting   LLE Edema Pitting   RLE Neurovascular Assessment   Color Red   RLE Sensation  Pain;Full sensation   LLE Neurovascular Assessment   Color Red   LLE Sensation  Pain;Full sensation   Musculoskeletal   RL Extremity Weakness   LL Extremity Weakness   Wound 11/01/23 Leg Left;Posterior #2   Date First Assessed/Time First Assessed: 11/01/23 0954   Present on Original Admission: Yes  Wound Approximate Age at First Assessment (Weeks): 12 weeks  Primary Wound Type: Venous Ulcer  Location: Leg  Wound Location Orientation: Left;Posterior  Woun. .. Wound Image     Wound Etiology Venous   Dressing Status Old drainage noted   Wound Cleansed Not Cleansed  (Too Pinful)   Dressing/Treatment Alginate   Wound Length (cm) 11 cm   Wound Width (cm) 10 cm   Wound Depth (cm) 0.1 cm   Wound Surface Area (cm^2) 110 cm^2   Change in Wound Size % (l*w) -10   Wound Volume (cm^3) 11 cm^3   Wound Healing % -10   Wound Assessment Hector/red;Slough   Drainage Amount Moderate (25-50%)   Drainage Description Serosanguinous   Odor None   Pati-wound Assessment Fragile;Edematous   Wound Thickness Description not for Pressure Injury Full thickness   Wound 11/01/23 Leg Posterior;Right #1   Date First Assessed/Time First Assessed: 11/01/23 0954   Present on Original Admission: Yes  Wound Approximate Age at First Assessment (Weeks): 12 weeks  Primary Wound Type: Venous Ulcer  Location: Leg  Wound Location Orientation: Posterior;Right  Wou. ..    Wound Image     Wound Etiology Venous   Dressing Status Old drainage noted   Wound Cleansed Not Cleansed  (Too painful)   Dressing/Treatment Alginate   Wound Length (cm) 7 cm   Wound Width (cm) 15 cm   Wound Depth (cm) 0.1 cm   Wound Surface Area (cm^2) 105 cm^2   Change in Wound Size % (l*w) -87.5   Wound Volume (cm^3) 10.5 cm^3   Wound Healing % -88   Wound Assessment Hector/red;Slough   Drainage Amount Moderate (25-50%)   Drainage Description Serosanguinous

## 2023-12-12 ENCOUNTER — OFFICE VISIT (OUTPATIENT)
Dept: ONCOLOGY | Age: 48
End: 2023-12-12
Payer: MEDICARE

## 2023-12-12 ENCOUNTER — HOSPITAL ENCOUNTER (OUTPATIENT)
Dept: LAB | Age: 48
Discharge: HOME | End: 2023-12-15
Payer: MEDICARE

## 2023-12-12 VITALS
WEIGHT: 293 LBS | HEART RATE: 104 BPM | OXYGEN SATURATION: 96 % | TEMPERATURE: 97.9 F | BODY MASS INDEX: 45.99 KG/M2 | RESPIRATION RATE: 16 BRPM | SYSTOLIC BLOOD PRESSURE: 125 MMHG | DIASTOLIC BLOOD PRESSURE: 76 MMHG | HEIGHT: 67 IN

## 2023-12-12 DIAGNOSIS — D50.8 OTHER IRON DEFICIENCY ANEMIA: Primary | ICD-10-CM

## 2023-12-12 DIAGNOSIS — D50.8 OTHER IRON DEFICIENCY ANEMIA: ICD-10-CM

## 2023-12-12 DIAGNOSIS — I82.4Z2 DEEP VEIN THROMBOSIS (DVT) OF DISTAL VEIN OF LEFT LOWER EXTREMITY, UNSPECIFIED CHRONICITY (HCC): ICD-10-CM

## 2023-12-12 DIAGNOSIS — K21.9 CHRONIC GERD: ICD-10-CM

## 2023-12-12 DIAGNOSIS — D68.59 PRIMARY HYPERCOAGULABLE STATE (HCC): Primary | ICD-10-CM

## 2023-12-12 DIAGNOSIS — E88.89 OTHER SPECIFIED METABOLIC DISORDERS (HCC): ICD-10-CM

## 2023-12-12 DIAGNOSIS — D68.59 PRIMARY HYPERCOAGULABLE STATE (HCC): ICD-10-CM

## 2023-12-12 LAB
ALBUMIN SERPL-MCNC: 3 G/DL (ref 3.5–5)
ALBUMIN/GLOB SERPL: 0.6 (ref 0.4–1.6)
ALP SERPL-CCNC: 109 U/L (ref 50–136)
ALT SERPL-CCNC: 20 U/L (ref 12–65)
ANION GAP SERPL CALC-SCNC: 4 MMOL/L (ref 2–11)
AST SERPL-CCNC: 14 U/L (ref 15–37)
BASOPHILS # BLD: 0 K/UL (ref 0–0.2)
BASOPHILS NFR BLD: 1 % (ref 0–2)
BILIRUB SERPL-MCNC: 0.3 MG/DL (ref 0.2–1.1)
BUN SERPL-MCNC: 12 MG/DL (ref 6–23)
CALCIUM SERPL-MCNC: 9 MG/DL (ref 8.3–10.4)
CHLORIDE SERPL-SCNC: 105 MMOL/L (ref 103–113)
CO2 SERPL-SCNC: 29 MMOL/L (ref 21–32)
CREAT SERPL-MCNC: 1.1 MG/DL (ref 0.6–1)
D DIMER PPP FEU-MCNC: 0.58 UG/ML(FEU)
DIFFERENTIAL METHOD BLD: ABNORMAL
EOSINOPHIL # BLD: 0.2 K/UL (ref 0–0.8)
EOSINOPHIL NFR BLD: 3 % (ref 0.5–7.8)
ERYTHROCYTE [DISTWIDTH] IN BLOOD BY AUTOMATED COUNT: 13.5 % (ref 11.9–14.6)
FERRITIN SERPL-MCNC: 53 NG/ML (ref 8–388)
GLOBULIN SER CALC-MCNC: 5.1 G/DL (ref 2.8–4.5)
GLUCOSE SERPL-MCNC: 118 MG/DL (ref 65–100)
HCT VFR BLD AUTO: 37.2 % (ref 35.8–46.3)
HGB BLD-MCNC: 11.5 G/DL (ref 11.7–15.4)
IMM GRANULOCYTES # BLD AUTO: 0 K/UL (ref 0–0.5)
IMM GRANULOCYTES NFR BLD AUTO: 0 % (ref 0–5)
LYMPHOCYTES # BLD: 1.5 K/UL (ref 0.5–4.6)
LYMPHOCYTES NFR BLD: 24 % (ref 13–44)
MCH RBC QN AUTO: 28.2 PG (ref 26.1–32.9)
MCHC RBC AUTO-ENTMCNC: 30.9 G/DL (ref 31.4–35)
MCV RBC AUTO: 91.2 FL (ref 82–102)
MONOCYTES # BLD: 0.5 K/UL (ref 0.1–1.3)
MONOCYTES NFR BLD: 8 % (ref 4–12)
NEUTS SEG # BLD: 3.9 K/UL (ref 1.7–8.2)
NEUTS SEG NFR BLD: 64 % (ref 43–78)
NRBC # BLD: 0 K/UL (ref 0–0.2)
PLATELET # BLD AUTO: 492 K/UL (ref 150–450)
PMV BLD AUTO: 9 FL (ref 9.4–12.3)
POTASSIUM SERPL-SCNC: 4.3 MMOL/L (ref 3.5–5.1)
PROT SERPL-MCNC: 8.1 G/DL (ref 6.3–8.2)
RBC # BLD AUTO: 4.08 M/UL (ref 4.05–5.2)
SODIUM SERPL-SCNC: 138 MMOL/L (ref 136–146)
WBC # BLD AUTO: 6.1 K/UL (ref 4.3–11.1)

## 2023-12-12 PROCEDURE — 3078F DIAST BP <80 MM HG: CPT | Performed by: INTERNAL MEDICINE

## 2023-12-12 PROCEDURE — G8427 DOCREV CUR MEDS BY ELIG CLIN: HCPCS | Performed by: INTERNAL MEDICINE

## 2023-12-12 PROCEDURE — 83090 ASSAY OF HOMOCYSTEINE: CPT

## 2023-12-12 PROCEDURE — G8482 FLU IMMUNIZE ORDER/ADMIN: HCPCS | Performed by: INTERNAL MEDICINE

## 2023-12-12 PROCEDURE — 1036F TOBACCO NON-USER: CPT | Performed by: INTERNAL MEDICINE

## 2023-12-12 PROCEDURE — 80053 COMPREHEN METABOLIC PANEL: CPT

## 2023-12-12 PROCEDURE — 82728 ASSAY OF FERRITIN: CPT

## 2023-12-12 PROCEDURE — 85025 COMPLETE CBC W/AUTO DIFF WBC: CPT

## 2023-12-12 PROCEDURE — 99215 OFFICE O/P EST HI 40 MIN: CPT | Performed by: INTERNAL MEDICINE

## 2023-12-12 PROCEDURE — 85379 FIBRIN DEGRADATION QUANT: CPT

## 2023-12-12 PROCEDURE — G8417 CALC BMI ABV UP PARAM F/U: HCPCS | Performed by: INTERNAL MEDICINE

## 2023-12-12 PROCEDURE — 36415 COLL VENOUS BLD VENIPUNCTURE: CPT

## 2023-12-12 PROCEDURE — 3074F SYST BP LT 130 MM HG: CPT | Performed by: INTERNAL MEDICINE

## 2023-12-12 RX ORDER — HYDROCODONE BITARTRATE AND ACETAMINOPHEN 7.5; 325 MG/1; MG/1
TABLET ORAL
COMMUNITY
Start: 2023-11-15

## 2023-12-12 RX ORDER — B12/LEVOMEFOLATE CALCIUM/B-6 2-1.13-25
1 TABLET ORAL DAILY
Qty: 30 TABLET | Refills: 11 | Status: SHIPPED | OUTPATIENT
Start: 2023-12-12

## 2023-12-12 ASSESSMENT — PATIENT HEALTH QUESTIONNAIRE - PHQ9
SUM OF ALL RESPONSES TO PHQ QUESTIONS 1-9: 0
2. FEELING DOWN, DEPRESSED OR HOPELESS: 0
SUM OF ALL RESPONSES TO PHQ QUESTIONS 1-9: 0
SUM OF ALL RESPONSES TO PHQ9 QUESTIONS 1 & 2: 0
1. LITTLE INTEREST OR PLEASURE IN DOING THINGS: 0

## 2023-12-12 NOTE — PROGRESS NOTES
[address] [facility], [facility] [address]  Phone: [phone]           12/12/2023  Lupillo Israel  1975  [id number]        Lupillo Israel is a 51year old -American female who has returned to my clinic for a follow-up visit; she was initially referred to me by Cordell Holman NP for evaluation of Leukopenia and Iron Deficiency Anemia; her Myeloid Disorders Profile was unremarkable. In 12/2022 she was found to have an unprovoked left leg DVT, her partial Hypercoagulable work-up revealed Hyperhomocysteinemia and is now on Eliquis 5 mg BID ( till 8/2023 ). She received parenteral Iron infusions in 2/2023 and 4/2023. ALLERGIES:    Ciprofloxacin and sulfa drugs. FAMILY HISTORY:     Her sister had Hodgkin Lymphoma. SOCIAL HISTORY:    She is single and lives with her son. She used to work as a  for [employer]. She denies ever using any tobacco products. PAST MEDICAL HISTORY:     Hypertension, Asthma, Bipolar Disorder, Osteoarthritis, renal insufficiency, CVA, left leg DVT, Hypercoagulable Disorder, Diabetes Mellitus, GERD, Iron Deficiency Anemia and Neuropathy. ROS:  The patient complained of fatigue and arthralgias; all other systems negative. PHYSICAL EXAM:   The patient was alert, awake and oriented, no acute distress was noted. Oral examination did not reveal any mucosal lesions. Lymph node examination did not reveal any adenopathy. Neck examination revealed a supple neck, no thyromegaly or masses were noted. Chest examination revealed normal vesicular breath sounds. Heart examination revealed S-1 and S-2 without any murmurs. Abdominal examination revealed a non-tender abdomen, bowel sounds were positive, no organomegaly could be appreciated. Examination of the extremities did not reveal any tenderness or erythema, 1+ bilateral pedal edema was noted. Examination of the skin did not reveal any lesions.         KPS:

## 2023-12-12 NOTE — PATIENT INSTRUCTIONS
Patient Instructions from Today's Visit    Reason for Visit:  Follow Up    Diagnosis Information:  [email or web address]    Plan:  Lab work looks stable today  Hemoglobin and Iron levels are still at a good level  We will send in a prescription for Foltix    -this should be a prescription that is affordable   We would like you to have a Colonoscopy and EGD at some point to see where you are losing blood    Follow Up: We will bring you back in April for a follow up with  and lab work prior.     Recent Lab Results:  Hospital Outpatient Visit on 12/12/2023   Component Date Value Ref Range Status    WBC 12/12/2023 6.1  4.3 - 11.1 K/uL Final    RBC 12/12/2023 4.08  4.05 - 5.2 M/uL Final    Hemoglobin 12/12/2023 11.5 (L)  11.7 - 15.4 g/dL Final    Hematocrit 12/12/2023 37.2  35.8 - 46.3 % Final    MCV 12/12/2023 91.2  82.0 - 102.0 FL Final    MCH 12/12/2023 28.2  26.1 - 32.9 PG Final    MCHC 12/12/2023 30.9 (L)  31.4 - 35.0 g/dL Final    RDW 12/12/2023 13.5  11.9 - 14.6 % Final    Platelets 23/07/7728 492 (H)  150 - 450 K/uL Final    MPV 12/12/2023 9.0 (L)  9.4 - 12.3 FL Final    nRBC 12/12/2023 0.00  0.0 - 0.2 K/uL Final    **Note: Absolute NRBC parameter is now reported with Hemogram**    Differential Type 12/12/2023 AUTOMATED    Final    Neutrophils % 12/12/2023 64  43 - 78 % Final    Lymphocytes % 12/12/2023 24  13 - 44 % Final    Monocytes % 12/12/2023 8  4.0 - 12.0 % Final    Eosinophils % 12/12/2023 3  0.5 - 7.8 % Final    Basophils % 12/12/2023 1  0.0 - 2.0 % Final    Immature Granulocytes 12/12/2023 0  0.0 - 5.0 % Final    Neutrophils Absolute 12/12/2023 3.9  1.7 - 8.2 K/UL Final    Lymphocytes Absolute 12/12/2023 1.5  0.5 - 4.6 K/UL Final    Monocytes Absolute 12/12/2023 0.5  0.1 - 1.3 K/UL Final    Eosinophils Absolute 12/12/2023 0.2  0.0 - 0.8 K/UL Final    Basophils Absolute 12/12/2023 0.0  0.0 - 0.2 K/UL Final    Absolute

## 2023-12-13 DIAGNOSIS — L50.9 URTICARIA: ICD-10-CM

## 2023-12-13 DIAGNOSIS — I83.009 VENOUS STASIS ULCER, UNSPECIFIED SITE, UNSPECIFIED ULCER STAGE, UNSPECIFIED WHETHER VARICOSE VEINS PRESENT (HCC): Primary | ICD-10-CM

## 2023-12-13 DIAGNOSIS — L97.909 VENOUS STASIS ULCER, UNSPECIFIED SITE, UNSPECIFIED ULCER STAGE, UNSPECIFIED WHETHER VARICOSE VEINS PRESENT (HCC): Primary | ICD-10-CM

## 2023-12-13 LAB — HCYS SERPL-SCNC: 17.2 UMOL/L (ref 0–14.5)

## 2023-12-13 RX ORDER — NUTRITIONAL SUPPLEMENT
1 POWDER (GRAM) ORAL 2 TIMES DAILY
Qty: 60 EACH | Refills: 1 | Status: SHIPPED | OUTPATIENT
Start: 2023-12-13 | End: 2024-01-23

## 2023-12-13 RX ORDER — HYDROXYZINE 50 MG/1
TABLET, FILM COATED ORAL
Qty: 30 TABLET | Refills: 0 | Status: SHIPPED | OUTPATIENT
Start: 2023-12-13 | End: 2023-12-21 | Stop reason: SDUPTHER

## 2023-12-13 RX ORDER — HYDROXYZINE 50 MG/1
TABLET, FILM COATED ORAL
Qty: 30 TABLET | Refills: 0 | Status: CANCELLED | OUTPATIENT
Start: 2023-12-13

## 2023-12-26 RX ORDER — FLUCONAZOLE 150 MG/1
TABLET ORAL
Qty: 2 TABLET | Refills: 0 | OUTPATIENT
Start: 2023-12-26

## 2023-12-28 DIAGNOSIS — J30.9 ALLERGIC RHINITIS, UNSPECIFIED SEASONALITY, UNSPECIFIED TRIGGER: ICD-10-CM

## 2023-12-28 RX ORDER — MONTELUKAST SODIUM 10 MG/1
10 TABLET ORAL DAILY
Qty: 90 TABLET | Refills: 0 | Status: SHIPPED | OUTPATIENT
Start: 2023-12-28

## 2024-01-03 ENCOUNTER — APPOINTMENT (OUTPATIENT)
Dept: CT IMAGING | Age: 49
End: 2024-01-03
Payer: MEDICARE

## 2024-01-03 ENCOUNTER — HOSPITAL ENCOUNTER (EMERGENCY)
Age: 49
Discharge: HOME | End: 2024-01-03
Attending: EMERGENCY MEDICINE
Payer: MEDICARE

## 2024-01-03 ENCOUNTER — APPOINTMENT (OUTPATIENT)
Dept: GENERAL RADIOLOGY | Age: 49
End: 2024-01-03
Payer: MEDICARE

## 2024-01-03 VITALS
OXYGEN SATURATION: 95 % | TEMPERATURE: 97.5 F | DIASTOLIC BLOOD PRESSURE: 68 MMHG | BODY MASS INDEX: 45.99 KG/M2 | HEIGHT: 67 IN | WEIGHT: 293 LBS | RESPIRATION RATE: 18 BRPM | HEART RATE: 88 BPM | SYSTOLIC BLOOD PRESSURE: 113 MMHG

## 2024-01-03 DIAGNOSIS — S70.02XA CONTUSION OF LEFT HIP, INITIAL ENCOUNTER: ICD-10-CM

## 2024-01-03 DIAGNOSIS — S46.912A SHOULDER STRAIN, LEFT, INITIAL ENCOUNTER: ICD-10-CM

## 2024-01-03 DIAGNOSIS — S16.1XXA ACUTE STRAIN OF NECK MUSCLE, INITIAL ENCOUNTER: ICD-10-CM

## 2024-01-03 DIAGNOSIS — S86.912A KNEE STRAIN, LEFT, INITIAL ENCOUNTER: ICD-10-CM

## 2024-01-03 DIAGNOSIS — L03.116 CELLULITIS OF LEFT LOWER EXTREMITY: ICD-10-CM
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
## 2024-01-03 LAB
APPEARANCE UR: CLEAR
BILIRUB UR QL: NEGATIVE
COLOR UR: NORMAL
GLUCOSE UR STRIP.AUTO-MCNC: NEGATIVE MG/DL
HGB UR QL STRIP: NEGATIVE
KETONES UR QL STRIP.AUTO: NEGATIVE MG/DL
LEUKOCYTE ESTERASE UR QL STRIP.AUTO: NEGATIVE
NITRITE UR QL STRIP.AUTO: NEGATIVE
PH UR STRIP: 5.5 (ref 5–9)
PROT UR STRIP-MCNC: NEGATIVE MG/DL
SP GR UR REFRACTOMETRY: 1.02 (ref 1–1.02)
UROBILINOGEN UR QL STRIP.AUTO: 0.2 EU/DL (ref 0.2–1)

## 2024-01-03 PROCEDURE — 2500000003 HC RX 250 WO HCPCS: Performed by: EMERGENCY MEDICINE

## 2024-01-03 PROCEDURE — 99284 EMERGENCY DEPT VISIT MOD MDM: CPT

## 2024-01-03 PROCEDURE — 73030 X-RAY EXAM OF SHOULDER: CPT

## 2024-01-03 PROCEDURE — 70450 CT HEAD/BRAIN W/O DYE: CPT

## 2024-01-03 PROCEDURE — 96375 TX/PRO/DX INJ NEW DRUG ADDON: CPT

## 2024-01-03 PROCEDURE — 6360000002 HC RX W HCPCS: Performed by: EMERGENCY MEDICINE

## 2024-01-03 PROCEDURE — 96374 THER/PROPH/DIAG INJ IV PUSH: CPT

## 2024-01-03 PROCEDURE — 73562 X-RAY EXAM OF KNEE 3: CPT

## 2024-01-03 PROCEDURE — 81003 URINALYSIS AUTO W/O SCOPE: CPT

## 2024-01-03 PROCEDURE — 73502 X-RAY EXAM HIP UNI 2-3 VIEWS: CPT

## 2024-01-03 PROCEDURE — 72125 CT NECK SPINE W/O DYE: CPT

## 2024-01-03 PROCEDURE — 73080 X-RAY EXAM OF ELBOW: CPT

## 2024-01-03 RX ORDER — HYDROMORPHONE HYDROCHLORIDE 1 MG/ML
0.5 INJECTION, SOLUTION INTRAMUSCULAR; INTRAVENOUS; SUBCUTANEOUS ONCE
Status: COMPLETED | OUTPATIENT
Start: 2024-01-03 | End: 2024-01-03

## 2024-01-03 RX ORDER — ONDANSETRON 2 MG/ML
4 INJECTION INTRAMUSCULAR; INTRAVENOUS
Status: COMPLETED | OUTPATIENT
Start: 2024-01-03 | End: 2024-01-03

## 2024-01-03 RX ORDER — HYDROMORPHONE HYDROCHLORIDE 1 MG/ML
1 INJECTION, SOLUTION INTRAMUSCULAR; INTRAVENOUS; SUBCUTANEOUS
Status: DISCONTINUED | OUTPATIENT
Start: 2024-01-03 | End: 2024-01-03

## 2024-01-03 RX ORDER — ONDANSETRON 4 MG/1
4 TABLET, ORALLY DISINTEGRATING ORAL
Status: DISCONTINUED | OUTPATIENT
Start: 2024-01-03 | End: 2024-01-03

## 2024-01-03 RX ORDER — CEPHALEXIN 500 MG/1
500 CAPSULE ORAL 4 TIMES DAILY
Qty: 28 CAPSULE | Refills: 0 | Status: SHIPPED | OUTPATIENT
Start: 2024-01-03 | End: 2024-01-10

## 2024-01-03 RX ADMIN — HYDROMORPHONE HYDROCHLORIDE 0.5 MG: 1 INJECTION, SOLUTION INTRAMUSCULAR; INTRAVENOUS; SUBCUTANEOUS at 02:39

## 2024-01-03 RX ADMIN — ONDANSETRON 4 MG: 2 INJECTION INTRAMUSCULAR; INTRAVENOUS at 02:39

## 2024-01-03 ASSESSMENT — PAIN DESCRIPTION - LOCATION
LOCATION: LEG
LOCATION: LEG
LOCATION: LEG;ARM

## 2024-01-03 ASSESSMENT — PAIN DESCRIPTION - DESCRIPTORS: DESCRIPTORS: THROBBING;ACHING

## 2024-01-03 ASSESSMENT — PAIN DESCRIPTION - ORIENTATION
ORIENTATION: LEFT
ORIENTATION: LEFT;RIGHT
ORIENTATION: RIGHT;LEFT

## 2024-01-03 ASSESSMENT — ENCOUNTER SYMPTOMS
NAUSEA: 0
VOMITING: 0
ABDOMINAL PAIN: 0

## 2024-01-03 ASSESSMENT — PAIN DESCRIPTION - PAIN TYPE: TYPE: ACUTE PAIN

## 2024-01-03 ASSESSMENT — PAIN - FUNCTIONAL ASSESSMENT: PAIN_FUNCTIONAL_ASSESSMENT: 0-10

## 2024-01-03 ASSESSMENT — PAIN SCALES - GENERAL
PAINLEVEL_OUTOF10: 8
PAINLEVEL_OUTOF10: 10
PAINLEVEL_OUTOF10: 3

## 2024-01-03 NOTE — ED PROVIDER NOTES
I discussed the results of all labs, procedures, radiographs, and treatments with the patient and available family.  Treatment plan is agreed upon and the patient is ready for discharge.  All voiced understanding of the discharge plan and medication instructions or changes as appropriate.  Questions about treatment in the ED were answered.  All were encouraged to return should symptoms worsen or new problems develop.     Voice dictation software was used during the making of this note.  This software is not perfect and grammatical and other typographical errors may be present.  This note has not been completely proofread for errors.     Osito Boone MD  01/03/24 043

## 2024-01-03 NOTE — ED TRIAGE NOTES
Pt arrives to ed via [facility] from home after fall. Pt c/o left arm pain and leg. + blood thinners, negative LOC, negative head strike. Pt has baseline bilateral leg pain.

## 2024-01-03 NOTE — ED NOTES
I have reviewed discharge instructions with the patient.  The patient verbalized understanding.    Patient left ED via Discharge Method: stretcher to Home with [facility].    Opportunity for questions and clarification provided.       Patient given 0 scripts.         To continue your aftercare when you leave the hospital, you may receive an automated call from our care team to check in on how you are doing.  This is a free service and part of our promise to provide the best care and service to meet your aftercare needs.” If you have questions, or wish to unsubscribe from this service please call [phone].  Thank you for Choosing our [facility] Emergency Department.        Jaiden Pineda, RN  01/03/24 2519

## 2024-01-14 ENCOUNTER — APPOINTMENT (OUTPATIENT)
Dept: GENERAL RADIOLOGY | Age: 49
End: 2024-01-14
Payer: MEDICARE

## 2024-01-14 ENCOUNTER — HOSPITAL ENCOUNTER (EMERGENCY)
Age: 49
Discharge: HOME | End: 2024-01-15
Payer: MEDICARE

## 2024-01-14 ENCOUNTER — HOSPITAL ENCOUNTER (EMERGENCY)
Dept: ULTRASOUND IMAGING | Age: 49
Discharge: HOME | End: 2024-01-17
Payer: MEDICARE

## 2024-01-14 DIAGNOSIS — L03.116 CELLULITIS OF LEFT LOWER EXTREMITY: Primary | ICD-10-CM

## 2024-01-14 DIAGNOSIS — M79.89 LEG SWELLING: ICD-10-CM

## 2024-01-14 DIAGNOSIS — J81.0 ACUTE PULMONARY EDEMA (HCC): ICD-10-CM

## 2024-01-14 LAB
ANION GAP SERPL CALC-SCNC: 1 MMOL/L (ref 2–11)
BASOPHILS # BLD: 0 K/UL (ref 0–0.2)
BASOPHILS NFR BLD: 0 % (ref 0–2)
BUN SERPL-MCNC: 11 MG/DL (ref 6–23)
CALCIUM SERPL-MCNC: 9 MG/DL (ref 8.3–10.4)
CHLORIDE SERPL-SCNC: 102 MMOL/L (ref 103–113)
CO2 SERPL-SCNC: 29 MMOL/L (ref 21–32)
CREAT SERPL-MCNC: 1 MG/DL (ref 0.6–1)
DIFFERENTIAL METHOD BLD: ABNORMAL
EOSINOPHIL # BLD: 0.1 K/UL (ref 0–0.8)
EOSINOPHIL NFR BLD: 1 % (ref 0.5–7.8)
ERYTHROCYTE [DISTWIDTH] IN BLOOD BY AUTOMATED COUNT: 14.6 % (ref 11.9–14.6)
GLUCOSE SERPL-MCNC: 94 MG/DL (ref 65–100)
HCT VFR BLD AUTO: 30.1 % (ref 35.8–46.3)
HGB BLD-MCNC: 9.7 G/DL (ref 11.7–15.4)
IMM GRANULOCYTES # BLD AUTO: 0 K/UL (ref 0–0.5)
IMM GRANULOCYTES NFR BLD AUTO: 0 % (ref 0–5)
LACTATE SERPL-SCNC: 1 MMOL/L (ref 0.4–2)
LYMPHOCYTES # BLD: 1.4 K/UL (ref 0.5–4.6)
LYMPHOCYTES NFR BLD: 13 % (ref 13–44)
MCH RBC QN AUTO: 28.3 PG (ref 26.1–32.9)
MCHC RBC AUTO-ENTMCNC: 32.2 G/DL (ref 31.4–35)
MCV RBC AUTO: 87.8 FL (ref 82–102)
MONOCYTES # BLD: 0.7 K/UL (ref 0.1–1.3)
MONOCYTES NFR BLD: 6 % (ref 4–12)
NEUTS SEG # BLD: 8.8 K/UL (ref 1.7–8.2)
NEUTS SEG NFR BLD: 80 % (ref 43–78)
NRBC # BLD: 0 K/UL (ref 0–0.2)
NT PRO BNP: 220 PG/ML (ref 5–125)
PLATELET # BLD AUTO: 480 K/UL (ref 150–450)
PMV BLD AUTO: 9.1 FL (ref 9.4–12.3)
POTASSIUM SERPL-SCNC: 4.7 MMOL/L (ref 3.5–5.1)
PROCALCITONIN SERPL-MCNC: 0.09 NG/ML (ref 0–0.49)
RBC # BLD AUTO: 3.43 M/UL (ref 4.05–5.2)
SODIUM SERPL-SCNC: 132 MMOL/L (ref 136–146)
WBC # BLD AUTO: 11.1 K/UL (ref 4.3–11.1)

## 2024-01-14 PROCEDURE — 96365 THER/PROPH/DIAG IV INF INIT: CPT

## 2024-01-14 PROCEDURE — 87040 BLOOD CULTURE FOR BACTERIA: CPT

## 2024-01-14 PROCEDURE — 2580000003 HC RX 258

## 2024-01-14 PROCEDURE — 93971 EXTREMITY STUDY: CPT

## 2024-01-14 PROCEDURE — 6360000002 HC RX W HCPCS

## 2024-01-14 PROCEDURE — 93005 ELECTROCARDIOGRAM TRACING: CPT

## 2024-01-14 PROCEDURE — 84145 PROCALCITONIN (PCT): CPT

## 2024-01-14 PROCEDURE — 84132 ASSAY OF SERUM POTASSIUM: CPT

## 2024-01-14 PROCEDURE — 96375 TX/PRO/DX INJ NEW DRUG ADDON: CPT

## 2024-01-14 PROCEDURE — 83605 ASSAY OF LACTIC ACID: CPT

## 2024-01-14 PROCEDURE — 96366 THER/PROPH/DIAG IV INF ADDON: CPT

## 2024-01-14 PROCEDURE — 80048 BASIC METABOLIC PNL TOTAL CA: CPT

## 2024-01-14 PROCEDURE — 83880 ASSAY OF NATRIURETIC PEPTIDE: CPT

## 2024-01-14 PROCEDURE — 71045 X-RAY EXAM CHEST 1 VIEW: CPT

## 2024-01-14 PROCEDURE — 96368 THER/DIAG CONCURRENT INF: CPT

## 2024-01-14 PROCEDURE — 85025 COMPLETE CBC W/AUTO DIFF WBC: CPT

## 2024-01-14 PROCEDURE — 99285 EMERGENCY DEPT VISIT HI MDM: CPT

## 2024-01-14 RX ORDER — MORPHINE SULFATE 4 MG/ML
4 INJECTION INTRAVENOUS
Status: DISCONTINUED | OUTPATIENT
Start: 2024-01-14 | End: 2024-01-15

## 2024-01-14 RX ORDER — KETOROLAC TROMETHAMINE 15 MG/ML
15 INJECTION, SOLUTION INTRAMUSCULAR; INTRAVENOUS ONCE
Status: COMPLETED | OUTPATIENT
Start: 2024-01-15 | End: 2024-01-15

## 2024-01-14 RX ADMIN — MORPHINE SULFATE 4 MG: 4 INJECTION INTRAVENOUS at 22:45

## 2024-01-14 RX ADMIN — PIPERACILLIN AND TAZOBACTAM 4500 MG: 4; .5 INJECTION, POWDER, FOR SOLUTION INTRAVENOUS at 22:51

## 2024-01-14 RX ADMIN — Medication 2500 MG: at 22:58

## 2024-01-14 ASSESSMENT — PAIN DESCRIPTION - ORIENTATION
ORIENTATION: RIGHT;LEFT
ORIENTATION: LEFT

## 2024-01-14 ASSESSMENT — PAIN DESCRIPTION - LOCATION
LOCATION: LEG
LOCATION: LEG

## 2024-01-14 ASSESSMENT — PAIN DESCRIPTION - DESCRIPTORS
DESCRIPTORS: THROBBING;BURNING
DESCRIPTORS: TIGHTNESS

## 2024-01-14 ASSESSMENT — PAIN SCALES - GENERAL
PAINLEVEL_OUTOF10: 10
PAINLEVEL_OUTOF10: 10

## 2024-01-14 ASSESSMENT — PAIN - FUNCTIONAL ASSESSMENT: PAIN_FUNCTIONAL_ASSESSMENT: 0-10

## 2024-01-15 VITALS
HEIGHT: 67 IN | WEIGHT: 293 LBS | BODY MASS INDEX: 45.99 KG/M2 | OXYGEN SATURATION: 94 % | TEMPERATURE: 98.3 F | DIASTOLIC BLOOD PRESSURE: 74 MMHG | SYSTOLIC BLOOD PRESSURE: 107 MMHG | HEART RATE: 94 BPM | RESPIRATION RATE: 18 BRPM

## 2024-01-15 LAB
EKG ATRIAL RATE: 96 BPM
EKG DIAGNOSIS: NORMAL
EKG P AXIS: 51 DEGREES
EKG P-R INTERVAL: 145 MS
EKG Q-T INTERVAL: 359 MS
EKG QRS DURATION: 77 MS
EKG QTC CALCULATION (BAZETT): 454 MS
EKG R AXIS: 42 DEGREES
EKG T AXIS: 58 DEGREES
EKG VENTRICULAR RATE: 96 BPM
POTASSIUM SERPL-SCNC: 4.3 MMOL/L (ref 3.5–5.1)

## 2024-01-15 PROCEDURE — 93010 ELECTROCARDIOGRAM REPORT: CPT | Performed by: INTERNAL MEDICINE

## 2024-01-15 PROCEDURE — 6370000000 HC RX 637 (ALT 250 FOR IP)

## 2024-01-15 PROCEDURE — 6360000002 HC RX W HCPCS

## 2024-01-15 RX ORDER — FUROSEMIDE 10 MG/ML
40 INJECTION INTRAMUSCULAR; INTRAVENOUS
Status: COMPLETED | OUTPATIENT
Start: 2024-01-15 | End: 2024-01-15

## 2024-01-15 RX ORDER — CLINDAMYCIN HYDROCHLORIDE 150 MG/1
450 CAPSULE ORAL 3 TIMES DAILY
Qty: 63 CAPSULE | Refills: 0 | Status: SHIPPED | OUTPATIENT
Start: 2024-01-15 | End: 2024-01-22

## 2024-01-15 RX ORDER — FUROSEMIDE 40 MG/1
40 TABLET ORAL DAILY
Qty: 3 TABLET | Refills: 0 | Status: SHIPPED | OUTPATIENT
Start: 2024-01-15 | End: 2024-01-18

## 2024-01-15 RX ORDER — HYDROCODONE BITARTRATE AND ACETAMINOPHEN 5; 325 MG/1; MG/1
1 TABLET ORAL
Status: COMPLETED | OUTPATIENT
Start: 2024-01-15 | End: 2024-01-15

## 2024-01-15 RX ADMIN — HYDROCODONE BITARTRATE AND ACETAMINOPHEN 1 TABLET: 5; 325 TABLET ORAL at 00:29

## 2024-01-15 RX ADMIN — FUROSEMIDE 40 MG: 10 INJECTION, SOLUTION INTRAMUSCULAR; INTRAVENOUS at 00:29

## 2024-01-15 RX ADMIN — KETOROLAC TROMETHAMINE 15 MG: 15 INJECTION, SOLUTION INTRAMUSCULAR; INTRAVENOUS at 00:00

## 2024-01-15 ASSESSMENT — PAIN DESCRIPTION - LOCATION: LOCATION: LEG

## 2024-01-15 ASSESSMENT — PAIN SCALES - GENERAL: PAINLEVEL_OUTOF10: 9

## 2024-01-15 ASSESSMENT — PAIN DESCRIPTION - ORIENTATION: ORIENTATION: RIGHT;LEFT

## 2024-01-15 ASSESSMENT — PAIN DESCRIPTION - DESCRIPTORS: DESCRIPTORS: BURNING

## 2024-01-15 NOTE — ED PROVIDER NOTES
restarted on any diabetic medications.  She states she has severe pain in the left lower and left upper leg and her mobility has been severely affected.    The history is provided by the patient.        Review of Systems   Constitutional:  Positive for activity change. Negative for fever.   Respiratory:  Positive for shortness of breath.    Cardiovascular:  Positive for leg swelling. Negative for chest pain.   Skin:  Positive for wound.   All other systems reviewed and are negative.      Physical Exam     Vitals signs and nursing note reviewed:  Vitals:    01/15/24 0210 01/15/24 0228 01/15/24 0258 01/15/24 0318   BP: 109/64 131/87 117/85 107/74   Pulse: 94 99 95 94   Resp: 13 14 13 18   Temp:       TempSrc:       SpO2: 94% 94%     Weight:       Height:          Physical Exam  Vitals and nursing note reviewed.   Constitutional:       Appearance: She is obese.   HENT:      Head: Normocephalic.   Cardiovascular:      Rate and Rhythm: Regular rhythm. Tachycardia present.      Pulses: Normal pulses.      Heart sounds: Normal heart sounds.   Pulmonary:      Effort: Pulmonary effort is normal. No respiratory distress.      Breath sounds: Normal breath sounds. No wheezing, rhonchi or rales.   Musculoskeletal:         General: Swelling and tenderness present.      Left lower leg: 3+ Edema present.   Skin:     General: Skin is warm and dry.      Findings: Ecchymosis, erythema, lesion and wound present.          Neurological:      General: No focal deficit present.      Mental Status: She is alert and oriented to person, place, and time.   Psychiatric:         Mood and Affect: Mood normal.         Behavior: Behavior normal.          Procedures     Procedures    Orders Placed This Encounter   Procedures    Blood Culture 1    Blood Culture 2    XR CHEST PORTABLE    BMP    CBC with Auto Differential    Lactate, Sepsis    Procalcitonin    Brain Natriuretic Peptide    Potassium    [facility] [place]    Pharmacy to

## 2024-01-15 NOTE — ED NOTES
Assumed care of pt at this time. Pt c/o bilateral leg pain. Pt resting in bed, attached to cardiac monitor. Pt denies questions and concerns at this time. Warm blankets provided.      Coco Spencer, RN  01/14/24 0242

## 2024-01-15 NOTE — DISCHARGE INSTRUCTIONS
Please follow-up with wound care as scheduled.  Also, please follow-up with cardiology.  Take Lasix once daily in the morning for the next 3 days.  Take clindamycin 3 times daily for 7 days as prescribed.  Return to the emergency department for shortness of breath, chest pain, or any other symptoms that concern you.

## 2024-01-15 NOTE — ED TRIAGE NOTES
Pt reports bilateral leg swelling and hx of DVT. Pt recently taken off her blood thinners. Redness and heat noted to L leg area.    Post-Care Instructions: I reviewed with the patient in detail post-care instructions. Patient is to wear sunprotection, and avoid picking at any of the treated lesions. Pt may apply Vaseline to crusted or scabbing areas. Consent: The patient's consent was obtained including but not limited to risks of crusting, scabbing, blistering, scarring, darker or lighter pigmentary change, recurrence, incomplete removal and infection. The patient understands that the procedure is cosmetic in nature and is not covered by insurance. Detail Level: Simple Price (Use Numbers Only, No Special Characters Or $): 0 Render Post-Care Instructions In Note?: no

## 2024-01-16 ENCOUNTER — HOSPITAL ENCOUNTER (OUTPATIENT)
Dept: WOUND CARE | Age: 49
Discharge: HOME | End: 2024-01-16
Payer: MEDICARE

## 2024-01-16 VITALS
WEIGHT: 293 LBS | BODY MASS INDEX: 45.99 KG/M2 | RESPIRATION RATE: 18 BRPM | HEART RATE: 108 BPM | SYSTOLIC BLOOD PRESSURE: 122 MMHG | DIASTOLIC BLOOD PRESSURE: 74 MMHG | TEMPERATURE: 98.2 F | HEIGHT: 67 IN

## 2024-01-16 PROCEDURE — 29581 APPL MULTLAYER CMPRN SYS LEG: CPT

## 2024-01-16 ASSESSMENT — PAIN DESCRIPTION - DESCRIPTORS: DESCRIPTORS: BURNING

## 2024-01-16 ASSESSMENT — PAIN DESCRIPTION - LOCATION: LOCATION: LEG

## 2024-01-16 ASSESSMENT — PAIN DESCRIPTION - ORIENTATION: ORIENTATION: RIGHT;LEFT

## 2024-01-16 ASSESSMENT — PAIN SCALES - GENERAL: PAINLEVEL_OUTOF10: 10

## 2024-01-16 NOTE — FLOWSHEET NOTE
01/16/24 0957   Right Leg Edema Point of Measurement   Leg circumference 60 cm   Ankle circumference 32 cm   Foot circumference 25 cm   Compression Therapy Tubular elastic support bandage   Left Leg Edema Point of Measurement   Leg circumference 70 cm   Ankle circumference 32 cm   Foot circumference 25 cm   Compression Therapy Tubular elastic support bandage   Wound 11/01/23 Leg Left;Posterior #2   Date First Assessed/Time First Assessed: 11/01/23 0954   Present on Original Admission: Yes  Wound Approximate Age at First Assessment (Weeks): 12 weeks  Primary Wound Type: Venous Ulcer  Location: Leg  Wound Location Orientation: Left;Posterior  Woun...   Wound Image    Wound Etiology Venous   Dressing Status Old drainage noted   Wound Cleansed Cleansed with saline   Dressing/Treatment Xeroform   Wound Length (cm) 11 cm   Wound Width (cm) 14 cm   Wound Depth (cm) 0.1 cm   Wound Surface Area (cm^2) 154 cm^2   Change in Wound Size % (l*w) -54   Wound Volume (cm^3) 15.4 cm^3   Wound Healing % -54   Wound Assessment [place]/red;Slough   Drainage Amount Large (50-75% saturated)   Drainage Description Serosanguinous   Odor None   Pati-wound Assessment Fragile;Edematous   Wound Thickness Description not for Pressure Injury Full thickness   Wound 11/01/23 Leg Posterior;Right #1   Date First Assessed/Time First Assessed: 11/01/23 0954   Present on Original Admission: Yes  Wound Approximate Age at First Assessment (Weeks): 12 weeks  Primary Wound Type: Venous Ulcer  Location: Leg  Wound Location Orientation: Posterior;Right  Wou...   Wound Image    Wound Etiology Venous   Dressing Status Old drainage noted   Wound Cleansed Cleansed with saline   Dressing/Treatment Xeroform   Wound Length (cm) 7 cm   Wound Width (cm) 15 cm   Wound Depth (cm) 0.1 cm   Wound Surface Area (cm^2) 105 cm^2   Change in Wound Size % (l*w) -87.5   Wound Volume (cm^3) 10.5 cm^3   Wound Healing % -88   Wound Assessment Nunica/red;Slough   Drainage Amount

## 2024-01-16 NOTE — WOUND CARE
Discharge Instructions for  [facility]  [address]  [address]  [place] [id number]  Phone [phone]   Fax [phone]      NAME:  Lo Tirado  YOB: 1975  MEDICAL RECORD NUMBER:  [id number]  DATE:  1/16/2024    Return Appointment:   1 week with Alexi Tabor DO    Instructions: Bilateral lower legs:    Vashe Wound Solution (hypochlorous acid) soak placed on wound bed for a minimum of 60 seconds prior to dressing application. This solution removes pathogens, bioburden, and biofilms from wounds, but is not cytotoxic.     Xeroform- apply to wound bed.   Cover with ABD or absorptive dressing, rolled gauze  2 layer compression wrap  Change on Thursday or Friday this week  Then next week HH to change on Wednesday and Friday.  Wound center to change on Monday's      Elevate legs when sitting to reduce swelling.   Swelling interferes with wound healing.  Do not cut compression wraps off. If wraps become too loose or slide down, call wound center to make an appointment for dressing change. If wraps become too tight, try elevating your legs to assist fluid drainage.  Elevate legs when sitting.  Avoid prolonged standing or sitting with legs in dependent position.  Be cautious of increased salt intake as this promotes fluid retention and swelling.  Take diuretic (fluid pill) as instructed by your doctor.  Increase protein intake to promote wound healing. Jase and Ensure are examples of protein supplements.  Do not get legs wet, use cast cover to shower.     Interim HH to change dressing 2 times per week until next appointment date.    Please increase dietary protein to at least 60 grams per day to support cell rejuvenation.   May use supplements such as Ensure Max, Jase, & Glucerna (samples & coupons provided at first visit).   Be sure to spread intake throughout the day for improved absorption.     Should you experience increased redness, swelling, pain, foul

## 2024-01-16 NOTE — WOUND CARE
Multilayer Compression Wrap   (Not Unna) Below the Knee    NAME:  Lo Tirado  YOB: 1975  MEDICAL RECORD NUMBER:  [id number]  DATE:  1/16/2024    Multilayer compression wrap: Removed old Multilayer wrap if indicated and wash leg with mild soap/water.  Applied moisturizing agent to dry skin as needed.   Applied primary and secondary dressing as ordered.  Applied multilayered dressing below the knee to right lower leg.  Applied multilayered dressing below the knee to left lower leg.  Instructed patient/caregiver not to remove dressing and to keep it clean and dry.   Instructed patient/caregiver on complications to report to provider, such as pain, numbness in toes, heavy drainage, and slippage of dressing.  Instructed patient on purpose of compression dressing and on activity and exercise recommendations.      Electronically signed by Tequila Hudson RN on 1/16/2024 at 12:06 PM

## 2024-01-18 ENCOUNTER — TELEPHONE (OUTPATIENT)
Dept: FAMILY MEDICINE CLINIC | Facility: CLINIC | Age: 49
End: 2024-01-18

## 2024-01-18 DIAGNOSIS — L03.119 CELLULITIS OF LOWER EXTREMITY, UNSPECIFIED LATERALITY: ICD-10-CM

## 2024-01-18 RX ORDER — NYSTATIN 100000 [USP'U]/G
POWDER TOPICAL
Qty: 30 G | Refills: 0 | Status: SHIPPED | OUTPATIENT
Start: 2024-01-18

## 2024-01-18 NOTE — TELEPHONE ENCOUNTER
Patient called states she has been taking antibiotics from her last ER for her cellulitis visit. She is having yeast under her breast and would like to know if you can call something in for her. Please advise

## 2024-01-19 ENCOUNTER — PATIENT MESSAGE (OUTPATIENT)
Dept: FAMILY MEDICINE CLINIC | Facility: CLINIC | Age: 49
End: 2024-01-19

## 2024-01-19 DIAGNOSIS — L30.4 INTERTRIGO: Primary | ICD-10-CM

## 2024-01-19 RX ORDER — FLUCONAZOLE 150 MG/1
TABLET ORAL
Qty: 1 TABLET | Refills: 0 | OUTPATIENT
Start: 2024-01-19

## 2024-01-19 RX ORDER — FLUCONAZOLE 150 MG/1
TABLET ORAL
Qty: 4 TABLET | Refills: 0 | Status: SHIPPED | OUTPATIENT
Start: 2024-01-19

## 2024-01-19 NOTE — TELEPHONE ENCOUNTER
From: Lo Tirado  Sent: 1/19/2024 9:47 AM EST  To: [facility] Clinical Staff  Subject: Nystatin powder    Yes that’s what they told me. I need the pill form for all over. This yeast infection has me itching all over. And I can’t put that powder in i between my legs anyway. Yes I have it under my breast and under my stomach also itching in between my legs and behind my legs.

## 2024-01-19 NOTE — TELEPHONE ENCOUNTER
1. Intertrigo  -     fluconazole (DIFLUCAN) 150 MG tablet; Take one tab PO weekly for four weeks, Disp-4 tablet, R-0Normal

## 2024-01-23 ENCOUNTER — HOSPITAL ENCOUNTER (OUTPATIENT)
Dept: WOUND CARE | Age: 49
Discharge: HOME | End: 2024-01-23
Payer: MEDICARE

## 2024-01-23 ENCOUNTER — INITIAL CONSULT (OUTPATIENT)
Age: 49
End: 2024-01-23
Payer: MEDICARE

## 2024-01-23 VITALS
WEIGHT: 293 LBS | BODY MASS INDEX: 45.99 KG/M2 | DIASTOLIC BLOOD PRESSURE: 85 MMHG | SYSTOLIC BLOOD PRESSURE: 147 MMHG | HEART RATE: 117 BPM | HEIGHT: 67 IN

## 2024-01-23 VITALS
HEIGHT: 67 IN | DIASTOLIC BLOOD PRESSURE: 84 MMHG | WEIGHT: 293 LBS | BODY MASS INDEX: 45.99 KG/M2 | SYSTOLIC BLOOD PRESSURE: 136 MMHG | HEART RATE: 64 BPM

## 2024-01-23 DIAGNOSIS — I89.0 LYMPHEDEMA OF BOTH LOWER EXTREMITIES: Primary | Chronic | ICD-10-CM

## 2024-01-23 PROCEDURE — 99213 OFFICE O/P EST LOW 20 MIN: CPT

## 2024-01-23 PROCEDURE — 1036F TOBACCO NON-USER: CPT | Performed by: INTERNAL MEDICINE

## 2024-01-23 PROCEDURE — G8427 DOCREV CUR MEDS BY ELIG CLIN: HCPCS | Performed by: INTERNAL MEDICINE

## 2024-01-23 PROCEDURE — 99204 OFFICE O/P NEW MOD 45 MIN: CPT | Performed by: INTERNAL MEDICINE

## 2024-01-23 PROCEDURE — G8482 FLU IMMUNIZE ORDER/ADMIN: HCPCS | Performed by: INTERNAL MEDICINE

## 2024-01-23 PROCEDURE — 3079F DIAST BP 80-89 MM HG: CPT | Performed by: INTERNAL MEDICINE

## 2024-01-23 PROCEDURE — 3075F SYST BP GE 130 - 139MM HG: CPT | Performed by: INTERNAL MEDICINE

## 2024-01-23 PROCEDURE — G8417 CALC BMI ABV UP PARAM F/U: HCPCS | Performed by: INTERNAL MEDICINE

## 2024-01-23 ASSESSMENT — PAIN SCALES - GENERAL: PAINLEVEL_OUTOF10: 10

## 2024-01-23 ASSESSMENT — ENCOUNTER SYMPTOMS
ORTHOPNEA: 0
ABDOMINAL PAIN: 0
SHORTNESS OF BREATH: 0

## 2024-01-23 NOTE — FLOWSHEET NOTE
01/23/24 1442   Right Leg Edema Point of Measurement   Leg circumference 60 cm   Ankle circumference 33 cm   Foot circumference 25 cm   Compression Therapy Tubular elastic support bandage   Left Leg Edema Point of Measurement   Leg circumference 71 cm   Ankle circumference 33 cm   Foot circumference 25 cm   Compression Therapy Tubular elastic support bandage   Wound 11/01/23 Leg Left;Posterior #2   Date First Assessed/Time First Assessed: 11/01/23 0954   Present on Original Admission: Yes  Wound Approximate Age at First Assessment (Weeks): 12 weeks  Primary Wound Type: Venous Ulcer  Location: Leg  Wound Location Orientation: Left;Posterior  Woun...   Wound Image    Wound Etiology Venous   Dressing Status Old drainage noted   Wound Cleansed Cleansed with saline   Dressing/Treatment Xeroform   Wound Length (cm) 11 cm   Wound Width (cm) 14 cm   Wound Depth (cm) 0.1 cm   Wound Surface Area (cm^2) 154 cm^2   Change in Wound Size % (l*w) -54   Wound Volume (cm^3) 15.4 cm^3   Wound Healing % -54   Wound Assessment [place]/red;Slough   Drainage Amount Large (50-75% saturated)   Drainage Description Serosanguinous   Odor None   Pati-wound Assessment Fragile;Edematous   Wound Thickness Description not for Pressure Injury Full thickness   Wound 11/01/23 Leg Posterior;Right #1   Date First Assessed/Time First Assessed: 11/01/23 0954   Present on Original Admission: Yes  Wound Approximate Age at First Assessment (Weeks): 12 weeks  Primary Wound Type: Venous Ulcer  Location: Leg  Wound Location Orientation: Posterior;Right  Wou...   Wound Image    Wound Etiology Venous   Dressing Status Old drainage noted   Wound Cleansed Cleansed with saline   Dressing/Treatment Xeroform   Wound Length (cm) 7 cm   Wound Width (cm) 15 cm   Wound Depth (cm) 0.1 cm   Wound Surface Area (cm^2) 105 cm^2   Change in Wound Size % (l*w) -87.5   Wound Volume (cm^3) 10.5 cm^3   Wound Healing % -88   Wound Assessment [place]/red;Slough   Drainage Amount  No

## 2024-01-23 NOTE — DISCHARGE INSTRUCTIONS
Discharge Instructions for  [facility]  [address]  [address]  [place] [id number]  Phone [phone]   Fax [phone]      NAME:  Lo Tiraod  YOB: 1975  MEDICAL RECORD NUMBER:  [id number]  DATE:  @ED@    Return Appointment:   2 weeks with Alexi Tabor DO      Instructions: Bilateral lower legs:     Vashe Wound Solution (hypochlorous acid) soak placed on wound bed for a minimum of 60 seconds prior to dressing application. This solution removes pathogens, bioburden, and biofilms from wounds, but is not cytotoxic.   Apply adaptic to wound bed then cover with hydrofera blue  Hydrofera Blue: Cut to wound size, moisten with saline, and apply to wound bed.      Cover with Exudry and rolled gauze then tubigrip         Then next week HH to change on Wednesday and Friday.   Elevate legs when sitting to reduce swelling.   Swelling interferes with wound healing.  Do not cut compression wraps off. If wraps become too loose or slide down, call wound center to make an appointment for dressing change. If wraps become too tight, try elevating your legs to assist fluid drainage.  Elevate legs when sitting.  Avoid prolonged standing or sitting with legs in dependent position.  Be cautious of increased salt intake as this promotes fluid retention and swelling.  Take diuretic (fluid pill) as instructed by your doctor.  Increase protein intake to promote wound healing. Jase and Ensure are examples of protein supplements.  Do not get legs wet, use cast cover to shower.      Interim HH to change dressing 2 times per week until next appointment date.     Please increase dietary protein to at least 60 grams per day to support cell rejuvenation.   May use supplements such as Ensure Max, Jase, & Glucerna (samples & coupons provided at first visit).   Be sure to spread intake throughout the day for improved absorption.   Arterial studies ordered. Please call [phone] to

## 2024-01-23 NOTE — WOUND CARE
Discharge Instructions for  [facility]  [address]  [address]  [place] [address]  Phone [phone]   Fax [phone]      NAME:  Lo Tirado  YOB: 1975  MEDICAL RECORD NUMBER:  [id number]  DATE:  1/23/2024    Return Appointment:   1 week with Alexi Tabor DO    Instructions: Bilateral lower legs:    Vashe Wound Solution (hypochlorous acid) soak placed on wound bed for a minimum of 60 seconds prior to dressing application. This solution removes pathogens, bioburden, and biofilms from wounds, but is not cytotoxic.   Apply adaptic to wound bed then cover with hydrofera blue  Hydrofera Blue: Cut to wound size, moisten with saline, and apply to wound bed.     Cover with Exudry and rolled gauze then tubigrip        Then next week HH to change on Wednesday and Friday.   Elevate legs when sitting to reduce swelling.   Swelling interferes with wound healing.  Do not cut compression wraps off. If wraps become too loose or slide down, call wound center to make an appointment for dressing change. If wraps become too tight, try elevating your legs to assist fluid drainage.  Elevate legs when sitting.  Avoid prolonged standing or sitting with legs in dependent position.  Be cautious of increased salt intake as this promotes fluid retention and swelling.  Take diuretic (fluid pill) as instructed by your doctor.  Increase protein intake to promote wound healing. Jase and Ensure are examples of protein supplements.  Do not get legs wet, use cast cover to shower.     Interim HH to change dressing 2 times per week until next appointment date.    Please increase dietary protein to at least 60 grams per day to support cell rejuvenation.   May use supplements such as Ensure Max, Jase, & Glucerna (samples & coupons provided at first visit).   Be sure to spread intake throughout the day for improved absorption.   Arterial studies ordered. Please call [phone] to speak

## 2024-01-23 NOTE — PROGRESS NOTES
[facility] CARDIOLOGY  [address] [place] [address], [address]  [place] [address]  PHONE: [phone]      24    NAME:  Lo Tirado  : 1975  MRN: [id number]         SUBJECTIVE:   Lo Tirado is a 48 y.o. female seen for a consultation visit regarding the following:     Chief Complaint   Patient presents with    Consultation     Pulmonary edema and LLE cellulitis             HPI:  Consultation is requested by Nenita Beatty APRN - CNP for evaluation of Consultation (Pulmonary edema and LLE cellulitis )   .    Ms. Tirado presents today for evaluation of cardiomegaly.  Patient was in the emergency room for some shortness of breath.  Had a chest x-ray which showed cardiac enlargement as well as some mild pulmonary vascular congestion.  Patient has a history of hypertension as well as obesity and chronic lymphedema.  Has had no heart history in the past.  No significant family history of early onset CAD.  Patient was started on Lasix, her breathing seems to have improved.  She does remain edematous although this been a chronic problem.  No chest pain.  She is non-smoker.  She denies orthopnea, PND, palpitations, syncope.          Key CAD CHF Meds            furosemide (LASIX) 40 MG tablet (Taking)    Sig - Route: Take 1 tablet by mouth daily for 3 days - Oral    losartan (COZAAR) 50 MG tablet (Taking)    Sig - Route: Take 1 tablet by mouth daily - Oral          Key Antihyperglycemic Medications       Patient is on no antihyperglycemic meds.              Past Medical History, Past Surgical History, Family history, Social History, and Medications were all reviewed with the patient today and updated as necessary.     Prior to Admission medications    Medication Sig Start Date End Date Taking? Authorizing Provider   fluconazole (DIFLUCAN) 150 MG tablet Take one tab PO weekly for four weeks 24  Yes Delon Rodriguez MD   nystatin (MYCOSTATIN) 475429 UNIT/GM powder Apply 3 times daily.

## 2024-01-26 ENCOUNTER — TELEPHONE (OUTPATIENT)
Dept: FAMILY MEDICINE CLINIC | Facility: CLINIC | Age: 49
End: 2024-01-26

## 2024-01-26 ENCOUNTER — TELEPHONE (OUTPATIENT)
Dept: ONCOLOGY | Age: 49
End: 2024-01-26

## 2024-01-26 NOTE — TELEPHONE ENCOUNTER
Pt called asking to Cx 04.11.24 appts. Pt declined to RS and no stated \"no RS is required w/Dr Clark.\"

## 2024-01-26 NOTE — TELEPHONE ENCOUNTER
Pt called stating she would like to speak with  due to Nenita not being in office. Would like to discuss what was done at an office visit today. Pt was upset and states she doesn't want any doctors and that she should take care of herself or just kill herself.  Spoke with Michael and Michael will give pt a call back.

## 2024-01-28 ENCOUNTER — HOSPITAL ENCOUNTER (EMERGENCY)
Age: 49
Discharge: HOME | End: 2024-01-28
Attending: EMERGENCY MEDICINE
Payer: MEDICARE

## 2024-01-28 VITALS
TEMPERATURE: 97.6 F | DIASTOLIC BLOOD PRESSURE: 99 MMHG | WEIGHT: 293 LBS | RESPIRATION RATE: 12 BRPM | OXYGEN SATURATION: 97 % | HEART RATE: 80 BPM | BODY MASS INDEX: 45.99 KG/M2 | HEIGHT: 67 IN | SYSTOLIC BLOOD PRESSURE: 152 MMHG
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
## 2024-01-28 DIAGNOSIS — M79.605 BILATERAL LEG PAIN: Primary | ICD-10-CM

## 2024-01-28 DIAGNOSIS — M79.604 BILATERAL LEG PAIN: Primary | ICD-10-CM

## 2024-01-28 LAB
ANION GAP SERPL CALC-SCNC: 0 MMOL/L (ref 2–11)
BASOPHILS # BLD: 0 K/UL (ref 0–0.2)
BASOPHILS NFR BLD: 1 % (ref 0–2)
BUN SERPL-MCNC: 7 MG/DL (ref 6–23)
CALCIUM SERPL-MCNC: 9 MG/DL (ref 8.3–10.4)
CHLORIDE SERPL-SCNC: 108 MMOL/L (ref 103–113)
CO2 SERPL-SCNC: 29 MMOL/L (ref 21–32)
CREAT SERPL-MCNC: 1 MG/DL (ref 0.6–1)
CRP SERPL-MCNC: 1 MG/DL (ref 0–0.9)
DIFFERENTIAL METHOD BLD: ABNORMAL
EOSINOPHIL # BLD: 0.3 K/UL (ref 0–0.8)
EOSINOPHIL NFR BLD: 7 % (ref 0.5–7.8)
ERYTHROCYTE [DISTWIDTH] IN BLOOD BY AUTOMATED COUNT: 15.5 % (ref 11.9–14.6)
ERYTHROCYTE [SEDIMENTATION RATE] IN BLOOD: 101 MM/HR (ref 0–20)
GLUCOSE SERPL-MCNC: 94 MG/DL (ref 65–100)
HCT VFR BLD AUTO: 32.9 % (ref 35.8–46.3)
HGB BLD-MCNC: 10.3 G/DL (ref 11.7–15.4)
IMM GRANULOCYTES # BLD AUTO: 0 K/UL (ref 0–0.5)
IMM GRANULOCYTES NFR BLD AUTO: 0 % (ref 0–5)
LACTATE SERPL-SCNC: 1.2 MMOL/L (ref 0.4–2)
LYMPHOCYTES # BLD: 1.3 K/UL (ref 0.5–4.6)
LYMPHOCYTES NFR BLD: 26 % (ref 13–44)
MCH RBC QN AUTO: 27.7 PG (ref 26.1–32.9)
MCHC RBC AUTO-ENTMCNC: 31.3 G/DL (ref 31.4–35)
MCV RBC AUTO: 88.4 FL (ref 82–102)
MONOCYTES # BLD: 0.8 K/UL (ref 0.1–1.3)
MONOCYTES NFR BLD: 16 % (ref 4–12)
NEUTS SEG # BLD: 2.5 K/UL (ref 1.7–8.2)
NEUTS SEG NFR BLD: 50 % (ref 43–78)
NRBC # BLD: 0 K/UL (ref 0–0.2)
PLATELET # BLD AUTO: 399 K/UL (ref 150–450)
PMV BLD AUTO: 9.7 FL (ref 9.4–12.3)
POTASSIUM SERPL-SCNC: 4.7 MMOL/L (ref 3.5–5.1)
RBC # BLD AUTO: 3.72 M/UL (ref 4.05–5.2)
SODIUM SERPL-SCNC: 137 MMOL/L (ref 136–146)
WBC # BLD AUTO: 4.9 K/UL (ref 4.3–11.1)

## 2024-01-28 PROCEDURE — 85025 COMPLETE CBC W/AUTO DIFF WBC: CPT

## 2024-01-28 PROCEDURE — 99284 EMERGENCY DEPT VISIT MOD MDM: CPT

## 2024-01-28 PROCEDURE — 2500000003 HC RX 250 WO HCPCS: Performed by: EMERGENCY MEDICINE

## 2024-01-28 PROCEDURE — 80048 BASIC METABOLIC PNL TOTAL CA: CPT

## 2024-01-28 PROCEDURE — 96375 TX/PRO/DX INJ NEW DRUG ADDON: CPT

## 2024-01-28 PROCEDURE — 86140 C-REACTIVE PROTEIN: CPT

## 2024-01-28 PROCEDURE — 96374 THER/PROPH/DIAG INJ IV PUSH: CPT

## 2024-01-28 PROCEDURE — 6360000002 HC RX W HCPCS: Performed by: EMERGENCY MEDICINE

## 2024-01-28 PROCEDURE — 85652 RBC SED RATE AUTOMATED: CPT

## 2024-01-28 PROCEDURE — 83605 ASSAY OF LACTIC ACID: CPT

## 2024-01-28 RX ORDER — HYDROMORPHONE HYDROCHLORIDE 1 MG/ML
1 INJECTION, SOLUTION INTRAMUSCULAR; INTRAVENOUS; SUBCUTANEOUS
Status: COMPLETED | OUTPATIENT
Start: 2024-01-28 | End: 2024-01-28

## 2024-01-28 RX ORDER — ONDANSETRON 2 MG/ML
4 INJECTION INTRAMUSCULAR; INTRAVENOUS
Status: COMPLETED | OUTPATIENT
Start: 2024-01-28 | End: 2024-01-28

## 2024-01-28 RX ORDER — AMOXICILLIN AND CLAVULANATE POTASSIUM 875; 125 MG/1; MG/1
1 TABLET, FILM COATED ORAL 2 TIMES DAILY
Qty: 14 TABLET | Refills: 0 | Status: SHIPPED | OUTPATIENT
Start: 2024-01-28 | End: 2024-02-04

## 2024-01-28 RX ADMIN — HYDROMORPHONE HYDROCHLORIDE 1 MG: 1 INJECTION, SOLUTION INTRAMUSCULAR; INTRAVENOUS; SUBCUTANEOUS at 08:25

## 2024-01-28 RX ADMIN — ONDANSETRON 4 MG: 2 INJECTION INTRAMUSCULAR; INTRAVENOUS at 08:25

## 2024-01-28 ASSESSMENT — PAIN DESCRIPTION - ORIENTATION: ORIENTATION: RIGHT;LEFT

## 2024-01-28 ASSESSMENT — PAIN - FUNCTIONAL ASSESSMENT: PAIN_FUNCTIONAL_ASSESSMENT: 0-10

## 2024-01-28 ASSESSMENT — PAIN SCALES - GENERAL: PAINLEVEL_OUTOF10: 10

## 2024-01-28 ASSESSMENT — PAIN DESCRIPTION - LOCATION: LOCATION: LEG

## 2024-01-28 ASSESSMENT — PAIN DESCRIPTION - DESCRIPTORS: DESCRIPTORS: SHOOTING

## 2024-01-28 NOTE — DISCHARGE INSTRUCTIONS
"""Follow ERM w/o surgery. Call if vision decreases or distortion increases.  """ Follow-up with pain management to discuss better control of pain.  Follow-up with home health tomorrow for dressing changes and wound care.  Monitor dog bite closely and start Augmentin for any signs of infection.  Return for any worsening or concerning symptoms.   Pt to ct

## 2024-01-28 NOTE — ED NOTES
I have reviewed discharge instructions with the patient.  The patient verbalized understanding.    Patient left ED via Discharge Method: stretcher to Home with self.    Opportunity for questions and clarification provided.       Patient given 1 scripts.         To continue your aftercare when you leave the hospital, you may receive an automated call from our care team to check in on how you are doing.  This is a free service and part of our promise to provide the best care and service to meet your aftercare needs.” If you have questions, or wish to unsubscribe from this service please call [phone].  Thank you for Choosing our [facility] Emergency Department.        Pino Redd RN  01/28/24 4385

## 2024-01-28 NOTE — ED TRIAGE NOTES
Patient coming from home via ems.    Patient has shooting pain in her legs that has gotten intermittently worst through the night (legs swollen +3 weeping).  Patient was supposed to get ecko Friday.    Per patient primary care provider said to hold Lasixs.    Patient has history of neuropathy, DM2.    Alert oriented x4

## 2024-02-05 ENCOUNTER — TELEPHONE (OUTPATIENT)
Dept: WOUND CARE | Age: 49
End: 2024-02-05

## 2024-02-05 ENCOUNTER — TELEPHONE (OUTPATIENT)
Dept: FAMILY MEDICINE CLINIC | Facility: CLINIC | Age: 49
End: 2024-02-05

## 2024-02-05 NOTE — TELEPHONE ENCOUNTER
Patient called states she is starting to get pain in her heels and would like a podiatry referral. Please advise

## 2024-02-05 NOTE — TELEPHONE ENCOUNTER
Patient called wound center today and left a message stating she hadn't been able to walk all weekend due to terrible pain in her heels and legs. She expressed how badly her legs and feet hurt and she didn't know what she could do for the pain. Zeynep Hudson PT spoke to patient and expressed that we could not prescribe her pain medicine due to her being seen at pain management. Zeynep gave her the vascular office phone number for her to follow up with. Patient stated she would call them since she has not heard from them yet.  This RN also called the patient and left a VM to touch base with wound center if she needed any other guidance and we would see her tomorrow for her appointment.

## 2024-02-07 ENCOUNTER — OFFICE VISIT (OUTPATIENT)
Dept: FAMILY MEDICINE CLINIC | Facility: CLINIC | Age: 49
End: 2024-02-07
Payer: MEDICARE

## 2024-02-07 VITALS
BODY MASS INDEX: 45.99 KG/M2 | OXYGEN SATURATION: 99 % | HEIGHT: 67 IN | SYSTOLIC BLOOD PRESSURE: 122 MMHG | DIASTOLIC BLOOD PRESSURE: 78 MMHG | HEART RATE: 112 BPM | TEMPERATURE: 97.7 F | WEIGHT: 293 LBS

## 2024-02-07 DIAGNOSIS — I89.0 LYMPHEDEMA: ICD-10-CM

## 2024-02-07 DIAGNOSIS — L97.929 ULCERS OF BOTH LOWER LEGS (HCC): Primary | ICD-10-CM

## 2024-02-07 DIAGNOSIS — K21.9 GASTROESOPHAGEAL REFLUX DISEASE, UNSPECIFIED WHETHER ESOPHAGITIS PRESENT: ICD-10-CM

## 2024-02-07 DIAGNOSIS — L97.919 ULCERS OF BOTH LOWER LEGS (HCC): Primary | ICD-10-CM

## 2024-02-07 PROCEDURE — G8482 FLU IMMUNIZE ORDER/ADMIN: HCPCS | Performed by: NURSE PRACTITIONER

## 2024-02-07 PROCEDURE — G8427 DOCREV CUR MEDS BY ELIG CLIN: HCPCS | Performed by: NURSE PRACTITIONER

## 2024-02-07 PROCEDURE — 99214 OFFICE O/P EST MOD 30 MIN: CPT | Performed by: NURSE PRACTITIONER

## 2024-02-07 PROCEDURE — 1036F TOBACCO NON-USER: CPT | Performed by: NURSE PRACTITIONER

## 2024-02-07 PROCEDURE — 3078F DIAST BP <80 MM HG: CPT | Performed by: NURSE PRACTITIONER

## 2024-02-07 PROCEDURE — G8417 CALC BMI ABV UP PARAM F/U: HCPCS | Performed by: NURSE PRACTITIONER

## 2024-02-07 PROCEDURE — 3074F SYST BP LT 130 MM HG: CPT | Performed by: NURSE PRACTITIONER

## 2024-02-07 ASSESSMENT — PATIENT HEALTH QUESTIONNAIRE - PHQ9
9. THOUGHTS THAT YOU WOULD BE BETTER OFF DEAD, OR OF HURTING YOURSELF: SEVERAL DAYS
8. MOVING OR SPEAKING SO SLOWLY THAT OTHER PEOPLE COULD HAVE NOTICED. OR THE OPPOSITE, BEING SO FIGETY OR RESTLESS THAT YOU HAVE BEEN MOVING AROUND A LOT MORE THAN USUAL: 0
1. LITTLE INTEREST OR PLEASURE IN DOING THINGS: SEVERAL DAYS
SUM OF ALL RESPONSES TO PHQ QUESTIONS 1-9: 11
SUM OF ALL RESPONSES TO PHQ QUESTIONS 1-9: 8
9. THOUGHTS THAT YOU WOULD BE BETTER OFF DEAD, OR OF HURTING YOURSELF: 0
2. FEELING DOWN, DEPRESSED OR HOPELESS: 1
8. MOVING OR SPEAKING SO SLOWLY THAT OTHER PEOPLE COULD HAVE NOTICED. OR THE OPPOSITE - BEING SO FIDGETY OR RESTLESS THAT YOU HAVE BEEN MOVING AROUND A LOT MORE THAN USUAL: NOT AT ALL
10. IF YOU CHECKED OFF ANY PROBLEMS, HOW DIFFICULT HAVE THESE PROBLEMS MADE IT FOR YOU TO DO YOUR WORK, TAKE CARE OF THINGS AT HOME, OR GET ALONG WITH OTHER PEOPLE: 2
6. FEELING BAD ABOUT YOURSELF - OR THAT YOU ARE A FAILURE OR HAVE LET YOURSELF OR YOUR FAMILY DOWN: 0
7. TROUBLE CONCENTRATING ON THINGS, SUCH AS READING THE NEWSPAPER OR WATCHING TELEVISION: NOT AT ALL
2. FEELING DOWN, DEPRESSED OR HOPELESS: 1
4. FEELING TIRED OR HAVING LITTLE ENERGY: 3
7. TROUBLE CONCENTRATING ON THINGS, SUCH AS READING THE NEWSPAPER OR WATCHING TELEVISION: 0
SUM OF ALL RESPONSES TO PHQ9 QUESTIONS 1 & 2: 2
SUM OF ALL RESPONSES TO PHQ QUESTIONS 1-9: 8
5. POOR APPETITE OR OVEREATING: 2
SUM OF ALL RESPONSES TO PHQ QUESTIONS 1-9: 8
1. LITTLE INTEREST OR PLEASURE IN DOING THINGS: 1
8. MOVING OR SPEAKING SO SLOWLY THAT OTHER PEOPLE COULD HAVE NOTICED. OR THE OPPOSITE, BEING SO FIGETY OR RESTLESS THAT YOU HAVE BEEN MOVING AROUND A LOT MORE THAN USUAL: 0
9. THOUGHTS THAT YOU WOULD BE BETTER OFF DEAD, OR OF HURTING YOURSELF: 1
2. FEELING DOWN, DEPRESSED OR HOPELESS: SEVERAL DAYS
SUM OF ALL RESPONSES TO PHQ QUESTIONS 1-9: 8
1. LITTLE INTEREST OR PLEASURE IN DOING THINGS: 0
3. TROUBLE FALLING OR STAYING ASLEEP: NEARLY EVERY DAY
10. IF YOU CHECKED OFF ANY PROBLEMS, HOW DIFFICULT HAVE THESE PROBLEMS MADE IT FOR YOU TO DO YOUR WORK, TAKE CARE OF THINGS AT HOME, OR GET ALONG WITH OTHER PEOPLE: 1
5. POOR APPETITE OR OVEREATING: MORE THAN HALF THE DAYS
7. TROUBLE CONCENTRATING ON THINGS, SUCH AS READING THE NEWSPAPER OR WATCHING TELEVISION: 0
5. POOR APPETITE OR OVEREATING: 1
SUM OF ALL RESPONSES TO PHQ9 QUESTIONS 1 & 2: 1
SUM OF ALL RESPONSES TO PHQ QUESTIONS 1-9: 10
3. TROUBLE FALLING OR STAYING ASLEEP: 3
3. TROUBLE FALLING OR STAYING ASLEEP: 3
4. FEELING TIRED OR HAVING LITTLE ENERGY: 2
6. FEELING BAD ABOUT YOURSELF - OR THAT YOU ARE A FAILURE OR HAVE LET YOURSELF OR YOUR FAMILY DOWN: SEVERAL DAYS
10. IF YOU CHECKED OFF ANY PROBLEMS, HOW DIFFICULT HAVE THESE PROBLEMS MADE IT FOR YOU TO DO YOUR WORK, TAKE CARE OF THINGS AT HOME, OR GET ALONG WITH OTHER PEOPLE: VERY DIFFICULT
SUM OF ALL RESPONSES TO PHQ QUESTIONS 1-9: 11
6. FEELING BAD ABOUT YOURSELF - OR THAT YOU ARE A FAILURE OR HAVE LET YOURSELF OR YOUR FAMILY DOWN: 1
4. FEELING TIRED OR HAVING LITTLE ENERGY: MORE THAN HALF THE DAYS
SUM OF ALL RESPONSES TO PHQ QUESTIONS 1-9: 11
SUM OF ALL RESPONSES TO PHQ QUESTIONS 1-9: 11

## 2024-02-07 ASSESSMENT — ENCOUNTER SYMPTOMS: COUGH: 1

## 2024-02-07 ASSESSMENT — COLUMBIA-SUICIDE SEVERITY RATING SCALE - C-SSRS
6. IN YOUR LIFETIME, HAVE YOU EVER DONE ANYTHING, STARTED TO DO ANYTHING, OR PREPARED TO DO ANYTHING TO END YOUR LIFE?: YES
2. IN THE PAST MONTH, HAVE YOU ACTUALLY HAD ANY THOUGHTS OF KILLING YOURSELF?: NO
7. DID THIS OCCUR IN THE LAST THREE MONTHS: NO
1. IN THE PAST MONTH, HAVE YOU WISHED YOU WERE DEAD OR WISHED YOU COULD GO TO SLEEP AND NOT WAKE UP?: YES

## 2024-02-07 NOTE — PROGRESS NOTES
[facility] [address]  [place] [address]   () [phone] (fax) [phone]  ROLAND Bruce      Chief Complaint   Patient presents with    Pain     R foot pain; all over generalized pain    Cough       48  y.o. female with medical history of anxiety, bipolar disorder, CKD stage III, diabetes in remission, GERD, hypertension, peripheral neuropathy, stroke without residual deficit, sleep apnea, and vertigo.  She is still under wound management and still has PT coming out as well. She went to [facility] ER on 02/05/24 with bilateral leg pain and weeping wounds, as well as increasing pain. No fevers or chills. She reports that they re-wrapped her legs and told her to follow up with [facility]. She is very frustrated and feels no one is listening. She is requesting to be sent to [facility] wound management. She feels that [facility] is not healing  her wounds. She has also been referred to vascular and needs to schedule appt.     Pain  Associated symptoms include coughing.   Cough        Allergies   Allergen Reactions    Ciprofloxacin Itching    Sulfa Antibiotics Itching    Xarelto [Rivaroxaban] Itching       Past Medical History:   Diagnosis Date    Anxiety     Arthritis     Bipolar affective disorder (HCC) 15 yrs old    Cellulitis 01/08/2010    denies currently     Cerebrovascular disease     Chronic back pain     CKD (chronic kidney disease) stage 3, GFR 30-59 ml/min ([facility])     Deep vein thrombosis ([facility]) 12/13/2022    Depression     Diabetes ([facility]) dx:1/09    Unknown A1C    Diabetes ([facility]) 01/08/2010    had multiple TIA's from low bs in 2014 and hasn't been on any meds since    Diabetes mellitus type II, non insulin dependent ([facility]) 03/08/2015    Disease of blood and blood forming organ     Edema of both legs 01/08/2010    ankles     Femur fracture, left ([facility]) 03/08/2015    GERD (gastroesophageal reflux disease)

## 2024-02-08 RX ORDER — OMEPRAZOLE 20 MG/1
20 CAPSULE, DELAYED RELEASE ORAL DAILY
Qty: 90 CAPSULE | Refills: 0 | OUTPATIENT
Start: 2024-02-08

## 2024-02-13 DIAGNOSIS — K21.9 GASTROESOPHAGEAL REFLUX DISEASE, UNSPECIFIED WHETHER ESOPHAGITIS PRESENT: ICD-10-CM

## 2024-02-13 DIAGNOSIS — L50.9 URTICARIA: ICD-10-CM

## 2024-02-13 DIAGNOSIS — J30.9 ALLERGIC RHINITIS, UNSPECIFIED SEASONALITY, UNSPECIFIED TRIGGER: ICD-10-CM

## 2024-02-13 DIAGNOSIS — I10 PRIMARY HYPERTENSION: ICD-10-CM

## 2024-02-13 RX ORDER — LOSARTAN POTASSIUM 50 MG/1
50 TABLET ORAL DAILY
Qty: 90 TABLET | Refills: 1 | OUTPATIENT
Start: 2024-02-13

## 2024-02-13 RX ORDER — LOSARTAN POTASSIUM 50 MG/1
50 TABLET ORAL DAILY
Qty: 90 TABLET | Refills: 0 | Status: SHIPPED | OUTPATIENT
Start: 2024-02-13

## 2024-02-13 RX ORDER — LORATADINE 10 MG/1
TABLET ORAL
Qty: 90 TABLET | Refills: 0 | OUTPATIENT
Start: 2024-02-13

## 2024-02-13 RX ORDER — OMEPRAZOLE 20 MG/1
20 CAPSULE, DELAYED RELEASE ORAL DAILY
Qty: 90 CAPSULE | Refills: 0 | OUTPATIENT
Start: 2024-02-13

## 2024-02-13 RX ORDER — HYDROXYZINE HYDROCHLORIDE 25 MG/1
25 TABLET, FILM COATED ORAL NIGHTLY
Qty: 30 TABLET | Refills: 1 | Status: SHIPPED | OUTPATIENT
Start: 2024-02-13 | End: 2024-04-13

## 2024-02-13 RX ORDER — HYDROXYZINE HYDROCHLORIDE 25 MG/1
25 TABLET, FILM COATED ORAL NIGHTLY
Qty: 30 TABLET | Refills: 1 | Status: CANCELLED | OUTPATIENT
Start: 2024-02-13 | End: 2024-04-13

## 2024-02-16 ENCOUNTER — HOSPITAL ENCOUNTER (EMERGENCY)
Age: 49
Discharge: HOME | End: 2024-02-16
Attending: EMERGENCY MEDICINE
Payer: MEDICARE

## 2024-02-16 ENCOUNTER — APPOINTMENT (OUTPATIENT)
Dept: GENERAL RADIOLOGY | Age: 49
End: 2024-02-16
Payer: MEDICARE

## 2024-02-16 VITALS
TEMPERATURE: 97.8 F | DIASTOLIC BLOOD PRESSURE: 99 MMHG | WEIGHT: 293 LBS | HEIGHT: 67 IN | OXYGEN SATURATION: 98 % | RESPIRATION RATE: 18 BRPM | SYSTOLIC BLOOD PRESSURE: 156 MMHG | BODY MASS INDEX: 45.99 KG/M2 | HEART RATE: 102 BPM

## 2024-02-16 DIAGNOSIS — I83.009 VENOUS STASIS ULCER, UNSPECIFIED SITE, UNSPECIFIED ULCER STAGE, UNSPECIFIED WHETHER VARICOSE VEINS PRESENT (HCC): ICD-10-CM

## 2024-02-16 DIAGNOSIS — G89.4 CHRONIC PAIN SYNDROME: ICD-10-CM

## 2024-02-16 DIAGNOSIS — M25.551 BILATERAL HIP PAIN: ICD-10-CM
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
## 2024-02-16 DIAGNOSIS — M25.552 BILATERAL HIP PAIN: ICD-10-CM

## 2024-02-16 DIAGNOSIS — L97.909 VENOUS STASIS ULCER, UNSPECIFIED SITE, UNSPECIFIED ULCER STAGE, UNSPECIFIED WHETHER VARICOSE VEINS PRESENT (HCC): ICD-10-CM

## 2024-02-16 DIAGNOSIS — G60.9 PERIPHERAL NEUROPATHY, IDIOPATHIC: ICD-10-CM

## 2024-02-16 LAB
ALBUMIN SERPL-MCNC: 3.1 G/DL (ref 3.5–5)
ALBUMIN/GLOB SERPL: 0.6 (ref 0.4–1.6)
ALP SERPL-CCNC: 98 U/L (ref 50–136)
ALT SERPL-CCNC: 41 U/L (ref 12–65)
ANION GAP SERPL CALC-SCNC: 3 MMOL/L (ref 2–11)
APAP SERPL-MCNC: <2 UG/ML (ref 10–30)
APPEARANCE UR: CLEAR
AST SERPL-CCNC: 108 U/L (ref 15–37)
BASOPHILS # BLD: 0 K/UL (ref 0–0.2)
BASOPHILS NFR BLD: 0 % (ref 0–2)
BILIRUB SERPL-MCNC: 0.6 MG/DL (ref 0.2–1.1)
BILIRUB UR QL: NEGATIVE
BUN SERPL-MCNC: 10 MG/DL (ref 6–23)
CALCIUM SERPL-MCNC: 8.8 MG/DL (ref 8.3–10.4)
CHLORIDE SERPL-SCNC: 105 MMOL/L (ref 103–113)
CO2 SERPL-SCNC: 25 MMOL/L (ref 21–32)
COLOR UR: ABNORMAL
CREAT SERPL-MCNC: 0.9 MG/DL (ref 0.6–1)
DIFFERENTIAL METHOD BLD: ABNORMAL
EKG ATRIAL RATE: 87 BPM
EKG DIAGNOSIS: NORMAL
EKG P AXIS: 65 DEGREES
EKG P-R INTERVAL: 157 MS
EKG Q-T INTERVAL: 408 MS
EKG QRS DURATION: 87 MS
EKG QTC CALCULATION (BAZETT): 488 MS
EKG R AXIS: 56 DEGREES
EKG T AXIS: 62 DEGREES
EKG VENTRICULAR RATE: 86 BPM
EOSINOPHIL # BLD: 0.2 K/UL (ref 0–0.8)
EOSINOPHIL NFR BLD: 3 % (ref 0.5–7.8)
ERYTHROCYTE [DISTWIDTH] IN BLOOD BY AUTOMATED COUNT: 16.6 % (ref 11.9–14.6)
GLOBULIN SER CALC-MCNC: 5.5 G/DL (ref 2.8–4.5)
GLUCOSE SERPL-MCNC: 85 MG/DL (ref 65–100)
GLUCOSE UR STRIP.AUTO-MCNC: NEGATIVE MG/DL
HCT VFR BLD AUTO: 34.5 % (ref 35.8–46.3)
HGB BLD-MCNC: 10.5 G/DL (ref 11.7–15.4)
HGB UR QL STRIP: NEGATIVE
IMM GRANULOCYTES # BLD AUTO: 0 K/UL (ref 0–0.5)
IMM GRANULOCYTES NFR BLD AUTO: 0 % (ref 0–5)
KETONES UR QL STRIP.AUTO: 15 MG/DL
LACTATE SERPL-SCNC: 1 MMOL/L (ref 0.4–2)
LEUKOCYTE ESTERASE UR QL STRIP.AUTO: NEGATIVE
LYMPHOCYTES # BLD: 1 K/UL (ref 0.5–4.6)
LYMPHOCYTES NFR BLD: 15 % (ref 13–44)
MCH RBC QN AUTO: 26.1 PG (ref 26.1–32.9)
MCHC RBC AUTO-ENTMCNC: 30.4 G/DL (ref 31.4–35)
MCV RBC AUTO: 85.8 FL (ref 82–102)
MONOCYTES # BLD: 0.5 K/UL (ref 0.1–1.3)
MONOCYTES NFR BLD: 8 % (ref 4–12)
NEUTS SEG # BLD: 4.9 K/UL (ref 1.7–8.2)
NEUTS SEG NFR BLD: 74 % (ref 43–78)
NITRITE UR QL STRIP.AUTO: NEGATIVE
NRBC # BLD: 0 K/UL (ref 0–0.2)
NT PRO BNP: 114 PG/ML (ref 5–125)
PH UR STRIP: 5.5 (ref 5–9)
PLATELET # BLD AUTO: 475 K/UL (ref 150–450)
PMV BLD AUTO: 9.2 FL (ref 9.4–12.3)
POTASSIUM SERPL-SCNC: 4.8 MMOL/L (ref 3.5–5.1)
POTASSIUM SERPL-SCNC: 6 MMOL/L (ref 3.5–5.1)
PROCALCITONIN SERPL-MCNC: <0.05 NG/ML (ref 0–0.49)
PROT SERPL-MCNC: 8.6 G/DL (ref 6.3–8.2)
PROT UR STRIP-MCNC: NEGATIVE MG/DL
RBC # BLD AUTO: 4.02 M/UL (ref 4.05–5.2)
SODIUM SERPL-SCNC: 133 MMOL/L (ref 136–146)
SP GR UR REFRACTOMETRY: 1.02 (ref 1–1.02)
UROBILINOGEN UR QL STRIP.AUTO: 0.2 EU/DL (ref 0.2–1)
WBC # BLD AUTO: 6.6 K/UL (ref 4.3–11.1)

## 2024-02-16 PROCEDURE — 6360000002 HC RX W HCPCS: Performed by: EMERGENCY MEDICINE

## 2024-02-16 PROCEDURE — 71045 X-RAY EXAM CHEST 1 VIEW: CPT

## 2024-02-16 PROCEDURE — 93005 ELECTROCARDIOGRAM TRACING: CPT | Performed by: EMERGENCY MEDICINE

## 2024-02-16 PROCEDURE — 87040 BLOOD CULTURE FOR BACTERIA: CPT

## 2024-02-16 PROCEDURE — 6370000000 HC RX 637 (ALT 250 FOR IP): Performed by: EMERGENCY MEDICINE

## 2024-02-16 PROCEDURE — 72170 X-RAY EXAM OF PELVIS: CPT

## 2024-02-16 PROCEDURE — 83605 ASSAY OF LACTIC ACID: CPT

## 2024-02-16 PROCEDURE — 96375 TX/PRO/DX INJ NEW DRUG ADDON: CPT

## 2024-02-16 PROCEDURE — 96374 THER/PROPH/DIAG INJ IV PUSH: CPT

## 2024-02-16 PROCEDURE — 2500000003 HC RX 250 WO HCPCS: Performed by: EMERGENCY MEDICINE

## 2024-02-16 PROCEDURE — 80143 DRUG ASSAY ACETAMINOPHEN: CPT

## 2024-02-16 PROCEDURE — 96376 TX/PRO/DX INJ SAME DRUG ADON: CPT

## 2024-02-16 PROCEDURE — 83880 ASSAY OF NATRIURETIC PEPTIDE: CPT

## 2024-02-16 PROCEDURE — 80053 COMPREHEN METABOLIC PANEL: CPT

## 2024-02-16 PROCEDURE — 84132 ASSAY OF SERUM POTASSIUM: CPT

## 2024-02-16 PROCEDURE — 36415 COLL VENOUS BLD VENIPUNCTURE: CPT

## 2024-02-16 PROCEDURE — 93010 ELECTROCARDIOGRAM REPORT: CPT | Performed by: INTERNAL MEDICINE

## 2024-02-16 PROCEDURE — 84145 PROCALCITONIN (PCT): CPT

## 2024-02-16 PROCEDURE — 73610 X-RAY EXAM OF ANKLE: CPT

## 2024-02-16 PROCEDURE — 99285 EMERGENCY DEPT VISIT HI MDM: CPT

## 2024-02-16 PROCEDURE — 2580000003 HC RX 258: Performed by: EMERGENCY MEDICINE

## 2024-02-16 PROCEDURE — 85025 COMPLETE CBC W/AUTO DIFF WBC: CPT

## 2024-02-16 PROCEDURE — 81003 URINALYSIS AUTO W/O SCOPE: CPT

## 2024-02-16 RX ORDER — PREGABALIN 150 MG/1
150 CAPSULE ORAL
Status: COMPLETED | OUTPATIENT
Start: 2024-02-16 | End: 2024-02-16

## 2024-02-16 RX ORDER — OXYCODONE HYDROCHLORIDE 5 MG/1
5 TABLET ORAL
Status: COMPLETED | OUTPATIENT
Start: 2024-02-16 | End: 2024-02-16

## 2024-02-16 RX ORDER — ONDANSETRON 2 MG/ML
4 INJECTION INTRAMUSCULAR; INTRAVENOUS
Status: COMPLETED | OUTPATIENT
Start: 2024-02-16 | End: 2024-02-16

## 2024-02-16 RX ORDER — HYDROMORPHONE HYDROCHLORIDE 1 MG/ML
0.5 INJECTION, SOLUTION INTRAMUSCULAR; INTRAVENOUS; SUBCUTANEOUS
Status: COMPLETED | OUTPATIENT
Start: 2024-02-16 | End: 2024-02-16

## 2024-02-16 RX ORDER — 0.9 % SODIUM CHLORIDE 0.9 %
500 INTRAVENOUS SOLUTION INTRAVENOUS
Status: COMPLETED | OUTPATIENT
Start: 2024-02-16 | End: 2024-02-16

## 2024-02-16 RX ADMIN — PREGABALIN 150 MG: 150 CAPSULE ORAL at 10:33

## 2024-02-16 RX ADMIN — SODIUM CHLORIDE 500 ML: 9 INJECTION, SOLUTION INTRAVENOUS at 08:21

## 2024-02-16 RX ADMIN — HYDROMORPHONE HYDROCHLORIDE 0.5 MG: 1 INJECTION, SOLUTION INTRAMUSCULAR; INTRAVENOUS; SUBCUTANEOUS at 10:32

## 2024-02-16 RX ADMIN — ONDANSETRON 4 MG: 2 INJECTION INTRAMUSCULAR; INTRAVENOUS at 08:15

## 2024-02-16 RX ADMIN — OXYCODONE 5 MG: 5 TABLET ORAL at 13:14

## 2024-02-16 RX ADMIN — HYDROMORPHONE HYDROCHLORIDE 0.5 MG: 1 INJECTION, SOLUTION INTRAMUSCULAR; INTRAVENOUS; SUBCUTANEOUS at 08:15

## 2024-02-16 ASSESSMENT — PAIN - FUNCTIONAL ASSESSMENT: PAIN_FUNCTIONAL_ASSESSMENT: 0-10

## 2024-02-16 ASSESSMENT — PAIN SCALES - GENERAL
PAINLEVEL_OUTOF10: 8
PAINLEVEL_OUTOF10: 10

## 2024-02-16 ASSESSMENT — PAIN DESCRIPTION - LOCATION: LOCATION: LEG;GENERALIZED

## 2024-02-16 ASSESSMENT — PAIN DESCRIPTION - ORIENTATION: ORIENTATION: RIGHT

## 2024-02-16 NOTE — ED TRIAGE NOTES
Patient arrives via [facility] from home with [place] multiple falls. Last fall 0100 last night. Intermittent confusion with EMS. Reports pain in legs, with chronic weeping wounds. Patient denies getting dizzy or LOC reports severe right leg pain, pt reports taking \"6 extra strength tylenol every 6 hours\" pt denies fevers.

## 2024-02-16 NOTE — ED PROVIDER NOTES
Normal rate and regular rhythm.      Heart sounds: No murmur heard.  Pulmonary:      Effort: Pulmonary effort is normal.      Breath sounds: Normal breath sounds.   Abdominal:      General: Abdomen is flat.      Palpations: There is no mass.      Tenderness: There is no abdominal tenderness. There is no right CVA tenderness or left CVA tenderness.   Musculoskeletal:         General: Normal range of motion.      Cervical back: Normal range of motion and neck supple.      Right lower leg: 3+ Pitting Edema present.      Left lower leg: 3+ Pitting Edema present.      Comments: Left hand reveals moderate swelling, minimal erythema and no warmth with no evidence of discharge from a small abrasion/puncture wound from her dog apparently.  Patient states she has been squeezing it trying to get something out but nothing is coming out.  No evidence of streaking up the arm.   Skin:     General: Skin is warm and dry.      Comments: Chronic venous stasis changes bilateral lower extremities with open sores to bilateral lower extremities, right lower extremity very painful with any movement.   Neurological:      General: No focal deficit present.      Mental Status: She is alert and oriented to person, place, and time.   Psychiatric:         Mood and Affect: Mood and affect normal.         Speech: Speech normal.          Procedures     Procedures    Orders Placed This Encounter   Procedures    Blood Culture 1    Blood Culture 2    XR CHEST PORTABLE    XR PELVIS (1-2 VIEWS)    XR ANKLE RIGHT (MIN 3 VIEWS)    Comprehensive Metabolic Panel    CBC with Auto Differential    Lactate, Sepsis    Procalcitonin    Urinalysis w/Reflex to Micro    Acetaminophen Level    Brain Natriuretic Peptide    Potassium    Neurologic Status Assessment    Strict intake and output    Vital Signs Per Unit Routine    EKG 12 Lead    Saline lock IV        Medications given during this emergency department visit:  Medications   oxyCODONE (ROXICODONE) immediate  Leukocyte Esterase, Urine Negative NEG     Acetaminophen Level    Collection Time: 02/16/24  8:32 AM   Result Value Ref Range    Acetaminophen Level <2 (L) 10.0 - 30.0 ug/mL   Brain Natriuretic Peptide    Collection Time: 02/16/24  8:32 AM   Result Value Ref Range    NT Pro- 5 - 125 PG/ML   Potassium    Collection Time: 02/16/24 10:31 AM   Result Value Ref Range    Potassium 4.8 3.5 - 5.1 mmol/L     XR CHEST PORTABLE   Final Result      Bibasilar subsegmental atelectasis         XR PELVIS (1-2 VIEWS)   Final Result   No radiographic evidence of acute osseous pelvic injury.      Findings as above.      XR ANKLE RIGHT (MIN 3 VIEWS)   Final Result   Soft tissue swelling.      No other radiographic evidence of acute right ankle injury.      Follow-up if the patient remains symptomatic.          I discussed the results of all labs, procedures, radiographs, and treatments with the patient and available family.  Treatment plan is agreed upon and the patient is ready for discharge.  All voiced understanding of the discharge plan and medication instructions or changes as appropriate.  Questions about treatment in the ED were answered.  All were encouraged to return should symptoms worsen or new problems develop.   Voice dictation software was used during the making of this note.  This software is not perfect and grammatical and other typographical errors may be present.  This note has not been completely proofread for errors.     Osito Boone MD  02/16/24 9374

## 2024-02-16 NOTE — ED NOTES
Patient helped to change into home clothes. Discharge instructions reviewed and and patient accompanied by staff to vehicle with family to take patient home. All questions answered and pt verbalized understanding     Andreina Pascual RN  02/16/24 7545

## 2024-02-23 ENCOUNTER — OFFICE VISIT (OUTPATIENT)
Dept: VASCULAR SURGERY | Age: 49
End: 2024-02-23
Payer: MEDICARE

## 2024-02-23 VITALS
DIASTOLIC BLOOD PRESSURE: 68 MMHG | HEIGHT: 67 IN | WEIGHT: 293 LBS | HEART RATE: 110 BPM | BODY MASS INDEX: 45.99 KG/M2 | OXYGEN SATURATION: 93 % | SYSTOLIC BLOOD PRESSURE: 132 MMHG

## 2024-02-23 DIAGNOSIS — I89.0 LYMPHEDEMA OF BOTH LOWER EXTREMITIES: Chronic | ICD-10-CM

## 2024-02-23 DIAGNOSIS — I73.9 PVD (PERIPHERAL VASCULAR DISEASE) WITH CLAUDICATION (HCC): Primary | ICD-10-CM

## 2024-02-23 PROCEDURE — G8427 DOCREV CUR MEDS BY ELIG CLIN: HCPCS | Performed by: SURGERY

## 2024-02-23 PROCEDURE — 3075F SYST BP GE 130 - 139MM HG: CPT | Performed by: SURGERY

## 2024-02-23 PROCEDURE — 1036F TOBACCO NON-USER: CPT | Performed by: SURGERY

## 2024-02-23 PROCEDURE — 99204 OFFICE O/P NEW MOD 45 MIN: CPT | Performed by: SURGERY

## 2024-02-23 PROCEDURE — G8417 CALC BMI ABV UP PARAM F/U: HCPCS | Performed by: SURGERY

## 2024-02-23 PROCEDURE — 3078F DIAST BP <80 MM HG: CPT | Performed by: SURGERY

## 2024-02-23 PROCEDURE — G8482 FLU IMMUNIZE ORDER/ADMIN: HCPCS | Performed by: SURGERY

## 2024-02-23 NOTE — PROGRESS NOTES
Other Son         24 week twin, LD    Colon Cancer Other     Other Cousin         ? femur fracture     Past Surgical History:   Procedure Laterality Date     SECTION      DELIVERY   ,     both preemies    HEENT      Tonsillectomy/SFE/18    HERNIA REPAIR  age 4    JOINT REPLACEMENT      TONSILLECTOMY      TOTAL HIP ARTHROPLASTY Left     Dr. Andersen    TUBAL LIGATION       Social History     Tobacco Use    Smoking status: Never    Smokeless tobacco: Never   Substance Use Topics    Alcohol use: No        Review of Systems  Constitutional: Negative for fever and chills.   HENT: Negative for congestion and sore throat.    Skin: Negative for rash and itching.  Eyes: Negative for blurred vision and double vision.   Respiratory: Negative for cough and shortness of breath.    Cardiovascular: Negative for chest pain, palpitations, claudication and leg swelling.   Gastrointestinal: Negative for nausea, vomiting and abdominal pain.   Genitourinary: Negative for dysuria, hematuria and flank pain.  Musculoskeletal: Negative for joint pain and falls.  Neurological: Negative for dizziness, sensory change, focal weakness, loss of consciousness and headaches.     PHYSICAL EXAM   [unfilled]     Constitutional: she appears well-developed. No distress.   HENT: Hearing intact.  Head: Atraumatic.   Eyes: Pupils are equal, round, and reactive to light.   Neck: Normal range of motion.   Cardiovascular: Regular rhythm.    Pulmonary/Chest: Effort normal and breath sounds normal. No respiratory distress.   Abdominal: Soft. Bowel sounds are normal. she exhibits no distension. There is no tenderness. There is no guarding. No hernia.   Musculoskeletal: Normal range of motion.   Neurological: She is alert. CN II- XII grossly intact  Skin: Warm and dry.  Vascular: Superficial abrasions bilateral legs      Imaging Results  Duplex study showed triphasic flow down to the tibial vessels and toe pressures

## 2024-02-27 ENCOUNTER — OFFICE VISIT (OUTPATIENT)
Dept: FAMILY MEDICINE CLINIC | Facility: CLINIC | Age: 49
End: 2024-02-27
Payer: MEDICARE

## 2024-02-27 VITALS
WEIGHT: 293 LBS | HEART RATE: 97 BPM | BODY MASS INDEX: 45.99 KG/M2 | DIASTOLIC BLOOD PRESSURE: 78 MMHG | TEMPERATURE: 98.3 F | HEIGHT: 67 IN | OXYGEN SATURATION: 100 % | SYSTOLIC BLOOD PRESSURE: 128 MMHG

## 2024-02-27 DIAGNOSIS — I10 PRIMARY HYPERTENSION: Primary | ICD-10-CM

## 2024-02-27 DIAGNOSIS — L50.9 URTICARIA: ICD-10-CM
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
## 2024-02-27 DIAGNOSIS — K21.9 GASTROESOPHAGEAL REFLUX DISEASE, UNSPECIFIED WHETHER ESOPHAGITIS PRESENT: ICD-10-CM

## 2024-02-27 DIAGNOSIS — J30.9 ALLERGIC RHINITIS, UNSPECIFIED SEASONALITY, UNSPECIFIED TRIGGER: ICD-10-CM

## 2024-02-27 PROCEDURE — G8482 FLU IMMUNIZE ORDER/ADMIN: HCPCS | Performed by: NURSE PRACTITIONER

## 2024-02-27 PROCEDURE — 3074F SYST BP LT 130 MM HG: CPT | Performed by: NURSE PRACTITIONER

## 2024-02-27 PROCEDURE — G8427 DOCREV CUR MEDS BY ELIG CLIN: HCPCS | Performed by: NURSE PRACTITIONER

## 2024-02-27 PROCEDURE — 99214 OFFICE O/P EST MOD 30 MIN: CPT | Performed by: NURSE PRACTITIONER

## 2024-02-27 PROCEDURE — G8417 CALC BMI ABV UP PARAM F/U: HCPCS | Performed by: NURSE PRACTITIONER

## 2024-02-27 PROCEDURE — 3078F DIAST BP <80 MM HG: CPT | Performed by: NURSE PRACTITIONER

## 2024-02-27 PROCEDURE — 1036F TOBACCO NON-USER: CPT | Performed by: NURSE PRACTITIONER

## 2024-02-27 RX ORDER — HYDROXYZINE HYDROCHLORIDE 25 MG/1
25 TABLET, FILM COATED ORAL NIGHTLY
Qty: 30 TABLET | Refills: 1 | Status: CANCELLED | OUTPATIENT
Start: 2024-02-27 | End: 2024-04-27

## 2024-02-27 RX ORDER — LOSARTAN POTASSIUM 50 MG/1
50 TABLET ORAL DAILY
Qty: 90 TABLET | Refills: 0 | Status: SHIPPED | OUTPATIENT
Start: 2024-02-27

## 2024-02-27 RX ORDER — AMOXICILLIN AND CLAVULANATE POTASSIUM 875; 125 MG/1; MG/1
1 TABLET, FILM COATED ORAL 2 TIMES DAILY
Qty: 14 TABLET | Refills: 0 | Status: SHIPPED | OUTPATIENT
Start: 2024-02-27 | End: 2024-03-05

## 2024-02-27 RX ORDER — OMEPRAZOLE 20 MG/1
20 CAPSULE, DELAYED RELEASE ORAL DAILY
Qty: 90 CAPSULE | Refills: 0 | Status: SHIPPED | OUTPATIENT
Start: 2024-02-27

## 2024-02-27 RX ORDER — LORATADINE 10 MG/1
TABLET ORAL
Qty: 90 TABLET | Refills: 0 | Status: SHIPPED | OUTPATIENT
Start: 2024-02-27

## 2024-02-27 RX ORDER — LACTOSE-REDUCED FOOD
1 POWDER (GRAM) ORAL 2 TIMES DAILY
Qty: 5 BOX | Refills: 0 | Status: SHIPPED | OUTPATIENT
Start: 2024-02-27

## 2024-02-27 SDOH — ECONOMIC STABILITY: FOOD INSECURITY: WITHIN THE PAST 12 MONTHS, THE FOOD YOU BOUGHT JUST DIDN'T LAST AND YOU DIDN'T HAVE MONEY TO GET MORE.: NEVER TRUE

## 2024-02-27 SDOH — ECONOMIC STABILITY: INCOME INSECURITY: HOW HARD IS IT FOR YOU TO PAY FOR THE VERY BASICS LIKE FOOD, HOUSING, MEDICAL CARE, AND HEATING?: NOT HARD AT ALL

## 2024-02-27 SDOH — ECONOMIC STABILITY: FOOD INSECURITY: WITHIN THE PAST 12 MONTHS, YOU WORRIED THAT YOUR FOOD WOULD RUN OUT BEFORE YOU GOT MONEY TO BUY MORE.: NEVER TRUE

## 2024-02-27 ASSESSMENT — ENCOUNTER SYMPTOMS
RESPIRATORY NEGATIVE: 1
EYES NEGATIVE: 1
ALLERGIC/IMMUNOLOGIC NEGATIVE: 1
GASTROINTESTINAL NEGATIVE: 1

## 2024-02-27 NOTE — PROGRESS NOTES
medical treatment long term    Orthopnea     Osteoarthritis     Other ill-defined conditions(799.89)     cellulitis    Peripheral neuropathy 2 years    Peripheral vascular disease (HCC)      delivery 10/4/1996    Birth of my twins 24wks     labor 10/4/1996    Psychiatric disorder     bipolar disorder    Renal insufficiency 2010    Restless legs syndrome     Stroke (HCC)     multiple TIA's in 2014; no residual     Unspecified sleep apnea     does not use cpap    Vertigo 2015       Family History   Problem Relation Age of Onset    No Known Problems Mother     Heart Attack Father     Other Father         aneurysm age 58, now brain damaged    Heart Disease Father     Cancer Sister         hodgkins    Breast Cancer Sister     Breast Cancer Sister     Diabetes Paternal Grandmother     Other Son         24 week twin, LD    Colon Cancer Other     Other Cousin         ? femur fracture       Social History     Socioeconomic History    Marital status: Single     Spouse name: Not on file    Number of children: Not on file    Years of education: Not on file    Highest education level: Not on file   Occupational History    Not on file   Tobacco Use    Smoking status: Never    Smokeless tobacco: Never   Substance and Sexual Activity    Alcohol use: No    Drug use: Yes     Types: Prescription    Sexual activity: Not Currently     Partners: Male   Other Topics Concern    Not on file   Social History Narrative    Patient is single, lives with disabled father, 13 year old disabled son and 10 year old daughter.  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 She reports a lot of stress, including her 16 year old        :  lives with her 25 yo special needs son and her daughter (? Serenity) and her daughter's child      Social Determinants of Health     Financial Resource Strain: Low Risk  (2024)    Overall Financial Resource Strain (CARDIA)

## 2024-02-29 DIAGNOSIS — I83.009 VENOUS STASIS ULCER, UNSPECIFIED SITE, UNSPECIFIED ULCER STAGE, UNSPECIFIED WHETHER VARICOSE VEINS PRESENT (HCC): ICD-10-CM

## 2024-02-29 DIAGNOSIS — G62.9 NEUROPATHY: ICD-10-CM

## 2024-02-29 DIAGNOSIS — L97.509 TYPE 2 DIABETES MELLITUS WITH FOOT ULCER, WITHOUT LONG-TERM CURRENT USE OF INSULIN (HCC): Primary | ICD-10-CM

## 2024-02-29 DIAGNOSIS — L97.909 VENOUS STASIS ULCER, UNSPECIFIED SITE, UNSPECIFIED ULCER STAGE, UNSPECIFIED WHETHER VARICOSE VEINS PRESENT (HCC): ICD-10-CM

## 2024-02-29 DIAGNOSIS — E11.621 TYPE 2 DIABETES MELLITUS WITH FOOT ULCER, WITHOUT LONG-TERM CURRENT USE OF INSULIN (HCC): Primary | ICD-10-CM

## 2024-03-06 ENCOUNTER — TELEPHONE (OUTPATIENT)
Dept: FAMILY MEDICINE CLINIC | Facility: CLINIC | Age: 49
End: 2024-03-06

## 2024-03-06 NOTE — TELEPHONE ENCOUNTER
Patient called stating that the DME order sent for the shoes was sent to a facility no longer doing that.  Please send DME order to another location and call her with the information of where it was sent please.  Thanks

## 2024-03-09 ENCOUNTER — TELEPHONE (OUTPATIENT)
Dept: FAMILY MEDICINE CLINIC | Facility: CLINIC | Age: 49
End: 2024-03-09

## 2024-03-09 DIAGNOSIS — K62.5 BRBPR (BRIGHT RED BLOOD PER RECTUM): Primary | ICD-10-CM

## 2024-03-09 DIAGNOSIS — I89.0 LYMPHEDEMA OF BOTH LOWER EXTREMITIES: Chronic | ICD-10-CM

## 2024-03-12 ENCOUNTER — TELEPHONE (OUTPATIENT)
Dept: FAMILY MEDICINE CLINIC | Facility: CLINIC | Age: 49
End: 2024-03-12

## 2024-03-12 DIAGNOSIS — L30.4 INTERTRIGO: ICD-10-CM

## 2024-03-12 RX ORDER — FLUCONAZOLE 150 MG/1
TABLET ORAL
Qty: 1 TABLET | Refills: 0 | Status: SHIPPED | OUTPATIENT
Start: 2024-03-12

## 2024-03-13 ENCOUNTER — TELEPHONE (OUTPATIENT)
Dept: FAMILY MEDICINE CLINIC | Facility: CLINIC | Age: 49
End: 2024-03-13

## 2024-03-13 ENCOUNTER — TELEPHONE (OUTPATIENT)
Dept: WOUND CARE | Age: 49
End: 2024-03-13

## 2024-03-13 NOTE — TELEPHONE ENCOUNTER
Patient called crying this morning stating that her legs are causing her extreme pain and are more red then before as well. Pt almost unable to talk due to crying in pain. Explained to patient that if she is having so much pain and increased redness we recommend to go to the ER asap for eval for possible need for IV antibiotics. Pt was not very happy with this recommendation stating that \" you are not going to help me.\" Calmly explained to patient that we do not give pain medication prescriptions at the outpatient office and would like patient to get a full evaluation at the ER to see what could possibly be causing the pain. Pt has follow up with us on Friday. Pt states she has been to the ER \"too much.\" Still recommend patient go to ER for full workup prior to coming to outpatient clinic. Pt very reluctant at this response and states \" you do not care about me. \" Reassured patient that we do care and this recommendation to go to the ER is for her best interest and best care for her problems she states she has at this time. Pt hung up phone after this.

## 2024-03-13 NOTE — TELEPHONE ENCOUNTER
Patient called crying and upset. Her legs are hurting so bad, she has called all her doctors office and no one is helping her. She states she will not go to the ER. Patient would like a call back, Please advise

## 2024-03-14 ENCOUNTER — HOSPITAL ENCOUNTER (EMERGENCY)
Age: 49
Discharge: HOME | End: 2024-03-14
Attending: EMERGENCY MEDICINE
Payer: MEDICARE

## 2024-03-14 VITALS
RESPIRATION RATE: 18 BRPM | OXYGEN SATURATION: 98 % | SYSTOLIC BLOOD PRESSURE: 131 MMHG | HEIGHT: 67 IN | DIASTOLIC BLOOD PRESSURE: 82 MMHG | BODY MASS INDEX: 45.99 KG/M2 | WEIGHT: 293 LBS | TEMPERATURE: 98.6 F | HEART RATE: 77 BPM

## 2024-03-14 DIAGNOSIS — N39.0 URINARY TRACT INFECTION WITHOUT HEMATURIA, SITE UNSPECIFIED: Primary | ICD-10-CM

## 2024-03-14 DIAGNOSIS — L97.909 CHRONIC CUTANEOUS VENOUS STASIS ULCER (HCC): ICD-10-CM

## 2024-03-14 DIAGNOSIS — I83.009 CHRONIC CUTANEOUS VENOUS STASIS ULCER (HCC): ICD-10-CM

## 2024-03-14 DIAGNOSIS — L03.119 CELLULITIS OF LOWER EXTREMITY, UNSPECIFIED LATERALITY: ICD-10-CM

## 2024-03-14 LAB
ALBUMIN SERPL-MCNC: 3.1 G/DL (ref 3.5–5)
ALBUMIN/GLOB SERPL: 0.6 (ref 0.4–1.6)
ALP SERPL-CCNC: 96 U/L (ref 50–136)
ALT SERPL-CCNC: 26 U/L (ref 12–65)
ANION GAP SERPL CALC-SCNC: 5 MMOL/L (ref 2–11)
APPEARANCE UR: ABNORMAL
AST SERPL-CCNC: 20 U/L (ref 15–37)
BACTERIA URNS QL MICRO: ABNORMAL /HPF
BASOPHILS # BLD: 0 K/UL (ref 0–0.2)
BASOPHILS NFR BLD: 1 % (ref 0–2)
BILIRUB SERPL-MCNC: 0.6 MG/DL (ref 0.2–1.1)
BILIRUB UR QL: NEGATIVE
BUN SERPL-MCNC: 10 MG/DL (ref 6–23)
CALCIUM SERPL-MCNC: 9.6 MG/DL (ref 8.3–10.4)
CHLORIDE SERPL-SCNC: 105 MMOL/L (ref 103–113)
CO2 SERPL-SCNC: 30 MMOL/L (ref 21–32)
COLOR UR: ABNORMAL
CREAT SERPL-MCNC: 1.1 MG/DL (ref 0.6–1)
DIFFERENTIAL METHOD BLD: ABNORMAL
EOSINOPHIL # BLD: 0.4 K/UL (ref 0–0.8)
EOSINOPHIL NFR BLD: 6 % (ref 0.5–7.8)
EPI CELLS #/AREA URNS HPF: ABNORMAL /HPF
ERYTHROCYTE [DISTWIDTH] IN BLOOD BY AUTOMATED COUNT: 18.3 % (ref 11.9–14.6)
GLOBULIN SER CALC-MCNC: 5.6 G/DL (ref 2.8–4.5)
GLUCOSE SERPL-MCNC: 92 MG/DL (ref 65–100)
GLUCOSE UR STRIP.AUTO-MCNC: NEGATIVE MG/DL
HCT VFR BLD AUTO: 35.7 % (ref 35.8–46.3)
HGB BLD-MCNC: 10.9 G/DL (ref 11.7–15.4)
HGB UR QL STRIP: ABNORMAL
IMM GRANULOCYTES # BLD AUTO: 0 K/UL (ref 0–0.5)
IMM GRANULOCYTES NFR BLD AUTO: 0 % (ref 0–5)
KETONES UR QL STRIP.AUTO: NEGATIVE MG/DL
LACTATE SERPL-SCNC: 1 MMOL/L (ref 0.4–2)
LEUKOCYTE ESTERASE UR QL STRIP.AUTO: ABNORMAL
LYMPHOCYTES # BLD: 1.3 K/UL (ref 0.5–4.6)
LYMPHOCYTES NFR BLD: 22 % (ref 13–44)
MCH RBC QN AUTO: 26.3 PG (ref 26.1–32.9)
MCHC RBC AUTO-ENTMCNC: 30.5 G/DL (ref 31.4–35)
MCV RBC AUTO: 86 FL (ref 82–102)
MONOCYTES # BLD: 0.5 K/UL (ref 0.1–1.3)
MONOCYTES NFR BLD: 9 % (ref 4–12)
NEUTS SEG # BLD: 3.8 K/UL (ref 1.7–8.2)
NEUTS SEG NFR BLD: 62 % (ref 43–78)
NITRITE UR QL STRIP.AUTO: POSITIVE
NRBC # BLD: 0 K/UL (ref 0–0.2)
OTHER OBSERVATIONS: ABNORMAL
PH UR STRIP: 6 (ref 5–9)
PLATELET # BLD AUTO: 476 K/UL (ref 150–450)
PMV BLD AUTO: 8.6 FL (ref 9.4–12.3)
POTASSIUM SERPL-SCNC: 4 MMOL/L (ref 3.5–5.1)
PROCALCITONIN SERPL-MCNC: <0.05 NG/ML (ref 0–0.49)
PROT SERPL-MCNC: 8.7 G/DL (ref 6.3–8.2)
PROT UR STRIP-MCNC: 30 MG/DL
RBC # BLD AUTO: 4.15 M/UL (ref 4.05–5.2)
RBC #/AREA URNS HPF: ABNORMAL /HPF
SODIUM SERPL-SCNC: 140 MMOL/L (ref 136–146)
SP GR UR REFRACTOMETRY: 1.01 (ref 1–1.02)
UROBILINOGEN UR QL STRIP.AUTO: 0.2 EU/DL (ref 0.2–1)
WBC # BLD AUTO: 6 K/UL (ref 4.3–11.1)
WBC URNS QL MICRO: >100 /HPF

## 2024-03-14 PROCEDURE — 81001 URINALYSIS AUTO W/SCOPE: CPT

## 2024-03-14 PROCEDURE — 85025 COMPLETE CBC W/AUTO DIFF WBC: CPT

## 2024-03-14 PROCEDURE — 87040 BLOOD CULTURE FOR BACTERIA: CPT

## 2024-03-14 PROCEDURE — 6370000000 HC RX 637 (ALT 250 FOR IP): Performed by: PHYSICIAN ASSISTANT

## 2024-03-14 PROCEDURE — 83605 ASSAY OF LACTIC ACID: CPT

## 2024-03-14 PROCEDURE — 99283 EMERGENCY DEPT VISIT LOW MDM: CPT

## 2024-03-14 PROCEDURE — 87086 URINE CULTURE/COLONY COUNT: CPT

## 2024-03-14 PROCEDURE — 84145 PROCALCITONIN (PCT): CPT

## 2024-03-14 PROCEDURE — 6360000002 HC RX W HCPCS: Performed by: PHYSICIAN ASSISTANT

## 2024-03-14 PROCEDURE — 80053 COMPREHEN METABOLIC PANEL: CPT

## 2024-03-14 RX ORDER — MORPHINE SULFATE 4 MG/ML
2 INJECTION INTRAVENOUS ONCE
Status: DISCONTINUED | OUTPATIENT
Start: 2024-03-14 | End: 2024-03-14 | Stop reason: HOSPADM

## 2024-03-14 RX ORDER — KETOROLAC TROMETHAMINE 15 MG/ML
15 INJECTION, SOLUTION INTRAMUSCULAR; INTRAVENOUS ONCE
Status: DISCONTINUED | OUTPATIENT
Start: 2024-03-14 | End: 2024-03-14 | Stop reason: HOSPADM

## 2024-03-14 RX ORDER — HYDROCODONE BITARTRATE AND ACETAMINOPHEN 5; 325 MG/1; MG/1
1 TABLET ORAL
Status: DISCONTINUED | OUTPATIENT
Start: 2024-03-14 | End: 2024-03-14

## 2024-03-14 RX ORDER — DOXYCYCLINE HYCLATE 100 MG
100 TABLET ORAL 2 TIMES DAILY
Qty: 14 TABLET | Refills: 0 | Status: SHIPPED | OUTPATIENT
Start: 2024-03-14 | End: 2024-03-21

## 2024-03-14 RX ORDER — BACITRACIN ZINC AND POLYMYXIN B SULFATE 500; 1000 [USP'U]/G; [USP'U]/G
OINTMENT TOPICAL
Qty: 4 EACH | Refills: 1 | Status: SHIPPED | OUTPATIENT
Start: 2024-03-14 | End: 2024-03-21

## 2024-03-14 RX ORDER — CEPHALEXIN 500 MG/1
500 CAPSULE ORAL 4 TIMES DAILY
Qty: 28 CAPSULE | Refills: 0 | Status: SHIPPED | OUTPATIENT
Start: 2024-03-14 | End: 2024-03-21

## 2024-03-14 RX ORDER — HYDROCODONE BITARTRATE AND ACETAMINOPHEN 7.5; 325 MG/1; MG/1
1 TABLET ORAL
Status: COMPLETED | OUTPATIENT
Start: 2024-03-14 | End: 2024-03-14

## 2024-03-14 RX ADMIN — HYDROCODONE BITARTRATE AND ACETAMINOPHEN 1 TABLET: 7.5; 325 TABLET ORAL at 16:40

## 2024-03-14 RX ADMIN — MORPHINE SULFATE 2 MG: 4 INJECTION INTRAVENOUS at 18:07

## 2024-03-14 ASSESSMENT — ENCOUNTER SYMPTOMS
EYE REDNESS: 0
VOMITING: 0
BACK PAIN: 0
COUGH: 0
RHINORRHEA: 0
DIARRHEA: 0
CHEST TIGHTNESS: 0
COLOR CHANGE: 1
NAUSEA: 0
SORE THROAT: 0
SHORTNESS OF BREATH: 0
ABDOMINAL DISTENTION: 0

## 2024-03-14 ASSESSMENT — PAIN DESCRIPTION - LOCATION
LOCATION: LEG

## 2024-03-14 ASSESSMENT — PAIN DESCRIPTION - ORIENTATION
ORIENTATION: LEFT;RIGHT
ORIENTATION: RIGHT;LEFT
ORIENTATION: RIGHT;LEFT

## 2024-03-14 ASSESSMENT — PAIN SCALES - GENERAL
PAINLEVEL_OUTOF10: 10

## 2024-03-14 ASSESSMENT — PAIN DESCRIPTION - DESCRIPTORS: DESCRIPTORS: GNAWING

## 2024-03-14 NOTE — ED NOTES
I have reviewed discharge instructions with the patient.  The patient verbalized understanding.    Patient left ED via Discharge Method: wheelchair to Home with family.    Opportunity for questions and clarification provided.       Patient given 2 scripts.         To continue your aftercare when you leave the hospital, you may receive an automated call from our care team to check in on how you are doing.  This is a free service and part of our promise to provide the best care and service to meet your aftercare needs.” If you have questions, or wish to unsubscribe from this service please call [phone].  Thank you for Choosing our [facility] Emergency Department.        Jaylin Jones LPN  03/14/24 6212

## 2024-03-14 NOTE — ED PROVIDER NOTES
Emergency Department Provider Note       PCP: Nenita Beatty, JOSE - LIBIA   Age: 48 y.o.   Sex: female     DISPOSITION  03/14/2024 05:50:26 PM       ICD-10-CM    1. Urinary tract infection without hematuria, site unspecified  N39.0       2. Cellulitis of lower extremity, unspecified laterality  L03.119       3. Chronic cutaneous venous stasis ulcer (HCC)  I83.009     L97.909           Medical Decision Making     Patient is a 48-year-old female presenting with worsening pain in her bilateral lower extremities.  She has history of venous stasis with lymphedema and chronic ulcers on her legs for which she follows with vascular and wound care.  She states that for the last couple of days they have become more painful she describes the pain as burning in nature.  She does take Lyrica and has history of neuropathy but does not feel like it is helping.  She states that she was diabetic in the past but is not diabetic now currently.  She has not had any fever or chills.  She also notes that she has had Cloudy urine for the last couple of days with increased urgency.  She is afebrile, mildly tachycardic with a heart rate of 111 otherwise vital signs within appropriate limits.  She does have leakage from the wounds to both of her legs with slight redness along the border.  Given appearance of legs and elevated heart rate will check labs per sepsis protocol.  Will check CBC, CMP, lactic acid, Pro-Wilbur and blood cultures.  She was given Norco and Toradol for pain.    Labs Reviewed   CBC WITH AUTO DIFFERENTIAL - Abnormal; Notable for the following components:       Result Value    Hemoglobin 10.9 (*)     Hematocrit 35.7 (*)     MCHC 30.5 (*)     RDW 18.3 (*)     Platelets 476 (*)     MPV 8.6 (*)     All other components within normal limits   COMPREHENSIVE METABOLIC PANEL - Abnormal; Notable for the following components:    Creatinine 1.10 (*)     Total Protein 8.7 (*)     Albumin 3.1 (*)     Globulin 5.6 (*)     All

## 2024-03-16 LAB
BACTERIA SPEC CULT: NORMAL
BACTERIA SPEC CULT: NORMAL
SERVICE CMNT-IMP: NORMAL

## 2024-03-17 LAB
BACTERIA SPEC CULT: NORMAL
SERVICE CMNT-IMP: NORMAL

## 2024-03-19 LAB
BACTERIA SPEC CULT: NORMAL
SERVICE CMNT-IMP: NORMAL

## 2024-03-20 ENCOUNTER — TELEPHONE (OUTPATIENT)
Dept: FAMILY MEDICINE CLINIC | Facility: CLINIC | Age: 49
End: 2024-03-20

## 2024-03-28 ENCOUNTER — APPOINTMENT (OUTPATIENT)
Dept: GENERAL RADIOLOGY | Age: 49
End: 2024-03-28
Payer: MEDICARE

## 2024-03-28 ENCOUNTER — HOSPITAL ENCOUNTER (EMERGENCY)
Age: 49
Discharge: HOME | End: 2024-03-28
Attending: EMERGENCY MEDICINE
Payer: MEDICARE

## 2024-03-28 VITALS
HEIGHT: 67 IN | OXYGEN SATURATION: 97 % | BODY MASS INDEX: 45.99 KG/M2 | WEIGHT: 293 LBS | RESPIRATION RATE: 18 BRPM | SYSTOLIC BLOOD PRESSURE: 160 MMHG | DIASTOLIC BLOOD PRESSURE: 91 MMHG | TEMPERATURE: 97.7 F | HEART RATE: 121 BPM

## 2024-03-28 DIAGNOSIS — S81.809A OPEN WOUND OF LOWER LEG, UNSPECIFIED LATERALITY, INITIAL ENCOUNTER: Primary | ICD-10-CM

## 2024-03-28 DIAGNOSIS — G89.29 OTHER CHRONIC PAIN: ICD-10-CM

## 2024-03-28 LAB
ALBUMIN SERPL-MCNC: 2.8 G/DL (ref 3.5–5)
ALBUMIN/GLOB SERPL: 0.5 (ref 0.4–1.6)
ALP SERPL-CCNC: 100 U/L (ref 50–136)
ALT SERPL-CCNC: 28 U/L (ref 12–65)
ANION GAP SERPL CALC-SCNC: 5 MMOL/L (ref 2–11)
APPEARANCE UR: CLEAR
AST SERPL-CCNC: 39 U/L (ref 15–37)
BASOPHILS # BLD: 0 K/UL (ref 0–0.2)
BASOPHILS NFR BLD: 1 % (ref 0–2)
BILIRUB SERPL-MCNC: 0.8 MG/DL (ref 0.2–1.1)
BILIRUB UR QL: NEGATIVE
BUN SERPL-MCNC: 8 MG/DL (ref 6–23)
CALCIUM SERPL-MCNC: 9.5 MG/DL (ref 8.3–10.4)
CHLORIDE SERPL-SCNC: 103 MMOL/L (ref 103–113)
CK SERPL-CCNC: 487 U/L (ref 21–215)
CO2 SERPL-SCNC: 28 MMOL/L (ref 21–32)
COLOR UR: ABNORMAL
CREAT SERPL-MCNC: 0.8 MG/DL (ref 0.6–1)
DIFFERENTIAL METHOD BLD: ABNORMAL
EKG ATRIAL RATE: 103 BPM
EKG DIAGNOSIS: NORMAL
EKG P AXIS: 61 DEGREES
EKG P-R INTERVAL: 133 MS
EKG Q-T INTERVAL: 366 MS
EKG QRS DURATION: 87 MS
EKG QTC CALCULATION (BAZETT): 482 MS
EKG R AXIS: 64 DEGREES
EKG T AXIS: 57 DEGREES
EKG VENTRICULAR RATE: 104 BPM
EOSINOPHIL # BLD: 0.3 K/UL (ref 0–0.8)
EOSINOPHIL NFR BLD: 5 % (ref 0.5–7.8)
ERYTHROCYTE [DISTWIDTH] IN BLOOD BY AUTOMATED COUNT: 17.6 % (ref 11.9–14.6)
GLOBULIN SER CALC-MCNC: 5.3 G/DL (ref 2.8–4.5)
GLUCOSE SERPL-MCNC: 95 MG/DL (ref 65–100)
GLUCOSE UR STRIP.AUTO-MCNC: NEGATIVE MG/DL
HCT VFR BLD AUTO: 34.5 % (ref 35.8–46.3)
HGB BLD-MCNC: 10.9 G/DL (ref 11.7–15.4)
HGB UR QL STRIP: NEGATIVE
IMM GRANULOCYTES # BLD AUTO: 0.1 K/UL (ref 0–0.5)
IMM GRANULOCYTES NFR BLD AUTO: 1 % (ref 0–5)
KETONES UR QL STRIP.AUTO: 15 MG/DL
LACTATE SERPL-SCNC: 1.1 MMOL/L (ref 0.4–2)
LEUKOCYTE ESTERASE UR QL STRIP.AUTO: NEGATIVE
LYMPHOCYTES # BLD: 0.8 K/UL (ref 0.5–4.6)
LYMPHOCYTES NFR BLD: 14 % (ref 13–44)
MCH RBC QN AUTO: 26.5 PG (ref 26.1–32.9)
MCHC RBC AUTO-ENTMCNC: 31.6 G/DL (ref 31.4–35)
MCV RBC AUTO: 83.9 FL (ref 82–102)
MONOCYTES # BLD: 0.5 K/UL (ref 0.1–1.3)
MONOCYTES NFR BLD: 9 % (ref 4–12)
NEUTS SEG # BLD: 4.2 K/UL (ref 1.7–8.2)
NEUTS SEG NFR BLD: 70 % (ref 43–78)
NITRITE UR QL STRIP.AUTO: NEGATIVE
NRBC # BLD: 0 K/UL (ref 0–0.2)
PH UR STRIP: 6.5 (ref 5–9)
PLATELET # BLD AUTO: 459 K/UL (ref 150–450)
PMV BLD AUTO: 9.6 FL (ref 9.4–12.3)
POTASSIUM SERPL-SCNC: 4.6 MMOL/L (ref 3.5–5.1)
PROCALCITONIN SERPL-MCNC: <0.05 NG/ML (ref 0–0.49)
PROT SERPL-MCNC: 8.1 G/DL (ref 6.3–8.2)
PROT UR STRIP-MCNC: NEGATIVE MG/DL
RBC # BLD AUTO: 4.11 M/UL (ref 4.05–5.2)
SODIUM SERPL-SCNC: 136 MMOL/L (ref 136–146)
SP GR UR REFRACTOMETRY: 1.02 (ref 1–1.02)
UROBILINOGEN UR QL STRIP.AUTO: 0.2 EU/DL (ref 0.2–1)
WBC # BLD AUTO: 5.9 K/UL (ref 4.3–11.1)

## 2024-03-28 PROCEDURE — 6360000002 HC RX W HCPCS: Performed by: EMERGENCY MEDICINE

## 2024-03-28 PROCEDURE — 71045 X-RAY EXAM CHEST 1 VIEW: CPT

## 2024-03-28 PROCEDURE — 81003 URINALYSIS AUTO W/O SCOPE: CPT

## 2024-03-28 PROCEDURE — 87040 BLOOD CULTURE FOR BACTERIA: CPT

## 2024-03-28 PROCEDURE — 82550 ASSAY OF CK (CPK): CPT

## 2024-03-28 PROCEDURE — 80053 COMPREHEN METABOLIC PANEL: CPT

## 2024-03-28 PROCEDURE — 6370000000 HC RX 637 (ALT 250 FOR IP): Performed by: EMERGENCY MEDICINE

## 2024-03-28 PROCEDURE — 96374 THER/PROPH/DIAG INJ IV PUSH: CPT

## 2024-03-28 PROCEDURE — 83605 ASSAY OF LACTIC ACID: CPT

## 2024-03-28 PROCEDURE — 84145 PROCALCITONIN (PCT): CPT

## 2024-03-28 PROCEDURE — 99285 EMERGENCY DEPT VISIT HI MDM: CPT

## 2024-03-28 PROCEDURE — 85025 COMPLETE CBC W/AUTO DIFF WBC: CPT

## 2024-03-28 RX ORDER — MORPHINE SULFATE 4 MG/ML
4 INJECTION INTRAVENOUS ONCE
Status: COMPLETED | OUTPATIENT
Start: 2024-03-28 | End: 2024-03-28

## 2024-03-28 RX ORDER — PREGABALIN 150 MG/1
150 CAPSULE ORAL
Status: COMPLETED | OUTPATIENT
Start: 2024-03-28 | End: 2024-03-28

## 2024-03-28 RX ORDER — PREGABALIN 150 MG/1
150 CAPSULE ORAL 3 TIMES DAILY
Qty: 21 CAPSULE | Refills: 0 | Status: SHIPPED | OUTPATIENT
Start: 2024-03-28 | End: 2024-04-04

## 2024-03-28 RX ADMIN — MORPHINE SULFATE 4 MG: 4 INJECTION INTRAVENOUS at 09:09

## 2024-03-28 RX ADMIN — PREGABALIN 150 MG: 150 CAPSULE ORAL at 09:11

## 2024-03-28 ASSESSMENT — PAIN SCALES - GENERAL
PAINLEVEL_OUTOF10: 10
PAINLEVEL_OUTOF10: 10

## 2024-03-28 ASSESSMENT — PAIN DESCRIPTION - FREQUENCY: FREQUENCY: CONTINUOUS

## 2024-03-28 ASSESSMENT — PAIN DESCRIPTION - PAIN TYPE: TYPE: ACUTE PAIN

## 2024-03-28 ASSESSMENT — PAIN DESCRIPTION - ORIENTATION
ORIENTATION: LEFT
ORIENTATION: LEFT;RIGHT

## 2024-03-28 ASSESSMENT — PAIN - FUNCTIONAL ASSESSMENT: PAIN_FUNCTIONAL_ASSESSMENT: 0-10

## 2024-03-28 ASSESSMENT — PAIN DESCRIPTION - LOCATION
LOCATION: LEG
LOCATION: LEG

## 2024-03-28 NOTE — ED NOTES
Emergency Department Provider Note       PCP: Nenita Beatty, APRN - LIBIA   Age: 48 y.o.   Sex: female     DISPOSITION Decision To Discharge 03/28/2024 09:32:06 AM       ICD-10-CM    1. Open wound of lower leg, unspecified laterality, initial encounter  S81.809A       2. Other chronic pain  G89.29         Will follow-up with wound care.  Continue taking Lyrica, will refill at this time.    Medical Decision Making     Differential diagnosis:    Fall  Chronic wound  Cellulitis  Chronic pain       1 or more acute illnesses that pose a threat to life or bodily function.   Over the counter drug management performed.    I independently ordered and reviewed each unique test.                     History     Patient is a 48-year-old female who presents to the emergency department following a fall and bilateral cellulitis on the lower extremity.  Patient has a past medical history of hypertension type 2 diabetes stroke chronic kidney disease stage III.  Patient endorses falling on the floor yesterday morning due to weakness and losing control when walking.  Patient states that she was able to sleep last night on the floor and only wanted to get up because she had to use the restroom. She stayed on the ground since yesterday until this morning when she came in via EMS.  Patient denies hitting her head.  Patient has been using a cane for years.    Patient uses depends.  Patient states that she has had cellulitis bilaterally in the lower extremity since August.  She is also had increasing falls within the last 3 months.  Patient endorses burning pain bilateral lower extremity and bilateral wounds on her heels.  Wounds were covered by EMS and cleaned.    The history is provided by the patient.       ROS     Review of Systems   Musculoskeletal:  Positive for gait problem.   Skin:  Positive for wound.   Neurological:  Positive for numbness. Negative for headaches.        Physical Exam     Vitals signs and nursing note

## 2024-03-28 NOTE — ED TRIAGE NOTES
To ED from home, pt fell yesterday morning from chair when she got up to use restroom, didn't hit head or LOC, EMS reports that pt still in floor this morning when they arrived. Pt stated unable to get out of floor, family unable to get out of floor, pt refused to call for help. EMS was called out this am approx 24 hours after fall. Dx of cellulitis BLE with LE worse, cellulitis since August and does have wound nurse come to her home. Family called for fall but pt encouraged to come due to cellulitis. FSBS 149. Given 300 of NS en route. Pt A&O x4.

## 2024-03-28 NOTE — DISCHARGE INSTRUCTIONS
Continue your normal medications as prescribed.  Since you are out of your Lyrica I will write for 1 week of the medication but you need to get further refills from your family doctor or pain management specialist.

## 2024-03-28 NOTE — ED PROVIDER NOTES
Emergency Department Provider Note       PCP: Nenita Beatty, APRN - CNP   Age: 48 y.o.   Sex: female     DISPOSITION Decision To Discharge 03/28/2024 09:32:06 AM       ICD-10-CM    1. Open wound of lower leg, unspecified laterality, initial encounter  S81.809A pregabalin (LYRICA) 150 MG capsule      2. Other chronic pain  G89.29           Medical Decision Making     DDX:    Chronic wounds, cellulitis, acute on chronic pain exacerbation, medication noncompliance,         1 acute complicated illness or injury.  Prescription drug management performed.  Shared medical decision making was utilized in creating the patients health plan today.    I independently ordered and reviewed each unique test.  I reviewed external records: provider visit note from PCP.  I reviewed external records: provider visit note from outside specialist.  I reviewed external records: previous EKG including cardiologist interpretation.    I reviewed external records: previous lab results from outside ED.  I reviewed external records: previous imaging study including radiologist interpretation.                   History     Patient is a 48-year-old female who presents to the emergency department following a fall and bilateral cellulitis on the lower extremity.  Patient has a past medical history of hypertension type 2 diabetes stroke chronic kidney disease stage III.  Patient endorses falling on the floor yesterday morning due to weakness and losing control when walking.  Patient states that she was able to sleep last night on the floor and only wanted to get up because she had to use the restroom. She stayed on the ground since yesterday until this morning when she came in via EMS.  Patient denies hitting her head.  Patient has been using a cane for years.    Patient uses depends.  Patient states that she has had cellulitis bilaterally in the lower extremity since August.  She is also had increasing falls within the last 3 months.

## 2024-03-28 NOTE — ED NOTES
Patient mobility status  Pt ambulates with difficulty and asst device at baseline,  Pt requires ambulance transport home. Provider aware     I have reviewed discharge instructions with the patient.  The patient verbalized understanding.    Patient left ED via Discharge Method: stretcher to Home with [facility].    Opportunity for questions and clarification provided.     Patient given 1 scripts.           Hawa Jones, STEWART  03/28/24 5203       Hawa Jones RN  03/28/24 4665

## 2024-03-29 DIAGNOSIS — J30.9 ALLERGIC RHINITIS, UNSPECIFIED SEASONALITY, UNSPECIFIED TRIGGER: ICD-10-CM

## 2024-03-29 DIAGNOSIS — L30.4 INTERTRIGO: ICD-10-CM

## 2024-03-29 RX ORDER — MONTELUKAST SODIUM 10 MG/1
10 TABLET ORAL DAILY
Qty: 90 TABLET | Refills: 0 | Status: SHIPPED | OUTPATIENT
Start: 2024-03-29

## 2024-03-29 RX ORDER — FLUCONAZOLE 150 MG/1
TABLET ORAL
Qty: 1 TABLET | Refills: 0 | Status: SHIPPED | OUTPATIENT
Start: 2024-03-29

## 2024-03-31 LAB
BACTERIA SPEC CULT: NORMAL
SERVICE CMNT-IMP: NORMAL

## 2024-04-02 LAB
BACTERIA SPEC CULT: NORMAL
SERVICE CMNT-IMP: NORMAL

## 2024-04-03 ENCOUNTER — HOSPITAL ENCOUNTER (OUTPATIENT)
Dept: WOUND CARE | Age: 49
Discharge: HOME | End: 2024-04-03
Payer: MEDICARE

## 2024-04-03 VITALS
TEMPERATURE: 97.9 F | BODY MASS INDEX: 45.99 KG/M2 | WEIGHT: 293 LBS | RESPIRATION RATE: 16 BRPM | DIASTOLIC BLOOD PRESSURE: 61 MMHG | OXYGEN SATURATION: 95 % | SYSTOLIC BLOOD PRESSURE: 129 MMHG | HEART RATE: 104 BPM | HEIGHT: 67 IN

## 2024-04-03 PROCEDURE — 29581 APPL MULTLAYER CMPRN SYS LEG: CPT

## 2024-04-03 NOTE — WOUND CARE
Multilayer Compression Wrap   (Not Unna) Below the Knee    NAME:  Lo Tirado  YOB: 1975  MEDICAL RECORD NUMBER:  [id number]  DATE:  4/3/2024    Multilayer compression wrap: Removed old Multilayer wrap if indicated and wash leg with mild soap/water.  Applied moisturizing agent to dry skin as needed.   Applied primary and secondary dressing as ordered.  Applied multilayered dressing below the knee to right lower leg.  Applied multilayered dressing below the knee to left lower leg.  Instructed patient/caregiver not to remove dressing and to keep it clean and dry.   Instructed patient/caregiver on complications to report to provider, such as pain, numbness in toes, heavy drainage, and slippage of dressing.  Instructed patient on purpose of compression dressing and on activity and exercise recommendations.      Electronically signed by Jen Flores RN on 4/3/2024 at 6:17 PM

## 2024-04-03 NOTE — WOUND CARE
Discharge Instructions for  [facility]  [address]  [address]  [place] [address]  Phone [phone]   Fax [phone]      NAME:  Lo Tirado  YOB: 1975  MEDICAL RECORD NUMBER:  [id number]  DATE:  4/3/2024    Return Appointment:   2 weeks with Alexi Tabor DO      Instructions:   Bilateral lower legs:  Cleanse wounds with Normal Saline.   Follow with Vashe moistened gauze for 1 minute.  Apply Oil Emulsion Gauze such as Adaptic to wound bed.  Follow with Aquacel Ag.    Cover with ABD.  2 layer compression with wound and lower extremity assessment.  If there is any problem with the dressing (too tight, slides down, etc.) Patient to return to wound clinic to have re-wrapped by clinician.    Dressing change 3x weekly for 2 weeks.     Please readmit to [facility] for wound assessment and wound care 3x weekly with start date of April 5 or as soon as possible.   Elevate lower legs to the level of your heart 1/2 hr out of every hour.  Do not cut compression wraps off. If wraps become too loose or slide down, call wound center to make an appointment for dressing change. If wraps become too tight, try elevating your legs to assist fluid drainage.  Avoid prolonged standing or sitting with legs in dependent position.  Be cautious of increased salt intake as this promotes fluid retention and swelling.  Take diuretic (fluid pill) as instructed by your doctor.  Increase protein intake to promote wound healing. Jase and Ensure are examples of protein supplements.  Keep dressings dry and intact; use cast covers for showering       Please increase dietary protein to at least 60 grams per day to support cell rejuvenation.   May use supplements such as Ensure Max, Jase, & Glucerna (samples & coupons provided at first visit).   Be sure to spread intake throughout the day for improved absorption.       Lymphedema Pump Therapy 1 hr, 2x daily.    Should you

## 2024-04-03 NOTE — FLOWSHEET NOTE
04/03/24 1354   Right Leg Edema Point of Measurement   Leg circumference 56 cm   Ankle circumference 31 cm   Foot circumference 25 cm   Compression Therapy Tubular elastic support bandage   Left Leg Edema Point of Measurement   Leg circumference 58 cm   Ankle circumference 31 cm   Foot circumference 25 cm   Compression Therapy Tubular elastic support bandage   Wound 11/01/23 Leg Left;Posterior #2   Date First Assessed/Time First Assessed: 11/01/23 0954   Present on Original Admission: Yes  Wound Approximate Age at First Assessment (Weeks): 12 weeks  Primary Wound Type: Venous Ulcer  Location: Leg  Wound Location Orientation: Left;Posterior  Woun...   Wound Image    Wound Etiology Venous   Dressing Status Old drainage noted   Wound Cleansed Cleansed with saline   Dressing/Treatment   (Vaseline or Aquafor)   Wound Length (cm) 19 cm   Wound Width (cm) 26.5 cm   Wound Depth (cm) 0.1 cm   Wound Surface Area (cm^2) 503.5 cm^2   Change in Wound Size % (l*w) -403.5   Wound Volume (cm^3) 50.35 cm^3   Wound Healing % -404   Wound Assessment Slough;Pink/red   Drainage Amount Moderate (25-50%)   Drainage Description Serosanguinous   Odor None   Pati-wound Assessment Edematous   Margins Attached edges   Wound Thickness Description not for Pressure Injury Full thickness   Wound 11/01/23 Leg Posterior;Right #1   Date First Assessed/Time First Assessed: 11/01/23 0954   Present on Original Admission: Yes  Wound Approximate Age at First Assessment (Weeks): 12 weeks  Primary Wound Type: Venous Ulcer  Location: Leg  Wound Location Orientation: Posterior;Right  Wou...   Wound Image    Wound Etiology Venous   Dressing Status Old drainage noted   Wound Cleansed Cleansed with saline   Dressing/Treatment Other (comment)  (Vaseline, Aquafor)   Wound Length (cm) 9 cm   Wound Width (cm) 15 cm   Wound Depth (cm) 0.1 cm   Wound Surface Area (cm^2) 135 cm^2   Change in Wound Size % (l*w) -141.07   Wound Volume (cm^3) 13.5 cm^3   Wound Healing

## 2024-04-09 DIAGNOSIS — L50.9 URTICARIA: ICD-10-CM

## 2024-04-09 RX ORDER — HYDROXYZINE HYDROCHLORIDE 25 MG/1
25 TABLET, FILM COATED ORAL NIGHTLY
Qty: 30 TABLET | Refills: 0 | OUTPATIENT
Start: 2024-04-09 | End: 2024-06-08

## 2024-04-10 ENCOUNTER — APPOINTMENT (OUTPATIENT)
Dept: GENERAL RADIOLOGY | Age: 49
DRG: 603 | End: 2024-04-10
Payer: MEDICARE

## 2024-04-10 ENCOUNTER — HOSPITAL ENCOUNTER (INPATIENT)
Age: 49
LOS: 5 days | Discharge: HOME | DRG: 603 | End: 2024-04-15
Attending: EMERGENCY MEDICINE | Admitting: HOSPITALIST
Payer: MEDICARE

## 2024-04-10 ENCOUNTER — TELEPHONE (OUTPATIENT)
Dept: FAMILY MEDICINE CLINIC | Facility: CLINIC | Age: 49
End: 2024-04-10

## 2024-04-10 DIAGNOSIS — R52 INTRACTABLE PAIN: ICD-10-CM

## 2024-04-10 DIAGNOSIS — L03.115 BILATERAL LOWER LEG CELLULITIS: ICD-10-CM

## 2024-04-10 DIAGNOSIS — L03.116 CELLULITIS OF LEFT LOWER EXTREMITY: Primary | ICD-10-CM

## 2024-04-10 DIAGNOSIS — G60.3 IDIOPATHIC PROGRESSIVE NEUROPATHY: ICD-10-CM

## 2024-04-10 DIAGNOSIS — K21.9 GASTROESOPHAGEAL REFLUX DISEASE, UNSPECIFIED WHETHER ESOPHAGITIS PRESENT: ICD-10-CM

## 2024-04-10 DIAGNOSIS — L03.116 BILATERAL LOWER LEG CELLULITIS: ICD-10-CM

## 2024-04-10 DIAGNOSIS — L97.312 CHRONIC ULCER OF RIGHT ANKLE WITH FAT LAYER EXPOSED (HCC): Chronic | ICD-10-CM

## 2024-04-10 DIAGNOSIS — I87.303 CHRONIC VENOUS HYPERTENSION INVOLVING BOTH SIDES: Chronic | ICD-10-CM

## 2024-04-10 DIAGNOSIS — L97.922 CHRONIC ULCER OF LEFT LOWER EXTREMITY WITH FAT LAYER EXPOSED (HCC): Chronic | ICD-10-CM

## 2024-04-10 DIAGNOSIS — R26.81 UNSTEADY GAIT: Primary | ICD-10-CM

## 2024-04-10 DIAGNOSIS — L03.116 BILATERAL CELLULITIS OF LOWER LEG: ICD-10-CM

## 2024-04-10 DIAGNOSIS — L03.115 BILATERAL CELLULITIS OF LOWER LEG: ICD-10-CM

## 2024-04-10 DIAGNOSIS — L50.9 URTICARIA: ICD-10-CM

## 2024-04-10 LAB
ALBUMIN SERPL-MCNC: 2.7 G/DL (ref 3.5–5)
ALBUMIN/GLOB SERPL: 0.5 (ref 0.4–1.6)
ALP SERPL-CCNC: 84 U/L (ref 50–136)
ALT SERPL-CCNC: 26 U/L (ref 12–65)
ANION GAP SERPL CALC-SCNC: 3 MMOL/L (ref 2–11)
APPEARANCE UR: CLEAR
AST SERPL-CCNC: 18 U/L (ref 15–37)
BASOPHILS # BLD: 0 K/UL (ref 0–0.2)
BASOPHILS NFR BLD: 0 % (ref 0–2)
BILIRUB SERPL-MCNC: 0.4 MG/DL (ref 0.2–1.1)
BILIRUB UR QL: NEGATIVE
BUN SERPL-MCNC: 12 MG/DL (ref 6–23)
CALCIUM SERPL-MCNC: 9.2 MG/DL (ref 8.3–10.4)
CHLORIDE SERPL-SCNC: 105 MMOL/L (ref 103–113)
CO2 SERPL-SCNC: 29 MMOL/L (ref 21–32)
COLOR UR: NORMAL
CREAT SERPL-MCNC: 1 MG/DL (ref 0.6–1)
DIFFERENTIAL METHOD BLD: ABNORMAL
EOSINOPHIL # BLD: 0.5 K/UL (ref 0–0.8)
EOSINOPHIL NFR BLD: 7 % (ref 0.5–7.8)
ERYTHROCYTE [DISTWIDTH] IN BLOOD BY AUTOMATED COUNT: 17.7 % (ref 11.9–14.6)
EST. AVERAGE GLUCOSE BLD GHB EST-MCNC: 111 MG/DL
GLOBULIN SER CALC-MCNC: 5 G/DL (ref 2.8–4.5)
GLUCOSE SERPL-MCNC: 90 MG/DL (ref 65–100)
GLUCOSE UR STRIP.AUTO-MCNC: NEGATIVE MG/DL
HBA1C MFR BLD: 5.5 % (ref 4.8–5.6)
HCT VFR BLD AUTO: 31.6 % (ref 35.8–46.3)
HGB BLD-MCNC: 9.9 G/DL (ref 11.7–15.4)
HGB UR QL STRIP: NEGATIVE
IMM GRANULOCYTES # BLD AUTO: 0 K/UL (ref 0–0.5)
IMM GRANULOCYTES NFR BLD AUTO: 0 % (ref 0–5)
KETONES UR QL STRIP.AUTO: NEGATIVE MG/DL
LACTATE SERPL-SCNC: 0.6 MMOL/L (ref 0.4–2)
LEUKOCYTE ESTERASE UR QL STRIP.AUTO: NEGATIVE
LYMPHOCYTES # BLD: 1.4 K/UL (ref 0.5–4.6)
LYMPHOCYTES NFR BLD: 21 % (ref 13–44)
MCH RBC QN AUTO: 26.7 PG (ref 26.1–32.9)
MCHC RBC AUTO-ENTMCNC: 31.3 G/DL (ref 31.4–35)
MCV RBC AUTO: 85.2 FL (ref 82–102)
MONOCYTES # BLD: 0.6 K/UL (ref 0.1–1.3)
MONOCYTES NFR BLD: 9 % (ref 4–12)
NEUTS SEG # BLD: 4.3 K/UL (ref 1.7–8.2)
NEUTS SEG NFR BLD: 63 % (ref 43–78)
NITRITE UR QL STRIP.AUTO: NEGATIVE
NRBC # BLD: 0 K/UL (ref 0–0.2)
PH UR STRIP: 6.5 (ref 5–9)
PLATELET # BLD AUTO: 485 K/UL (ref 150–450)
PMV BLD AUTO: 8.7 FL (ref 9.4–12.3)
POTASSIUM SERPL-SCNC: 4.2 MMOL/L (ref 3.5–5.1)
PROCALCITONIN SERPL-MCNC: <0.05 NG/ML (ref 0–0.49)
PROT SERPL-MCNC: 7.7 G/DL (ref 6.3–8.2)
PROT UR STRIP-MCNC: NEGATIVE MG/DL
RBC # BLD AUTO: 3.71 M/UL (ref 4.05–5.2)
SODIUM SERPL-SCNC: 137 MMOL/L (ref 136–146)
SP GR UR REFRACTOMETRY: 1.01 (ref 1–1.02)
UROBILINOGEN UR QL STRIP.AUTO: 0.2 EU/DL (ref 0.2–1)
WBC # BLD AUTO: 6.9 K/UL (ref 4.3–11.1)

## 2024-04-10 PROCEDURE — 99285 EMERGENCY DEPT VISIT HI MDM: CPT

## 2024-04-10 PROCEDURE — 97161 PT EVAL LOW COMPLEX 20 MIN: CPT

## 2024-04-10 PROCEDURE — 84145 PROCALCITONIN (PCT): CPT

## 2024-04-10 PROCEDURE — 2580000003 HC RX 258: Performed by: HOSPITALIST

## 2024-04-10 PROCEDURE — 87040 BLOOD CULTURE FOR BACTERIA: CPT

## 2024-04-10 PROCEDURE — 36415 COLL VENOUS BLD VENIPUNCTURE: CPT

## 2024-04-10 PROCEDURE — 71045 X-RAY EXAM CHEST 1 VIEW: CPT

## 2024-04-10 PROCEDURE — 97165 OT EVAL LOW COMPLEX 30 MIN: CPT

## 2024-04-10 PROCEDURE — 80053 COMPREHEN METABOLIC PANEL: CPT

## 2024-04-10 PROCEDURE — 81003 URINALYSIS AUTO W/O SCOPE: CPT

## 2024-04-10 PROCEDURE — 97112 NEUROMUSCULAR REEDUCATION: CPT

## 2024-04-10 PROCEDURE — 97530 THERAPEUTIC ACTIVITIES: CPT

## 2024-04-10 PROCEDURE — 85025 COMPLETE CBC W/AUTO DIFF WBC: CPT

## 2024-04-10 PROCEDURE — 6360000002 HC RX W HCPCS: Performed by: EMERGENCY MEDICINE

## 2024-04-10 PROCEDURE — 96375 TX/PRO/DX INJ NEW DRUG ADDON: CPT

## 2024-04-10 PROCEDURE — 6360000002 HC RX W HCPCS: Performed by: HOSPITALIST

## 2024-04-10 PROCEDURE — 2500000003 HC RX 250 WO HCPCS: Performed by: EMERGENCY MEDICINE

## 2024-04-10 PROCEDURE — 83036 HEMOGLOBIN GLYCOSYLATED A1C: CPT

## 2024-04-10 PROCEDURE — A4216 STERILE WATER/SALINE, 10 ML: HCPCS | Performed by: HOSPITALIST

## 2024-04-10 PROCEDURE — 2580000003 HC RX 258: Performed by: EMERGENCY MEDICINE

## 2024-04-10 PROCEDURE — 83605 ASSAY OF LACTIC ACID: CPT

## 2024-04-10 PROCEDURE — 96374 THER/PROPH/DIAG INJ IV PUSH: CPT

## 2024-04-10 PROCEDURE — 6370000000 HC RX 637 (ALT 250 FOR IP): Performed by: HOSPITALIST

## 2024-04-10 PROCEDURE — 1100000000 HC RM PRIVATE

## 2024-04-10 PROCEDURE — 2500000003 HC RX 250 WO HCPCS: Performed by: HOSPITALIST

## 2024-04-10 RX ORDER — LACTOBACILLUS RHAMNOSUS GG 10B CELL
1 CAPSULE ORAL
Status: DISCONTINUED | OUTPATIENT
Start: 2024-04-11 | End: 2024-04-15 | Stop reason: HOSPADM

## 2024-04-10 RX ORDER — MORPHINE SULFATE 4 MG/ML
4 INJECTION INTRAVENOUS ONCE
Status: COMPLETED | OUTPATIENT
Start: 2024-04-10 | End: 2024-04-10

## 2024-04-10 RX ORDER — DULOXETIN HYDROCHLORIDE 30 MG/1
30 CAPSULE, DELAYED RELEASE ORAL DAILY
Status: DISCONTINUED | OUTPATIENT
Start: 2024-04-10 | End: 2024-04-15 | Stop reason: HOSPADM

## 2024-04-10 RX ORDER — ACETAMINOPHEN 650 MG/1
650 SUPPOSITORY RECTAL EVERY 6 HOURS PRN
Status: DISCONTINUED | OUTPATIENT
Start: 2024-04-10 | End: 2024-04-15 | Stop reason: HOSPADM

## 2024-04-10 RX ORDER — LOSARTAN POTASSIUM 50 MG/1
50 TABLET ORAL DAILY
Status: DISCONTINUED | OUTPATIENT
Start: 2024-04-10 | End: 2024-04-10

## 2024-04-10 RX ORDER — MAGNESIUM SULFATE IN WATER 40 MG/ML
2000 INJECTION, SOLUTION INTRAVENOUS PRN
Status: DISCONTINUED | OUTPATIENT
Start: 2024-04-10 | End: 2024-04-15 | Stop reason: HOSPADM

## 2024-04-10 RX ORDER — PREGABALIN 75 MG/1
150 CAPSULE ORAL 3 TIMES DAILY
Status: DISCONTINUED | OUTPATIENT
Start: 2024-04-10 | End: 2024-04-10

## 2024-04-10 RX ORDER — ENOXAPARIN SODIUM 100 MG/ML
40 INJECTION SUBCUTANEOUS 2 TIMES DAILY
Status: DISCONTINUED | OUTPATIENT
Start: 2024-04-10 | End: 2024-04-15 | Stop reason: HOSPADM

## 2024-04-10 RX ORDER — HYDROMORPHONE HYDROCHLORIDE 1 MG/ML
1 INJECTION, SOLUTION INTRAMUSCULAR; INTRAVENOUS; SUBCUTANEOUS EVERY 4 HOURS PRN
Status: DISCONTINUED | OUTPATIENT
Start: 2024-04-10 | End: 2024-04-15 | Stop reason: HOSPADM

## 2024-04-10 RX ORDER — LOSARTAN POTASSIUM 50 MG/1
50 TABLET ORAL DAILY
Status: DISCONTINUED | OUTPATIENT
Start: 2024-04-11 | End: 2024-04-15 | Stop reason: HOSPADM

## 2024-04-10 RX ORDER — POLYETHYLENE GLYCOL 3350 17 G/17G
17 POWDER, FOR SOLUTION ORAL DAILY PRN
Status: DISCONTINUED | OUTPATIENT
Start: 2024-04-10 | End: 2024-04-15 | Stop reason: HOSPADM

## 2024-04-10 RX ORDER — SODIUM CHLORIDE 9 MG/ML
INJECTION, SOLUTION INTRAVENOUS PRN
Status: DISCONTINUED | OUTPATIENT
Start: 2024-04-10 | End: 2024-04-15 | Stop reason: HOSPADM

## 2024-04-10 RX ORDER — HYDROXYZINE HYDROCHLORIDE 10 MG/1
25 TABLET, FILM COATED ORAL NIGHTLY
Status: DISCONTINUED | OUTPATIENT
Start: 2024-04-10 | End: 2024-04-15 | Stop reason: HOSPADM

## 2024-04-10 RX ORDER — PROMETHAZINE HYDROCHLORIDE 12.5 MG/1
12.5 TABLET ORAL EVERY 6 HOURS PRN
Status: DISCONTINUED | OUTPATIENT
Start: 2024-04-10 | End: 2024-04-15 | Stop reason: HOSPADM

## 2024-04-10 RX ORDER — MONTELUKAST SODIUM 10 MG/1
10 TABLET ORAL DAILY
Status: DISCONTINUED | OUTPATIENT
Start: 2024-04-10 | End: 2024-04-15 | Stop reason: HOSPADM

## 2024-04-10 RX ORDER — LANOLIN ALCOHOL/MO/W.PET/CERES
3 CREAM (GRAM) TOPICAL NIGHTLY
Status: DISCONTINUED | OUTPATIENT
Start: 2024-04-10 | End: 2024-04-15 | Stop reason: HOSPADM

## 2024-04-10 RX ORDER — POTASSIUM CHLORIDE 7.45 MG/ML
10 INJECTION INTRAVENOUS PRN
Status: DISCONTINUED | OUTPATIENT
Start: 2024-04-10 | End: 2024-04-15 | Stop reason: HOSPADM

## 2024-04-10 RX ORDER — POTASSIUM CHLORIDE 20 MEQ/1
40 TABLET, EXTENDED RELEASE ORAL PRN
Status: DISCONTINUED | OUTPATIENT
Start: 2024-04-10 | End: 2024-04-15 | Stop reason: HOSPADM

## 2024-04-10 RX ORDER — ACETAMINOPHEN 325 MG/1
650 TABLET ORAL EVERY 4 HOURS PRN
Status: DISCONTINUED | OUTPATIENT
Start: 2024-04-10 | End: 2024-04-15 | Stop reason: HOSPADM

## 2024-04-10 RX ORDER — FLUTICASONE PROPIONATE 50 MCG
2 SPRAY, SUSPENSION (ML) NASAL DAILY
Status: DISCONTINUED | OUTPATIENT
Start: 2024-04-10 | End: 2024-04-15 | Stop reason: HOSPADM

## 2024-04-10 RX ORDER — ONDANSETRON 2 MG/ML
4 INJECTION INTRAMUSCULAR; INTRAVENOUS EVERY 4 HOURS PRN
Status: DISCONTINUED | OUTPATIENT
Start: 2024-04-10 | End: 2024-04-15 | Stop reason: HOSPADM

## 2024-04-10 RX ORDER — SODIUM CHLORIDE 0.9 % (FLUSH) 0.9 %
5-40 SYRINGE (ML) INJECTION EVERY 12 HOURS SCHEDULED
Status: DISCONTINUED | OUTPATIENT
Start: 2024-04-10 | End: 2024-04-15 | Stop reason: HOSPADM

## 2024-04-10 RX ORDER — FUROSEMIDE 10 MG/ML
40 INJECTION INTRAMUSCULAR; INTRAVENOUS ONCE
Status: COMPLETED | OUTPATIENT
Start: 2024-04-10 | End: 2024-04-10

## 2024-04-10 RX ORDER — HYDROMORPHONE HYDROCHLORIDE 1 MG/ML
0.5 INJECTION, SOLUTION INTRAMUSCULAR; INTRAVENOUS; SUBCUTANEOUS ONCE
Status: COMPLETED | OUTPATIENT
Start: 2024-04-10 | End: 2024-04-10

## 2024-04-10 RX ORDER — SODIUM CHLORIDE 0.9 % (FLUSH) 0.9 %
5-40 SYRINGE (ML) INJECTION PRN
Status: DISCONTINUED | OUTPATIENT
Start: 2024-04-10 | End: 2024-04-15 | Stop reason: HOSPADM

## 2024-04-10 RX ORDER — HYDRALAZINE HYDROCHLORIDE 20 MG/ML
10 INJECTION INTRAMUSCULAR; INTRAVENOUS EVERY 6 HOURS PRN
Status: DISCONTINUED | OUTPATIENT
Start: 2024-04-10 | End: 2024-04-15 | Stop reason: HOSPADM

## 2024-04-10 RX ORDER — HYDROCODONE BITARTRATE AND ACETAMINOPHEN 7.5; 325 MG/1; MG/1
1 TABLET ORAL EVERY 4 HOURS PRN
Status: DISCONTINUED | OUTPATIENT
Start: 2024-04-10 | End: 2024-04-15 | Stop reason: HOSPADM

## 2024-04-10 RX ORDER — PREGABALIN 100 MG/1
200 CAPSULE ORAL 3 TIMES DAILY
Status: DISCONTINUED | OUTPATIENT
Start: 2024-04-10 | End: 2024-04-15 | Stop reason: HOSPADM

## 2024-04-10 RX ADMIN — PIPERACILLIN AND TAZOBACTAM 3375 MG: 3; .375 INJECTION, POWDER, LYOPHILIZED, FOR SOLUTION INTRAVENOUS at 17:42

## 2024-04-10 RX ADMIN — PREGABALIN 150 MG: 75 CAPSULE ORAL at 14:17

## 2024-04-10 RX ADMIN — FAMOTIDINE 20 MG: 10 INJECTION, SOLUTION INTRAVENOUS at 14:17

## 2024-04-10 RX ADMIN — HYDROMORPHONE HYDROCHLORIDE 1 MG: 1 INJECTION, SOLUTION INTRAMUSCULAR; INTRAVENOUS; SUBCUTANEOUS at 20:00

## 2024-04-10 RX ADMIN — Medication 3 MG: at 20:02

## 2024-04-10 RX ADMIN — PIPERACILLIN AND TAZOBACTAM 4500 MG: 4; .5 INJECTION, POWDER, FOR SOLUTION INTRAVENOUS at 12:11

## 2024-04-10 RX ADMIN — MONTELUKAST 10 MG: 10 TABLET, FILM COATED ORAL at 14:17

## 2024-04-10 RX ADMIN — HYDROMORPHONE HYDROCHLORIDE 0.5 MG: 1 INJECTION, SOLUTION INTRAMUSCULAR; INTRAVENOUS; SUBCUTANEOUS at 09:43

## 2024-04-10 RX ADMIN — SODIUM CHLORIDE, PRESERVATIVE FREE 10 ML: 5 INJECTION INTRAVENOUS at 20:03

## 2024-04-10 RX ADMIN — PREGABALIN 200 MG: 100 CAPSULE ORAL at 20:01

## 2024-04-10 RX ADMIN — FLUTICASONE PROPIONATE 2 SPRAY: 50 SPRAY, METERED NASAL at 14:17

## 2024-04-10 RX ADMIN — HYDROMORPHONE HYDROCHLORIDE 1 MG: 1 INJECTION, SOLUTION INTRAMUSCULAR; INTRAVENOUS; SUBCUTANEOUS at 15:11

## 2024-04-10 RX ADMIN — VANCOMYCIN HYDROCHLORIDE 1250 MG: 10 INJECTION, POWDER, LYOPHILIZED, FOR SOLUTION INTRAVENOUS at 14:24

## 2024-04-10 RX ADMIN — FAMOTIDINE 20 MG: 10 INJECTION, SOLUTION INTRAVENOUS at 20:01

## 2024-04-10 RX ADMIN — HYDROXYZINE HYDROCHLORIDE 25 MG: 10 TABLET ORAL at 20:02

## 2024-04-10 RX ADMIN — FUROSEMIDE 40 MG: 10 INJECTION, SOLUTION INTRAMUSCULAR; INTRAVENOUS at 14:17

## 2024-04-10 RX ADMIN — MORPHINE SULFATE 4 MG: 4 INJECTION INTRAVENOUS at 10:12

## 2024-04-10 ASSESSMENT — PAIN SCALES - GENERAL
PAINLEVEL_OUTOF10: 3
PAINLEVEL_OUTOF10: 10
PAINLEVEL_OUTOF10: 0
PAINLEVEL_OUTOF10: 10
PAINLEVEL_OUTOF10: 10

## 2024-04-10 ASSESSMENT — PAIN DESCRIPTION - DESCRIPTORS
DESCRIPTORS: ACHING
DESCRIPTORS: DISCOMFORT;BURNING
DESCRIPTORS: ACHING

## 2024-04-10 ASSESSMENT — PAIN DESCRIPTION - ORIENTATION
ORIENTATION: RIGHT;LEFT
ORIENTATION: RIGHT;LEFT
ORIENTATION: LEFT;RIGHT
ORIENTATION: LEFT;RIGHT
ORIENTATION: RIGHT;LEFT

## 2024-04-10 ASSESSMENT — PAIN - FUNCTIONAL ASSESSMENT: PAIN_FUNCTIONAL_ASSESSMENT: 0-10

## 2024-04-10 ASSESSMENT — PAIN DESCRIPTION - LOCATION
LOCATION: LEG

## 2024-04-10 NOTE — ACP (ADVANCE CARE PLANNING)
Advance Care Planning   Healthcare Decision Maker:    Primary Decision Maker: Abigail Tirado - Child - [phone]    Secondary Decision Maker: Cristina Cervantes - Friend - [phone]    Click here to complete Healthcare Decision Makers including selection of the Healthcare Decision Maker Relationship (ie \"Primary\").

## 2024-04-10 NOTE — ED PROVIDER NOTES
Emergency Department Provider Note       PCP: Nenita Beatty, APRN - CNP   Age: 48 y.o.   Sex: female     DISPOSITION Admitted 04/10/2024 12:17:48 PM       ICD-10-CM    1. Cellulitis of left lower extremity  L03.116       2. Intractable pain  R52           Medical Decision Making     Patient with cellulitis.  She was given 2 doses of narcotics here and still having a significant amount of pain.  Her lab workup is negative.  She was offered discharge with antibiotics and pain medication and she reports she does not think she can tolerate this pain at home given the opiates did not help.  Discussed with patient option for admission and she prefers that.  Given antibiotics and admitted to the hospitalist.     1 or more acute illnesses that pose a threat to life or bodily function.   Prescription drug management performed.  Parental controlled substances given in the ED.  Discussion with external consultants.    I independently ordered and reviewed each unique test.         My Independent EKG Interpretation: sinus rhythm, no evidence of arrhythmia      ST Segments:Nonspecific ST segments - NO STEMI   Rate: 100  The patient was admitted and I have discussed patient management with the admitting provider.    Exclusion criteria - the patient is NOT to be included for SEP-1 Core Measure due to: May have criteria for sepsis, but does not meet criteria for severe sepsis or septic shock       History     Presents with complaint of burning in her left lower extremity with redness and worsening wounds.  Patient has chronic wounds since last year and had home health but no longer has it.  States she is having to change her dressings multiple times more so than usual.  No nausea vomiting fever chest pain or shortness of breath.  Has a history of lymphedema and swelling is about at baseline.    The history is provided by the patient.     Physical Exam     Vitals signs and nursing note reviewed:  Vitals:    04/10/24 1200     Lactic Acid, Sepsis 0.6 0.4 - 2.0 MMOL/L   Urinalysis w/Reflex to Micro   Result Value Ref Range    Color, UA YELLOW/STRAW      Appearance CLEAR      Specific [place], UA 1.011 1.001 - 1.023      pH, Urine 6.5 5.0 - 9.0      Protein, UA Negative NEG mg/dL    Glucose, UA Negative mg/dL    Ketones, Urine Negative NEG mg/dL    Bilirubin Urine Negative NEG      Blood, Urine Negative NEG      Urobilinogen, Urine 0.2 0.2 - 1.0 EU/dL    Nitrite, Urine Negative NEG      Leukocyte Esterase, Urine Negative NEG     CBC with Auto Differential   Result Value Ref Range    WBC 6.9 4.3 - 11.1 K/uL    RBC 3.71 (L) 4.05 - 5.2 M/uL    Hemoglobin 9.9 (L) 11.7 - 15.4 g/dL    Hematocrit 31.6 (L) 35.8 - 46.3 %    MCV 85.2 82 - 102 FL    MCH 26.7 26.1 - 32.9 PG    MCHC 31.3 (L) 31.4 - 35.0 g/dL    RDW 17.7 (H) 11.9 - 14.6 %    Platelets 485 (H) 150 - 450 K/uL    MPV 8.7 (L) 9.4 - 12.3 FL    nRBC 0.00 0.0 - 0.2 K/uL    Differential Type AUTOMATED      Neutrophils % 63 43 - 78 %    Lymphocytes % 21 13 - 44 %    Monocytes % 9 4.0 - 12.0 %    Eosinophils % 7 0.5 - 7.8 %    Basophils % 0 0.0 - 2.0 %    Immature Granulocytes % 0 0.0 - 5.0 %    Neutrophils Absolute 4.3 1.7 - 8.2 K/UL    Lymphocytes Absolute 1.4 0.5 - 4.6 K/UL    Monocytes Absolute 0.6 0.1 - 1.3 K/UL    Eosinophils Absolute 0.5 0.0 - 0.8 K/UL    Basophils Absolute 0.0 0.0 - 0.2 K/UL    Immature Granulocytes Absolute 0.0 0.0 - 0.5 K/UL   Procalcitonin   Result Value Ref Range    Procalcitonin <0.05 0.00 - 0.49 ng/mL         XR CHEST PORTABLE   Final Result   No acute findings in the chest                      No results for input(s): \"COVID19\" in the last 72 hours.     Voice dictation software was used during the making of this note.  This software is not perfect and grammatical and other typographical errors may be present.  This note has not been completely proofread for errors.     Kendy Wills MD  04/10/24 7924

## 2024-04-10 NOTE — PROGRESS NOTES
4 Eyes Skin Assessment     NAME:  Lo Tirado  YOB: 1975  MEDICAL RECORD NUMBER:  [id number]    The patient is being assessed for  Admission    I agree that at least one RN has performed a thorough Head to Toe Skin Assessment on the patient. ALL assessment sites listed below have been assessed.      Areas assessed by both nurses:    Head, Face, Ears, Shoulders, Back, Chest, Arms, Elbows, Hands, Sacrum. Buttock, Coccyx, Ischium, and Legs. Feet and Heels        Does the Patient have a Wound? Yes wound(s) were present on assessment. LDA wound assessment was Initiated and completed by RN       BLE (legs) Wounds    Gilbert Prevention initiated by RN: Yes  Wound Care Orders initiated by RN: Yes    Pressure Injury (Stage 3,4, Unstageable, DTI, NWPT, and Complex wounds) if present, place Wound referral order by RN under : Yes    New Ostomies, if present place, Ostomy referral order under : No     Nurse 1 eSignature: Electronically signed by Yolis Estevez RN on 4/10/24 at 1:52 PM EDT    **SHARE this note so that the co-signing nurse can place an eSignature**    Nurse 2 eSignature: Electronically signed by Amie Bates RN on 4/10/24 at 1:53 PM EDT

## 2024-04-10 NOTE — ED NOTES
TRANSFER - OUT REPORT:    Verbal report given to STEWART Lagos on Lo Tirado  being transferred to [facility] for routine progression of patient care       Report consisted of patient's Situation, Background, Assessment and   Recommendations(SBAR).     Information from the following report(s) Nurse Handoff Report was reviewed with the receiving nurse.    Lneka Fall Assessment:    Presents to emergency department  because of falls (Syncope, seizure, or loss of consciousness): No  Age > 70: No  Altered Mental Status, Intoxication with alcohol or substance confusion (Disorientation, impaired judgment, poor safety awaremess, or inability to follow instructions): No  Impaired Mobility: Ambulates or transfers with assistive devices or assistance; Unable to ambulate or transer.: Yes             Lines:   Peripheral IV Right Antecubital (Active)        Opportunity for questions and clarification was provided.      Patient transported with:  Transport.         Nikia Menchaca RN  04/10/24 3300

## 2024-04-10 NOTE — CARE COORDINATION
Patient currently receives care at wound care center.  She was set up with [facility] but reports this home service was cancelled when she missed a wound care appt.  She has a hx of falls and uses a cane to ambulate.  Currently her bariatric rollator is broken and she is working with PCP to get a new one.      CM to follow clinical course for discharge planning needs.  Pt confirms POA, PCP and insurance.    04/10/24 1227   Service Assessment   Patient Orientation Alert and Oriented   Cognition Alert   History Provided By Patient   Primary Caregiver Self   Patient's Healthcare Decision Maker is: Legal Next of Kin   PCP Verified by CM Yes   Last Visit to PCP Within last 3 months   Prior Functional Level Assistance with the following:;Cooking;Housework;Shopping;Mobility   Current Functional Level Mobility;Shopping;Housework;Cooking;Assistance with the following:   Can patient return to prior living arrangement Yes   Ability to make needs known: Good   Family able to assist with home care needs: Yes   Would you like for me to discuss the discharge plan with any other family members/significant others, and if so, who? No   Financial Resources Medicaid;Medicare   Community Resources None   CM/SW Referral Other (see comment)   Social/Functional History   Lives With Son;Daughter;Family  (patient lives with disabled 27 year old son, 24 year old daughter and her 3 children.)   Type of Home House   Home Layout One level   Home Access Stairs to enter with rails   Entrance Stairs - Number of Steps 6   Bathroom Toilet Standard   Bathroom Accessibility Accessible   Home Equipment Cane;Rollator   Receives Help From Family   ADL Assistance Independent   Homemaking Assistance Needs assistance   Homemaking Responsibilities No   Ambulation Assistance Needs assistance   Transfer Assistance Needs assistance   Active  Yes  (drives minimally - states she can drive to wound care center and thats it)   Mode of Transportation Car

## 2024-04-10 NOTE — PROGRESS NOTES
ACUTE PHYSICAL THERAPY GOALS:   (Developed with and agreed upon by patient and/or caregiver.)   Candida will perform all transfers independently in 5 days.    Candida will perform gait with least restrictive device 200 ft independently in 5 days.     PHYSICAL THERAPY Initial Assessment and Daily Note  (Link to Caseload Tracking: PT Visit Days : 1  Acknowledge Orders  Time In/Out  PT Charge Capture  Rehab Caseload Tracker    Lo Tirado is a 48 y.o. female   PRIMARY DIAGNOSIS: Bilateral cellulitis of lower leg  Cellulitis of left lower extremity [L03.116]  Intractable pain [R52]  Bilateral cellulitis of lower leg [L03.116, L03.115]       Reason for Referral: Generalized Muscle Weakness (M62.81)  Difficulty in walking, Not elsewhere classified (R26.2)  Inpatient: Payor: [employer] MEDICARE / Plan: [employer] GOLD PLUS HMO / Product Type: *No Product type* /     ASSESSMENT:     REHAB RECOMMENDATIONS:   Recommendation to date pending progress:  Setting:  No further skilled physical therapy after discharge from hospital    Equipment:     Needs a rollator hers is broken     ASSESSMENT:  Ms. Tirado presents with increased swelling bilateral lower extremities.  She uses a cane at baseline along with a rollator in the community however her rollator is broken.  Her legs are visibly weeping and open.  She says she does wrap them at home and nursing comes to do it as well.  She admits to taking them off when her legs burn.  She has no falls.  Ms. Tirado stays in a recliner chair.  She will not go into the bed.  Sit to stand with supervision and gait with cane in the room with supervision.  She has a wide base of support with increase trunk sway.  She is barefoot declining socks as they get soaking wet from her wounds.  She really needs wound care while here and she is functioning slightly below baseline and is therefore appropriate for skilled PT to maximize her rehab potential.  She will likely not have any needs at discharge

## 2024-04-10 NOTE — PROGRESS NOTES
VANCO DAILY FOLLOW UP NOTE  [facility]   Pharmacy Pharmacokinetic Monitoring Service - Vancomycin    Consulting Provider: Dr. Best   Indication: SSTI  Target Concentration: Goal AUC/KALI 400-600 mg*hr/L  Day of Therapy: 1 of 5  Additional Antimicrobials: pip/tazo    Pertinent Laboratory Values:   Wt Readings from Last 1 Encounters:   04/10/24 (!) 154.2 kg (340 lb)     Temp Readings from Last 1 Encounters:   04/10/24 97.5 °F (36.4 °C) (Oral)     Recent Labs     04/10/24  0919 04/10/24  0920   BUN  --  12   CREATININE  --  1.00   WBC 6.9  --    PROCAL <0.05  --    LACTSEPSIS 0.6  --      Estimated Creatinine Clearance: 106 mL/min (based on SCr of 1 mg/dL).    Lab Results   Component Value Date/Time    VANCORANDOM 16.8 11/09/2023 05:34 AM     MRSA Nasal Swab: N/A. Non-respiratory infection    Assessment:  Date/Time Dose Concentration AUC         Note: Serum concentrations collected for AUC dosing may appear elevated if collected in close proximity to the dose administered, this is not necessarily an indication of toxicity    Plan:  Dosing recommendations based on Bayesian software  Start vancomycin 1250 mg every 12 hours   Anticipated AUC of 528 and trough concentration of 14.2 at steady state  Renal labs as indicated   Vancomycin concentrations will be ordered as clinically appropriate   Pharmacy will continue to monitor patient and adjust therapy as indicated    Thank you for the consult,  Patricia Latham RPH

## 2024-04-10 NOTE — PROGRESS NOTES
ACUTE OCCUPATIONAL THERAPY GOALS:   (Developed with and agreed upon by patient and/or caregiver.)  1. Patient will complete lower body bathing and dressing with MOD I and adaptive equipment as needed.   2.Patient will complete upper body bathing and dressing with MOD I and adaptive equipment as needed.  3. Patient will complete toileting with MOD I.   4. Patient will tolerate 30 minutes of OT treatment with 1-2 rest breaks to increase activity tolerance for ADLs.   5. Patient will complete functional transfers with MOD I and adaptive equipment as needed.   6. Patient will complete functional activity with MOD I and adaptive equipment as needed.    Timeframe: 7 visits      All goals met 4/10/24    OCCUPATIONAL THERAPY Initial Assessment, Daily Note, and Discharge       OT Visit Days: 1  Acknowledge Orders  Time  OT Charge Capture  Rehab Caseload Tracker      Lo Tirado is a 48 y.o. female   PRIMARY DIAGNOSIS: Bilateral cellulitis of lower leg  Cellulitis of left lower extremity [L03.116]  Intractable pain [R52]  Bilateral cellulitis of lower leg [L03.116, L03.115]       Reason for Referral: Generalized Muscle Weakness (M62.81)  Other lack of cordination (R27.8)  Difficulty in walking, Not elsewhere classified (R26.2)  Inpatient: Payor: [employer] MEDICARE / Plan: [employer] GOLD PLUS HMO / Product Type: *No Product type* /     ASSESSMENT:     REHAB RECOMMENDATIONS:   Recommendation to date pending progress:  Setting:  No further skilled occupational therapy after discharge from hospital    Equipment:    None     ASSESSMENT:  Ms. Tirado presented to the hospital with worsening BLE pain and chronic wounds. Pt has been receiving OP wound care for her legs and trained on how to care for them at home. At baseline, pt is mod I for bADLs, mobility, and IADLs. Uses a cane for mobility in the home and a rollator outside the home. Pt reported she is very sedentary at home.   Today, pt was received sitting in the chair.  balance, posture, coordination, static balance, and dynamic balance in order to prepare for functional task, prepare for seated ADLs, prepare for standing ADLs, prepare for discharge home , and prepare for self care..     TREATMENT GRID:  N/A    AFTER TREATMENT PRECAUTIONS: Call light within reach, Chair, Needs within reach, and RN notified    INTERDISCIPLINARY COLLABORATION:  RN/ PCT, PT/ PTA, and OT/ OJEDA    EDUCATION:  Education Given To: Patient  Education Provided: Role of Therapy;Plan of Care;Fall Prevention Strategies  Education Outcome: Verbalized understanding;Demonstrated understanding    TOTAL TREATMENT DURATION AND TIME:  Time In: 1450  Time Out: 1511  Minutes: 21    Dia Schuster, OT

## 2024-04-10 NOTE — H&P
Hospitalist History and Physical   Admit Date:  4/10/2024  8:28 AM   Name:  Lo Tirado   Age:  48 y.o.  Sex:  female  :  1975   MRN:  [id number]   Room:  ER    Presenting/Chief Complaint: Leg Pain     Reason(s) for Admission: Bilateral cellulitis of lower leg [L03.116, L03.115]     History of Present Illness:   Lo Tirado is a 48 y.o. female with medical history of morbid obesity with BMI 53.25, hypertension, bipolar affective disorder, medication noncompliance, peripheral neuropathy, lymphedema of bilateral lower extremity along with chronic bilateral lower extremity wounds who presented to the ER with chief complaints of worsening bilateral lower extremity pain and worsening of chronic wounds.  Patient has been following up with [facility] wound care, was evaluated by them on 4/3/2024.  Patient was advised and trained for routine wound care to be done at home.  Patient states that she is doing wound care dressing regularly and is requiring frequent changes due to excessive weeping from her chronic leg wounds.  Patient also reports of increasing lower extremity pain that is not improving with any over-the-counter pain medication.  She denies any fever, chills, nausea vomiting, diarrhea, headache, chest pain, palpitations.  Vital signs on arrival: Tmax 97.9, RR 18, , /98, saturation of 96% on room air.  Blood work remarkable for albumin 2.7.  Lactic acid, Pro-Wilbur, WBC were all within normal range.      Assessment & Plan:     Principal Problem:    Bilateral cellulitis of lower leg    Lymphedema of both lower extremities    Chronic ulcer of right ankle with fat layer exposed (HCC)    Chronic ulcer of left lower extremity with fat layer exposed (HCC)  Plan: 4/10-  Admit to inpatient for worsening of bilateral lower extremity cellulitis  Will start on empiric IV Zosyn and vancomycin.  Blood and wound culture ordered.  Wound care consulted.    Active Problems:    Non  Panel    Collection Time: 04/10/24  9:20 AM   Result Value Ref Range    Sodium 137 136 - 146 mmol/L    Potassium 4.2 3.5 - 5.1 mmol/L    Chloride 105 103 - 113 mmol/L    CO2 29 21 - 32 mmol/L    Anion Gap 3 2 - 11 mmol/L    Glucose 90 65 - 100 mg/dL    BUN 12 6 - 23 MG/DL    Creatinine 1.00 0.6 - 1.0 MG/DL    Est, Glom Filt Rate 69 >60 ml/min/1.73m2    Calcium 9.2 8.3 - 10.4 MG/DL    Total Bilirubin 0.4 0.2 - 1.1 MG/DL    ALT 26 12 - 65 U/L    AST 18 15 - 37 U/L    Alk Phosphatase 84 50 - 136 U/L    Total Protein 7.7 6.3 - 8.2 g/dL    Albumin 2.7 (L) 3.5 - 5.0 g/dL    Globulin 5.0 (H) 2.8 - 4.5 g/dL    Albumin/Globulin Ratio 0.5 0.4 - 1.6     Urinalysis w/Reflex to Micro    Collection Time: 04/10/24  9:22 AM   Result Value Ref Range    Color, UA YELLOW/STRAW      Appearance CLEAR      Specific [place], UA 1.011 1.001 - 1.023      pH, Urine 6.5 5.0 - 9.0      Protein, UA Negative NEG mg/dL    Glucose, UA Negative mg/dL    Ketones, Urine Negative NEG mg/dL    Bilirubin Urine Negative NEG      Blood, Urine Negative NEG      Urobilinogen, Urine 0.2 0.2 - 1.0 EU/dL    Nitrite, Urine Negative NEG      Leukocyte Esterase, Urine Negative NEG         No results for input(s): \"COVID19\" in the last 72 hours.    XR CHEST PORTABLE    Result Date: 4/10/2024  Chest X-ray INDICATION: Sepsis COMPARISON: March 28, 2024 TECHNIQUE: AP/PA view of the chest was obtained. FINDINGS: The lungs are clear. There are no infiltrates or effusions.  The heart size is normal.  The bony thorax is intact.      No acute findings in the chest         Signed:  Mati Best MD    Part of this note may have been written by using a voice dictation software.  The note has been proof read but may still contain some grammatical/other typographical errors.

## 2024-04-10 NOTE — TELEPHONE ENCOUNTER
Pt called for a new bariatric Rolator walker    ( walker needs to have a seat). She broke her current one.

## 2024-04-10 NOTE — ED TRIAGE NOTES
Pt arrives from home via EMS w/ cc of bilateral leg pain, swelling, weaping wounds x 1 wk. Pt has had wounds on her legs since 08/2023. Pt had home health wound care and but it was cancelled and has not been assessed for 1 month. VSS Pt A&O x 4

## 2024-04-10 NOTE — PROGRESS NOTES
TRANSFER - IN REPORT:    Verbal report received from STEWART Montejo on Lo Vilaon  being received from ER for routine progression of patient care      Report consisted of patient's Situation, Background, Assessment and   Recommendations(SBAR).     Information from the following report(s) Nurse Handoff Report was reviewed with the receiving nurse.    Opportunity for questions and clarification was provided.      Assessment completed upon patient's arrival to unit and care assumed.

## 2024-04-11 LAB
ANION GAP SERPL CALC-SCNC: 5 MMOL/L (ref 2–11)
BASOPHILS # BLD: 0 K/UL (ref 0–0.2)
BASOPHILS NFR BLD: 1 % (ref 0–2)
BUN SERPL-MCNC: 11 MG/DL (ref 6–23)
CALCIUM SERPL-MCNC: 9.3 MG/DL (ref 8.3–10.4)
CHLORIDE SERPL-SCNC: 100 MMOL/L (ref 103–113)
CO2 SERPL-SCNC: 29 MMOL/L (ref 21–32)
CREAT SERPL-MCNC: 1 MG/DL (ref 0.6–1)
DIFFERENTIAL METHOD BLD: ABNORMAL
EOSINOPHIL # BLD: 0.3 K/UL (ref 0–0.8)
EOSINOPHIL NFR BLD: 4 % (ref 0.5–7.8)
ERYTHROCYTE [DISTWIDTH] IN BLOOD BY AUTOMATED COUNT: 18.1 % (ref 11.9–14.6)
GLUCOSE SERPL-MCNC: 98 MG/DL (ref 65–100)
HCT VFR BLD AUTO: 33.4 % (ref 35.8–46.3)
HGB BLD-MCNC: 10.5 G/DL (ref 11.7–15.4)
IMM GRANULOCYTES # BLD AUTO: 0 K/UL (ref 0–0.5)
IMM GRANULOCYTES NFR BLD AUTO: 0 % (ref 0–5)
LYMPHOCYTES # BLD: 1.1 K/UL (ref 0.5–4.6)
LYMPHOCYTES NFR BLD: 13 % (ref 13–44)
MCH RBC QN AUTO: 26.8 PG (ref 26.1–32.9)
MCHC RBC AUTO-ENTMCNC: 31.4 G/DL (ref 31.4–35)
MCV RBC AUTO: 85.2 FL (ref 82–102)
MONOCYTES # BLD: 0.7 K/UL (ref 0.1–1.3)
MONOCYTES NFR BLD: 9 % (ref 4–12)
NEUTS SEG # BLD: 6.2 K/UL (ref 1.7–8.2)
NEUTS SEG NFR BLD: 73 % (ref 43–78)
NRBC # BLD: 0 K/UL (ref 0–0.2)
PLATELET # BLD AUTO: 562 K/UL (ref 150–450)
PMV BLD AUTO: 9.4 FL (ref 9.4–12.3)
POTASSIUM SERPL-SCNC: 4.4 MMOL/L (ref 3.5–5.1)
RBC # BLD AUTO: 3.92 M/UL (ref 4.05–5.2)
SODIUM SERPL-SCNC: 134 MMOL/L (ref 136–146)
WBC # BLD AUTO: 8.3 K/UL (ref 4.3–11.1)

## 2024-04-11 PROCEDURE — 87077 CULTURE AEROBIC IDENTIFY: CPT

## 2024-04-11 PROCEDURE — 85025 COMPLETE CBC W/AUTO DIFF WBC: CPT

## 2024-04-11 PROCEDURE — 87205 SMEAR GRAM STAIN: CPT

## 2024-04-11 PROCEDURE — 76937 US GUIDE VASCULAR ACCESS: CPT

## 2024-04-11 PROCEDURE — A4216 STERILE WATER/SALINE, 10 ML: HCPCS | Performed by: HOSPITALIST

## 2024-04-11 PROCEDURE — 1100000000 HC RM PRIVATE

## 2024-04-11 PROCEDURE — 2580000003 HC RX 258: Performed by: HOSPITALIST

## 2024-04-11 PROCEDURE — 2500000003 HC RX 250 WO HCPCS: Performed by: HOSPITALIST

## 2024-04-11 PROCEDURE — 6360000002 HC RX W HCPCS: Performed by: HOSPITALIST

## 2024-04-11 PROCEDURE — 36415 COLL VENOUS BLD VENIPUNCTURE: CPT

## 2024-04-11 PROCEDURE — 6370000000 HC RX 637 (ALT 250 FOR IP): Performed by: HOSPITALIST

## 2024-04-11 PROCEDURE — 87070 CULTURE OTHR SPECIMN AEROBIC: CPT

## 2024-04-11 PROCEDURE — 80048 BASIC METABOLIC PNL TOTAL CA: CPT

## 2024-04-11 RX ORDER — METOPROLOL SUCCINATE 25 MG/1
25 TABLET, EXTENDED RELEASE ORAL DAILY
Status: DISCONTINUED | OUTPATIENT
Start: 2024-04-11 | End: 2024-04-15 | Stop reason: HOSPADM

## 2024-04-11 RX ORDER — MORPHINE SULFATE 15 MG/1
15 TABLET, FILM COATED, EXTENDED RELEASE ORAL EVERY 12 HOURS SCHEDULED
Status: DISCONTINUED | OUTPATIENT
Start: 2024-04-11 | End: 2024-04-14

## 2024-04-11 RX ORDER — FUROSEMIDE 10 MG/ML
60 INJECTION INTRAMUSCULAR; INTRAVENOUS ONCE
Status: COMPLETED | OUTPATIENT
Start: 2024-04-11 | End: 2024-04-11

## 2024-04-11 RX ORDER — CLONAZEPAM 0.5 MG/1
0.5 TABLET ORAL EVERY 12 HOURS
Status: DISCONTINUED | OUTPATIENT
Start: 2024-04-11 | End: 2024-04-13

## 2024-04-11 RX ADMIN — HYDROMORPHONE HYDROCHLORIDE 1 MG: 1 INJECTION, SOLUTION INTRAMUSCULAR; INTRAVENOUS; SUBCUTANEOUS at 00:07

## 2024-04-11 RX ADMIN — MONTELUKAST 10 MG: 10 TABLET, FILM COATED ORAL at 08:13

## 2024-04-11 RX ADMIN — PREGABALIN 200 MG: 100 CAPSULE ORAL at 08:13

## 2024-04-11 RX ADMIN — PIPERACILLIN AND TAZOBACTAM 3375 MG: 3; .375 INJECTION, POWDER, LYOPHILIZED, FOR SOLUTION INTRAVENOUS at 02:57

## 2024-04-11 RX ADMIN — PIPERACILLIN AND TAZOBACTAM 4500 MG: 4; .5 INJECTION, POWDER, LYOPHILIZED, FOR SOLUTION INTRAVENOUS at 10:39

## 2024-04-11 RX ADMIN — VANCOMYCIN HYDROCHLORIDE 1250 MG: 10 INJECTION, POWDER, LYOPHILIZED, FOR SOLUTION INTRAVENOUS at 13:10

## 2024-04-11 RX ADMIN — SODIUM CHLORIDE: 9 INJECTION, SOLUTION INTRAVENOUS at 01:14

## 2024-04-11 RX ADMIN — FAMOTIDINE 20 MG: 10 INJECTION, SOLUTION INTRAVENOUS at 08:13

## 2024-04-11 RX ADMIN — SODIUM CHLORIDE: 9 INJECTION, SOLUTION INTRAVENOUS at 13:10

## 2024-04-11 RX ADMIN — SODIUM CHLORIDE, PRESERVATIVE FREE 10 ML: 5 INJECTION INTRAVENOUS at 21:22

## 2024-04-11 RX ADMIN — HYDROCODONE BITARTRATE AND ACETAMINOPHEN 1 TABLET: 7.5; 325 TABLET ORAL at 10:33

## 2024-04-11 RX ADMIN — LOSARTAN POTASSIUM 50 MG: 50 TABLET, FILM COATED ORAL at 08:14

## 2024-04-11 RX ADMIN — PREGABALIN 200 MG: 100 CAPSULE ORAL at 15:06

## 2024-04-11 RX ADMIN — CLONAZEPAM 0.5 MG: 0.5 TABLET ORAL at 08:58

## 2024-04-11 RX ADMIN — METOPROLOL SUCCINATE 25 MG: 25 TABLET, FILM COATED, EXTENDED RELEASE ORAL at 08:58

## 2024-04-11 RX ADMIN — DULOXETINE HYDROCHLORIDE 30 MG: 30 CAPSULE, DELAYED RELEASE ORAL at 08:13

## 2024-04-11 RX ADMIN — HYDROCODONE BITARTRATE AND ACETAMINOPHEN 1 TABLET: 7.5; 325 TABLET ORAL at 05:58

## 2024-04-11 RX ADMIN — HYDROMORPHONE HYDROCHLORIDE 1 MG: 1 INJECTION, SOLUTION INTRAMUSCULAR; INTRAVENOUS; SUBCUTANEOUS at 12:18

## 2024-04-11 RX ADMIN — Medication 1 CAPSULE: at 08:14

## 2024-04-11 RX ADMIN — HYDROMORPHONE HYDROCHLORIDE 1 MG: 1 INJECTION, SOLUTION INTRAMUSCULAR; INTRAVENOUS; SUBCUTANEOUS at 04:14

## 2024-04-11 RX ADMIN — FAMOTIDINE 20 MG: 10 INJECTION, SOLUTION INTRAVENOUS at 21:22

## 2024-04-11 RX ADMIN — PIPERACILLIN AND TAZOBACTAM 4500 MG: 4; .5 INJECTION, POWDER, LYOPHILIZED, FOR SOLUTION INTRAVENOUS at 17:52

## 2024-04-11 RX ADMIN — HYDROMORPHONE HYDROCHLORIDE 1 MG: 1 INJECTION, SOLUTION INTRAMUSCULAR; INTRAVENOUS; SUBCUTANEOUS at 08:13

## 2024-04-11 RX ADMIN — SODIUM CHLORIDE: 9 INJECTION, SOLUTION INTRAVENOUS at 17:48

## 2024-04-11 RX ADMIN — MORPHINE SULFATE 15 MG: 15 TABLET, FILM COATED, EXTENDED RELEASE ORAL at 15:06

## 2024-04-11 RX ADMIN — FUROSEMIDE 60 MG: 10 INJECTION, SOLUTION INTRAMUSCULAR; INTRAVENOUS at 15:50

## 2024-04-11 RX ADMIN — VANCOMYCIN HYDROCHLORIDE 1250 MG: 10 INJECTION, POWDER, LYOPHILIZED, FOR SOLUTION INTRAVENOUS at 01:15

## 2024-04-11 ASSESSMENT — PAIN DESCRIPTION - DESCRIPTORS
DESCRIPTORS: DISCOMFORT
DESCRIPTORS: DISCOMFORT
DESCRIPTORS: BURNING;DISCOMFORT

## 2024-04-11 ASSESSMENT — PAIN DESCRIPTION - LOCATION
LOCATION: LEG

## 2024-04-11 ASSESSMENT — PAIN SCALES - GENERAL
PAINLEVEL_OUTOF10: 10
PAINLEVEL_OUTOF10: 0
PAINLEVEL_OUTOF10: 6
PAINLEVEL_OUTOF10: 10
PAINLEVEL_OUTOF10: 0
PAINLEVEL_OUTOF10: 10
PAINLEVEL_OUTOF10: 6
PAINLEVEL_OUTOF10: 10

## 2024-04-11 ASSESSMENT — PAIN DESCRIPTION - ORIENTATION
ORIENTATION: RIGHT;LEFT

## 2024-04-11 NOTE — PROGRESS NOTES
Hourly rounds performed this shift. Bed lowered and locked. Call light within reach. PRN pain medication given per MAR, pt satisfied. Wound culture swabs of bilat legs sent. Pt sat in chair this shift and pt refused to be repositioned and return to bed.  Bedside report will be given to oncoming nurse.

## 2024-04-11 NOTE — PROGRESS NOTES
04/10/24 1200 -- 96 19 (!) 153/62 97 %   04/10/24 1115 -- 92 -- (!) 145/88 96 %   04/10/24 1114 -- (!) 104 -- (!) 153/98 98 %   04/10/24 1100 -- 97 -- -- 100 %   04/10/24 1000 -- (!) 104 (!) 33 (!) 146/47 97 %   04/10/24 0945 -- 94 13 (!) 149/96 94 %   04/10/24 0915 -- 92 14 125/89 100 %       Oxygen Therapy  SpO2: 96 %  Pulse via Oximetry: 99 beats per minute  O2 Device: None (Room air)    Estimated body mass index is 53.25 kg/m² as calculated from the following:    Height as of this encounter: 1.702 m (5' 7\").    Weight as of this encounter: 154.2 kg (340 lb).    Intake/Output Summary (Last 24 hours) at 4/11/2024 0847  Last data filed at 4/11/2024 0529  Gross per 24 hour   Intake --   Output 2150 ml   Net -2150 ml         Physical Exam:     General:    Alert, awake, morbidly obese, mild distress due to pain, on room air.    Head:  Normocephalic, atraumatic  Eyes:  Sclerae appear normal.  Pupils equally round.  ENT:  Nares appear normal.  Moist oral mucosa  Neck:  No restricted ROM.  Trachea midline   CV:   RRR.  No m/r/g.  No jugular venous distension.  Lungs:   CTAB.  No wheezing, rhonchi, or rales.  Symmetric expansion.  Abdomen:   Soft, nontender, nondistended.  Extremities: Bilateral lower extremity with 1-2+ pitting edema along with large superficial ulcer on posterior lateral aspect of left lower leg and posterior aspect of right lower leg with stage IV and mild erythema noted  Skin:     No rashes.  Normal coloration.   Warm and dry.    Neuro:  CN II-XII grossly intact.    Psych:  Normal mood and affect.      I have personally reviewed labs and tests:  Recent Labs:  Recent Results (from the past 48 hour(s))   Lactate, Sepsis    Collection Time: 04/10/24  9:19 AM   Result Value Ref Range    Lactic Acid, Sepsis 0.6 0.4 - 2.0 MMOL/L   CBC with Auto Differential    Collection Time: 04/10/24  9:19 AM   Result Value Ref Range    WBC 6.9 4.3 - 11.1 K/uL    RBC 3.71 (L) 4.05 - 5.2 M/uL    Hemoglobin 9.9 (L) 11.7 -        Signed:  Mati Best MD    Part of this note may have been written by using a voice dictation software.  The note has been proof read but may still contain some grammatical/other typographical errors.

## 2024-04-11 NOTE — PROGRESS NOTES
VANCO DAILY FOLLOW UP NOTE  [facility]   Pharmacy Pharmacokinetic Monitoring Service - Vancomycin    Consulting Provider: Dr. Best   Indication: SSTI  Target Concentration: Goal AUC/KALI 400-600 mg*hr/L  Day of Therapy: 2 of 5  Additional Antimicrobials: pip/tazo    Pertinent Laboratory Values:   Wt Readings from Last 1 Encounters:   04/10/24 (!) 154.2 kg (340 lb)     Temp Readings from Last 1 Encounters:   04/11/24 98.6 °F (37 °C) (Oral)     Recent Labs     04/10/24  0919 04/10/24  0920 04/11/24  0500 04/11/24  0648   BUN  --  12 11  --    CREATININE  --  1.00 1.00  --    WBC 6.9  --   --  8.3   PROCAL <0.05  --   --   --    LACTSEPSIS 0.6  --   --   --      Estimated Creatinine Clearance: 106 mL/min (based on SCr of 1 mg/dL).      MRSA Nasal Swab: N/A. Non-respiratory infection    Assessment:  Date/Time Dose Concentration AUC         Note: Serum concentrations collected for AUC dosing may appear elevated if collected in close proximity to the dose administered, this is not necessarily an indication of toxicity    Plan:  Dosing recommendations based on Bayesian software  Continue vancomycin 1250 mg every 12 hours   Anticipated AUC of 528 and trough concentration of 14.2 at steady state  Renal labs as indicated   Vancomycin concentrations will be ordered as clinically appropriate   Pharmacy will continue to monitor patient and adjust therapy as indicated    Thank you for the consult,  Darrion Charles [facility]

## 2024-04-11 NOTE — WOUND CARE
Bilateral lower legs with chronic venous ulcer. Known to wound clinic. Patient states she has lymphedema pumps at home, however cannot wear secondary to pain/ burning. Patient states she cannot tolerate wraps.   Offered bandages and she agreed. Wounds cleansed with wound  adaptic applied and strips of opticel ag applied over wounds with zinc barrier periwound, ABD's kerlex and tubigrip size \"g\" (from  sample not available in this facility routinely) applied.  Patient complained of a great deal of pain and the tubigrip was removed from the left leg at her request. Will need daily dressing changes until edema and erythema improve. Recommend home health and follow up at wound clinic. Will monitor

## 2024-04-12 LAB
CREAT SERPL-MCNC: 1.2 MG/DL (ref 0.6–1)
VANCOMYCIN SERPL-MCNC: 17.6 UG/ML

## 2024-04-12 PROCEDURE — 6360000002 HC RX W HCPCS: Performed by: HOSPITALIST

## 2024-04-12 PROCEDURE — 1100000000 HC RM PRIVATE

## 2024-04-12 PROCEDURE — 6370000000 HC RX 637 (ALT 250 FOR IP): Performed by: HOSPITALIST

## 2024-04-12 PROCEDURE — 2580000003 HC RX 258: Performed by: HOSPITALIST

## 2024-04-12 PROCEDURE — A4216 STERILE WATER/SALINE, 10 ML: HCPCS | Performed by: HOSPITALIST

## 2024-04-12 PROCEDURE — 97530 THERAPEUTIC ACTIVITIES: CPT

## 2024-04-12 PROCEDURE — 36415 COLL VENOUS BLD VENIPUNCTURE: CPT

## 2024-04-12 PROCEDURE — 80202 ASSAY OF VANCOMYCIN: CPT

## 2024-04-12 PROCEDURE — 82565 ASSAY OF CREATININE: CPT

## 2024-04-12 PROCEDURE — 2500000003 HC RX 250 WO HCPCS: Performed by: HOSPITALIST

## 2024-04-12 RX ORDER — FUROSEMIDE 10 MG/ML
40 INJECTION INTRAMUSCULAR; INTRAVENOUS ONCE
Status: COMPLETED | OUTPATIENT
Start: 2024-04-12 | End: 2024-04-12

## 2024-04-12 RX ADMIN — SODIUM CHLORIDE, PRESERVATIVE FREE 10 ML: 5 INJECTION INTRAVENOUS at 08:53

## 2024-04-12 RX ADMIN — HYDROMORPHONE HYDROCHLORIDE 1 MG: 1 INJECTION, SOLUTION INTRAMUSCULAR; INTRAVENOUS; SUBCUTANEOUS at 11:17

## 2024-04-12 RX ADMIN — FUROSEMIDE 40 MG: 10 INJECTION, SOLUTION INTRAMUSCULAR; INTRAVENOUS at 08:49

## 2024-04-12 RX ADMIN — FAMOTIDINE 20 MG: 10 INJECTION, SOLUTION INTRAVENOUS at 08:52

## 2024-04-12 RX ADMIN — MORPHINE SULFATE 15 MG: 15 TABLET, FILM COATED, EXTENDED RELEASE ORAL at 13:54

## 2024-04-12 RX ADMIN — MONTELUKAST 10 MG: 10 TABLET, FILM COATED ORAL at 08:47

## 2024-04-12 RX ADMIN — FLUTICASONE PROPIONATE 2 SPRAY: 50 SPRAY, METERED NASAL at 08:53

## 2024-04-12 RX ADMIN — PREGABALIN 200 MG: 100 CAPSULE ORAL at 08:47

## 2024-04-12 RX ADMIN — FAMOTIDINE 20 MG: 10 INJECTION, SOLUTION INTRAVENOUS at 20:39

## 2024-04-12 RX ADMIN — SODIUM CHLORIDE, PRESERVATIVE FREE 10 ML: 5 INJECTION INTRAVENOUS at 20:46

## 2024-04-12 RX ADMIN — CLONAZEPAM 0.5 MG: 0.5 TABLET ORAL at 20:39

## 2024-04-12 RX ADMIN — VANCOMYCIN HYDROCHLORIDE 1250 MG: 10 INJECTION, POWDER, LYOPHILIZED, FOR SOLUTION INTRAVENOUS at 13:54

## 2024-04-12 RX ADMIN — HYDROXYZINE HYDROCHLORIDE 25 MG: 10 TABLET ORAL at 20:39

## 2024-04-12 RX ADMIN — VANCOMYCIN HYDROCHLORIDE 1250 MG: 10 INJECTION, POWDER, LYOPHILIZED, FOR SOLUTION INTRAVENOUS at 00:46

## 2024-04-12 RX ADMIN — HYDROCODONE BITARTRATE AND ACETAMINOPHEN 1 TABLET: 7.5; 325 TABLET ORAL at 23:26

## 2024-04-12 RX ADMIN — PREGABALIN 200 MG: 100 CAPSULE ORAL at 13:54

## 2024-04-12 RX ADMIN — Medication 1 CAPSULE: at 08:47

## 2024-04-12 RX ADMIN — Medication 3 MG: at 20:39

## 2024-04-12 RX ADMIN — PIPERACILLIN AND TAZOBACTAM 4500 MG: 4; .5 INJECTION, POWDER, LYOPHILIZED, FOR SOLUTION INTRAVENOUS at 03:08

## 2024-04-12 RX ADMIN — DULOXETINE HYDROCHLORIDE 30 MG: 30 CAPSULE, DELAYED RELEASE ORAL at 08:47

## 2024-04-12 RX ADMIN — PIPERACILLIN AND TAZOBACTAM 4500 MG: 4; .5 INJECTION, POWDER, LYOPHILIZED, FOR SOLUTION INTRAVENOUS at 08:51

## 2024-04-12 RX ADMIN — CLONAZEPAM 0.5 MG: 0.5 TABLET ORAL at 08:48

## 2024-04-12 RX ADMIN — PREGABALIN 200 MG: 100 CAPSULE ORAL at 20:39

## 2024-04-12 RX ADMIN — HYDROMORPHONE HYDROCHLORIDE 1 MG: 1 INJECTION, SOLUTION INTRAMUSCULAR; INTRAVENOUS; SUBCUTANEOUS at 05:25

## 2024-04-12 RX ADMIN — PIPERACILLIN AND TAZOBACTAM 4500 MG: 4; .5 INJECTION, POWDER, LYOPHILIZED, FOR SOLUTION INTRAVENOUS at 17:12

## 2024-04-12 ASSESSMENT — PAIN DESCRIPTION - DESCRIPTORS
DESCRIPTORS: BURNING;DISCOMFORT
DESCRIPTORS: DISCOMFORT
DESCRIPTORS: DISCOMFORT
DESCRIPTORS: BURNING

## 2024-04-12 ASSESSMENT — PAIN DESCRIPTION - ORIENTATION
ORIENTATION: RIGHT;LEFT

## 2024-04-12 ASSESSMENT — PAIN SCALES - GENERAL
PAINLEVEL_OUTOF10: 3
PAINLEVEL_OUTOF10: 0
PAINLEVEL_OUTOF10: 3
PAINLEVEL_OUTOF10: 9
PAINLEVEL_OUTOF10: 10
PAINLEVEL_OUTOF10: 6

## 2024-04-12 ASSESSMENT — PAIN DESCRIPTION - LOCATION
LOCATION: LEG

## 2024-04-12 NOTE — PROGRESS NOTES
Hospitalist Progress Note   Admit Date:  4/10/2024  8:28 AM   Name:  Lo Tirado   Age:  48 y.o.  Sex:  female  :  1975   MRN:  [id number]   Room:  [facility]    Presenting/Chief Complaint: Leg Pain     Reason(s) for Admission: Cellulitis of left lower extremity [L03.116]  Intractable pain [R52]  Bilateral cellulitis of lower leg [L03.116, L03.115]     Hospital Course:   Lo Tirado is a 48 y.o. female with medical history of morbid obesity with BMI 53.25, hypertension, bipolar affective disorder, medication noncompliance, peripheral neuropathy, lymphedema of bilateral lower extremity along with chronic bilateral lower extremity wounds admitted for bilateral cellulitis of lower leg and increasing LE pain.      Subjective & 24hr Events:   Patient seen at bedside, resting in bed comfortably.  She is alert and awake, AOx3, on room air  Patient continues to report of burning pain in bilateral lower extremity.  Denies any chest pain, fever, chills, nausea vomiting, diarrhea.  Patient informed that she was started on MS Contin 15 mg twice daily yesterday to achieve optimal pain control.  No other events reported by nursing staff.      ROS:  10 point review of system is negative except for what mentioned above.      Assessment & Plan:     Principal Problem:    Bilateral cellulitis of lower leg    Lymphedema of both lower extremities    Chronic ulcer of right ankle with fat layer exposed (HCC)    Chronic ulcer of left lower extremity with fat layer exposed (HCC)  Plan: -  Continue empiric IV Zosyn and vancomycin, D2.  Culture negative to date, wound culture with some gram-positive cocci and gram-negative rods.  Appreciate wound care team evaluation.  Pain control with as needed Dilaudid and Norco.  MS Contin 15 mg twice daily for adequate pain control.  Continue Lyrica 200 mg p.o. 3 times daily.  Continue intermittent Lasix, creatinine slightly increased to 1.2 today.     Active Problems:    Non  13 - 44 %    Monocytes % 9 4.0 - 12.0 %    Eosinophils % 4 0.5 - 7.8 %    Basophils % 1 0.0 - 2.0 %    Immature Granulocytes % 0 0.0 - 5.0 %    Neutrophils Absolute 6.2 1.7 - 8.2 K/UL    Lymphocytes Absolute 1.1 0.5 - 4.6 K/UL    Monocytes Absolute 0.7 0.1 - 1.3 K/UL    Eosinophils Absolute 0.3 0.0 - 0.8 K/UL    Basophils Absolute 0.0 0.0 - 0.2 K/UL    Immature Granulocytes Absolute 0.0 0.0 - 0.5 K/UL       No results for input(s): \"COVID19\" in the last 72 hours.    Current Meds:  Current Facility-Administered Medications   Medication Dose Route Frequency    furosemide (LASIX) injection 40 mg  40 mg IntraVENous Once    metoprolol succinate (TOPROL XL) extended release tablet 25 mg  25 mg Oral Daily    clonazePAM (KLONOPIN) tablet 0.5 mg  0.5 mg Oral Q12H    piperacillin-tazobactam (ZOSYN) 4,500 mg in sodium chloride 0.9 % 100 mL IVPB (mini-bag)  4,500 mg IntraVENous Q8H    morphine (MS CONTIN) extended release tablet 15 mg  15 mg Oral 2 times per day    montelukast (SINGULAIR) tablet 10 mg  10 mg Oral Daily    hydrOXYzine HCl (ATARAX) tablet 25 mg  25 mg Oral Nightly    HYDROcodone-acetaminophen (NORCO) 7.5-325 MG per tablet 1 tablet  1 tablet Oral Q4H PRN    fluticasone (FLONASE) 50 MCG/ACT nasal spray 2 spray  2 spray Nasal Daily    DULoxetine (CYMBALTA) extended release capsule 30 mg  30 mg Oral Daily    sodium chloride flush 0.9 % injection 5-40 mL  5-40 mL IntraVENous 2 times per day    sodium chloride flush 0.9 % injection 5-40 mL  5-40 mL IntraVENous PRN    0.9 % sodium chloride infusion   IntraVENous PRN    potassium chloride (KLOR-CON M) extended release tablet 40 mEq  40 mEq Oral PRN    Or    potassium bicarb-citric acid (EFFER-K) effervescent tablet 40 mEq  40 mEq Oral PRN    Or    potassium chloride 10 mEq/100 mL IVPB (Peripheral Line)  10 mEq IntraVENous PRN    magnesium sulfate 2000 mg in 50 mL IVPB premix  2,000 mg IntraVENous PRN    enoxaparin (LOVENOX) injection 40 mg  40 mg SubCUTAneous BID

## 2024-04-12 NOTE — PLAN OF CARE
Problem: Skin/Tissue Integrity  Goal: Absence of new skin breakdown  Description: 1.  Monitor for areas of redness and/or skin breakdown  2.  Assess vascular access sites hourly  3.  Every 4-6 hours minimum:  Change oxygen saturation probe site  4.  Every 4-6 hours:  If on nasal continuous positive airway pressure, respiratory therapy assess nares and determine need for appliance change or resting period.  4/12/2024 0317 by Keesha Gallego, RN  Outcome: Progressing  4/12/2024 0317 by Keesha Gallego, RN  Outcome: Progressing     Problem: Discharge Planning  Goal: Discharge to home or other facility with appropriate resources  4/12/2024 0317 by Keesha Gallego RN  Outcome: Progressing  4/12/2024 0317 by Keesha Gallego RN  Outcome: Progressing     Problem: Pain  Goal: Verbalizes/displays adequate comfort level or baseline comfort level  Outcome: Progressing     Problem: Safety - Adult  Goal: Free from fall injury  Outcome: Progressing     Problem: Chronic Conditions and Co-morbidities  Goal: Patient's chronic conditions and co-morbidity symptoms are monitored and maintained or improved  Outcome: Progressing

## 2024-04-12 NOTE — PROGRESS NOTES
ACUTE PHYSICAL THERAPY GOALS:   (Developed with and agreed upon by patient and/or caregiver.)   Candida will perform all transfers independently in 5 days.    Candida will perform gait with least restrictive device 200 ft independently in 5 days.     PHYSICAL THERAPY: Daily Note PM   (Link to Caseload Tracking: PT Visit Days : 2  Time In/Out PT Charge Capture  Rehab Caseload Tracker  Orders    Lo Tirado is a 48 y.o. female   PRIMARY DIAGNOSIS: Bilateral cellulitis of lower leg  Cellulitis of left lower extremity [L03.116]  Intractable pain [R52]  Bilateral cellulitis of lower leg [L03.116, L03.115]       Inpatient: Payor: [employer] MEDICARE / Plan: [employer] GOLD PLUS HMO / Product Type: *No Product type* /     ASSESSMENT:     REHAB RECOMMENDATIONS:   Recommendation to date pending progress:  Setting:  No further skilled physical therapy after discharge from hospital    Equipment:     Rollator     ASSESSMENT:  Ms. Tirado continues to progress well toward goals.  Donned socks with assistance and ambulated into the hallway using her SPC.  She prefers to use her Rollator, but one has not been delivered yet.  She ambulated with a steady pace and no loss of  balance, using  the  SPC and occasionally holding onto furniture.     SUBJECTIVE:   Ms. Tirado states, \"I'll take the socks off once they get wet\"  Referring to the draining from her legs    Social/Functional Lives With: Son, Daughter, Family (patient lives with disabled 27 year old son, 24 year old daughter and her 3 children.)  Type of Home: House  Home Layout: One level  Home Access: Stairs to enter with rails  Entrance Stairs - Number of Steps: 6  Bathroom Toilet: Standard  Bathroom Accessibility: Accessible  Home Equipment: Cane, Rollator  Receives Help From: Family  ADL Assistance: Independent  Homemaking Assistance: Needs assistance  Homemaking Responsibilities: No  Ambulation Assistance: Needs assistance  Transfer Assistance: Needs assistance  Active  Activity (23 Minutes): Therapeutic activity included Transfer Training, Ambulation on level ground, Sitting balance , and Standing balance to improve functional Activity tolerance, Balance, Mobility, and Strength.    TREATMENT GRID:  N/A    AFTER TREATMENT PRECAUTIONS: Call light within reach, Chair, Needs within reach, RN notified, and Visitors at bedside    INTERDISCIPLINARY COLLABORATION:  RN/ PCT, PT/ PTA, and Rehab Attendant    EDUCATION:      TIME IN/OUT:  Time In: 1444  Time Out: 1507  Minutes: 23    Ruby Boggs, PTA

## 2024-04-12 NOTE — PROGRESS NOTES
Hourly rounds performed this shift. Bed lowered and locked. Call light within reach. PRN pain medication given per MAR, pt satisfied. Dressing changed per wound care order. Bedside report will be given to oncoming nurse.

## 2024-04-12 NOTE — CARE COORDINATION
Chart reviewed and patient discussed in IDT rounds this AM. Discharge plan is to return home with [facility]. Referral sent to [facility] for wound care. Patient reports her rollator is jesusita- PCP is already aware and sent orders to [facility].     [place], [facility]  [place]

## 2024-04-12 NOTE — PROGRESS NOTES
VANCO DAILY FOLLOW UP NOTE  [facility]   Pharmacy Pharmacokinetic Monitoring Service - Vancomycin    Consulting Provider: Dr. Best   Indication: SSTI  Target Concentration: Goal AUC/KALI 400-600 mg*hr/L  Day of Therapy: 3 of 5  Additional Antimicrobials: pip/tazo    Pertinent Laboratory Values:   Wt Readings from Last 1 Encounters:   04/10/24 (!) 154.2 kg (340 lb)     Temp Readings from Last 1 Encounters:   04/12/24 98 °F (36.7 °C) (Oral)     Recent Labs     04/10/24  0919 04/10/24  0920 04/11/24  0500 04/11/24  0648 04/12/24  0814   BUN  --  12 11  --   --    CREATININE  --  1.00 1.00  --  1.20*   WBC 6.9  --   --  8.3  --    PROCAL <0.05  --   --   --   --    LACTSEPSIS 0.6  --   --   --   --      Estimated Creatinine Clearance: 88 mL/min (A) (based on SCr of 1.2 mg/dL (H)).    Lab Results   Component Value Date/Time    VANCORANDOM 17.6 04/12/2024 08:14 AM         MRSA Nasal Swab: N/A. Non-respiratory infection    Assessment:  Date/Time Dose Concentration AUC   4/12 0814 1250 mg Q12H 17.6 518   Note: Serum concentrations collected for AUC dosing may appear elevated if collected in close proximity to the dose administered, this is not necessarily an indication of toxicity    Plan:  Dosing recommendations based on Bayesian software  Regimen therapeutic, but will reduce dose to vancomycin 1000 mg every 12 hours   Anticipated AUC of 414 and trough concentration of 11.5 at steady state  Renal labs as indicated   Vancomycin concentrations will be ordered as clinically appropriate   Pharmacy will continue to monitor patient and adjust therapy as indicated    Thank you for the consult,  Darrion [facility]

## 2024-04-13 LAB
ANION GAP SERPL CALC-SCNC: 2 MMOL/L (ref 2–11)
BACTERIA SPEC CULT: NORMAL
BACTERIA SPEC CULT: NORMAL
BUN SERPL-MCNC: 16 MG/DL (ref 6–23)
CALCIUM SERPL-MCNC: 9.3 MG/DL (ref 8.3–10.4)
CHLORIDE SERPL-SCNC: 103 MMOL/L (ref 103–113)
CO2 SERPL-SCNC: 30 MMOL/L (ref 21–32)
CREAT SERPL-MCNC: 1 MG/DL (ref 0.6–1)
GLUCOSE SERPL-MCNC: 104 MG/DL (ref 65–100)
GRAM STN SPEC: NORMAL
POTASSIUM SERPL-SCNC: 4.3 MMOL/L (ref 3.5–5.1)
SERVICE CMNT-IMP: NORMAL
SODIUM SERPL-SCNC: 135 MMOL/L (ref 136–146)

## 2024-04-13 PROCEDURE — 1100000000 HC RM PRIVATE

## 2024-04-13 PROCEDURE — 2500000003 HC RX 250 WO HCPCS: Performed by: HOSPITALIST

## 2024-04-13 PROCEDURE — 6360000002 HC RX W HCPCS: Performed by: HOSPITALIST

## 2024-04-13 PROCEDURE — A4216 STERILE WATER/SALINE, 10 ML: HCPCS | Performed by: HOSPITALIST

## 2024-04-13 PROCEDURE — 80048 BASIC METABOLIC PNL TOTAL CA: CPT

## 2024-04-13 PROCEDURE — 6370000000 HC RX 637 (ALT 250 FOR IP): Performed by: HOSPITALIST

## 2024-04-13 PROCEDURE — 2580000003 HC RX 258: Performed by: HOSPITALIST

## 2024-04-13 PROCEDURE — 36415 COLL VENOUS BLD VENIPUNCTURE: CPT

## 2024-04-13 RX ORDER — FUROSEMIDE 10 MG/ML
60 INJECTION INTRAMUSCULAR; INTRAVENOUS ONCE
Status: COMPLETED | OUTPATIENT
Start: 2024-04-13 | End: 2024-04-13

## 2024-04-13 RX ORDER — BACLOFEN 10 MG/1
5 TABLET ORAL EVERY 8 HOURS PRN
Status: ON HOLD | COMMUNITY
End: 2024-04-23 | Stop reason: HOSPADM

## 2024-04-13 RX ORDER — ASPIRIN 81 MG/1
81 TABLET, CHEWABLE ORAL DAILY
Status: DISCONTINUED | OUTPATIENT
Start: 2024-04-13 | End: 2024-04-15 | Stop reason: HOSPADM

## 2024-04-13 RX ORDER — BACLOFEN 10 MG/1
10 TABLET ORAL 3 TIMES DAILY PRN
Status: DISCONTINUED | OUTPATIENT
Start: 2024-04-13 | End: 2024-04-15 | Stop reason: HOSPADM

## 2024-04-13 RX ORDER — ASPIRIN 81 MG/1
81 TABLET, CHEWABLE ORAL DAILY
Status: ON HOLD | COMMUNITY

## 2024-04-13 RX ORDER — FLUCONAZOLE 100 MG/1
200 TABLET ORAL DAILY
Status: COMPLETED | OUTPATIENT
Start: 2024-04-13 | End: 2024-04-14

## 2024-04-13 RX ADMIN — SODIUM CHLORIDE, PRESERVATIVE FREE 10 ML: 5 INJECTION INTRAVENOUS at 08:59

## 2024-04-13 RX ADMIN — HYDROMORPHONE HYDROCHLORIDE 1 MG: 1 INJECTION, SOLUTION INTRAMUSCULAR; INTRAVENOUS; SUBCUTANEOUS at 06:24

## 2024-04-13 RX ADMIN — HYDROMORPHONE HYDROCHLORIDE 1 MG: 1 INJECTION, SOLUTION INTRAMUSCULAR; INTRAVENOUS; SUBCUTANEOUS at 16:45

## 2024-04-13 RX ADMIN — PIPERACILLIN AND TAZOBACTAM 4500 MG: 4; .5 INJECTION, POWDER, LYOPHILIZED, FOR SOLUTION INTRAVENOUS at 01:58

## 2024-04-13 RX ADMIN — PIPERACILLIN AND TAZOBACTAM 4500 MG: 4; .5 INJECTION, POWDER, LYOPHILIZED, FOR SOLUTION INTRAVENOUS at 18:58

## 2024-04-13 RX ADMIN — MORPHINE SULFATE 15 MG: 15 TABLET, FILM COATED, EXTENDED RELEASE ORAL at 14:46

## 2024-04-13 RX ADMIN — MORPHINE SULFATE 15 MG: 15 TABLET, FILM COATED, EXTENDED RELEASE ORAL at 01:27

## 2024-04-13 RX ADMIN — HYDROCODONE BITARTRATE AND ACETAMINOPHEN 1 TABLET: 7.5; 325 TABLET ORAL at 21:30

## 2024-04-13 RX ADMIN — PREGABALIN 200 MG: 100 CAPSULE ORAL at 08:58

## 2024-04-13 RX ADMIN — ASPIRIN 81 MG 81 MG: 81 TABLET ORAL at 11:39

## 2024-04-13 RX ADMIN — SODIUM CHLORIDE, PRESERVATIVE FREE 10 ML: 5 INJECTION INTRAVENOUS at 21:29

## 2024-04-13 RX ADMIN — FUROSEMIDE 60 MG: 10 INJECTION, SOLUTION INTRAMUSCULAR; INTRAVENOUS at 09:08

## 2024-04-13 RX ADMIN — Medication 3 MG: at 21:31

## 2024-04-13 RX ADMIN — HYDROXYZINE HYDROCHLORIDE 25 MG: 10 TABLET ORAL at 21:31

## 2024-04-13 RX ADMIN — VANCOMYCIN HYDROCHLORIDE 1000 MG: 1 INJECTION, POWDER, LYOPHILIZED, FOR SOLUTION INTRAVENOUS at 01:42

## 2024-04-13 RX ADMIN — FAMOTIDINE 20 MG: 10 INJECTION, SOLUTION INTRAVENOUS at 21:28

## 2024-04-13 RX ADMIN — HYDROCODONE BITARTRATE AND ACETAMINOPHEN 1 TABLET: 7.5; 325 TABLET ORAL at 09:08

## 2024-04-13 RX ADMIN — VANCOMYCIN HYDROCHLORIDE 1000 MG: 1 INJECTION, POWDER, LYOPHILIZED, FOR SOLUTION INTRAVENOUS at 14:45

## 2024-04-13 RX ADMIN — SODIUM CHLORIDE: 9 INJECTION, SOLUTION INTRAVENOUS at 01:57

## 2024-04-13 RX ADMIN — Medication 1 CAPSULE: at 08:58

## 2024-04-13 RX ADMIN — PIPERACILLIN AND TAZOBACTAM 4500 MG: 4; .5 INJECTION, POWDER, LYOPHILIZED, FOR SOLUTION INTRAVENOUS at 11:42

## 2024-04-13 RX ADMIN — PREGABALIN 200 MG: 100 CAPSULE ORAL at 21:31

## 2024-04-13 RX ADMIN — FLUCONAZOLE 200 MG: 100 TABLET ORAL at 11:39

## 2024-04-13 RX ADMIN — METOPROLOL SUCCINATE 25 MG: 25 TABLET, FILM COATED, EXTENDED RELEASE ORAL at 08:58

## 2024-04-13 RX ADMIN — FAMOTIDINE 20 MG: 10 INJECTION, SOLUTION INTRAVENOUS at 08:58

## 2024-04-13 RX ADMIN — PREGABALIN 200 MG: 100 CAPSULE ORAL at 14:48

## 2024-04-13 RX ADMIN — MONTELUKAST 10 MG: 10 TABLET, FILM COATED ORAL at 08:58

## 2024-04-13 RX ADMIN — DULOXETINE HYDROCHLORIDE 30 MG: 30 CAPSULE, DELAYED RELEASE ORAL at 08:58

## 2024-04-13 RX ADMIN — FLUTICASONE PROPIONATE 2 SPRAY: 50 SPRAY, METERED NASAL at 09:08

## 2024-04-13 ASSESSMENT — PAIN SCALES - GENERAL
PAINLEVEL_OUTOF10: 8
PAINLEVEL_OUTOF10: 6
PAINLEVEL_OUTOF10: 8
PAINLEVEL_OUTOF10: 7

## 2024-04-13 ASSESSMENT — PAIN DESCRIPTION - LOCATION
LOCATION: LEG
LOCATION: LEG

## 2024-04-13 ASSESSMENT — PAIN DESCRIPTION - ORIENTATION
ORIENTATION: RIGHT;LEFT
ORIENTATION: RIGHT;LEFT;LOWER

## 2024-04-13 ASSESSMENT — PAIN DESCRIPTION - DESCRIPTORS: DESCRIPTORS: DISCOMFORT

## 2024-04-13 NOTE — PROGRESS NOTES
VANCO DAILY FOLLOW UP NOTE  [facility]   Pharmacy Pharmacokinetic Monitoring Service - Vancomycin    Consulting Provider: Dr. Best   Indication: SSTI  Target Concentration: Goal AUC/KALI 400-600 mg*hr/L  Day of Therapy: 4 of 5  Additional Antimicrobials: pip/tazo    Pertinent Laboratory Values:   Wt Readings from Last 1 Encounters:   04/10/24 (!) 154.2 kg (340 lb)     Temp Readings from Last 1 Encounters:   04/13/24 97.4 °F (36.3 °C) (Oral)     Recent Labs     04/11/24  0500 04/11/24  0648 04/12/24  0814 04/13/24  0444   BUN 11  --   --  16   CREATININE 1.00  --  1.20* 1.00   WBC  --  8.3  --   --      Estimated Creatinine Clearance: 106 mL/min (based on SCr of 1 mg/dL).    Lab Results   Component Value Date/Time    VANCORANDOM 17.6 04/12/2024 08:14 AM         MRSA Nasal Swab: N/A. Non-respiratory infection    Assessment:  Date/Time Dose Concentration AUC   4/12 0814 1250 mg Q12H 17.6 518   Note: Serum concentrations collected for AUC dosing may appear elevated if collected in close proximity to the dose administered, this is not necessarily an indication of toxicity    Plan:  Dosing recommendations based on Bayesian software  Continue vancomycin 1000 mg every 12 hours   Renal labs as indicated   Vancomycin concentrations will be ordered as clinically appropriate   Pharmacy will continue to monitor patient and adjust therapy as indicated    Thank you for the consult,  Doug James, PharmD, BCOP  Clinical Pharmacist  [employer]

## 2024-04-13 NOTE — PROGRESS NOTES
Hourly rounds performed this shift. Bed lowered and locked. Call light within reach. PRN pain medication given per MAR, pt satisfied. Pt refused to redress BLE wraps this shift. Bedside report will be given to oncoming nurse.

## 2024-04-13 NOTE — PROGRESS NOTES
EOS     Pt resting in chair at this time. Pt's leg wounds were wrapped per wound care orders and patient given education on wound care. Pt's family came to the bedside today and took her home medications back home. Pt given multiple doses of pain meds, see MAR. Pt non-complaint with staff, refusing to wear socks or let staff help her with her perineal area. Pt reported some itching in her vaginal area, this RN was unable to visualize. MD aware and medications given. Pt also given antifungal cream to help with external irritation. Call light and personal belongings within reach. Pt encouraged to call with any needs. Hourly rounding completed during this shift. Bedside shift report to be given to oncoming nurse.

## 2024-04-13 NOTE — PLAN OF CARE
Problem: Skin/Tissue Integrity  Goal: Absence of new skin breakdown  Description: 1.  Monitor for areas of redness and/or skin breakdown  2.  Assess vascular access sites hourly  3.  Every 4-6 hours minimum:  Change oxygen saturation probe site  4.  Every 4-6 hours:  If on nasal continuous positive airway pressure, respiratory therapy assess nares and determine need for appliance change or resting period.  4/13/2024 1020 by Lotus Lawton RN  Outcome: Progressing  4/13/2024 0356 by Keesha Gallego RN  Outcome: Progressing     Problem: Discharge Planning  Goal: Discharge to home or other facility with appropriate resources  4/13/2024 1020 by Lotus Lawton RN  Outcome: Progressing  4/13/2024 0356 by Keesha Gallego RN  Outcome: Progressing     Problem: Pain  Goal: Verbalizes/displays adequate comfort level or baseline comfort level  4/13/2024 1020 by Lotus Lawton RN  Outcome: Progressing  4/13/2024 0356 by Keesha Gallego RN  Outcome: Progressing     Problem: Safety - Adult  Goal: Free from fall injury  4/13/2024 1020 by Lotus Lawton RN  Outcome: Progressing  4/13/2024 0356 by Keesha Gallego RN  Outcome: Progressing     Problem: Chronic Conditions and Co-morbidities  Goal: Patient's chronic conditions and co-morbidity symptoms are monitored and maintained or improved  4/13/2024 1020 by Lotus Lawton RN  Outcome: Progressing  4/13/2024 0356 by Keesha Gallego RN  Outcome: Progressing

## 2024-04-13 NOTE — PROGRESS NOTES
Hospitalist Progress Note   Admit Date:  4/10/2024  8:28 AM   Name:  Lo Tirado   Age:  48 y.o.  Sex:  female  :  1975   MRN:  [id number]   Room:  [facility]    Presenting/Chief Complaint: Leg Pain     Reason(s) for Admission: Cellulitis of left lower extremity [L03.116]  Intractable pain [R52]  Bilateral cellulitis of lower leg [L03.116, L03.115]     Hospital Course:   Lo Tirado is a 48 y.o. female with medical history of morbid obesity with BMI 53.25, hypertension, bipolar affective disorder, medication noncompliance, peripheral neuropathy, lymphedema of bilateral lower extremity along with chronic bilateral lower extremity wounds admitted for bilateral cellulitis of lower leg and increasing LE pain.      Subjective & 24hr Events:   Pt seen and examined at bedside.  Pt is sitting comfortably in bedside recliner, eating \"potato cheddar chips\".  Pt reports that pain is slightly better today.  Pt counseled about high sodium content in potato chips and advised to avoid eating them as it will increase the swelling in LE.  Pt also reports of having yeast infection and asking for fluconazole.    ROS:  10 point review of system is negative except for what mentioned above.      Assessment & Plan:     Principal Problem:    Bilateral cellulitis of lower leg    Lymphedema of both lower extremities    Chronic ulcer of right ankle with fat layer exposed (HCC)    Chronic ulcer of left lower extremity with fat layer exposed (HCC)  Plan: -  Continue empiric IV Zosyn and vancomycin, D3.  Culture negative to date, wound culture with some gram-positive cocci and gram-negative rods.  Appreciate wound care team evaluation.  Pain control with as needed Dilaudid and Norco.  MS Contin 15 mg twice daily for adequate pain control.  Continue Lyrica 200 mg p.o. 3 times daily.  Ordered for Lasix 60 mg IV this AM.    Wound care instructions:  Cleanse bilateral lower legs with wound , apply zinc barrier to  MG/DL   Basic Metabolic Panel    Collection Time: 04/13/24  4:44 AM   Result Value Ref Range    Sodium 135 (L) 136 - 146 mmol/L    Potassium 4.3 3.5 - 5.1 mmol/L    Chloride 103 103 - 113 mmol/L    CO2 30 21 - 32 mmol/L    Anion Gap 2 2 - 11 mmol/L    Glucose 104 (H) 65 - 100 mg/dL    BUN 16 6 - 23 MG/DL    Creatinine 1.00 0.6 - 1.0 MG/DL    Est, Glom Filt Rate 69 >60 ml/min/1.73m2    Calcium 9.3 8.3 - 10.4 MG/DL       No results for input(s): \"COVID19\" in the last 72 hours.    Current Meds:  Current Facility-Administered Medications   Medication Dose Route Frequency    furosemide (LASIX) injection 60 mg  60 mg IntraVENous Once    vancomycin (VANCOCIN) 1,000 mg in sodium chloride 0.9 % 250 mL IVPB ([id number])  1,000 mg IntraVENous Q12H    metoprolol succinate (TOPROL XL) extended release tablet 25 mg  25 mg Oral Daily    clonazePAM (KLONOPIN) tablet 0.5 mg  0.5 mg Oral Q12H    piperacillin-tazobactam (ZOSYN) 4,500 mg in sodium chloride 0.9 % 100 mL IVPB (mini-bag)  4,500 mg IntraVENous Q8H    morphine (MS CONTIN) extended release tablet 15 mg  15 mg Oral 2 times per day    montelukast (SINGULAIR) tablet 10 mg  10 mg Oral Daily    hydrOXYzine HCl (ATARAX) tablet 25 mg  25 mg Oral Nightly    HYDROcodone-acetaminophen (NORCO) 7.5-325 MG per tablet 1 tablet  1 tablet Oral Q4H PRN    fluticasone (FLONASE) 50 MCG/ACT nasal spray 2 spray  2 spray Nasal Daily    DULoxetine (CYMBALTA) extended release capsule 30 mg  30 mg Oral Daily    sodium chloride flush 0.9 % injection 5-40 mL  5-40 mL IntraVENous 2 times per day    sodium chloride flush 0.9 % injection 5-40 mL  5-40 mL IntraVENous PRN    0.9 % sodium chloride infusion   IntraVENous PRN    potassium chloride (KLOR-CON M) extended release tablet 40 mEq  40 mEq Oral PRN    Or    potassium bicarb-citric acid (EFFER-K) effervescent tablet 40 mEq  40 mEq Oral PRN    Or    potassium chloride 10 mEq/100 mL IVPB (Peripheral Line)  10 mEq IntraVENous PRN    magnesium sulfate 2000

## 2024-04-13 NOTE — PLAN OF CARE
Problem: Skin/Tissue Integrity  Goal: Absence of new skin breakdown  Description: 1.  Monitor for areas of redness and/or skin breakdown  2.  Assess vascular access sites hourly  3.  Every 4-6 hours minimum:  Change oxygen saturation probe site  4.  Every 4-6 hours:  If on nasal continuous positive airway pressure, respiratory therapy assess nares and determine need for appliance change or resting period.  Outcome: Progressing     Problem: Discharge Planning  Goal: Discharge to home or other facility with appropriate resources  Outcome: Progressing     Problem: Pain  Goal: Verbalizes/displays adequate comfort level or baseline comfort level  Outcome: Progressing     Problem: Safety - Adult  Goal: Free from fall injury  Outcome: Progressing     Problem: Chronic Conditions and Co-morbidities  Goal: Patient's chronic conditions and co-morbidity symptoms are monitored and maintained or improved  Outcome: Progressing

## 2024-04-14 LAB
ANION GAP SERPL CALC-SCNC: 5 MMOL/L (ref 2–11)
BUN SERPL-MCNC: 17 MG/DL (ref 6–23)
CALCIUM SERPL-MCNC: 9.9 MG/DL (ref 8.3–10.4)
CHLORIDE SERPL-SCNC: 101 MMOL/L (ref 103–113)
CO2 SERPL-SCNC: 29 MMOL/L (ref 21–32)
CREAT SERPL-MCNC: 1 MG/DL (ref 0.6–1)
GLUCOSE SERPL-MCNC: 107 MG/DL (ref 65–100)
POTASSIUM SERPL-SCNC: 4 MMOL/L (ref 3.5–5.1)
SODIUM SERPL-SCNC: 135 MMOL/L (ref 136–146)

## 2024-04-14 PROCEDURE — 36415 COLL VENOUS BLD VENIPUNCTURE: CPT

## 2024-04-14 PROCEDURE — 2500000003 HC RX 250 WO HCPCS: Performed by: HOSPITALIST

## 2024-04-14 PROCEDURE — A4216 STERILE WATER/SALINE, 10 ML: HCPCS | Performed by: HOSPITALIST

## 2024-04-14 PROCEDURE — 2580000003 HC RX 258: Performed by: HOSPITALIST

## 2024-04-14 PROCEDURE — 1100000000 HC RM PRIVATE

## 2024-04-14 PROCEDURE — 6370000000 HC RX 637 (ALT 250 FOR IP): Performed by: HOSPITALIST

## 2024-04-14 PROCEDURE — 80048 BASIC METABOLIC PNL TOTAL CA: CPT

## 2024-04-14 PROCEDURE — 6360000002 HC RX W HCPCS: Performed by: HOSPITALIST

## 2024-04-14 RX ORDER — CEFDINIR 300 MG/1
300 CAPSULE ORAL EVERY 12 HOURS SCHEDULED
Status: DISCONTINUED | OUTPATIENT
Start: 2024-04-14 | End: 2024-04-15 | Stop reason: HOSPADM

## 2024-04-14 RX ORDER — DOXYCYCLINE HYCLATE 100 MG/1
100 CAPSULE ORAL
Status: DISCONTINUED | OUTPATIENT
Start: 2024-04-14 | End: 2024-04-15 | Stop reason: HOSPADM

## 2024-04-14 RX ORDER — METOLAZONE 2.5 MG/1
2.5 TABLET ORAL ONCE
Status: COMPLETED | OUTPATIENT
Start: 2024-04-14 | End: 2024-04-14

## 2024-04-14 RX ORDER — FUROSEMIDE 10 MG/ML
60 INJECTION INTRAMUSCULAR; INTRAVENOUS ONCE
Status: COMPLETED | OUTPATIENT
Start: 2024-04-14 | End: 2024-04-14

## 2024-04-14 RX ADMIN — DOXYCYCLINE HYCLATE 100 MG: 100 CAPSULE ORAL at 17:36

## 2024-04-14 RX ADMIN — SODIUM CHLORIDE, PRESERVATIVE FREE 10 ML: 5 INJECTION INTRAVENOUS at 10:22

## 2024-04-14 RX ADMIN — FLUTICASONE PROPIONATE 2 SPRAY: 50 SPRAY, METERED NASAL at 10:22

## 2024-04-14 RX ADMIN — FLUCONAZOLE 200 MG: 100 TABLET ORAL at 10:21

## 2024-04-14 RX ADMIN — Medication 3 MG: at 21:24

## 2024-04-14 RX ADMIN — DULOXETINE HYDROCHLORIDE 30 MG: 30 CAPSULE, DELAYED RELEASE ORAL at 10:21

## 2024-04-14 RX ADMIN — FUROSEMIDE 60 MG: 10 INJECTION, SOLUTION INTRAMUSCULAR; INTRAVENOUS at 10:21

## 2024-04-14 RX ADMIN — DOXYCYCLINE HYCLATE 100 MG: 100 CAPSULE ORAL at 10:21

## 2024-04-14 RX ADMIN — PREGABALIN 200 MG: 100 CAPSULE ORAL at 10:21

## 2024-04-14 RX ADMIN — HYDROXYZINE HYDROCHLORIDE 25 MG: 10 TABLET ORAL at 21:24

## 2024-04-14 RX ADMIN — HYDROMORPHONE HYDROCHLORIDE 1 MG: 1 INJECTION, SOLUTION INTRAMUSCULAR; INTRAVENOUS; SUBCUTANEOUS at 17:36

## 2024-04-14 RX ADMIN — PREGABALIN 200 MG: 100 CAPSULE ORAL at 21:24

## 2024-04-14 RX ADMIN — CEFDINIR 300 MG: 300 CAPSULE ORAL at 10:21

## 2024-04-14 RX ADMIN — METOPROLOL SUCCINATE 25 MG: 25 TABLET, FILM COATED, EXTENDED RELEASE ORAL at 10:21

## 2024-04-14 RX ADMIN — SODIUM CHLORIDE: 9 INJECTION, SOLUTION INTRAVENOUS at 02:13

## 2024-04-14 RX ADMIN — HYDROMORPHONE HYDROCHLORIDE 1 MG: 1 INJECTION, SOLUTION INTRAMUSCULAR; INTRAVENOUS; SUBCUTANEOUS at 04:56

## 2024-04-14 RX ADMIN — BACLOFEN 10 MG: 10 TABLET ORAL at 21:30

## 2024-04-14 RX ADMIN — HYDROCODONE BITARTRATE AND ACETAMINOPHEN 1 TABLET: 7.5; 325 TABLET ORAL at 07:34

## 2024-04-14 RX ADMIN — METOLAZONE 2.5 MG: 2.5 TABLET ORAL at 10:21

## 2024-04-14 RX ADMIN — PIPERACILLIN AND TAZOBACTAM 4500 MG: 4; .5 INJECTION, POWDER, LYOPHILIZED, FOR SOLUTION INTRAVENOUS at 02:14

## 2024-04-14 RX ADMIN — Medication 1 CAPSULE: at 10:21

## 2024-04-14 RX ADMIN — MONTELUKAST 10 MG: 10 TABLET, FILM COATED ORAL at 10:21

## 2024-04-14 RX ADMIN — FAMOTIDINE 20 MG: 10 INJECTION, SOLUTION INTRAVENOUS at 21:25

## 2024-04-14 RX ADMIN — HYDROCODONE BITARTRATE AND ACETAMINOPHEN 1 TABLET: 7.5; 325 TABLET ORAL at 16:38

## 2024-04-14 RX ADMIN — HYDROMORPHONE HYDROCHLORIDE 1 MG: 1 INJECTION, SOLUTION INTRAMUSCULAR; INTRAVENOUS; SUBCUTANEOUS at 13:52

## 2024-04-14 RX ADMIN — ASPIRIN 81 MG 81 MG: 81 TABLET ORAL at 10:21

## 2024-04-14 RX ADMIN — PREGABALIN 200 MG: 100 CAPSULE ORAL at 15:40

## 2024-04-14 RX ADMIN — FAMOTIDINE 20 MG: 10 INJECTION, SOLUTION INTRAVENOUS at 10:21

## 2024-04-14 RX ADMIN — CEFDINIR 300 MG: 300 CAPSULE ORAL at 21:25

## 2024-04-14 RX ADMIN — SODIUM CHLORIDE, PRESERVATIVE FREE 10 ML: 5 INJECTION INTRAVENOUS at 21:26

## 2024-04-14 RX ADMIN — VANCOMYCIN HYDROCHLORIDE 1000 MG: 1 INJECTION, POWDER, LYOPHILIZED, FOR SOLUTION INTRAVENOUS at 02:13

## 2024-04-14 ASSESSMENT — PAIN DESCRIPTION - ORIENTATION: ORIENTATION: LOWER

## 2024-04-14 ASSESSMENT — PAIN SCALES - GENERAL
PAINLEVEL_OUTOF10: 6
PAINLEVEL_OUTOF10: 9
PAINLEVEL_OUTOF10: 6
PAINLEVEL_OUTOF10: 3
PAINLEVEL_OUTOF10: 5
PAINLEVEL_OUTOF10: 10
PAINLEVEL_OUTOF10: 6
PAINLEVEL_OUTOF10: 9

## 2024-04-14 ASSESSMENT — PAIN DESCRIPTION - LOCATION
LOCATION: LEG
LOCATION: LEG

## 2024-04-14 ASSESSMENT — PAIN DESCRIPTION - DESCRIPTORS
DESCRIPTORS: BURNING
DESCRIPTORS: BURNING

## 2024-04-14 NOTE — PROGRESS NOTES
Pt stayed in chair most of shift.  Attempted to assist pt to bed multiple times and educated pt about importance of elevating lower extremities to alleviate pain and swelling.  Pt declined to elevate legs or go to bed.  Pt removed her own wound dressings at 0430 am.  Pt also asked for IV abx to be stopped as she reports that the fluids are making her legs swell and weep more.  This RN cleaned and re dressed wounds.  Pt has box of wound care supplies at home that she is using at bedside.  Pt c/o pain \"15/10\", medicated per MAR.  Educated pt on importance of compliance with current wound care orders, IV abx, and elevating extremities.  Educated on importance of calling for help getting up.  Pt states she walks on her own and will not call.  Pt got in bed at about 0530 this am and asked for a turkey sandwich tray.  Hourly rounds performed this shift.  VS stable.  All known needs met at this time.  Bed low and locked, call light in reach.  Bedside shift report given to oncoming nurse.    [FreeTextEntry1] : Continue Dulera\par Albuterol HFA as needed for shortness of breath.\par Auto-titrating Positive Airway Pressure(APAP) compliance reviewed with patient in office today\par Patient is compliant and benefiting from APAP usage.\par \par

## 2024-04-14 NOTE — PROGRESS NOTES
Hospitalist Progress Note   Admit Date:  4/10/2024  8:28 AM   Name:  Lo Tirado   Age:  48 y.o.  Sex:  female  :  1975   MRN:  [id number]   Room:  [facility]/    Presenting/Chief Complaint: Leg Pain     Reason(s) for Admission: Cellulitis of left lower extremity [L03.116]  Intractable pain [R52]  Bilateral cellulitis of lower leg [L03.116, L03.115]     Hospital Course:   Lo Tirado is a 48 y.o. female with medical history of morbid obesity with BMI 53.25, hypertension, bipolar affective disorder, medication noncompliance, peripheral neuropathy, lymphedema of bilateral lower extremity along with chronic bilateral lower extremity wounds admitted for bilateral cellulitis of lower leg and increasing LE pain.      Subjective & 24hr Events:   Patient seen at bedside, resting in bed.  Patient appears little bit more drowsy and somnolent this morning.  Patient is currently resting in bed and having bilateral compression wraps in lower extremities.  Patient does report feeling better today.  Lower extremity pain is improving.    ROS:  10 point review of system is negative except for what mentioned above.      Assessment & Plan:     Principal Problem:    Bilateral cellulitis of lower leg    Lymphedema of both lower extremities    Chronic ulcer of right ankle with fat layer exposed (HCC)    Chronic ulcer of left lower extremity with fat layer exposed (HCC)  Plan: -  Patient has completed 3 days of IV Zosyn and vancomycin, switching to oral Omnicef 300 mg p.o. twice daily along with doxycycline 100 mg twice daily from today.  Stopping MS Contin today as patient appears overmedicated.  Will continue as needed Dilaudid and Norco  Continue Lyrica 200 mg p.o. 3 times daily.  Repeated IV Lasix 60 mg x 1 this morning.  Culture negative to date, wound culture with some gram-positive cocci and gram-negative rods.  Appreciate wound care team evaluation.    Wound care instructions:  Cleanse bilateral lower legs

## 2024-04-14 NOTE — PROGRESS NOTES
Hourly rounds completed.  Patient is alert and oriented.  Dressings to bilateral legs changed per orders.  Patient sitting up in chair.  C/o pain and burning in legs, medicated for pain per MAR.  Patient stayed in bed until lunch with legs elevated, and returned to chair after lunch, elevated legs with recliner, but patient only tolerated for about 5 minutes and let legs down again. All known needs met at this time, family at bedside.  Call light in reach.  Will report to oncoming RN.

## 2024-04-15 VITALS
WEIGHT: 293 LBS | SYSTOLIC BLOOD PRESSURE: 129 MMHG | HEART RATE: 92 BPM | HEIGHT: 67 IN | DIASTOLIC BLOOD PRESSURE: 72 MMHG | OXYGEN SATURATION: 98 % | TEMPERATURE: 98.1 F | RESPIRATION RATE: 18 BRPM | BODY MASS INDEX: 45.99 KG/M2

## 2024-04-15 LAB
ANION GAP SERPL CALC-SCNC: 4 MMOL/L (ref 2–11)
BACTERIA SPEC CULT: NORMAL
BACTERIA SPEC CULT: NORMAL
BUN SERPL-MCNC: 20 MG/DL (ref 6–23)
CALCIUM SERPL-MCNC: 9.5 MG/DL (ref 8.3–10.4)
CHLORIDE SERPL-SCNC: 98 MMOL/L (ref 103–113)
CO2 SERPL-SCNC: 33 MMOL/L (ref 21–32)
CREAT SERPL-MCNC: 1 MG/DL (ref 0.6–1)
GLUCOSE SERPL-MCNC: 99 MG/DL (ref 65–100)
POTASSIUM SERPL-SCNC: 3.6 MMOL/L (ref 3.5–5.1)
SERVICE CMNT-IMP: NORMAL
SERVICE CMNT-IMP: NORMAL
SODIUM SERPL-SCNC: 135 MMOL/L (ref 136–146)

## 2024-04-15 PROCEDURE — 6360000002 HC RX W HCPCS: Performed by: HOSPITALIST

## 2024-04-15 PROCEDURE — A4216 STERILE WATER/SALINE, 10 ML: HCPCS | Performed by: HOSPITALIST

## 2024-04-15 PROCEDURE — 36415 COLL VENOUS BLD VENIPUNCTURE: CPT

## 2024-04-15 PROCEDURE — 2500000003 HC RX 250 WO HCPCS: Performed by: HOSPITALIST

## 2024-04-15 PROCEDURE — 2580000003 HC RX 258: Performed by: HOSPITALIST

## 2024-04-15 PROCEDURE — 80048 BASIC METABOLIC PNL TOTAL CA: CPT

## 2024-04-15 PROCEDURE — 6370000000 HC RX 637 (ALT 250 FOR IP): Performed by: HOSPITALIST

## 2024-04-15 RX ORDER — HYDROCODONE BITARTRATE AND ACETAMINOPHEN 7.5; 325 MG/1; MG/1
1 TABLET ORAL EVERY 6 HOURS PRN
Qty: 20 TABLET | Refills: 0 | Status: ON HOLD | OUTPATIENT
Start: 2024-04-15 | End: 2024-04-23 | Stop reason: HOSPADM

## 2024-04-15 RX ORDER — POTASSIUM CHLORIDE 20 MEQ/1
40 TABLET, EXTENDED RELEASE ORAL
Status: COMPLETED | OUTPATIENT
Start: 2024-04-15 | End: 2024-04-15

## 2024-04-15 RX ORDER — CEFDINIR 300 MG/1
300 CAPSULE ORAL EVERY 12 HOURS SCHEDULED
Qty: 18 CAPSULE | Refills: 0 | Status: ON HOLD | OUTPATIENT
Start: 2024-04-15 | End: 2024-04-23 | Stop reason: HOSPADM

## 2024-04-15 RX ORDER — METOLAZONE 2.5 MG/1
2.5 TABLET ORAL ONCE
Status: COMPLETED | OUTPATIENT
Start: 2024-04-15 | End: 2024-04-15

## 2024-04-15 RX ORDER — FUROSEMIDE 40 MG/1
40 TABLET ORAL EVERY OTHER DAY
Qty: 15 TABLET | Refills: 0 | Status: ON HOLD | OUTPATIENT
Start: 2024-04-15 | End: 2024-04-23 | Stop reason: HOSPADM

## 2024-04-15 RX ORDER — DOXYCYCLINE HYCLATE 100 MG/1
100 CAPSULE ORAL 2 TIMES DAILY WITH MEALS
Qty: 12 CAPSULE | Refills: 0 | Status: ON HOLD | OUTPATIENT
Start: 2024-04-15 | End: 2024-04-23 | Stop reason: HOSPADM

## 2024-04-15 RX ORDER — METOPROLOL SUCCINATE 25 MG/1
25 TABLET, EXTENDED RELEASE ORAL DAILY
Qty: 30 TABLET | Refills: 3 | Status: ON HOLD | OUTPATIENT
Start: 2024-04-15

## 2024-04-15 RX ORDER — PREGABALIN 200 MG/1
200 CAPSULE ORAL 3 TIMES DAILY
Qty: 90 CAPSULE | Refills: 3 | Status: ON HOLD | OUTPATIENT
Start: 2024-04-15 | End: 2024-05-15

## 2024-04-15 RX ORDER — FUROSEMIDE 10 MG/ML
60 INJECTION INTRAMUSCULAR; INTRAVENOUS ONCE
Status: COMPLETED | OUTPATIENT
Start: 2024-04-15 | End: 2024-04-15

## 2024-04-15 RX ORDER — LACTOBACILLUS RHAMNOSUS GG 10B CELL
1 CAPSULE ORAL
Qty: 10 CAPSULE | Refills: 0 | Status: SHIPPED | OUTPATIENT
Start: 2024-04-15 | End: 2024-04-25

## 2024-04-15 RX ORDER — PANTOPRAZOLE SODIUM 40 MG/1
40 TABLET, DELAYED RELEASE ORAL
Qty: 30 TABLET | Refills: 0 | Status: ON HOLD | OUTPATIENT
Start: 2024-04-15 | End: 2024-05-15

## 2024-04-15 RX ORDER — HYDROXYZINE HYDROCHLORIDE 25 MG/1
25 TABLET, FILM COATED ORAL NIGHTLY
Qty: 30 TABLET | Refills: 1 | Status: ON HOLD | OUTPATIENT
Start: 2024-04-15 | End: 2024-06-14

## 2024-04-15 RX ADMIN — SODIUM CHLORIDE, PRESERVATIVE FREE 10 ML: 5 INJECTION INTRAVENOUS at 10:05

## 2024-04-15 RX ADMIN — DOXYCYCLINE HYCLATE 100 MG: 100 CAPSULE ORAL at 09:58

## 2024-04-15 RX ADMIN — METOLAZONE 2.5 MG: 2.5 TABLET ORAL at 10:09

## 2024-04-15 RX ADMIN — FUROSEMIDE 60 MG: 10 INJECTION, SOLUTION INTRAMUSCULAR; INTRAVENOUS at 09:59

## 2024-04-15 RX ADMIN — ASPIRIN 81 MG 81 MG: 81 TABLET ORAL at 09:58

## 2024-04-15 RX ADMIN — FLUTICASONE PROPIONATE 2 SPRAY: 50 SPRAY, METERED NASAL at 10:18

## 2024-04-15 RX ADMIN — DULOXETINE HYDROCHLORIDE 30 MG: 30 CAPSULE, DELAYED RELEASE ORAL at 09:58

## 2024-04-15 RX ADMIN — HYDROCODONE BITARTRATE AND ACETAMINOPHEN 1 TABLET: 7.5; 325 TABLET ORAL at 08:05

## 2024-04-15 RX ADMIN — HYDROCODONE BITARTRATE AND ACETAMINOPHEN 1 TABLET: 7.5; 325 TABLET ORAL at 12:32

## 2024-04-15 RX ADMIN — PREGABALIN 200 MG: 100 CAPSULE ORAL at 08:05

## 2024-04-15 RX ADMIN — FAMOTIDINE 20 MG: 10 INJECTION, SOLUTION INTRAVENOUS at 10:09

## 2024-04-15 RX ADMIN — POTASSIUM CHLORIDE 40 MEQ: 1500 TABLET, EXTENDED RELEASE ORAL at 09:58

## 2024-04-15 RX ADMIN — HYDROMORPHONE HYDROCHLORIDE 1 MG: 1 INJECTION, SOLUTION INTRAMUSCULAR; INTRAVENOUS; SUBCUTANEOUS at 09:53

## 2024-04-15 RX ADMIN — Medication 1 CAPSULE: at 09:58

## 2024-04-15 RX ADMIN — METOPROLOL SUCCINATE 25 MG: 25 TABLET, FILM COATED, EXTENDED RELEASE ORAL at 09:58

## 2024-04-15 RX ADMIN — MONTELUKAST 10 MG: 10 TABLET, FILM COATED ORAL at 09:59

## 2024-04-15 RX ADMIN — CEFDINIR 300 MG: 300 CAPSULE ORAL at 09:58

## 2024-04-15 RX ADMIN — POTASSIUM CHLORIDE 40 MEQ: 1500 TABLET, EXTENDED RELEASE ORAL at 12:33

## 2024-04-15 ASSESSMENT — PAIN SCALES - GENERAL
PAINLEVEL_OUTOF10: 6
PAINLEVEL_OUTOF10: 10
PAINLEVEL_OUTOF10: 7

## 2024-04-15 ASSESSMENT — PAIN DESCRIPTION - LOCATION
LOCATION: LEG
LOCATION: LEG

## 2024-04-15 ASSESSMENT — PAIN DESCRIPTION - DESCRIPTORS
DESCRIPTORS: BURNING
DESCRIPTORS: ACHING;BURNING

## 2024-04-15 ASSESSMENT — PAIN DESCRIPTION - ORIENTATION
ORIENTATION: RIGHT;LEFT

## 2024-04-15 NOTE — PROGRESS NOTES
Hourly rounds performed this shift.  VS stable.  Pt medicated per MAR.  All known needs met at this time.  Bed low and locked, call light in reach.  Bedside shift report given to oncoming nurse.

## 2024-04-15 NOTE — PROGRESS NOTES
Pt in chair crying, states she is in pain. Wound care performed, morning meds given. Pt on room air, IV flushed and capped. Purewick draining.   1330: Pt in chair, IV discontinued. Discharge instructions given, patient verbalized understanding. Patient being sent home with wound care materials. Being discharged to home health.

## 2024-04-15 NOTE — DISCHARGE SUMMARY
Hospitalist Discharge Summary   Admit Date:  4/10/2024  8:28 AM   DC Note date: 4/15/2024  Name:  Lo Tirado   Age:  48 y.o.  Sex:  female  :  1975   MRN:  [id number]   Room:  [facility]  PCP:  Nenita Beatty APRN - CNP    Presenting Complaint: Leg Pain     Initial Admission Diagnosis: Cellulitis of left lower extremity [L03.116]  Intractable pain [R52]  Bilateral cellulitis of lower leg [L03.116, L03.115]     Problem List for this Hospitalization (present on admission):    Principal Problem:    Bilateral cellulitis of lower leg  Active Problems:    Non compliance with medical treatment    Peripheral neuropathy    Hypertension    Morbid obesity (HCC)    Bipolar affective disorder (HCC)    Lymphedema of both lower extremities    Chronic ulcer of right ankle with fat layer exposed (HCC)    Chronic ulcer of left lower extremity with fat layer exposed (HCC)  Resolved Problems:    * No resolved hospital problems. *      Hospital Course:  Lo Tirado is a 48 y.o. female with medical history of morbid obesity with BMI 53.25, hypertension, bipolar affective disorder, medication noncompliance, peripheral neuropathy, lymphedema of bilateral lower extremity along with chronic bilateral lower extremity wounds admitted for bilateral cellulitis of lower leg and increasing LE pain.     Principal Problem:    Bilateral cellulitis of lower leg    Lymphedema of both lower extremities    Chronic ulcer of right ankle with fat layer exposed (HCC)    Chronic ulcer of left lower extremity with fat layer exposed (HCC)  Plan: 4/15-  Patient has completed 3 days of IV Zosyn and vancomycin, switched to oral Omnicef 300 mg p.o. twice daily along with doxycycline 100 mg twice daily from 24. Will continue oral antibiotics for 9 days more.  Stopped MS continue on 24.  Continue prn norco on discharge.  Continue Lyrica 200 mg p.o. 3 times daily.  Given lasix 60 mg IV with metolazone 2.5 mg x 1 time dose            CONTINUE these medications which have NOT CHANGED    Details   baclofen (LIORESAL) 10 MG tablet Take 0.5 tablets by mouth every 8 hours as needed (1- 2 tablets every 8 hours as needed for ms spasms)      aspirin 81 MG chewable tablet Take 1 tablet by mouth daily      montelukast (SINGULAIR) 10 MG tablet Take 1 tablet by mouth daily  Qty: 90 tablet, Refills: 0    Associated Diagnoses: Allergic rhinitis, unspecified seasonality, unspecified trigger      loratadine (CLARITIN) 10 MG tablet Use instead of zyrtec for allergies  Qty: 90 tablet, Refills: 0    Associated Diagnoses: Allergic rhinitis, unspecified seasonality, unspecified trigger      losartan (COZAAR) 50 MG tablet Take 1 tablet by mouth daily  Qty: 90 tablet, Refills: 0    Associated Diagnoses: Primary hypertension      Nutritional Supplements (ENSURE MAX PROTEIN) LIQD Take 1 each by mouth in the morning and at bedtime  Qty: 5 box , Refills: 0      DULoxetine (CYMBALTA) 30 MG extended release capsule Take 1 capsule by mouth daily      docusate sodium (COLACE) 100 MG capsule Take 1 capsule by mouth 2 times daily      albuterol sulfate HFA (PROVENTIL;VENTOLIN;PROAIR) 108 (90 Base) MCG/ACT inhaler Inhale 2 puffs into the lungs every 6 hours as needed for Wheezing or Shortness of Breath TAKE 2 PUFFS BY MOUTH EVERY 4 HOURS AS NEEDED  Qty: 18 g, Refills: 5    Associated Diagnoses: Moderate persistent asthma without complication      fluticasone (FLONASE) 50 MCG/ACT nasal spray 2 sprays by Nasal route daily           STOP taking these medications       fluconazole (DIFLUCAN) 150 MG tablet Comments:   Reason for Stopping:         omeprazole (PRILOSEC) 20 MG delayed release capsule Comments:   Reason for Stopping:         mineral oil-hydrophilic petrolatum (AQUAPHOR) ointment Comments:   Reason for Stopping:               Some of the medications may be marked as \"stop taking\" by the system; but in reality pt or family reported already being off these meds;

## 2024-04-15 NOTE — CARE COORDINATION
Chart reviewed and patient discussed in IDT rounds this AM. Patient discharging home today with [facility] home health. Patient discharging with wound care supplies to bridge her until interim is able to order the supplies. IMM explained and signed. Patient reports her daughter will come pick her up.    [place], ACM  [place]

## 2024-04-15 NOTE — PROGRESS NOTES
Patient sitting up in chair and crying due to pain, PRN pain med given and patient verbalized pain relief about 20 minutes later. Morning meds given and wound care performed as well. Patient on room air, IV flushed and capped. Purewick draining clear, yellow urine.

## 2024-04-15 NOTE — DISCHARGE INSTRUCTIONS
Discussed with the patient and all questioned fully answered. She will call Primary Care Physician or come to ED if there is any problems.

## 2024-04-15 NOTE — PROGRESS NOTES
Physical Therapy Note:    Attempted to see patient this AM for physical therapy treatment  session. Patient in a lot of pain, plan is  for discharge later today. Will follow and re-attempt as schedule permits/patient available. Thank you,    Ruby Boggs, [facility] [place]     Rehab Caseload Tracker

## 2024-04-16 LAB
BACTERIA SPEC CULT: ABNORMAL
GRAM STN SPEC: ABNORMAL
GRAM STN SPEC: ABNORMAL
SERVICE CMNT-IMP: ABNORMAL

## 2024-04-16 RX ORDER — MORPHINE SULFATE 15 MG/1
15 TABLET, FILM COATED, EXTENDED RELEASE ORAL 2 TIMES DAILY
Qty: 14 TABLET | Refills: 0 | Status: ON HOLD | OUTPATIENT
Start: 2024-04-16 | End: 2024-04-23 | Stop reason: HOSPADM

## 2024-04-16 RX ORDER — HYDROMORPHONE HYDROCHLORIDE 2 MG/1
2 TABLET ORAL EVERY 12 HOURS PRN
Qty: 10 TABLET | Refills: 0 | Status: ON HOLD | OUTPATIENT
Start: 2024-04-16 | End: 2024-04-23 | Stop reason: HOSPADM

## 2024-04-17 ENCOUNTER — TELEPHONE (OUTPATIENT)
Dept: FAMILY MEDICINE CLINIC | Facility: CLINIC | Age: 49
End: 2024-04-17

## 2024-04-17 ENCOUNTER — HOSPITAL ENCOUNTER (INPATIENT)
Age: 49
LOS: 6 days | Discharge: HOME | DRG: 638 | End: 2024-04-24
Attending: EMERGENCY MEDICINE | Admitting: FAMILY MEDICINE
Payer: MEDICARE

## 2024-04-17 ENCOUNTER — HOSPITAL ENCOUNTER (EMERGENCY)
Dept: GENERAL RADIOLOGY | Age: 49
Discharge: HOME | DRG: 638 | End: 2024-04-20
Payer: MEDICARE

## 2024-04-17 DIAGNOSIS — L97.922 LEG ULCER, LEFT, WITH FAT LAYER EXPOSED (HCC): ICD-10-CM

## 2024-04-17 DIAGNOSIS — L03.115 BILATERAL LOWER LEG CELLULITIS: Primary | ICD-10-CM

## 2024-04-17 DIAGNOSIS — I10 PRIMARY HYPERTENSION: ICD-10-CM

## 2024-04-17 DIAGNOSIS — L97.312 CHRONIC ULCER OF RIGHT ANKLE WITH FAT LAYER EXPOSED (HCC): Chronic | ICD-10-CM

## 2024-04-17 DIAGNOSIS — L03.116 BILATERAL LOWER LEG CELLULITIS: Primary | ICD-10-CM

## 2024-04-17 DIAGNOSIS — R52 INTRACTABLE PAIN: ICD-10-CM

## 2024-04-17 DIAGNOSIS — L97.922 CHRONIC ULCER OF LEFT LOWER EXTREMITY WITH FAT LAYER EXPOSED (HCC): Chronic | ICD-10-CM

## 2024-04-17 LAB
ALBUMIN SERPL-MCNC: 2.4 G/DL (ref 3.5–5)
ALBUMIN/GLOB SERPL: 0.5 (ref 0.4–1.6)
ALP SERPL-CCNC: 79 U/L (ref 50–136)
ALT SERPL-CCNC: 26 U/L (ref 12–65)
ANION GAP SERPL CALC-SCNC: 0 MMOL/L (ref 2–11)
AST SERPL-CCNC: 20 U/L (ref 15–37)
BILIRUB SERPL-MCNC: 0.3 MG/DL (ref 0.2–1.1)
BUN SERPL-MCNC: 15 MG/DL (ref 6–23)
CALCIUM SERPL-MCNC: 9 MG/DL (ref 8.3–10.4)
CHLORIDE SERPL-SCNC: 101 MMOL/L (ref 103–113)
CO2 SERPL-SCNC: 33 MMOL/L (ref 21–32)
CREAT SERPL-MCNC: 0.9 MG/DL (ref 0.6–1)
ERYTHROCYTE [DISTWIDTH] IN BLOOD BY AUTOMATED COUNT: 17.1 % (ref 11.9–14.6)
GLOBULIN SER CALC-MCNC: 4.9 G/DL (ref 2.8–4.5)
GLUCOSE SERPL-MCNC: 93 MG/DL (ref 65–100)
HCT VFR BLD AUTO: 29.6 % (ref 35.8–46.3)
HGB BLD-MCNC: 9.2 G/DL (ref 11.7–15.4)
LACTATE SERPL-SCNC: 1 MMOL/L (ref 0.4–2)
MCH RBC QN AUTO: 26.4 PG (ref 26.1–32.9)
MCHC RBC AUTO-ENTMCNC: 31.1 G/DL (ref 31.4–35)
MCV RBC AUTO: 85.1 FL (ref 82–102)
NRBC # BLD: 0 K/UL (ref 0–0.2)
PLATELET # BLD AUTO: 396 K/UL (ref 150–450)
PMV BLD AUTO: 9 FL (ref 9.4–12.3)
POTASSIUM SERPL-SCNC: 3.7 MMOL/L (ref 3.5–5.1)
PROT SERPL-MCNC: 7.3 G/DL (ref 6.3–8.2)
RBC # BLD AUTO: 3.48 M/UL (ref 4.05–5.2)
SODIUM SERPL-SCNC: 134 MMOL/L (ref 136–146)
WBC # BLD AUTO: 6.7 K/UL (ref 4.3–11.1)

## 2024-04-17 PROCEDURE — 87154 CUL TYP ID BLD PTHGN 6+ TRGT: CPT

## 2024-04-17 PROCEDURE — 73590 X-RAY EXAM OF LOWER LEG: CPT

## 2024-04-17 PROCEDURE — 80053 COMPREHEN METABOLIC PANEL: CPT

## 2024-04-17 PROCEDURE — 96374 THER/PROPH/DIAG INJ IV PUSH: CPT

## 2024-04-17 PROCEDURE — 6360000002 HC RX W HCPCS: Performed by: EMERGENCY MEDICINE

## 2024-04-17 PROCEDURE — 99285 EMERGENCY DEPT VISIT HI MDM: CPT

## 2024-04-17 PROCEDURE — 85027 COMPLETE CBC AUTOMATED: CPT

## 2024-04-17 PROCEDURE — 93005 ELECTROCARDIOGRAM TRACING: CPT | Performed by: EMERGENCY MEDICINE

## 2024-04-17 PROCEDURE — 83605 ASSAY OF LACTIC ACID: CPT

## 2024-04-17 PROCEDURE — 87040 BLOOD CULTURE FOR BACTERIA: CPT

## 2024-04-17 RX ORDER — KETOROLAC TROMETHAMINE 15 MG/ML
15 INJECTION, SOLUTION INTRAMUSCULAR; INTRAVENOUS
Status: COMPLETED | OUTPATIENT
Start: 2024-04-17 | End: 2024-04-17

## 2024-04-17 RX ADMIN — KETOROLAC TROMETHAMINE 15 MG: 15 INJECTION, SOLUTION INTRAMUSCULAR; INTRAVENOUS at 22:31

## 2024-04-17 ASSESSMENT — PAIN SCALES - GENERAL: PAINLEVEL_OUTOF10: 10

## 2024-04-17 ASSESSMENT — PAIN - FUNCTIONAL ASSESSMENT: PAIN_FUNCTIONAL_ASSESSMENT: 0-10

## 2024-04-17 ASSESSMENT — PAIN DESCRIPTION - DESCRIPTORS: DESCRIPTORS: BURNING

## 2024-04-17 ASSESSMENT — PAIN DESCRIPTION - LOCATION: LOCATION: LEG

## 2024-04-17 ASSESSMENT — PAIN DESCRIPTION - ORIENTATION: ORIENTATION: RIGHT;LEFT

## 2024-04-17 NOTE — TELEPHONE ENCOUNTER
Care Transitions Initial Follow Up Call    Outreach made within 2 business days of discharge: Yes    Patient: Lo Tirado Patient : 1975   MRN: [id number]  Reason for Admission: There are no discharge diagnoses documented for the most recent discharge.  Discharge Date: 4/15/24       Spoke with: Lo Tirado      Discharge department/facility: [facility] Interactive Patient Contact:  Was patient able to fill all prescriptions: Yes  Was patient instructed to bring all medications to the follow-up visit: Yes  Is patient taking all medications as directed in the discharge summary? Yes  Does patient understand their discharge instructions: Yes  Does patient have questions or concerns that need addressed prior to 7-14 day follow up office visit: no    Scheduled appointment with PCP within 7-14 days    Follow Up  Future Appointments   Date Time Provider Department Center   2024  4:00 PM Nenita Beatty APRN - [facility]   5/15/2024  1:10 PM Camryn Gresham PA [facility]       Orin Wood MA

## 2024-04-17 NOTE — TELEPHONE ENCOUNTER
Melanie from [facility] is calling to get verbal start of care for Lo Tirado. Good phone number to call back is [phone]

## 2024-04-17 NOTE — TELEPHONE ENCOUNTER
Informed [facility]. Spoke with Melanie and pt is non compliant. Pt refused the 2 layer dressing. The legs will not get better if she does not follow orders.

## 2024-04-18 PROBLEM — L97.922 LEG ULCER, LEFT, WITH FAT LAYER EXPOSED (HCC): Status: ACTIVE | Noted: 2024-04-18

## 2024-04-18 LAB
ANION GAP SERPL CALC-SCNC: 1 MMOL/L (ref 2–11)
BASOPHILS # BLD: 0 K/UL (ref 0–0.2)
BASOPHILS NFR BLD: 0 % (ref 0–2)
BUN SERPL-MCNC: 17 MG/DL (ref 6–23)
CALCIUM SERPL-MCNC: 9.4 MG/DL (ref 8.3–10.4)
CHLORIDE SERPL-SCNC: 100 MMOL/L (ref 103–113)
CO2 SERPL-SCNC: 34 MMOL/L (ref 21–32)
CREAT SERPL-MCNC: 1.1 MG/DL (ref 0.6–1)
DIFFERENTIAL METHOD BLD: ABNORMAL
EKG ATRIAL RATE: 103 BPM
EKG DIAGNOSIS: NORMAL
EKG P AXIS: 60 DEGREES
EKG P-R INTERVAL: 149 MS
EKG Q-T INTERVAL: 352 MS
EKG QRS DURATION: 83 MS
EKG QTC CALCULATION (BAZETT): 463 MS
EKG R AXIS: 62 DEGREES
EKG T AXIS: 55 DEGREES
EKG VENTRICULAR RATE: 104 BPM
EOSINOPHIL # BLD: 0.4 K/UL (ref 0–0.8)
EOSINOPHIL NFR BLD: 5 % (ref 0.5–7.8)
ERYTHROCYTE [DISTWIDTH] IN BLOOD BY AUTOMATED COUNT: 17 % (ref 11.9–14.6)
EST. AVERAGE GLUCOSE BLD GHB EST-MCNC: 111 MG/DL
GLUCOSE BLD STRIP.AUTO-MCNC: 108 MG/DL (ref 65–100)
GLUCOSE BLD STRIP.AUTO-MCNC: 146 MG/DL (ref 65–100)
GLUCOSE BLD STRIP.AUTO-MCNC: 71 MG/DL (ref 65–100)
GLUCOSE BLD STRIP.AUTO-MCNC: 81 MG/DL (ref 65–100)
GLUCOSE SERPL-MCNC: 115 MG/DL (ref 65–100)
HBA1C MFR BLD: 5.5 % (ref 4.8–5.6)
HCT VFR BLD AUTO: 32.2 % (ref 35.8–46.3)
HGB BLD-MCNC: 9.7 G/DL (ref 11.7–15.4)
IMM GRANULOCYTES # BLD AUTO: 0 K/UL (ref 0–0.5)
IMM GRANULOCYTES NFR BLD AUTO: 0 % (ref 0–5)
LACTATE SERPL-SCNC: 0.8 MMOL/L (ref 0.4–2)
LYMPHOCYTES # BLD: 0.9 K/UL (ref 0.5–4.6)
LYMPHOCYTES NFR BLD: 10 % (ref 13–44)
MAGNESIUM SERPL-MCNC: 1.9 MG/DL (ref 1.8–2.4)
MCH RBC QN AUTO: 26.3 PG (ref 26.1–32.9)
MCHC RBC AUTO-ENTMCNC: 30.1 G/DL (ref 31.4–35)
MCV RBC AUTO: 87.3 FL (ref 82–102)
MONOCYTES # BLD: 0.9 K/UL (ref 0.1–1.3)
MONOCYTES NFR BLD: 10 % (ref 4–12)
NEUTS SEG # BLD: 6.8 K/UL (ref 1.7–8.2)
NEUTS SEG NFR BLD: 75 % (ref 43–78)
NRBC # BLD: 0 K/UL (ref 0–0.2)
PLATELET # BLD AUTO: 451 K/UL (ref 150–450)
PMV BLD AUTO: 9.1 FL (ref 9.4–12.3)
POTASSIUM SERPL-SCNC: 3.6 MMOL/L (ref 3.5–5.1)
RBC # BLD AUTO: 3.69 M/UL (ref 4.05–5.2)
SERVICE CMNT-IMP: ABNORMAL
SERVICE CMNT-IMP: ABNORMAL
SERVICE CMNT-IMP: NORMAL
SERVICE CMNT-IMP: NORMAL
SODIUM SERPL-SCNC: 135 MMOL/L (ref 136–146)
WBC # BLD AUTO: 9.1 K/UL (ref 4.3–11.1)

## 2024-04-18 PROCEDURE — 6360000002 HC RX W HCPCS: Performed by: FAMILY MEDICINE

## 2024-04-18 PROCEDURE — 82962 GLUCOSE BLOOD TEST: CPT

## 2024-04-18 PROCEDURE — 2500000003 HC RX 250 WO HCPCS: Performed by: INTERNAL MEDICINE

## 2024-04-18 PROCEDURE — 6360000002 HC RX W HCPCS: Performed by: INTERNAL MEDICINE

## 2024-04-18 PROCEDURE — 2500000003 HC RX 250 WO HCPCS: Performed by: EMERGENCY MEDICINE

## 2024-04-18 PROCEDURE — 2580000003 HC RX 258: Performed by: FAMILY MEDICINE

## 2024-04-18 PROCEDURE — 93010 ELECTROCARDIOGRAM REPORT: CPT | Performed by: INTERNAL MEDICINE

## 2024-04-18 PROCEDURE — G0378 HOSPITAL OBSERVATION PER HR: HCPCS

## 2024-04-18 PROCEDURE — 83605 ASSAY OF LACTIC ACID: CPT

## 2024-04-18 PROCEDURE — 96375 TX/PRO/DX INJ NEW DRUG ADDON: CPT

## 2024-04-18 PROCEDURE — 83735 ASSAY OF MAGNESIUM: CPT

## 2024-04-18 PROCEDURE — 6370000000 HC RX 637 (ALT 250 FOR IP): Performed by: INTERNAL MEDICINE

## 2024-04-18 PROCEDURE — 87040 BLOOD CULTURE FOR BACTERIA: CPT

## 2024-04-18 PROCEDURE — 36415 COLL VENOUS BLD VENIPUNCTURE: CPT

## 2024-04-18 PROCEDURE — 6370000000 HC RX 637 (ALT 250 FOR IP): Performed by: FAMILY MEDICINE

## 2024-04-18 PROCEDURE — 83036 HEMOGLOBIN GLYCOSYLATED A1C: CPT

## 2024-04-18 PROCEDURE — 85025 COMPLETE CBC W/AUTO DIFF WBC: CPT

## 2024-04-18 PROCEDURE — 80048 BASIC METABOLIC PNL TOTAL CA: CPT

## 2024-04-18 PROCEDURE — 96376 TX/PRO/DX INJ SAME DRUG ADON: CPT

## 2024-04-18 RX ORDER — ASPIRIN 81 MG/1
81 TABLET, CHEWABLE ORAL DAILY
Status: DISCONTINUED | OUTPATIENT
Start: 2024-04-18 | End: 2024-04-24 | Stop reason: HOSPADM

## 2024-04-18 RX ORDER — POLYETHYLENE GLYCOL 3350 17 G/17G
17 POWDER, FOR SOLUTION ORAL DAILY PRN
Status: DISCONTINUED | OUTPATIENT
Start: 2024-04-18 | End: 2024-04-24 | Stop reason: HOSPADM

## 2024-04-18 RX ORDER — HYDROXYZINE HYDROCHLORIDE 25 MG/1
25 TABLET, FILM COATED ORAL NIGHTLY
Status: DISCONTINUED | OUTPATIENT
Start: 2024-04-18 | End: 2024-04-24 | Stop reason: HOSPADM

## 2024-04-18 RX ORDER — INSULIN LISPRO 100 [IU]/ML
0-4 INJECTION, SOLUTION INTRAVENOUS; SUBCUTANEOUS
Status: DISCONTINUED | OUTPATIENT
Start: 2024-04-18 | End: 2024-04-24 | Stop reason: HOSPADM

## 2024-04-18 RX ORDER — DULOXETIN HYDROCHLORIDE 60 MG/1
60 CAPSULE, DELAYED RELEASE ORAL DAILY
Status: DISCONTINUED | OUTPATIENT
Start: 2024-04-18 | End: 2024-04-24 | Stop reason: HOSPADM

## 2024-04-18 RX ORDER — HYDROCODONE BITARTRATE AND ACETAMINOPHEN 7.5; 325 MG/1; MG/1
1 TABLET ORAL EVERY 6 HOURS PRN
Status: DISCONTINUED | OUTPATIENT
Start: 2024-04-18 | End: 2024-04-18

## 2024-04-18 RX ORDER — FLUTICASONE PROPIONATE 50 MCG
2 SPRAY, SUSPENSION (ML) NASAL DAILY
Status: DISCONTINUED | OUTPATIENT
Start: 2024-04-18 | End: 2024-04-24 | Stop reason: HOSPADM

## 2024-04-18 RX ORDER — METOPROLOL SUCCINATE 25 MG/1
25 TABLET, EXTENDED RELEASE ORAL DAILY
Status: DISCONTINUED | OUTPATIENT
Start: 2024-04-18 | End: 2024-04-24 | Stop reason: HOSPADM

## 2024-04-18 RX ORDER — HYDROMORPHONE HYDROCHLORIDE 1 MG/ML
1 INJECTION, SOLUTION INTRAMUSCULAR; INTRAVENOUS; SUBCUTANEOUS
Status: COMPLETED | OUTPATIENT
Start: 2024-04-18 | End: 2024-04-18

## 2024-04-18 RX ORDER — FUROSEMIDE 40 MG/1
40 TABLET ORAL EVERY OTHER DAY
Status: DISCONTINUED | OUTPATIENT
Start: 2024-04-18 | End: 2024-04-21

## 2024-04-18 RX ORDER — LOSARTAN POTASSIUM 50 MG/1
50 TABLET ORAL DAILY
Status: DISCONTINUED | OUTPATIENT
Start: 2024-04-18 | End: 2024-04-24 | Stop reason: HOSPADM

## 2024-04-18 RX ORDER — ACETAMINOPHEN 650 MG/1
650 SUPPOSITORY RECTAL EVERY 6 HOURS PRN
Status: DISCONTINUED | OUTPATIENT
Start: 2024-04-18 | End: 2024-04-24 | Stop reason: HOSPADM

## 2024-04-18 RX ORDER — MORPHINE SULFATE 15 MG/1
15 TABLET, FILM COATED, EXTENDED RELEASE ORAL 2 TIMES DAILY
Status: DISCONTINUED | OUTPATIENT
Start: 2024-04-18 | End: 2024-04-18

## 2024-04-18 RX ORDER — ALBUTEROL SULFATE 90 UG/1
2 AEROSOL, METERED RESPIRATORY (INHALATION) EVERY 6 HOURS PRN
Status: DISCONTINUED | OUTPATIENT
Start: 2024-04-18 | End: 2024-04-24 | Stop reason: HOSPADM

## 2024-04-18 RX ORDER — MONTELUKAST SODIUM 10 MG/1
10 TABLET ORAL DAILY
Status: DISCONTINUED | OUTPATIENT
Start: 2024-04-18 | End: 2024-04-24 | Stop reason: HOSPADM

## 2024-04-18 RX ORDER — SODIUM CHLORIDE 9 MG/ML
INJECTION, SOLUTION INTRAVENOUS PRN
Status: DISCONTINUED | OUTPATIENT
Start: 2024-04-18 | End: 2024-04-24 | Stop reason: HOSPADM

## 2024-04-18 RX ORDER — MAGNESIUM SULFATE IN WATER 40 MG/ML
2000 INJECTION, SOLUTION INTRAVENOUS PRN
Status: DISCONTINUED | OUTPATIENT
Start: 2024-04-18 | End: 2024-04-24 | Stop reason: HOSPADM

## 2024-04-18 RX ORDER — PANTOPRAZOLE SODIUM 40 MG/1
40 TABLET, DELAYED RELEASE ORAL
Status: DISCONTINUED | OUTPATIENT
Start: 2024-04-18 | End: 2024-04-24 | Stop reason: HOSPADM

## 2024-04-18 RX ORDER — HYDROMORPHONE HYDROCHLORIDE 2 MG/1
2 TABLET ORAL EVERY 12 HOURS PRN
Status: DISCONTINUED | OUTPATIENT
Start: 2024-04-18 | End: 2024-04-18

## 2024-04-18 RX ORDER — HYDROMORPHONE HYDROCHLORIDE 1 MG/ML
0.5 INJECTION, SOLUTION INTRAMUSCULAR; INTRAVENOUS; SUBCUTANEOUS EVERY 4 HOURS PRN
Status: DISCONTINUED | OUTPATIENT
Start: 2024-04-18 | End: 2024-04-20

## 2024-04-18 RX ORDER — CETIRIZINE HYDROCHLORIDE 10 MG/1
10 TABLET ORAL DAILY
Status: DISCONTINUED | OUTPATIENT
Start: 2024-04-18 | End: 2024-04-24 | Stop reason: HOSPADM

## 2024-04-18 RX ORDER — DULOXETIN HYDROCHLORIDE 60 MG/1
60 CAPSULE, DELAYED RELEASE ORAL DAILY
COMMUNITY

## 2024-04-18 RX ORDER — INSULIN LISPRO 100 [IU]/ML
0-4 INJECTION, SOLUTION INTRAVENOUS; SUBCUTANEOUS NIGHTLY
Status: DISCONTINUED | OUTPATIENT
Start: 2024-04-18 | End: 2024-04-24 | Stop reason: HOSPADM

## 2024-04-18 RX ORDER — BACLOFEN 10 MG/1
5 TABLET ORAL EVERY 8 HOURS PRN
Status: DISCONTINUED | OUTPATIENT
Start: 2024-04-18 | End: 2024-04-20

## 2024-04-18 RX ORDER — ENOXAPARIN SODIUM 100 MG/ML
40 INJECTION SUBCUTANEOUS 2 TIMES DAILY
Status: DISCONTINUED | OUTPATIENT
Start: 2024-04-18 | End: 2024-04-24 | Stop reason: HOSPADM

## 2024-04-18 RX ORDER — ONDANSETRON 4 MG/1
4 TABLET, ORALLY DISINTEGRATING ORAL EVERY 8 HOURS PRN
Status: DISCONTINUED | OUTPATIENT
Start: 2024-04-18 | End: 2024-04-24 | Stop reason: HOSPADM

## 2024-04-18 RX ORDER — CEFDINIR 300 MG/1
300 CAPSULE ORAL EVERY 12 HOURS SCHEDULED
Status: DISCONTINUED | OUTPATIENT
Start: 2024-04-18 | End: 2024-04-19

## 2024-04-18 RX ORDER — PREGABALIN 100 MG/1
200 CAPSULE ORAL 3 TIMES DAILY
Status: DISCONTINUED | OUTPATIENT
Start: 2024-04-18 | End: 2024-04-24 | Stop reason: HOSPADM

## 2024-04-18 RX ORDER — DOCUSATE SODIUM 100 MG/1
100 CAPSULE, LIQUID FILLED ORAL 2 TIMES DAILY
Status: DISCONTINUED | OUTPATIENT
Start: 2024-04-18 | End: 2024-04-24 | Stop reason: HOSPADM

## 2024-04-18 RX ORDER — HYDROMORPHONE HYDROCHLORIDE 1 MG/ML
0.5 INJECTION, SOLUTION INTRAMUSCULAR; INTRAVENOUS; SUBCUTANEOUS ONCE
Status: COMPLETED | OUTPATIENT
Start: 2024-04-18 | End: 2024-04-18

## 2024-04-18 RX ORDER — POTASSIUM CHLORIDE 7.45 MG/ML
10 INJECTION INTRAVENOUS PRN
Status: DISCONTINUED | OUTPATIENT
Start: 2024-04-18 | End: 2024-04-24 | Stop reason: HOSPADM

## 2024-04-18 RX ORDER — SODIUM CHLORIDE 0.9 % (FLUSH) 0.9 %
5-40 SYRINGE (ML) INJECTION PRN
Status: DISCONTINUED | OUTPATIENT
Start: 2024-04-18 | End: 2024-04-24 | Stop reason: HOSPADM

## 2024-04-18 RX ORDER — MINERAL OIL/HYDROPHIL PETROLAT
OINTMENT (GRAM) TOPICAL DAILY
Status: DISCONTINUED | OUTPATIENT
Start: 2024-04-18 | End: 2024-04-19

## 2024-04-18 RX ORDER — IBUPROFEN 600 MG/1
1 TABLET ORAL PRN
Status: DISCONTINUED | OUTPATIENT
Start: 2024-04-18 | End: 2024-04-24 | Stop reason: HOSPADM

## 2024-04-18 RX ORDER — ACETAMINOPHEN 325 MG/1
650 TABLET ORAL EVERY 6 HOURS PRN
Status: DISCONTINUED | OUTPATIENT
Start: 2024-04-18 | End: 2024-04-24 | Stop reason: HOSPADM

## 2024-04-18 RX ORDER — ONDANSETRON 2 MG/ML
4 INJECTION INTRAMUSCULAR; INTRAVENOUS EVERY 6 HOURS PRN
Status: DISCONTINUED | OUTPATIENT
Start: 2024-04-18 | End: 2024-04-24 | Stop reason: HOSPADM

## 2024-04-18 RX ORDER — SODIUM CHLORIDE 0.9 % (FLUSH) 0.9 %
5-40 SYRINGE (ML) INJECTION EVERY 12 HOURS SCHEDULED
Status: DISCONTINUED | OUTPATIENT
Start: 2024-04-18 | End: 2024-04-24 | Stop reason: HOSPADM

## 2024-04-18 RX ORDER — POTASSIUM CHLORIDE 20 MEQ/1
40 TABLET, EXTENDED RELEASE ORAL PRN
Status: DISCONTINUED | OUTPATIENT
Start: 2024-04-18 | End: 2024-04-24 | Stop reason: HOSPADM

## 2024-04-18 RX ORDER — DOXYCYCLINE HYCLATE 100 MG/1
100 CAPSULE ORAL 2 TIMES DAILY WITH MEALS
Status: DISCONTINUED | OUTPATIENT
Start: 2024-04-18 | End: 2024-04-19

## 2024-04-18 RX ORDER — DEXTROSE MONOHYDRATE 100 MG/ML
INJECTION, SOLUTION INTRAVENOUS CONTINUOUS PRN
Status: DISCONTINUED | OUTPATIENT
Start: 2024-04-18 | End: 2024-04-24 | Stop reason: HOSPADM

## 2024-04-18 RX ADMIN — FUROSEMIDE 40 MG: 40 TABLET ORAL at 09:24

## 2024-04-18 RX ADMIN — DULOXETINE HYDROCHLORIDE 60 MG: 60 CAPSULE, DELAYED RELEASE ORAL at 09:23

## 2024-04-18 RX ADMIN — FLUTICASONE PROPIONATE 2 SPRAY: 50 SPRAY, METERED NASAL at 09:32

## 2024-04-18 RX ADMIN — Medication: at 16:22

## 2024-04-18 RX ADMIN — LOSARTAN POTASSIUM 50 MG: 50 TABLET, FILM COATED ORAL at 09:24

## 2024-04-18 RX ADMIN — CEFDINIR 300 MG: 300 CAPSULE ORAL at 20:36

## 2024-04-18 RX ADMIN — CETIRIZINE HYDROCHLORIDE 10 MG: 10 TABLET, FILM COATED ORAL at 09:23

## 2024-04-18 RX ADMIN — DOXYCYCLINE HYCLATE 100 MG: 100 CAPSULE ORAL at 17:14

## 2024-04-18 RX ADMIN — HYDROMORPHONE HYDROCHLORIDE 0.5 MG: 1 INJECTION, SOLUTION INTRAMUSCULAR; INTRAVENOUS; SUBCUTANEOUS at 11:06

## 2024-04-18 RX ADMIN — HYDROMORPHONE HYDROCHLORIDE 0.5 MG: 1 INJECTION, SOLUTION INTRAMUSCULAR; INTRAVENOUS; SUBCUTANEOUS at 15:47

## 2024-04-18 RX ADMIN — PREGABALIN 200 MG: 100 CAPSULE ORAL at 09:28

## 2024-04-18 RX ADMIN — MORPHINE SULFATE 15 MG: 15 TABLET, FILM COATED, EXTENDED RELEASE ORAL at 09:29

## 2024-04-18 RX ADMIN — SODIUM CHLORIDE, PRESERVATIVE FREE 20 ML: 5 INJECTION INTRAVENOUS at 09:35

## 2024-04-18 RX ADMIN — DOCUSATE SODIUM 100 MG: 100 CAPSULE, LIQUID FILLED ORAL at 09:28

## 2024-04-18 RX ADMIN — DOCUSATE SODIUM 100 MG: 100 CAPSULE, LIQUID FILLED ORAL at 20:36

## 2024-04-18 RX ADMIN — HYDROXYZINE HYDROCHLORIDE 25 MG: 25 TABLET, FILM COATED ORAL at 20:36

## 2024-04-18 RX ADMIN — CEFDINIR 300 MG: 300 CAPSULE ORAL at 09:23

## 2024-04-18 RX ADMIN — HYDROMORPHONE HYDROCHLORIDE 2 MG: 2 TABLET ORAL at 04:38

## 2024-04-18 RX ADMIN — MONTELUKAST 10 MG: 10 TABLET, FILM COATED ORAL at 09:28

## 2024-04-18 RX ADMIN — HYDROMORPHONE HYDROCHLORIDE 1 MG: 1 INJECTION, SOLUTION INTRAMUSCULAR; INTRAVENOUS; SUBCUTANEOUS at 00:31

## 2024-04-18 RX ADMIN — PREGABALIN 200 MG: 100 CAPSULE ORAL at 20:35

## 2024-04-18 RX ADMIN — ASPIRIN 81 MG 81 MG: 81 TABLET ORAL at 09:23

## 2024-04-18 RX ADMIN — METOPROLOL SUCCINATE 25 MG: 25 TABLET, EXTENDED RELEASE ORAL at 09:29

## 2024-04-18 RX ADMIN — PREGABALIN 200 MG: 100 CAPSULE ORAL at 15:14

## 2024-04-18 RX ADMIN — SODIUM CHLORIDE, PRESERVATIVE FREE 10 ML: 5 INJECTION INTRAVENOUS at 20:37

## 2024-04-18 RX ADMIN — DOXYCYCLINE HYCLATE 100 MG: 100 CAPSULE ORAL at 09:24

## 2024-04-18 ASSESSMENT — PAIN DESCRIPTION - ORIENTATION
ORIENTATION: RIGHT;LEFT
ORIENTATION: RIGHT;LEFT
ORIENTATION: RIGHT;LEFT;LOWER
ORIENTATION: RIGHT;LEFT

## 2024-04-18 ASSESSMENT — PAIN DESCRIPTION - LOCATION
LOCATION: LEG

## 2024-04-18 ASSESSMENT — PAIN DESCRIPTION - FREQUENCY
FREQUENCY: CONTINUOUS
FREQUENCY: CONTINUOUS

## 2024-04-18 ASSESSMENT — PAIN DESCRIPTION - DESCRIPTORS
DESCRIPTORS: BURNING
DESCRIPTORS: STABBING;THROBBING;BURNING
DESCRIPTORS: BURNING
DESCRIPTORS: BURNING

## 2024-04-18 ASSESSMENT — PAIN DESCRIPTION - PAIN TYPE
TYPE: CHRONIC PAIN
TYPE: CHRONIC PAIN

## 2024-04-18 ASSESSMENT — PAIN SCALES - GENERAL
PAINLEVEL_OUTOF10: 10
PAINLEVEL_OUTOF10: 10
PAINLEVEL_OUTOF10: 0
PAINLEVEL_OUTOF10: 10
PAINLEVEL_OUTOF10: 0
PAINLEVEL_OUTOF10: 10
PAINLEVEL_OUTOF10: 10
PAINLEVEL_OUTOF10: 0

## 2024-04-18 ASSESSMENT — PAIN - FUNCTIONAL ASSESSMENT
PAIN_FUNCTIONAL_ASSESSMENT: PREVENTS OR INTERFERES SOME ACTIVE ACTIVITIES AND ADLS
PAIN_FUNCTIONAL_ASSESSMENT: PREVENTS OR INTERFERES SOME ACTIVE ACTIVITIES AND ADLS

## 2024-04-18 NOTE — ED NOTES
TRANSFER - OUT REPORT:    Verbal report given to Ruby WILLIAM on Lo Tirado  being transferred to [facility] for routine progression of patient care       Report consisted of patient's Situation, Background, Assessment and   Recommendations(SBAR).     Information from the following report(s) Nurse Handoff Report was reviewed with the receiving nurse.    [place] Fall Assessment:    Presents to emergency department  because of falls (Syncope, seizure, or loss of consciousness): No  Age > 70: No  Altered Mental Status, Intoxication with alcohol or substance confusion (Disorientation, impaired judgment, poor safety awaremess, or inability to follow instructions): No  Impaired Mobility: Ambulates or transfers with assistive devices or assistance; Unable to ambulate or transer.: Yes  Nursing Judgement: Yes          Lines:   Peripheral IV 04/17/24 Right Antecubital (Active)        Opportunity for questions and clarification was provided.      Patient transported with:  Registered Nurse           David Balbuena RN  04/18/24 8718

## 2024-04-18 NOTE — ASSESSMENT & PLAN NOTE
- Recently hospitalized for same/similar  - Patient too somnolent at this time to describe symptoms, however she was reportedly agitated/in severe pain per EMS and ER  - Will continue PO antibiotics as prescribed on discharge  - Consult with Wound Care  - Continue home pain medications PRN

## 2024-04-18 NOTE — ED NOTES
Report given to STEWART Miller to assume care at this time.      Coco Spencer, RN  04/18/24 0102

## 2024-04-18 NOTE — CARE COORDINATION
CASE MANAGEMENT ASSESSMENT NOTE      Patient is a 48 year old female with intractable pain and bilateral lower leg cellulitis.  Patient is currently under observation status with a recent hospiralization (Dc'd 4/15).    Patient assessment completed at bedside.  Patient presents to assessment alert and oriented. Complains of burning in her legs.  She is able to answer questions appropriately  She lives at home with son, daughter, and 3 grandchildren.  At baseline, she is independent with transfers, and needs assist with ADLs such as bathing. Lives in a single story home.  She has medicaid.  Confirmed PCP.  A referral was made to [facility] during previous admission.  Patient states that they admitted her prior to this hospital visit and she wishes to return to their services.  She had been set up with wound care with Dr. Tabor but patient states that she arrived to her appointment late.    At this time, anticipate patient to be discharged home with [facility].  Case management will continue to follow.  Please notify if there are any changes.       Attending Physician: Desiree Morris MD  Admit Problem: Intractable pain [R52]  Bilateral lower leg cellulitis [L03.116, L03.115]  Leg ulcer, left, with fat layer exposed (HCC) [L97.922]  Date/Time of Admission: 4/17/2024 10:07 PM  Problem List:  Patient Active Problem List   Diagnosis    Non compliance with medical treatment    Vertigo    Status post hip replacement    Peripheral neuropathy    CKD (chronic kidney disease) stage 3, GFR 30-59 ml/min (HCC)    Osteoarthritis of hip    Hypertension    Morbid obesity (HCC)    Moderate persistent asthma without complication    Bipolar affective disorder (HCC)    Menometrorrhagia    Other iron deficiency anemias    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)    MPN (myeloproliferative neoplasm) (HCC)    Type 2 diabetes mellitus    Lymphedema of both lower extremities    Chronic venous  hypertension involving both sides    Chronic ulcer of right ankle with fat layer exposed (HCC)    Chronic ulcer of left lower extremity with fat layer exposed (HCC)    Bilateral lower leg cellulitis    Bilateral cellulitis of lower leg    Leg ulcer, left, with fat layer exposed (HCC)          04/18/24 1007   Service Assessment   Patient Orientation Alert and Oriented   Cognition Alert   History Provided By Patient   Primary Caregiver Self   Accompanied By/Relationship N/A   Support Systems Family Members  (Lives with son, daughter, and 3 granchildren.)   Patient's Healthcare Decision Maker is: Legal Next of Kin  (Abigail Tirado, child)   PCP Verified by CM Yes  (Nenita Lilliane)   Last Visit to PCP Within last 3 months   Prior Functional Level Assistance with the following:;Bathing;Housework;Dressing;Mobility   Current Functional Level Assistance with the following:;Bathing;Dressing;Mobility   Can patient return to prior living arrangement Yes   Ability to make needs known: Good   Family able to assist with home care needs: Yes   Would you like for me to discuss the discharge plan with any other family members/significant others, and if so, who? Yes  (Abigail Tirado, Daughter; Cristina Kenna, Friend)   Financial Resources Medicaid;Medicare   Community Resources None   CM/SW Referral Other (see comment)  (Home Health)   Social/Functional History   Lives With Son;Daughter   Type of Home House   Home Layout One level   Bathroom Shower/Tub Tub/Shower unit   Bathroom Toilet Standard   Bathroom Accessibility Accessible   Home Equipment None   Receives Help From Family   ADL Assistance Needs assistance   Toileting Needs assistance   Homemaking Assistance Needs assistance   Ambulation Assistance Needs assistance   Transfer Assistance Needs assistance   Active  No   Mode of Transportation Van;Car   Occupation Unemployed   Discharge Planning   Type of Residence House   Living Arrangements Family Members   Current

## 2024-04-18 NOTE — PROGRESS NOTES
MG/DL    Est, Glom Filt Rate 78 >60 ml/min/1.73m2    Calcium 9.0 8.3 - 10.4 MG/DL    Total Bilirubin 0.3 0.2 - 1.1 MG/DL    ALT 26 12 - 65 U/L    AST 20 15 - 37 U/L    Alk Phosphatase 79 50 - 136 U/L    Total Protein 7.3 6.3 - 8.2 g/dL    Albumin 2.4 (L) 3.5 - 5.0 g/dL    Globulin 4.9 (H) 2.8 - 4.5 g/dL    Albumin/Globulin Ratio 0.5 0.4 - 1.6     Culture, Blood 1    Collection Time: 04/17/24 10:41 PM    Specimen: Blood   Result Value Ref Range    Special Requests NO SPECIAL REQUESTS  RIGHT  HAND        Culture NO GROWTH AFTER 8 HOURS     Lactic Acid    Collection Time: 04/17/24 10:41 PM   Result Value Ref Range    Lactic Acid, Plasma 1.0 0.4 - 2.0 MMOL/L   Lactic Acid    Collection Time: 04/18/24  4:44 AM   Result Value Ref Range    Lactic Acid, Plasma 0.8 0.4 - 2.0 MMOL/L   Basic Metabolic Panel w/ Reflex to MG    Collection Time: 04/18/24  4:44 AM   Result Value Ref Range    Sodium 135 (L) 136 - 146 mmol/L    Potassium 3.6 3.5 - 5.1 mmol/L    Chloride 100 (L) 103 - 113 mmol/L    CO2 34 (H) 21 - 32 mmol/L    Anion Gap 1 (L) 2 - 11 mmol/L    Glucose 115 (H) 65 - 100 mg/dL    BUN 17 6 - 23 MG/DL    Creatinine 1.10 (H) 0.6 - 1.0 MG/DL    Est, Glom Filt Rate 62 >60 ml/min/1.73m2    Calcium 9.4 8.3 - 10.4 MG/DL   CBC with Auto Differential    Collection Time: 04/18/24  4:44 AM   Result Value Ref Range    WBC 9.1 4.3 - 11.1 K/uL    RBC 3.69 (L) 4.05 - 5.2 M/uL    Hemoglobin 9.7 (L) 11.7 - 15.4 g/dL    Hematocrit 32.2 (L) 35.8 - 46.3 %    MCV 87.3 82 - 102 FL    MCH 26.3 26.1 - 32.9 PG    MCHC 30.1 (L) 31.4 - 35.0 g/dL    RDW 17.0 (H) 11.9 - 14.6 %    Platelets 451 (H) 150 - 450 K/uL    MPV 9.1 (L) 9.4 - 12.3 FL    nRBC 0.00 0.0 - 0.2 K/uL    Differential Type AUTOMATED      Neutrophils % 75 43 - 78 %    Lymphocytes % 10 (L) 13 - 44 %    Monocytes % 10 4.0 - 12.0 %    Eosinophils % 5 0.5 - 7.8 %    Basophils % 0 0.0 - 2.0 %    Immature Granulocytes % 0 0.0 - 5.0 %    Neutrophils Absolute 6.8 1.7 - 8.2 K/UL    Lymphocytes  Absolute 0.9 0.5 - 4.6 K/UL    Monocytes Absolute 0.9 0.1 - 1.3 K/UL    Eosinophils Absolute 0.4 0.0 - 0.8 K/UL    Basophils Absolute 0.0 0.0 - 0.2 K/UL    Immature Granulocytes Absolute 0.0 0.0 - 0.5 K/UL   POCT Glucose    Collection Time: 04/18/24  5:27 AM   Result Value Ref Range    POC Glucose 71 65 - 100 mg/dL    Performed by: Ashanti        No results for input(s): \"COVID19\" in the last 72 hours.    Current Meds:  Current Facility-Administered Medications   Medication Dose Route Frequency    fluticasone (FLONASE) 50 MCG/ACT nasal spray 2 spray  2 spray Nasal Daily    albuterol sulfate HFA (PROVENTIL;VENTOLIN;PROAIR) 108 (90 Base) MCG/ACT inhaler 2 puff  2 puff Inhalation Q6H PRN    docusate sodium (COLACE) capsule 100 mg  100 mg Oral BID    DULoxetine (CYMBALTA) extended release capsule 60 mg  60 mg Oral Daily    losartan (COZAAR) tablet 50 mg  50 mg Oral Daily    montelukast (SINGULAIR) tablet 10 mg  10 mg Oral Daily    baclofen (LIORESAL) tablet 5 mg  5 mg Oral Q8H PRN    aspirin chewable tablet 81 mg  81 mg Oral Daily    hydrOXYzine HCl (ATARAX) tablet 25 mg  25 mg Oral Nightly    pregabalin (LYRICA) capsule 200 mg  200 mg Oral TID    metoprolol succinate (TOPROL XL) extended release tablet 25 mg  25 mg Oral Daily    cefdinir (OMNICEF) capsule 300 mg  300 mg Oral 2 times per day    furosemide (LASIX) tablet 40 mg  40 mg Oral Every Other Day    doxycycline hyclate (VIBRAMYCIN) capsule 100 mg  100 mg Oral BID WC    pantoprazole (PROTONIX) tablet 40 mg  40 mg Oral QAM AC    cetirizine (ZYRTEC) tablet 10 mg  10 mg Oral Daily    sodium chloride flush 0.9 % injection 5-40 mL  5-40 mL IntraVENous 2 times per day    sodium chloride flush 0.9 % injection 5-40 mL  5-40 mL IntraVENous PRN    0.9 % sodium chloride infusion   IntraVENous PRN    potassium chloride (KLOR-CON M) extended release tablet 40 mEq  40 mEq Oral PRN    Or    potassium bicarb-citric acid (EFFER-K) effervescent tablet 40 mEq  40 mEq Oral PRN

## 2024-04-18 NOTE — ED PROVIDER NOTES
Emergency Department Provider Note       PCP: Nenita Beatty, APRN - [facility]   Age: 48 y.o.   Sex: female     DISPOSITION Decision To Admit 04/18/2024 01:39:58 AM       ICD-10-CM    1. Bilateral lower leg cellulitis  L03.116     L03.115       2. Intractable pain  R52           Medical Decision Making     Patient still crying tearful and with continual moaning limiting cardiac and lung exam.  Patient's been given ketamine and fentanyl prior to arrival.  Review of records reveals normal creatinine so some Toradol to supplement her pain control is ordered at this time as well as blood cultures lactic acid and broad-spectrum antibiotics.  Lower extremity plain film imaging to evaluate for gas or osteo.  Anticipate need for readmission     1 or more acute illnesses that pose a threat to life or bodily function.   1 or more chronic illnesses with a severe exacerbation or progression.  Chronic medical problems impacting care include morbid obesity and chronic pain.  Shared medical decision making was utilized in creating the patients health plan today.    I independently ordered and reviewed each unique test.  I reviewed external records: provider visit note from outside specialist.   The patients assessment required an independent historian: EMS.  The reason they were needed is important historical information not provided by the patient.  I interpreted the X-rays no osteomyelitis or gas in the soft tissues-confirmed by radiology, no fractures either.  My Independent EKG Interpretation: sinus rhythm, no evidence of arrhythmia      ST Segments:Normal ST segments - NO STEMI   Rate: 104  The patient was admitted and I have discussed patient management with the admitting provider.    Exclusion criteria - the patient is NOT to be included for SEP-1 Core Measure due to: 2+ SIRS criteria are not met       History     48-year-old female with history of morbid obesity and recurrent lower extremity cellulitis and lymphedema

## 2024-04-18 NOTE — WOUND CARE
Bilateral lower legs with chronic venous stasis ulcers. Known to wound team and known to wound clinic. Patient has refractory wounds that are very painful and she often takes the bandage off.. She has allergies to antibiotics and seems to be sensitive to several creams and lotions that have been tried.  She may be a good candidate for Sorbact wound dressing with Convatec max absorbing pads under compression therapy daily, these products are not available inpatient. She often complains of burning the best solution to stop the burning is to break the lymphedema cycle by keeping the compression on, but she usually will not tolerate and takes the compression off anyway, this has been explained several times to her she states she can not stand it.  Discussed briefly with acute lymphedema specialist and an acute option is not available if patient won't be compliant with compression. Having her follow up with wound clinic and outpatient lymphedema clinic may be an option if she will be compliant with the therapies offered. Will order aquaphor for severo wound, xeroform for base and large ABD's pads with kerlex  and ace from toes to knee daily. Will monitor.

## 2024-04-18 NOTE — H&P
Hospitalist History and Physical   Admit Date:  2024 10:07 PM   Name:  Lo Tirado   Age:  48 y.o.  Sex:  female  :  1975   MRN:  [id number]     Presenting Complaint: leg pain  Reason(s) for Admission: Intractable pain [R52]  Bilateral lower leg cellulitis [L03.116, L03.115]  Leg ulcer, left, with fat layer exposed (HCC) [L97.922]     History of Present Illness:   Lo Tirado is a 48 y.o. female with medical history of HTN, DM2, CKD3, bipolar, chronic lymphedema who presented to ED with BLE pain.  Patient was recently discharged on 4/15 after hospitalization for BLE cellulitis/lymphedema.  She was discharged with a 9 day course of omnicef and doxycycline as well as wound care instructions.  She returned to the ER tonight via EMS due to worsening pain.  Per report, EMS needed to give her ketamine and fentanyl en route due to agitation from pain.  While in the ER, she has also received toradol and dilaudid.  Upon ER workup, labs remain stable.  Patient has weeping from LLE especially.  Patient did not feel comfortable returning home tonight.  Hospitalist asked to admit.    At time of my bedside exam, patient is extremely somnolent.  She aroused to voice, however immediately falls back asleep.  Interview was attempted several times, but patient is too sleepy to participate likely due to multiple sedating medications given by EMS and ER due to her earlier condition.    Review of Systems:  Unable to assess due to somnolence.  Assessment & Plan:   * Leg ulcer, left, with fat layer exposed (HCC)  Assessment & Plan  - Recently hospitalized for same/similar  - Patient too somnolent at this time to describe symptoms, however she was reportedly agitated/in severe pain per EMS and ER  - Will continue PO antibiotics as prescribed on discharge  - Consult with Wound Care  - Continue home pain medications PRN    Lymphedema of both lower extremities  Assessment & Plan  - Of note  - Wound Care

## 2024-04-18 NOTE — PROGRESS NOTES
END OF SHIFT NOTE:    (1640) BLE dressing change per order. Patient tolerated well. Premedicated with PRN IV Dilaudid per patient request.    INTAKE/OUTPUT  No intake/output data recorded.  Voiding: Yes  Catheter: No  Drain:   External Urinary Catheter (Active)   Site Assessment Clean,dry & intact 04/18/24 1558   Placement Replaced 04/18/24 1558   Securement Method Other (Comment) 04/18/24 1558   Catheter Care Catheter/Wick replaced 04/18/24 1558   Perineal Care Yes 04/18/24 1558   Suction 40 mmgHg continuous 04/18/24 1558   Output (mL) 600 mL 04/18/24 1504     Flatus: Patient does have flatus present.    Stool: 0 occurrences.    Characteristics:           Stool Assessment  Last BM (including prior to admit): 04/16/24    Emesis: 0 occurrences.    Characteristics:        VITAL SIGNS  Patient Vitals for the past 12 hrs:   Temp Pulse Resp BP SpO2   04/18/24 1504 98.6 °F (37 °C) (!) 101 18 (!) 141/76 97 %   04/18/24 1126 98.4 °F (36.9 °C) 99 16 136/69 --   04/18/24 0732 97.9 °F (36.6 °C) 91 17 136/83 100 %       Pain Assessment  Pain Level: 0 (04/18/24 1547)  Pain Location: Leg  Patient's Stated Pain Goal: 0 - No pain    Ambulating  No    Shift report given to oncoming nurse at the bedside.    Riddhi Stover, RN

## 2024-04-18 NOTE — ED NOTES
Patient found with legs hanging off of bed, patient states that she wants to sit on side of bed. Patient able to moved legs to side of bed onto floor without assistance. Patient left sitting on side of bed, no other needs voiced at this time.     Dylan Fields RN  04/17/24 1936

## 2024-04-18 NOTE — PROGRESS NOTES
END OF SHIFT NOTE:    INTAKE/OUTPUT  No intake/output data recorded.  Voiding: No; has not voided since coming to floor at 0400.  Catheter: Yes; external   Drain:   External Urinary Catheter (Active)               Flatus: Patient does have flatus present.    Stool:  occurrences.    Characteristics:           Stool Assessment  Last BM (including prior to admit): 04/16/24 (per patient)    Emesis:  occurrences.    Characteristics:        VITAL SIGNS  Patient Vitals for the past 12 hrs:   Temp Pulse Resp BP SpO2   04/18/24 0405 97.5 °F (36.4 °C) 90 18 (!) 148/90 98 %   04/18/24 0356 -- 90 -- (!) 148/90 --   04/18/24 0225 -- 95 13 -- --   04/18/24 0218 -- 98 18 -- --   04/18/24 0215 -- 97 17 116/84 --   04/18/24 0200 -- 98 11 (!) 122/91 --   04/18/24 0147 -- 98 15 -- 96 %   04/18/24 0145 -- 94 12 (!) 127/90 --   04/18/24 0130 -- 96 13 124/81 --   04/18/24 0116 -- 97 15 -- 100 %   04/18/24 0115 -- 91 11 127/88 --   04/18/24 0100 -- 92 10 127/77 --   04/17/24 2245 -- 94 16 (!) 134/59 99 %   04/17/24 2220 98.5 °F (36.9 °C) (!) 102 17 (!) 150/80 94 %       Pain Assessment  Pain Level: 10 (04/18/24 5934)  Pain Location: Leg  Patient's Stated Pain Goal: 0 - No pain    Ambulating  No    Shift report given to oncoming nurse at the bedside.    GORDON HERNANDEZ RN

## 2024-04-18 NOTE — CONSULTS
Infectious Disease Consult    Today's Date: 2024   Admit Date: 2024  : 1975    Impression:   Lymphedema with chronic wounds  Concern for cellulitis of LE's  DM2  Morbid obesity  - Recently admitted 4/10 for LE pain and edema. Got 4 days of Vanc and Pip/tazo, then changed to Doxy plus Cefdinir to finish a planned course of 14 days. Discharged on 4/15, but came back .  - Back to the ED  because of pain. Labs are unremarkable and she's afebrile.     Plan:   I don't see any current evidence of cellulitis in her legs. The findings are due to poorly controlled lymphedema and stasis.   Stop antibiotics.  I recommend some diuresis while she's here to help with the edema. I also recommend compression stockings, wound care, outpatient lymphedema therapy referral.    Anti-infectives:   Doxy  Cefdinir    Subjective:   Date of Consultation:  2024  Referring Physician: Desiree Morris MD for: assistance in management of the above noted issues.    Patient is a 48 y.o. female with hx bipolar d/o, morbid obesity, lymphedema with chronic wounds, HTN, CKD 3 who came in for LE pain.    Recently admitted for LE pain and swelling and got 4 days IV abx, then discharged with PO abx planned for 14 days. Came back due to pain. Apparently got Fent and Ketamine by EMS? Then hydromorphone in the ED. She's very somnolent today. She only arouses briefly and states she'll \"need meds\" if I lift her legs, then falls back asleep. ROS is limited by her somnolence currently.    Past Medical History:   Diagnosis Date    Anxiety     Arthritis     Bipolar affective disorder (HCC) 15 yrs old    Cellulitis 2010    denies currently     Cerebrovascular disease     Chronic back pain     CKD (chronic kidney disease) stage 3, GFR 30-59 ml/min ([facility])     Deep vein thrombosis ([facility]) 2022    Depression     Diabetes ([facility]) dx:    Unknown A1C    Diabetes ([facility]) 2010    had multiple TIA's from low bs in   and hasn't been on any meds since    Diabetes mellitus type II, non insulin dependent ([facility]) 2015    Disease of blood and blood forming organ     Edema of both legs 2010    ankles     Femur fracture, left ([facility]) 2015    GERD (gastroesophageal reflux disease)     denies    Gout     Hypertension     no meds    Moderate persistent asthma without complication 10/19/2021    Morbid obesity ([facility]) lifetime    Non compliance with medical treatment long term    Orthopnea     Osteoarthritis     Other ill-defined conditions(799.89)     cellulitis    Peripheral neuropathy 2 years    Peripheral vascular disease ([facility])      delivery 10/4/1996    Birth of my twins 24wks     labor 10/4/1996    Psychiatric disorder     bipolar disorder    Renal insufficiency 2010    Restless legs syndrome     Stroke ([facility])     multiple TIA's in 2014; no residual     Unspecified sleep apnea     does not use cpap    Vertigo 2015       Social History     Tobacco Use    Smoking status: Never    Smokeless tobacco: Never   Substance Use Topics    Alcohol use: No      Past Surgical History:   Procedure Laterality Date     SECTION      DELIVERY   ,     both preemies    HEENT      Tonsillectomy/SFE/18    HERNIA REPAIR  age 4    JOINT REPLACEMENT      TONSILLECTOMY      TOTAL HIP ARTHROPLASTY Left     Dr. Andersen    TUBAL LIGATION       Family History   Problem Relation Age of Onset    No Known Problems Mother     Heart Attack Father     Other Father         aneurysm age 58, now brain damaged    Heart Disease Father     Cancer Sister         hodgkins    Breast Cancer Sister     Breast Cancer Sister     Diabetes Paternal Grandmother     Other Son         24 week twin, LD    Colon Cancer Other     Other Cousin         ? femur fracture         Current medications were reviewed     Allergies   Allergen Reactions    Ciprofloxacin Itching    Sulfa Antibiotics Itching    Xarelto

## 2024-04-18 NOTE — ED TRIAGE NOTES
Pt brought in by EMS from home c/o worsening BLE pain (10/10), swelling, and drainage (x 2 days). Pt followed by wound care, but reports her pain has been uncontrolled for the past few days. Pt reports increased difficulty ambulating. Pt received 100 mg of ketamine w/ EMS prior to arrival d/t pt being uncooperative d/t severe pain while transporting. Pt given 100 mcg of fentanyl at 2157 en route w/ EMS. Pt reports hx of cellulitis in both legs and possible lymphedema. Pt appears very uncomfortable in triage and is crying.

## 2024-04-18 NOTE — ED NOTES
Perineal care provided, brief changed, and purewick placed. Pt tolerated well.      Coco Spencer, RN  04/18/24 0104

## 2024-04-18 NOTE — PROGRESS NOTES
Patient arrived to the floor. Patient w/open weeping wounds to the [facility]'s. Hospitalist notified. New orders received to consult Wound care.

## 2024-04-19 PROBLEM — I89.0 LYMPHEDEMA: Status: ACTIVE | Noted: 2024-04-19

## 2024-04-19 LAB
ANION GAP SERPL CALC-SCNC: 3 MMOL/L (ref 2–11)
BASOPHILS # BLD: 0 K/UL (ref 0–0.2)
BASOPHILS NFR BLD: 1 % (ref 0–2)
BUN SERPL-MCNC: 20 MG/DL (ref 6–23)
CALCIUM SERPL-MCNC: 9 MG/DL (ref 8.3–10.4)
CHLORIDE SERPL-SCNC: 100 MMOL/L (ref 103–113)
CO2 SERPL-SCNC: 32 MMOL/L (ref 21–32)
CREAT SERPL-MCNC: 0.9 MG/DL (ref 0.6–1)
DIFFERENTIAL METHOD BLD: ABNORMAL
EOSINOPHIL # BLD: 0.3 K/UL (ref 0–0.8)
EOSINOPHIL NFR BLD: 5 % (ref 0.5–7.8)
ERYTHROCYTE [DISTWIDTH] IN BLOOD BY AUTOMATED COUNT: 17 % (ref 11.9–14.6)
GLUCOSE BLD STRIP.AUTO-MCNC: 100 MG/DL (ref 65–100)
GLUCOSE BLD STRIP.AUTO-MCNC: 102 MG/DL (ref 65–100)
GLUCOSE BLD STRIP.AUTO-MCNC: 103 MG/DL (ref 65–100)
GLUCOSE BLD STRIP.AUTO-MCNC: 119 MG/DL (ref 65–100)
GLUCOSE SERPL-MCNC: 103 MG/DL (ref 65–100)
HCT VFR BLD AUTO: 27.7 % (ref 35.8–46.3)
HGB BLD-MCNC: 8.5 G/DL (ref 11.7–15.4)
IMM GRANULOCYTES # BLD AUTO: 0 K/UL (ref 0–0.5)
IMM GRANULOCYTES NFR BLD AUTO: 0 % (ref 0–5)
LYMPHOCYTES # BLD: 1.4 K/UL (ref 0.5–4.6)
LYMPHOCYTES NFR BLD: 25 % (ref 13–44)
MAGNESIUM SERPL-MCNC: 1.7 MG/DL (ref 1.8–2.4)
MCH RBC QN AUTO: 26.2 PG (ref 26.1–32.9)
MCHC RBC AUTO-ENTMCNC: 30.7 G/DL (ref 31.4–35)
MCV RBC AUTO: 85.2 FL (ref 82–102)
MONOCYTES # BLD: 1 K/UL (ref 0.1–1.3)
MONOCYTES NFR BLD: 16 % (ref 4–12)
NEUTS SEG # BLD: 3.1 K/UL (ref 1.7–8.2)
NEUTS SEG NFR BLD: 53 % (ref 43–78)
NRBC # BLD: 0 K/UL (ref 0–0.2)
PLATELET # BLD AUTO: 405 K/UL (ref 150–450)
PMV BLD AUTO: 9.3 FL (ref 9.4–12.3)
POTASSIUM SERPL-SCNC: 3.5 MMOL/L (ref 3.5–5.1)
RBC # BLD AUTO: 3.25 M/UL (ref 4.05–5.2)
SERVICE CMNT-IMP: ABNORMAL
SERVICE CMNT-IMP: NORMAL
SODIUM SERPL-SCNC: 135 MMOL/L (ref 136–146)
WBC # BLD AUTO: 5.8 K/UL (ref 4.3–11.1)

## 2024-04-19 PROCEDURE — 2500000003 HC RX 250 WO HCPCS: Performed by: INTERNAL MEDICINE

## 2024-04-19 PROCEDURE — G0378 HOSPITAL OBSERVATION PER HR: HCPCS

## 2024-04-19 PROCEDURE — 90792 PSYCH DIAG EVAL W/MED SRVCS: CPT | Performed by: NURSE PRACTITIONER

## 2024-04-19 PROCEDURE — 82962 GLUCOSE BLOOD TEST: CPT

## 2024-04-19 PROCEDURE — 96376 TX/PRO/DX INJ SAME DRUG ADON: CPT

## 2024-04-19 PROCEDURE — 36415 COLL VENOUS BLD VENIPUNCTURE: CPT

## 2024-04-19 PROCEDURE — 80048 BASIC METABOLIC PNL TOTAL CA: CPT

## 2024-04-19 PROCEDURE — 6360000002 HC RX W HCPCS: Performed by: INTERNAL MEDICINE

## 2024-04-19 PROCEDURE — 2580000003 HC RX 258: Performed by: FAMILY MEDICINE

## 2024-04-19 PROCEDURE — 1100000000 HC RM PRIVATE

## 2024-04-19 PROCEDURE — 6370000000 HC RX 637 (ALT 250 FOR IP): Performed by: FAMILY MEDICINE

## 2024-04-19 PROCEDURE — 85025 COMPLETE CBC W/AUTO DIFF WBC: CPT

## 2024-04-19 PROCEDURE — 83735 ASSAY OF MAGNESIUM: CPT

## 2024-04-19 PROCEDURE — 6370000000 HC RX 637 (ALT 250 FOR IP): Performed by: INTERNAL MEDICINE

## 2024-04-19 RX ORDER — POTASSIUM CHLORIDE 20 MEQ/1
40 TABLET, EXTENDED RELEASE ORAL ONCE
Status: COMPLETED | OUTPATIENT
Start: 2024-04-19 | End: 2024-04-19

## 2024-04-19 RX ORDER — MAGNESIUM SULFATE IN WATER 40 MG/ML
2000 INJECTION, SOLUTION INTRAVENOUS ONCE
Status: COMPLETED | OUTPATIENT
Start: 2024-04-19 | End: 2024-04-19

## 2024-04-19 RX ORDER — HYDROMORPHONE HYDROCHLORIDE 1 MG/ML
0.5 INJECTION, SOLUTION INTRAMUSCULAR; INTRAVENOUS; SUBCUTANEOUS ONCE
Status: COMPLETED | OUTPATIENT
Start: 2024-04-19 | End: 2024-04-19

## 2024-04-19 RX ORDER — MINERAL OIL/HYDROPHIL PETROLAT
OINTMENT (GRAM) TOPICAL 2 TIMES DAILY
Status: DISCONTINUED | OUTPATIENT
Start: 2024-04-19 | End: 2024-04-24 | Stop reason: HOSPADM

## 2024-04-19 RX ADMIN — DOXYCYCLINE HYCLATE 100 MG: 100 CAPSULE ORAL at 09:19

## 2024-04-19 RX ADMIN — DULOXETINE HYDROCHLORIDE 60 MG: 60 CAPSULE, DELAYED RELEASE ORAL at 09:19

## 2024-04-19 RX ADMIN — PREGABALIN 200 MG: 100 CAPSULE ORAL at 19:56

## 2024-04-19 RX ADMIN — CEFDINIR 300 MG: 300 CAPSULE ORAL at 09:16

## 2024-04-19 RX ADMIN — MONTELUKAST 10 MG: 10 TABLET, FILM COATED ORAL at 09:22

## 2024-04-19 RX ADMIN — CETIRIZINE HYDROCHLORIDE 10 MG: 10 TABLET, FILM COATED ORAL at 09:16

## 2024-04-19 RX ADMIN — HYDROMORPHONE HYDROCHLORIDE 0.5 MG: 1 INJECTION, SOLUTION INTRAMUSCULAR; INTRAVENOUS; SUBCUTANEOUS at 11:35

## 2024-04-19 RX ADMIN — HYDROMORPHONE HYDROCHLORIDE 0.5 MG: 1 INJECTION, SOLUTION INTRAMUSCULAR; INTRAVENOUS; SUBCUTANEOUS at 19:57

## 2024-04-19 RX ADMIN — PANTOPRAZOLE SODIUM 40 MG: 40 TABLET, DELAYED RELEASE ORAL at 05:10

## 2024-04-19 RX ADMIN — FLUTICASONE PROPIONATE 2 SPRAY: 50 SPRAY, METERED NASAL at 09:26

## 2024-04-19 RX ADMIN — METOPROLOL SUCCINATE 25 MG: 25 TABLET, EXTENDED RELEASE ORAL at 09:22

## 2024-04-19 RX ADMIN — PREGABALIN 200 MG: 100 CAPSULE ORAL at 14:40

## 2024-04-19 RX ADMIN — SODIUM CHLORIDE: 9 INJECTION, SOLUTION INTRAVENOUS at 13:01

## 2024-04-19 RX ADMIN — DOCUSATE SODIUM 100 MG: 100 CAPSULE, LIQUID FILLED ORAL at 09:17

## 2024-04-19 RX ADMIN — HYDROXYZINE HYDROCHLORIDE 25 MG: 25 TABLET, FILM COATED ORAL at 19:56

## 2024-04-19 RX ADMIN — SODIUM CHLORIDE, PRESERVATIVE FREE 20 ML: 5 INJECTION INTRAVENOUS at 09:23

## 2024-04-19 RX ADMIN — Medication: at 20:02

## 2024-04-19 RX ADMIN — POTASSIUM CHLORIDE 40 MEQ: 1500 TABLET, EXTENDED RELEASE ORAL at 12:58

## 2024-04-19 RX ADMIN — LOSARTAN POTASSIUM 50 MG: 50 TABLET, FILM COATED ORAL at 09:22

## 2024-04-19 RX ADMIN — SODIUM CHLORIDE, PRESERVATIVE FREE 10 ML: 5 INJECTION INTRAVENOUS at 19:56

## 2024-04-19 RX ADMIN — HYDROMORPHONE HYDROCHLORIDE 0.5 MG: 1 INJECTION, SOLUTION INTRAMUSCULAR; INTRAVENOUS; SUBCUTANEOUS at 09:31

## 2024-04-19 RX ADMIN — DOCUSATE SODIUM 100 MG: 100 CAPSULE, LIQUID FILLED ORAL at 19:56

## 2024-04-19 RX ADMIN — MAGNESIUM SULFATE HEPTAHYDRATE 2000 MG: 40 INJECTION, SOLUTION INTRAVENOUS at 13:03

## 2024-04-19 RX ADMIN — ASPIRIN 81 MG 81 MG: 81 TABLET ORAL at 09:16

## 2024-04-19 RX ADMIN — PREGABALIN 200 MG: 100 CAPSULE ORAL at 09:23

## 2024-04-19 ASSESSMENT — PAIN DESCRIPTION - DESCRIPTORS
DESCRIPTORS: BURNING

## 2024-04-19 ASSESSMENT — PAIN SCALES - GENERAL
PAINLEVEL_OUTOF10: 10
PAINLEVEL_OUTOF10: 0
PAINLEVEL_OUTOF10: 5
PAINLEVEL_OUTOF10: 4
PAINLEVEL_OUTOF10: 10
PAINLEVEL_OUTOF10: 10

## 2024-04-19 ASSESSMENT — PAIN DESCRIPTION - ORIENTATION
ORIENTATION: LEFT;RIGHT
ORIENTATION: LEFT;RIGHT
ORIENTATION: RIGHT;LEFT
ORIENTATION: RIGHT;LEFT

## 2024-04-19 ASSESSMENT — PAIN DESCRIPTION - FREQUENCY
FREQUENCY: INTERMITTENT
FREQUENCY: CONTINUOUS

## 2024-04-19 ASSESSMENT — PAIN DESCRIPTION - LOCATION
LOCATION: LEG

## 2024-04-19 ASSESSMENT — PAIN DESCRIPTION - PAIN TYPE
TYPE: CHRONIC PAIN
TYPE: CHRONIC PAIN

## 2024-04-19 NOTE — PROGRESS NOTES
4 Eyes Skin Assessment     NAME:  Lo Tirado  YOB: 1975  MEDICAL RECORD NUMBER:  [id number]    The patient is being assessed for  Admission    I agree that at least one RN has performed a thorough Head to Toe Skin Assessment on the patient. ALL assessment sites listed below have been assessed.      Areas assessed by both nurses:    Head, Face, Ears, Shoulders, Back, Chest, Arms, Elbows, Hands, Sacrum. Buttock, Coccyx, Ischium, and Legs. Feet and Heels        Does the Patient have a Wound? No noted wound(s)       Gilbert Prevention initiated by RN: No  Wound Care Orders initiated by RN: No    Pressure Injury (Stage 3,4, Unstageable, DTI, NWPT, and Complex wounds) if present, place Wound referral order by RN under : No    New Ostomies, if present place, Ostomy referral order under : No     Nurse 1 eSignature: Electronically signed by GORDON HERNANDEZ RN on 4/19/24 at 5:32 AM EDT    **SHARE this note so that the co-signing nurse can place an eSignature**    Nurse 2 eSignature: Electronically signed by Zohra Stephen RN on 4/19/24 at 6:11 AM EDT

## 2024-04-19 NOTE — PROGRESS NOTES
END OF SHIFT NOTE:    INTAKE/OUTPUT  04/18 0701 - 04/19 0700  In: 120 [P.O.:120]  Out: 1850 [Urine:1850]  Voiding: Yes  Catheter: No  Drain:   External Urinary Catheter (Active)   Site Assessment Clean,dry & intact 04/18/24 1945   Placement Replaced 04/18/24 1558   Securement Method Other (Comment) 04/18/24 1911   Catheter Care Catheter/Wick replaced 04/18/24 1558   Perineal Care Yes 04/18/24 1945   Suction 40 mmgHg continuous 04/18/24 1945   Urine Color Airam 04/18/24 2138   Urine Appearance Clear 04/18/24 2138   Output (mL) 400 mL 04/19/24 0403               Flatus: Patient does have flatus present.    Stool:  occurrences.    Characteristics:           Stool Assessment  Last BM (including prior to admit): 04/16/24 (per patient)    Emesis:  occurrences.    Characteristics:        VITAL SIGNS  Patient Vitals for the past 12 hrs:   Temp Pulse Resp BP SpO2   04/19/24 0403 98.6 °F (37 °C) 91 18 106/67 100 %   04/19/24 0339 -- 95 -- -- --   04/18/24 2344 -- (!) 104 -- -- --   04/18/24 2257 99.3 °F (37.4 °C) (!) 111 20 121/63 93 %   04/18/24 2040 98.7 °F (37.1 °C) (!) 118 16 (!) 116/57 95 %   04/18/24 1911 99.9 °F (37.7 °C) (!) 118 18 (!) 104/57 91 %       Pain Assessment  Pain Level: 0 (04/18/24 1945)  Pain Location: Leg  Patient's Stated Pain Goal:  (patient drowsy, states a 12/10 pain and falls asleep immediately)    Ambulating  No    Shift report to be given to oncoming nurse at the bedside.    Zohra Stephen RN

## 2024-04-19 NOTE — WOUND CARE
Patient has had a great deal of pain today and is asking for more frequent dressing changes She also is asking for xeroform to be discontinued and asks for ointment over the wound instead of dressings under the abd/ wraps. She has been told many times that leaving the compression is place is the best to help her legs, she understands however cannot tolerate the pain. Orders updated to match her wishes.

## 2024-04-19 NOTE — PROGRESS NOTES
8.5 (L) 11.7 - 15.4 g/dL    Hematocrit 27.7 (L) 35.8 - 46.3 %    MCV 85.2 82 - 102 FL    MCH 26.2 26.1 - 32.9 PG    MCHC 30.7 (L) 31.4 - 35.0 g/dL    RDW 17.0 (H) 11.9 - 14.6 %    Platelets 405 150 - 450 K/uL    MPV 9.3 (L) 9.4 - 12.3 FL    nRBC 0.00 0.0 - 0.2 K/uL    Differential Type AUTOMATED      Neutrophils % 53 43 - 78 %    Lymphocytes % 25 13 - 44 %    Monocytes % 16 (H) 4.0 - 12.0 %    Eosinophils % 5 0.5 - 7.8 %    Basophils % 1 0.0 - 2.0 %    Immature Granulocytes % 0 0.0 - 5.0 %    Neutrophils Absolute 3.1 1.7 - 8.2 K/UL    Lymphocytes Absolute 1.4 0.5 - 4.6 K/UL    Monocytes Absolute 1.0 0.1 - 1.3 K/UL    Eosinophils Absolute 0.3 0.0 - 0.8 K/UL    Basophils Absolute 0.0 0.0 - 0.2 K/UL    Immature Granulocytes Absolute 0.0 0.0 - 0.5 K/UL   Magnesium    Collection Time: 04/19/24  3:19 AM   Result Value Ref Range    Magnesium 1.7 (L) 1.8 - 2.4 mg/dL   POCT Glucose    Collection Time: 04/19/24  7:22 AM   Result Value Ref Range    POC Glucose 100 65 - 100 mg/dL    Performed by: Panchito    POCT Glucose    Collection Time: 04/19/24 11:37 AM   Result Value Ref Range    POC Glucose 103 (H) 65 - 100 mg/dL    Performed by: ArnieNMartaxel        No results for input(s): \"COVID19\" in the last 72 hours.    Current Meds:  Current Facility-Administered Medications   Medication Dose Route Frequency    fluticasone (FLONASE) 50 MCG/ACT nasal spray 2 spray  2 spray Nasal Daily    albuterol sulfate HFA (PROVENTIL;VENTOLIN;PROAIR) 108 (90 Base) MCG/ACT inhaler 2 puff  2 puff Inhalation Q6H PRN    docusate sodium (COLACE) capsule 100 mg  100 mg Oral BID    DULoxetine (CYMBALTA) extended release capsule 60 mg  60 mg Oral Daily    losartan (COZAAR) tablet 50 mg  50 mg Oral Daily    montelukast (SINGULAIR) tablet 10 mg  10 mg Oral Daily    baclofen (LIORESAL) tablet 5 mg  5 mg Oral Q8H PRN    aspirin chewable tablet 81 mg  81 mg Oral Daily    hydrOXYzine HCl (ATARAX) tablet 25 mg  25 mg Oral Nightly    pregabalin (LYRICA)  capsule 200 mg  200 mg Oral TID    metoprolol succinate (TOPROL XL) extended release tablet 25 mg  25 mg Oral Daily    furosemide (LASIX) tablet 40 mg  40 mg Oral Every Other Day    pantoprazole (PROTONIX) tablet 40 mg  40 mg Oral QAM AC    cetirizine (ZYRTEC) tablet 10 mg  10 mg Oral Daily    sodium chloride flush 0.9 % injection 5-40 mL  5-40 mL IntraVENous 2 times per day    sodium chloride flush 0.9 % injection 5-40 mL  5-40 mL IntraVENous PRN    0.9 % sodium chloride infusion   IntraVENous PRN    potassium chloride (KLOR-CON M) extended release tablet 40 mEq  40 mEq Oral PRN    Or    potassium bicarb-citric acid (EFFER-K) effervescent tablet 40 mEq  40 mEq Oral PRN    Or    potassium chloride 10 mEq/100 mL IVPB (Peripheral Line)  10 mEq IntraVENous PRN    magnesium sulfate 2000 mg in 50 mL IVPB premix  2,000 mg IntraVENous PRN    enoxaparin (LOVENOX) injection 40 mg  40 mg SubCUTAneous BID    ondansetron (ZOFRAN-ODT) disintegrating tablet 4 mg  4 mg Oral Q8H PRN    Or    ondansetron (ZOFRAN) injection 4 mg  4 mg IntraVENous Q6H PRN    polyethylene glycol (GLYCOLAX) packet 17 g  17 g Oral Daily PRN    acetaminophen (TYLENOL) tablet 650 mg  650 mg Oral Q6H PRN    Or    acetaminophen (TYLENOL) suppository 650 mg  650 mg Rectal Q6H PRN    HYDROmorphone HCl PF (DILAUDID) injection 0.5 mg  0.5 mg IntraVENous Q4H PRN    glucose chewable tablet 16 g  4 tablet Oral PRN    dextrose bolus 10% 125 mL  125 mL IntraVENous PRN    Or    dextrose bolus 10% 250 mL  250 mL IntraVENous PRN    Glucagon Emergency KIT 1 mg  1 mg SubCUTAneous PRN    dextrose 10 % infusion   IntraVENous Continuous PRN    insulin lispro (HUMALOG) injection vial 0-4 Units  0-4 Units SubCUTAneous TID WC    insulin lispro (HUMALOG) injection vial 0-4 Units  0-4 Units SubCUTAneous Nightly    mineral oil-hydrophilic petrolatum (AQUAPHOR) ointment   Topical Daily       Signed:  Desiree Morris MD    Part of this note may have been written by using a voice

## 2024-04-19 NOTE — CONSULTS
PSYCHIATRIC EVALUATION    Date of Service: 4/19/2024    Purpose:  Psychiatric Diagnostic Evaluation  Referral Source: Desiree Morris MD   History  From: patient and patient chart      Chief Complaint:  anxiety, statements of wanting to die         4- - [facility] note - Chart review completed.  To room to meet pt - along with Candice Culver PMHNP.  No family at bedside for collateral.  Pt in bed, smiling on entry to room - noted to get mildly irritable at times during conversation - but remains cooperative.  Reviewed noted psychiatric, medical, social, family and substance use hx (see below).    Psychiatric consult complete. Chart was reviewed and patient was seen. The patient denies current/active suicidal ideation (no intent or plan) - endorses statement made in frustration - r/t current condition and ongoing pain.  Pt denies current/active homicidal ideation (no intent or plan).  Pt denies AVH, and does not present as acutely psychotic or RTIS.  Pt states appetite and sleep are stable.  Pt agreeable to outpt therapy/counseling for trauma hx and coping skills for current stressors, pain, decline in mobility. Lo Tirado is able to contract for safety and is future oriented in conversation. At this time, patient does NOT meet criteria for an inpatient psychiatric admission and can follow up outpt when medically cleared.     ----------------------------------------------------------------------------------------------------------------------------------------------------------------------------    History of Present Illness:  Lo Tirado is a 48 y.o. female with a history significant for anxiety, bipolar, noncompliance with medical treatment.  Pt has a PMH that includes:  HTN, lymphedema of both extremities, DM2, morbid obesity, osteoarthritis of hip, GERD, cellulitis, neuropathy, gout, hx TIA, vertigo.    Pt presented to the ED on 4-, c/o:    Triage note - Pt brought in by EMS from home  thoughts of self harm, she declines saying she can't deal with me, asking for different doctor who will help  Denies seeing prior psychiatry   Nothing helps pain, burning in legs    CM note - Chart reviewed by RNCM  Patient admitted with left leg ulcer.   Patient complaining of severe pain   ID consulted, no current evidence of cellulitis. Antibiotics discontinued  History of lymphedema. Per documentation, patient non-compliant with treatment  PT/OT consulted, awaiting recommendations.  CM following.    OT note - Attempted to see patient this AM for occupational therapy evaluation session. RN advised holding therapies today as patient is wanting more pain medication and very upset. Will follow and re-attempt as schedule permits/patient available. Thank you,     ------------------------------------------------------------------------------------------------------------------------------------------------------------------------------------------------------    Chart Review:    1- - ED - Patient is single, lives with disabled father, 13 year old disabled son and 10 year old daughter. 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 She reports a lot of stress, including her 16 year old daughter living in [place] with an aunt due to some DSS issue. She admits to being non-compliant with meds and diet in part due to stress.    CLINDAMYCIN (CLEOCIN) 300 MG CAPSULE Take 1 Cap by mouth three (3) times daily for 10 days.   Continue Taking   AMITRIPTYLINE (ELAVIL) 150 MG TABLET Take by mouth nightly.   BUPROPION (WELLBUTRIN) 100 MG TABLET Take 300 mg by mouth daily.   BUPROPION SR (WELLBUTRIN SR) 150 MG SR TABLET Take 150 mg by mouth two (2) times a day. 2 tabs   DEPAKOTE PO take by mouth.   GABAPENTIN (NEURONTIN) 300 MG CAPSULE Take 300 mg by mouth three (3) times daily.   KLONOPIN PO take by mouth.   MELOXICAM (MOBIC) 7.5 MG TABLET Take 15 mg by mouth daily.

## 2024-04-19 NOTE — BH NOTE
PSYCHIATRIC EVALUATION     Date of Service: 4/19/2024     Purpose:  Psychiatric Diagnostic Evaluation  Referral Source: Desiree Morris MD   History  From: patient and patient chart        Chief Complaint:  anxiety, statements of wanting to die       History of Present Illness:  Lo Tirado is a 48 y.o. female with a history significant for anxiety, bipolar, noncompliance with medical treatment - with noted hx of mandatory anger mgmt classes, suicide attempt x1 (as a teenager) and inpt psychiatric admissions (\"years ago\").    Pt has a PMH that includes:  HTN, lymphedema of both extremities, DM2, morbid obesity, osteoarthritis of hip, GERD, cellulitis, neuropathy, gout, hx TIA, vertigo.    4- - Psychiatric consult complete. Chart was reviewed and patient was seen. The patient denies current/active suicidal ideation (no intent or plan) - endorses statement made in frustration - r/t current condition and ongoing pain.  Pt denies current/active homicidal ideation (no intent or plan).  Pt denies AVH, and does not present as acutely psychotic or RTIS.  Pt states appetite and sleep are stable.  Pt agreeable to outpt therapy/counseling for trauma hx and coping skills for current stressors, pain, decline in mobility. Lo Tirado is able to contract for safety and is future oriented in conversation. At this time, patient does NOT meet criteria for an inpatient psychiatric admission and can follow up outpt when medically cleared.      Full psychiatric consult note to follow.     Plan:  - Pt agreeable to possible outpt therapy/counseling - provided pt with list of therapist  - No medication recommendations at this time      Thank you for consult. Please do not hesitate to contact provider if there are additional questions regarding patient.      Heather Perez PMHNP-BC  4/19/2024  [facility]

## 2024-04-19 NOTE — PROGRESS NOTES
Attempted to see patient this AM for occupational therapy evaluation session. RN advised holding therapies today as patient is wanting more pain medication and very upset. Will follow and re-attempt as schedule permits/patient available. Thank you,    Lakisha David, OT    Rehab Caseload Tracker

## 2024-04-19 NOTE — PROGRESS NOTES
Patient admitted last night for pain control and BLE open wounds. Patient heart rate slowly increasing throughout the day. currently :118pbm. Patient also currently has soft BP's. Patient is drowsy but arousable yet falls back alseep soon after. Patient denies CP and SOB. Hospitalist notified. Patient placed on Telemetry for 48 hours.

## 2024-04-19 NOTE — PROGRESS NOTES
Physical Therapy Note:    Attempted to see patient this AM for physical therapy evaluation session. Patient RN advised holding therapies today as patient is wanting more pain medication and very upset. Will follow and re-attempt as schedule permits/patient available. Thank you,    LEON Mcqueen, PT     Rehab Caseload Tracker

## 2024-04-19 NOTE — CARE COORDINATION
Chart reviewed by RNNATALIIA    Patient admitted with left leg ulcer.    Patient complaining of severe pain    ID consulted, no current evidence of cellulitis. Antibiotics discontinued    History of lymphedema. Per documentation, patient non-compliant with treatment    PT/OT consulted, awaiting recommendations.    CM following.

## 2024-04-20 LAB
ANION GAP SERPL CALC-SCNC: 3 MMOL/L (ref 2–11)
BASOPHILS # BLD: 0 K/UL (ref 0–0.2)
BASOPHILS NFR BLD: 1 % (ref 0–2)
BUN SERPL-MCNC: 19 MG/DL (ref 6–23)
CALCIUM SERPL-MCNC: 8.8 MG/DL (ref 8.3–10.4)
CHLORIDE SERPL-SCNC: 103 MMOL/L (ref 103–113)
CO2 SERPL-SCNC: 31 MMOL/L (ref 21–32)
CREAT SERPL-MCNC: 0.9 MG/DL (ref 0.6–1)
DIFFERENTIAL METHOD BLD: ABNORMAL
EOSINOPHIL # BLD: 0.5 K/UL (ref 0–0.8)
EOSINOPHIL NFR BLD: 9 % (ref 0.5–7.8)
ERYTHROCYTE [DISTWIDTH] IN BLOOD BY AUTOMATED COUNT: 17.1 % (ref 11.9–14.6)
FERRITIN SERPL-MCNC: 94 NG/ML (ref 8–388)
FOLATE SERPL-MCNC: 7.5 NG/ML (ref 3.1–17.5)
GLUCOSE BLD STRIP.AUTO-MCNC: 108 MG/DL (ref 65–100)
GLUCOSE BLD STRIP.AUTO-MCNC: 114 MG/DL (ref 65–100)
GLUCOSE BLD STRIP.AUTO-MCNC: 124 MG/DL (ref 65–100)
GLUCOSE BLD STRIP.AUTO-MCNC: 128 MG/DL (ref 65–100)
GLUCOSE SERPL-MCNC: 111 MG/DL (ref 65–100)
HCT VFR BLD AUTO: 26.1 % (ref 35.8–46.3)
HGB BLD-MCNC: 7.9 G/DL (ref 11.7–15.4)
IMM GRANULOCYTES # BLD AUTO: 0 K/UL (ref 0–0.5)
IMM GRANULOCYTES NFR BLD AUTO: 0 % (ref 0–5)
IRON SATN MFR SERPL: 6 %
IRON SERPL-MCNC: 18 UG/DL (ref 35–150)
LYMPHOCYTES # BLD: 1.6 K/UL (ref 0.5–4.6)
LYMPHOCYTES NFR BLD: 27 % (ref 13–44)
MCH RBC QN AUTO: 25.9 PG (ref 26.1–32.9)
MCHC RBC AUTO-ENTMCNC: 30.3 G/DL (ref 31.4–35)
MCV RBC AUTO: 85.6 FL (ref 82–102)
MONOCYTES # BLD: 0.8 K/UL (ref 0.1–1.3)
MONOCYTES NFR BLD: 14 % (ref 4–12)
NEUTS SEG # BLD: 2.9 K/UL (ref 1.7–8.2)
NEUTS SEG NFR BLD: 49 % (ref 43–78)
NRBC # BLD: 0 K/UL (ref 0–0.2)
PLATELET # BLD AUTO: 372 K/UL (ref 150–450)
PMV BLD AUTO: 8.8 FL (ref 9.4–12.3)
POTASSIUM SERPL-SCNC: 3.8 MMOL/L (ref 3.5–5.1)
RBC # BLD AUTO: 3.05 M/UL (ref 4.05–5.2)
SERVICE CMNT-IMP: ABNORMAL
SODIUM SERPL-SCNC: 137 MMOL/L (ref 136–146)
TIBC SERPL-MCNC: 298 UG/DL (ref 250–450)
VIT B12 SERPL-MCNC: 637 PG/ML (ref 193–986)
WBC # BLD AUTO: 5.9 K/UL (ref 4.3–11.1)

## 2024-04-20 PROCEDURE — 82607 VITAMIN B-12: CPT

## 2024-04-20 PROCEDURE — 82746 ASSAY OF FOLIC ACID SERUM: CPT

## 2024-04-20 PROCEDURE — 82728 ASSAY OF FERRITIN: CPT

## 2024-04-20 PROCEDURE — 2500000003 HC RX 250 WO HCPCS: Performed by: INTERNAL MEDICINE

## 2024-04-20 PROCEDURE — 6370000000 HC RX 637 (ALT 250 FOR IP): Performed by: FAMILY MEDICINE

## 2024-04-20 PROCEDURE — 82962 GLUCOSE BLOOD TEST: CPT

## 2024-04-20 PROCEDURE — 97166 OT EVAL MOD COMPLEX 45 MIN: CPT

## 2024-04-20 PROCEDURE — 85025 COMPLETE CBC W/AUTO DIFF WBC: CPT

## 2024-04-20 PROCEDURE — 6370000000 HC RX 637 (ALT 250 FOR IP): Performed by: STUDENT IN AN ORGANIZED HEALTH CARE EDUCATION/TRAINING PROGRAM

## 2024-04-20 PROCEDURE — 97162 PT EVAL MOD COMPLEX 30 MIN: CPT

## 2024-04-20 PROCEDURE — 83540 ASSAY OF IRON: CPT

## 2024-04-20 PROCEDURE — 83550 IRON BINDING TEST: CPT

## 2024-04-20 PROCEDURE — 1100000000 HC RM PRIVATE

## 2024-04-20 PROCEDURE — 36415 COLL VENOUS BLD VENIPUNCTURE: CPT

## 2024-04-20 PROCEDURE — 6360000002 HC RX W HCPCS: Performed by: HOSPITALIST

## 2024-04-20 PROCEDURE — 80048 BASIC METABOLIC PNL TOTAL CA: CPT

## 2024-04-20 PROCEDURE — 2500000003 HC RX 250 WO HCPCS: Performed by: STUDENT IN AN ORGANIZED HEALTH CARE EDUCATION/TRAINING PROGRAM

## 2024-04-20 PROCEDURE — 97112 NEUROMUSCULAR REEDUCATION: CPT

## 2024-04-20 PROCEDURE — 97530 THERAPEUTIC ACTIVITIES: CPT

## 2024-04-20 PROCEDURE — 2580000003 HC RX 258: Performed by: FAMILY MEDICINE

## 2024-04-20 RX ORDER — NALOXONE HYDROCHLORIDE 4 MG/.1ML
1 SPRAY NASAL PRN
Qty: 1 EACH | Refills: 1 | Status: SHIPPED | OUTPATIENT
Start: 2024-04-20

## 2024-04-20 RX ORDER — BACLOFEN 10 MG/1
10 TABLET ORAL EVERY 8 HOURS PRN
Status: DISCONTINUED | OUTPATIENT
Start: 2024-04-20 | End: 2024-04-20

## 2024-04-20 RX ORDER — LIDOCAINE AND PRILOCAINE 25; 25 MG/G; MG/G
CREAM TOPICAL PRN
Status: DISCONTINUED | OUTPATIENT
Start: 2024-04-20 | End: 2024-04-24 | Stop reason: HOSPADM

## 2024-04-20 RX ORDER — FUROSEMIDE 10 MG/ML
60 INJECTION INTRAMUSCULAR; INTRAVENOUS ONCE
Status: COMPLETED | OUTPATIENT
Start: 2024-04-20 | End: 2024-04-20

## 2024-04-20 RX ORDER — BACLOFEN 10 MG/1
10 TABLET ORAL 3 TIMES DAILY
Status: DISCONTINUED | OUTPATIENT
Start: 2024-04-20 | End: 2024-04-24 | Stop reason: HOSPADM

## 2024-04-20 RX ORDER — HYDROMORPHONE HYDROCHLORIDE 1 MG/ML
0.25 INJECTION, SOLUTION INTRAMUSCULAR; INTRAVENOUS; SUBCUTANEOUS EVERY 4 HOURS PRN
Status: DISCONTINUED | OUTPATIENT
Start: 2024-04-20 | End: 2024-04-21

## 2024-04-20 RX ORDER — OXYCODONE HYDROCHLORIDE 5 MG/1
5 TABLET ORAL EVERY 4 HOURS PRN
Status: DISCONTINUED | OUTPATIENT
Start: 2024-04-20 | End: 2024-04-23

## 2024-04-20 RX ADMIN — PANTOPRAZOLE SODIUM 40 MG: 40 TABLET, DELAYED RELEASE ORAL at 05:37

## 2024-04-20 RX ADMIN — Medication: at 20:03

## 2024-04-20 RX ADMIN — HYDROMORPHONE HYDROCHLORIDE 0.25 MG: 1 INJECTION, SOLUTION INTRAMUSCULAR; INTRAVENOUS; SUBCUTANEOUS at 12:51

## 2024-04-20 RX ADMIN — HYDROMORPHONE HYDROCHLORIDE 0.25 MG: 1 INJECTION, SOLUTION INTRAMUSCULAR; INTRAVENOUS; SUBCUTANEOUS at 23:28

## 2024-04-20 RX ADMIN — PREGABALIN 200 MG: 100 CAPSULE ORAL at 21:17

## 2024-04-20 RX ADMIN — DOCUSATE SODIUM 100 MG: 100 CAPSULE, LIQUID FILLED ORAL at 20:01

## 2024-04-20 RX ADMIN — PREGABALIN 200 MG: 100 CAPSULE ORAL at 13:43

## 2024-04-20 RX ADMIN — OXYCODONE 5 MG: 5 TABLET ORAL at 15:45

## 2024-04-20 RX ADMIN — BACLOFEN 10 MG: 10 TABLET ORAL at 21:17

## 2024-04-20 RX ADMIN — BACLOFEN 10 MG: 10 TABLET ORAL at 13:42

## 2024-04-20 RX ADMIN — HYDROMORPHONE HYDROCHLORIDE 0.5 MG: 1 INJECTION, SOLUTION INTRAMUSCULAR; INTRAVENOUS; SUBCUTANEOUS at 00:01

## 2024-04-20 RX ADMIN — FUROSEMIDE 60 MG: 10 INJECTION, SOLUTION INTRAMUSCULAR; INTRAVENOUS at 15:46

## 2024-04-20 RX ADMIN — OXYCODONE 5 MG: 5 TABLET ORAL at 20:01

## 2024-04-20 RX ADMIN — SODIUM CHLORIDE, PRESERVATIVE FREE 10 ML: 5 INJECTION INTRAVENOUS at 09:13

## 2024-04-20 RX ADMIN — HYDROMORPHONE HYDROCHLORIDE 0.5 MG: 1 INJECTION, SOLUTION INTRAMUSCULAR; INTRAVENOUS; SUBCUTANEOUS at 08:59

## 2024-04-20 RX ADMIN — BACLOFEN 10 MG: 10 TABLET ORAL at 01:44

## 2024-04-20 RX ADMIN — HYDROXYZINE HYDROCHLORIDE 25 MG: 25 TABLET, FILM COATED ORAL at 20:00

## 2024-04-20 RX ADMIN — SODIUM CHLORIDE, PRESERVATIVE FREE 10 ML: 5 INJECTION INTRAVENOUS at 20:03

## 2024-04-20 RX ADMIN — HYDROMORPHONE HYDROCHLORIDE 0.25 MG: 1 INJECTION, SOLUTION INTRAMUSCULAR; INTRAVENOUS; SUBCUTANEOUS at 17:16

## 2024-04-20 ASSESSMENT — PAIN - FUNCTIONAL ASSESSMENT
PAIN_FUNCTIONAL_ASSESSMENT: PREVENTS OR INTERFERES SOME ACTIVE ACTIVITIES AND ADLS
PAIN_FUNCTIONAL_ASSESSMENT: ACTIVITIES ARE NOT PREVENTED
PAIN_FUNCTIONAL_ASSESSMENT: ACTIVITIES ARE NOT PREVENTED
PAIN_FUNCTIONAL_ASSESSMENT: PREVENTS OR INTERFERES SOME ACTIVE ACTIVITIES AND ADLS

## 2024-04-20 ASSESSMENT — PAIN SCALES - GENERAL
PAINLEVEL_OUTOF10: 10
PAINLEVEL_OUTOF10: 6
PAINLEVEL_OUTOF10: 5
PAINLEVEL_OUTOF10: 5
PAINLEVEL_OUTOF10: 9
PAINLEVEL_OUTOF10: 5
PAINLEVEL_OUTOF10: 10
PAINLEVEL_OUTOF10: 6
PAINLEVEL_OUTOF10: 10
PAINLEVEL_OUTOF10: 6
PAINLEVEL_OUTOF10: 10
PAINLEVEL_OUTOF10: 6
PAINLEVEL_OUTOF10: 10
PAINLEVEL_OUTOF10: 6
PAINLEVEL_OUTOF10: 10
PAINLEVEL_OUTOF10: 0
PAINLEVEL_OUTOF10: 5

## 2024-04-20 ASSESSMENT — PAIN DESCRIPTION - DESCRIPTORS
DESCRIPTORS: BURNING
DESCRIPTORS: CRAMPING;SORE
DESCRIPTORS: BURNING

## 2024-04-20 ASSESSMENT — PAIN DESCRIPTION - PAIN TYPE
TYPE: CHRONIC PAIN

## 2024-04-20 ASSESSMENT — PAIN DESCRIPTION - LOCATION
LOCATION: LEG

## 2024-04-20 ASSESSMENT — PAIN DESCRIPTION - ORIENTATION
ORIENTATION: RIGHT;LEFT
ORIENTATION: RIGHT;LEFT;LOWER

## 2024-04-20 ASSESSMENT — PAIN DESCRIPTION - FREQUENCY
FREQUENCY: CONTINUOUS

## 2024-04-20 NOTE — PROGRESS NOTES
Patient was calm upon entering room.  This is the first visit  had with this patient.  Patient began crying and said \" I am so weary this illness has been going on since August\"  She continued ' I am starting to hate everything and everybody\"    She went on to say \" this is not who I am normally - this constant pain is consuming me and I think people are judging me wrong(laughing, smiling, gossiping, not listening to what I say).     acknowledged her feelings.  Assured her that we want to help her and see her  thru the Lord's eyes and heart.    Assured her that we want to be an ' advocate\" for her in the healing process.    [facility] w[facility] evident -  prayer offered.    Will attempt to see pt regularly during the remainder of her stay.         will follow up with staff to gain their insight on how we can help this patient.

## 2024-04-20 NOTE — PROGRESS NOTES
(4958) patient with BLE dressings intact. C/o burning pain to BLE. Administered Dilaudid 0.5 mg IVP per PRN order. Requesting dressings to be removed.    (5285) Attempted to remove dressings, however, patient screaming in pain, unable to tolerate. Notified Hospitalist, Dr. Hou. MD said will come see patient.    (9005) order received for one time dose of Dilaudid 0.5 mg IV for dressing change. Administered per order.    (5760) Spoke with Wound NurseGemma re: patient unable to tolerate dressing change. New dressing change orders. Shallow, open pink wounds with small amount of bleeding noted. Large amount of serous drainage on guaze. Cleansed with wound cleanser, applied Aquaphor ointment to wound beds and severo wounds, applied ABD pads, roll gauze, and ACE Wrap from toes to knees. Patient tolerated well.     Patient was apologetic for her behavior earlier and comfortable at present.

## 2024-04-20 NOTE — PROGRESS NOTES
Hospitalist Progress Note   Admit Date:  2024 10:07 PM   Name:  Lo Tirado   Age:  48 y.o.  Sex:  female  :  1975   MRN:  [id number]   Room:      Presenting/Chief Complaint: Leg Pain and Cellulitis     Reason(s) for Admission: Intractable pain [R52]  Bilateral lower leg cellulitis [L03.116, L03.115]  Leg ulcer, left, with fat layer exposed (HCC) [L97.922]  Lymphedema [I89.0]     Hospital Course:       Lo Tirado is a 48 y.o. female with medical history of chronic pain followed by pain management, neuropathy, lymphedema, cellulitis, BMI 56, HTN, DM2, CKD, bipolar disorder, admitted with LE pain in setting of lymphedema, ongoing leg wounds.  She was discharged to home 15 with similar complaints, s/p vancomycin, zosyn and changed to doxycycline/ omnicef for continued course. Discharged on norco but states this has not helped. Previously was on MS contin.  Is followed by pain management.        She was given ketamine/fentanyl by EMS And somnolent upon arrival.   Labs and vitals stable. BLE xrays negative for osteomyelitis, some soft tissue swelling on lower right leg. ID and wound care consulted. ID recommended discontinuing antibiotics.  Wound care recommends compression /and wound changes daily.   Admission complicated by behavioral disturbances. Seen by psychiatry with no evidence of SI/HI. No new medications recommended.     Subjective & 24hr Events:   Sitting up in bed working with PT this morning.  Was withdrawn and guarded but polite.     RN reported to me after I left, pt attempted to strike PT. When I returned to pt's room later in the day she stated that PT was laughing at her while she was in pain and she requested PT to leave the room and assumed she had left so when she was grabbed by PT from behind she struck out from fear.  Tells me  feels very tired and finds the staff and other doctors are laughing at her.       Assessment & Plan:     Principal Problem:    Leg  4 mg IntraVENous Q6H PRN    polyethylene glycol (GLYCOLAX) packet 17 g  17 g Oral Daily PRN    acetaminophen (TYLENOL) tablet 650 mg  650 mg Oral Q6H PRN    Or    acetaminophen (TYLENOL) suppository 650 mg  650 mg Rectal Q6H PRN    glucose chewable tablet 16 g  4 tablet Oral PRN    dextrose bolus 10% 125 mL  125 mL IntraVENous PRN    Or    dextrose bolus 10% 250 mL  250 mL IntraVENous PRN    Glucagon Emergency KIT 1 mg  1 mg SubCUTAneous PRN    dextrose 10 % infusion   IntraVENous Continuous PRN    insulin lispro (HUMALOG) injection vial 0-4 Units  0-4 Units SubCUTAneous TID WC    insulin lispro (HUMALOG) injection vial 0-4 Units  0-4 Units SubCUTAneous Nightly       Signed:  Regine Redman MD    Part of this note may have been written by using a voice dictation software.  The note has been proof read but may still contain some grammatical/other typographical errors.

## 2024-04-20 NOTE — PROGRESS NOTES
ACUTE OCCUPATIONAL THERAPY GOALS:   (Developed with and agreed upon by patient and/or caregiver.)  No goals set.     OCCUPATIONAL THERAPY Initial Assessment, Daily Note, and Discharge       OT Visit Days: 1  Acknowledge Orders  Time  OT Charge Capture  Rehab Caseload Tracker      Lo Tirado is a 48 y.o. female   PRIMARY DIAGNOSIS: Leg ulcer, left, with fat layer exposed (HCC)  Intractable pain [R52]  Bilateral lower leg cellulitis [L03.116, L03.115]  Leg ulcer, left, with fat layer exposed (HCC) [L97.922]  Lymphedema [I89.0]       Reason for Referral: Generalized Muscle Weakness (M62.81)  Difficulty in walking, Not elsewhere classified (R26.2)  Other abnormalities of gait and mobility (R26.89)  Inpatient: Payor: [employer] MEDICARE / Plan: [employer] GOLD PLUS HMO / Product Type: *No Product type* /     ASSESSMENT:     REHAB RECOMMENDATIONS:   Recommendation to date pending progress:  Setting:  Unable to be determined  due to non-participation    Equipment:     Unable to be determined     ASSESSMENT:  Ms. Tirdao is a 47 y/o female presents with BLE cellulitis, L leg ulcer. At baseline pt lives with her son/dtr, gets assistance at times for ADLs and uses a rollator for ambulation. Today pt presents with decreased activity tolerance, balance, strength and mobility impacting ADLs. Pt initially agreeable to evaluation with some encouragement. Pt overall mod A x2 for bed mobility and with good sitting balance edge of bed. Pt declined therapist donning socks as her feet were \"burning.\" Pt pain became increasingly worse edge of bed, pt yelling/crying out for nurse. Attempted to return pt to supine for her comfort, however pt became verbally aggressive, again calling out for nurse. Nurse present stating pt must lay down for pain medicine--attempted to assist pt to supine, pt screamed and yelled for therapist to leave the room, swatting/swinging hands at therapist. Once therapist left room, pt was returned to supine, assist

## 2024-04-20 NOTE — PROGRESS NOTES
Patient willingly took scheduled afternoon medications (Baclofen and Lyrica). Agreeable to scheduled dressing change to BLE. Dr. Redman in room and assessed patient's leg wounds during dressing change. Moderate amount of serous-serosanguinous drainage without odor. Patient tolerated fair. Patient insists on ACE wrap being loose d/t pain.

## 2024-04-20 NOTE — PROGRESS NOTES
END OF SHIFT NOTE:    INTAKE/OUTPUT  04/19 0701 - 04/20 0700  In: 788.9 [P.O.:640; I.V.:102.3]  Out: 2450 [Urine:2450]  Voiding: Yes  Catheter: No  Drain:   External Urinary Catheter (Active)   Site Assessment Clean;Intact 04/20/24 1536   Placement Replaced 04/19/24 1926   Securement Method Securing device (Describe) 04/19/24 1926   Catheter Care Suction Canister/Tubing changed 04/20/24 1536   Perineal Care No 04/20/24 1536   Suction Other 04/20/24 1536   Urine Color Yellow 04/20/24 1536   Urine Appearance Clear 04/20/24 1536   Output (mL) 650 mL 04/20/24 1744     Flatus: Patient does have flatus present.    Stool: 1 occurrences.    Characteristics:  Stool Appearance: Formed  Stool Color: Brown  Stool Amount: Large  Stool Assessment  Incontinence: No  Stool Appearance: Formed  Stool Color: Brown  Stool Amount: Large  Stool Source: Rectum  Last BM (including prior to admit): 04/20/24    Emesis: 0 occurrences.    Characteristics:        VITAL SIGNS  Patient Vitals for the past 12 hrs:   Temp Pulse Resp BP SpO2   04/20/24 1536 98.2 °F (36.8 °C) (!) 101 18 126/66 98 %   04/20/24 1112 98.2 °F (36.8 °C) 97 20 107/71 97 %   04/20/24 0907 -- 96 -- (!) 117/94 --   04/20/24 0732 98.4 °F (36.9 °C) 94 18 106/66 98 %       Pain Assessment  Pain Level: 6 (04/20/24 1816)  Pain Location: Leg  Patient's Stated Pain Goal: 0 - No pain    Ambulating  Yes in room    Shift report given to oncoming nurse at the bedside.    Riddhi Stover RN      Patient presents for wellness and weight management pursuing gastric bypass. Patient to follow diet and eating plan. Patient will see us monthly. This is month #4 of 7 Needs psych evaluation. Continue to monitor progress.

## 2024-04-20 NOTE — PROGRESS NOTES
Patient is currently refusing to take her morning medications. Notified Dr. Redman via perfect serve. No new orders. MD will be to see patient today.

## 2024-04-20 NOTE — PROGRESS NOTES
This RN okayed PT/OT to work with patient. They were able to assist patient to sit on side of bed. Patient started screaming and c/o burning BLE pain. I instructed patient to stop yelling and I would give her pain medication. Instructed patient she had to lay back down in bed so I can give her the IV Dilaudid. Therapy assisting patient with raising of legs to lay back in bed. Patient screaming and swinging arms at therapy saying they are hurting her legs. This RN instructed patient that she is to calm down and cooperate with staff as we are trying to help her. Dilaudid 0.5 mg given IVP per prn order.

## 2024-04-20 NOTE — PROGRESS NOTES
END OF SHIFT NOTE:    INTAKE/OUTPUT  04/19 0701 - 04/20 0700  In: 788.9 [P.O.:640; I.V.:102.3]  Out: 2450 [Urine:2450]  Voiding: Yes  Catheter: No  Drain:   External Urinary Catheter (Active)   Site Assessment Clean,dry & intact 04/19/24 1926   Placement Replaced 04/19/24 1926   Securement Method Securing device (Describe) 04/19/24 1926   Catheter Care Catheter/Wick replaced 04/19/24 1926   Perineal Care Yes 04/19/24 1926   Suction 40 mmgHg continuous 04/19/24 0950   Urine Color Yellow 04/19/24 0950   Urine Appearance Clear 04/19/24 0950   Output (mL) 400 mL 04/20/24 0355               Flatus: Patient does have flatus present.    Stool: 0 occurrences.    Characteristics:           Stool Assessment  Last BM (including prior to admit): 04/16/24 (per patient)    Emesis: 0 occurrences.    Characteristics:        VITAL SIGNS  Patient Vitals for the past 12 hrs:   Temp Pulse Resp BP SpO2   04/20/24 0355 98.6 °F (37 °C) 95 20 126/69 99 %   04/20/24 0031 -- -- 18 -- --   04/20/24 0011 99 °F (37.2 °C) (!) 102 18 99/61 97 %   04/19/24 2027 -- -- 18 -- --   04/19/24 1945 -- 93 -- 117/73 --   04/19/24 1940 98.4 °F (36.9 °C) 99 20 (!) 108/59 100 %       Pain Assessment  Pain Level: 5 (04/20/24 0151)  Pain Location: Leg  Patient's Stated Pain Goal: 5    Ambulating  Yes    Shift report given to oncoming nurse at the bedside.    Carly Jones RN

## 2024-04-20 NOTE — PROGRESS NOTES
ACUTE PHYSICAL THERAPY GOALS:   (Developed with and agreed upon by patient and/or caregiver.)  No goals set    PHYSICAL THERAPY Initial Assessment, Daily Note, Discharge, and AM  (Link to Caseload Tracking: PT Visit Days : 1  Acknowledge Orders  Time In/Out  PT Charge Capture  Rehab Caseload Tracker    Lo Tirado is a 48 y.o. female   PRIMARY DIAGNOSIS: Leg ulcer, left, with fat layer exposed (HCC)  Intractable pain [R52]  Bilateral lower leg cellulitis [L03.116, L03.115]  Leg ulcer, left, with fat layer exposed (HCC) [L97.922]  Lymphedema [I89.0]       Reason for Referral: Generalized Muscle Weakness (M62.81)  Difficulty in walking, Not elsewhere classified (R26.2)  Inpatient: Payor: [employer] MEDICARE / Plan: [employer] GOLD PLUS HMO / Product Type: *No Product type* /     ASSESSMENT:     REHAB RECOMMENDATIONS:   Recommendation to date pending progress:  Setting:  Unable to determine due to non participation    Equipment:     Unable to determine     ASSESSMENT:  Ms. Tirado is a 48 year old female who presents to D with L leg pain and cellulitis/L leg ulcer. Pt reports using rollator mod I at baseline. Pt initially required encouragement to participate in therapy, but was somewhat cooperative. Completed bed mobility with min/mod A x 2 to sit EOB. Pt c/o pain B lower LE, stating \"it's burning\", but reports that it gradually subsided. MD came in to room to speak with pt and was calm throughout that time. Almost immediately after MD left room, pt began c/o pain, screaming out at times and refused further mobility. Pt raising voice and becoming verbally aggressive; refusing to cooperate with attempt to return pt to supine. PT requested that RN come to bedside. Continued to scream out, telling 1 therapist to \"get out\" repeatedly and started swinging arms toward/at therapist. Pt did this 2 times. Pt returned to supine in bed with max A x 2. Pt continued to scream out in an aggressive manner and threw arms again at  improvement)      Post Treatment: as above Vitals        Oxygen  2 L    IV    RESTRICTIONS/PRECAUTIONS:  Restrictions/Precautions: Fall Risk                 GROSS EVALUATION:  Intact Impaired (Comments):   AROM []  Limited 2/2 pain, swelling   PROM []    Strength [] Unable to formally assess; through observation grossly 3/5    Balance [] Sitting - Static: Good  Sitting - Dynamic: Fair  Standing - Static:  (NT)  Standing - Dynamic:  (NT)   Posture [] N/A   Sensation [] NT    Coordination [] NT    Tone []     Edema []    Activity Tolerance [] poor     []      COGNITION/  PERCEPTION: Intact Impaired (Comments):   Orientation [x]     Vision [x]     Hearing [x]     Cognition  [x]       MOBILITY: I Mod I S SBA CGA Min Mod Max Total  NT x2 Comments:   Bed Mobility    Rolling [] [] [] [] [] [] [] [] [] [x] []    Supine to Sit [] [] [] [] [] [x] [x] [] [] [] [x] Sat EOB ~ 20 min   Scooting [] [] [] [] [x] [] [] [] [] [] []    Sit to Supine [] [] [] [] [] [] [] [x] [] [] [x]    Transfers    Sit to Stand [] [] [] [] [] [] [] [] [] [x] [] Pt declined any attempt at standing   Bed to Chair [] [] [] [] [] [] [] [] [] [x] []    Stand to Sit [] [] [] [] [] [] [] [] [] [x] []     [] [] [] [] [] [] [] [] [] [] []    I=Independent, Mod I=Modified Independent, S=Supervision, SBA=Standby Assistance, CGA=Contact Guard Assistance,   Min=Minimal Assistance, Mod=Moderate Assistance, Max=Maximal Assistance, Total=Total Assistance, NT=Not Tested    GAIT: I Mod I S SBA CGA Min Mod Max Total  NT x2 Comments:   Level of Assistance [] [] [] [] [] [] [] [] [] [x] []    Distance     DME     Gait Quality     Weightbearing Status Restrictions/Precautions  Restrictions/Precautions: Fall Risk    Stairs      I=Independent, Mod I=Modified Independent, S=Supervision, SBA=Standby Assistance, CGA=Contact Guard Assistance,   Min=Minimal Assistance, Mod=Moderate Assistance, Max=Maximal Assistance, Total=Total Assistance, NT=Not Tested    PLAN:   FREQUENCY AND

## 2024-04-20 NOTE — PROGRESS NOTES
1950: pt tolerated dressing change well. B/L leg wounds were dressed per wound care orders .     2300: pt with c/o burning pain from LLE and requesting ace wrap to be and repositioned and loosened all the way .     0001: prn pain meds given for leg pain. Pt also requesting that the LLE ace wrap be removed and only wrapped with kerlex loosely     0015: pt asking for LLE to be rewrapped with the ace wrap as noted in wound care order

## 2024-04-21 LAB
BASOPHILS # BLD: 0 K/UL (ref 0–0.2)
BASOPHILS NFR BLD: 1 % (ref 0–2)
DIFFERENTIAL METHOD BLD: ABNORMAL
EOSINOPHIL # BLD: 0.6 K/UL (ref 0–0.8)
EOSINOPHIL NFR BLD: 11 % (ref 0.5–7.8)
ERYTHROCYTE [DISTWIDTH] IN BLOOD BY AUTOMATED COUNT: 17 % (ref 11.9–14.6)
GLUCOSE BLD STRIP.AUTO-MCNC: 106 MG/DL (ref 65–100)
GLUCOSE BLD STRIP.AUTO-MCNC: 109 MG/DL (ref 65–100)
GLUCOSE BLD STRIP.AUTO-MCNC: 129 MG/DL (ref 65–100)
GLUCOSE BLD STRIP.AUTO-MCNC: 135 MG/DL (ref 65–100)
HCT VFR BLD AUTO: 27.2 % (ref 35.8–46.3)
HGB BLD-MCNC: 8.5 G/DL (ref 11.7–15.4)
IMM GRANULOCYTES # BLD AUTO: 0 K/UL (ref 0–0.5)
IMM GRANULOCYTES NFR BLD AUTO: 0 % (ref 0–5)
LYMPHOCYTES # BLD: 1.4 K/UL (ref 0.5–4.6)
LYMPHOCYTES NFR BLD: 25 % (ref 13–44)
MCH RBC QN AUTO: 27 PG (ref 26.1–32.9)
MCHC RBC AUTO-ENTMCNC: 31.3 G/DL (ref 31.4–35)
MCV RBC AUTO: 86.3 FL (ref 82–102)
MONOCYTES # BLD: 0.6 K/UL (ref 0.1–1.3)
MONOCYTES NFR BLD: 11 % (ref 4–12)
NEUTS SEG # BLD: 3 K/UL (ref 1.7–8.2)
NEUTS SEG NFR BLD: 52 % (ref 43–78)
NRBC # BLD: 0 K/UL (ref 0–0.2)
PLATELET # BLD AUTO: 398 K/UL (ref 150–450)
PMV BLD AUTO: 9.1 FL (ref 9.4–12.3)
RBC # BLD AUTO: 3.15 M/UL (ref 4.05–5.2)
SERVICE CMNT-IMP: ABNORMAL
WBC # BLD AUTO: 5.6 K/UL (ref 4.3–11.1)

## 2024-04-21 PROCEDURE — 6370000000 HC RX 637 (ALT 250 FOR IP): Performed by: INTERNAL MEDICINE

## 2024-04-21 PROCEDURE — 2500000003 HC RX 250 WO HCPCS: Performed by: FAMILY MEDICINE

## 2024-04-21 PROCEDURE — 2500000003 HC RX 250 WO HCPCS: Performed by: STUDENT IN AN ORGANIZED HEALTH CARE EDUCATION/TRAINING PROGRAM

## 2024-04-21 PROCEDURE — 6360000002 HC RX W HCPCS: Performed by: FAMILY MEDICINE

## 2024-04-21 PROCEDURE — 36415 COLL VENOUS BLD VENIPUNCTURE: CPT

## 2024-04-21 PROCEDURE — 6370000000 HC RX 637 (ALT 250 FOR IP): Performed by: STUDENT IN AN ORGANIZED HEALTH CARE EDUCATION/TRAINING PROGRAM

## 2024-04-21 PROCEDURE — 82962 GLUCOSE BLOOD TEST: CPT

## 2024-04-21 PROCEDURE — 1100000000 HC RM PRIVATE

## 2024-04-21 PROCEDURE — 2580000003 HC RX 258: Performed by: FAMILY MEDICINE

## 2024-04-21 PROCEDURE — 85025 COMPLETE CBC W/AUTO DIFF WBC: CPT

## 2024-04-21 PROCEDURE — 6370000000 HC RX 637 (ALT 250 FOR IP): Performed by: FAMILY MEDICINE

## 2024-04-21 RX ORDER — FUROSEMIDE 40 MG/1
40 TABLET ORAL DAILY
Status: DISCONTINUED | OUTPATIENT
Start: 2024-04-22 | End: 2024-04-24 | Stop reason: HOSPADM

## 2024-04-21 RX ORDER — MORPHINE SULFATE 2 MG/ML
2 INJECTION, SOLUTION INTRAMUSCULAR; INTRAVENOUS ONCE
Status: COMPLETED | OUTPATIENT
Start: 2024-04-21 | End: 2024-04-21

## 2024-04-21 RX ORDER — HYDROMORPHONE HYDROCHLORIDE 1 MG/ML
0.25 INJECTION, SOLUTION INTRAMUSCULAR; INTRAVENOUS; SUBCUTANEOUS ONCE
Status: COMPLETED | OUTPATIENT
Start: 2024-04-21 | End: 2024-04-21

## 2024-04-21 RX ORDER — HYDROMORPHONE HYDROCHLORIDE 2 MG/1
2 TABLET ORAL EVERY 4 HOURS PRN
Status: DISCONTINUED | OUTPATIENT
Start: 2024-04-21 | End: 2024-04-22

## 2024-04-21 RX ORDER — HYDROMORPHONE HYDROCHLORIDE 1 MG/ML
0.5 INJECTION, SOLUTION INTRAMUSCULAR; INTRAVENOUS; SUBCUTANEOUS ONCE
Status: COMPLETED | OUTPATIENT
Start: 2024-04-21 | End: 2024-04-21

## 2024-04-21 RX ADMIN — HYDROMORPHONE HYDROCHLORIDE 2 MG: 2 TABLET ORAL at 17:03

## 2024-04-21 RX ADMIN — CETIRIZINE HYDROCHLORIDE 10 MG: 10 TABLET, FILM COATED ORAL at 08:37

## 2024-04-21 RX ADMIN — BACLOFEN 10 MG: 10 TABLET ORAL at 08:37

## 2024-04-21 RX ADMIN — Medication: at 22:35

## 2024-04-21 RX ADMIN — HYDROMORPHONE HYDROCHLORIDE 0.25 MG: 1 INJECTION, SOLUTION INTRAMUSCULAR; INTRAVENOUS; SUBCUTANEOUS at 08:50

## 2024-04-21 RX ADMIN — HYDROMORPHONE HYDROCHLORIDE 0.25 MG: 1 INJECTION, SOLUTION INTRAMUSCULAR; INTRAVENOUS; SUBCUTANEOUS at 10:48

## 2024-04-21 RX ADMIN — HYDROMORPHONE HYDROCHLORIDE 0.5 MG: 1 INJECTION, SOLUTION INTRAMUSCULAR; INTRAVENOUS; SUBCUTANEOUS at 21:37

## 2024-04-21 RX ADMIN — BACLOFEN 10 MG: 10 TABLET ORAL at 22:33

## 2024-04-21 RX ADMIN — LIDOCAINE AND PRILOCAINE: 25; 25 CREAM TOPICAL at 13:05

## 2024-04-21 RX ADMIN — ASPIRIN 81 MG 81 MG: 81 TABLET ORAL at 08:37

## 2024-04-21 RX ADMIN — MONTELUKAST 10 MG: 10 TABLET, FILM COATED ORAL at 08:37

## 2024-04-21 RX ADMIN — DULOXETINE HYDROCHLORIDE 60 MG: 60 CAPSULE, DELAYED RELEASE ORAL at 08:37

## 2024-04-21 RX ADMIN — Medication: at 13:04

## 2024-04-21 RX ADMIN — HYDROXYZINE HYDROCHLORIDE 25 MG: 25 TABLET, FILM COATED ORAL at 22:33

## 2024-04-21 RX ADMIN — HYDROMORPHONE HYDROCHLORIDE 0.25 MG: 1 INJECTION, SOLUTION INTRAMUSCULAR; INTRAVENOUS; SUBCUTANEOUS at 03:46

## 2024-04-21 RX ADMIN — PREGABALIN 200 MG: 100 CAPSULE ORAL at 22:33

## 2024-04-21 RX ADMIN — MORPHINE SULFATE 2 MG: 2 INJECTION, SOLUTION INTRAMUSCULAR; INTRAVENOUS at 20:34

## 2024-04-21 RX ADMIN — DOCUSATE SODIUM 100 MG: 100 CAPSULE, LIQUID FILLED ORAL at 08:37

## 2024-04-21 RX ADMIN — SODIUM CHLORIDE, PRESERVATIVE FREE 10 ML: 5 INJECTION INTRAVENOUS at 21:41

## 2024-04-21 RX ADMIN — PANTOPRAZOLE SODIUM 40 MG: 40 TABLET, DELAYED RELEASE ORAL at 05:33

## 2024-04-21 RX ADMIN — PREGABALIN 200 MG: 100 CAPSULE ORAL at 14:56

## 2024-04-21 RX ADMIN — PREGABALIN 200 MG: 100 CAPSULE ORAL at 08:37

## 2024-04-21 RX ADMIN — FLUTICASONE PROPIONATE 2 SPRAY: 50 SPRAY, METERED NASAL at 08:42

## 2024-04-21 RX ADMIN — SODIUM CHLORIDE, PRESERVATIVE FREE 10 ML: 5 INJECTION INTRAVENOUS at 08:41

## 2024-04-21 RX ADMIN — BACLOFEN 10 MG: 10 TABLET ORAL at 13:09

## 2024-04-21 ASSESSMENT — PAIN DESCRIPTION - LOCATION
LOCATION: LEG
LOCATION: LEG;OTHER (COMMENT)
LOCATION: LEG

## 2024-04-21 ASSESSMENT — PAIN DESCRIPTION - ORIENTATION
ORIENTATION: RIGHT;LEFT

## 2024-04-21 ASSESSMENT — PAIN - FUNCTIONAL ASSESSMENT
PAIN_FUNCTIONAL_ASSESSMENT: PREVENTS OR INTERFERES SOME ACTIVE ACTIVITIES AND ADLS

## 2024-04-21 ASSESSMENT — PAIN SCALES - GENERAL
PAINLEVEL_OUTOF10: 10
PAINLEVEL_OUTOF10: 5
PAINLEVEL_OUTOF10: 8
PAINLEVEL_OUTOF10: 10
PAINLEVEL_OUTOF10: 10
PAINLEVEL_OUTOF10: 8
PAINLEVEL_OUTOF10: 10
PAINLEVEL_OUTOF10: 10
PAINLEVEL_OUTOF10: 9
PAINLEVEL_OUTOF10: 10

## 2024-04-21 ASSESSMENT — PAIN SCALES - WONG BAKER: WONGBAKER_NUMERICALRESPONSE: NO HURT

## 2024-04-21 ASSESSMENT — PAIN DESCRIPTION - DESCRIPTORS
DESCRIPTORS: BURNING
DESCRIPTORS: ACHING
DESCRIPTORS: BURNING
DESCRIPTORS: BURNING
DESCRIPTORS: ACHING;DISCOMFORT
DESCRIPTORS: BURNING
DESCRIPTORS: ACHING;DISCOMFORT;GNAWING

## 2024-04-21 NOTE — PROGRESS NOTES
could hear pt yelling out in hallway  Pt saying “ it hurts so much”     provided calming presence  Held her hand and prayed for her    Pt was vocal in her pain when I left room    Spoke briefly to nurse

## 2024-04-21 NOTE — PROGRESS NOTES
Hospitalist Progress Note   Admit Date:  2024 10:07 PM   Name:  Lo Tirado   Age:  48 y.o.  Sex:  female  :  1975   MRN:  [id number]   Room:      Presenting/Chief Complaint: Leg Pain and Cellulitis     Reason(s) for Admission: Intractable pain [R52]  Bilateral lower leg cellulitis [L03.116, L03.115]  Leg ulcer, left, with fat layer exposed (HCC) [L97.922]  Lymphedema [I89.0]     Hospital Course:       Lo Tirado is a 48 y.o. female with medical history of chronic pain followed by pain management, neuropathy, lymphedema, cellulitis, BMI 56, HTN, DM2, CKD, bipolar disorder, admitted with LE pain in setting of lymphedema, ongoing leg wounds.  She was discharged to home 15 with similar complaints, s/p vancomycin, zosyn and changed to doxycycline/ omnicef for continued course. Discharged on norco but states this has not helped. Previously was on MS contin.  Is followed by pain management.        She was given ketamine/fentanyl by EMS And somnolent upon arrival.   Labs and vitals stable. BLE xrays negative for osteomyelitis, some soft tissue swelling on lower right leg. ID and wound care consulted. ID recommended discontinuing antibiotics.  Wound care recommends compression /and wound changes daily.   Admission complicated by behavioral disturbances. Seen by psychiatry with no evidence of SI/HI. No new medications recommended.     Admission complicated by inability to achieve adequate pain control.     Subjective & 24hr Events:   Pt weeping in bed this morning due to leg pain.       Assessment & Plan:     Principal Problem:    Leg ulcer, left, with fat layer exposed (HCC)    Lymphedema of both lower extremities  Reduced to Dilaudid 0.25 mg every 4 for pain and added on 5 mg oxycodone  Will change to po dilaudid today in anticipation of discharge  Restarted home dose baclofen scheduled  Discontinued antibiotics per ID  Wound care consulted, continue with wound management  Discussed  0325 98.8 °F (37.1 °C) 87 18 122/73 96 %   04/21/24 0028 -- -- 18 -- --   04/20/24 2358 -- -- 17 -- --   04/20/24 2319 98.8 °F (37.1 °C) 93 18 113/71 95 %   04/20/24 2115 -- 93 -- 116/60 --   04/20/24 2031 -- -- 18 -- --   04/20/24 1929 98.6 °F (37 °C) (!) 101 18 123/78 99 %   04/20/24 1536 98.2 °F (36.8 °C) (!) 101 18 126/66 98 %   04/20/24 1112 98.2 °F (36.8 °C) 97 20 107/71 97 %   04/20/24 0907 -- 96 -- (!) 117/94 --         Oxygen Therapy  SpO2: 95 %  Pulse via Oximetry: 98 beats per minute  O2 Device: None (Room air)  O2 Flow Rate (L/min): 2 L/min    Estimated body mass index is 56.27 kg/m² as calculated from the following:    Height as of this encounter: 1.702 m (5' 7\").    Weight as of this encounter: 163 kg (359 lb 4.8 oz).    Intake/Output Summary (Last 24 hours) at 4/21/2024 0808  Last data filed at 4/21/2024 0739  Gross per 24 hour   Intake 500 ml   Output 4000 ml   Net -3500 ml           Physical Exam:     General:    Well nourished.  Alert, in pain and tearful, yelling   CV:   RRR.  No m/r/g.  No jugular venous distension. 1+ edema   Lungs:   CTAB.  No wheezing, rhonchi, or rales.  Symmetric expansion. Anterior   Extremities: BLE wrapped   Skin:     Above   Neuro:  grossly intact.    Psych:  Tearful ,weepy    I have personally reviewed labs and tests:  Recent Labs:  Recent Results (from the past 48 hour(s))   POCT Glucose    Collection Time: 04/19/24 11:37 AM   Result Value Ref Range    POC Glucose 103 (H) 65 - 100 mg/dL    Performed by: Panchito    POCT Glucose    Collection Time: 04/19/24  4:22 PM   Result Value Ref Range    POC Glucose 102 (H) 65 - 100 mg/dL    Performed by: Rahul    POCT Glucose    Collection Time: 04/19/24  7:45 PM   Result Value Ref Range    POC Glucose 119 (H) 65 - 100 mg/dL    Performed by: Sue    Basic Metabolic Panel w/ Reflex to MG    Collection Time: 04/20/24  3:04 AM   Result Value Ref Range    Sodium 137 136 - 146 mmol/L    Potassium 3.8 3.5 - 5.1

## 2024-04-21 NOTE — PROGRESS NOTES
END OF SHIFT SUMMARY:    SiAdditional events this shift:   Patient went to restroom with assist and after IV was pulled out by accident.  Text doctor and she is okay with not having IV since she is switching to po dil     I/O  Bedside shift report given to STEWART Toussaint RN

## 2024-04-21 NOTE — PROGRESS NOTES
Patient called me into her room to change dressing she had already pulled half of R leg dressing off.  Screaming in pain.  I offered po oxy and chata wanted me to text doctor for more IV pain medication.  Text doctor no new orders

## 2024-04-21 NOTE — PLAN OF CARE
Problem: Discharge Planning  Goal: Discharge to home or other facility with appropriate resources  4/21/2024 1143 by Lalo Pompa RN  Outcome: Progressing  Flowsheets (Taken 4/21/2024 0850)  Discharge to home or other facility with appropriate resources: Identify barriers to discharge with patient and caregiver  4/20/2024 2235 by Carly Jones RN  Outcome: Progressing     Problem: Pain  Goal: Verbalizes/displays adequate comfort level or baseline comfort level  4/21/2024 1143 by Lalo Pompa RN  Outcome: Progressing  4/20/2024 2235 by Carly Jones RN  Outcome: Progressing     Problem: Skin/Tissue Integrity  Goal: Absence of new skin breakdown  Description: 1.  Monitor for areas of redness and/or skin breakdown  2.  Assess vascular access sites hourly  3.  Every 4-6 hours minimum:  Change oxygen saturation probe site  4.  Every 4-6 hours:  If on nasal continuous positive airway pressure, respiratory therapy assess nares and determine need for appliance change or resting period.  4/20/2024 2235 by Carly Jones RN  Outcome: Progressing     Problem: ABCDS Injury Assessment  Goal: Absence of physical injury  4/20/2024 2235 by Carly Jones RN  Outcome: Progressing     Problem: Safety - Adult  Goal: Free from fall injury  4/20/2024 2235 by Carly Jones RN  Outcome: Progressing     Problem: Chronic Conditions and Co-morbidities  Goal: Patient's chronic conditions and co-morbidity symptoms are monitored and maintained or improved  Recent Flowsheet Documentation  Taken 4/21/2024 0850 by Lalo Pompa RN  Care Plan - Patient's Chronic Conditions and Co-Morbidity Symptoms are Monitored and Maintained or Improved: Monitor and assess patient's chronic conditions and comorbid symptoms for stability, deterioration, or improvement  4/20/2024 2235 by Carly Jones RN  Outcome: Progressing

## 2024-04-21 NOTE — PROGRESS NOTES
END OF SHIFT NOTE:    INTAKE/OUTPUT  04/20 0701 - 04/21 0700  In: 500 [P.O.:500]  Out: 4400 [Urine:4400]  Voiding: Yes  Catheter: No  Drain:   External Urinary Catheter (Active)   Site Assessment Clean;Intact 04/20/24 1536   Placement Replaced 04/19/24 1926   Securement Method Securing device (Describe) 04/19/24 1926   Catheter Care Suction Canister/Tubing changed 04/20/24 1536   Perineal Care No 04/20/24 1536   Suction Other 04/20/24 1536   Urine Color Yellow 04/20/24 1536   Urine Appearance Clear 04/20/24 1536   Output (mL) 800 mL 04/21/24 0534               Flatus: Patient does have flatus present.    Stool: 0 occurrences.    Characteristics:  Stool Appearance: Formed  Stool Color: Brown  Stool Amount: Large  Stool Assessment  Incontinence: No  Stool Appearance: Formed  Stool Color: Brown  Stool Amount: Large  Stool Source: Rectum  Last BM (including prior to admit): 04/20/24    Emesis: 0 occurrences.    Characteristics:        VITAL SIGNS  Patient Vitals for the past 12 hrs:   Temp Pulse Resp BP SpO2   04/21/24 0416 -- -- 18 -- --   04/21/24 0325 98.8 °F (37.1 °C) 87 18 122/73 96 %   04/21/24 0028 -- -- 18 -- --   04/20/24 2358 -- -- 17 -- --   04/20/24 2319 98.8 °F (37.1 °C) 93 18 113/71 95 %   04/20/24 2115 -- 93 -- 116/60 --   04/20/24 2031 -- -- 18 -- --   04/20/24 1929 98.6 °F (37 °C) (!) 101 18 123/78 99 %       Pain Assessment  Pain Level: 8 (04/21/24 0416)  Pain Location: Leg  Patient's Stated Pain Goal: 0 - No pain    Ambulating  No    Shift report given to oncoming nurse at the bedside.    Carly Jones RN

## 2024-04-22 LAB
ANION GAP SERPL CALC-SCNC: 3 MMOL/L (ref 2–11)
BACTERIA SPEC CULT: NORMAL
BUN SERPL-MCNC: 12 MG/DL (ref 6–23)
CALCIUM SERPL-MCNC: 8.8 MG/DL (ref 8.3–10.4)
CHLORIDE SERPL-SCNC: 106 MMOL/L (ref 103–113)
CO2 SERPL-SCNC: 29 MMOL/L (ref 21–32)
CREAT SERPL-MCNC: 0.8 MG/DL (ref 0.6–1)
ERYTHROCYTE [DISTWIDTH] IN BLOOD BY AUTOMATED COUNT: 16.9 % (ref 11.9–14.6)
GLUCOSE BLD STRIP.AUTO-MCNC: 108 MG/DL (ref 65–100)
GLUCOSE BLD STRIP.AUTO-MCNC: 133 MG/DL (ref 65–100)
GLUCOSE BLD STRIP.AUTO-MCNC: 150 MG/DL (ref 65–100)
GLUCOSE BLD STRIP.AUTO-MCNC: 161 MG/DL (ref 65–100)
GLUCOSE SERPL-MCNC: 102 MG/DL (ref 65–100)
HCT VFR BLD AUTO: 27.2 % (ref 35.8–46.3)
HGB BLD-MCNC: 8.2 G/DL (ref 11.7–15.4)
MCH RBC QN AUTO: 26.1 PG (ref 26.1–32.9)
MCHC RBC AUTO-ENTMCNC: 30.1 G/DL (ref 31.4–35)
MCV RBC AUTO: 86.6 FL (ref 82–102)
NRBC # BLD: 0 K/UL (ref 0–0.2)
PLATELET # BLD AUTO: 417 K/UL (ref 150–450)
PMV BLD AUTO: 9.1 FL (ref 9.4–12.3)
POTASSIUM SERPL-SCNC: 4 MMOL/L (ref 3.5–5.1)
RBC # BLD AUTO: 3.14 M/UL (ref 4.05–5.2)
SERVICE CMNT-IMP: ABNORMAL
SERVICE CMNT-IMP: NORMAL
SODIUM SERPL-SCNC: 138 MMOL/L (ref 136–146)
WBC # BLD AUTO: 6.5 K/UL (ref 4.3–11.1)

## 2024-04-22 PROCEDURE — 1100000000 HC RM PRIVATE

## 2024-04-22 PROCEDURE — 6360000002 HC RX W HCPCS: Performed by: STUDENT IN AN ORGANIZED HEALTH CARE EDUCATION/TRAINING PROGRAM

## 2024-04-22 PROCEDURE — 97165 OT EVAL LOW COMPLEX 30 MIN: CPT

## 2024-04-22 PROCEDURE — 36415 COLL VENOUS BLD VENIPUNCTURE: CPT

## 2024-04-22 PROCEDURE — 6370000000 HC RX 637 (ALT 250 FOR IP): Performed by: FAMILY MEDICINE

## 2024-04-22 PROCEDURE — 80048 BASIC METABOLIC PNL TOTAL CA: CPT

## 2024-04-22 PROCEDURE — 6370000000 HC RX 637 (ALT 250 FOR IP): Performed by: STUDENT IN AN ORGANIZED HEALTH CARE EDUCATION/TRAINING PROGRAM

## 2024-04-22 PROCEDURE — 82962 GLUCOSE BLOOD TEST: CPT

## 2024-04-22 PROCEDURE — 2580000003 HC RX 258: Performed by: FAMILY MEDICINE

## 2024-04-22 PROCEDURE — 85027 COMPLETE CBC AUTOMATED: CPT

## 2024-04-22 RX ORDER — HYDROMORPHONE HYDROCHLORIDE 1 MG/ML
0.5 INJECTION, SOLUTION INTRAMUSCULAR; INTRAVENOUS; SUBCUTANEOUS ONCE
Status: COMPLETED | OUTPATIENT
Start: 2024-04-22 | End: 2024-04-22

## 2024-04-22 RX ORDER — MORPHINE SULFATE 15 MG/1
15 TABLET, FILM COATED, EXTENDED RELEASE ORAL
Status: CANCELLED | OUTPATIENT
Start: 2024-04-22

## 2024-04-22 RX ORDER — OXYCODONE HCL 10 MG/1
10 TABLET, FILM COATED, EXTENDED RELEASE ORAL EVERY 12 HOURS SCHEDULED
Status: DISCONTINUED | OUTPATIENT
Start: 2024-04-22 | End: 2024-04-23

## 2024-04-22 RX ADMIN — DOCUSATE SODIUM 100 MG: 100 CAPSULE, LIQUID FILLED ORAL at 22:07

## 2024-04-22 RX ADMIN — HYDROMORPHONE HYDROCHLORIDE 0.5 MG: 1 INJECTION, SOLUTION INTRAMUSCULAR; INTRAVENOUS; SUBCUTANEOUS at 09:27

## 2024-04-22 RX ADMIN — ASPIRIN 81 MG 81 MG: 81 TABLET ORAL at 09:09

## 2024-04-22 RX ADMIN — FLUTICASONE PROPIONATE 2 SPRAY: 50 SPRAY, METERED NASAL at 09:12

## 2024-04-22 RX ADMIN — LOSARTAN POTASSIUM 50 MG: 50 TABLET, FILM COATED ORAL at 09:08

## 2024-04-22 RX ADMIN — BACLOFEN 10 MG: 10 TABLET ORAL at 09:08

## 2024-04-22 RX ADMIN — CETIRIZINE HYDROCHLORIDE 10 MG: 10 TABLET, FILM COATED ORAL at 09:09

## 2024-04-22 RX ADMIN — SODIUM CHLORIDE, PRESERVATIVE FREE 10 ML: 5 INJECTION INTRAVENOUS at 11:15

## 2024-04-22 RX ADMIN — HYDROMORPHONE HYDROCHLORIDE 2 MG: 2 TABLET ORAL at 05:44

## 2024-04-22 RX ADMIN — MONTELUKAST 10 MG: 10 TABLET, FILM COATED ORAL at 09:08

## 2024-04-22 RX ADMIN — OXYCODONE HYDROCHLORIDE 10 MG: 10 TABLET, FILM COATED, EXTENDED RELEASE ORAL at 11:14

## 2024-04-22 RX ADMIN — DULOXETINE HYDROCHLORIDE 60 MG: 60 CAPSULE, DELAYED RELEASE ORAL at 09:09

## 2024-04-22 RX ADMIN — OXYCODONE HYDROCHLORIDE 10 MG: 10 TABLET, FILM COATED, EXTENDED RELEASE ORAL at 22:08

## 2024-04-22 RX ADMIN — Medication: at 11:50

## 2024-04-22 RX ADMIN — FUROSEMIDE 40 MG: 40 TABLET ORAL at 09:09

## 2024-04-22 RX ADMIN — METOPROLOL SUCCINATE 25 MG: 25 TABLET, EXTENDED RELEASE ORAL at 09:09

## 2024-04-22 RX ADMIN — DOCUSATE SODIUM 100 MG: 100 CAPSULE, LIQUID FILLED ORAL at 09:08

## 2024-04-22 RX ADMIN — SODIUM CHLORIDE, PRESERVATIVE FREE 10 ML: 5 INJECTION INTRAVENOUS at 22:10

## 2024-04-22 RX ADMIN — PREGABALIN 200 MG: 100 CAPSULE ORAL at 09:09

## 2024-04-22 RX ADMIN — PANTOPRAZOLE SODIUM 40 MG: 40 TABLET, DELAYED RELEASE ORAL at 05:37

## 2024-04-22 RX ADMIN — BACLOFEN 10 MG: 10 TABLET ORAL at 22:07

## 2024-04-22 RX ADMIN — OXYCODONE 5 MG: 5 TABLET ORAL at 19:23

## 2024-04-22 RX ADMIN — HYDROXYZINE HYDROCHLORIDE 25 MG: 25 TABLET, FILM COATED ORAL at 22:07

## 2024-04-22 RX ADMIN — PREGABALIN 200 MG: 100 CAPSULE ORAL at 22:07

## 2024-04-22 ASSESSMENT — PAIN - FUNCTIONAL ASSESSMENT
PAIN_FUNCTIONAL_ASSESSMENT: ACTIVITIES ARE NOT PREVENTED
PAIN_FUNCTIONAL_ASSESSMENT: ACTIVITIES ARE NOT PREVENTED

## 2024-04-22 ASSESSMENT — PAIN DESCRIPTION - LOCATION
LOCATION: LEG
LOCATION: LEG

## 2024-04-22 ASSESSMENT — PAIN DESCRIPTION - ORIENTATION
ORIENTATION: RIGHT;LEFT
ORIENTATION: RIGHT;LEFT

## 2024-04-22 ASSESSMENT — PAIN SCALES - GENERAL
PAINLEVEL_OUTOF10: 8
PAINLEVEL_OUTOF10: 10
PAINLEVEL_OUTOF10: 10

## 2024-04-22 ASSESSMENT — PAIN DESCRIPTION - PAIN TYPE: TYPE: CHRONIC PAIN

## 2024-04-22 ASSESSMENT — PAIN DESCRIPTION - DESCRIPTORS
DESCRIPTORS: ACHING;THROBBING
DESCRIPTORS: ACHING;BURNING

## 2024-04-22 ASSESSMENT — PAIN SCALES - WONG BAKER: WONGBAKER_NUMERICALRESPONSE: NO HURT

## 2024-04-22 ASSESSMENT — PAIN DESCRIPTION - FREQUENCY: FREQUENCY: CONTINUOUS

## 2024-04-22 NOTE — PROGRESS NOTES
END OF SHIFT NOTE:    INTAKE/OUTPUT  04/21 0701 - 04/22 0700  In: 800 [P.O.:800]  Out: 2300 [Urine:2300]  Voiding: Yes  Catheter: No  Drain:   External Urinary Catheter (Active)   Site Assessment Clean,dry & intact 04/21/24 1406   Placement Replaced 04/21/24 1406   Securement Method Securing device (Describe) 04/19/24 1926   Catheter Care Catheter/Wick replaced 04/21/24 1406   Perineal Care Yes 04/21/24 1406   Suction 40 mmgHg continuous 04/21/24 1338   Urine Color Yellow 04/21/24 1338   Urine Appearance Clear 04/21/24 1338   Output (mL) 200 mL 04/22/24 0521               Flatus: Patient does have flatus present.    Stool: 0 occurrences.    Characteristics:  Stool Appearance: Formed  Stool Color: Brown  Stool Amount: Large  Stool Assessment  Incontinence: No  Stool Appearance: Formed  Stool Color: Brown  Stool Amount: Large  Stool Source: Rectum  Last BM (including prior to admit): 04/20/24    Emesis: 0 occurrences.    Characteristics:        VITAL SIGNS  Patient Vitals for the past 12 hrs:   Temp Pulse Resp BP SpO2   04/22/24 0327 97.8 °F (36.6 °C) 99 19 117/60 95 %   04/21/24 2309 98.2 °F (36.8 °C) 88 19 129/77 99 %   04/21/24 2207 -- -- 18 -- --   04/21/24 2104 -- -- 18 -- --   04/21/24 2020 98 °F (36.7 °C) 98 18 120/72 98 %       Pain Assessment  Pain Level: 10 (04/22/24 0544)  Pain Location: Leg  Patient's Stated Pain Goal: 10    Ambulating  No    Shift report given to oncoming nurse at the bedside.    Carly Jones RN

## 2024-04-22 NOTE — PROGRESS NOTES
Hospitalist Progress Note   Admit Date:  2024 10:07 PM   Name:  Lo Tirado   Age:  48 y.o.  Sex:  female  :  1975   MRN:  [id number]   Room:      Presenting/Chief Complaint: Leg Pain and Cellulitis     Reason(s) for Admission: Intractable pain [R52]  Bilateral lower leg cellulitis [L03.116, L03.115]  Leg ulcer, left, with fat layer exposed (HCC) [L97.922]  Lymphedema [I89.0]     Hospital Course:       Lo Tirado is a 48 y.o. female with medical history of chronic pain followed by pain management, neuropathy, lymphedema, cellulitis, BMI 56, HTN, DM2, CKD, bipolar disorder, admitted with LE pain in setting of lymphedema, ongoing leg wounds.  She was discharged to home 4-15 with similar complaints, s/p vancomycin, zosyn and changed to doxycycline/ omnicef for continued course. Discharged on norco but states this has not helped. Previously was on MS contin.  Is followed by pain management.        She was given ketamine/fentanyl by EMS And somnolent upon arrival.   Labs and vitals stable. BLE xrays negative for osteomyelitis, some soft tissue swelling on lower right leg. ID and wound care consulted. ID recommended discontinuing antibiotics.  Wound care recommends compression /and wound changes daily.   Admission complicated by behavioral disturbances. Seen by psychiatry with no evidence of SI/HI. No new medications recommended.     Admission complicated by inability to achieve adequate pain control.     Subjective & 24hr Events:   Pt weeping again this morning. Switched to po meds yesterday but did not receive prns overnight. States her wound care was not appropriate last night.       Assessment & Plan:     Principal Problem:    Leg ulcer, left, with fat layer exposed (HCC)    Lymphedema of both lower extremities  Changed to 10 mg BID oxycontin   With prn 5 mg oxycodone   Restarted home dose baclofen scheduled  Remains on 300 mg TID of lyrica   Discontinued antibiotics per ID  Wound  4.3 - 11.1 K/uL    RBC 3.14 (L) 4.05 - 5.2 M/uL    Hemoglobin 8.2 (L) 11.7 - 15.4 g/dL    Hematocrit 27.2 (L) 35.8 - 46.3 %    MCV 86.6 82 - 102 FL    MCH 26.1 26.1 - 32.9 PG    MCHC 30.1 (L) 31.4 - 35.0 g/dL    RDW 16.9 (H) 11.9 - 14.6 %    Platelets 417 150 - 450 K/uL    MPV 9.1 (L) 9.4 - 12.3 FL    nRBC 0.00 0.0 - 0.2 K/uL   Basic Metabolic Panel    Collection Time: 04/22/24  5:10 AM   Result Value Ref Range    Sodium 138 136 - 146 mmol/L    Potassium 4.0 3.5 - 5.1 mmol/L    Chloride 106 103 - 113 mmol/L    CO2 29 21 - 32 mmol/L    Anion Gap 3 2 - 11 mmol/L    Glucose 102 (H) 65 - 100 mg/dL    BUN 12 6 - 23 MG/DL    Creatinine 0.80 0.6 - 1.0 MG/DL    Est, Glom Filt Rate >90 >60 ml/min/1.73m2    Calcium 8.8 8.3 - 10.4 MG/DL   POCT Glucose    Collection Time: 04/22/24  7:17 AM   Result Value Ref Range    POC Glucose 108 (H) 65 - 100 mg/dL    Performed by: Royce        No results for input(s): \"COVID19\" in the last 72 hours.    Current Meds:  Current Facility-Administered Medications   Medication Dose Route Frequency    oxyCODONE (OXYCONTIN) extended release tablet 10 mg  10 mg Oral 2 times per day    furosemide (LASIX) tablet 40 mg  40 mg Oral Daily    baclofen (LIORESAL) tablet 10 mg  10 mg Oral TID    oxyCODONE (ROXICODONE) immediate release tablet 5 mg  5 mg Oral Q4H PRN    lidocaine-prilocaine (EMLA) cream   Topical PRN    mineral oil-hydrophilic petrolatum (AQUAPHOR) ointment   Topical BID    fluticasone (FLONASE) 50 MCG/ACT nasal spray 2 spray  2 spray Nasal Daily    albuterol sulfate HFA (PROVENTIL;VENTOLIN;PROAIR) 108 (90 Base) MCG/ACT inhaler 2 puff  2 puff Inhalation Q6H PRN    docusate sodium (COLACE) capsule 100 mg  100 mg Oral BID    DULoxetine (CYMBALTA) extended release capsule 60 mg  60 mg Oral Daily    losartan (COZAAR) tablet 50 mg  50 mg Oral Daily    montelukast (SINGULAIR) tablet 10 mg  10 mg Oral Daily    aspirin chewable tablet 81 mg  81 mg Oral Daily    hydrOXYzine HCl (ATARAX)

## 2024-04-22 NOTE — PROGRESS NOTES
ACUTE OCCUPATIONAL THERAPY GOALS:   (Developed with and agreed upon by patient and/or caregiver.)  Evaluation only    OCCUPATIONAL THERAPY Initial Assessment, Discharge, and PM       OT Visit Days: 1   Acknowledge Orders  Time  OT Charge Capture  Rehab Caseload Tracker      Lo Tirado is a 48 y.o. female   PRIMARY DIAGNOSIS: Leg ulcer, left, with fat layer exposed (HCC)  Intractable pain [R52]  Bilateral lower leg cellulitis [L03.116, L03.115]  Leg ulcer, left, with fat layer exposed (HCC) [L97.922]  Lymphedema [I89.0]       Reason for Referral: Generalized Muscle Weakness (M62.81)  Inpatient: Payor: [employer] MEDICARE / Plan: [employer] GOLD PLUS HMO / Product Type: *No Product type* /     ASSESSMENT:     REHAB RECOMMENDATIONS:   Recommendation to date pending progress:  Setting:  OP lymphedema treatment    Equipment:    None     ASSESSMENT:  Ms. Tirado presents w/ symptoms consistent with Stage 2 lymphedema. Reports having compression pumps which she was using 3x/daily. Recently reduced use d/t presence of wounds (currently being seeing at [facility] wound care clinic). States she has never received formal lymphedema treatment. Today, presents with 2+ pitting edema in BLEs however RLE > LLE. RN/wound care had just applied dressing changes so unable to formally assess skin integrity. Educated patient on CDT and progression of lymphedema therapy; pt verbalized understanding. Discussed deferring acute lymphedema treatment d/t wounds and notable BLE pain in favor of follow-up at OP lymphedema clinic; pt in agreement with POC. Will plan for follow-up with OP wound care and OP lymphedema clinic upon discharge from hospital.     [facility] AM-PAC™ “6 Clicks” Daily Activity Inpatient Short Form:    AM-PAC Daily Activity - Inpatient   How much help is needed for putting on and taking off regular lower body clothing?: Total  How much help is needed for bathing (which includes washing, rinsing, drying)?: A Lot  How much  (Untimed Charge)  The initial evaluation charge encompasses clinical chart review, objective assessment, interpretation of assessment, and skilled monitoring of the patient's response to treatment in order to develop a plan of care.     TREATMENT:   Evaluation only    TREATMENT GRID:  N/A    AFTER TREATMENT PRECAUTIONS: Bed, Bed/Chair Locked, Call light within reach, Needs within reach, and RN notified    INTERDISCIPLINARY COLLABORATION:  RN/ PCT and OT/ OJEDA    EDUCATION:  Education Given To: Patient  Education Provided: Role of Therapy (role of lymphedema therapy)  Barriers to Learning: None  Education Outcome: Verbalized understanding      TOTAL TREATMENT DURATION AND TIME:  Time In: 1301  Time Out: 1325  Minutes: 24      Jasmin Scales, OT

## 2024-04-22 NOTE — PROGRESS NOTES
Pt complains of pain stating we cannot move her she has been in the same position since last night. Gave pt iv dilaudid and tried to move her but she refused

## 2024-04-22 NOTE — CARE COORDINATION
Pt chart reviewed. During IDR rounds, MD reports pt is not ready for discharge at this time. Changing med regimen per MD. No other needs expressed at this time for case management. Pt has SAYRA with [facility] at discharge. Discharge to be determined. CM will follow patient for care.

## 2024-04-22 NOTE — PLAN OF CARE
Problem: Discharge Planning  Goal: Discharge to home or other facility with appropriate resources  4/22/2024 0111 by Carly Jones, RN  Outcome: Progressing  4/21/2024 1143 by Lalo Pompa RN  Outcome: Progressing  Flowsheets (Taken 4/21/2024 0850)  Discharge to home or other facility with appropriate resources: Identify barriers to discharge with patient and caregiver     Problem: Pain  Goal: Verbalizes/displays adequate comfort level or baseline comfort level  4/22/2024 0111 by Carly Jones RN  Outcome: Progressing  4/21/2024 1143 by Lalo Pompa, RN  Outcome: Progressing     Problem: Skin/Tissue Integrity  Goal: Absence of new skin breakdown  Description: 1.  Monitor for areas of redness and/or skin breakdown  2.  Assess vascular access sites hourly  3.  Every 4-6 hours minimum:  Change oxygen saturation probe site  4.  Every 4-6 hours:  If on nasal continuous positive airway pressure, respiratory therapy assess nares and determine need for appliance change or resting period.  Outcome: Progressing     Problem: ABCDS Injury Assessment  Goal: Absence of physical injury  Outcome: Progressing     Problem: Safety - Adult  Goal: Free from fall injury  Outcome: Progressing     Problem: Chronic Conditions and Co-morbidities  Goal: Patient's chronic conditions and co-morbidity symptoms are monitored and maintained or improved  Outcome: Progressing

## 2024-04-23 ENCOUNTER — HOME HEALTH ADMISSION (OUTPATIENT)
Dept: HOME HEALTH SERVICES | Facility: HOME HEALTH | Age: 49
End: 2024-04-23

## 2024-04-23 LAB
ALBUMIN SERPL-MCNC: 1.9 G/DL (ref 3.5–5)
ANION GAP SERPL CALC-SCNC: 4 MMOL/L (ref 2–11)
BACTERIA SPEC CULT: NORMAL
BUN SERPL-MCNC: 12 MG/DL (ref 6–23)
CALCIUM SERPL-MCNC: 8.4 MG/DL (ref 8.3–10.4)
CHLORIDE SERPL-SCNC: 104 MMOL/L (ref 103–113)
CO2 SERPL-SCNC: 29 MMOL/L (ref 21–32)
CREAT SERPL-MCNC: 0.9 MG/DL (ref 0.6–1)
GLUCOSE BLD STRIP.AUTO-MCNC: 101 MG/DL (ref 65–100)
GLUCOSE BLD STRIP.AUTO-MCNC: 111 MG/DL (ref 65–100)
GLUCOSE BLD STRIP.AUTO-MCNC: 127 MG/DL (ref 65–100)
GLUCOSE BLD STRIP.AUTO-MCNC: 97 MG/DL (ref 65–100)
GLUCOSE SERPL-MCNC: 99 MG/DL (ref 65–100)
PHOSPHATE SERPL-MCNC: 4.1 MG/DL (ref 2.5–4.5)
POTASSIUM SERPL-SCNC: 3.9 MMOL/L (ref 3.5–5.1)
SERVICE CMNT-IMP: ABNORMAL
SERVICE CMNT-IMP: NORMAL
SERVICE CMNT-IMP: NORMAL
SODIUM SERPL-SCNC: 137 MMOL/L (ref 136–146)

## 2024-04-23 PROCEDURE — 82962 GLUCOSE BLOOD TEST: CPT

## 2024-04-23 PROCEDURE — 6370000000 HC RX 637 (ALT 250 FOR IP): Performed by: INTERNAL MEDICINE

## 2024-04-23 PROCEDURE — 6370000000 HC RX 637 (ALT 250 FOR IP): Performed by: FAMILY MEDICINE

## 2024-04-23 PROCEDURE — 6370000000 HC RX 637 (ALT 250 FOR IP): Performed by: STUDENT IN AN ORGANIZED HEALTH CARE EDUCATION/TRAINING PROGRAM

## 2024-04-23 PROCEDURE — 36415 COLL VENOUS BLD VENIPUNCTURE: CPT

## 2024-04-23 PROCEDURE — 80069 RENAL FUNCTION PANEL: CPT

## 2024-04-23 PROCEDURE — 1100000000 HC RM PRIVATE

## 2024-04-23 PROCEDURE — 2580000003 HC RX 258: Performed by: FAMILY MEDICINE

## 2024-04-23 RX ORDER — LOSARTAN POTASSIUM 50 MG/1
50 TABLET ORAL DAILY
Qty: 90 TABLET | Refills: 0 | Status: SHIPPED | OUTPATIENT
Start: 2024-04-23

## 2024-04-23 RX ORDER — OXYCODONE HCL 10 MG/1
10 TABLET, FILM COATED, EXTENDED RELEASE ORAL EVERY 12 HOURS SCHEDULED
Qty: 20 TABLET | Refills: 0 | Status: SHIPPED | OUTPATIENT
Start: 2024-04-23 | End: 2024-04-23 | Stop reason: HOSPADM

## 2024-04-23 RX ORDER — FLUCONAZOLE 100 MG/1
150 TABLET ORAL
Status: DISCONTINUED | OUTPATIENT
Start: 2024-04-23 | End: 2024-04-24 | Stop reason: HOSPADM

## 2024-04-23 RX ORDER — MINERAL OIL/HYDROPHIL PETROLAT
OINTMENT (GRAM) TOPICAL
Qty: 99 G | Refills: 0 | Status: SHIPPED | OUTPATIENT
Start: 2024-04-23

## 2024-04-23 RX ORDER — FUROSEMIDE 40 MG/1
40 TABLET ORAL DAILY
Qty: 60 TABLET | Refills: 3 | Status: SHIPPED | OUTPATIENT
Start: 2024-04-24

## 2024-04-23 RX ORDER — OXYCODONE HCL 10 MG/1
10 TABLET, FILM COATED, EXTENDED RELEASE ORAL EVERY 12 HOURS SCHEDULED
Status: COMPLETED | OUTPATIENT
Start: 2024-04-23 | End: 2024-04-23

## 2024-04-23 RX ORDER — BACLOFEN 10 MG/1
10 TABLET ORAL 3 TIMES DAILY
Qty: 90 TABLET | Refills: 0 | Status: SHIPPED | OUTPATIENT
Start: 2024-04-23 | End: 2024-05-23

## 2024-04-23 RX ORDER — DIPHENHYDRAMINE HCL 25 MG
25 CAPSULE ORAL EVERY 8 HOURS PRN
Status: DISCONTINUED | OUTPATIENT
Start: 2024-04-23 | End: 2024-04-24 | Stop reason: HOSPADM

## 2024-04-23 RX ORDER — OXYCODONE HYDROCHLORIDE 5 MG/1
5 TABLET ORAL EVERY 4 HOURS PRN
Status: DISCONTINUED | OUTPATIENT
Start: 2024-04-23 | End: 2024-04-24 | Stop reason: HOSPADM

## 2024-04-23 RX ORDER — OXYCODONE HYDROCHLORIDE 10 MG/1
10 TABLET ORAL EVERY 6 HOURS PRN
Qty: 20 TABLET | Refills: 0 | Status: ON HOLD | OUTPATIENT
Start: 2024-04-23 | End: 2024-05-01 | Stop reason: HOSPADM

## 2024-04-23 RX ORDER — OXYCODONE HYDROCHLORIDE 5 MG/1
10 TABLET ORAL EVERY 6 HOURS
Status: DISCONTINUED | OUTPATIENT
Start: 2024-04-24 | End: 2024-04-24 | Stop reason: HOSPADM

## 2024-04-23 RX ADMIN — DOCUSATE SODIUM 100 MG: 100 CAPSULE, LIQUID FILLED ORAL at 10:10

## 2024-04-23 RX ADMIN — FLUTICASONE PROPIONATE 2 SPRAY: 50 SPRAY, METERED NASAL at 10:16

## 2024-04-23 RX ADMIN — DULOXETINE HYDROCHLORIDE 60 MG: 60 CAPSULE, DELAYED RELEASE ORAL at 10:10

## 2024-04-23 RX ADMIN — Medication: at 20:30

## 2024-04-23 RX ADMIN — SODIUM CHLORIDE, PRESERVATIVE FREE 10 ML: 5 INJECTION INTRAVENOUS at 10:12

## 2024-04-23 RX ADMIN — METOPROLOL SUCCINATE 25 MG: 25 TABLET, EXTENDED RELEASE ORAL at 10:10

## 2024-04-23 RX ADMIN — PANTOPRAZOLE SODIUM 40 MG: 40 TABLET, DELAYED RELEASE ORAL at 05:52

## 2024-04-23 RX ADMIN — BACLOFEN 10 MG: 10 TABLET ORAL at 21:42

## 2024-04-23 RX ADMIN — FUROSEMIDE 40 MG: 40 TABLET ORAL at 10:10

## 2024-04-23 RX ADMIN — OXYCODONE HYDROCHLORIDE 10 MG: 10 TABLET, FILM COATED, EXTENDED RELEASE ORAL at 10:10

## 2024-04-23 RX ADMIN — DIPHENHYDRAMINE HYDROCHLORIDE 25 MG: 25 CAPSULE ORAL at 19:03

## 2024-04-23 RX ADMIN — HYDROXYZINE HYDROCHLORIDE 25 MG: 25 TABLET, FILM COATED ORAL at 21:42

## 2024-04-23 RX ADMIN — OXYCODONE 5 MG: 5 TABLET ORAL at 05:53

## 2024-04-23 RX ADMIN — Medication: at 10:12

## 2024-04-23 RX ADMIN — PREGABALIN 200 MG: 100 CAPSULE ORAL at 10:10

## 2024-04-23 RX ADMIN — DOCUSATE SODIUM 100 MG: 100 CAPSULE, LIQUID FILLED ORAL at 21:42

## 2024-04-23 RX ADMIN — FLUCONAZOLE 150 MG: 100 TABLET ORAL at 15:03

## 2024-04-23 RX ADMIN — PREGABALIN 200 MG: 100 CAPSULE ORAL at 15:03

## 2024-04-23 RX ADMIN — BACLOFEN 10 MG: 10 TABLET ORAL at 10:10

## 2024-04-23 RX ADMIN — OXYCODONE 5 MG: 5 TABLET ORAL at 17:10

## 2024-04-23 RX ADMIN — CETIRIZINE HYDROCHLORIDE 10 MG: 10 TABLET, FILM COATED ORAL at 10:10

## 2024-04-23 RX ADMIN — PREGABALIN 200 MG: 100 CAPSULE ORAL at 21:42

## 2024-04-23 RX ADMIN — OXYCODONE HYDROCHLORIDE 10 MG: 10 TABLET, FILM COATED, EXTENDED RELEASE ORAL at 21:42

## 2024-04-23 RX ADMIN — MONTELUKAST 10 MG: 10 TABLET, FILM COATED ORAL at 10:10

## 2024-04-23 RX ADMIN — LOSARTAN POTASSIUM 50 MG: 50 TABLET, FILM COATED ORAL at 10:10

## 2024-04-23 RX ADMIN — OXYCODONE 5 MG: 5 TABLET ORAL at 01:39

## 2024-04-23 RX ADMIN — ASPIRIN 81 MG 81 MG: 81 TABLET ORAL at 10:10

## 2024-04-23 RX ADMIN — Medication: at 03:29

## 2024-04-23 RX ADMIN — BACLOFEN 10 MG: 10 TABLET ORAL at 15:03

## 2024-04-23 ASSESSMENT — PAIN DESCRIPTION - ORIENTATION
ORIENTATION: RIGHT;LEFT;LOWER
ORIENTATION: LEFT;RIGHT;LOWER

## 2024-04-23 ASSESSMENT — PAIN DESCRIPTION - LOCATION: LOCATION: LEG

## 2024-04-23 ASSESSMENT — PAIN DESCRIPTION - DESCRIPTORS: DESCRIPTORS: BURNING

## 2024-04-23 ASSESSMENT — PAIN SCALES - GENERAL
PAINLEVEL_OUTOF10: 6
PAINLEVEL_OUTOF10: 6
PAINLEVEL_OUTOF10: 10

## 2024-04-23 NOTE — PROGRESS NOTES
END OF SHIFT NOTE:    INTAKE/OUTPUT  04/22 0701 - 04/23 0700  In: -   Out: 1350 [Urine:1350]  Voiding: Yes  Catheter: Yes  Drain:   External Urinary Catheter (Active)   Site Assessment Clean,dry & intact 04/21/24 1406   Placement Replaced 04/23/24 1651   Securement Method Securing device (Describe) 04/23/24 1651   Catheter Care Catheter/Wick replaced 04/23/24 1651   Perineal Care Yes 04/23/24 1651   Suction 40 mmgHg continuous 04/21/24 1338   Urine Color Yellow 04/21/24 1338   Urine Appearance Clear 04/21/24 1338   Output (mL) 350 mL 04/23/24 1651               Flatus: Patient does have flatus present.    Stool:  occurrences.  0  Characteristics:  Stool Appearance: Formed  Stool Color: Brown  Stool Amount: Large  Stool Assessment  Incontinence: No  Stool Appearance: Formed  Stool Color: Brown  Stool Amount: Large  Stool Source: Rectum  Last BM (including prior to admit): 04/21/24 (per pt)    Emesis:  occurrences.  0  Characteristics:        VITAL SIGNS  Patient Vitals for the past 12 hrs:   Temp Pulse Resp BP SpO2   04/23/24 1912 98.1 °F (36.7 °C) 95 16 114/63 99 %   04/23/24 1547 98.4 °F (36.9 °C) 92 19 118/61 95 %   04/23/24 1218 98.2 °F (36.8 °C) 88 19 113/67 95 %   04/23/24 1015 -- -- -- 114/67 --   04/23/24 1010 -- -- -- (!) 99/58 --       Pain Assessment  Pain Level: 6 (04/23/24 0553)  Pain Location: Leg  Patient's Stated Pain Goal: 0 - No pain    Ambulating  Yes    Shift report given to oncoming nurse at the bedside.    Jennifer Banks RN

## 2024-04-23 NOTE — PROGRESS NOTES
Hospitalist Progress Note   Admit Date:  2024 10:07 PM   Name:  Lo Tirado   Age:  48 y.o.  Sex:  female  :  1975   MRN:  [id number]   Room:      Presenting/Chief Complaint: Leg Pain and Cellulitis     Reason(s) for Admission: Intractable pain [R52]  Bilateral lower leg cellulitis [L03.116, L03.115]  Leg ulcer, left, with fat layer exposed (HCC) [L97.922]  Lymphedema [I89.0]     Hospital Course:       Lo Tirado is a 48 y.o. female with medical history of chronic pain followed by pain management, neuropathy, lymphedema, cellulitis, BMI 56, HTN, DM2, CKD, bipolar disorder, admitted with LE pain in setting of lymphedema, ongoing leg wounds.  She was discharged to home 15 with similar complaints, s/p vancomycin, zosyn and changed to doxycycline/ omnicef for continued course. Discharged on norco but states this has not helped. Previously was on MS contin.  Is followed by pain management.        She was given ketamine/fentanyl by EMS And somnolent upon arrival.   Labs and vitals stable. BLE xrays negative for osteomyelitis, some soft tissue swelling on lower right leg. ID and wound care consulted. ID recommended discontinuing antibiotics.  Wound care recommends compression /and wound changes daily.   Admission complicated by behavioral disturbances. Seen by psychiatry with no evidence of SI/HI. No new medications recommended.     Admission complicated by inability to achieve adequate pain control.     Subjective & 24hr Events:   Pt was to discharge today. Pain control finally achieved on oxycontin 10 ER. Unfortunately pt's pharmacy has notified us that insurance will not cover this. Additionally pharmacy will not have brand name lyrica until tomorrow and pt will not take generic. HH agency she was with will not accept her back. Her walker was not delivered.      Assessment & Plan:     Principal Problem:    Leg ulcer, left, with fat layer exposed (HCC)    Lymphedema of both lower     sodium chloride flush 0.9 % injection 5-40 mL  5-40 mL IntraVENous 2 times per day    sodium chloride flush 0.9 % injection 5-40 mL  5-40 mL IntraVENous PRN    0.9 % sodium chloride infusion   IntraVENous PRN    potassium chloride (KLOR-CON M) extended release tablet 40 mEq  40 mEq Oral PRN    Or    potassium bicarb-citric acid (EFFER-K) effervescent tablet 40 mEq  40 mEq Oral PRN    Or    potassium chloride 10 mEq/100 mL IVPB (Peripheral Line)  10 mEq IntraVENous PRN    magnesium sulfate 2000 mg in 50 mL IVPB premix  2,000 mg IntraVENous PRN    enoxaparin (LOVENOX) injection 40 mg  40 mg SubCUTAneous BID    ondansetron (ZOFRAN-ODT) disintegrating tablet 4 mg  4 mg Oral Q8H PRN    Or    ondansetron (ZOFRAN) injection 4 mg  4 mg IntraVENous Q6H PRN    polyethylene glycol (GLYCOLAX) packet 17 g  17 g Oral Daily PRN    acetaminophen (TYLENOL) tablet 650 mg  650 mg Oral Q6H PRN    Or    acetaminophen (TYLENOL) suppository 650 mg  650 mg Rectal Q6H PRN    glucose chewable tablet 16 g  4 tablet Oral PRN    dextrose bolus 10% 125 mL  125 mL IntraVENous PRN    Or    dextrose bolus 10% 250 mL  250 mL IntraVENous PRN    Glucagon Emergency KIT 1 mg  1 mg SubCUTAneous PRN    dextrose 10 % infusion   IntraVENous Continuous PRN    insulin lispro (HUMALOG) injection vial 0-4 Units  0-4 Units SubCUTAneous TID WC    insulin lispro (HUMALOG) injection vial 0-4 Units  0-4 Units SubCUTAneous Nightly       Signed:  Regine Redman MD    Part of this note may have been written by using a voice dictation software.  The note has been proof read but may still contain some grammatical/other typographical errors.

## 2024-04-23 NOTE — PROGRESS NOTES
END OF SHIFT NOTE:    INTAKE/OUTPUT  04/22 0701 - 04/23 0700  In: -   Out: 1350 [Urine:1350]  Voiding: Yes  Catheter: Yes; external  Drain:   External Urinary Catheter (Active)   Site Assessment Clean,dry & intact 04/21/24 1406   Placement Replaced 04/22/24 1442   Securement Method Securing device (Describe) 04/22/24 1442   Catheter Care Catheter/Wick replaced;Suction Canister/Tubing changed 04/22/24 1442   Perineal Care Yes 04/22/24 1442   Suction 40 mmgHg continuous 04/21/24 1338   Urine Color Yellow 04/21/24 1338   Urine Appearance Clear 04/21/24 1338   Output (mL) 350 mL 04/22/24 2207               Flatus: Patient does have flatus present.    Stool:  occurrences.    Characteristics:  Stool Appearance: Formed  Stool Color: Brown  Stool Amount: Large  Stool Assessment  Incontinence: No  Stool Appearance: Formed  Stool Color: Brown  Stool Amount: Large  Stool Source: Rectum  Last BM (including prior to admit): 04/21/24 (per pt)    Emesis:  occurrences.    Characteristics:        VITAL SIGNS  Patient Vitals for the past 12 hrs:   Temp Pulse Resp BP SpO2   04/23/24 0553 -- -- -- 113/69 --   04/23/24 0343 98.8 °F (37.1 °C) 94 17 114/76 95 %   04/23/24 0139 -- -- -- 104/75 --   04/22/24 2313 98.2 °F (36.8 °C) 95 17 (!) 103/58 94 %   04/22/24 2207 -- -- -- (!) 106/58 --   04/22/24 1958 99.7 °F (37.6 °C) 97 18 (!) 111/56 95 %       Pain Assessment  Pain Level: 6 (04/23/24 0553)  Pain Location: Leg  Patient's Stated Pain Goal: 0 - No pain    Ambulating  No    Shift report given to oncoming nurse at the bedside.    GORDON HERNANDEZ RN

## 2024-04-23 NOTE — PLAN OF CARE
Achieved pain control with oxycodone 10 mg extended release and sent this to [facility] pharmacy but is not covered by insurance. Have sent MME equivalent of 10 mg q6 to pharmacy as they are unable to tell me if insurance will cover this medication for her until it is prescribed. If this is not covered, I will call and verbally cancel both prescriptions to ensure there is no risk of multiple narcotic agents being available.     I have reviewed the patient's .

## 2024-04-23 NOTE — PROGRESS NOTES
Writer in patient room and noticed the BLE drsg's were \"loosened\". Patient admitted to writer that she loosened the drsg. Writer attempted to re-wrap the dressings and patient declined. Patient educated on the importance of the wounds being covered and compressed.

## 2024-04-23 NOTE — PROGRESS NOTES
Pt has only had 350 mL UOP at 2200. No UOP last 8 hours. Bladder scanner shows 147 mL. Hospitalist notified. No new orders received.

## 2024-04-23 NOTE — CARE COORDINATION
Pt discharge is pending DME delivery and prescriptions at this time. Referral sent to [facility] to review and follow up with pt for wound dressing care at discharge. Will continue to follow pt for care.

## 2024-04-24 ENCOUNTER — TELEPHONE (OUTPATIENT)
Dept: FAMILY MEDICINE CLINIC | Facility: CLINIC | Age: 49
End: 2024-04-24

## 2024-04-24 VITALS
RESPIRATION RATE: 17 BRPM | TEMPERATURE: 98.1 F | BODY MASS INDEX: 45.99 KG/M2 | HEART RATE: 89 BPM | OXYGEN SATURATION: 96 % | HEIGHT: 67 IN | SYSTOLIC BLOOD PRESSURE: 111 MMHG | WEIGHT: 293 LBS | DIASTOLIC BLOOD PRESSURE: 65 MMHG

## 2024-04-24 LAB
GLUCOSE BLD STRIP.AUTO-MCNC: 93 MG/DL (ref 65–100)
GLUCOSE BLD STRIP.AUTO-MCNC: 97 MG/DL (ref 65–100)
GLUCOSE BLD STRIP.AUTO-MCNC: 99 MG/DL (ref 65–100)
SERVICE CMNT-IMP: NORMAL

## 2024-04-24 PROCEDURE — 6370000000 HC RX 637 (ALT 250 FOR IP): Performed by: FAMILY MEDICINE

## 2024-04-24 PROCEDURE — 82962 GLUCOSE BLOOD TEST: CPT

## 2024-04-24 PROCEDURE — 6370000000 HC RX 637 (ALT 250 FOR IP): Performed by: STUDENT IN AN ORGANIZED HEALTH CARE EDUCATION/TRAINING PROGRAM

## 2024-04-24 PROCEDURE — 2580000003 HC RX 258: Performed by: FAMILY MEDICINE

## 2024-04-24 RX ORDER — FLUCONAZOLE 150 MG/1
150 TABLET ORAL ONCE
Qty: 1 TABLET | Refills: 0 | Status: SHIPPED | OUTPATIENT
Start: 2024-04-25 | End: 2024-04-25

## 2024-04-24 RX ADMIN — DOCUSATE SODIUM 100 MG: 100 CAPSULE, LIQUID FILLED ORAL at 08:16

## 2024-04-24 RX ADMIN — OXYCODONE 10 MG: 5 TABLET ORAL at 08:21

## 2024-04-24 RX ADMIN — BACLOFEN 10 MG: 10 TABLET ORAL at 14:27

## 2024-04-24 RX ADMIN — OXYCODONE 5 MG: 5 TABLET ORAL at 17:00

## 2024-04-24 RX ADMIN — ASPIRIN 81 MG 81 MG: 81 TABLET ORAL at 08:17

## 2024-04-24 RX ADMIN — CETIRIZINE HYDROCHLORIDE 10 MG: 10 TABLET, FILM COATED ORAL at 08:20

## 2024-04-24 RX ADMIN — OXYCODONE 10 MG: 5 TABLET ORAL at 13:27

## 2024-04-24 RX ADMIN — DULOXETINE HYDROCHLORIDE 60 MG: 60 CAPSULE, DELAYED RELEASE ORAL at 08:19

## 2024-04-24 RX ADMIN — FLUTICASONE PROPIONATE 2 SPRAY: 50 SPRAY, METERED NASAL at 08:20

## 2024-04-24 RX ADMIN — BACLOFEN 10 MG: 10 TABLET ORAL at 08:19

## 2024-04-24 RX ADMIN — PANTOPRAZOLE SODIUM 40 MG: 40 TABLET, DELAYED RELEASE ORAL at 05:20

## 2024-04-24 RX ADMIN — METOPROLOL SUCCINATE 25 MG: 25 TABLET, EXTENDED RELEASE ORAL at 08:20

## 2024-04-24 RX ADMIN — SODIUM CHLORIDE, PRESERVATIVE FREE 10 ML: 5 INJECTION INTRAVENOUS at 08:22

## 2024-04-24 RX ADMIN — Medication: at 08:21

## 2024-04-24 RX ADMIN — FUROSEMIDE 40 MG: 40 TABLET ORAL at 08:17

## 2024-04-24 RX ADMIN — PREGABALIN 200 MG: 100 CAPSULE ORAL at 14:27

## 2024-04-24 RX ADMIN — PREGABALIN 200 MG: 100 CAPSULE ORAL at 08:22

## 2024-04-24 RX ADMIN — MONTELUKAST 10 MG: 10 TABLET, FILM COATED ORAL at 08:21

## 2024-04-24 RX ADMIN — DIPHENHYDRAMINE HYDROCHLORIDE 25 MG: 25 CAPSULE ORAL at 08:55

## 2024-04-24 ASSESSMENT — PAIN SCALES - GENERAL
PAINLEVEL_OUTOF10: 0
PAINLEVEL_OUTOF10: 10
PAINLEVEL_OUTOF10: 10
PAINLEVEL_OUTOF10: 0
PAINLEVEL_OUTOF10: 10

## 2024-04-24 ASSESSMENT — PAIN DESCRIPTION - FREQUENCY
FREQUENCY: CONTINUOUS
FREQUENCY: CONTINUOUS

## 2024-04-24 ASSESSMENT — PAIN DESCRIPTION - ORIENTATION
ORIENTATION: LEFT;RIGHT

## 2024-04-24 ASSESSMENT — PAIN DESCRIPTION - LOCATION
LOCATION: LEG

## 2024-04-24 ASSESSMENT — PAIN DESCRIPTION - PAIN TYPE
TYPE: CHRONIC PAIN
TYPE: CHRONIC PAIN

## 2024-04-24 ASSESSMENT — PAIN DESCRIPTION - DESCRIPTORS
DESCRIPTORS: BURNING

## 2024-04-24 NOTE — CARE COORDINATION
[facility] has accepted pt, pt has discharge order for today. No other needs identified.

## 2024-04-24 NOTE — PROGRESS NOTES
END OF SHIFT NOTE:    INTAKE/OUTPUT  04/23 0701 - 04/24 0700  In: 180 [P.O.:180]  Out: 2350 [Urine:2350]  Voiding: Yes  Catheter: Yes; external  Drain:   External Urinary Catheter (Active)   Site Assessment Clean,dry & intact 04/21/24 1406   Placement Replaced 04/23/24 1651   Securement Method Securing device (Describe) 04/23/24 1651   Catheter Care Catheter/Wick replaced 04/23/24 1651   Perineal Care Yes 04/23/24 1651   Suction 40 mmgHg continuous 04/21/24 1338   Urine Color Yellow 04/21/24 1338   Urine Appearance Clear 04/21/24 1338   Output (mL) 600 mL 04/24/24 0343               Flatus: Patient does have flatus present.    Stool:  occurrences.    Characteristics:  Stool Appearance: Formed  Stool Color: Brown  Stool Amount: Large  Stool Assessment  Incontinence: No  Stool Appearance: Formed  Stool Color: Brown  Stool Amount: Large  Stool Source: Rectum  Last BM (including prior to admit): 04/21/24 (per pt)    Emesis:  occurrences.    Characteristics:        VITAL SIGNS  Patient Vitals for the past 12 hrs:   Temp Pulse Resp BP SpO2   04/24/24 0343 98.3 °F (36.8 °C) 89 14 105/67 94 %   04/23/24 2246 98.1 °F (36.7 °C) 91 16 112/61 97 %   04/23/24 1912 98.1 °F (36.7 °C) 95 16 114/63 99 %       Pain Assessment  Pain Level: 10 (unable to receive pain meds at this time, drsg was changed for comfort) (04/23/24 1930)  Pain Location: Leg  Patient's Stated Pain Goal: 0 - No pain    Ambulating  No    Shift report given to oncoming nurse at the bedside.    GORDON HERNANDEZ RN

## 2024-04-24 NOTE — DISCHARGE INSTRUCTIONS
Please follow up with your primary care and pain management. Get well soon!      DISCHARGE SUMMARY from Nurse    PATIENT INSTRUCTIONS:    After general anesthesia or intravenous sedation, for 24 hours or while taking prescription Narcotics:  Limit your activities  Do not drive and operate hazardous machinery  Do not make important personal or business decisions  Do  not drink alcoholic beverages  If you have not urinated within 8 hours after discharge, please contact your surgeon on call.    Report the following to your surgeon:  Excessive pain, swelling, redness or odor of or around the surgical area  Temperature over 100.5  Nausea and vomiting lasting longer than 4 hours or if unable to take medications  Any signs of decreased circulation or nerve impairment to extremity: change in color, persistent  numbness, tingling, coldness or increase pain  Any questions    What to do at Home:  Recommended activity: activity as tolerated and no driving while taking narcotic pain medications.    If you experience any of the following symptoms fever of 101 or greater, pain unrelieved by medication, increased swelling to legs, green-tan drain from legs please follow up with PCP.    *  Please give a list of your current medications to your Primary Care Provider.    *  Please update this list whenever your medications are discontinued, doses are      changed, or new medications (including over-the-counter products) are added.    *  Please carry medication information at all times in case of emergency situations.    These are general instructions for a healthy lifestyle:    No smoking/ No tobacco products/ Avoid exposure to second hand smoke  Surgeon General's Warning:  Quitting smoking now greatly reduces serious risk to your health.    Obesity, smoking, and sedentary lifestyle greatly increases your risk for illness    A healthy diet, regular physical exercise & weight monitoring are important for maintaining a healthy  leading from the area.  Pus draining from the area.  A fever.   Watch closely for changes in your health, and be sure to contact your doctor if:    You have new or worse symptoms from lymphedema.   Where can you learn more?  Go to https://www.[employer].net/patientEd and enter V398 to learn more about \"Lymphedema: Care Instructions.\"  Current as of: December 13, 2023               Content Version: 14.0  © 2006-2024 [employer].   Care instructions adapted under license by [employer]. If you have questions about a medical condition or this instruction, always ask your healthcare professional. [employer] disclaims any warranty or liability for your use of this information.         Learning About How to Care for Your Chronic Wound  What is a chronic wound?     Some wounds don't heal quickly. They take a long time to get better. These are called chronic wounds. Chronic wounds include:  Venous leg wounds. These are shallow wounds. They happen when veins in the leg aren't working well.  Wounds that result from diabetes. These happen when nerves are damaged, so you may not feel pain. High blood sugar can damage nerves. If you don't notice them, sores and infections or other problems may not get treated.  Arterial ulcers. These are deep wounds that happen when there isn't enough blood flow to the legs or feet.  Pressure injuries. These are wounds over bony parts of the body. They happen when you're not able to move well in bed or change your position when sitting.  Wounds from surgery that were closed and then reopened on their own.  How can you care for yourself at home?  Follow your doctor's instructions for how to care for the type of wound that you have. And know that your care team is there to help. While it may take some time, here are some things you can do to help your wound heal.  Take your medicines exactly as prescribed.   Be safe with medicines. Read and follow all instructions on

## 2024-04-24 NOTE — TELEPHONE ENCOUNTER
[facility] health called patient currently in hospital and when discharged and seen by doctor can a order be sent to them to start home health care    .

## 2024-04-24 NOTE — PLAN OF CARE
Problem: Discharge Planning  Goal: Discharge to home or other facility with appropriate resources  4/24/2024 1502 by Liz High RN  Outcome: Progressing  4/24/2024 0356 by Ruby Mccauley RN  Outcome: Progressing     Problem: Pain  Goal: Verbalizes/displays adequate comfort level or baseline comfort level  4/24/2024 1502 by Liz High RN  Outcome: Progressing  4/24/2024 0356 by Ruby Mccauley RN  Outcome: Progressing  Flowsheets (Taken 4/23/2024 1930)  Verbalizes/displays adequate comfort level or baseline comfort level:   Encourage patient to monitor pain and request assistance   Assess pain using appropriate pain scale   Administer analgesics based on type and severity of pain and evaluate response     Problem: Skin/Tissue Integrity  Goal: Absence of new skin breakdown  Description: 1.  Monitor for areas of redness and/or skin breakdown  2.  Assess vascular access sites hourly  3.  Every 4-6 hours minimum:  Change oxygen saturation probe site  4.  Every 4-6 hours:  If on nasal continuous positive airway pressure, respiratory therapy assess nares and determine need for appliance change or resting period.  4/24/2024 1502 by Liz High RN  Outcome: Progressing  4/24/2024 0356 by Ruby Mccauley RN  Outcome: Progressing     Problem: ABCDS Injury Assessment  Goal: Absence of physical injury  4/24/2024 1502 by Liz Hihg RN  Outcome: Progressing  4/24/2024 0356 by Ruby Mccauley RN  Outcome: Progressing     Problem: Safety - Adult  Goal: Free from fall injury  4/24/2024 1502 by Liz High RN  Outcome: Progressing  Flowsheets (Taken 4/24/2024 0710)  Free From Fall Injury: Instruct family/caregiver on patient safety  4/24/2024 0356 by Ruby Mccauley RN  Outcome: Progressing     Problem: Chronic Conditions and Co-morbidities  Goal: Patient's chronic conditions and co-morbidity symptoms are monitored and maintained or improved  4/24/2024 1502 by Liz High

## 2024-04-24 NOTE — DISCHARGE SUMMARY
midline  CV:   RRR.  No m/r/g.  No JVD  Lungs:   CTAB.  No wheezing, rhonchi, or rales.  Respirations even, unlabored  Abdomen:   Soft, nontender, nondistended.    Extremities: Warm and dry.  BLE with clean, dry dressing    Skin:     No rashes.  Normal coloration  Neuro:  CN II-XII grossly intact.  Psych:  Normal mood and affect.    Signed:  Regine Redman MD    Part of this note may have been written by using a voice dictation software.  The note has been proof read but may still contain some grammatical/other typographical errors.

## 2024-04-24 NOTE — PLAN OF CARE
Problem: Discharge Planning  Goal: Discharge to home or other facility with appropriate resources  4/24/2024 0356 by Ruby Mccauley RN  Outcome: Progressing  4/23/2024 1841 by Jennifer Banks RN  Outcome: Progressing     Problem: Pain  Goal: Verbalizes/displays adequate comfort level or baseline comfort level  4/24/2024 0356 by Ruby Mccauley RN  Outcome: Progressing  Flowsheets (Taken 4/23/2024 1930)  Verbalizes/displays adequate comfort level or baseline comfort level:   Encourage patient to monitor pain and request assistance   Assess pain using appropriate pain scale   Administer analgesics based on type and severity of pain and evaluate response  4/23/2024 1841 by Jennifer Banks RN  Outcome: Progressing     Problem: Skin/Tissue Integrity  Goal: Absence of new skin breakdown  Description: 1.  Monitor for areas of redness and/or skin breakdown  2.  Assess vascular access sites hourly  3.  Every 4-6 hours minimum:  Change oxygen saturation probe site  4.  Every 4-6 hours:  If on nasal continuous positive airway pressure, respiratory therapy assess nares and determine need for appliance change or resting period.  4/24/2024 0356 by Ruby Mccauley RN  Outcome: Progressing  4/23/2024 1841 by Jennifer Banks RN  Outcome: Progressing     Problem: ABCDS Injury Assessment  Goal: Absence of physical injury  4/24/2024 0356 by Ruby Mccauley RN  Outcome: Progressing  4/23/2024 1841 by Jennifer Banks RN  Outcome: Progressing     Problem: Safety - Adult  Goal: Free from fall injury  4/24/2024 0356 by Ruby Mccauley RN  Outcome: Progressing  4/23/2024 1841 by Jennifer Banks RN  Outcome: Progressing     Problem: Chronic Conditions and Co-morbidities  Goal: Patient's chronic conditions and co-morbidity symptoms are monitored and maintained or improved  4/24/2024 0356 by Ruby Mccauley RN  Outcome: Progressing  4/23/2024 1841 by Jennifer Banks RN  Outcome: Progressing

## 2024-04-24 NOTE — FLOWSHEET NOTE
04/24/24 1715   AVS Reviewed   AVS & discharge instructions reviewed with patient and/or representative? Yes   Reviewed instructions with Patient   Level of Understanding Questions answered;Verbalized understanding

## 2024-04-25 ENCOUNTER — CARE COORDINATION (OUTPATIENT)
Dept: CARE COORDINATION | Facility: CLINIC | Age: 49
End: 2024-04-25

## 2024-04-25 PROBLEM — F40.9 FEAR ASSOCIATED WITH ILLNESS AND BODY FUNCTION: Status: ACTIVE | Noted: 2024-04-25

## 2024-04-25 PROBLEM — R45.4 IRRITABILITY AND ANGER: Status: ACTIVE | Noted: 2024-04-25

## 2024-04-25 PROBLEM — R52 INTRACTABLE PAIN: Status: ACTIVE | Noted: 2024-04-25

## 2024-04-25 NOTE — CARE COORDINATION
Care Transitions Outreach Attempt    Call within 2 business days of discharge: Yes   Attempted to reach patient for transitions of care follow up. Unable to reach patient.    Patient: Lo Tirado Patient : 1975 MRN: [id number]    Last Discharge Facility       Date Complaint Diagnosis Description Type Department Provider    24 Leg Pain; Cellulitis Bilateral lower leg cellulitis ... ED to Hosp-Admission (Discharged) (ADMITTED) [id number] Gray MD; Yassine Moreno..              Was this an external facility discharge? No Discharge Facility Name: [facility]    Noted following upcoming appointments from discharge chart review:   BS follow up appointment(s):   Future Appointments   Date Time Provider Department Center   5/15/2024  1:10 PM Camryn Gresham PA [facility]     Non-BS  follow up appointment(s): [facility] at 3:30 am

## 2024-04-26 ENCOUNTER — CARE COORDINATION (OUTPATIENT)
Dept: CARE COORDINATION | Facility: CLINIC | Age: 49
End: 2024-04-26

## 2024-04-26 NOTE — CARE COORDINATION
Care Transitions Outreach Attempt    Call within 2 business days of discharge: Yes   Attempted to reach patient for transitions of care follow up. Unable to reach patient.    Patient: Lo Tirado Patient : 1975 MRN: [id number]    Last Discharge Facility       Date Complaint Diagnosis Description Type Department Provider    24 Leg Pain; Cellulitis Bilateral lower leg cellulitis ... ED to Hosp-Admission (Discharged) (ADMITTED) [id number] Gray MD; Yassine Moreno..              Was this an external facility discharge? No Discharge Facility Name: [facility]    Noted following upcoming appointments from discharge chart review:   BS follow up appointment(s):   Future Appointments   Date Time Provider Department Center   5/15/2024  1:10 PM Camryn Gresham PA [facility]     Non-BS  follow up appointment(s):  [facility] at 3:30 am

## 2024-04-27 ENCOUNTER — HOSPITAL ENCOUNTER (INPATIENT)
Age: 49
LOS: 4 days | Discharge: HOME | DRG: 300 | End: 2024-05-01
Attending: EMERGENCY MEDICINE | Admitting: INTERNAL MEDICINE
Payer: MEDICARE

## 2024-04-27 DIAGNOSIS — E66.01 MORBID OBESITY (HCC): ICD-10-CM

## 2024-04-27 DIAGNOSIS — M79.604 CHRONIC PAIN OF BOTH LOWER EXTREMITIES: Primary | ICD-10-CM

## 2024-04-27 DIAGNOSIS — L97.312 CHRONIC ULCER OF RIGHT ANKLE WITH FAT LAYER EXPOSED (HCC): Chronic | ICD-10-CM

## 2024-04-27 DIAGNOSIS — M79.605 CHRONIC PAIN OF BOTH LOWER EXTREMITIES: Primary | ICD-10-CM

## 2024-04-27 DIAGNOSIS — G89.29 CHRONIC PAIN OF BOTH LOWER EXTREMITIES: Primary | ICD-10-CM

## 2024-04-27 DIAGNOSIS — L97.922 LEG ULCER, LEFT, WITH FAT LAYER EXPOSED (HCC): ICD-10-CM

## 2024-04-27 PROBLEM — R53.81 DEBILITY: Status: ACTIVE | Noted: 2024-04-27

## 2024-04-27 LAB
ALBUMIN SERPL-MCNC: 2.6 G/DL (ref 3.5–5)
ALBUMIN/GLOB SERPL: 0.5 (ref 1–1.9)
ALP SERPL-CCNC: 77 U/L (ref 35–104)
ALT SERPL-CCNC: 16 U/L (ref 12–65)
ANION GAP SERPL CALC-SCNC: 10 MMOL/L (ref 9–18)
AST SERPL-CCNC: 36 U/L (ref 15–37)
BASOPHILS # BLD: 0 K/UL (ref 0–0.2)
BASOPHILS NFR BLD: 0 % (ref 0–2)
BILIRUB SERPL-MCNC: 0.5 MG/DL (ref 0–1.2)
BUN SERPL-MCNC: 9 MG/DL (ref 6–23)
CALCIUM SERPL-MCNC: 9.2 MG/DL (ref 8.8–10.2)
CHLORIDE SERPL-SCNC: 98 MMOL/L (ref 98–107)
CO2 SERPL-SCNC: 27 MMOL/L (ref 20–28)
CREAT SERPL-MCNC: 0.95 MG/DL (ref 0.6–1.1)
DIFFERENTIAL METHOD BLD: ABNORMAL
EOSINOPHIL # BLD: 0.2 K/UL (ref 0–0.8)
EOSINOPHIL NFR BLD: 2 % (ref 0.5–7.8)
ERYTHROCYTE [DISTWIDTH] IN BLOOD BY AUTOMATED COUNT: 16.2 % (ref 11.9–14.6)
EST. AVERAGE GLUCOSE BLD GHB EST-MCNC: 119 MG/DL
GLOBULIN SER CALC-MCNC: 5.1 G/DL (ref 2.3–3.5)
GLUCOSE SERPL-MCNC: 97 MG/DL (ref 70–99)
HBA1C MFR BLD: 5.8 % (ref 0–5.6)
HCT VFR BLD AUTO: 30.6 % (ref 35.8–46.3)
HGB BLD-MCNC: 9.3 G/DL (ref 11.7–15.4)
IMM GRANULOCYTES # BLD AUTO: 0 K/UL (ref 0–0.5)
IMM GRANULOCYTES NFR BLD AUTO: 0 % (ref 0–5)
LACTATE SERPL-SCNC: 1.1 MMOL/L (ref 0.5–2)
LYMPHOCYTES # BLD: 0.7 K/UL (ref 0.5–4.6)
LYMPHOCYTES NFR BLD: 8 % (ref 13–44)
MCH RBC QN AUTO: 26.2 PG (ref 26.1–32.9)
MCHC RBC AUTO-ENTMCNC: 30.4 G/DL (ref 31.4–35)
MCV RBC AUTO: 86.2 FL (ref 82–102)
MONOCYTES # BLD: 0.9 K/UL (ref 0.1–1.3)
MONOCYTES NFR BLD: 10 % (ref 4–12)
NEUTS SEG # BLD: 7 K/UL (ref 1.7–8.2)
NEUTS SEG NFR BLD: 80 % (ref 43–78)
NRBC # BLD: 0 K/UL (ref 0–0.2)
PLATELET # BLD AUTO: 538 K/UL (ref 150–450)
PMV BLD AUTO: 8.8 FL (ref 9.4–12.3)
POTASSIUM SERPL-SCNC: 5.1 MMOL/L (ref 3.5–5.1)
PROCALCITONIN SERPL-MCNC: 0.07 NG/ML (ref 0–0.1)
PROT SERPL-MCNC: 7.7 G/DL (ref 6.3–8.2)
RBC # BLD AUTO: 3.55 M/UL (ref 4.05–5.2)
SODIUM SERPL-SCNC: 135 MMOL/L (ref 136–145)
WBC # BLD AUTO: 8.9 K/UL (ref 4.3–11.1)

## 2024-04-27 PROCEDURE — 84145 PROCALCITONIN (PCT): CPT

## 2024-04-27 PROCEDURE — 6370000000 HC RX 637 (ALT 250 FOR IP): Performed by: INTERNAL MEDICINE

## 2024-04-27 PROCEDURE — 85025 COMPLETE CBC W/AUTO DIFF WBC: CPT

## 2024-04-27 PROCEDURE — 2580000003 HC RX 258: Performed by: INTERNAL MEDICINE

## 2024-04-27 PROCEDURE — 83605 ASSAY OF LACTIC ACID: CPT

## 2024-04-27 PROCEDURE — 80053 COMPREHEN METABOLIC PANEL: CPT

## 2024-04-27 PROCEDURE — 1100000000 HC RM PRIVATE

## 2024-04-27 PROCEDURE — 83036 HEMOGLOBIN GLYCOSYLATED A1C: CPT

## 2024-04-27 PROCEDURE — 2500000003 HC RX 250 WO HCPCS: Performed by: INTERNAL MEDICINE

## 2024-04-27 PROCEDURE — 99285 EMERGENCY DEPT VISIT HI MDM: CPT

## 2024-04-27 RX ORDER — METOPROLOL SUCCINATE 25 MG/1
25 TABLET, EXTENDED RELEASE ORAL DAILY
Status: DISCONTINUED | OUTPATIENT
Start: 2024-04-28 | End: 2024-05-01 | Stop reason: HOSPADM

## 2024-04-27 RX ORDER — BACLOFEN 10 MG/1
10 TABLET ORAL 3 TIMES DAILY
Status: DISCONTINUED | OUTPATIENT
Start: 2024-04-27 | End: 2024-05-01 | Stop reason: HOSPADM

## 2024-04-27 RX ORDER — BISACODYL 10 MG
10 SUPPOSITORY, RECTAL RECTAL DAILY PRN
Status: DISCONTINUED | OUTPATIENT
Start: 2024-04-27 | End: 2024-05-01 | Stop reason: HOSPADM

## 2024-04-27 RX ORDER — ACETAMINOPHEN 650 MG/1
650 SUPPOSITORY RECTAL EVERY 6 HOURS PRN
Status: DISCONTINUED | OUTPATIENT
Start: 2024-04-27 | End: 2024-05-01 | Stop reason: HOSPADM

## 2024-04-27 RX ORDER — LOSARTAN POTASSIUM 50 MG/1
50 TABLET ORAL DAILY
Status: DISCONTINUED | OUTPATIENT
Start: 2024-04-28 | End: 2024-05-01 | Stop reason: HOSPADM

## 2024-04-27 RX ORDER — POTASSIUM CHLORIDE 20 MEQ/1
40 TABLET, EXTENDED RELEASE ORAL PRN
Status: DISCONTINUED | OUTPATIENT
Start: 2024-04-27 | End: 2024-05-01 | Stop reason: HOSPADM

## 2024-04-27 RX ORDER — MAGNESIUM SULFATE IN WATER 40 MG/ML
2000 INJECTION, SOLUTION INTRAVENOUS PRN
Status: DISCONTINUED | OUTPATIENT
Start: 2024-04-27 | End: 2024-05-01 | Stop reason: HOSPADM

## 2024-04-27 RX ORDER — POTASSIUM CHLORIDE 7.45 MG/ML
10 INJECTION INTRAVENOUS PRN
Status: DISCONTINUED | OUTPATIENT
Start: 2024-04-27 | End: 2024-05-01 | Stop reason: HOSPADM

## 2024-04-27 RX ORDER — LIDOCAINE AND PRILOCAINE 25; 25 MG/G; MG/G
CREAM TOPICAL DAILY
Status: DISCONTINUED | OUTPATIENT
Start: 2024-04-27 | End: 2024-04-27 | Stop reason: ALTCHOICE

## 2024-04-27 RX ORDER — FUROSEMIDE 40 MG/1
40 TABLET ORAL DAILY
Status: DISCONTINUED | OUTPATIENT
Start: 2024-04-28 | End: 2024-05-01 | Stop reason: HOSPADM

## 2024-04-27 RX ORDER — ENOXAPARIN SODIUM 100 MG/ML
40 INJECTION SUBCUTANEOUS 2 TIMES DAILY
Status: DISCONTINUED | OUTPATIENT
Start: 2024-04-27 | End: 2024-05-01 | Stop reason: HOSPADM

## 2024-04-27 RX ORDER — SODIUM CHLORIDE 0.9 % (FLUSH) 0.9 %
5-40 SYRINGE (ML) INJECTION PRN
Status: DISCONTINUED | OUTPATIENT
Start: 2024-04-27 | End: 2024-05-01 | Stop reason: HOSPADM

## 2024-04-27 RX ORDER — MONTELUKAST SODIUM 10 MG/1
10 TABLET ORAL DAILY
Status: DISCONTINUED | OUTPATIENT
Start: 2024-04-27 | End: 2024-05-01 | Stop reason: HOSPADM

## 2024-04-27 RX ORDER — PREGABALIN 100 MG/1
200 CAPSULE ORAL 3 TIMES DAILY
Status: DISCONTINUED | OUTPATIENT
Start: 2024-04-27 | End: 2024-05-01 | Stop reason: HOSPADM

## 2024-04-27 RX ORDER — ONDANSETRON 2 MG/ML
4 INJECTION INTRAMUSCULAR; INTRAVENOUS EVERY 6 HOURS PRN
Status: DISCONTINUED | OUTPATIENT
Start: 2024-04-27 | End: 2024-05-01 | Stop reason: HOSPADM

## 2024-04-27 RX ORDER — POTASSIUM CHLORIDE 7.45 MG/ML
10 INJECTION INTRAVENOUS PRN
Status: DISCONTINUED | OUTPATIENT
Start: 2024-04-27 | End: 2024-05-01 | Stop reason: SDUPTHER

## 2024-04-27 RX ORDER — PANTOPRAZOLE SODIUM 40 MG/1
40 TABLET, DELAYED RELEASE ORAL
Status: DISCONTINUED | OUTPATIENT
Start: 2024-04-28 | End: 2024-05-01 | Stop reason: HOSPADM

## 2024-04-27 RX ORDER — SODIUM CHLORIDE 9 MG/ML
INJECTION, SOLUTION INTRAVENOUS PRN
Status: DISCONTINUED | OUTPATIENT
Start: 2024-04-27 | End: 2024-05-01 | Stop reason: HOSPADM

## 2024-04-27 RX ORDER — HYDROXYZINE HYDROCHLORIDE 25 MG/1
25 TABLET, FILM COATED ORAL NIGHTLY
Status: DISCONTINUED | OUTPATIENT
Start: 2024-04-27 | End: 2024-05-01 | Stop reason: HOSPADM

## 2024-04-27 RX ORDER — HYDROMORPHONE HYDROCHLORIDE 1 MG/ML
0.25 INJECTION, SOLUTION INTRAMUSCULAR; INTRAVENOUS; SUBCUTANEOUS EVERY 4 HOURS PRN
Status: DISCONTINUED | OUTPATIENT
Start: 2024-04-27 | End: 2024-05-01 | Stop reason: HOSPADM

## 2024-04-27 RX ORDER — MAGNESIUM HYDROXIDE/ALUMINUM HYDROXICE/SIMETHICONE 120; 1200; 1200 MG/30ML; MG/30ML; MG/30ML
30 SUSPENSION ORAL EVERY 6 HOURS PRN
Status: DISCONTINUED | OUTPATIENT
Start: 2024-04-27 | End: 2024-05-01 | Stop reason: HOSPADM

## 2024-04-27 RX ORDER — POLYETHYLENE GLYCOL 3350 17 G/17G
17 POWDER, FOR SOLUTION ORAL DAILY PRN
Status: DISCONTINUED | OUTPATIENT
Start: 2024-04-27 | End: 2024-05-01 | Stop reason: HOSPADM

## 2024-04-27 RX ORDER — DULOXETIN HYDROCHLORIDE 60 MG/1
60 CAPSULE, DELAYED RELEASE ORAL DAILY
Status: DISCONTINUED | OUTPATIENT
Start: 2024-04-28 | End: 2024-05-01 | Stop reason: HOSPADM

## 2024-04-27 RX ORDER — FAMOTIDINE 20 MG/1
10 TABLET, FILM COATED ORAL DAILY PRN
Status: DISCONTINUED | OUTPATIENT
Start: 2024-04-27 | End: 2024-05-01 | Stop reason: HOSPADM

## 2024-04-27 RX ORDER — ASPIRIN 81 MG/1
81 TABLET, CHEWABLE ORAL DAILY
Status: DISCONTINUED | OUTPATIENT
Start: 2024-04-27 | End: 2024-05-01 | Stop reason: HOSPADM

## 2024-04-27 RX ORDER — ALBUTEROL SULFATE 2.5 MG/3ML
2.5 SOLUTION RESPIRATORY (INHALATION) EVERY 6 HOURS PRN
Status: DISCONTINUED | OUTPATIENT
Start: 2024-04-27 | End: 2024-05-01 | Stop reason: HOSPADM

## 2024-04-27 RX ORDER — ONDANSETRON 4 MG/1
4 TABLET, ORALLY DISINTEGRATING ORAL EVERY 8 HOURS PRN
Status: DISCONTINUED | OUTPATIENT
Start: 2024-04-27 | End: 2024-05-01 | Stop reason: HOSPADM

## 2024-04-27 RX ORDER — OXYCODONE HYDROCHLORIDE 5 MG/1
10 TABLET ORAL EVERY 6 HOURS PRN
Status: DISCONTINUED | OUTPATIENT
Start: 2024-04-27 | End: 2024-05-01 | Stop reason: HOSPADM

## 2024-04-27 RX ORDER — SODIUM CHLORIDE 0.9 % (FLUSH) 0.9 %
5-40 SYRINGE (ML) INJECTION EVERY 12 HOURS SCHEDULED
Status: DISCONTINUED | OUTPATIENT
Start: 2024-04-27 | End: 2024-05-01 | Stop reason: HOSPADM

## 2024-04-27 RX ORDER — ACETAMINOPHEN 325 MG/1
650 TABLET ORAL EVERY 6 HOURS PRN
Status: DISCONTINUED | OUTPATIENT
Start: 2024-04-27 | End: 2024-05-01 | Stop reason: HOSPADM

## 2024-04-27 RX ADMIN — ASPIRIN 81 MG 81 MG: 81 TABLET ORAL at 21:02

## 2024-04-27 RX ADMIN — TUBERCULIN PURIFIED PROTEIN DERIVATIVE 5 UNITS: 5 INJECTION, SOLUTION INTRADERMAL at 21:04

## 2024-04-27 RX ADMIN — HYDROXYZINE HYDROCHLORIDE 25 MG: 25 TABLET, FILM COATED ORAL at 21:03

## 2024-04-27 RX ADMIN — PREGABALIN 200 MG: 100 CAPSULE ORAL at 21:02

## 2024-04-27 RX ADMIN — SODIUM CHLORIDE, PRESERVATIVE FREE 10 ML: 5 INJECTION INTRAVENOUS at 21:04

## 2024-04-27 RX ADMIN — BACLOFEN 10 MG: 10 TABLET ORAL at 21:02

## 2024-04-27 RX ADMIN — MONTELUKAST 10 MG: 10 TABLET, FILM COATED ORAL at 21:02

## 2024-04-27 ASSESSMENT — PAIN - FUNCTIONAL ASSESSMENT
PAIN_FUNCTIONAL_ASSESSMENT: 0-10
PAIN_FUNCTIONAL_ASSESSMENT: PREVENTS OR INTERFERES SOME ACTIVE ACTIVITIES AND ADLS

## 2024-04-27 ASSESSMENT — PAIN SCALES - GENERAL
PAINLEVEL_OUTOF10: 10
PAINLEVEL_OUTOF10: 10

## 2024-04-27 ASSESSMENT — PAIN DESCRIPTION - LOCATION: LOCATION: LEG

## 2024-04-27 ASSESSMENT — PAIN SCALES - WONG BAKER: WONGBAKER_NUMERICALRESPONSE: NO HURT

## 2024-04-27 ASSESSMENT — PAIN DESCRIPTION - DESCRIPTORS: DESCRIPTORS: BURNING

## 2024-04-27 NOTE — H&P
Hospitalist History and Physical   Admit Date:  2024  7:35 AM   Name:  Lo Tirado   Age:  48 y.o.  Sex:  female  :  1975   MRN:  [id number]   Room:  [facility]/    Presenting/Chief Complaint: Leg Pain     Reason(s) for Admission: Debility [R53.81]     History of Present Illness:   Lo Tirado is a 48 y.o. female with medical history of chronic pain followed by pain management, neuropathy, lymphedema, lower extremity wounds, obesity with BMI of 54, hypertension, type 2 diabetes, bipolar disorder who presented to emergency room complaining of lower extremity pain.  Patient was recently hospitalized from - for similar symptoms.  Patient pain regimen was adjusted.  Infectious disease was consulted during admission and was recommended for discontinued antibiotics as her lower extremity wounds are likely due to poorly controlled lymphedema and stasis.  Reports that since she went home, she has been laying on her recliner and unable to walk due to pain and debility.  Reports that she takes medication as prescribed and does not take more than needed.    In the ER, patient appeared to be somnolent and was unable to be aroused throughout the day.  Later in the evening, patient is alert awake and oriented x 4, complaining of severe pain in her lower extremity.  Due to her debility and uncontrolled pain, we were requested for admission.  Laboratory workup was unremarkable.    Assessment & Plan:     Intractable lower extremity pain  Open lower extremity wounds due to bilateral lymphedema and stasis  Debility   Patient was recently hospitalized on 2024 to 2024 for similar symptoms.  Infectious disease was consulted at that time and recommending no antibiotics, control of her lymphedema with wound care and lymphedema clinic.  -Consult wound care  -Continue with Lyrica 200 mg 3 times daily  -Continue with baclofen 10 mg 3 times daily  -As needed pain medication with IV Dilaudid and p.o.

## 2024-04-27 NOTE — ED TRIAGE NOTES
Pt arrives via [facility] from home for complaint of lower leg cellulitis. Was discharged from this facility recently for same. Has not been able to follow up with home health.

## 2024-04-27 NOTE — ED PROVIDER NOTES
I have received signout from [employer].  We have reviewed patient's presentation, history, physical, PMH, testing so far, treatments so far, and pending testing plus disposition plan.  Please see their note for full details.    Results Include:    Recent Results (from the past 24 hour(s))   CBC with Auto Differential    Collection Time: 04/27/24  9:16 AM   Result Value Ref Range    WBC 8.9 4.3 - 11.1 K/uL    RBC 3.55 (L) 4.05 - 5.2 M/uL    Hemoglobin 9.3 (L) 11.7 - 15.4 g/dL    Hematocrit 30.6 (L) 35.8 - 46.3 %    MCV 86.2 82 - 102 FL    MCH 26.2 26.1 - 32.9 PG    MCHC 30.4 (L) 31.4 - 35.0 g/dL    RDW 16.2 (H) 11.9 - 14.6 %    Platelets 538 (H) 150 - 450 K/uL    MPV 8.8 (L) 9.4 - 12.3 FL    nRBC 0.00 0.0 - 0.2 K/uL    Differential Type AUTOMATED      Neutrophils % 80 (H) 43 - 78 %    Lymphocytes % 8 (L) 13 - 44 %    Monocytes % 10 4.0 - 12.0 %    Eosinophils % 2 0.5 - 7.8 %    Basophils % 0 0.0 - 2.0 %    Immature Granulocytes % 0 0.0 - 5.0 %    Neutrophils Absolute 7.0 1.7 - 8.2 K/UL    Lymphocytes Absolute 0.7 0.5 - 4.6 K/UL    Monocytes Absolute 0.9 0.1 - 1.3 K/UL    Eosinophils Absolute 0.2 0.0 - 0.8 K/UL    Basophils Absolute 0.0 0.0 - 0.2 K/UL    Immature Granulocytes Absolute 0.0 0.0 - 0.5 K/UL   Comprehensive Metabolic Panel    Collection Time: 04/27/24  9:16 AM   Result Value Ref Range    Sodium 135 (L) 136 - 145 mmol/L    Potassium 5.1 3.5 - 5.1 mmol/L    Chloride 98 98 - 107 mmol/L    CO2 27 20 - 28 mmol/L    Anion Gap 10 9 - 18 mmol/L    Glucose 97 70 - 99 mg/dL    BUN 9 6 - 23 MG/DL    Creatinine 0.95 0.60 - 1.10 MG/DL    Est, Glom Filt Rate 73 >60 ml/min/1.73m2    Calcium 9.2 8.8 - 10.2 MG/DL    Total Bilirubin 0.5 0.0 - 1.2 MG/DL    ALT 16 12 - 65 U/L    AST 36 15 - 37 U/L    Alk Phosphatase 77 35 - 104 U/L    Total Protein 7.7 6.3 - 8.2 g/dL    Albumin 2.6 (L) 3.5 - 5.0 g/dL    Globulin 5.1 (H) 2.3 - 3.5 g/dL    Albumin/Globulin Ratio 0.5 (L) 1.0 - 1.9     Lactic Acid    Collection Time: 04/27/24  9:16 
(ENSURE MAX PROTEIN) LIQD    Take 1 each by mouth in the morning and at bedtime    OXYCODONE HCL (OXY-IR) 10 MG IMMEDIATE RELEASE TABLET    Take 1 tablet by mouth every 6 hours as needed for Pain for up to 5 days. (note over 50 MME a day, must review pt's opioid prescription history on state database before prescribing) Max Daily Amount: 40 mg    PANTOPRAZOLE (PROTONIX) 40 MG TABLET    Take 1 tablet by mouth every morning (before breakfast)    PREGABALIN (LYRICA) 200 MG CAPSULE    Take 1 capsule by mouth in the morning, at noon, and at bedtime for 30 days. Max Daily Amount: 600 mg        Results for orders placed or performed during the hospital encounter of 04/27/24   CBC with Auto Differential   Result Value Ref Range    WBC 8.9 4.3 - 11.1 K/uL    RBC 3.55 (L) 4.05 - 5.2 M/uL    Hemoglobin 9.3 (L) 11.7 - 15.4 g/dL    Hematocrit 30.6 (L) 35.8 - 46.3 %    MCV 86.2 82 - 102 FL    MCH 26.2 26.1 - 32.9 PG    MCHC 30.4 (L) 31.4 - 35.0 g/dL    RDW 16.2 (H) 11.9 - 14.6 %    Platelets 538 (H) 150 - 450 K/uL    MPV 8.8 (L) 9.4 - 12.3 FL    nRBC 0.00 0.0 - 0.2 K/uL    Differential Type AUTOMATED      Neutrophils % 80 (H) 43 - 78 %    Lymphocytes % 8 (L) 13 - 44 %    Monocytes % 10 4.0 - 12.0 %    Eosinophils % 2 0.5 - 7.8 %    Basophils % 0 0.0 - 2.0 %    Immature Granulocytes % 0 0.0 - 5.0 %    Neutrophils Absolute 7.0 1.7 - 8.2 K/UL    Lymphocytes Absolute 0.7 0.5 - 4.6 K/UL    Monocytes Absolute 0.9 0.1 - 1.3 K/UL    Eosinophils Absolute 0.2 0.0 - 0.8 K/UL    Basophils Absolute 0.0 0.0 - 0.2 K/UL    Immature Granulocytes Absolute 0.0 0.0 - 0.5 K/UL   Comprehensive Metabolic Panel   Result Value Ref Range    Sodium 135 (L) 136 - 145 mmol/L    Potassium 5.1 3.5 - 5.1 mmol/L    Chloride 98 98 - 107 mmol/L    CO2 27 20 - 28 mmol/L    Anion Gap 10 9 - 18 mmol/L    Glucose 97 70 - 99 mg/dL    BUN 9 6 - 23 MG/DL    Creatinine 0.95 0.60 - 1.10 MG/DL    Est, Glom Filt Rate 73 >60 ml/min/1.73m2    Calcium 9.2 8.8 - 10.2 MG/DL

## 2024-04-27 NOTE — ED NOTES
Bedside and Verbal shift change report given to Nina PERAZA RN (oncoming nurse) by STEWART Evans (offgoing nurse). Report included the following information Nurse Handoff Report. Patient refuses to be changed. This RN and David WILLIAM attempted to change BLE dressings. Patient verbally abusive to staff and refusing the dressing change of LLE. Patient allowed staff to change RLE.  Patient placed on BP cuff and pulse ox. Patient pulled them off and said she would not have them on. Patient is resting in stretcher. Respirations are even and unlabored. Side rails x 2. Call light within reach. Area Clear of clutter.         Nina Ybarra, RN  04/27/24 1921       Nina Ybarra RN  04/27/24 1922

## 2024-04-27 NOTE — ED NOTES
Pt became awake, alert and oriented x 4. Pt told that they can continue their pain medication prescriptions @ home as well as continue to work with home health.      Lanre Chaves RN  04/27/24 5258

## 2024-04-27 NOTE — DISCHARGE INSTRUCTIONS
Take medications exactly as prescribed  Keep your legs elevated is much as possible  Continue dressing changes    Call your doctor or the follow up doctor to set up appointment for recheck visit    Return to ER for any worsening symptoms or new problems which may arise

## 2024-04-28 LAB
ANION GAP SERPL CALC-SCNC: 10 MMOL/L (ref 9–18)
BASOPHILS # BLD: 0 K/UL (ref 0–0.2)
BASOPHILS NFR BLD: 0 % (ref 0–2)
BUN SERPL-MCNC: 9 MG/DL (ref 6–23)
CALCIUM SERPL-MCNC: 8.7 MG/DL (ref 8.8–10.2)
CHLORIDE SERPL-SCNC: 101 MMOL/L (ref 98–107)
CO2 SERPL-SCNC: 28 MMOL/L (ref 20–28)
CREAT SERPL-MCNC: 0.85 MG/DL (ref 0.6–1.1)
DIFFERENTIAL METHOD BLD: ABNORMAL
EOSINOPHIL # BLD: 0.3 K/UL (ref 0–0.8)
EOSINOPHIL NFR BLD: 4 % (ref 0.5–7.8)
ERYTHROCYTE [DISTWIDTH] IN BLOOD BY AUTOMATED COUNT: 16.2 % (ref 11.9–14.6)
GLUCOSE SERPL-MCNC: 110 MG/DL (ref 70–99)
HCT VFR BLD AUTO: 26.5 % (ref 35.8–46.3)
HGB BLD-MCNC: 8.1 G/DL (ref 11.7–15.4)
IMM GRANULOCYTES # BLD AUTO: 0 K/UL (ref 0–0.5)
IMM GRANULOCYTES NFR BLD AUTO: 0 % (ref 0–5)
LYMPHOCYTES # BLD: 1.2 K/UL (ref 0.5–4.6)
LYMPHOCYTES NFR BLD: 18 % (ref 13–44)
MCH RBC QN AUTO: 26.1 PG (ref 26.1–32.9)
MCHC RBC AUTO-ENTMCNC: 30.6 G/DL (ref 31.4–35)
MCV RBC AUTO: 85.5 FL (ref 82–102)
MM INDURATION, POC: 0 MM (ref 0–5)
MONOCYTES # BLD: 0.9 K/UL (ref 0.1–1.3)
MONOCYTES NFR BLD: 14 % (ref 4–12)
NEUTS SEG # BLD: 4.1 K/UL (ref 1.7–8.2)
NEUTS SEG NFR BLD: 64 % (ref 43–78)
NRBC # BLD: 0 K/UL (ref 0–0.2)
PLATELET # BLD AUTO: 519 K/UL (ref 150–450)
PMV BLD AUTO: 9.1 FL (ref 9.4–12.3)
POTASSIUM SERPL-SCNC: 3.8 MMOL/L (ref 3.5–5.1)
PPD, POC: NEGATIVE
RBC # BLD AUTO: 3.1 M/UL (ref 4.05–5.2)
SODIUM SERPL-SCNC: 139 MMOL/L (ref 136–145)
WBC # BLD AUTO: 6.5 K/UL (ref 4.3–11.1)

## 2024-04-28 PROCEDURE — 6360000002 HC RX W HCPCS: Performed by: INTERNAL MEDICINE

## 2024-04-28 PROCEDURE — 2580000003 HC RX 258: Performed by: INTERNAL MEDICINE

## 2024-04-28 PROCEDURE — 1100000000 HC RM PRIVATE

## 2024-04-28 PROCEDURE — 85025 COMPLETE CBC W/AUTO DIFF WBC: CPT

## 2024-04-28 PROCEDURE — 80048 BASIC METABOLIC PNL TOTAL CA: CPT

## 2024-04-28 PROCEDURE — 6370000000 HC RX 637 (ALT 250 FOR IP): Performed by: INTERNAL MEDICINE

## 2024-04-28 PROCEDURE — 36415 COLL VENOUS BLD VENIPUNCTURE: CPT

## 2024-04-28 RX ADMIN — METOPROLOL SUCCINATE 25 MG: 25 TABLET, EXTENDED RELEASE ORAL at 09:32

## 2024-04-28 RX ADMIN — FUROSEMIDE 40 MG: 40 TABLET ORAL at 09:32

## 2024-04-28 RX ADMIN — SODIUM CHLORIDE, PRESERVATIVE FREE 10 ML: 5 INJECTION INTRAVENOUS at 09:33

## 2024-04-28 RX ADMIN — ASPIRIN 81 MG 81 MG: 81 TABLET ORAL at 09:32

## 2024-04-28 RX ADMIN — PREGABALIN 200 MG: 100 CAPSULE ORAL at 19:57

## 2024-04-28 RX ADMIN — ENOXAPARIN SODIUM 40 MG: 100 INJECTION SUBCUTANEOUS at 09:33

## 2024-04-28 RX ADMIN — PANTOPRAZOLE SODIUM 40 MG: 40 TABLET, DELAYED RELEASE ORAL at 05:36

## 2024-04-28 RX ADMIN — DULOXETINE HYDROCHLORIDE 60 MG: 60 CAPSULE, DELAYED RELEASE ORAL at 09:32

## 2024-04-28 RX ADMIN — BACLOFEN 10 MG: 10 TABLET ORAL at 19:56

## 2024-04-28 RX ADMIN — LOSARTAN POTASSIUM 50 MG: 50 TABLET, FILM COATED ORAL at 09:32

## 2024-04-28 RX ADMIN — PREGABALIN 200 MG: 100 CAPSULE ORAL at 14:24

## 2024-04-28 RX ADMIN — HYDROXYZINE HYDROCHLORIDE 25 MG: 25 TABLET, FILM COATED ORAL at 19:57

## 2024-04-28 RX ADMIN — MONTELUKAST 10 MG: 10 TABLET, FILM COATED ORAL at 09:32

## 2024-04-28 RX ADMIN — SODIUM CHLORIDE, PRESERVATIVE FREE 10 ML: 5 INJECTION INTRAVENOUS at 19:57

## 2024-04-28 RX ADMIN — BACLOFEN 10 MG: 10 TABLET ORAL at 09:32

## 2024-04-28 RX ADMIN — PREGABALIN 200 MG: 100 CAPSULE ORAL at 09:32

## 2024-04-28 RX ADMIN — BACLOFEN 10 MG: 10 TABLET ORAL at 12:48

## 2024-04-28 RX ADMIN — HYDROMORPHONE HYDROCHLORIDE 0.25 MG: 1 INJECTION, SOLUTION INTRAMUSCULAR; INTRAVENOUS; SUBCUTANEOUS at 14:51

## 2024-04-28 RX ADMIN — HYDROMORPHONE HYDROCHLORIDE 0.25 MG: 1 INJECTION, SOLUTION INTRAMUSCULAR; INTRAVENOUS; SUBCUTANEOUS at 21:54

## 2024-04-28 RX ADMIN — HYDROMORPHONE HYDROCHLORIDE 0.25 MG: 1 INJECTION, SOLUTION INTRAMUSCULAR; INTRAVENOUS; SUBCUTANEOUS at 09:38

## 2024-04-28 RX ADMIN — OXYCODONE 10 MG: 5 TABLET ORAL at 05:45

## 2024-04-28 RX ADMIN — OXYCODONE 10 MG: 5 TABLET ORAL at 14:24

## 2024-04-28 ASSESSMENT — PAIN DESCRIPTION - LOCATION
LOCATION: LEG

## 2024-04-28 ASSESSMENT — PAIN DESCRIPTION - DESCRIPTORS
DESCRIPTORS: BURNING
DESCRIPTORS: ACHING
DESCRIPTORS: BURNING
DESCRIPTORS: ACHING;BURNING
DESCRIPTORS: BURNING

## 2024-04-28 ASSESSMENT — PAIN DESCRIPTION - ORIENTATION
ORIENTATION: RIGHT;LEFT

## 2024-04-28 ASSESSMENT — PAIN SCALES - GENERAL
PAINLEVEL_OUTOF10: 10
PAINLEVEL_OUTOF10: 9
PAINLEVEL_OUTOF10: 0
PAINLEVEL_OUTOF10: 8
PAINLEVEL_OUTOF10: 7
PAINLEVEL_OUTOF10: 8
PAINLEVEL_OUTOF10: 10
PAINLEVEL_OUTOF10: 6

## 2024-04-28 ASSESSMENT — PAIN SCALES - WONG BAKER: WONGBAKER_NUMERICALRESPONSE: NO HURT

## 2024-04-28 NOTE — PLAN OF CARE
Problem: Discharge Planning  Goal: Discharge to home or other facility with appropriate resources  4/28/2024 1039 by Mignon Limon RN  Outcome: Progressing  4/28/2024 0218 by Carly Jones RN  Outcome: Progressing     Problem: Pain  Goal: Verbalizes/displays adequate comfort level or baseline comfort level  4/28/2024 1039 by Mignon Limon RN  Outcome: Progressing  4/28/2024 0218 by Carly Jones RN  Outcome: Progressing     Problem: Skin/Tissue Integrity  Goal: Absence of new skin breakdown  Description: 1.  Monitor for areas of redness and/or skin breakdown  2.  Assess vascular access sites hourly  3.  Every 4-6 hours minimum:  Change oxygen saturation probe site  4.  Every 4-6 hours:  If on nasal continuous positive airway pressure, respiratory therapy assess nares and determine need for appliance change or resting period.  4/28/2024 1039 by Mignon Limon RN  Outcome: Progressing  4/28/2024 0218 by Carly Jones RN  Outcome: Progressing     Problem: Safety - Adult  Goal: Free from fall injury  4/28/2024 1039 by Mignon Limon RN  Outcome: Progressing  4/28/2024 0218 by Carly Jones RN  Outcome: Progressing     Problem: ABCDS Injury Assessment  Goal: Absence of physical injury  4/28/2024 1039 by Mignon Limon RN  Outcome: Progressing  4/28/2024 0218 by Carly Jones RN  Outcome: Progressing     Problem: Chronic Conditions and Co-morbidities  Goal: Patient's chronic conditions and co-morbidity symptoms are monitored and maintained or improved  Outcome: Progressing

## 2024-04-28 NOTE — ED NOTES
TRANSFER - OUT REPORT:  Patient refused vital signs. Verbal report given to STEWART De Dios on Lo Tirado  [facility] for [facility] progression of patient care       Report consisted of patient's Situation, Background, Assessment and   Recommendations(SBAR).     Information from the following report(s) Nurse Handoff Report was reviewed with the receiving nurse.    [place] Fall Assessment:    Presents to emergency department  because of falls (Syncope, seizure, or loss of consciousness): No  Age > 70: No  Altered Mental Status, Intoxication with alcohol or substance confusion (Disorientation, impaired judgment, poor safety awaremess, or inability to follow instructions): No  Impaired Mobility: Ambulates or transfers with assistive devices or assistance; Unable to ambulate or transer.: No  Nursing Judgement: No          Lines:       Opportunity for questions and clarification was provided.      Patient transported with transport.         Nina Ybarra RN  04/27/24 2015

## 2024-04-29 LAB
ANION GAP SERPL CALC-SCNC: 9 MMOL/L (ref 9–18)
BASOPHILS # BLD: 0 K/UL (ref 0–0.2)
BASOPHILS NFR BLD: 1 % (ref 0–2)
BUN SERPL-MCNC: 10 MG/DL (ref 6–23)
CALCIUM SERPL-MCNC: 8.6 MG/DL (ref 8.8–10.2)
CHLORIDE SERPL-SCNC: 100 MMOL/L (ref 98–107)
CO2 SERPL-SCNC: 28 MMOL/L (ref 20–28)
CREAT SERPL-MCNC: 0.85 MG/DL (ref 0.6–1.1)
DIFFERENTIAL METHOD BLD: ABNORMAL
EOSINOPHIL # BLD: 0.4 K/UL (ref 0–0.8)
EOSINOPHIL NFR BLD: 6 % (ref 0.5–7.8)
ERYTHROCYTE [DISTWIDTH] IN BLOOD BY AUTOMATED COUNT: 16.5 % (ref 11.9–14.6)
GLUCOSE SERPL-MCNC: 104 MG/DL (ref 70–99)
HCT VFR BLD AUTO: 26.8 % (ref 35.8–46.3)
HGB BLD-MCNC: 8.2 G/DL (ref 11.7–15.4)
IMM GRANULOCYTES # BLD AUTO: 0 K/UL (ref 0–0.5)
IMM GRANULOCYTES NFR BLD AUTO: 0 % (ref 0–5)
LYMPHOCYTES # BLD: 1.3 K/UL (ref 0.5–4.6)
LYMPHOCYTES NFR BLD: 21 % (ref 13–44)
MCH RBC QN AUTO: 26 PG (ref 26.1–32.9)
MCHC RBC AUTO-ENTMCNC: 30.6 G/DL (ref 31.4–35)
MCV RBC AUTO: 85.1 FL (ref 82–102)
MM INDURATION, POC: 0 MM (ref 0–5)
MONOCYTES # BLD: 0.7 K/UL (ref 0.1–1.3)
MONOCYTES NFR BLD: 13 % (ref 4–12)
NEUTS SEG # BLD: 3.5 K/UL (ref 1.7–8.2)
NEUTS SEG NFR BLD: 59 % (ref 43–78)
NRBC # BLD: 0 K/UL (ref 0–0.2)
PLATELET # BLD AUTO: 521 K/UL (ref 150–450)
PMV BLD AUTO: 9 FL (ref 9.4–12.3)
POTASSIUM SERPL-SCNC: 3.8 MMOL/L (ref 3.5–5.1)
PPD, POC: NEGATIVE
RBC # BLD AUTO: 3.15 M/UL (ref 4.05–5.2)
SODIUM SERPL-SCNC: 137 MMOL/L (ref 136–145)
WBC # BLD AUTO: 5.9 K/UL (ref 4.3–11.1)

## 2024-04-29 PROCEDURE — 6360000002 HC RX W HCPCS: Performed by: INTERNAL MEDICINE

## 2024-04-29 PROCEDURE — 80048 BASIC METABOLIC PNL TOTAL CA: CPT

## 2024-04-29 PROCEDURE — 36415 COLL VENOUS BLD VENIPUNCTURE: CPT

## 2024-04-29 PROCEDURE — 97165 OT EVAL LOW COMPLEX 30 MIN: CPT

## 2024-04-29 PROCEDURE — 1100000000 HC RM PRIVATE

## 2024-04-29 PROCEDURE — A4216 STERILE WATER/SALINE, 10 ML: HCPCS | Performed by: INTERNAL MEDICINE

## 2024-04-29 PROCEDURE — 97530 THERAPEUTIC ACTIVITIES: CPT

## 2024-04-29 PROCEDURE — 85025 COMPLETE CBC W/AUTO DIFF WBC: CPT

## 2024-04-29 PROCEDURE — 6370000000 HC RX 637 (ALT 250 FOR IP): Performed by: INTERNAL MEDICINE

## 2024-04-29 PROCEDURE — 2580000003 HC RX 258: Performed by: INTERNAL MEDICINE

## 2024-04-29 PROCEDURE — 97535 SELF CARE MNGMENT TRAINING: CPT

## 2024-04-29 PROCEDURE — 97161 PT EVAL LOW COMPLEX 20 MIN: CPT

## 2024-04-29 RX ORDER — MINERAL OIL/HYDROPHIL PETROLAT
OINTMENT (GRAM) TOPICAL PRN
Status: DISCONTINUED | OUTPATIENT
Start: 2024-04-29 | End: 2024-05-01 | Stop reason: HOSPADM

## 2024-04-29 RX ADMIN — PREGABALIN 200 MG: 100 CAPSULE ORAL at 09:39

## 2024-04-29 RX ADMIN — MONTELUKAST 10 MG: 10 TABLET, FILM COATED ORAL at 09:40

## 2024-04-29 RX ADMIN — PREGABALIN 200 MG: 100 CAPSULE ORAL at 15:35

## 2024-04-29 RX ADMIN — METOPROLOL SUCCINATE 25 MG: 25 TABLET, EXTENDED RELEASE ORAL at 13:22

## 2024-04-29 RX ADMIN — HYDROMORPHONE HYDROCHLORIDE 0.25 MG: 1 INJECTION, SOLUTION INTRAMUSCULAR; INTRAVENOUS; SUBCUTANEOUS at 04:08

## 2024-04-29 RX ADMIN — LOSARTAN POTASSIUM 50 MG: 50 TABLET, FILM COATED ORAL at 09:40

## 2024-04-29 RX ADMIN — BACLOFEN 10 MG: 10 TABLET ORAL at 15:35

## 2024-04-29 RX ADMIN — HYDROMORPHONE HYDROCHLORIDE 0.25 MG: 1 INJECTION, SOLUTION INTRAMUSCULAR; INTRAVENOUS; SUBCUTANEOUS at 16:46

## 2024-04-29 RX ADMIN — BACLOFEN 10 MG: 10 TABLET ORAL at 09:40

## 2024-04-29 RX ADMIN — BACLOFEN 10 MG: 10 TABLET ORAL at 21:05

## 2024-04-29 RX ADMIN — ASPIRIN 81 MG 81 MG: 81 TABLET ORAL at 09:39

## 2024-04-29 RX ADMIN — PREGABALIN 200 MG: 100 CAPSULE ORAL at 21:05

## 2024-04-29 RX ADMIN — FUROSEMIDE 40 MG: 40 TABLET ORAL at 09:40

## 2024-04-29 RX ADMIN — SODIUM CHLORIDE, PRESERVATIVE FREE 10 ML: 5 INJECTION INTRAVENOUS at 09:43

## 2024-04-29 RX ADMIN — OXYCODONE 10 MG: 5 TABLET ORAL at 05:41

## 2024-04-29 RX ADMIN — OXYCODONE 10 MG: 5 TABLET ORAL at 13:22

## 2024-04-29 RX ADMIN — DULOXETINE HYDROCHLORIDE 60 MG: 60 CAPSULE, DELAYED RELEASE ORAL at 09:39

## 2024-04-29 RX ADMIN — SODIUM CHLORIDE, PRESERVATIVE FREE 10 ML: 5 INJECTION INTRAVENOUS at 21:05

## 2024-04-29 ASSESSMENT — PAIN SCALES - GENERAL
PAINLEVEL_OUTOF10: 5
PAINLEVEL_OUTOF10: 10
PAINLEVEL_OUTOF10: 0
PAINLEVEL_OUTOF10: 8
PAINLEVEL_OUTOF10: 5
PAINLEVEL_OUTOF10: 0

## 2024-04-29 ASSESSMENT — PAIN DESCRIPTION - PAIN TYPE
TYPE: CHRONIC PAIN
TYPE: CHRONIC PAIN

## 2024-04-29 ASSESSMENT — PAIN DESCRIPTION - LOCATION
LOCATION: LEG

## 2024-04-29 ASSESSMENT — PAIN - FUNCTIONAL ASSESSMENT
PAIN_FUNCTIONAL_ASSESSMENT: ACTIVITIES ARE NOT PREVENTED
PAIN_FUNCTIONAL_ASSESSMENT: ACTIVITIES ARE NOT PREVENTED
PAIN_FUNCTIONAL_ASSESSMENT: PREVENTS OR INTERFERES SOME ACTIVE ACTIVITIES AND ADLS
PAIN_FUNCTIONAL_ASSESSMENT: PREVENTS OR INTERFERES WITH MANY ACTIVE NOT PASSIVE ACTIVITIES

## 2024-04-29 ASSESSMENT — PAIN DESCRIPTION - DESCRIPTORS
DESCRIPTORS: BURNING
DESCRIPTORS: BURNING
DESCRIPTORS: ACHING;SORE
DESCRIPTORS: BURNING;TIGHTNESS

## 2024-04-29 ASSESSMENT — PAIN DESCRIPTION - ONSET
ONSET: ON-GOING
ONSET: ON-GOING

## 2024-04-29 ASSESSMENT — PAIN DESCRIPTION - FREQUENCY
FREQUENCY: INTERMITTENT
FREQUENCY: CONTINUOUS

## 2024-04-29 NOTE — CARE COORDINATION
RNCM met with patient in room 212 to discuss discharge planning.    Patient lives with family in one level home with 4 steps for entry. Patient states that she completes ADLs independently and uses a rollator for ambulation. Patient has no current home care services. Referral made for home health services during previous admission, but patient's primary care doctor will not sign orders until patient is seen in the office. Patient notified. Demographics verified. CM following for discharge needs.       04/29/24 3902   Service Assessment   Patient Orientation Alert and Oriented   Cognition Alert   History Provided By Patient;Medical Record   Primary Caregiver Self   Accompanied By/Relationship n/a   Support Systems Family Members   Patient's Healthcare Decision Maker is: Legal Next of Kin   PCP Verified by CM Yes   Last Visit to PCP Within last 3 months   Prior Functional Level Independent in ADLs/IADLs   Current Functional Level Independent in ADLs/IADLs   Can patient return to prior living arrangement Yes   Ability to make needs known: Good   Family able to assist with home care needs: Yes   Would you like for me to discuss the discharge plan with any other family members/significant others, and if so, who? No   Financial Resources Medicare;Medicaid   Community Resources None   Social/Functional History   Lives With Family   Type of Home House   Home Layout One level   Home Access Stairs to enter with rails   Entrance Stairs - Number of Steps 4   Home Equipment Rollator   Receives Help From Family   ADL Assistance Independent   Transfer Assistance Independent   Active  No   Occupation Unemployed   Discharge Planning   Type of Residence House   Living Arrangements Family Members   Current Services Prior To Admission Durable Medical Equipment   Current DME Prior to Arrival [facility]   Potential Assistance Needed N/A   Potential DME Needed [facility]   DME Ordered? No   Potential Assistance Purchasing Medications

## 2024-04-29 NOTE — WOUND CARE
Bilateral lower legs with chronic venous stasis ulcers. Known to wound team and known to wound clinic. Patient has refractory wounds that are very painful and she often takes the bandage off.. She has allergies to antibiotics and seems to be sensitive to several creams and lotions that have been tried. She often complains of burning the best solution to stop the burning is to break the lymphedema cycle by keeping the compression on, but she usually will not tolerate and takes the compression off anyway, this has been explained several times to her she states she can not stand it. She has refused dressings at times and asked for them to be changed much earlier than she was supposed to. Will use aquaphor abd' kerlex and ace.  If patient would tolerate petroleum gauze over open areas would also be helpful. Nurse updated that orders written.  Patient was in restroom, unable to assess today.

## 2024-04-30 LAB
ANION GAP SERPL CALC-SCNC: 10 MMOL/L (ref 9–18)
BASOPHILS # BLD: 0 K/UL (ref 0–0.2)
BASOPHILS NFR BLD: 1 % (ref 0–2)
BUN SERPL-MCNC: 12 MG/DL (ref 6–23)
CALCIUM SERPL-MCNC: 8.5 MG/DL (ref 8.8–10.2)
CHLORIDE SERPL-SCNC: 100 MMOL/L (ref 98–107)
CO2 SERPL-SCNC: 27 MMOL/L (ref 20–28)
CREAT SERPL-MCNC: 0.87 MG/DL (ref 0.6–1.1)
DIFFERENTIAL METHOD BLD: ABNORMAL
EOSINOPHIL # BLD: 0.5 K/UL (ref 0–0.8)
EOSINOPHIL NFR BLD: 8 % (ref 0.5–7.8)
ERYTHROCYTE [DISTWIDTH] IN BLOOD BY AUTOMATED COUNT: 16.5 % (ref 11.9–14.6)
GLUCOSE SERPL-MCNC: 100 MG/DL (ref 70–99)
HCT VFR BLD AUTO: 27 % (ref 35.8–46.3)
HGB BLD-MCNC: 8.2 G/DL (ref 11.7–15.4)
IMM GRANULOCYTES # BLD AUTO: 0 K/UL (ref 0–0.5)
IMM GRANULOCYTES NFR BLD AUTO: 0 % (ref 0–5)
LYMPHOCYTES # BLD: 1.3 K/UL (ref 0.5–4.6)
LYMPHOCYTES NFR BLD: 23 % (ref 13–44)
MCH RBC QN AUTO: 25.9 PG (ref 26.1–32.9)
MCHC RBC AUTO-ENTMCNC: 30.4 G/DL (ref 31.4–35)
MCV RBC AUTO: 85.2 FL (ref 82–102)
MONOCYTES # BLD: 0.6 K/UL (ref 0.1–1.3)
MONOCYTES NFR BLD: 10 % (ref 4–12)
NEUTS SEG # BLD: 3.4 K/UL (ref 1.7–8.2)
NEUTS SEG NFR BLD: 58 % (ref 43–78)
NRBC # BLD: 0 K/UL (ref 0–0.2)
PLATELET # BLD AUTO: 516 K/UL (ref 150–450)
PMV BLD AUTO: 8.6 FL (ref 9.4–12.3)
POTASSIUM SERPL-SCNC: 4 MMOL/L (ref 3.5–5.1)
RBC # BLD AUTO: 3.17 M/UL (ref 4.05–5.2)
SODIUM SERPL-SCNC: 137 MMOL/L (ref 136–145)
WBC # BLD AUTO: 5.9 K/UL (ref 4.3–11.1)

## 2024-04-30 PROCEDURE — 2580000003 HC RX 258: Performed by: INTERNAL MEDICINE

## 2024-04-30 PROCEDURE — 1100000000 HC RM PRIVATE

## 2024-04-30 PROCEDURE — 85025 COMPLETE CBC W/AUTO DIFF WBC: CPT

## 2024-04-30 PROCEDURE — 2500000003 HC RX 250 WO HCPCS: Performed by: INTERNAL MEDICINE

## 2024-04-30 PROCEDURE — 6370000000 HC RX 637 (ALT 250 FOR IP): Performed by: INTERNAL MEDICINE

## 2024-04-30 PROCEDURE — 36415 COLL VENOUS BLD VENIPUNCTURE: CPT

## 2024-04-30 PROCEDURE — 6360000002 HC RX W HCPCS: Performed by: STUDENT IN AN ORGANIZED HEALTH CARE EDUCATION/TRAINING PROGRAM

## 2024-04-30 PROCEDURE — 80048 BASIC METABOLIC PNL TOTAL CA: CPT

## 2024-04-30 PROCEDURE — 6370000000 HC RX 637 (ALT 250 FOR IP): Performed by: STUDENT IN AN ORGANIZED HEALTH CARE EDUCATION/TRAINING PROGRAM

## 2024-04-30 PROCEDURE — A4216 STERILE WATER/SALINE, 10 ML: HCPCS | Performed by: INTERNAL MEDICINE

## 2024-04-30 RX ORDER — DIPHENHYDRAMINE HCL 25 MG
25 CAPSULE ORAL EVERY 8 HOURS PRN
Status: DISCONTINUED | OUTPATIENT
Start: 2024-04-30 | End: 2024-05-01 | Stop reason: HOSPADM

## 2024-04-30 RX ORDER — FUROSEMIDE 10 MG/ML
40 INJECTION INTRAMUSCULAR; INTRAVENOUS DAILY
Status: DISCONTINUED | OUTPATIENT
Start: 2024-04-30 | End: 2024-05-01 | Stop reason: HOSPADM

## 2024-04-30 RX ADMIN — ASPIRIN 81 MG 81 MG: 81 TABLET ORAL at 09:44

## 2024-04-30 RX ADMIN — LOSARTAN POTASSIUM 50 MG: 50 TABLET, FILM COATED ORAL at 09:44

## 2024-04-30 RX ADMIN — DIPHENHYDRAMINE HYDROCHLORIDE 25 MG: 25 CAPSULE ORAL at 07:27

## 2024-04-30 RX ADMIN — SODIUM CHLORIDE, PRESERVATIVE FREE 10 ML: 5 INJECTION INTRAVENOUS at 09:47

## 2024-04-30 RX ADMIN — PANTOPRAZOLE SODIUM 40 MG: 40 TABLET, DELAYED RELEASE ORAL at 05:37

## 2024-04-30 RX ADMIN — HYDROXYZINE HYDROCHLORIDE 25 MG: 25 TABLET, FILM COATED ORAL at 20:49

## 2024-04-30 RX ADMIN — METOPROLOL SUCCINATE 25 MG: 25 TABLET, EXTENDED RELEASE ORAL at 09:44

## 2024-04-30 RX ADMIN — DULOXETINE HYDROCHLORIDE 60 MG: 60 CAPSULE, DELAYED RELEASE ORAL at 09:44

## 2024-04-30 RX ADMIN — PREGABALIN 200 MG: 100 CAPSULE ORAL at 09:44

## 2024-04-30 RX ADMIN — FUROSEMIDE 40 MG: 10 INJECTION, SOLUTION INTRAMUSCULAR; INTRAVENOUS at 09:45

## 2024-04-30 RX ADMIN — BACLOFEN 10 MG: 10 TABLET ORAL at 20:48

## 2024-04-30 RX ADMIN — BACLOFEN 10 MG: 10 TABLET ORAL at 14:56

## 2024-04-30 RX ADMIN — PREGABALIN 200 MG: 100 CAPSULE ORAL at 14:56

## 2024-04-30 RX ADMIN — BACLOFEN 10 MG: 10 TABLET ORAL at 09:44

## 2024-04-30 RX ADMIN — OXYCODONE 10 MG: 5 TABLET ORAL at 05:37

## 2024-04-30 RX ADMIN — MONTELUKAST 10 MG: 10 TABLET, FILM COATED ORAL at 09:44

## 2024-04-30 RX ADMIN — SODIUM CHLORIDE, PRESERVATIVE FREE 10 ML: 5 INJECTION INTRAVENOUS at 20:51

## 2024-04-30 RX ADMIN — OXYCODONE 10 MG: 5 TABLET ORAL at 11:59

## 2024-04-30 RX ADMIN — OXYCODONE 10 MG: 5 TABLET ORAL at 18:42

## 2024-04-30 RX ADMIN — PREGABALIN 200 MG: 100 CAPSULE ORAL at 20:48

## 2024-04-30 RX ADMIN — HYDROMORPHONE HYDROCHLORIDE 0.25 MG: 1 INJECTION, SOLUTION INTRAMUSCULAR; INTRAVENOUS; SUBCUTANEOUS at 13:23

## 2024-04-30 ASSESSMENT — PAIN DESCRIPTION - DESCRIPTORS
DESCRIPTORS: BURNING

## 2024-04-30 ASSESSMENT — PAIN DESCRIPTION - ORIENTATION
ORIENTATION: RIGHT;LEFT
ORIENTATION: RIGHT

## 2024-04-30 ASSESSMENT — PAIN - FUNCTIONAL ASSESSMENT
PAIN_FUNCTIONAL_ASSESSMENT: PREVENTS OR INTERFERES SOME ACTIVE ACTIVITIES AND ADLS
PAIN_FUNCTIONAL_ASSESSMENT: PREVENTS OR INTERFERES WITH MANY ACTIVE NOT PASSIVE ACTIVITIES

## 2024-04-30 ASSESSMENT — PAIN DESCRIPTION - LOCATION
LOCATION: LEG

## 2024-04-30 ASSESSMENT — PAIN DESCRIPTION - ONSET
ONSET: ON-GOING
ONSET: ON-GOING

## 2024-04-30 ASSESSMENT — PAIN SCALES - GENERAL
PAINLEVEL_OUTOF10: 10
PAINLEVEL_OUTOF10: 10
PAINLEVEL_OUTOF10: 5
PAINLEVEL_OUTOF10: 7
PAINLEVEL_OUTOF10: 8
PAINLEVEL_OUTOF10: 5

## 2024-04-30 ASSESSMENT — PAIN DESCRIPTION - PAIN TYPE
TYPE: CHRONIC PAIN

## 2024-04-30 ASSESSMENT — PAIN DESCRIPTION - FREQUENCY
FREQUENCY: CONTINUOUS
FREQUENCY: CONTINUOUS

## 2024-05-01 ENCOUNTER — TELEPHONE (OUTPATIENT)
Dept: FAMILY MEDICINE CLINIC | Facility: CLINIC | Age: 49
End: 2024-05-01

## 2024-05-01 VITALS
DIASTOLIC BLOOD PRESSURE: 58 MMHG | WEIGHT: 293 LBS | HEIGHT: 67 IN | HEART RATE: 91 BPM | RESPIRATION RATE: 18 BRPM | BODY MASS INDEX: 45.99 KG/M2 | OXYGEN SATURATION: 98 % | TEMPERATURE: 98.1 F | SYSTOLIC BLOOD PRESSURE: 117 MMHG

## 2024-05-01 LAB
ANION GAP SERPL CALC-SCNC: 9 MMOL/L (ref 9–18)
BASOPHILS # BLD: 0 K/UL (ref 0–0.2)
BASOPHILS NFR BLD: 1 % (ref 0–2)
BUN SERPL-MCNC: 16 MG/DL (ref 6–23)
CALCIUM SERPL-MCNC: 8.7 MG/DL (ref 8.8–10.2)
CHLORIDE SERPL-SCNC: 102 MMOL/L (ref 98–107)
CO2 SERPL-SCNC: 28 MMOL/L (ref 20–28)
CREAT SERPL-MCNC: 0.91 MG/DL (ref 0.6–1.1)
DIFFERENTIAL METHOD BLD: ABNORMAL
EOSINOPHIL # BLD: 0.5 K/UL (ref 0–0.8)
EOSINOPHIL NFR BLD: 8 % (ref 0.5–7.8)
ERYTHROCYTE [DISTWIDTH] IN BLOOD BY AUTOMATED COUNT: 16.4 % (ref 11.9–14.6)
GLUCOSE SERPL-MCNC: 115 MG/DL (ref 70–99)
HCT VFR BLD AUTO: 28.6 % (ref 35.8–46.3)
HGB BLD-MCNC: 8.4 G/DL (ref 11.7–15.4)
IMM GRANULOCYTES # BLD AUTO: 0 K/UL (ref 0–0.5)
IMM GRANULOCYTES NFR BLD AUTO: 0 % (ref 0–5)
LYMPHOCYTES # BLD: 1.4 K/UL (ref 0.5–4.6)
LYMPHOCYTES NFR BLD: 22 % (ref 13–44)
MCH RBC QN AUTO: 25.4 PG (ref 26.1–32.9)
MCHC RBC AUTO-ENTMCNC: 29.4 G/DL (ref 31.4–35)
MCV RBC AUTO: 86.4 FL (ref 82–102)
MONOCYTES # BLD: 0.6 K/UL (ref 0.1–1.3)
MONOCYTES NFR BLD: 10 % (ref 4–12)
NEUTS SEG # BLD: 3.7 K/UL (ref 1.7–8.2)
NEUTS SEG NFR BLD: 60 % (ref 43–78)
NRBC # BLD: 0 K/UL (ref 0–0.2)
PLATELET # BLD AUTO: 534 K/UL (ref 150–450)
PMV BLD AUTO: 8.8 FL (ref 9.4–12.3)
POTASSIUM SERPL-SCNC: 4 MMOL/L (ref 3.5–5.1)
RBC # BLD AUTO: 3.31 M/UL (ref 4.05–5.2)
SODIUM SERPL-SCNC: 138 MMOL/L (ref 136–145)
WBC # BLD AUTO: 6.2 K/UL (ref 4.3–11.1)

## 2024-05-01 PROCEDURE — 2580000003 HC RX 258: Performed by: INTERNAL MEDICINE

## 2024-05-01 PROCEDURE — 80048 BASIC METABOLIC PNL TOTAL CA: CPT

## 2024-05-01 PROCEDURE — 6370000000 HC RX 637 (ALT 250 FOR IP): Performed by: INTERNAL MEDICINE

## 2024-05-01 PROCEDURE — 6360000002 HC RX W HCPCS: Performed by: INTERNAL MEDICINE

## 2024-05-01 PROCEDURE — 36415 COLL VENOUS BLD VENIPUNCTURE: CPT

## 2024-05-01 PROCEDURE — 6360000002 HC RX W HCPCS: Performed by: STUDENT IN AN ORGANIZED HEALTH CARE EDUCATION/TRAINING PROGRAM

## 2024-05-01 PROCEDURE — 6370000000 HC RX 637 (ALT 250 FOR IP): Performed by: STUDENT IN AN ORGANIZED HEALTH CARE EDUCATION/TRAINING PROGRAM

## 2024-05-01 PROCEDURE — 85025 COMPLETE CBC W/AUTO DIFF WBC: CPT

## 2024-05-01 PROCEDURE — 2500000003 HC RX 250 WO HCPCS: Performed by: INTERNAL MEDICINE

## 2024-05-01 RX ADMIN — HYDROMORPHONE HYDROCHLORIDE 0.25 MG: 1 INJECTION, SOLUTION INTRAMUSCULAR; INTRAVENOUS; SUBCUTANEOUS at 02:58

## 2024-05-01 RX ADMIN — PANTOPRAZOLE SODIUM 40 MG: 40 TABLET, DELAYED RELEASE ORAL at 06:10

## 2024-05-01 RX ADMIN — FUROSEMIDE 40 MG: 10 INJECTION, SOLUTION INTRAMUSCULAR; INTRAVENOUS at 08:11

## 2024-05-01 RX ADMIN — ASPIRIN 81 MG 81 MG: 81 TABLET ORAL at 07:58

## 2024-05-01 RX ADMIN — SODIUM CHLORIDE, PRESERVATIVE FREE 10 ML: 5 INJECTION INTRAVENOUS at 09:52

## 2024-05-01 RX ADMIN — LOSARTAN POTASSIUM 50 MG: 50 TABLET, FILM COATED ORAL at 09:29

## 2024-05-01 RX ADMIN — DULOXETINE HYDROCHLORIDE 60 MG: 60 CAPSULE, DELAYED RELEASE ORAL at 07:57

## 2024-05-01 RX ADMIN — METOPROLOL SUCCINATE 25 MG: 25 TABLET, EXTENDED RELEASE ORAL at 09:32

## 2024-05-01 RX ADMIN — PREGABALIN 200 MG: 100 CAPSULE ORAL at 07:57

## 2024-05-01 RX ADMIN — BACLOFEN 10 MG: 10 TABLET ORAL at 07:58

## 2024-05-01 RX ADMIN — DIPHENHYDRAMINE HYDROCHLORIDE 25 MG: 25 CAPSULE ORAL at 09:29

## 2024-05-01 RX ADMIN — OXYCODONE 10 MG: 5 TABLET ORAL at 00:47

## 2024-05-01 RX ADMIN — MONTELUKAST 10 MG: 10 TABLET, FILM COATED ORAL at 07:57

## 2024-05-01 ASSESSMENT — PAIN DESCRIPTION - ORIENTATION
ORIENTATION: RIGHT;LEFT
ORIENTATION: RIGHT
ORIENTATION: RIGHT;LEFT

## 2024-05-01 ASSESSMENT — PAIN SCALES - GENERAL
PAINLEVEL_OUTOF10: 7
PAINLEVEL_OUTOF10: 7

## 2024-05-01 ASSESSMENT — PAIN DESCRIPTION - LOCATION
LOCATION: LEG

## 2024-05-01 ASSESSMENT — PAIN DESCRIPTION - DESCRIPTORS
DESCRIPTORS: BURNING
DESCRIPTORS: ACHING

## 2024-05-01 NOTE — TELEPHONE ENCOUNTER
Care Transitions Initial Follow Up Call    Outreach made within 2 business days of discharge: Yes    Patient: Lo Tirado   Patient : 1975   MRN: [id number]    Reason for Admission: There are no discharge diagnoses documented for the most recent discharge.  Discharge Date: 24       Spoke with: patient    Discharge department/facility: [facility]    TCM Interactive Patient Contact:  Was patient able to fill all prescriptions: Yes  Was patient instructed to bring all medications to the follow-up visit: Yes  Is patient taking all medications as directed in the discharge summary? Yes  Does patient understand their discharge instructions: Yes  Does patient have questions or concerns that need addressed prior to 7-14 day follow up office visit: no    Scheduled appointment with PCP within 7-14 days    Follow Up  Future Appointments   Date Time Provider Department Center   2024 10:30 AM Nenita Beatty APRN - CNP [facility]   5/3/2024  8:50 AM Alexi Tabor DO [facility]   5/15/2024  1:10 PM Camryn Gresham PA [facility]       Carol Murcia LPN

## 2024-05-01 NOTE — TELEPHONE ENCOUNTER
Patient being discharged 05/01/2024 from [facility] patient has f/u scheduled on 05/02/23 at 10:30 am Please call for TCM

## 2024-05-01 NOTE — CARE COORDINATION
Patient with discharge orders for today. PT/OT recommending HH. Patient made aware that she will need to follow up with her PCP before home health orders will be signed. Appointment scheduled for 5/2. No additional needs made known to CM. Patient has met all treatment goals and milestones for discharge. Family to provide transportation home. CM following until patient is discharged.      05/01/24 1340   Services At/After Discharge   Transition of Care Consult (CM Consult) Discharge Planning   Services At/After Discharge None    Resource Information Provided? No   Mode of Transport at Discharge Self   Confirm Follow Up Transport Self   Condition of Participation: Discharge Planning   The Plan for Transition of Care is related to the following treatment goals: Return to baseline   The Patient and/or Patient Representative was provided with a Choice of Provider? Patient   The Patient and/Or Patient Representative agree with the Discharge Plan? Yes   Freedom of Choice list was provided with basic dialogue that supports the patient's individualized plan of care/goals, treatment preferences, and shares the quality data associated with the providers?  Yes

## 2024-05-01 NOTE — PROGRESS NOTES
Hospitalist Progress Note   Admit Date:  2024  7:35 AM   Name:  Lo Tirado   Age:  48 y.o.  Sex:  female  :  1975   MRN:  [id number]   Room:      Presenting/Chief Complaint: Leg Pain     Reason(s) for Admission: Morbid obesity (HCC) [E66.01]  Debility [R53.81]  Chronic pain of both lower extremities [M79.604, M79.605, G89.29]     Hospital Course:   48-year-old female history of lymphedema, neuropathy, lower extremity wounds bilaterally, chronic pain followed by pain management, hypertension, type 2 diabetes, bipolar disorder presented with complaint of lower extremity pain.  Patient was just recently hospitalized from - for similar symptoms.  Patient was evaluated by infectious disease who recommended discontinuing antibiotics as lower extremity wounds secondary to uncontrolled lymphedema and stasis.  Patient complaining of severe pain causing her debility.      Subjective & 24hr Events:   Patient was seen and examined at the bedside.  No overnight events.  Some improvement to pain to bilateral lower extremities.  Requesting that nursing to change her bandages very often throughout the day.  Advised her that that may not be feasible in the hospital setting.  Wound care to follow-up with patient.      Assessment & Plan:   Intractable lower extremity pain  Bilateral lower extremity wounds secondary to lymphedema and stasis  Debility  -Patient was just here  to  for similar symptoms  - At that time infectious disease did not recommend any antibiotics  - Patient to follow-up with wound care and lymphedema clinic  - Wound care consulted  - Continue Lyrica, baclofen  - As needed IV Dilaudid and p.o. Oxy  - PT/OT  - Continue to diurese with Lasix  - Wound care recommending petroleum gauze over open areas  - Per documentation looks like patient refusing compression    Hypertension  - Continue home metoprolol, losartan    Bipolar disorder  - Continue home duloxetine  - Patient 
       Hospitalist Progress Note   Admit Date:  2024  7:35 AM   Name:  Lo Tirado   Age:  48 y.o.  Sex:  female  :  1975   MRN:  [id number]   Room:      Presenting/Chief Complaint: Leg Pain     Reason(s) for Admission: Morbid obesity (HCC) [E66.01]  Debility [R53.81]  Chronic pain of both lower extremities [M79.604, M79.605, G89.29]     Hospital Course:   48-year-old female history of lymphedema, neuropathy, lower extremity wounds bilaterally, chronic pain followed by pain management, hypertension, type 2 diabetes, bipolar disorder presented with complaint of lower extremity pain.  Patient was just recently hospitalized from - for similar symptoms.  Patient was evaluated by infectious disease who recommended discontinuing antibiotics as lower extremity wounds secondary to uncontrolled lymphedema and stasis.  Patient complaining of severe pain causing her debility.      Subjective & 24hr Events:   Patient was seen and examined at the bedside.  No overnight events.  No new complaints this morning.    Assessment & Plan:   Intractable lower extremity pain  Bilateral lower extremity wounds secondary to lymphedema and stasis  Debility  -Patient was just here  to  for similar symptoms  - At that time infectious disease did not recommend any antibiotics  - Patient to follow-up with wound care and lymphedema clinic  - Wound care consulted  - Continue Lyrica, baclofen  - As needed IV Dilaudid and p.o. Oxy  - PT/OT  - Continue to diurese with Lasix  - Wound care recommending petroleum gauze over open areas  - Per documentation looks like patient refusing compression  -Had long discussion with patient this morning, greater than 15 minutes, about importance of getting lymphedema under control to help with wound healing.  - Will give 40 unit IV Lasix daily  -Advised fluid restriction less than 1.8 L daily  - Creatinine on labs  stable    Hypertension  - Continue home metoprolol, 
2045: Primary RN alerted writer the patient lost IV access. Writer attempted 2 unsuccessful \"sticks\" to obtain access. Writer called the PICC team (x2) and left a message requesting assistance. Writer then spoke with the [place] concerning the need for IV access. Vanesa states he will \"be by after seeing his patient to try.\"    0200: ED RN able to obtain IV Access.        
4 Eyes Skin Assessment     NAME:  Lo Tirado  YOB: 1975  MEDICAL RECORD NUMBER:  [id number]    The patient is being assessed for  Admission    I agree that at least one RN has performed a thorough Head to Toe Skin Assessment on the patient. ALL assessment sites listed below have been assessed.      Areas assessed by both nurses:    Head, Face, Ears, Shoulders, Back, Chest, Arms, Elbows, Hands, Sacrum. Buttock, Coccyx, Ischium, Legs. Feet and Heels, and Under Medical Devices         Does the Patient have a Wound? Yes wound(s) were present on assessment. LDA wound assessment was Initiated and completed by RN       Gilbert Prevention initiated by RN: Yes  Wound Care Orders initiated by RN: No    Pressure Injury (Stage 3,4, Unstageable, DTI, NWPT, and Complex wounds) if present, place Wound referral order by RN under : No    New Ostomies, if present place, Ostomy referral order under : No     Nurse 1 eSignature: Electronically signed by Carly Jones RN on 4/28/24 at 2:21 AM EDT    **SHARE this note so that the co-signing nurse can place an eSignature**    Nurse 2 eSignature: Electronically signed by MARIA LUISA RICHTER RN on 4/28/24 at 2:29 AM EDT   
Attempted to see patient this AM for occupational therapy evaluation session. Patient with MD currently . Will follow and re-attempt as schedule permits/patient available. Thank you,    Lakisha David, OT    Rehab Caseload Tracker       
Dr Newton stated wound care will give orders on their consult as to how to address the BLE wounds.  Wound care has been consulted.  
END OF SHIFT NOTE:    INTAKE/OUTPUT  04/28 0701 - 04/29 0700  In: 960 [P.O.:960]  Out: 1300 [Urine:1300]  Voiding: Yes  Catheter: No  Drain:   External Urinary Catheter (Active)   Site Assessment Intact 04/28/24 0000   Placement Replaced 04/28/24 1955   Securement Method Securing device (Describe) 04/28/24 1257   Catheter Care Catheter/Wick replaced 04/28/24 1955   Perineal Care Yes 04/28/24 1717   Suction 40 mmgHg continuous 04/28/24 0000   Urine Color Yellow 04/28/24 1717   Urine Appearance Clear 04/28/24 1717   Output (mL) 500 mL 04/28/24 1717               Flatus: Patient does have flatus present.    Stool: 1 occurrences.    Characteristics:  Stool Appearance: Formed  Stool Color: Brown  Stool Amount: Large  Stool Assessment  Incontinence: No  Stool Appearance: Formed  Stool Color: Brown  Stool Amount: Large  Stool Source: Rectum  Last BM (including prior to admit): 04/28/24    Emesis: 0 occurrences.    Characteristics:        VITAL SIGNS  Patient Vitals for the past 12 hrs:   Temp Pulse Resp BP SpO2   04/29/24 0302 97.9 °F (36.6 °C) 98 20 117/61 94 %   04/28/24 2256 97.7 °F (36.5 °C) 96 20 117/63 96 %   04/28/24 2224 -- -- 18 -- --   04/28/24 2153 -- 72 -- 137/77 --   04/28/24 1940 -- 100 20 120/66 97 %       Pain Assessment  Pain Level: 10 (04/29/24 0541)  Pain Location: Leg  Patient's Stated Pain Goal: 0 - No pain    Ambulating  Yes    Shift report given to oncoming nurse at the bedside.    Carly Jones, RN  
END OF SHIFT NOTE:    INTAKE/OUTPUT  04/28 0701 - 04/29 0700  In: 960 [P.O.:960]  Out: 1300 [Urine:1300]  Voiding: Yes  Catheter: Yes  Drain:   External Urinary Catheter (Active)   Site Assessment Clean,dry & intact 04/29/24 1037   Placement Replaced 04/29/24 1037   Securement Method Securing device (Describe) 04/29/24 1037   Catheter Care Catheter/Wick replaced 04/29/24 1037   Perineal Care Yes 04/28/24 1717   Suction 40 mmgHg continuous 04/28/24 0000   Urine Color Yellow 04/29/24 1037   Urine Appearance Clear 04/29/24 1037   Output (mL) 500 mL 04/29/24 1037               Flatus: Patient does have flatus present.    Stool:  0 occurrences.    Characteristics:  Stool Appearance: Formed  Stool Color: Brown  Stool Amount: Large  Stool Assessment  Incontinence: No  Stool Appearance: Formed  Stool Color: Brown  Stool Amount: Large  Stool Source: Rectum  Last BM (including prior to admit): 04/28/24 (per patient)    Emesis: 0 occurrences.    Characteristics:        VITAL SIGNS  Patient Vitals for the past 12 hrs:   Temp Pulse Resp BP SpO2   04/29/24 1912 98.1 °F (36.7 °C) 91 20 104/68 99 %   04/29/24 1459 98.1 °F (36.7 °C) 92 17 105/64 98 %   04/29/24 1322 -- -- 18 -- --   04/29/24 1120 97.9 °F (36.6 °C) (!) 103 17 (!) 120/58 95 %   04/29/24 0725 99.5 °F (37.5 °C) 93 17 111/60 --       Pain Assessment  Pain Level: 10 (04/29/24 1646)  Pain Location: Leg  Patient's Stated Pain Goal: 0 - No pain    Ambulating  Yes, using own rollator.    Shift report given to oncoming nurse at the bedside.    Lynda Conner RN     
END OF SHIFT NOTE:    INTAKE/OUTPUT  04/29 0701 - 04/30 0700  In: 358 [P.O.:358]  Out: 1725 [Urine:1725]  Voiding: Yes  Catheter: Yes, external  Drain:   External Urinary Catheter (Active)   Site Assessment Clean,dry & intact 04/30/24 1320   Placement Replaced 04/30/24 1320   Securement Method Securing device (Describe) 04/29/24 1037   Catheter Care Catheter/Wick replaced;Suction Canister/Tubing changed 04/30/24 1320   Perineal Care Yes 04/30/24 1320   Suction 40 mmgHg continuous 04/28/24 0000   Urine Color Yellow 04/30/24 1320   Urine Appearance Clear 04/30/24 1320   Output (mL) 600 mL 04/30/24 1604               Flatus: Patient does have flatus present.    Stool:  1 occurrences.    Characteristics: \"normal\" reported by patient at shift change  Stool Appearance: Formed  Stool Color: Brown  Stool Amount: Large  Stool Assessment  Incontinence: No  Stool Appearance: Formed  Stool Color: Brown  Stool Amount: Large  Stool Source: Rectum  Last BM (including prior to admit): 04/28/24 (per patient)    Emesis:  0 occurrences.    Characteristics:        VITAL SIGNS  Patient Vitals for the past 12 hrs:   Temp Pulse Resp BP SpO2   04/30/24 1604 98.1 °F (36.7 °C) 96 18 (!) 104/55 95 %   04/30/24 1159 -- -- 16 -- --   04/30/24 1046 98.1 °F (36.7 °C) 88 18 (!) 115/55 93 %       Pain Assessment  Pain Level: 8 (04/30/24 1842)  Pain Location: Leg  Patient's Stated Pain Goal: 0 - No pain    Ambulating  Yes. Has own rollator at bedside.     Shift report given to oncoming nurse at the bedside.    Lynda Conner RN     
END OF SHIFT NOTE:    INTAKE/OUTPUT  04/30 0701 - 05/01 0700  In: 462 [P.O.:462]  Out: 2900 [Urine:2900]  Voiding: Yes  Catheter: No  Drain:   External Urinary Catheter (Active)   Site Assessment Clean,dry & intact 04/30/24 1320   Placement Replaced 04/30/24 1320   Securement Method Securing device (Describe) 04/29/24 1037   Catheter Care Catheter/Wick replaced;Suction Canister/Tubing changed 04/30/24 1320   Perineal Care Yes 04/30/24 1320   Suction 40 mmgHg continuous 04/28/24 0000   Urine Color Yellow 04/30/24 1320   Urine Appearance Clear 04/30/24 1320   Output (mL) 600 mL 04/30/24 1604               Flatus: Patient does have flatus present.    Stool:  occurrences.    Characteristics:  Stool Appearance: Loose, Soft  Stool Color: Brown  Stool Amount: Medium  Stool Assessment  Incontinence: No  Stool Appearance: Loose, Soft  Stool Color: Brown  Stool Amount: Medium  Stool Source: Rectum  Last BM (including prior to admit): 04/30/24    Emesis:  occurrences.    Characteristics:        VITAL SIGNS  Patient Vitals for the past 12 hrs:   Temp Pulse Resp BP SpO2   05/01/24 0303 98.1 °F (36.7 °C) 99 -- 105/65 95 %   04/30/24 2323 -- 95 -- (!) 111/58 --   04/30/24 2319 98.2 °F (36.8 °C) 90 18 (!) 100/59 100 %   04/30/24 1952 97.5 °F (36.4 °C) 82 17 109/68 100 %       Pain Assessment  Pain Level: 7 (05/01/24 0258)  Pain Location: Leg  Patient's Stated Pain Goal: 0 - No pain    Ambulating  Yes    Shift report given to oncoming nurse at the bedside.    Yenny Rosales RN      
Physical Therapy Note:    Attempted to see patient this AM for physical therapy evaluation session. Patient with MD currently. Will follow and re-attempt as schedule permits/patient available. Thank you,    A Argentina Mcqueen, PT     Rehab Caseload Tracker    
Pt in severe pain from BLE wounds and telling RN that the dressings must come off because they are too tight and burning. This RN explained that wound care is consulted and will see her and give wound care recommendations/orders for dressing changes. Pt still insisting on dressings being removed and redone. Old dressings moistened with saline to loosen/remove. Legs redressed with non-adherent pads and wrapped with roll gauze until wound care rounds on pt and gives reqs.    
Pt reports burning to IV in RAC. Writer examined IV and noted tegaderm slightly peelling. Nurse flushed IV and pt reporting increase burning and requested IV to be removed. Nurse complied with pt request.   Nurse alerted charge nurse of no IV and charge nurse contacted IV team and left message for request to attempt to start IV.   
TRANSFER - IN REPORT:    Verbal report received from Nina WILLIAM on Lo MAKENZIE Vilaon  being received from ER for routine progression of patient care      Report consisted of patient's Situation, Background, Assessment and   Recommendations(SBAR).     Information from the following report(s) Nurse Handoff Report was reviewed with the receiving nurse.    Opportunity for questions and clarification was provided.      Assessment completed upon patient's arrival to unit and care assumed.    
[] [] [] [] [] [] [] [] [] []    Bed to Chair [] [] [] [] [] [] [] [] [] [] []    Stand to Sit [] [] [] [] [] [] [] [] [] [] []    Tub/Shower [] [] [] [] [] [] [] [] [] [] []     Toilet [x] [] [] [] [] [] [] [] [] [] []      [] [] [] [] [] [] [] [] [] [] []    I=Independent, Mod I=Modified Independent, S=Supervision/Setup, SBA=Standby Assistance, CGA=Contact Guard Assistance, Min=Minimal Assistance, Mod=Moderate Assistance, Max=Maximal Assistance, Total=Total Assistance, NT=Not Tested    ACTIVITIES OF DAILY LIVING: I Mod I S SBA CGA Min Mod Max Total NT Comments   BASIC ADLs:              Upper Body Bathing  [] [] [] [] [] [] [] [] [] []     Lower Body Bathing [] [] [] [] [] [] [] [] [] []     Toileting [x] [] [] [] [] [] [] [] [] []    Upper Body Dressing [] [] [] [] [] [] [] [] [] []    Lower Body Dressing [] [] [] [] [] [] [] [] [] []    Feeding [] [] [] [] [] [] [] [] [] []    Grooming [] [] [] [] [] [] [] [] [] []    Personal Device Care [] [] [] [] [] [] [] [] [] []    Functional Mobility [] [] [] [x] [] [] [] [] [] []    I=Independent, Mod I=Modified Independent, S=Supervision/Setup, SBA=Standby Assistance, CGA=Contact Guard Assistance, Min=Minimal Assistance, Mod=Moderate Assistance, Max=Maximal Assistance, Total=Total Assistance, NT=Not Tested      TREATMENT:     EVALUATION: LOW COMPLEXITY: (Untimed Charge)  The initial evaluation charge encompasses clinical chart review, objective assessment, interpretation of assessment, and skilled monitoring of the patient's response to treatment in order to develop a plan of care.     TREATMENT:   Co-Treatment PT/OT necessary due to patient's decreased overall endurance/tolerance levels, as well as need for high level skilled assistance to complete functional transfers/mobility and functional tasks  Self Care (8 minutes): Patient participated in toileting ADLs in unsupported sitting with no verbal cueing to increase independence. Patient also participated in functional 
Tolerance  Decreased Balance  Decreased Gait Ability  Decreased Transfer Abilities  Increased Pain INTERVENTIONS PLANNED:   (Benefits and precautions of physical therapy have been discussed with the patient.)  Self Care Training  Therapeutic Activity  Therapeutic Exercise/HEP  Gait Training  Education       TREATMENT:   EVALUATION: LOW COMPLEXITY: (Untimed Charge)  The initial evaluation charge encompasses clinical chart review, objective assessment, interpretation of assessment, and skilled monitoring of the patient's response to treatment in order to develop a plan of care.     TREATMENT:   Therapeutic Activity (10 Minutes): Therapeutic activity included Transfer Training, Ambulation on level ground, Sitting balance , and Standing balance to improve functional Activity tolerance, Balance, Mobility, and Strength.    TREATMENT GRID:  N/A    AFTER TREATMENT PRECAUTIONS: Bed/Chair Locked, Call light within reach, and sitting on rollator seat    INTERDISCIPLINARY COLLABORATION:  RN/ PCT, PT/ PTA, and OT/ OJEDA    EDUCATION: Education Given To: Patient  Education Provided: Role of Therapy;Plan of Care  Education Outcome: Verbalized understanding    TIME IN/OUT:  Time In: 1050  Time Out: 1117  Minutes: 27    A Argentina Mcqueen PT    
Basophils Absolute 0.0 0.0 - 0.2 K/UL    Immature Granulocytes Absolute 0.0 0.0 - 0.5 K/UL       No results for input(s): \"COVID19\" in the last 72 hours.    Current Meds:  Current Facility-Administered Medications   Medication Dose Route Frequency    pregabalin (LYRICA) capsule 200 mg  200 mg Oral TID    HYDROmorphone HCl PF (DILAUDID) injection 0.25 mg  0.25 mg IntraVENous Q4H PRN    baclofen (LIORESAL) tablet 10 mg  10 mg Oral TID    aspirin chewable tablet 81 mg  81 mg Oral Daily    DULoxetine (CYMBALTA) extended release capsule 60 mg  60 mg Oral Daily    furosemide (LASIX) tablet 40 mg  40 mg Oral Daily    hydrOXYzine HCl (ATARAX) tablet 25 mg  25 mg Oral Nightly    losartan (COZAAR) tablet 50 mg  50 mg Oral Daily    metoprolol succinate (TOPROL XL) extended release tablet 25 mg  25 mg Oral Daily    montelukast (SINGULAIR) tablet 10 mg  10 mg Oral Daily    pantoprazole (PROTONIX) tablet 40 mg  40 mg Oral QAM AC    albuterol (PROVENTIL) (2.5 MG/3ML) 0.083% nebulizer solution 2.5 mg  2.5 mg Nebulization Q6H PRN    sodium chloride flush 0.9 % injection 5-40 mL  5-40 mL IntraVENous 2 times per day    sodium chloride flush 0.9 % injection 5-40 mL  5-40 mL IntraVENous PRN    0.9 % sodium chloride infusion   IntraVENous PRN    potassium chloride (KLOR-CON M) extended release tablet 40 mEq  40 mEq Oral PRN    Or    potassium bicarb-citric acid (EFFER-K) effervescent tablet 40 mEq  40 mEq Oral PRN    Or    potassium chloride 10 mEq/100 mL IVPB (Peripheral Line)  10 mEq IntraVENous PRN    potassium chloride 10 mEq/100 mL IVPB (Peripheral Line)  10 mEq IntraVENous PRN    magnesium sulfate 2000 mg in 50 mL IVPB premix  2,000 mg IntraVENous PRN    ondansetron (ZOFRAN-ODT) disintegrating tablet 4 mg  4 mg Oral Q8H PRN    Or    ondansetron (ZOFRAN) injection 4 mg  4 mg IntraVENous Q6H PRN    polyethylene glycol (GLYCOLAX) packet 17 g  17 g Oral Daily PRN    bisacodyl (DULCOLAX) suppository 10 mg  10 mg Rectal Daily PRN

## 2024-05-01 NOTE — DISCHARGE SUMMARY
Hospitalist Discharge Summary   Admit Date:  2024  7:35 AM   DC Note date: 2024  Name:  Lo Tirado   Age:  48 y.o.  Sex:  female  :  1975   MRN:  [id number]   Room:    PCP:  Nenita Beatty APRN - CNP    Presenting Complaint: Leg Pain     Initial Admission Diagnosis: Morbid obesity (HCC) [E66.01]  Debility [R53.81]  Chronic pain of both lower extremities [M79.604, M79.605, G89.29]     Problem List for this Hospitalization (present on admission):    Principal Problem:    Debility  Active Problems:    Peripheral neuropathy    Hypertension    Morbid obesity (HCC)    Moderate persistent asthma without complication    Bipolar affective disorder (HCC)    Type 2 diabetes mellitus    Lymphedema of both lower extremities    Leg ulcer, left, with fat layer exposed (HCC)    Intractable pain  Resolved Problems:    * No resolved hospital problems. *      Hospital Course:  48-year-old female history of lymphedema, neuropathy, lower extremity wounds bilaterally, chronic pain followed by pain management, hypertension, type 2 diabetes, bipolar disorder presented with complaint of lower extremity pain. Patient was just recently hospitalized from - for similar symptoms. Patient was evaluated by infectious disease who recommended discontinuing antibiotics as lower extremity wounds secondary to uncontrolled lymphedema and stasis. Patient complaining of severe pain causing her debility.   Patient presented with intractable lower extremity pain secondary to bilateral lower extremity wounds secondary to lymphedema and stasis.  Patient was just recently hospitalized from - for similar symptoms.  At that time infectious disease did not recommend any other antibiotics.  Wound care consulted during hospitalization here.  As needed analgesics.  Patient also diuresed and tolerated well.  Patient was advised in detail that she needs to get her lymphedema under control in order for wounds to

## 2024-05-02 ENCOUNTER — HOME HEALTH ADMISSION (OUTPATIENT)
Dept: HOME HEALTH SERVICES | Facility: HOME HEALTH | Age: 49
End: 2024-05-02

## 2024-05-02 ENCOUNTER — TELEPHONE (OUTPATIENT)
Dept: FAMILY MEDICINE CLINIC | Facility: CLINIC | Age: 49
End: 2024-05-02

## 2024-05-02 ENCOUNTER — CARE COORDINATION (OUTPATIENT)
Dept: CARE COORDINATION | Facility: CLINIC | Age: 49
End: 2024-05-02

## 2024-05-02 ENCOUNTER — TELEMEDICINE (OUTPATIENT)
Dept: FAMILY MEDICINE CLINIC | Facility: CLINIC | Age: 49
End: 2024-05-02

## 2024-05-02 DIAGNOSIS — R53.81 DEBILITY: Primary | ICD-10-CM

## 2024-05-02 DIAGNOSIS — I10 PRIMARY HYPERTENSION: Primary | ICD-10-CM

## 2024-05-02 DIAGNOSIS — E66.01 MORBID OBESITY (HCC): Chronic | ICD-10-CM

## 2024-05-02 DIAGNOSIS — L97.922 CHRONIC ULCER OF LEFT LOWER EXTREMITY WITH FAT LAYER EXPOSED (HCC): Chronic | ICD-10-CM

## 2024-05-02 DIAGNOSIS — R53.81 DEBILITY: ICD-10-CM

## 2024-05-02 DIAGNOSIS — I89.0 CHRONIC ACQUIRED LYMPHEDEMA: ICD-10-CM

## 2024-05-02 DIAGNOSIS — I89.0 LYMPHEDEMA OF BOTH LOWER EXTREMITIES: Chronic | ICD-10-CM

## 2024-05-02 DIAGNOSIS — L97.922 NON-PRESSURE CHRONIC ULCER OF LEFT LOWER LEG WITH FAT LAYER EXPOSED (HCC): Primary | ICD-10-CM

## 2024-05-02 DIAGNOSIS — L97.312 NON-PRESSURE CHRONIC ULCER OF RIGHT ANKLE WITH FAT LAYER EXPOSED (HCC): ICD-10-CM

## 2024-05-02 DIAGNOSIS — E11.628 TYPE 2 DIABETES MELLITUS WITH OTHER SKIN COMPLICATION, WITHOUT LONG-TERM CURRENT USE OF INSULIN (HCC): Chronic | ICD-10-CM

## 2024-05-02 DIAGNOSIS — Z09 HOSPITAL DISCHARGE FOLLOW-UP: ICD-10-CM

## 2024-05-02 DIAGNOSIS — L97.312 CHRONIC ULCER OF RIGHT ANKLE WITH FAT LAYER EXPOSED (HCC): Chronic | ICD-10-CM

## 2024-05-02 PROCEDURE — 1111F DSCHRG MED/CURRENT MED MERGE: CPT | Performed by: NURSE PRACTITIONER

## 2024-05-02 ASSESSMENT — PATIENT HEALTH QUESTIONNAIRE - PHQ9
SUM OF ALL RESPONSES TO PHQ9 QUESTIONS 1 & 2: 6
SUM OF ALL RESPONSES TO PHQ QUESTIONS 1-9: 12
SUM OF ALL RESPONSES TO PHQ QUESTIONS 1-9: 12
3. TROUBLE FALLING OR STAYING ASLEEP: NOT AT ALL
8. MOVING OR SPEAKING SO SLOWLY THAT OTHER PEOPLE COULD HAVE NOTICED. OR THE OPPOSITE, BEING SO FIGETY OR RESTLESS THAT YOU HAVE BEEN MOVING AROUND A LOT MORE THAN USUAL: SEVERAL DAYS
SUM OF ALL RESPONSES TO PHQ QUESTIONS 1-9: 12
4. FEELING TIRED OR HAVING LITTLE ENERGY: NEARLY EVERY DAY
SUM OF ALL RESPONSES TO PHQ QUESTIONS 1-9: 12
5. POOR APPETITE OR OVEREATING: NOT AT ALL
2. FEELING DOWN, DEPRESSED OR HOPELESS: NEARLY EVERY DAY
6. FEELING BAD ABOUT YOURSELF - OR THAT YOU ARE A FAILURE OR HAVE LET YOURSELF OR YOUR FAMILY DOWN: MORE THAN HALF THE DAYS
1. LITTLE INTEREST OR PLEASURE IN DOING THINGS: NEARLY EVERY DAY
7. TROUBLE CONCENTRATING ON THINGS, SUCH AS READING THE NEWSPAPER OR WATCHING TELEVISION: NOT AT ALL

## 2024-05-02 NOTE — TELEPHONE ENCOUNTER
Wound Center called and states patient cannot come to appointments. She was seen  by [facility] for her legs in the past they can do wound care at home. [facility] cannot see her until next Tuesday Please advise

## 2024-05-02 NOTE — PROGRESS NOTES
LIORESAL  Take 1 tablet by mouth 3 times daily     DULoxetine 60 MG extended release capsule  Commonly known as: CYMBALTA     Ensure Max Protein Liqd  Take 1 each by mouth in the morning and at bedtime     fluticasone 50 MCG/ACT nasal spray  Commonly known as: FLONASE     furosemide 40 MG tablet  Commonly known as: Lasix  Take 1 tablet by mouth daily     hydrOXYzine HCl 25 MG tablet  Commonly known as: ATARAX  Take 1 tablet by mouth nightly     losartan 50 MG tablet  Commonly known as: COZAAR  Take 1 tablet by mouth daily HOLD UNTIL FOLLOW UP WITH PRIMARY CARE DUE TO LOW BP     metoprolol succinate 25 MG extended release tablet  Commonly known as: TOPROL XL  Take 1 tablet by mouth daily     mineral oil-hydrophilic petrolatum ointment  Apply topically as needed.     montelukast 10 MG tablet  Commonly known as: SINGULAIR  Take 1 tablet by mouth daily     naloxone 4 MG/0.1ML Liqd nasal spray  Commonly known as: Narcan  1 spray by Nasal route as needed for Opioid Reversal     pantoprazole 40 MG tablet  Commonly known as: PROTONIX  Take 1 tablet by mouth every morning (before breakfast)     pregabalin 200 MG capsule  Commonly known as: LYRICA  Take 1 capsule by mouth in the morning, at noon, and at bedtime for 30 days. Max Daily Amount: 600 mg               Medications marked \"taking\" at this time  Outpatient Medications Marked as Taking for the 5/2/24 encounter (Telemedicine) with Nenita Beatty APRN - [facility]   Medication Sig Dispense Refill    baclofen (LIORESAL) 10 MG tablet Take 1 tablet by mouth 3 times daily 90 tablet 0    mineral oil-hydrophilic petrolatum (AQUAPHOR) ointment Apply topically as needed. 99 g 0    losartan (COZAAR) 50 MG tablet Take 1 tablet by mouth daily HOLD UNTIL FOLLOW UP WITH PRIMARY CARE DUE TO LOW BP 90 tablet 0    DULoxetine (CYMBALTA) 60 MG extended release capsule Take 1 capsule by mouth daily      hydrOXYzine HCl (ATARAX) 25 MG tablet Take 1 tablet by mouth nightly 30 tablet 1

## 2024-05-02 NOTE — CARE COORDINATION
have everything you need to keep you well at home?: Yes  Care Transitions Interventions    Social Work: Completed             Discussed follow-up appointments. If no appointment was previously scheduled, appointment scheduling offered: Yes.   Is follow up appointment scheduled within 7-14 days of discharge? Yes.    Follow Up  Future Appointments   Date Time Provider Department Center   5/3/2024  8:50 AM Alexi Tabor,  [facility]   5/15/2024  1:10 PM Camryn Gresham PA [facility]       Care Transition Nurse provided contact information.  Plan for follow-up call in 5-7 days based on severity of symptoms and risk factors.  Plan for next call: self management-assess self management of chronic disease; emotional issues.  referrals-MAULIK Sandoval RN

## 2024-05-03 ENCOUNTER — HOME HEALTH ADMISSION (OUTPATIENT)
Dept: HOME HEALTH SERVICES | Facility: HOME HEALTH | Age: 49
End: 2024-05-03

## 2024-05-03 ENCOUNTER — CARE COORDINATION (OUTPATIENT)
Dept: CARE COORDINATION | Facility: CLINIC | Age: 49
End: 2024-05-03

## 2024-05-03 ENCOUNTER — TELEPHONE (OUTPATIENT)
Dept: WOUND CARE | Age: 49
End: 2024-05-03

## 2024-05-03 NOTE — CARE COORDINATION
ROSSANA WILLIAM received referral from care transition's RN regarding assistance with pt for a number of concerns.  Pt verbalized concerns to care transition RN including food insecurity, need for ADL assistance, and 'not feeling herself'.  ROSSANA WILLIAM able to make referral for MOW since pt voiced concerns but the rest of concerns, ROSSANA WILLIAM needs to speak with pt to confirm pt would be okay with making additional referrals.  ROSSANA WILLIAM reached out to pt x 2 this day but both calls went right to VM and pt's VM box is full.     ROSSANA WILLAIM also noted that referral made to [facility] was unable to be accepted due to agency being at capacity.  Referral was sent to [facility] by office/[facility].  ROSSANA WILLIAM sent SAFEMAIL email to home health liaison with [facility] to provide heads up to referral coming their way.      No additional ROSSANA WILLIAM needs noted or voiced at this time.  Will await return call and/or follow up within 1 business day.

## 2024-05-03 NOTE — TELEPHONE ENCOUNTER
5/2/2024 Telephone message received from patient asking for a virtual visit as she is unable to keep her appointment for tomorrow.   Patient was notified that she would need to reschedule her appointment in the clinic  instead of a virtual visit.  Pt reported difficulty with dressing removal, continued pain with dressing removal and that she does not know which home health agency was referred.   This RN contacted [facility] was informed that the patients primary MD had referred patient however the agency schedule has been set through Tue and patient would not be able to be seen.  This RN called patients primary MD and asked that the patient be referred to [facility] as patient was seen by them in the past.  RN notified patient of this home health situation.  RN discussed dressing removal options with patient such as warm water to dressing prior to removal; increased amount of Aquaphor to leg and use of ice pack for pain / burning relief.  Educated patient that increased dressing changes daily will decrease rate of healing . Patient verbalized understanding. Patient will call to re-schedule appointment if unable to be seen on Friday 5/3/24

## 2024-05-07 ENCOUNTER — CARE COORDINATION (OUTPATIENT)
Dept: CARE COORDINATION | Facility: CLINIC | Age: 49
End: 2024-05-07

## 2024-05-07 ENCOUNTER — TELEPHONE (OUTPATIENT)
Dept: FAMILY MEDICINE CLINIC | Facility: CLINIC | Age: 49
End: 2024-05-07

## 2024-05-07 DIAGNOSIS — G62.9 PERIPHERAL POLYNEUROPATHY: ICD-10-CM

## 2024-05-07 DIAGNOSIS — L03.115 BILATERAL CELLULITIS OF LOWER LEG: ICD-10-CM

## 2024-05-07 DIAGNOSIS — L97.922 CHRONIC ULCER OF LEFT LOWER EXTREMITY WITH FAT LAYER EXPOSED (HCC): Chronic | ICD-10-CM

## 2024-05-07 DIAGNOSIS — L97.312 CHRONIC ULCER OF RIGHT ANKLE WITH FAT LAYER EXPOSED (HCC): Chronic | ICD-10-CM

## 2024-05-07 DIAGNOSIS — I89.0 LYMPHEDEMA OF BOTH LOWER EXTREMITIES: Chronic | ICD-10-CM

## 2024-05-07 DIAGNOSIS — I82.542: Primary | ICD-10-CM

## 2024-05-07 DIAGNOSIS — L03.116 BILATERAL CELLULITIS OF LOWER LEG: ICD-10-CM

## 2024-05-07 DIAGNOSIS — E66.01 MORBID OBESITY (HCC): Chronic | ICD-10-CM

## 2024-05-07 NOTE — TELEPHONE ENCOUNTER
I sent scripts for lift chair and BSC through DME. Can you please print and fax to [facility] [employer] or company that she can use?    Thanks

## 2024-05-07 NOTE — CARE COORDINATION
ROSSANA WILLIAM continues to follow for SW Program.  ROSSANA WILLIAM reached out to pt this day and was able to speak with her.  Pt reports she is doing much better then last week.  There were a couple of needs that she has, however.  She is need of a new BSC due to her limited mobility at this time, she also has [facility] sending her more supplies for her legs but she needs order form signed by provider, and she is requesting a lift chair.  ROSSANA WILLIAM explained that I could send message to PCP reiterating about the form and lift chair as this has already been relayed to provider and I will also include request for BSC.  Pt is agreeable appreciative.  Message to be sent this day.      No additional ROSSANA NATALIIA needs at this time.  Will monitor and update as needed.

## 2024-05-07 NOTE — TELEPHONE ENCOUNTER
[employer] is out of network with [facility]. Will send to alternate DME company.

## 2024-05-07 NOTE — TELEPHONE ENCOUNTER
Lo Dotson called in wanting to know if provider can help get a lift recliner and is the provider received a fax for medical supplies, so the form could get signed and sent back

## 2024-05-08 NOTE — TELEPHONE ENCOUNTER
Returned patient's call, who states that she does not have a preferred DME company to send life & 3-in-1 BSC order to. Can send to whoever accepts her insurance.   She states that [facility] was supposed to send an order to the office for Nenita to sign for wound care supplies. Will check with provider to see if form was received & if not, will contact Shon Noe at [phone] to have them re-fax form.

## 2024-05-08 NOTE — TELEPHONE ENCOUNTER
Form received & given to Nenita, who states we needs to know what medical supplies home health is using or plans to use so we know what to order for her.   Called [facility] & left a message for Annie, clinical supervisor, requesting a return call to discuss.

## 2024-05-08 NOTE — TELEPHONE ENCOUNTER
Spoke to provider who states she has not seen forms.   Called [facility] & spoke to Fanny, who states she will re-fax order. Confirmed office fax number with Fanny.

## 2024-05-08 NOTE — TELEPHONE ENCOUNTER
Annie with [facility] returned call & states that they are not seeing patient as she is listed as a \"Do Not Readmit\". States that this is d/t noncompliance. States that they have exhausted all of their resources to care for her. States that she would call daily, requesting a nurse to come to the house to wrap her legs & after they left, she would unwrap them because she \"couldn't stand it\". Reports she would not follow treatment plans or keep legs elevated as instructed.   Patient will have to be referred to another home health agency.

## 2024-05-09 ENCOUNTER — CARE COORDINATION (OUTPATIENT)
Dept: CARE COORDINATION | Facility: CLINIC | Age: 49
End: 2024-05-09

## 2024-05-09 ENCOUNTER — HOME HEALTH ADMISSION (OUTPATIENT)
Dept: HOME HEALTH SERVICES | Facility: HOME HEALTH | Age: 49
End: 2024-05-09

## 2024-05-09 DIAGNOSIS — L97.312 NON-PRESSURE CHRONIC ULCER OF RIGHT ANKLE WITH FAT LAYER EXPOSED (HCC): ICD-10-CM

## 2024-05-09 DIAGNOSIS — R53.81 DEBILITY: ICD-10-CM

## 2024-05-09 DIAGNOSIS — I89.0 LYMPHEDEMA: ICD-10-CM

## 2024-05-09 DIAGNOSIS — L97.922 LEG ULCER, LEFT, WITH FAT LAYER EXPOSED (HCC): Primary | ICD-10-CM

## 2024-05-09 NOTE — CARE COORDINATION
Care Transitions Outreach Attempt    Call within 2 business days of discharge: Yes   Attempted to reach patient for transitions of care follow up. Unable to reach patient.    Patient: Lo Tirado Patient : 1975 MRN: [id number]    Last Discharge Facility       Date Complaint Diagnosis Description Type Department Provider    24 Leg Pain Chronic pain of both lower extremities ... ED to Hosp-Admission (Discharged) (ADMITTED) [id number] Lozada MD; Catia Sprague...              Was this an external facility discharge? No Discharge Facility Name: [facility]    Noted following upcoming appointments from discharge chart review:   [facility] follow up appointment(s):   Future Appointments   Date Time Provider Department Center   5/15/2024  1:10 PM Camryn Gresham PA [facility]   2024  1:00 PM Alexi Tabor DO [facility]     Non-[facility]  follow up appointment(s): none noted

## 2024-05-10 ENCOUNTER — TELEPHONE (OUTPATIENT)
Dept: FAMILY MEDICINE CLINIC | Facility: CLINIC | Age: 49
End: 2024-05-10

## 2024-05-10 DIAGNOSIS — L97.929 ULCER OF LEFT LOWER EXTREMITY, UNSPECIFIED ULCER STAGE (HCC): ICD-10-CM

## 2024-05-10 DIAGNOSIS — L97.919 ULCER OF RIGHT LOWER EXTREMITY, UNSPECIFIED ULCER STAGE (HCC): Primary | ICD-10-CM

## 2024-05-10 DIAGNOSIS — R53.81 DEBILITY: ICD-10-CM

## 2024-05-10 DIAGNOSIS — Z91.81 AT MODERATE RISK FOR FALL: ICD-10-CM

## 2024-05-10 NOTE — TELEPHONE ENCOUNTER
Called patient, no answer. LMOM requesting a return call. [facility] office number was left on voicemail.

## 2024-05-10 NOTE — TELEPHONE ENCOUNTER
----- Message from Beatriz Queen sent at 5/10/2024  2:11 PM EDT -----  Subject: Message to Provider    QUESTIONS  Information for Provider? Patient is calling in and had questions   regarding her order for her lift chair and medical supplies Please call   patient back to help assist.   ---------------------------------------------------------------------------  --------------  CALL BACK INFO  [phone]; OK to leave message on voicemail  ---------------------------------------------------------------------------  --------------  SCRIPT ANSWERS  Relationship to Patient? Self

## 2024-05-11 ENCOUNTER — TELEPHONE (OUTPATIENT)
Dept: FAMILY MEDICINE CLINIC | Facility: CLINIC | Age: 49
End: 2024-05-11

## 2024-05-11 NOTE — TELEPHONE ENCOUNTER
Patient called answering service moaning and groaning into the phone kept saying over and over she is burning in her legs with no further details.  Recommended pt take her Lyrica that has been ordered or take an Epson salt bath or if severe, needs to go to the ED.  Patient agreed.

## 2024-05-12 ENCOUNTER — HOSPITAL ENCOUNTER (EMERGENCY)
Age: 49
Discharge: HOME | End: 2024-05-13
Attending: EMERGENCY MEDICINE
Payer: MEDICARE

## 2024-05-12 DIAGNOSIS — E16.2 HYPOGLYCEMIA: ICD-10-CM

## 2024-05-12 DIAGNOSIS — G89.4 CHRONIC PAIN SYNDROME: Primary | ICD-10-CM

## 2024-05-12 LAB
ANION GAP SERPL CALC-SCNC: 11 MMOL/L (ref 9–18)
BUN SERPL-MCNC: 9 MG/DL (ref 6–23)
CALCIUM SERPL-MCNC: 9 MG/DL (ref 8.8–10.2)
CHLORIDE SERPL-SCNC: 104 MMOL/L (ref 98–107)
CO2 SERPL-SCNC: 26 MMOL/L (ref 20–28)
CREAT SERPL-MCNC: 0.93 MG/DL (ref 0.6–1.1)
ERYTHROCYTE [DISTWIDTH] IN BLOOD BY AUTOMATED COUNT: 16.7 % (ref 11.9–14.6)
GLUCOSE BLD STRIP.AUTO-MCNC: 55 MG/DL (ref 65–100)
GLUCOSE BLD STRIP.AUTO-MCNC: 87 MG/DL (ref 65–100)
GLUCOSE BLD STRIP.AUTO-MCNC: 89 MG/DL (ref 65–100)
GLUCOSE BLD STRIP.AUTO-MCNC: 91 MG/DL (ref 65–100)
GLUCOSE BLD STRIP.AUTO-MCNC: 96 MG/DL (ref 65–100)
GLUCOSE SERPL-MCNC: 77 MG/DL (ref 70–99)
HCT VFR BLD AUTO: 30.5 % (ref 35.8–46.3)
HGB BLD-MCNC: 9.1 G/DL (ref 11.7–15.4)
MCH RBC QN AUTO: 25.1 PG (ref 26.1–32.9)
MCHC RBC AUTO-ENTMCNC: 29.8 G/DL (ref 31.4–35)
MCV RBC AUTO: 84.3 FL (ref 82–102)
NRBC # BLD: 0 K/UL (ref 0–0.2)
PLATELET # BLD AUTO: 449 K/UL (ref 150–450)
PMV BLD AUTO: 9.2 FL (ref 9.4–12.3)
POTASSIUM SERPL-SCNC: 4.5 MMOL/L (ref 3.5–5.1)
RBC # BLD AUTO: 3.62 M/UL (ref 4.05–5.2)
SERVICE CMNT-IMP: ABNORMAL
SERVICE CMNT-IMP: NORMAL
SODIUM SERPL-SCNC: 141 MMOL/L (ref 136–145)
WBC # BLD AUTO: 7.1 K/UL (ref 4.3–11.1)

## 2024-05-12 PROCEDURE — 85027 COMPLETE CBC AUTOMATED: CPT

## 2024-05-12 PROCEDURE — 6360000002 HC RX W HCPCS: Performed by: EMERGENCY MEDICINE

## 2024-05-12 PROCEDURE — 80307 DRUG TEST PRSMV CHEM ANLYZR: CPT

## 2024-05-12 PROCEDURE — 80048 BASIC METABOLIC PNL TOTAL CA: CPT

## 2024-05-12 PROCEDURE — 96365 THER/PROPH/DIAG IV INF INIT: CPT

## 2024-05-12 PROCEDURE — 99284 EMERGENCY DEPT VISIT MOD MDM: CPT

## 2024-05-12 PROCEDURE — 96374 THER/PROPH/DIAG INJ IV PUSH: CPT

## 2024-05-12 PROCEDURE — 82962 GLUCOSE BLOOD TEST: CPT

## 2024-05-12 PROCEDURE — 96375 TX/PRO/DX INJ NEW DRUG ADDON: CPT

## 2024-05-12 PROCEDURE — 2580000003 HC RX 258

## 2024-05-12 RX ORDER — NALOXONE HYDROCHLORIDE 0.4 MG/ML
0.4 INJECTION, SOLUTION INTRAMUSCULAR; INTRAVENOUS; SUBCUTANEOUS
Status: COMPLETED | OUTPATIENT
Start: 2024-05-12 | End: 2024-05-12

## 2024-05-12 RX ORDER — NALOXONE HYDROCHLORIDE 4 MG/.1ML
1 SPRAY NASAL PRN
Qty: 1 EACH | Refills: 1 | Status: SHIPPED | OUTPATIENT
Start: 2024-05-12

## 2024-05-12 RX ORDER — DEXTROSE MONOHYDRATE 100 MG/ML
INJECTION, SOLUTION INTRAVENOUS
Status: COMPLETED
Start: 2024-05-12 | End: 2024-05-12

## 2024-05-12 RX ORDER — HYDROCODONE BITARTRATE AND ACETAMINOPHEN 5; 325 MG/1; MG/1
1 TABLET ORAL EVERY 6 HOURS PRN
Qty: 20 TABLET | Refills: 0 | Status: SHIPPED | OUTPATIENT
Start: 2024-05-12 | End: 2024-05-17

## 2024-05-12 RX ORDER — DEXTROSE MONOHYDRATE 100 MG/ML
INJECTION, SOLUTION INTRAVENOUS
Status: COMPLETED | OUTPATIENT
Start: 2024-05-12 | End: 2024-05-12

## 2024-05-12 RX ADMIN — DEXTROSE MONOHYDRATE: 100 INJECTION, SOLUTION INTRAVENOUS at 21:36

## 2024-05-12 RX ADMIN — NALXONE HYDROCHLORIDE 0.4 MG: 0.4 INJECTION INTRAMUSCULAR; INTRAVENOUS; SUBCUTANEOUS at 22:04

## 2024-05-12 ASSESSMENT — PAIN - FUNCTIONAL ASSESSMENT: PAIN_FUNCTIONAL_ASSESSMENT: 0-10

## 2024-05-12 ASSESSMENT — PAIN SCALES - GENERAL: PAINLEVEL_OUTOF10: 10

## 2024-05-12 NOTE — ED PROVIDER NOTES
Emergency Department Provider Note       PCP: Nenita Beatty, APRN - CNP   Age: 48 y.o.   Sex: female     DISPOSITION Decision To Discharge 05/12/2024 08:37:24 PM       ICD-10-CM    1. Chronic pain syndrome  G89.4 HYDROcodone-acetaminophen (NORCO) 5-325 MG per tablet      2. Hypoglycemia  E16.2           Medical Decision Making      I see no indication for pain medication at this time as she is significantly somnolent likely from pain medications.  Review of [place] PDMP reveals 20 oxycodone at time of discharge few weeks ago (4/23).  Last Norco 7.5 (120)was filled on March 14.  Review of last admission reveals infectious disease has stopped antibiotics and treatment for her wounds secondary to chronic.  White blood cell count, lactic acid and procalcitonin is normal during those admissions.  No tachycardia or fever here today.  No signs of sepsis at this time    10:37 PM  Refill of patient's usual chronic pain medication will be sent to her pharmacy for use starting tomorrow.  Follow-up with her pain management and/or PCP this week recommended.  Pain medication not indicated in the ED and no indication for admission.  ED Course as of 05/12/24 2237   Sun May 12, 2024   2133 Patient still arousable, but glucose has fallen below 60. D10 ordered. [KM]   2201 Blood sugar in the 90s but patient remains somnolent.  0.4 mg of Narcan ordered [KM]   2207 Patient more alert with Narcan.  Patient continued to talk in a baby talk gibberish at that cleared suddenly when she was introduced to painful stimuli in all 4 extremities to check a neurologic exam.  Moves all 4 extremities to pain.  Speech cleared up at that point [KM]      ED Course User Index  [KM] Brent Moreno MD     1 chronic illness with exacerbation.  Prescription drug management performed.  Chronic medical problems impacting care include morbid obesity and chronic pain.  Shared medical decision making was utilized in creating the patients
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
## 2024-05-13 ENCOUNTER — APPOINTMENT (OUTPATIENT)
Dept: CT IMAGING | Age: 49
End: 2024-05-13
Payer: MEDICARE

## 2024-05-13 ENCOUNTER — CARE COORDINATION (OUTPATIENT)
Dept: CARE COORDINATION | Facility: CLINIC | Age: 49
End: 2024-05-13

## 2024-05-13 VITALS
WEIGHT: 293 LBS | OXYGEN SATURATION: 97 % | BODY MASS INDEX: 45.99 KG/M2 | RESPIRATION RATE: 20 BRPM | TEMPERATURE: 98.5 F | SYSTOLIC BLOOD PRESSURE: 142 MMHG | HEART RATE: 89 BPM | HEIGHT: 67 IN | DIASTOLIC BLOOD PRESSURE: 75 MMHG

## 2024-05-13 VITALS
TEMPERATURE: 97.9 F | RESPIRATION RATE: 17 BRPM | DIASTOLIC BLOOD PRESSURE: 79 MMHG | WEIGHT: 293 LBS | OXYGEN SATURATION: 98 % | HEART RATE: 89 BPM | HEIGHT: 67 IN | BODY MASS INDEX: 45.99 KG/M2 | SYSTOLIC BLOOD PRESSURE: 133 MMHG

## 2024-05-13 DIAGNOSIS — M79.605 CHRONIC PAIN OF BOTH LOWER EXTREMITIES: Primary | ICD-10-CM

## 2024-05-13 DIAGNOSIS — G89.29 CHRONIC PAIN OF BOTH LOWER EXTREMITIES: Primary | ICD-10-CM

## 2024-05-13 DIAGNOSIS — M79.604 CHRONIC PAIN OF BOTH LOWER EXTREMITIES: Primary | ICD-10-CM

## 2024-05-13 LAB
AMPHET UR QL SCN: NEGATIVE
ARTERIAL PATENCY WRIST A: POSITIVE
BARBITURATES UR QL SCN: NEGATIVE
BASE EXCESS BLD CALC-SCNC: 1.2 MMOL/L
BDY SITE: NORMAL
BENZODIAZ UR QL: NEGATIVE
CANNABINOIDS UR QL SCN: NEGATIVE
COCAINE UR QL SCN: NEGATIVE
GLUCOSE BLD STRIP.AUTO-MCNC: 84 MG/DL (ref 65–100)
GLUCOSE BLD STRIP.AUTO-MCNC: 87 MG/DL (ref 65–100)
GLUCOSE BLD STRIP.AUTO-MCNC: 98 MG/DL (ref 65–100)
HCO3 BLD-SCNC: 25.5 MMOL/L (ref 22–26)
METHADONE UR QL: NEGATIVE
O2/TOTAL GAS SETTING VFR VENT: 21 %
OPIATES UR QL: POSITIVE
PCO2 BLD: 38 MMHG (ref 35–45)
PCP UR QL: NEGATIVE
PH BLD: 7.43 (ref 7.35–7.45)
PO2 BLD: 78 MMHG (ref 75–100)
SAO2 % BLD: 95.9 % (ref 95–98)
SERVICE CMNT-IMP: NORMAL
SPECIMEN TYPE: NORMAL

## 2024-05-13 PROCEDURE — 6370000000 HC RX 637 (ALT 250 FOR IP): Performed by: EMERGENCY MEDICINE

## 2024-05-13 PROCEDURE — 99283 EMERGENCY DEPT VISIT LOW MDM: CPT

## 2024-05-13 PROCEDURE — 36600 WITHDRAWAL OF ARTERIAL BLOOD: CPT

## 2024-05-13 PROCEDURE — 82962 GLUCOSE BLOOD TEST: CPT

## 2024-05-13 PROCEDURE — 82803 BLOOD GASES ANY COMBINATION: CPT

## 2024-05-13 RX ORDER — OXYCODONE HYDROCHLORIDE 5 MG/1
10 TABLET ORAL
Status: COMPLETED | OUTPATIENT
Start: 2024-05-13 | End: 2024-05-13

## 2024-05-13 RX ORDER — HYDROCODONE BITARTRATE AND ACETAMINOPHEN 7.5; 325 MG/1; MG/1
1 TABLET ORAL
Status: DISCONTINUED | OUTPATIENT
Start: 2024-05-13 | End: 2024-05-13

## 2024-05-13 RX ORDER — PREGABALIN 100 MG/1
200 CAPSULE ORAL
Status: COMPLETED | OUTPATIENT
Start: 2024-05-13 | End: 2024-05-13

## 2024-05-13 RX ORDER — NALOXONE HYDROCHLORIDE 0.4 MG/ML
0.4 INJECTION, SOLUTION INTRAMUSCULAR; INTRAVENOUS; SUBCUTANEOUS
Status: DISCONTINUED | OUTPATIENT
Start: 2024-05-13 | End: 2024-05-13

## 2024-05-13 RX ADMIN — OXYCODONE HYDROCHLORIDE 10 MG: 5 TABLET ORAL at 04:01

## 2024-05-13 RX ADMIN — PREGABALIN 200 MG: 100 CAPSULE ORAL at 03:33

## 2024-05-13 ASSESSMENT — PAIN DESCRIPTION - DESCRIPTORS: DESCRIPTORS: BURNING

## 2024-05-13 ASSESSMENT — PAIN DESCRIPTION - ORIENTATION
ORIENTATION: RIGHT;LEFT
ORIENTATION: RIGHT;LEFT

## 2024-05-13 ASSESSMENT — PAIN DESCRIPTION - LOCATION
LOCATION: LEG
LOCATION: LEG

## 2024-05-13 ASSESSMENT — PAIN SCALES - GENERAL
PAINLEVEL_OUTOF10: 10
PAINLEVEL_OUTOF10: 10

## 2024-05-13 NOTE — ED NOTES
Per Dr. Moreno, provide pt orange juice and crackers and ready to dc.  Patient is able to answer questions

## 2024-05-13 NOTE — ED NOTES
Spoke with daughter Serenity, daughter requested SW. Left facesheet for sw to call daughter tomorrow

## 2024-05-13 NOTE — CARE COORDINATION
ROSSANA CM continues to follow for SW Program.  Upon chart review, it is noted pt with ED admission.  ROSSANA CM to delay outreach at this time and check in with pt in a couple of days for follow up.

## 2024-05-13 NOTE — DISCHARGE SUMMARY
Patient mobility status  with no difficulty and bedbound, requires full assistance to return home - [facility] to take patient home . Provider aware     I have reviewed discharge instructions with the patient.  The patient verbalized understanding.    Patient left ED via Discharge Method: stretcher to Home with  self .    Opportunity for questions and clarification provided.     Patient given 0 scripts.

## 2024-05-13 NOTE — FLOWSHEET NOTE
Patient mobility status  wheelchair bound. Provider aware     I have reviewed discharge instructions with the patient.  The patient verbalized understanding.    Patient left ED via Discharge Method: stretcher to Home with  ems .    Opportunity for questions and clarification provided.     Patient given 0 scripts.

## 2024-05-13 NOTE — ED NOTES
CM spoke with home health, patient already spoke with home health as well - patient is comfortable being discharged home. DC paperwork reviewed and patient expresses understanding. [facility] to  at 350a     Camryn Andrew, RN  05/13/24 6159

## 2024-05-13 NOTE — ED NOTES
Cleaned patient, new brief and new bed sheets.  Patient was able to roll and assist with bed change.  Pt cont to be in and out of alertness.

## 2024-05-13 NOTE — ED NOTES
Called [facility] to transfer patient home. Was told the ETA would be 830 AM, due to needing an extra truck to  safely move the patient into her house.      Neel Malloy  05/13/24 0427

## 2024-05-13 NOTE — ED NOTES
Patient is alert oriented x4 able to answer all questions.  EMS at bedside to take patient back home.  Daughter Serenity aware pt is on her way back.

## 2024-05-13 NOTE — ED NOTES
Patient refused orange juice but requested soda.  Patient finished 2 7.5fl oz sprit and crackers with peanut butter

## 2024-05-13 NOTE — ED TRIAGE NOTES
Pt was sent home from  and upon getting pt home pt c\o leg pain and [facility] [place] per pts request     Pt states the transport was not able to get her into her home because of the 7 steps

## 2024-05-13 NOTE — ED NOTES
RN spoke with Case Management regarding patient home health concerns. CM to call home health agency that patient was referred to in a past visit and communicate patient needs.     Camryn Andrew RN  05/13/24 9554

## 2024-05-13 NOTE — PROGRESS NOTES
ABG results: 05/13/2024  00:10    PH    7.43  PCO2    38  PO2    78  HCO3    25.5  BE    1.2  SO2    96%    Results given to Dr. Boone.

## 2024-05-13 NOTE — ED PROVIDER NOTES
Family History   Problem Relation Age of Onset    No Known Problems Mother     Heart Attack Father     Other Father         aneurysm age 58, now brain damaged    Heart Disease Father     Cancer Sister         hodgkins    Breast Cancer Sister     Breast Cancer Sister     Diabetes Paternal Grandmother     Other Son         24 week twin, LD    Colon Cancer Other     Other Cousin         ? femur fracture        Social History     Socioeconomic History    Marital status: Single   Tobacco Use    Smoking status: Never    Smokeless tobacco: Never   Substance and Sexual Activity    Alcohol use: No    Drug use: Yes     Types: Prescription    Sexual activity: Not Currently     Partners: Male   Social History Narrative    Patient is single, lives with disabled father, 13 year old disabled son and 10 year old daughter.  Patient worked in [employer] service at [employer] for many years before becoming disabled about 4 years ago. She reports a lot of stress, including her 16 year old        2023:  lives with her 27 yo special needs son and her daughter (? Serenity) and her daughter's child      Social Determinants of Health     Financial Resource Strain: Low Risk  (2/27/2024)    Overall Financial Resource Strain (CARDIA)     Difficulty of Paying Living Expenses: Not hard at all   Food Insecurity: Food Insecurity Present (4/27/2024)    Hunger Vital Sign     Worried About Running Out of Food in the Last Year: Sometimes true     Ran Out of Food in the Last Year: Sometimes true   Transportation Needs: No Transportation Needs (4/27/2024)    PRAPARE - Transportation     Lack of Transportation (Medical): No     Lack of Transportation (Non-Medical): No   Housing Stability: Low Risk  (4/27/2024)    Housing Stability Vital Sign     Unable to Pay for Housing in the Last Year: No     Number of Places Lived in the Last Year: 1     Unstable Housing in the Last Year: No         Ciprofloxacin, Sulfa antibiotics, and Xarelto [rivaroxaban]     Previous

## 2024-05-13 NOTE — ED NOTES
Pt called nurse in x3, asking about home health and pain medications. RN reviewed DC paperwork with patient and explained transport home.    MD notified of patient's request.     Camryn Andrew RN  05/13/24 0781

## 2024-05-13 NOTE — DISCHARGE INSTRUCTIONS
Continue Lyrica.  5 days of Norco 7.5 have been sent to your pharmacy for you to take until you can be seen by your primary care and/or pain management doctor for further medications.  Use Narcan nasal spray for overdose if needed.     Eat a snack before going to sleep tonight

## 2024-05-13 NOTE — ED NOTES
[facility] notified charge nurse that they are unable to supply enough staff to assist patient home.     Patient will meet with case management to further evaluate plan of discharge.    Patient updated and expresses understanding.      Camryn Andrew RN  05/13/24 0837

## 2024-05-13 NOTE — DISCHARGE INSTRUCTIONS
A prescription for pain medication was sent by the physician in your previous emergency department visit. Follow up with your doctor.

## 2024-05-15 ENCOUNTER — HOSPITAL ENCOUNTER (EMERGENCY)
Age: 49
Discharge: HOME | End: 2024-05-15
Attending: STUDENT IN AN ORGANIZED HEALTH CARE EDUCATION/TRAINING PROGRAM
Payer: MEDICARE

## 2024-05-15 ENCOUNTER — APPOINTMENT (OUTPATIENT)
Dept: CT IMAGING | Age: 49
End: 2024-05-15
Payer: MEDICARE

## 2024-05-15 VITALS
RESPIRATION RATE: 22 BRPM | BODY MASS INDEX: 45.99 KG/M2 | DIASTOLIC BLOOD PRESSURE: 110 MMHG | TEMPERATURE: 97.8 F | OXYGEN SATURATION: 97 % | WEIGHT: 293 LBS | HEART RATE: 99 BPM | HEIGHT: 67 IN | SYSTOLIC BLOOD PRESSURE: 146 MMHG

## 2024-05-15 DIAGNOSIS — G89.4 CHRONIC PAIN SYNDROME: Primary | ICD-10-CM

## 2024-05-15 PROCEDURE — 70450 CT HEAD/BRAIN W/O DYE: CPT

## 2024-05-15 PROCEDURE — 99284 EMERGENCY DEPT VISIT MOD MDM: CPT

## 2024-05-15 PROCEDURE — 6360000002 HC RX W HCPCS: Performed by: STUDENT IN AN ORGANIZED HEALTH CARE EDUCATION/TRAINING PROGRAM

## 2024-05-15 PROCEDURE — 96372 THER/PROPH/DIAG INJ SC/IM: CPT

## 2024-05-15 RX ORDER — ACETAMINOPHEN 325 MG/1
650 TABLET ORAL
Status: DISCONTINUED | OUTPATIENT
Start: 2024-05-15 | End: 2024-05-15 | Stop reason: HOSPADM

## 2024-05-15 RX ORDER — DEXAMETHASONE SODIUM PHOSPHATE 10 MG/ML
10 INJECTION INTRAMUSCULAR; INTRAVENOUS
Status: COMPLETED | OUTPATIENT
Start: 2024-05-15 | End: 2024-05-15

## 2024-05-15 RX ADMIN — DEXAMETHASONE SODIUM PHOSPHATE 10 MG: 10 INJECTION INTRAMUSCULAR; INTRAVENOUS at 14:21

## 2024-05-15 ASSESSMENT — PAIN SCALES - GENERAL: PAINLEVEL_OUTOF10: 10

## 2024-05-15 ASSESSMENT — PAIN DESCRIPTION - LOCATION: LOCATION: GENERALIZED

## 2024-05-15 ASSESSMENT — LIFESTYLE VARIABLES
HOW OFTEN DO YOU HAVE A DRINK CONTAINING ALCOHOL: NEVER
HOW MANY STANDARD DRINKS CONTAINING ALCOHOL DO YOU HAVE ON A TYPICAL DAY: PATIENT DECLINED

## 2024-05-15 ASSESSMENT — PAIN - FUNCTIONAL ASSESSMENT: PAIN_FUNCTIONAL_ASSESSMENT: 0-10

## 2024-05-15 NOTE — ED NOTES
PT and family now demanding to speak to Dr or charge nurse. Both aware.      Vannesa Lawton, STEWART  05/15/24 4907

## 2024-05-15 NOTE — ED NOTES
Notified Dr Burgess of family's request to speak with him regarding not having pain meds at home due to not having gone to pain management yet because she was in the hospital during each appointment. He said he would come talk with family.     Vannesa Lawton, RN  05/15/24 0496
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
## 2024-05-15 NOTE — CARE COORDINATION
ROSSANA WILLIAM reviewed pt chart for HH consult during ED admission. Per chart review, pt followed by [facility]NA Program and plans to follow-up with patient.

## 2024-05-15 NOTE — ED NOTES
Patient called this RN into room. Patient was yelling and being verbally disrespectful to primary RN. Patient is demanding to speak with CN. This RN called Andreina who will be in transit.      Nina Ybarra RN  05/15/24 1492

## 2024-05-15 NOTE — DISCHARGE INSTRUCTIONS
Unfortunately I cannot manage her chronic pain in the emergency department, please follow-up with a pain management physician for further management of your chronic pain.  Continue with your Lyrica and baclofen

## 2024-05-15 NOTE — ED NOTES
Patient mobility status  with mild difficulty. Provider aware     I have reviewed discharge instructions with the patient.  The patient verbalized understanding.    Patient left ED via Discharge Method: ambulatory to Home with Parent.    Opportunity for questions and clarification provided.     Patient given 0 scripts.           Carolann Peralta, RN  05/15/24 6240

## 2024-05-15 NOTE — ED NOTES
Appointment Change  Cancel, Reschedule, Change to Virtual      Who are you speaking with? Patient   If it is not the patient, is the caller listed on the communication consent form? N/A   Which provider is the appointment scheduled with? JOAO Bishop   When was the original appointment scheduled? Please list date and time 1/19/24 230   At which location is the appointment scheduled to take place? [place]   Was the appointment rescheduled? Was the appointment changed from an in person visit to a virtual visit? If so, please list the details of the change. 1/31/24 3pm   What is the reason for the appointment change? KATH       Was [facility] transport scheduled? N/A   Does [facility] transport need to be scheduled for the new visit (if applicable) N/A   Does the patient need an infusion appointment rescheduled? N/A   Does the patient have an upcoming infusion appointment scheduled? If so, when? No   Is the patient undergoing chemotherapy? N/A   For appointments cancelled with less than 24 hours:  Was the no-show policy reviewed?  N/A Attempted to get pt into wheelchair, pt not able to transfer into wheelchair. Pt agreeable to me calling [facility] ambulance. Pt instructed to not attempt to get up out of bed w/o assistance. Pt verbalized understanding     Carolann Peralta, RN  05/15/24 6270

## 2024-05-15 NOTE — ED PROVIDER NOTES
Emergency Department Provider Note       PCP: Nenita Beatty, JOSE - LIBIA   Age: 48 y.o.   Sex: female     DISPOSITION Decision To Discharge 05/15/2024 02:12:51 PM       ICD-10-CM    1. Chronic pain syndrome  G89.4           Medical Decision Making     48-year-old female with a well-documented history of chronic pain, neuropathy, drug-seeking behavior, here today complaining of lower extremity pain that was atraumatic in nature, described as burning-like sensation.  Patient is already on baclofen and, Lyrica for chronic pain, sees pain management, has an appointment on 5/28.  There is no appreciable signs of trauma on the lower legs, and patient is minimally mobile at baseline.  The wounds on her lower extremity do not appear to be acutely infected, and she does see wound care nurses weekly.  I explained to the patient in great detail that we cannot treat chronic pain here in the emergency department, so I am giving patient a dose of Decadron and Tylenol, and will be discharging patient home as long as the head CT is negative.  ED Course as of 05/15/24 1446   Wed May 15, 2024   1443 Head CT was negative for acute intracranial abnormality.  Patient was given a dose of Decadron, but she declined Tylenol.    Patient was very upset with me and staff stating that we were not treating her pain, patient's daughter was on the phone via speaker phone, and patient's mother was present at bedside.  In the presence of the nurse, I explained in great detail that we do not treat chronic pain in the emergency department, that this poses a significant risk for opioid addiction, and she needs to follow-up with pain management and primary care.  Patient began to get significantly more upset with me stating that \"I do not care about brown people\", I explained to the patient that there was absolutely no racial prejudice present in my treatment, that I would treat any 1 with a similar presentation the same way.  All of this was

## 2024-05-16 ENCOUNTER — CARE COORDINATION (OUTPATIENT)
Dept: CARE COORDINATION | Facility: CLINIC | Age: 49
End: 2024-05-16

## 2024-05-16 NOTE — CARE COORDINATION
Care Transitions Outreach Attempt    Call within 2 business days of discharge: Yes   Attempted to reach patient for transitions of care follow up. Unable to reach patient.    Patient: Lo Tirado Patient : 1975 MRN: [id number]    Last Discharge Facility       Date Complaint Diagnosis Description Type Department Provider    5/15/24 Leg Pain Chronic pain syndrome ED (DISCHARGE) Willard Healdey III, MD              Was this an external facility discharge? No Discharge Facility Name: [facility]    Noted following upcoming appointments from discharge chart review:   [facility] follow up appointment(s):   Future Appointments   Date Time Provider Department Center   2024 11:20 AM iMchael Perez, JOSE - CNP [facility] AMB   2024  1:00 PM Alexi Tabor DO [facility]     Non-[facility]  follow up appointment(s): mee    KIRBY outreach follow up call and follow up for ED visit on 05/15/2024.  No answer, received recorded message that voice mailbox is full, unable to leave message.

## 2024-05-20 ENCOUNTER — CARE COORDINATION (OUTPATIENT)
Dept: CARE COORDINATION | Facility: CLINIC | Age: 49
End: 2024-05-20

## 2024-05-20 NOTE — CARE COORDINATION
ROSSANA WILLIAM continues to follow for SW Program.  ROSSANA WILLIAM reached out to pt this day to follow up.  Unable to reach pt at this time.  Will await return call and/or try again at a later time.  Will monitor and update as needed.

## 2024-05-22 ENCOUNTER — TELEMEDICINE (OUTPATIENT)
Dept: SLEEP MEDICINE | Age: 49
End: 2024-05-22
Payer: MEDICARE

## 2024-05-22 DIAGNOSIS — E66.01 CLASS 3 SEVERE OBESITY DUE TO EXCESS CALORIES WITH SERIOUS COMORBIDITY AND BODY MASS INDEX (BMI) OF 50.0 TO 59.9 IN ADULT (HCC): ICD-10-CM

## 2024-05-22 DIAGNOSIS — G47.8 NON-RESTORATIVE SLEEP: ICD-10-CM

## 2024-05-22 DIAGNOSIS — G47.00 PERSISTENT DISORDER OF INITIATING OR MAINTAINING SLEEP: ICD-10-CM

## 2024-05-22 DIAGNOSIS — G47.10 HYPERSOMNIA: ICD-10-CM

## 2024-05-22 DIAGNOSIS — G47.34 NOCTURNAL HYPOXEMIA: ICD-10-CM

## 2024-05-22 DIAGNOSIS — G47.33 OSA (OBSTRUCTIVE SLEEP APNEA): Primary | ICD-10-CM

## 2024-05-22 DIAGNOSIS — G25.81 RLS (RESTLESS LEGS SYNDROME): ICD-10-CM

## 2024-05-22 DIAGNOSIS — R06.83 SNORING: ICD-10-CM

## 2024-05-22 PROCEDURE — 99203 OFFICE O/P NEW LOW 30 MIN: CPT | Performed by: NURSE PRACTITIONER

## 2024-05-22 PROCEDURE — 1111F DSCHRG MED/CURRENT MED MERGE: CPT | Performed by: NURSE PRACTITIONER

## 2024-05-22 PROCEDURE — G8427 DOCREV CUR MEDS BY ELIG CLIN: HCPCS | Performed by: NURSE PRACTITIONER

## 2024-05-22 ASSESSMENT — SLEEP AND FATIGUE QUESTIONNAIRES
HOW LIKELY ARE YOU TO NOD OFF OR FALL ASLEEP WHILE SITTING QUIETLY AFTER LUNCH WITHOUT ALCOHOL: MODERATE CHANCE OF DOZING
HOW LIKELY ARE YOU TO NOD OFF OR FALL ASLEEP IN A CAR, WHILE STOPPED FOR A FEW MINUTES IN TRAFFIC: MODERATE CHANCE OF DOZING
ESS TOTAL SCORE: 20
HOW LIKELY ARE YOU TO NOD OFF OR FALL ASLEEP WHILE SITTING INACTIVE IN A PUBLIC PLACE: HIGH CHANCE OF DOZING
HOW LIKELY ARE YOU TO NOD OFF OR FALL ASLEEP WHILE SITTING AND READING: MODERATE CHANCE OF DOZING
HOW LIKELY ARE YOU TO NOD OFF OR FALL ASLEEP WHILE SITTING AND TALKING TO SOMEONE: MODERATE CHANCE OF DOZING
HOW LIKELY ARE YOU TO NOD OFF OR FALL ASLEEP WHILE WATCHING TV: HIGH CHANCE OF DOZING
HOW LIKELY ARE YOU TO NOD OFF OR FALL ASLEEP WHEN YOU ARE A PASSENGER IN A CAR FOR AN HOUR WITHOUT A BREAK: HIGH CHANCE OF DOZING
HOW LIKELY ARE YOU TO NOD OFF OR FALL ASLEEP WHILE LYING DOWN TO REST IN THE AFTERNOON WHEN CIRCUMSTANCES PERMIT: HIGH CHANCE OF DOZING

## 2024-05-22 NOTE — PROGRESS NOTES
[facility]  [address]  [place], [place] [address] [phone]    Patient Name:  Lo Tirado  YOB: 1975    Lo Tirado, was evaluated through a synchronous (real-time) audio-video encounter. The patient (or guardian if applicable) is aware that this is a billable service, which includes applicable co-pays. This Virtual Visit was conducted with patient's (and/or legal guardian's) consent. Patient identification was verified, and a caregiver was present when appropriate.   The patient was located at Home: [address] [address]  Provider was located at Facility (Appt Dept): [address]  [place] [address]  Confirm you are appropriately licensed, registered, or certified to deliver care in the state where the patient is located as indicated above. If you are not or unsure, please re-schedule the visit: Yes, I confirm.      Services were provided through a video synchronous discussion virtually to substitute for in-person clinic visit.    Lo Tirado is a 48 y.o. female who was seen by synchronous (real-time) audio-video technology on 5/22/2024.        Office Visit 5/22/2024    CHIEF COMPLAINT:    Chief Complaint   Patient presents with    New Patient    Sleep Apnea       HISTORY OF PRESENT ILLNESS:      The patient presents in outpatient consultation at the request of Nenita Beatty NP for management of obstructive sleep apnea.  Significant PMH of ERICA was on CPAP, CKD, diabetes, PVD, depression, GERD, hypertension, bipolar, restless leg syndrome, obesity, and chronic venous leg ulcers.     She reports that she was diagnosed with sleep apnea many years ago and does still have a CPAP machine but she has not worn the CPAP machine in a few years.  States that she was recently hospitalized due to leg ulcers and I told her that at nighttime her oxygen levels were dropping.  She reports a prior history of being told

## 2024-05-22 NOTE — PATIENT INSTRUCTIONS
Urgent split-night sleep study ordered  Recommendations as above  Follow-up after sleep study 3 months after starting CPAP or sooner if needed

## 2024-05-23 ENCOUNTER — CARE COORDINATION (OUTPATIENT)
Dept: CARE COORDINATION | Facility: CLINIC | Age: 49
End: 2024-05-23

## 2024-05-23 NOTE — CARE COORDINATION
Care Transitions Outreach Attempt    Call within 2 business days of discharge: Yes   Attempted to reach patient for transitions of care follow up. Unable to reach patient.    Patient: Lo Tirado Patient : 1975 MRN: [id number]    Last Discharge Facility       Date Complaint Diagnosis Description Type Department Provider    5/15/24 Leg Pain Chronic pain syndrome ED (DISCHARGE) Willard Headley III, MD              Was this an external facility discharge? No Discharge Facility Name: [facility]    Noted following upcoming appointments from discharge chart review:   [facility] follow up appointment(s):   Future Appointments   Date Time Provider Department Center   2024  1:00 PM Alexi Tabor DO [facility]     Non-BS  follow up appointment(s): none noted

## 2024-05-24 ENCOUNTER — TELEPHONE (OUTPATIENT)
Dept: FAMILY MEDICINE CLINIC | Facility: CLINIC | Age: 49
End: 2024-05-24

## 2024-05-24 NOTE — TELEPHONE ENCOUNTER
Andrea MCGREGOR called just for  a  FYI to let you know patient is not compliant with the wound care she is putting Vaseline on wounds. The nurse also notified wound care     Andrea  nurse Jose -[phone]

## 2024-05-28 ENCOUNTER — HOSPITAL ENCOUNTER (OUTPATIENT)
Dept: WOUND CARE | Age: 49
Discharge: HOME | End: 2024-05-28
Payer: MEDICARE

## 2024-05-28 VITALS
HEART RATE: 112 BPM | SYSTOLIC BLOOD PRESSURE: 132 MMHG | TEMPERATURE: 98.1 F | DIASTOLIC BLOOD PRESSURE: 87 MMHG | OXYGEN SATURATION: 96 % | RESPIRATION RATE: 20 BRPM | BODY MASS INDEX: 45.99 KG/M2 | WEIGHT: 293 LBS | HEIGHT: 67 IN

## 2024-05-28 PROCEDURE — 99213 OFFICE O/P EST LOW 20 MIN: CPT

## 2024-05-28 RX ORDER — DOXYCYCLINE HYCLATE 100 MG
100 TABLET ORAL 2 TIMES DAILY
Qty: 20 TABLET | Refills: 0 | Status: SHIPPED | OUTPATIENT
Start: 2024-05-28 | End: 2024-06-07

## 2024-05-28 ASSESSMENT — PAIN SCALES - GENERAL: PAINLEVEL_OUTOF10: 10

## 2024-05-28 NOTE — WOUND CARE
Discharge Instructions for  [facility]  [address]  [address]  [place] [address]  Phone [phone]   Fax [phone]      NAME:  Lo Tirado  YOB: 1975  MEDICAL RECORD NUMBER:  [id number]  DATE:  5/28/2024    Return Appointment:   1 week with Alexi Tabor DO    Instructions: Bilateral lower legs:    Vashe Wound Solution (hypochlorous acid) soak placed on wound bed for a minimum of 60 seconds prior to dressing application. This solution removes pathogens, bioburden, and biofilms from wounds, but is not cytotoxic.   Apply xeroform to wound beds.  Cover with ABD and rolled gauze then tubigrip   Change dressing between HH visits 2-3 times daily.     [facility] to change dressings 3 times weekly  Prescription for antibiotic sent to your pharmacy. Please  and start taking.    Elevate legs when sitting to reduce swelling.   Swelling interferes with wound healing.  Do not cut compression wraps off. If wraps become too loose or slide down, call wound center to make an appointment for dressing change. If wraps become too tight, try elevating your legs to assist fluid drainage.  Elevate legs when sitting.  Avoid prolonged standing or sitting with legs in dependent position.  Be cautious of increased salt intake as this promotes fluid retention and swelling.  Take diuretic (fluid pill) as instructed by your doctor.  Increase protein intake to promote wound healing. Jase and Ensure are examples of protein supplements.  Do not get legs wet, use cast cover to shower.     Please increase dietary protein to at least 60 grams per day to support cell rejuvenation.   May use supplements such as Ensure Max, Jase, & Glucerna (samples & coupons provided at first visit).   Be sure to spread intake throughout the day for improved absorption.     Should you experience increased redness, swelling, pain, foul odor, size of wound(s), or have a temperature over 101

## 2024-05-28 NOTE — FLOWSHEET NOTE
Venous   Dressing Status Other (Comment)  (mildred)   Wound Cleansed Soap and water   Dressing/Treatment Xeroform;ABD;Roll gauze;Tape/Soft cloth adhesive tape   Wound Length (cm) 8 cm   Wound Width (cm) 13.7 cm   Wound Depth (cm) 0.2 cm   Wound Surface Area (cm^2) 109.6 cm^2   Change in Wound Size % (l*w) -95.71   Wound Volume (cm^3) 21.92 cm^3   Wound Healing % -291   Wound Assessment Eschar moist;Slough;Pink/red   Drainage Amount Large (50-75% saturated)   Drainage Description Serosanguinous   Odor None   Pati-wound Assessment Fragile   Margins Defined edges   Wound Thickness Description not for Pressure Injury Full thickness   Pain Assessment   Pain Assessment 0-10   Pain Level 10     Taking ASA

## 2024-05-28 NOTE — DISCHARGE INSTRUCTIONS
Instructions: Bilateral lower legs:     Vashe Wound Solution (hypochlorous acid) soak placed on wound bed for a minimum of 60 seconds prior to dressing application. This solution removes pathogens, bioburden, and biofilms from wounds, but is not cytotoxic.   Apply xeroform to wound beds.  Cover with ABD and rolled gauze then tubigrip   Change dressing between HH visits 2-3 times daily.     [facility] to change dressings 3 times weekly  Prescription for antibiotic sent to your pharmacy. Please  and start taking.     Elevate legs when sitting to reduce swelling.   Swelling interferes with wound healing.  Do not cut compression wraps off. If wraps become too loose or slide down, call wound center to make an appointment for dressing change. If wraps become too tight, try elevating your legs to assist fluid drainage.  Elevate legs when sitting.  Avoid prolonged standing or sitting with legs in dependent position.  Be cautious of increased salt intake as this promotes fluid retention and swelling.  Take diuretic (fluid pill) as instructed by your doctor.  Increase protein intake to promote wound healing. Jase and Ensure are examples of protein supplements.  Do not get legs wet, use cast cover to shower.

## 2024-05-29 ENCOUNTER — TELEPHONE (OUTPATIENT)
Dept: FAMILY MEDICINE CLINIC | Facility: CLINIC | Age: 49
End: 2024-05-29

## 2024-05-29 NOTE — TELEPHONE ENCOUNTER
----- Message from Kristopher Ashley III sent at 5/29/2024 11:00 AM EDT -----  Regarding: ECC Message to Provider  ECC Message to Provider    Relationship to Patient: Covered Entity /Insurance provider     Additional Information : Mr. Mesa from [facility] called with regards to a grievance complaint against nurse practitioner Giovanna Curtis. Reference number: [id number]    --------------------------------------------------------------------------------------------------------------------------    Call Back Information: OK to leave message on voicemail  Preferred Call Back Number:  Phone [phone]

## 2024-05-30 ENCOUNTER — CARE COORDINATION (OUTPATIENT)
Dept: CARE COORDINATION | Facility: CLINIC | Age: 49
End: 2024-05-30

## 2024-05-30 NOTE — CARE COORDINATION
Care Transitions Outreach Attempt    Call within 2 business days of discharge: Yes   Attempted to reach patient for transitions of care follow up. Unable to reach patient.    Patient: Lo Tirado Patient : 1975 MRN: [id number]    Last Discharge Facility       Date Complaint Diagnosis Description Type Department Provider    5/15/24 Leg Pain Chronic pain syndrome ED (DISCHARGE) Willard Headley III, MD              Was this an external facility discharge? No Discharge Facility Name: [facility]    Noted following upcoming appointments from discharge chart review:   [facility] follow up appointment(s):   Future Appointments   Date Time Provider Department Center   2024  1:50 PM Alexi Tabor DO [facility]   2024  8:30 PM [facility]EP [facility]     Non-[facility]  follow up appointment(s): none noted

## 2024-06-03 ENCOUNTER — CARE COORDINATION (OUTPATIENT)
Dept: CARE COORDINATION | Facility: CLINIC | Age: 49
End: 2024-06-03

## 2024-06-03 NOTE — CARE COORDINATION
ROSSANA WILLIAM continues to follow for SW Program.  ROSSANA WILLIAM reached out to pt this day to follow up.  Unable to reach her at this time -  left with return call details.  Will await return calls and/or follow up at a later time.

## 2024-06-04 ENCOUNTER — HOSPITAL ENCOUNTER (INPATIENT)
Age: 49
LOS: 4 days | Discharge: HOME | End: 2024-06-08
Attending: EMERGENCY MEDICINE | Admitting: HOSPITALIST
Payer: MEDICARE

## 2024-06-04 DIAGNOSIS — L03.115 CELLULITIS OF BOTH LOWER EXTREMITIES: ICD-10-CM

## 2024-06-04 DIAGNOSIS — L03.115 BILATERAL LOWER LEG CELLULITIS: Primary | ICD-10-CM

## 2024-06-04 DIAGNOSIS — L97.922 LEG ULCER, LEFT, WITH FAT LAYER EXPOSED (HCC): ICD-10-CM

## 2024-06-04 DIAGNOSIS — L97.922 CHRONIC ULCER OF LEFT LOWER EXTREMITY WITH FAT LAYER EXPOSED (HCC): Chronic | ICD-10-CM

## 2024-06-04 DIAGNOSIS — L97.312 CHRONIC ULCER OF RIGHT ANKLE WITH FAT LAYER EXPOSED (HCC): Chronic | ICD-10-CM

## 2024-06-04 DIAGNOSIS — L03.116 CELLULITIS OF BOTH LOWER EXTREMITIES: ICD-10-CM

## 2024-06-04 DIAGNOSIS — R79.82 ELEVATED C-REACTIVE PROTEIN (CRP): ICD-10-CM

## 2024-06-04 DIAGNOSIS — L03.116 BILATERAL LOWER LEG CELLULITIS: Primary | ICD-10-CM

## 2024-06-04 DIAGNOSIS — I89.0 LYMPHEDEMA OF BOTH LOWER EXTREMITIES: Chronic | ICD-10-CM

## 2024-06-04 LAB
ALBUMIN SERPL-MCNC: 2.7 G/DL (ref 3.5–5)
ALBUMIN/GLOB SERPL: 0.6 (ref 1–1.9)
ALP SERPL-CCNC: 95 U/L (ref 35–104)
ALT SERPL-CCNC: 20 U/L (ref 12–65)
ANION GAP SERPL CALC-SCNC: 9 MMOL/L (ref 9–18)
AST SERPL-CCNC: 18 U/L (ref 15–37)
BASOPHILS # BLD: 0 K/UL (ref 0–0.2)
BASOPHILS NFR BLD: 0 % (ref 0–2)
BILIRUB SERPL-MCNC: 0.4 MG/DL (ref 0–1.2)
BUN SERPL-MCNC: 13 MG/DL (ref 6–23)
CALCIUM SERPL-MCNC: 9.7 MG/DL (ref 8.8–10.2)
CHLORIDE SERPL-SCNC: 102 MMOL/L (ref 98–107)
CO2 SERPL-SCNC: 28 MMOL/L (ref 20–28)
CREAT SERPL-MCNC: 1.01 MG/DL (ref 0.6–1.1)
CRP SERPL HS-MCNC: 58.5 MG/L (ref 0–3)
DIFFERENTIAL METHOD BLD: ABNORMAL
EOSINOPHIL # BLD: 0.5 K/UL (ref 0–0.8)
EOSINOPHIL NFR BLD: 6 % (ref 0.5–7.8)
ERYTHROCYTE [DISTWIDTH] IN BLOOD BY AUTOMATED COUNT: 16.4 % (ref 11.9–14.6)
GLOBULIN SER CALC-MCNC: 4.9 G/DL (ref 2.3–3.5)
GLUCOSE BLD STRIP.AUTO-MCNC: 65 MG/DL (ref 65–100)
GLUCOSE SERPL-MCNC: 93 MG/DL (ref 70–99)
HCT VFR BLD AUTO: 30.2 % (ref 35.8–46.3)
HGB BLD-MCNC: 9 G/DL (ref 11.7–15.4)
IMM GRANULOCYTES # BLD AUTO: 0 K/UL (ref 0–0.5)
IMM GRANULOCYTES NFR BLD AUTO: 0 % (ref 0–5)
LYMPHOCYTES # BLD: 1.2 K/UL (ref 0.5–4.6)
LYMPHOCYTES NFR BLD: 16 % (ref 13–44)
MCH RBC QN AUTO: 24.6 PG (ref 26.1–32.9)
MCHC RBC AUTO-ENTMCNC: 29.8 G/DL (ref 31.4–35)
MCV RBC AUTO: 82.5 FL (ref 82–102)
MONOCYTES # BLD: 0.6 K/UL (ref 0.1–1.3)
MONOCYTES NFR BLD: 8 % (ref 4–12)
NEUTS SEG # BLD: 5.3 K/UL (ref 1.7–8.2)
NEUTS SEG NFR BLD: 70 % (ref 43–78)
NRBC # BLD: 0 K/UL (ref 0–0.2)
PLATELET # BLD AUTO: 584 K/UL (ref 150–450)
PMV BLD AUTO: 9 FL (ref 9.4–12.3)
POTASSIUM SERPL-SCNC: 4.3 MMOL/L (ref 3.5–5.1)
PROT SERPL-MCNC: 7.6 G/DL (ref 6.3–8.2)
RBC # BLD AUTO: 3.66 M/UL (ref 4.05–5.2)
SERVICE CMNT-IMP: NORMAL
SODIUM SERPL-SCNC: 139 MMOL/L (ref 136–145)
WBC # BLD AUTO: 7.6 K/UL (ref 4.3–11.1)

## 2024-06-04 PROCEDURE — 1100000000 HC RM PRIVATE

## 2024-06-04 PROCEDURE — 6360000002 HC RX W HCPCS: Performed by: EMERGENCY MEDICINE

## 2024-06-04 PROCEDURE — 2580000003 HC RX 258: Performed by: HOSPITALIST

## 2024-06-04 PROCEDURE — 85025 COMPLETE CBC W/AUTO DIFF WBC: CPT

## 2024-06-04 PROCEDURE — 80053 COMPREHEN METABOLIC PANEL: CPT

## 2024-06-04 PROCEDURE — 99285 EMERGENCY DEPT VISIT HI MDM: CPT

## 2024-06-04 PROCEDURE — 86141 C-REACTIVE PROTEIN HS: CPT

## 2024-06-04 PROCEDURE — 96375 TX/PRO/DX INJ NEW DRUG ADDON: CPT

## 2024-06-04 PROCEDURE — 93005 ELECTROCARDIOGRAM TRACING: CPT | Performed by: HOSPITALIST

## 2024-06-04 PROCEDURE — 82962 GLUCOSE BLOOD TEST: CPT

## 2024-06-04 PROCEDURE — 6370000000 HC RX 637 (ALT 250 FOR IP): Performed by: EMERGENCY MEDICINE

## 2024-06-04 PROCEDURE — 2580000003 HC RX 258: Performed by: EMERGENCY MEDICINE

## 2024-06-04 PROCEDURE — 96374 THER/PROPH/DIAG INJ IV PUSH: CPT

## 2024-06-04 RX ORDER — SODIUM CHLORIDE 0.9 % (FLUSH) 0.9 %
5-40 SYRINGE (ML) INJECTION PRN
Status: DISCONTINUED | OUTPATIENT
Start: 2024-06-04 | End: 2024-06-08 | Stop reason: HOSPADM

## 2024-06-04 RX ORDER — ONDANSETRON 4 MG/1
4 TABLET, ORALLY DISINTEGRATING ORAL EVERY 8 HOURS PRN
Status: DISCONTINUED | OUTPATIENT
Start: 2024-06-04 | End: 2024-06-08 | Stop reason: HOSPADM

## 2024-06-04 RX ORDER — POTASSIUM CHLORIDE 20 MEQ/1
40 TABLET, EXTENDED RELEASE ORAL PRN
Status: DISCONTINUED | OUTPATIENT
Start: 2024-06-04 | End: 2024-06-08 | Stop reason: HOSPADM

## 2024-06-04 RX ORDER — 0.9 % SODIUM CHLORIDE 0.9 %
1000 INTRAVENOUS SOLUTION INTRAVENOUS
Status: COMPLETED | OUTPATIENT
Start: 2024-06-04 | End: 2024-06-04

## 2024-06-04 RX ORDER — DULOXETIN HYDROCHLORIDE 60 MG/1
60 CAPSULE, DELAYED RELEASE ORAL DAILY
Status: DISCONTINUED | OUTPATIENT
Start: 2024-06-05 | End: 2024-06-08 | Stop reason: HOSPADM

## 2024-06-04 RX ORDER — ASPIRIN 81 MG/1
81 TABLET, CHEWABLE ORAL DAILY
Status: DISCONTINUED | OUTPATIENT
Start: 2024-06-05 | End: 2024-06-08 | Stop reason: HOSPADM

## 2024-06-04 RX ORDER — PREGABALIN 100 MG/1
200 CAPSULE ORAL 3 TIMES DAILY
Status: DISCONTINUED | OUTPATIENT
Start: 2024-06-04 | End: 2024-06-08 | Stop reason: HOSPADM

## 2024-06-04 RX ORDER — ACETAMINOPHEN 650 MG/1
650 SUPPOSITORY RECTAL EVERY 6 HOURS PRN
Status: DISCONTINUED | OUTPATIENT
Start: 2024-06-04 | End: 2024-06-08 | Stop reason: HOSPADM

## 2024-06-04 RX ORDER — ONDANSETRON 2 MG/ML
4 INJECTION INTRAMUSCULAR; INTRAVENOUS EVERY 6 HOURS PRN
Status: DISCONTINUED | OUTPATIENT
Start: 2024-06-04 | End: 2024-06-08 | Stop reason: HOSPADM

## 2024-06-04 RX ORDER — ONDANSETRON 2 MG/ML
4 INJECTION INTRAMUSCULAR; INTRAVENOUS
Status: COMPLETED | OUTPATIENT
Start: 2024-06-04 | End: 2024-06-04

## 2024-06-04 RX ORDER — MORPHINE SULFATE 4 MG/ML
6 INJECTION INTRAVENOUS ONCE
Status: DISCONTINUED | OUTPATIENT
Start: 2024-06-04 | End: 2024-06-04

## 2024-06-04 RX ORDER — SODIUM CHLORIDE 9 MG/ML
INJECTION, SOLUTION INTRAVENOUS PRN
Status: DISCONTINUED | OUTPATIENT
Start: 2024-06-04 | End: 2024-06-08 | Stop reason: HOSPADM

## 2024-06-04 RX ORDER — OXYCODONE HYDROCHLORIDE 5 MG/1
5 TABLET ORAL EVERY 4 HOURS PRN
Status: DISCONTINUED | OUTPATIENT
Start: 2024-06-04 | End: 2024-06-06

## 2024-06-04 RX ORDER — METOPROLOL SUCCINATE 25 MG/1
25 TABLET, EXTENDED RELEASE ORAL DAILY
Status: DISCONTINUED | OUTPATIENT
Start: 2024-06-05 | End: 2024-06-08 | Stop reason: HOSPADM

## 2024-06-04 RX ORDER — DEXTROSE MONOHYDRATE 100 MG/ML
INJECTION, SOLUTION INTRAVENOUS CONTINUOUS PRN
Status: DISCONTINUED | OUTPATIENT
Start: 2024-06-04 | End: 2024-06-04 | Stop reason: SDUPTHER

## 2024-06-04 RX ORDER — IBUPROFEN 600 MG/1
1 TABLET ORAL PRN
Status: DISCONTINUED | OUTPATIENT
Start: 2024-06-04 | End: 2024-06-08 | Stop reason: HOSPADM

## 2024-06-04 RX ORDER — GABAPENTIN 300 MG/1
300 CAPSULE ORAL ONCE
Status: COMPLETED | OUTPATIENT
Start: 2024-06-04 | End: 2024-06-04

## 2024-06-04 RX ORDER — POTASSIUM CHLORIDE 7.45 MG/ML
10 INJECTION INTRAVENOUS PRN
Status: DISCONTINUED | OUTPATIENT
Start: 2024-06-04 | End: 2024-06-08 | Stop reason: HOSPADM

## 2024-06-04 RX ORDER — SODIUM CHLORIDE 0.9 % (FLUSH) 0.9 %
5-40 SYRINGE (ML) INJECTION EVERY 12 HOURS SCHEDULED
Status: DISCONTINUED | OUTPATIENT
Start: 2024-06-04 | End: 2024-06-08 | Stop reason: HOSPADM

## 2024-06-04 RX ORDER — DEXTROSE MONOHYDRATE 100 MG/ML
INJECTION, SOLUTION INTRAVENOUS CONTINUOUS PRN
Status: DISCONTINUED | OUTPATIENT
Start: 2024-06-04 | End: 2024-06-08 | Stop reason: HOSPADM

## 2024-06-04 RX ORDER — MAGNESIUM SULFATE IN WATER 40 MG/ML
2000 INJECTION, SOLUTION INTRAVENOUS PRN
Status: DISCONTINUED | OUTPATIENT
Start: 2024-06-04 | End: 2024-06-08 | Stop reason: HOSPADM

## 2024-06-04 RX ORDER — POLYETHYLENE GLYCOL 3350 17 G/17G
17 POWDER, FOR SOLUTION ORAL DAILY PRN
Status: DISCONTINUED | OUTPATIENT
Start: 2024-06-04 | End: 2024-06-08 | Stop reason: HOSPADM

## 2024-06-04 RX ORDER — OXYCODONE HYDROCHLORIDE 5 MG/1
5 TABLET ORAL
Status: COMPLETED | OUTPATIENT
Start: 2024-06-04 | End: 2024-06-04

## 2024-06-04 RX ORDER — FUROSEMIDE 40 MG/1
40 TABLET ORAL DAILY
Status: DISCONTINUED | OUTPATIENT
Start: 2024-06-05 | End: 2024-06-08 | Stop reason: HOSPADM

## 2024-06-04 RX ORDER — LOSARTAN POTASSIUM 50 MG/1
50 TABLET ORAL DAILY
Status: DISCONTINUED | OUTPATIENT
Start: 2024-06-05 | End: 2024-06-08 | Stop reason: HOSPADM

## 2024-06-04 RX ORDER — ACETAMINOPHEN 325 MG/1
650 TABLET ORAL EVERY 6 HOURS PRN
Status: DISCONTINUED | OUTPATIENT
Start: 2024-06-04 | End: 2024-06-08 | Stop reason: HOSPADM

## 2024-06-04 RX ORDER — ENOXAPARIN SODIUM 100 MG/ML
40 INJECTION SUBCUTANEOUS 2 TIMES DAILY
Status: DISCONTINUED | OUTPATIENT
Start: 2024-06-05 | End: 2024-06-08 | Stop reason: HOSPADM

## 2024-06-04 RX ORDER — IBUPROFEN 600 MG/1
1 TABLET ORAL PRN
Status: DISCONTINUED | OUTPATIENT
Start: 2024-06-04 | End: 2024-06-04 | Stop reason: SDUPTHER

## 2024-06-04 RX ORDER — HYDROXYZINE HYDROCHLORIDE 25 MG/1
25 TABLET, FILM COATED ORAL NIGHTLY
Status: DISCONTINUED | OUTPATIENT
Start: 2024-06-04 | End: 2024-06-08 | Stop reason: HOSPADM

## 2024-06-04 RX ORDER — PANTOPRAZOLE SODIUM 40 MG/1
40 TABLET, DELAYED RELEASE ORAL
Status: DISCONTINUED | OUTPATIENT
Start: 2024-06-05 | End: 2024-06-08 | Stop reason: HOSPADM

## 2024-06-04 RX ORDER — MONTELUKAST SODIUM 10 MG/1
10 TABLET ORAL DAILY
Status: DISCONTINUED | OUTPATIENT
Start: 2024-06-05 | End: 2024-06-08 | Stop reason: HOSPADM

## 2024-06-04 RX ORDER — ACETAMINOPHEN 325 MG/1
650 TABLET ORAL
Status: COMPLETED | OUTPATIENT
Start: 2024-06-04 | End: 2024-06-04

## 2024-06-04 RX ADMIN — CEFTRIAXONE 1000 MG: 1 INJECTION, POWDER, FOR SOLUTION INTRAMUSCULAR; INTRAVENOUS at 20:22

## 2024-06-04 RX ADMIN — ACETAMINOPHEN 650 MG: 325 TABLET ORAL at 20:18

## 2024-06-04 RX ADMIN — ONDANSETRON 4 MG: 2 INJECTION INTRAMUSCULAR; INTRAVENOUS at 20:20

## 2024-06-04 RX ADMIN — Medication 2500 MG: at 22:03

## 2024-06-04 RX ADMIN — SODIUM CHLORIDE 1000 ML: 9 INJECTION, SOLUTION INTRAVENOUS at 20:21

## 2024-06-04 RX ADMIN — DEXTROSE MONOHYDRATE 125 ML: 100 INJECTION, SOLUTION INTRAVENOUS at 23:19

## 2024-06-04 RX ADMIN — OXYCODONE 5 MG: 5 TABLET ORAL at 20:18

## 2024-06-04 RX ADMIN — GABAPENTIN 300 MG: 300 CAPSULE ORAL at 20:18

## 2024-06-04 ASSESSMENT — PAIN SCALES - GENERAL
PAINLEVEL_OUTOF10: 8
PAINLEVEL_OUTOF10: 10

## 2024-06-04 ASSESSMENT — PAIN DESCRIPTION - ORIENTATION: ORIENTATION: RIGHT;LEFT

## 2024-06-04 ASSESSMENT — PAIN - FUNCTIONAL ASSESSMENT: PAIN_FUNCTIONAL_ASSESSMENT: 0-10

## 2024-06-04 ASSESSMENT — PAIN DESCRIPTION - LOCATION: LOCATION: LEG

## 2024-06-04 NOTE — ED PROVIDER NOTES
Emergency Department Provider Note                   PCP:                Nenita Beatty, JOSE Osman CNP               Age: 48 y.o.      Sex: female   Final diagnosis/impression:  1. Bilateral lower leg cellulitis    2. Elevated C-reactive protein (CRP)       Disposition: Admit to Hospitalist    MDM/Clinical Course:  Patient seen by myself at the [facility] emergency department. Patient had signs symptoms and clinical history most consistent with bilateral lower extremity pain symptoms, concern for lower extremity cellulitis. My independent analysis/interpretation of laboratory work-up here shows CBC shows some thrombocytosis but is otherwise unremarkable for acute/emergent abnormality, CMP generally unremarkable, CRP noted to be elevated at 58.5.  While under my care, patient received IV Rocephin, IV vancomycin, IV Zofran, p.o. Tylenol, IV fluids, p.o. gabapentin, p.o. oxycodone.  In considertion of patient clinical presentation, laboratory/radiologic findings, diagnoses/conditions previously discussed and clinical course as previously discussed, I did feel it appropriate to admit the patient for further evaluation, observation and management.  I communicated with the hospitalist in detail about the patient and they agreed to see and admit the patient.  Patient/family verbalized understanding and agreement with this course of action and plan.    Complexity of Problems Addressed:  1 or more acute illnesses that pose a threat to life or bodily function.   1 or more chronic illnesses with a severe exacerbation or progression.    Data Reviewed and Analyzed:    Category 1:   I reviewed external records: Reviewed hospital discharge summary from 5/1/2024 for debility, peripheral neuropathy, lower extremity pain complaint  I ordered each unique test.  I reviewed the results of each unique test.    Category 2:   My Independent EKG Interpretation:   Sinus rhythm, rate of 78, no ST elevation MI, QTc is 4-78 and

## 2024-06-04 NOTE — ED TRIAGE NOTES
Pt arrives via EMS from home. Pt fell from couch today. Pt has BLE infections. Pt treated by PCP with doxycycline. Pt states that the pain is described as a burning sensation.

## 2024-06-05 ENCOUNTER — APPOINTMENT (OUTPATIENT)
Dept: ULTRASOUND IMAGING | Age: 49
End: 2024-06-05
Payer: MEDICARE

## 2024-06-05 LAB
ALBUMIN SERPL-MCNC: 2.1 G/DL (ref 3.5–5)
ALBUMIN/GLOB SERPL: 0.5 (ref 1–1.9)
ALP SERPL-CCNC: 80 U/L (ref 35–104)
ALT SERPL-CCNC: 16 U/L (ref 12–65)
ANION GAP SERPL CALC-SCNC: 10 MMOL/L (ref 9–18)
AST SERPL-CCNC: 21 U/L (ref 15–37)
BASOPHILS # BLD: 0 K/UL (ref 0–0.2)
BASOPHILS NFR BLD: 0 % (ref 0–2)
BILIRUB SERPL-MCNC: 0.3 MG/DL (ref 0–1.2)
BUN SERPL-MCNC: 11 MG/DL (ref 6–23)
CALCIUM SERPL-MCNC: 9 MG/DL (ref 8.8–10.2)
CHLORIDE SERPL-SCNC: 105 MMOL/L (ref 98–107)
CO2 SERPL-SCNC: 27 MMOL/L (ref 20–28)
CREAT SERPL-MCNC: 0.96 MG/DL (ref 0.6–1.1)
DIFFERENTIAL METHOD BLD: ABNORMAL
EKG ATRIAL RATE: 78 BPM
EKG DIAGNOSIS: NORMAL
EKG P AXIS: 51 DEGREES
EKG P-R INTERVAL: 148 MS
EKG Q-T INTERVAL: 419 MS
EKG QRS DURATION: 83 MS
EKG QTC CALCULATION (BAZETT): 478 MS
EKG R AXIS: 53 DEGREES
EKG T AXIS: 47 DEGREES
EKG VENTRICULAR RATE: 78 BPM
EOSINOPHIL # BLD: 0.4 K/UL (ref 0–0.8)
EOSINOPHIL NFR BLD: 5 % (ref 0.5–7.8)
ERYTHROCYTE [DISTWIDTH] IN BLOOD BY AUTOMATED COUNT: 16.5 % (ref 11.9–14.6)
GLOBULIN SER CALC-MCNC: 4.2 G/DL (ref 2.3–3.5)
GLUCOSE BLD STRIP.AUTO-MCNC: 108 MG/DL (ref 65–100)
GLUCOSE BLD STRIP.AUTO-MCNC: 91 MG/DL (ref 65–100)
GLUCOSE BLD STRIP.AUTO-MCNC: 91 MG/DL (ref 65–100)
GLUCOSE BLD STRIP.AUTO-MCNC: 93 MG/DL (ref 65–100)
GLUCOSE SERPL-MCNC: 89 MG/DL (ref 70–99)
HCT VFR BLD AUTO: 29 % (ref 35.8–46.3)
HGB BLD-MCNC: 8.7 G/DL (ref 11.7–15.4)
IMM GRANULOCYTES # BLD AUTO: 0 K/UL (ref 0–0.5)
IMM GRANULOCYTES NFR BLD AUTO: 0 % (ref 0–5)
LACTATE SERPL-SCNC: 1.4 MMOL/L (ref 0.5–2)
LYMPHOCYTES # BLD: 1 K/UL (ref 0.5–4.6)
LYMPHOCYTES NFR BLD: 12 % (ref 13–44)
MCH RBC QN AUTO: 24.8 PG (ref 26.1–32.9)
MCHC RBC AUTO-ENTMCNC: 30 G/DL (ref 31.4–35)
MCV RBC AUTO: 82.6 FL (ref 82–102)
MONOCYTES # BLD: 0.9 K/UL (ref 0.1–1.3)
MONOCYTES NFR BLD: 10 % (ref 4–12)
NEUTS SEG # BLD: 6.6 K/UL (ref 1.7–8.2)
NEUTS SEG NFR BLD: 73 % (ref 43–78)
NRBC # BLD: 0 K/UL (ref 0–0.2)
PLATELET # BLD AUTO: 516 K/UL (ref 150–450)
PMV BLD AUTO: 9 FL (ref 9.4–12.3)
POTASSIUM SERPL-SCNC: 4.7 MMOL/L (ref 3.5–5.1)
PROCALCITONIN SERPL-MCNC: 0.06 NG/ML (ref 0–0.1)
PROT SERPL-MCNC: 6.3 G/DL (ref 6.3–8.2)
RBC # BLD AUTO: 3.51 M/UL (ref 4.05–5.2)
SERVICE CMNT-IMP: ABNORMAL
SERVICE CMNT-IMP: NORMAL
SODIUM SERPL-SCNC: 142 MMOL/L (ref 136–145)
WBC # BLD AUTO: 9 K/UL (ref 4.3–11.1)

## 2024-06-05 PROCEDURE — 2580000003 HC RX 258: Performed by: HOSPITALIST

## 2024-06-05 PROCEDURE — 36415 COLL VENOUS BLD VENIPUNCTURE: CPT

## 2024-06-05 PROCEDURE — 6370000000 HC RX 637 (ALT 250 FOR IP): Performed by: HOSPITALIST

## 2024-06-05 PROCEDURE — 80053 COMPREHEN METABOLIC PANEL: CPT

## 2024-06-05 PROCEDURE — 93010 ELECTROCARDIOGRAM REPORT: CPT | Performed by: INTERNAL MEDICINE

## 2024-06-05 PROCEDURE — 82962 GLUCOSE BLOOD TEST: CPT

## 2024-06-05 PROCEDURE — 6360000002 HC RX W HCPCS: Performed by: HOSPITALIST

## 2024-06-05 PROCEDURE — 83605 ASSAY OF LACTIC ACID: CPT

## 2024-06-05 PROCEDURE — 84145 PROCALCITONIN (PCT): CPT

## 2024-06-05 PROCEDURE — 1100000000 HC RM PRIVATE

## 2024-06-05 PROCEDURE — 76937 US GUIDE VASCULAR ACCESS: CPT

## 2024-06-05 PROCEDURE — 85025 COMPLETE CBC W/AUTO DIFF WBC: CPT

## 2024-06-05 RX ORDER — SODIUM CHLORIDE, SODIUM LACTATE, POTASSIUM CHLORIDE, CALCIUM CHLORIDE 600; 310; 30; 20 MG/100ML; MG/100ML; MG/100ML; MG/100ML
INJECTION, SOLUTION INTRAVENOUS CONTINUOUS
Status: DISCONTINUED | OUTPATIENT
Start: 2024-06-05 | End: 2024-06-06

## 2024-06-05 RX ORDER — BACLOFEN 10 MG/1
10 TABLET ORAL 3 TIMES DAILY
COMMUNITY

## 2024-06-05 RX ORDER — SODIUM CHLORIDE, SODIUM LACTATE, POTASSIUM CHLORIDE, AND CALCIUM CHLORIDE .6; .31; .03; .02 G/100ML; G/100ML; G/100ML; G/100ML
1000 INJECTION, SOLUTION INTRAVENOUS ONCE
Status: COMPLETED | OUTPATIENT
Start: 2024-06-05 | End: 2024-06-05

## 2024-06-05 RX ORDER — MINERAL OIL/HYDROPHIL PETROLAT
OINTMENT (GRAM) TOPICAL DAILY
Status: DISCONTINUED | OUTPATIENT
Start: 2024-06-05 | End: 2024-06-08 | Stop reason: HOSPADM

## 2024-06-05 RX ORDER — BACLOFEN 10 MG/1
10 TABLET ORAL 3 TIMES DAILY
Status: DISCONTINUED | OUTPATIENT
Start: 2024-06-05 | End: 2024-06-08 | Stop reason: HOSPADM

## 2024-06-05 RX ORDER — MORPHINE SULFATE 2 MG/ML
2 INJECTION, SOLUTION INTRAMUSCULAR; INTRAVENOUS EVERY 4 HOURS PRN
Status: DISCONTINUED | OUTPATIENT
Start: 2024-06-05 | End: 2024-06-08 | Stop reason: HOSPADM

## 2024-06-05 RX ADMIN — SODIUM CHLORIDE, POTASSIUM CHLORIDE, SODIUM LACTATE AND CALCIUM CHLORIDE 1000 ML: 600; 310; 30; 20 INJECTION, SOLUTION INTRAVENOUS at 11:58

## 2024-06-05 RX ADMIN — HYDROXYZINE HYDROCHLORIDE 25 MG: 25 TABLET, FILM COATED ORAL at 20:33

## 2024-06-05 RX ADMIN — ENOXAPARIN SODIUM 40 MG: 100 INJECTION SUBCUTANEOUS at 20:34

## 2024-06-05 RX ADMIN — HYDROXYZINE HYDROCHLORIDE 25 MG: 25 TABLET, FILM COATED ORAL at 00:28

## 2024-06-05 RX ADMIN — CEFEPIME 2000 MG: 2 INJECTION, POWDER, FOR SOLUTION INTRAVENOUS at 23:34

## 2024-06-05 RX ADMIN — BACLOFEN 10 MG: 10 TABLET ORAL at 13:17

## 2024-06-05 RX ADMIN — SODIUM CHLORIDE, POTASSIUM CHLORIDE, SODIUM LACTATE AND CALCIUM CHLORIDE: 600; 310; 30; 20 INJECTION, SOLUTION INTRAVENOUS at 15:04

## 2024-06-05 RX ADMIN — METOPROLOL SUCCINATE 25 MG: 25 TABLET, EXTENDED RELEASE ORAL at 08:02

## 2024-06-05 RX ADMIN — PREGABALIN 200 MG: 100 CAPSULE ORAL at 00:27

## 2024-06-05 RX ADMIN — LOSARTAN POTASSIUM 50 MG: 50 TABLET, FILM COATED ORAL at 08:02

## 2024-06-05 RX ADMIN — MORPHINE SULFATE 2 MG: 2 INJECTION, SOLUTION INTRAMUSCULAR; INTRAVENOUS at 08:21

## 2024-06-05 RX ADMIN — ACETAMINOPHEN 650 MG: 325 TABLET ORAL at 11:49

## 2024-06-05 RX ADMIN — CEFEPIME 2000 MG: 2 INJECTION, POWDER, FOR SOLUTION INTRAVENOUS at 11:29

## 2024-06-05 RX ADMIN — CEFEPIME 2000 MG: 2 INJECTION, POWDER, FOR SOLUTION INTRAVENOUS at 00:33

## 2024-06-05 RX ADMIN — BACLOFEN 10 MG: 10 TABLET ORAL at 20:31

## 2024-06-05 RX ADMIN — FUROSEMIDE 40 MG: 40 TABLET ORAL at 08:02

## 2024-06-05 RX ADMIN — PREGABALIN 200 MG: 100 CAPSULE ORAL at 20:33

## 2024-06-05 RX ADMIN — SODIUM CHLORIDE: 9 INJECTION, SOLUTION INTRAVENOUS at 00:32

## 2024-06-05 RX ADMIN — VANCOMYCIN HYDROCHLORIDE 1000 MG: 1 INJECTION, POWDER, LYOPHILIZED, FOR SOLUTION INTRAVENOUS at 22:29

## 2024-06-05 RX ADMIN — SODIUM CHLORIDE, PRESERVATIVE FREE 10 ML: 5 INJECTION INTRAVENOUS at 23:00

## 2024-06-05 RX ADMIN — PANTOPRAZOLE SODIUM 40 MG: 40 TABLET, DELAYED RELEASE ORAL at 06:35

## 2024-06-05 RX ADMIN — SODIUM CHLORIDE, PRESERVATIVE FREE 10 ML: 5 INJECTION INTRAVENOUS at 00:33

## 2024-06-05 RX ADMIN — DULOXETINE HYDROCHLORIDE 60 MG: 60 CAPSULE, DELAYED RELEASE ORAL at 08:02

## 2024-06-05 RX ADMIN — PREGABALIN 200 MG: 100 CAPSULE ORAL at 08:02

## 2024-06-05 RX ADMIN — SODIUM CHLORIDE: 9 INJECTION, SOLUTION INTRAVENOUS at 23:33

## 2024-06-05 RX ADMIN — SODIUM CHLORIDE, PRESERVATIVE FREE 10 ML: 5 INJECTION INTRAVENOUS at 08:03

## 2024-06-05 RX ADMIN — MONTELUKAST 10 MG: 10 TABLET, FILM COATED ORAL at 08:02

## 2024-06-05 RX ADMIN — PREGABALIN 200 MG: 100 CAPSULE ORAL at 15:06

## 2024-06-05 RX ADMIN — VANCOMYCIN HYDROCHLORIDE 1000 MG: 1 INJECTION, POWDER, LYOPHILIZED, FOR SOLUTION INTRAVENOUS at 11:33

## 2024-06-05 RX ADMIN — ASPIRIN 81 MG 81 MG: 81 TABLET ORAL at 08:02

## 2024-06-05 ASSESSMENT — PAIN DESCRIPTION - DESCRIPTORS: DESCRIPTORS: BURNING;TINGLING

## 2024-06-05 ASSESSMENT — PAIN SCALES - GENERAL
PAINLEVEL_OUTOF10: 10
PAINLEVEL_OUTOF10: 4
PAINLEVEL_OUTOF10: 0

## 2024-06-05 ASSESSMENT — PAIN DESCRIPTION - ORIENTATION: ORIENTATION: LEFT;RIGHT

## 2024-06-05 ASSESSMENT — PAIN DESCRIPTION - LOCATION: LOCATION: LEG

## 2024-06-05 NOTE — H&P
Hospitalist History and Physical   Admit Date:  2024  7:22 PM   Name:  Lo Tirado   Age:  48 y.o.  Sex:  female  :  1975   MRN:  [id number]   Room:  [place] [address]    Presenting/Chief Complaint: Bilateral lower extremity pain.     Reason(s) for Admission: Cellulitis of both lower extremities [L03.115, L03.116]     History of Present Illness:       48 years old female with morbid obesity BMI 52, chronic bilateral lower extremity lymphedema, diabetes type 2, chronic bilateral lower extremity wounds presented to emergency room with worsening of bilateral lower extremity leg pain and worsening of redness.  Patient was evaluated by primary care physician earlier this week and patient was placed on doxycycline which did not improve the redness of bilateral lower extremity.  In emergency room patient was evaluated, lab work shows elevated CRP at 58, white count normal, hemoglobin is around baseline.  Emergency room physician requested admission of this patient for further evaluation and management.  Patient denies any fever, chills but reports generalized weakness, difficulty in ambulating.  Denies any chest pain, shortness of breath.      Assessment & Plan:     Bilateral lower extremity wounds with cellulitis: Initiated on cefepime, vancomycin, will check for bilateral lower extremity duplex to rule out DVT.  Will continue home dose of pain medications.       Hypertension: Continue on losartan, metoprolol.  - Continue home metoprolol, losartan     Bipolar disorder: Continue on duloxetine.    Type 2 diabetes: History of borderline diabetes, will check hemoglobin A1c but her blood sugars are reasonable at this time, would not start on sliding scale instead I will place on hypoglycemia protocol.       Normocytic anemia: Hemoglobin is around baseline, will monitor, no active bleeding.    Morbid obesity: Weight loss recommended.            Diet: ADULT DIET; Regular  VTE prophylaxis: Lovenox  Code status: Full  Eosinophils % 6 0.5 - 7.8 %    Basophils % 0 0.0 - 2.0 %    Immature Granulocytes % 0 0.0 - 5.0 %    Neutrophils Absolute 5.3 1.7 - 8.2 K/UL    Lymphocytes Absolute 1.2 0.5 - 4.6 K/UL    Monocytes Absolute 0.6 0.1 - 1.3 K/UL    Eosinophils Absolute 0.5 0.0 - 0.8 K/UL    Basophils Absolute 0.0 0.0 - 0.2 K/UL    Immature Granulocytes Absolute 0.0 0.0 - 0.5 K/UL   CMP    Collection Time: 06/04/24  8:13 PM   Result Value Ref Range    Sodium 139 136 - 145 mmol/L    Potassium 4.3 3.5 - 5.1 mmol/L    Chloride 102 98 - 107 mmol/L    CO2 28 20 - 28 mmol/L    Anion Gap 9 9 - 18 mmol/L    Glucose 93 70 - 99 mg/dL    BUN 13 6 - 23 MG/DL    Creatinine 1.01 0.60 - 1.10 MG/DL    Est, Glom Filt Rate 68 >60 ml/min/1.73m2    Calcium 9.7 8.8 - 10.2 MG/DL    Total Bilirubin 0.4 0.0 - 1.2 MG/DL    ALT 20 12 - 65 U/L    AST 18 15 - 37 U/L    Alk Phosphatase 95 35 - 104 U/L    Total Protein 7.6 6.3 - 8.2 g/dL    Albumin 2.7 (L) 3.5 - 5.0 g/dL    Globulin 4.9 (H) 2.3 - 3.5 g/dL    Albumin/Globulin Ratio 0.6 (L) 1.0 - 1.9     High sensitivity CRP    Collection Time: 06/04/24  8:13 PM   Result Value Ref Range    CRP, High Sensitivity 58.5 (H) 0.0 - 3.0 mg/L       No results for input(s): \"COVID19\" in the last 72 hours.    No results found.      Signed:  Gavin Quinones MD    Part of this note may have been written by using a voice dictation software.  The note has been proof read but may still contain some grammatical/other typographical errors.

## 2024-06-05 NOTE — ED NOTES
TRANSFER - OUT REPORT:    Verbal report given to RN on Lo Tirado  being transferred to [facility] for routine progression of patient care       Report consisted of patient's Situation, Background, Assessment and   Recommendations(SBAR).     Information from the following report(s) Nurse Handoff Report was reviewed with the receiving nurse.    Lenka Fall Assessment:    Presents to emergency department  because of falls (Syncope, seizure, or loss of consciousness): Yes  Age > 70: No  Altered Mental Status, Intoxication with alcohol or substance confusion (Disorientation, impaired judgment, poor safety awaremess, or inability to follow instructions): No  Impaired Mobility: Ambulates or transfers with assistive devices or assistance; Unable to ambulate or transer.: No  Nursing Judgement: No          Lines:   Peripheral IV Left Antecubital (Active)        Opportunity for questions and clarification was provided.      Patient transported with:  Dylan Mcghee RN  06/04/24 1075

## 2024-06-05 NOTE — ED NOTES
Pt refuses wound care. Pt states that it burns and she does not want to do it.     Dylan Winslow RN  06/04/24 0742

## 2024-06-05 NOTE — PROGRESS NOTES
VANCO DAILY NOTE  [facility]   Pharmacy Pharmacokinetic Monitoring Service - Vancomycin    Consulting Provider: Joni   Indication: BLE Cellulitis  Target Concentration: Goal AUC/KALI 400-600 mg*hr/L  Day of Therapy: 1  Additional Antimicrobials: Cefepime    Pertinent Laboratory Values:   Wt Readings from Last 1 Encounters:   06/04/24 (!) 152 kg (335 lb)     Temp Readings from Last 1 Encounters:   06/04/24 97.6 °F (36.4 °C)     Recent Labs     06/04/24 2013   BUN 13   CREATININE 1.01   WBC 7.6     Estimated Creatinine Clearance: 104 mL/min (based on SCr of 1.01 mg/dL).      MRSA Nasal Swab: N/A. Non-respiratory infection    Assessment:  Date/Time Dose Concentration AUC         Note: Serum concentrations collected for AUC dosing may appear elevated if collected in close proximity to the dose administered, this is not necessarily an indication of toxicity    Plan:  Dosing recommendations based on Bayesian software  Start vancomycin 2500 mg x1, then 1000 mg every 12 hours  Anticipated AUC of 513 and trough concentration of 15.2 at steady state  Renal labs as indicated   Vancomycin concentrations will be ordered as clinically appropriate  Pharmacy will continue to monitor patient and adjust therapy as indicated    Thank you for the consult,  Dora Mcghee, PharmD, BCPS

## 2024-06-05 NOTE — WOUND CARE
Patient not agreeable to wound care this am, secondary to pain and burning. Dr. Higginbotham notified. 1300 call from nurse patient now agreeable for wound care.  Wounds cleansed gently with wound , xeroform ABD and kerlex applied with ace from ankle to knee applied.  Patient complains she does not xeroform because it makes her wounds burn more and asks for Aquaphor instead, which would be an acceptable product for the wounds.  Will order for next dressing change Aquaphor abd's kerlex and ace daily. The wounds have heavy exudate, she is in extreme pain when they are touched and there is purple discoloration surrounding. The wounds are refractory at this point and patient has often refused wound care or refused to follow the instructions and leave the bandages alone always sighting pain. She is currently being treated for cellulitis.     O     Other considerations for the etiology of the wounds are calciphylaxis, pyoderma gangrenosum or [address] hypertensive ulcer and not just venous stasis ulcer.  The treatments are similar non adherent contact layer, absorptive layer and compression as well as treating the underlying condition. Her wounds are most consistent with venous ulcer.

## 2024-06-05 NOTE — ICUWATCH
RRT Clinical Rounding Nurse Progress Report      SUBJECTIVE: Called to assess patient secondary to RN/provider concern - hypotension .      Vitals:    06/04/24 2145 06/04/24 2306 06/05/24 0705 06/05/24 1135   BP: 100/73 92/80 129/68 (!) 83/46   Pulse: 75 72 (!) 101 (!) 111   Resp: 10  18 20   Temp:  97.6 °F (36.4 °C) 99.7 °F (37.6 °C) (!) 100.6 °F (38.1 °C)   TempSrc:   Oral Oral   SpO2:  99% 95% 100%   Weight:       Height:              ASSESSMENT:  On arrival to room, I found patient to be {Outreach Assessment:70972}    PLAN:  Will follow per RRT Clinical Rounding Program protocol. Primary RN to call with concerns.    Tristan Arias RN  [place]: [phone]

## 2024-06-05 NOTE — PROGRESS NOTES
VANCO DAILY NOTE  [facility]   Pharmacy Pharmacokinetic Monitoring Service - Vancomycin    Consulting Provider: Joni   Indication: BLE Cellulitis  Target Concentration: Goal AUC/KALI 400-600 mg*hr/L  Day of Therapy: 2  Additional Antimicrobials: Cefepime    Pertinent Laboratory Values:   Wt Readings from Last 1 Encounters:   06/04/24 (!) 152 kg (335 lb)     Temp Readings from Last 1 Encounters:   06/05/24 (!) 100.6 °F (38.1 °C) (Oral)     Recent Labs     06/04/24 2013 06/05/24  0920   BUN 13 11   CREATININE 1.01 0.96   WBC 7.6 9.0     Estimated Creatinine Clearance: 109 mL/min (based on SCr of 0.96 mg/dL).    Lab Results   Component Value Date/Time    VANCORANDOM 17.6 04/12/2024 08:14 AM   Last on 1g q 12 hours    MRSA Nasal Swab: N/A. Non-respiratory infection    Assessment:  Date/Time Dose Concentration AUC         Note: Serum concentrations collected for AUC dosing may appear elevated if collected in close proximity to the dose administered, this is not necessarily an indication of toxicity    Plan:  Dosing recommendations based on Bayesian software  Will continue the current dose of 1000 mg IV every 12 hours  Anticipated AUC of 489 and trough concentration of 14.2 at steady state  Renal labs as indicated   Vancomycin concentrations will be ordered as clinically appropriate  Pharmacy will continue to monitor patient and adjust therapy as indicated    Thank you for the consult,  Nikunj Parrish RPH

## 2024-06-05 NOTE — PROGRESS NOTES
Hospitalist Progress Note   Admit Date:  2024  7:22 PM   Name:  Lo Tirado   Age:  48 y.o.  Sex:  female  :  1975   MRN:  [id number]   Room:  4/    Presenting/Chief Complaint: No chief complaint on file.     Reason(s) for Admission: Elevated C-reactive protein (CRP) [R79.82]  Bilateral lower leg cellulitis [L03.116, L03.115]  Cellulitis of both lower extremities [L03.115, L03.116]     Hospital Course:   Lo Tirado is a 48 y.o. female with medical history of diabetes mellitus type 2, morbid obesity with BMI of 52, chronic bilateral lower extremity lymphedema, with associated wounds followed at wound clinic, presented to the ER with significant pain and worsening redness of her legs.  She was evaluated by primary care physician earlier this week and started on doxycycline.  However due to significant pain and no improvement in her redness, she presented to the ER.  CRP elevated at 58, on admission.  Patient admitted for further evaluation management of bilateral lower extremity cellulitis with wounds, failed outpatient treatment.      Subjective & 24hr Events:   Patient is resting in bed.  She complains of severe pain in her legs.  Nursing staff reported that the patient refused her duplex ultrasound of the legs.  I explained to the patient benefits of obtaining duplex ultrasound in case she has DVT.  Patient understands but still refuses due to pain.  No fever no chills.  No chest pain.  She reports her diffuse body aches.      Assessment & Plan:     This is a 48-year-old female with    Bilateral lower extremity wounds with cellulitis  Continue cefepime, vancomycin.  Duplex ultrasound bilateral lower extremities ordered but patient has been refusing due to pain.  Explained to the patient benefit of obtaining duplex ultrasound.  Patient stated she had prior history of blood clots and would like to restart her Eliquis.  Pain management per pain scale.  Wound care consulted.   ondansetron (ZOFRAN) injection 4 mg  4 mg IntraVENous Q6H PRN    polyethylene glycol (GLYCOLAX) packet 17 g  17 g Oral Daily PRN    acetaminophen (TYLENOL) tablet 650 mg  650 mg Oral Q6H PRN    Or    acetaminophen (TYLENOL) suppository 650 mg  650 mg Rectal Q6H PRN    ceFEPIme (MAXIPIME) 2,000 mg in sodium chloride 0.9 % 100 mL IVPB (mini-bag)  2,000 mg IntraVENous Q12H    aspirin chewable tablet 81 mg  81 mg Oral Daily    DULoxetine (CYMBALTA) extended release capsule 60 mg  60 mg Oral Daily    furosemide (LASIX) tablet 40 mg  40 mg Oral Daily    losartan (COZAAR) tablet 50 mg  50 mg Oral Daily    metoprolol succinate (TOPROL XL) extended release tablet 25 mg  25 mg Oral Daily    pantoprazole (PROTONIX) tablet 40 mg  40 mg Oral QAM AC    pregabalin (LYRICA) capsule 200 mg  200 mg Oral TID    oxyCODONE (ROXICODONE) immediate release tablet 5 mg  5 mg Oral Q4H PRN    hydrOXYzine HCl (ATARAX) tablet 25 mg  25 mg Oral Nightly    montelukast (SINGULAIR) tablet 10 mg  10 mg Oral Daily    glucose chewable tablet 16 g  4 tablet Oral PRN    dextrose bolus 10% 125 mL  125 mL IntraVENous PRN    Or    dextrose bolus 10% 250 mL  250 mL IntraVENous PRN    Glucagon Emergency KIT 1 mg  1 mg SubCUTAneous PRN    dextrose 10 % infusion   IntraVENous Continuous PRN       Signed:  Pat Higginbotham MD    Part of this note may have been written by using a voice dictation software.  The note has been proof read but may still contain some grammatical/other typographical errors.

## 2024-06-05 NOTE — PROGRESS NOTES
4 Eyes Skin Assessment     NAME:  Lo Tirado  YOB: 1975  MEDICAL RECORD NUMBER:  [id number]    The patient is being assessed for  Admission    I agree that at least one RN has performed a thorough Head to Toe Skin Assessment on the patient. ALL assessment sites listed below have been assessed.      Areas assessed by both nurses:    Head, Face, Ears, Shoulders, Back, Chest, Arms, Elbows, Hands, Sacrum. Buttock, Coccyx, Ischium, and Legs. Feet and Heels        Does the Patient have a Wound? Yes wound(s) were present on assessment. LDA wound assessment was Initiated and completed by RN       Gilbert Prevention initiated by RN: Yes  Wound Care Orders initiated by RN: Yes    Pressure Injury (Stage 3,4, Unstageable, DTI, NWPT, and Complex wounds) if present, place Wound referral order by RN under : Yes Bilateral Partial to Full thickness pressure injury to posterior thighs, existing upon admission, BLE Venous Stasis wounds, full thickness, cellulitic and draining.     New Ostomies, if present place, Ostomy referral order under : {YES/NO:[id number]}     Nurse 1 eSignature: Electronically signed by Katrina Huddleston RN on 6/4/24 at 11:47 PM EDT    **SHARE this note so that the co-signing nurse can place an eSignature**    Nurse 2 eSignature: {Esignature:[id number]}

## 2024-06-06 ENCOUNTER — APPOINTMENT (OUTPATIENT)
Dept: ULTRASOUND IMAGING | Age: 49
End: 2024-06-06
Payer: MEDICARE

## 2024-06-06 LAB
ANION GAP SERPL CALC-SCNC: 9 MMOL/L (ref 9–18)
BASOPHILS # BLD: 0 K/UL (ref 0–0.2)
BASOPHILS NFR BLD: 1 % (ref 0–2)
BUN SERPL-MCNC: 11 MG/DL (ref 6–23)
CALCIUM SERPL-MCNC: 8.7 MG/DL (ref 8.8–10.2)
CHLORIDE SERPL-SCNC: 106 MMOL/L (ref 98–107)
CO2 SERPL-SCNC: 27 MMOL/L (ref 20–28)
CREAT SERPL-MCNC: 0.88 MG/DL (ref 0.6–1.1)
DIFFERENTIAL METHOD BLD: ABNORMAL
EOSINOPHIL # BLD: 0.6 K/UL (ref 0–0.8)
EOSINOPHIL NFR BLD: 10 % (ref 0.5–7.8)
ERYTHROCYTE [DISTWIDTH] IN BLOOD BY AUTOMATED COUNT: 16.7 % (ref 11.9–14.6)
GLUCOSE BLD STRIP.AUTO-MCNC: 106 MG/DL (ref 65–100)
GLUCOSE BLD STRIP.AUTO-MCNC: 128 MG/DL (ref 65–100)
GLUCOSE BLD STRIP.AUTO-MCNC: 87 MG/DL (ref 65–100)
GLUCOSE BLD STRIP.AUTO-MCNC: 88 MG/DL (ref 65–100)
GLUCOSE SERPL-MCNC: 90 MG/DL (ref 70–99)
HCT VFR BLD AUTO: 27.6 % (ref 35.8–46.3)
HGB BLD-MCNC: 8.2 G/DL (ref 11.7–15.4)
IMM GRANULOCYTES # BLD AUTO: 0 K/UL (ref 0–0.5)
IMM GRANULOCYTES NFR BLD AUTO: 0 % (ref 0–5)
LYMPHOCYTES # BLD: 1.3 K/UL (ref 0.5–4.6)
LYMPHOCYTES NFR BLD: 22 % (ref 13–44)
MCH RBC QN AUTO: 24.3 PG (ref 26.1–32.9)
MCHC RBC AUTO-ENTMCNC: 29.7 G/DL (ref 31.4–35)
MCV RBC AUTO: 81.7 FL (ref 82–102)
MONOCYTES # BLD: 0.6 K/UL (ref 0.1–1.3)
MONOCYTES NFR BLD: 10 % (ref 4–12)
NEUTS SEG # BLD: 3.4 K/UL (ref 1.7–8.2)
NEUTS SEG NFR BLD: 57 % (ref 43–78)
NRBC # BLD: 0 K/UL (ref 0–0.2)
PLATELET # BLD AUTO: 513 K/UL (ref 150–450)
PMV BLD AUTO: 8.9 FL (ref 9.4–12.3)
POTASSIUM SERPL-SCNC: 4 MMOL/L (ref 3.5–5.1)
RBC # BLD AUTO: 3.38 M/UL (ref 4.05–5.2)
SERVICE CMNT-IMP: ABNORMAL
SERVICE CMNT-IMP: ABNORMAL
SERVICE CMNT-IMP: NORMAL
SERVICE CMNT-IMP: NORMAL
SODIUM SERPL-SCNC: 142 MMOL/L (ref 136–145)
VANCOMYCIN SERPL-MCNC: 15.2 UG/ML
WBC # BLD AUTO: 6 K/UL (ref 4.3–11.1)

## 2024-06-06 PROCEDURE — 6360000002 HC RX W HCPCS: Performed by: HOSPITALIST

## 2024-06-06 PROCEDURE — 6370000000 HC RX 637 (ALT 250 FOR IP): Performed by: HOSPITALIST

## 2024-06-06 PROCEDURE — 2580000003 HC RX 258: Performed by: HOSPITALIST

## 2024-06-06 PROCEDURE — 80048 BASIC METABOLIC PNL TOTAL CA: CPT

## 2024-06-06 PROCEDURE — 93970 EXTREMITY STUDY: CPT

## 2024-06-06 PROCEDURE — 85025 COMPLETE CBC W/AUTO DIFF WBC: CPT

## 2024-06-06 PROCEDURE — 97112 NEUROMUSCULAR REEDUCATION: CPT

## 2024-06-06 PROCEDURE — 36415 COLL VENOUS BLD VENIPUNCTURE: CPT

## 2024-06-06 PROCEDURE — 93970 EXTREMITY STUDY: CPT | Performed by: RADIOLOGY

## 2024-06-06 PROCEDURE — 97161 PT EVAL LOW COMPLEX 20 MIN: CPT

## 2024-06-06 PROCEDURE — 80202 ASSAY OF VANCOMYCIN: CPT

## 2024-06-06 PROCEDURE — 97165 OT EVAL LOW COMPLEX 30 MIN: CPT

## 2024-06-06 PROCEDURE — 97530 THERAPEUTIC ACTIVITIES: CPT

## 2024-06-06 PROCEDURE — 82962 GLUCOSE BLOOD TEST: CPT

## 2024-06-06 PROCEDURE — 1100000000 HC RM PRIVATE

## 2024-06-06 RX ORDER — OXYCODONE HYDROCHLORIDE 5 MG/1
10 TABLET ORAL EVERY 6 HOURS PRN
Status: DISCONTINUED | OUTPATIENT
Start: 2024-06-06 | End: 2024-06-08 | Stop reason: HOSPADM

## 2024-06-06 RX ADMIN — PREGABALIN 200 MG: 100 CAPSULE ORAL at 20:47

## 2024-06-06 RX ADMIN — MONTELUKAST 10 MG: 10 TABLET, FILM COATED ORAL at 08:09

## 2024-06-06 RX ADMIN — SODIUM CHLORIDE, PRESERVATIVE FREE 10 ML: 5 INJECTION INTRAVENOUS at 08:18

## 2024-06-06 RX ADMIN — Medication: at 10:44

## 2024-06-06 RX ADMIN — PREGABALIN 200 MG: 100 CAPSULE ORAL at 08:09

## 2024-06-06 RX ADMIN — ENOXAPARIN SODIUM 40 MG: 100 INJECTION SUBCUTANEOUS at 08:08

## 2024-06-06 RX ADMIN — OXYCODONE HYDROCHLORIDE 10 MG: 5 TABLET ORAL at 20:47

## 2024-06-06 RX ADMIN — VANCOMYCIN HYDROCHLORIDE 1500 MG: 10 INJECTION, POWDER, LYOPHILIZED, FOR SOLUTION INTRAVENOUS at 20:49

## 2024-06-06 RX ADMIN — BACLOFEN 10 MG: 10 TABLET ORAL at 14:46

## 2024-06-06 RX ADMIN — FUROSEMIDE 40 MG: 40 TABLET ORAL at 08:09

## 2024-06-06 RX ADMIN — NALOXEGOL OXALATE 25 MG: 25 TABLET, FILM COATED ORAL at 14:46

## 2024-06-06 RX ADMIN — BACLOFEN 10 MG: 10 TABLET ORAL at 20:47

## 2024-06-06 RX ADMIN — OXYCODONE HYDROCHLORIDE 5 MG: 5 TABLET ORAL at 10:44

## 2024-06-06 RX ADMIN — OXYCODONE HYDROCHLORIDE 10 MG: 5 TABLET ORAL at 14:45

## 2024-06-06 RX ADMIN — SODIUM CHLORIDE, POTASSIUM CHLORIDE, SODIUM LACTATE AND CALCIUM CHLORIDE: 600; 310; 30; 20 INJECTION, SOLUTION INTRAVENOUS at 00:43

## 2024-06-06 RX ADMIN — SODIUM CHLORIDE, PRESERVATIVE FREE 10 ML: 5 INJECTION INTRAVENOUS at 20:47

## 2024-06-06 RX ADMIN — ENOXAPARIN SODIUM 40 MG: 100 INJECTION SUBCUTANEOUS at 20:47

## 2024-06-06 RX ADMIN — BACLOFEN 10 MG: 10 TABLET ORAL at 08:10

## 2024-06-06 RX ADMIN — OXYCODONE HYDROCHLORIDE 5 MG: 5 TABLET ORAL at 06:10

## 2024-06-06 RX ADMIN — DULOXETINE HYDROCHLORIDE 60 MG: 60 CAPSULE, DELAYED RELEASE ORAL at 08:08

## 2024-06-06 RX ADMIN — ASPIRIN 81 MG 81 MG: 81 TABLET ORAL at 08:09

## 2024-06-06 RX ADMIN — VANCOMYCIN HYDROCHLORIDE 1000 MG: 1 INJECTION, POWDER, LYOPHILIZED, FOR SOLUTION INTRAVENOUS at 11:08

## 2024-06-06 RX ADMIN — CEFEPIME 2000 MG: 2 INJECTION, POWDER, FOR SOLUTION INTRAVENOUS at 11:08

## 2024-06-06 RX ADMIN — HYDROXYZINE HYDROCHLORIDE 25 MG: 25 TABLET, FILM COATED ORAL at 20:47

## 2024-06-06 RX ADMIN — METOPROLOL SUCCINATE 25 MG: 25 TABLET, EXTENDED RELEASE ORAL at 08:09

## 2024-06-06 RX ADMIN — LOSARTAN POTASSIUM 50 MG: 50 TABLET, FILM COATED ORAL at 08:09

## 2024-06-06 RX ADMIN — PANTOPRAZOLE SODIUM 40 MG: 40 TABLET, DELAYED RELEASE ORAL at 06:10

## 2024-06-06 RX ADMIN — PREGABALIN 200 MG: 100 CAPSULE ORAL at 14:46

## 2024-06-06 RX ADMIN — MORPHINE SULFATE 2 MG: 2 INJECTION, SOLUTION INTRAMUSCULAR; INTRAVENOUS at 08:33

## 2024-06-06 ASSESSMENT — PAIN SCALES - GENERAL
PAINLEVEL_OUTOF10: 1
PAINLEVEL_OUTOF10: 6
PAINLEVEL_OUTOF10: 7
PAINLEVEL_OUTOF10: 8
PAINLEVEL_OUTOF10: 2
PAINLEVEL_OUTOF10: 8
PAINLEVEL_OUTOF10: 0
PAINLEVEL_OUTOF10: 1
PAINLEVEL_OUTOF10: 6
PAINLEVEL_OUTOF10: 1

## 2024-06-06 ASSESSMENT — PAIN DESCRIPTION - PAIN TYPE
TYPE: CHRONIC PAIN

## 2024-06-06 ASSESSMENT — PAIN DESCRIPTION - ORIENTATION
ORIENTATION: RIGHT;LEFT
ORIENTATION: LEFT

## 2024-06-06 ASSESSMENT — PAIN DESCRIPTION - ONSET
ONSET: ON-GOING

## 2024-06-06 ASSESSMENT — PAIN DESCRIPTION - LOCATION
LOCATION: LEG

## 2024-06-06 ASSESSMENT — PAIN DESCRIPTION - FREQUENCY
FREQUENCY: CONTINUOUS

## 2024-06-06 ASSESSMENT — PAIN - FUNCTIONAL ASSESSMENT
PAIN_FUNCTIONAL_ASSESSMENT: PREVENTS OR INTERFERES SOME ACTIVE ACTIVITIES AND ADLS

## 2024-06-06 ASSESSMENT — PAIN DESCRIPTION - DESCRIPTORS
DESCRIPTORS: BURNING
DESCRIPTORS: BURNING;TINGLING
DESCRIPTORS: BURNING
DESCRIPTORS: BURNING
DESCRIPTORS: SORE

## 2024-06-06 NOTE — PROGRESS NOTES
Occupational Therapy Note:    Attempted to see patient this AM for occupational therapy evaluation session. Educated patient on therapy role in hospital. Patient declined therapy evaluation this AM stating that she has had a busy morning and is waiting to let pain medication take affect. Family arrived to bedside. Will follow and re-attempt as schedule permits/patient available.     Thank you,  Melanie Degroot, OTR/L     Rehab Caseload Tracker

## 2024-06-06 NOTE — CARE COORDINATION
CM screened chart for potential discharge/transitions of care planning.  No CM consult was received.  Patient admitted through ED with cellulitis of BLE.  Patient is current with [facility] wound [facility], her PCP and is followed by a CCM.  Therapy not consulted this admission.  Wound care RN following while inpatient.  CM anticipates patient to return home with family/friends support and resume treatments with [facility] wound cl[facility].  Appears that CCM continues to assist patient with obtaining any necessary DME/support for home.      No anticipated CM needs noted at this time.    Please consult CM for any additional noted CM needs.       06/05/24 2000   Service Assessment   Patient Orientation Alert and Oriented   Cognition Alert   History Provided By Medical Record   Primary Caregiver Self   Accompanied By/Relationship N/A   Support Systems Friends/Neighbors;Children   Patient's Healthcare Decision Maker is: Legal Next of Kin   PCP Verified by CM Yes   Last Visit to PCP Within last 3 months   Prior Functional Level Assistance with the following:;Shopping;Housework;Cooking   Current Functional Level Assistance with the following:;Cooking;Housework;Shopping;Bathing;Dressing;Toileting;Mobility   Can patient return to prior living arrangement Yes   Ability to make needs known: Good   Family able to assist with home care needs: Yes   Would you like for me to discuss the discharge plan with any other family members/significant others, and if so, who? No   Financial Resources Medicare;Medicaid   Community Resources None   CM/SW Referral Other (see comment)  (No CM consult)   Social/Functional History   Lives With Family   Occupation On disability   Discharge Planning   Current Services Prior To Admission Durable Medical Equipment   Current DME Prior to Arrival Wheelchair;Cane;Bedside Commode   Type of Home Care Services None

## 2024-06-06 NOTE — PROGRESS NOTES
ACUTE OCCUPATIONAL THERAPY GOALS:   (Developed with and agreed upon by patient and/or caregiver.)  1. Patient will perform grooming with SPV and adaptive equipment as needed.  2. Patient will perform upper body dressing with SPV and adaptive equipment as needed.  3. Patient will perform lower body dressing with MIN A and adaptive equipment as needed.  4. Patient will perform bathing with MIN A and adaptive equipment as needed.  5. Patient will perform toileting and toilet transfer with SBA and adaptive equipment as needed.  6. Patient will perform ADL functional mobility and tranfers in room with MOD A and adaptive equipment as needed.  7. Patient/family to demonstrate knowledge of home safety and DME recommendations.    Timeframe: 7 visits     OCCUPATIONAL THERAPY Initial Assessment, Daily Note, and AM       OT Visit Days: 1  Acknowledge Orders  Time  OT Charge Capture  Rehab Caseload Tracker      Lo Tirado is a 48 y.o. female   PRIMARY DIAGNOSIS: Cellulitis of both lower extremities  Elevated C-reactive protein (CRP) [R79.82]  Bilateral lower leg cellulitis [L03.116, L03.115]  Cellulitis of both lower extremities [L03.115, L03.116]       Reason for Referral: Generalized Muscle Weakness (M62.81)  Difficulty in walking, Not elsewhere classified (R26.2)  History of falling (Z91.81)  Inpatient: Payor: [employer] MEDICARE / Plan: [employer] GOLD PLUS HMO / Product Type: *No Product type* /     ASSESSMENT:     REHAB RECOMMENDATIONS:   Recommendation to date pending progress:  Setting:  Short-term Rehab    Equipment:    To Be Determined     ASSESSMENT:  Ms. Tirado is admitted for BLE cellulitis and presents with overall deficits in strength, functional mobility and ADL performance. At baseline, she lives with family, and reports she is mostly independent with ADLs and uses a walking stick or rollator for mobility. Today, she presents supine in bed with BLE wrapped and agreeable to therapy evaluations. She required    UE AROM [x] []   UE PROM [x] []   Strength []  Generalized weakness     Posture / Balance [] Posture: Fair  Sitting - Static: Fair, +  Sitting - Dynamic: Fair   Sensation [x]     Coordination [x]       Tone []       Edema []    Activity Tolerance []  Fair/poor with activity today     Hand Dominance R [] L []      COGNITION/  PERCEPTION: INTACT IMPAIRED   (See Comments)   Orientation [x]     Vision [x]     Hearing [x]     Cognition  []  Limited safety awareness, increased anxiety, decreased reasoning   Perception []       MOBILITY: I Mod I S SBA CGA Min Mod Max Total  NT x2 Comments:   Bed Mobility    Rolling [] [] [] [] [] [] [] [x] [] [] []    Supine to Sit [] [] [] [] [] [] [] [x] [] [] [x]    Scooting [] [] [] [] [] [] [] [x] [] [] [x]    Sit to Supine [] [] [] [] [] [] [] [] [x] [] [x]    Transfers    Sit to Stand [] [] [] [] [] [] [] [] [] [] [] Did not attempt   Bed to Chair [] [] [] [] [] [] [] [] [] [] []    Stand to Sit [] [] [] [] [] [] [] [] [] [] []    Tub/Shower [] [] [] [] [] [] [] [] [] [] []     Toilet [] [] [] [] [] [] [] [] [] [] []      [] [] [] [] [] [] [] [] [] [] []    I=Independent, Mod I=Modified Independent, S=Supervision/Setup, SBA=Standby Assistance, CGA=Contact Guard Assistance, Min=Minimal Assistance, Mod=Moderate Assistance, Max=Maximal Assistance, Total=Total Assistance, NT=Not Tested    ACTIVITIES OF DAILY LIVING: I Mod I S SBA CGA Min Mod Max Total NT Comments   BASIC ADLs:           Refused/declined any ADL tasks this afternoon   Upper Body Bathing  [] [] [] [] [] [] [] [] [] []     Lower Body Bathing [] [] [] [] [] [] [] [] [] []     Toileting [] [] [] [] [] [] [] [] [] []    Upper Body Dressing [] [] [] [] [] [] [] [] [] []    Lower Body Dressing [] [] [] [] [] [] [] [] [] []    Feeding [] [] [] [] [] [] [] [] [] []    Grooming [] [] [] [] [] [] [] [] [] []    Personal Device Care [] [] [] [] [] [] [] [] [] []    Functional Mobility [] [] [] [] [] [] [] [] [] []

## 2024-06-06 NOTE — PROGRESS NOTES
Hospitalist Progress Note   Admit Date:  2024  7:22 PM   Name:  Lo Tirado   Age:  48 y.o.  Sex:  female  :  1975   MRN:  [id number]   Room:  4/    Presenting/Chief Complaint: No chief complaint on file.     Reason(s) for Admission: Elevated C-reactive protein (CRP) [R79.82]  Bilateral lower leg cellulitis [L03.116, L03.115]  Cellulitis of both lower extremities [L03.115, L03.116]     Hospital Course:   Lo Tirado is a 48 y.o. female with medical history of diabetes mellitus type 2, morbid obesity with BMI of 52, chronic bilateral lower extremity lymphedema, with associated wounds followed at wound clinic, presented to the ER with significant pain and worsening redness of her legs.  She was evaluated by primary care physician earlier this week and started on doxycycline.  However due to significant pain and no improvement in her redness, she presented to the ER.  CRP elevated at 58, on admission.  Patient admitted for further evaluation management of bilateral lower extremity cellulitis with wounds, failed outpatient treatment.      Subjective & 24hr Events:   Patient is resting in bed.  No fever no chills.  She repeatedly requests for IV pain medications.  Duplex ultrasound bilateral lower extremities negative.  I    Assessment & Plan:     This is a 48-year-old female with    Bilateral lower extremity wounds with cellulitis  Continue cefepime, vancomycin.  Duplex ultrasound bilateral lower extremities negative.  Pain management per pain scale.  Wound care consulted.  Appreciate recommendations.    Hypertension  Continue home medications losartan, metoprolol.     Bipolar disorder  Continue home medications Cymbalta    Diabetes mellitus type 2  Blood glucoses seems fairly well-controlled.    Normocytic anemia  Hemoglobin 8.2 at baseline.  No active bleeding.    Morbid obesity with BMI of 52  Adds to complexity.  Lifestyle modifications discussed with patient.     ERICA (obstructive

## 2024-06-06 NOTE — WOUND CARE
Bilateral lower legs had been changed this am by nursing.   Patient has requested the left leg bandage get changed again.  Bandage changed as she requested even using the xeroform today.  She had extreme pain and burning. Will continue to monitor and help with some of the bandage changes while in acute care.

## 2024-06-06 NOTE — PROGRESS NOTES
PT was in bed. CH introduced self. PT requested prayer. PT shared about health and hospitalization including concerns and hopes. PT is [employer]. CH offered prayer. CH checked for unmet needs and offered support.     Rev. JERE ChapmanDiv.

## 2024-06-06 NOTE — PROGRESS NOTES
VANCO DAILY NOTE  [facility]   Pharmacy Pharmacokinetic Monitoring Service - Vancomycin    Consulting Provider: Joni   Indication: BLE Cellulitis  Target Concentration: Goal AUC/KALI 400-600 mg*hr/L  Day of Therapy: 3  Additional Antimicrobials: Cefepime    Pertinent Laboratory Values:   Wt Readings from Last 1 Encounters:   06/04/24 (!) 152 kg (335 lb)     Temp Readings from Last 1 Encounters:   06/06/24 98.4 °F (36.9 °C) (Oral)     Recent Labs     06/04/24 2013 06/05/24  0920 06/05/24  1323 06/06/24  0418   BUN 13 11  --  11   CREATININE 1.01 0.96  --  0.88   WBC 7.6 9.0  --  6.0   PROCAL  --   --  0.06  --    LACTA  --   --  1.4  --      Estimated Creatinine Clearance: 119 mL/min (based on SCr of 0.88 mg/dL).    Lab Results   Component Value Date/Time    VANCORANDOM 15.2 06/06/2024 04:18 AM       MRSA Nasal Swab: N/A. Non-respiratory infection    Assessment:  Date/Time Dose Concentration AUC   6/6 0418 1000 mg Q12H 15.2 322   Note: Serum concentrations collected for AUC dosing may appear elevated if collected in close proximity to the dose administered, this is not necessarily an indication of toxicity    Plan:  Dosing recommendations based on Bayesian software  Regimen subtherapeutic, so dose adjusted to 1500 mg Q12H  Anticipated AUC of 482 and trough concentration of 11.2 at steady state  Renal labs as indicated   Vancomycin concentrations will be ordered as clinically appropriate  Pharmacy will continue to monitor patient and adjust therapy as indicated    Thank you for the consult,  Darrion Charles [facility]

## 2024-06-06 NOTE — PROGRESS NOTES
ACUTE PHYSICAL THERAPY GOALS:   (Developed with and agreed upon by patient and/or caregiver.)    (1.) Lo Tirado  will move from supine to sit and sit to supine  with MINIMAL ASSIST within 7 treatment day(s).    (2.) Lo Tirado will transfer from bed to chair and chair to bed with MAXIMAL ASSIST using the least restrictive device within 7 treatment day(s).    3.) Lo Tirado will perform seated static and dynamic balance activities x 20 minutes with STAND BY ASSIST to improve safety within 7 treatment day(s).  (4.) Lo Tirado will perform standing static and dynamic balance activities x 5 minutes with MAXIMAL ASSIST to improve safety within 7 treatment day(s).    PHYSICAL THERAPY Initial Assessment, Daily Note, and PM  (Link to Caseload Tracking: PT Visit Days : 1  Acknowledge Orders  Time In/Out  PT Charge Capture  Rehab Caseload Tracker    Lo Tirado is a 48 y.o. female   PRIMARY DIAGNOSIS: Cellulitis of both lower extremities  Elevated C-reactive protein (CRP) [R79.82]  Bilateral lower leg cellulitis [L03.116, L03.115]  Cellulitis of both lower extremities [L03.115, L03.116]       Reason for Referral: Generalized Muscle Weakness (M62.81)  Difficulty in walking, Not elsewhere classified (R26.2)  Inpatient: Payor: HU[employer]NA MEDICARE / Plan: [employer] GOLD PLUS HMO / Product Type: *No Product type* /     ASSESSMENT:     REHAB RECOMMENDATIONS:   Recommendation to date pending progress:  Setting:  Short-term Rehab    Equipment:    To Be Determined     ASSESSMENT:  Ms. Tirado is a 48 year old F who presents with B LE cellulitis. At baseline, pt reports she uses a cane or rollator for mobility and is typically independent with ADL's. She lives with her children and grandchildren. Pt complaining of high levels of B LE pain today, L worse than R. This date pt performs mobility including supine>sit with maxA of 2 and extended time. Pt unable to rest L LE on ground once sitting due to pain and

## 2024-06-07 LAB
GLUCOSE BLD STRIP.AUTO-MCNC: 120 MG/DL (ref 65–100)
GLUCOSE BLD STRIP.AUTO-MCNC: 127 MG/DL (ref 65–100)
GLUCOSE BLD STRIP.AUTO-MCNC: 90 MG/DL (ref 65–100)
GLUCOSE BLD STRIP.AUTO-MCNC: 99 MG/DL (ref 65–100)
SERVICE CMNT-IMP: ABNORMAL
SERVICE CMNT-IMP: ABNORMAL
SERVICE CMNT-IMP: NORMAL
SERVICE CMNT-IMP: NORMAL

## 2024-06-07 PROCEDURE — 99222 1ST HOSP IP/OBS MODERATE 55: CPT | Performed by: SURGERY

## 2024-06-07 PROCEDURE — 6370000000 HC RX 637 (ALT 250 FOR IP): Performed by: HOSPITALIST

## 2024-06-07 PROCEDURE — 6360000002 HC RX W HCPCS: Performed by: HOSPITALIST

## 2024-06-07 PROCEDURE — 1100000000 HC RM PRIVATE

## 2024-06-07 PROCEDURE — 2580000003 HC RX 258: Performed by: HOSPITALIST

## 2024-06-07 PROCEDURE — 87641 MR-STAPH DNA AMP PROBE: CPT

## 2024-06-07 PROCEDURE — 82962 GLUCOSE BLOOD TEST: CPT

## 2024-06-07 RX ORDER — LIDOCAINE HYDROCHLORIDE 40 MG/ML
SOLUTION TOPICAL ONCE
Status: COMPLETED | OUTPATIENT
Start: 2024-06-07 | End: 2024-06-07

## 2024-06-07 RX ORDER — AMOXICILLIN AND CLAVULANATE POTASSIUM 875; 125 MG/1; MG/1
1 TABLET, FILM COATED ORAL 2 TIMES DAILY
Qty: 14 TABLET | Refills: 0 | Status: SHIPPED | OUTPATIENT
Start: 2024-06-07 | End: 2024-06-14

## 2024-06-07 RX ADMIN — PANTOPRAZOLE SODIUM 40 MG: 40 TABLET, DELAYED RELEASE ORAL at 05:13

## 2024-06-07 RX ADMIN — LOSARTAN POTASSIUM 50 MG: 50 TABLET, FILM COATED ORAL at 08:58

## 2024-06-07 RX ADMIN — METOPROLOL SUCCINATE 25 MG: 25 TABLET, EXTENDED RELEASE ORAL at 08:58

## 2024-06-07 RX ADMIN — MONTELUKAST 10 MG: 10 TABLET, FILM COATED ORAL at 08:58

## 2024-06-07 RX ADMIN — ENOXAPARIN SODIUM 40 MG: 100 INJECTION SUBCUTANEOUS at 22:01

## 2024-06-07 RX ADMIN — PREGABALIN 200 MG: 100 CAPSULE ORAL at 08:59

## 2024-06-07 RX ADMIN — PREGABALIN 200 MG: 100 CAPSULE ORAL at 22:00

## 2024-06-07 RX ADMIN — BACLOFEN 10 MG: 10 TABLET ORAL at 13:16

## 2024-06-07 RX ADMIN — VANCOMYCIN HYDROCHLORIDE 1500 MG: 10 INJECTION, POWDER, LYOPHILIZED, FOR SOLUTION INTRAVENOUS at 22:17

## 2024-06-07 RX ADMIN — VANCOMYCIN HYDROCHLORIDE 1500 MG: 10 INJECTION, POWDER, LYOPHILIZED, FOR SOLUTION INTRAVENOUS at 09:12

## 2024-06-07 RX ADMIN — Medication: at 09:03

## 2024-06-07 RX ADMIN — PREGABALIN 200 MG: 100 CAPSULE ORAL at 14:39

## 2024-06-07 RX ADMIN — LIDOCAINE HYDROCHLORIDE: 40 SOLUTION ORAL at 15:15

## 2024-06-07 RX ADMIN — OXYCODONE HYDROCHLORIDE 10 MG: 5 TABLET ORAL at 05:13

## 2024-06-07 RX ADMIN — FUROSEMIDE 40 MG: 40 TABLET ORAL at 08:58

## 2024-06-07 RX ADMIN — SODIUM CHLORIDE, PRESERVATIVE FREE 10 ML: 5 INJECTION INTRAVENOUS at 22:11

## 2024-06-07 RX ADMIN — ENOXAPARIN SODIUM 40 MG: 100 INJECTION SUBCUTANEOUS at 08:59

## 2024-06-07 RX ADMIN — SODIUM CHLORIDE, PRESERVATIVE FREE 10 ML: 5 INJECTION INTRAVENOUS at 08:59

## 2024-06-07 RX ADMIN — CEFEPIME 2000 MG: 2 INJECTION, POWDER, FOR SOLUTION INTRAVENOUS at 00:01

## 2024-06-07 RX ADMIN — ASPIRIN 81 MG 81 MG: 81 TABLET ORAL at 08:59

## 2024-06-07 RX ADMIN — HYDROXYZINE HYDROCHLORIDE 25 MG: 25 TABLET, FILM COATED ORAL at 22:00

## 2024-06-07 RX ADMIN — MORPHINE SULFATE 2 MG: 2 INJECTION, SOLUTION INTRAMUSCULAR; INTRAVENOUS at 12:03

## 2024-06-07 RX ADMIN — BACLOFEN 10 MG: 10 TABLET ORAL at 22:00

## 2024-06-07 RX ADMIN — MORPHINE SULFATE 2 MG: 2 INJECTION, SOLUTION INTRAMUSCULAR; INTRAVENOUS at 22:11

## 2024-06-07 RX ADMIN — DULOXETINE HYDROCHLORIDE 60 MG: 60 CAPSULE, DELAYED RELEASE ORAL at 08:58

## 2024-06-07 RX ADMIN — BACLOFEN 10 MG: 10 TABLET ORAL at 08:58

## 2024-06-07 RX ADMIN — OXYCODONE HYDROCHLORIDE 10 MG: 5 TABLET ORAL at 14:39

## 2024-06-07 RX ADMIN — NALOXEGOL OXALATE 25 MG: 25 TABLET, FILM COATED ORAL at 05:13

## 2024-06-07 ASSESSMENT — PAIN DESCRIPTION - ORIENTATION
ORIENTATION: RIGHT;LEFT
ORIENTATION: LEFT
ORIENTATION: RIGHT;LEFT
ORIENTATION: RIGHT

## 2024-06-07 ASSESSMENT — PAIN DESCRIPTION - DESCRIPTORS
DESCRIPTORS: THROBBING
DESCRIPTORS: SPASM
DESCRIPTORS: SPASM
DESCRIPTORS: ACHING;BURNING;SHARP;SHOOTING;SORE

## 2024-06-07 ASSESSMENT — PAIN SCALES - GENERAL
PAINLEVEL_OUTOF10: 8
PAINLEVEL_OUTOF10: 0
PAINLEVEL_OUTOF10: 10
PAINLEVEL_OUTOF10: 10
PAINLEVEL_OUTOF10: 2
PAINLEVEL_OUTOF10: 8
PAINLEVEL_OUTOF10: 3
PAINLEVEL_OUTOF10: 2
PAINLEVEL_OUTOF10: 5
PAINLEVEL_OUTOF10: 3

## 2024-06-07 ASSESSMENT — PAIN DESCRIPTION - LOCATION
LOCATION: LEG
LOCATION: LEG;FOOT
LOCATION: LEG
LOCATION: LEG;FOOT

## 2024-06-07 NOTE — PROGRESS NOTES
Patient discharged  all lines removed  discharge instructions/medications/upcoming appointments for continuation of care reviewed with patient verbalizing understanding    Patient's belongings packed placed at bedside  PU ETA [id number]

## 2024-06-07 NOTE — PROGRESS NOTES
Pt complaining of right leg pain/burning stated it was from leg wrap being tight  This RN attempted to re-wrap right leg/pt refused

## 2024-06-07 NOTE — PLAN OF CARE
Problem: Discharge Planning  Goal: Discharge to home or other facility with appropriate resources  Outcome: Progressing  Flowsheets (Taken 6/7/2024 0830)  Discharge to home or other facility with appropriate resources:   Identify barriers to discharge with patient and caregiver   Arrange for needed discharge resources and transportation as appropriate   Identify discharge learning needs (meds, wound care, etc)   Refer to discharge planning if patient needs post-hospital services based on physician order or complex needs related to functional status, cognitive ability or social support system     Problem: Pain  Goal: Verbalizes/displays adequate comfort level or baseline comfort level  6/7/2024 1028 by Kiara Rushing RN  Outcome: Progressing  6/6/2024 2138 by Ruma Cornelius RN  Outcome: Progressing     Problem: Safety - Adult  Goal: Free from fall injury  6/7/2024 1028 by Kiara Rushing RN  Outcome: Progressing  6/6/2024 2138 by Ruma Cornelius RN  Outcome: Progressing     Problem: Wound:  Goal: Will show signs of wound healing; wound closure and no evidence of infection  Description: Will show signs of wound healing; wound closure and no evidence of infection  Outcome: Progressing     Problem: Blood Glucose:  Goal: Ability to maintain appropriate glucose levels will improve  Description: Ability to maintain appropriate glucose levels will improve  6/7/2024 1028 by Kiara Rushing RN  Outcome: Progressing  6/6/2024 2138 by Ruma Cornelius RN  Outcome: Progressing     Problem: Chronic Conditions and Co-morbidities  Goal: Patient's chronic conditions and co-morbidity symptoms are monitored and maintained or improved  Outcome: Progressing  Flowsheets (Taken 6/7/2024 0830)  Care Plan - Patient's Chronic Conditions and Co-Morbidity Symptoms are Monitored and Maintained or Improved:   Monitor and assess patient's chronic conditions and comorbid symptoms for stability, deterioration, or improvement    Collaborate with multidisciplinary team to address chronic and comorbid conditions and prevent exacerbation or deterioration   Update acute care plan with appropriate goals if chronic or comorbid symptoms are exacerbated and prevent overall improvement and discharge     Problem: Skin/Tissue Integrity  Goal: Absence of new skin breakdown  Description: 1.  Monitor for areas of redness and/or skin breakdown  2.  Assess vascular access sites hourly  3.  Every 4-6 hours minimum:  Change oxygen saturation probe site  4.  Every 4-6 hours:  If on nasal continuous positive airway pressure, respiratory therapy assess nares and determine need for appliance change or resting period.  Outcome: Progressing

## 2024-06-07 NOTE — CARE COORDINATION
14:45:  CM notified by unit secretary that patient's discharge is being cancelled by attending.  Transport cancelled.  Transport packet remains with patient's paper chart, but will need to be updated if patient discharges over weekend.  Transport can be arranged by staff calling [employer] at [phone].      12:40:  CM notified that patient is requesting stretcher transport home.  Patient transport arranged for 15:30 with [employer] transport; reference number [id number].  Transport packet placed with paper chart.    11:05: Patient inpatient plan of care and transitions of care plan reviewed in IDT rounds this AM.  Patient is medically ready to discharge per attending.  Patient will discharge on PO antibiotics and she has medication insurance.  \"Urgent\" referral has been sent to [facility] wound clinic in order for patient to resume her appointments.  Patient is requesting transport home.  The unit secretary will plan to arrange patient's transport up via [employer].  1st ILM completed yesterday.  No other transitions of care needs noted at this time.

## 2024-06-07 NOTE — CONSULTS
Attending Supervising Physician's Attestation Statement  I performed a history and physical examination on the patient and discussed the management with the nurse practitioner. I reviewed and agree with the findings and plan as documented in her note .    Pt with bilateral LE ulcers related to chronic lymphedema.      Currently some scabbing without evidence of fluid collection or abscess.  No fluctuance.  Surrounding erythema consistent with cellulitis. No necrotic tissue, bullae, or current signs of necrotizing fasciitis.  Therefore, no indication for urgent surgical intervention.  Continue local wound care and compression.    Follow wound progression and if develops necrotic tissue would consider debridement.        Electronically signed by Carmelo Ledesma MD on 24 at 7:30 PM EDT            H&P/Consult Note/Progress Note/Office Note:   Lo Tirado  MRN: [id number]  :1975  Age:48 y.o.    General Surgery Consult ordered by: Dr Higginbotham; Hospitlaist  Reason for General Surgery Consult: Infected Ulcers/ Leg wounds.    HPI: Lo Tirado is a 48 y.o. female with a past medical history of morbid obesity BMI 52, chronic bilateral lower extremity lymphedema, diabetes type 2, chronic bilateral lower extremity wounds who presented to the ED with C/o worsening of bilateral lower extremity leg pain and worsening of redness. Patient was evaluated by primary care physician earlier this week and patient was placed on doxycycline which did not improve the redness of bilateral lower extremity.              Pt does not take blood thinners.     Past Medical History:   Diagnosis Date    Anxiety     Arthritis     Bipolar affective disorder (HCC) 15 yrs old    Cellulitis 2010    denies currently     Cerebrovascular disease     Chronic back pain     CKD (chronic kidney disease) stage 3, GFR 30-59 ml/min ([facility])     Deep vein thrombosis ([facility]) 2022    Depression     Diabetes ([facility]) dx:    Unknown A1C

## 2024-06-07 NOTE — PROGRESS NOTES
Physical Therapy Note:    Attempted to see patient this PM for physical therapy treatment  session. Patient refused, stating, \"I'm not refusing services, but no\". Will follow and re-attempt as schedule permits/patient available. Thank you,    JANE CHIANG, PT     Rehab Caseload Tracker

## 2024-06-07 NOTE — DISCHARGE SUMMARY
Hospitalist Discharge Summary   Admit Date:  2024  7:22 PM   DC Note date: 2024  Name:  Lo Tirado   Age:  48 y.o.  Sex:  female  :  1975   MRN:  [id number]   Room:  [facility]  PCP:  Nenita Beatty APRN - CNP    Presenting Complaint: No chief complaint on file.     Initial Admission Diagnosis: Elevated C-reactive protein (CRP) [R79.82]  Bilateral lower leg cellulitis [L03.116, L03.115]  Cellulitis of both lower extremities [L03.115, L03.116]     Problem List for this Hospitalization (present on admission):    Principal Problem:    Cellulitis of both lower extremities  Active Problems:    Peripheral neuropathy    CKD (chronic kidney disease) stage 3, GFR 30-59 ml/min ([facility])    Hypertension    ERICA (obstructive sleep apnea)  Resolved Problems:    * No resolved hospital problems. *      Hospital Course:    This is a 48 y.o. female with medical history of diabetes mellitus type 2, morbid obesity with BMI of 52, chronic bilateral lower extremity lymphedema, with associated wounds followed at wound clinic, presented to the ER with significant pain and worsening redness of her legs.  She was evaluated by primary care physician earlier this week and started on doxycycline.  However due to significant pain and no improvement in her redness, she presented to the ER.  CRP elevated at 58, on admission.  Patient admitted for further evaluation management of bilateral lower extremity cellulitis with wounds.  During the course of hospitalization, she was treated with cefepime and vancomycin.  Duplex ultrasound bilateral lower extremities negative.  Wound care was consulted.  Blood cultures on admission negative.  With IV antibiotics, patient's redness and tenderness improved.  Antibiotics switched to Augmentin.  Doxycycline was continued.  PT/OT recommended short-term rehab but patient preferred to go home.  Patient is discharged home with home health services today in a stable condition.    Disposition:  NTPROBNP 114 02/16/2024 08:32 AM    NTPROBNP 220 01/14/2024 09:30 PM    NTPROBNP 47 11/07/2023 07:33 PM    TROPHS 6.0 07/12/2022 06:12 PM    TROPHS 5.8 07/12/2022 03:48 PM    TROPHS 15.0 03/04/2021 08:56 PM      Coags No results found for: \"PROTIME\", \"INR\", \"APTT\"   A1c Lab Results   Component Value Date/Time    LABA1C 5.8 04/27/2024 09:16 AM    LABA1C 5.5 04/18/2024 04:43 AM    LABA1C 5.5 04/10/2024 09:19 AM     04/27/2024 09:16 AM     04/18/2024 04:43 AM     04/10/2024 09:19 AM      Lipids Lab Results   Component Value Date/Time    CHOL 187 08/15/2022 04:47 PM    HDL 73 08/15/2022 04:47 PM    CHOLHDLRATIO 2.6 08/15/2022 04:47 PM    TRIG 56 08/15/2022 04:47 PM      Thyroid  Lab Results   Component Value Date/Time    TSHELE 0.81 07/29/2022 08:36 AM        Most Recent UA Lab Results   Component Value Date/Time    COLORU YELLOW/STRAW 04/10/2024 09:22 AM    APPEARANCE CLEAR 04/10/2024 09:22 AM    PROTEINU Negative 04/10/2024 09:22 AM    GLUCOSEU Negative 04/10/2024 09:22 AM    KETUA Negative 04/10/2024 09:22 AM    BILIRUBINUR Negative 04/10/2024 09:22 AM    BILIRUBINUR Negative 10/19/2021 03:12 PM    BLOODU Negative 04/10/2024 09:22 AM    UROBILINOGEN 0.2 04/10/2024 09:22 AM    NITRU Negative 04/10/2024 09:22 AM    LEUKOCYTESUR Negative 04/10/2024 09:22 AM    WBCUA >100 03/14/2024 04:19 PM    RBCUA 0-3 03/14/2024 04:19 PM    BACTERIA 4+ 03/14/2024 04:19 PM    LABCAST 0 07/29/2022 08:36 AM    MUCUS 0 07/29/2022 08:36 AM        Microbiology:  Results       Procedure Component Value Units Date/Time    MRSA/Staph Aureus DNA [[id number]] Collected: 06/07/24 0919    Order Status: Sent Specimen: Nares Updated: 06/07/24 0927    MRSA/Staph Aureus DNA [[id number]]     Order Status: Canceled Specimen: Nares             All Labs from Last 24 Hrs:  Recent Results (from the past 24 hour(s))   POCT Glucose    Collection Time: 06/06/24 11:46 AM   Result Value Ref Range    POC Glucose 87 65 - 100 mg/dL    Performed

## 2024-06-07 NOTE — PROGRESS NOTES
Hospitalist Progress Note   Admit Date:  2024  7:22 PM   Name:  Lo Tirado   Age:  48 y.o.  Sex:  female  :  1975   MRN:  [id number]   Room:  4/    Presenting/Chief Complaint: No chief complaint on file.     Reason(s) for Admission: Elevated C-reactive protein (CRP) [R79.82]  Bilateral lower leg cellulitis [L03.116, L03.115]  Cellulitis of both lower extremities [L03.115, L03.116]     Hospital Course:   This is a 48 y.o. female with medical history of diabetes mellitus type 2, morbid obesity with BMI of 52, chronic bilateral lower extremity lymphedema, with associated wounds followed at wound clinic, presented to the ER with significant pain and worsening redness of her legs.  She was evaluated by primary care physician earlier this week and started on doxycycline.  However due to significant pain and no improvement in her redness, she presented to the ER.  CRP elevated at 58, on admission.  Patient admitted for further evaluation management of bilateral lower extremity cellulitis with wounds.  During the course of hospitalization, she was treated with cefepime and vancomycin.  Duplex ultrasound bilateral lower extremities negative.  Wound care was consulted.  Blood cultures on admission negative.     Subjective & 24hr Events:   Patient is resting in bed. This morning, she stated she is doing well and would like to go home. However, this afternoon, she complained of excruciating pain. No fever, she was tachycardic and diaphoretic complaining of severe pain.      Assessment & Plan:     This is a 48-year-old female with    Bilateral lower extremity wounds with cellulitis  Continue vancomycin. DC Cefepime.   Duplex ultrasound bilateral lower extremities negative.  Pain management per pain scale.  Wound care consulted.  Appreciate recommendations.  She has painful ulcers on her legs, General Surgery consulted after discussing with wound nurse.     Hypertension  Continue home medications  oil-hydrophilic petrolatum (AQUAPHOR) ointment   Topical Daily    sodium chloride flush 0.9 % injection 5-40 mL  5-40 mL IntraVENous 2 times per day    sodium chloride flush 0.9 % injection 5-40 mL  5-40 mL IntraVENous PRN    0.9 % sodium chloride infusion   IntraVENous PRN    potassium chloride (KLOR-CON M) extended release tablet 40 mEq  40 mEq Oral PRN    Or    potassium bicarb-citric acid (EFFER-K) effervescent tablet 40 mEq  40 mEq Oral PRN    Or    potassium chloride 10 mEq/100 mL IVPB (Peripheral Line)  10 mEq IntraVENous PRN    magnesium sulfate 2000 mg in 50 mL IVPB premix  2,000 mg IntraVENous PRN    enoxaparin (LOVENOX) injection 40 mg  40 mg SubCUTAneous BID    ondansetron (ZOFRAN-ODT) disintegrating tablet 4 mg  4 mg Oral Q8H PRN    Or    ondansetron (ZOFRAN) injection 4 mg  4 mg IntraVENous Q6H PRN    polyethylene glycol (GLYCOLAX) packet 17 g  17 g Oral Daily PRN    acetaminophen (TYLENOL) tablet 650 mg  650 mg Oral Q6H PRN    Or    acetaminophen (TYLENOL) suppository 650 mg  650 mg Rectal Q6H PRN    aspirin chewable tablet 81 mg  81 mg Oral Daily    DULoxetine (CYMBALTA) extended release capsule 60 mg  60 mg Oral Daily    furosemide (LASIX) tablet 40 mg  40 mg Oral Daily    losartan (COZAAR) tablet 50 mg  50 mg Oral Daily    metoprolol succinate (TOPROL XL) extended release tablet 25 mg  25 mg Oral Daily    pantoprazole (PROTONIX) tablet 40 mg  40 mg Oral QAM AC    pregabalin (LYRICA) capsule 200 mg  200 mg Oral TID    hydrOXYzine HCl (ATARAX) tablet 25 mg  25 mg Oral Nightly    montelukast (SINGULAIR) tablet 10 mg  10 mg Oral Daily    glucose chewable tablet 16 g  4 tablet Oral PRN    dextrose bolus 10% 125 mL  125 mL IntraVENous PRN    Or    dextrose bolus 10% 250 mL  250 mL IntraVENous PRN    Glucagon Emergency KIT 1 mg  1 mg SubCUTAneous PRN    dextrose 10 % infusion   IntraVENous Continuous PRN       Signed:  Pat Higginbotham MD    Part of this note may have been written by using a voice dictation

## 2024-06-07 NOTE — WOUND CARE
Called by nurses to help with bandage.  Patient has extreme burning and pain and concern for Klemme's hypertensive ulcer is the biggest concern for her wound healing. I have only had a few patients with this, the patient that healed from this had Skin grafting over clean wound beds and they healed, extreme pain until the skin grafts were done. The burning and pain was similar with this patient. Controlling hypertension with betablocker's and ace inhibiter's is in the research for this type of ulcer, this was dicussed with Dr. Higginbotham. Will attempt to add bandge today by using topical lidocaine, order received from Dr. Higginbotham. 1515 after applying lidocaine, patient became calm and agreeable to wound care, asked for xerform today, applied aquaphor to ABD to prevent sticking as well.  Will continue to monitor.

## 2024-06-07 NOTE — PROGRESS NOTES
Physician Progress Note      PATIENT:               KORIN WOODALL  CSN #:                  [id number]  :                       1975  ADMIT DATE:       2024 7:22 PM  DISCH DATE:  RESPONDING  PROVIDER #:        Pat Zarate MD          QUERY TEXT:    Pt admitted with Cellulitis of Both lower extremities. Pt noted to have Type 2   diabetes mellitus. If possible, please document in progress notes and   discharge summary the relationship, if any, between cellulitis and DM.    The medical record reflects the following:  Risk Factors: Type 2 diabetes mellitus, cellulitis  Clinical Indicators: IM PN -Bilateral lower extremity wounds with   cellulitis, Diabetes mellitus type 2  Glucose-128, 106  Treatment: IV Vancomycin, Cefepime, Serial lab        Thank you,  Katerine Alexander [facility] CDS  Options provided:  -- Lower extremities cellulitis associated with Diabetes  -- Lower extremities cellulitis unrelated to Diabetes  -- Other - I will add my own diagnosis  -- Disagree - Not applicable / Not valid  -- Disagree - Clinically unable to determine / Unknown  -- Refer to Clinical Documentation Reviewer    PROVIDER RESPONSE TEXT:    Lower extremities cellulitis associated with Diabetes.    Query created by: Katerine Alexander on 2024 11:57 PM      Electronically signed by:  Pat Zarate MD 2024 7:15 AM

## 2024-06-07 NOTE — PROGRESS NOTES
VANCO DAILY NOTE  [facility]   Pharmacy Pharmacokinetic Monitoring Service - Vancomycin    Consulting Provider: Joni   Indication: BLE Cellulitis  Target Concentration: Goal AUC/KALI 400-600 mg*hr/L  Day of Therapy: 4  Additional Antimicrobials: None    Pertinent Laboratory Values:   Wt Readings from Last 1 Encounters:   06/04/24 (!) 152 kg (335 lb)     Temp Readings from Last 1 Encounters:   06/07/24 99.3 °F (37.4 °C) (Oral)     Recent Labs     06/04/24 2013 06/05/24  0920 06/05/24  1323 06/06/24  0418   BUN 13 11  --  11   CREATININE 1.01 0.96  --  0.88   WBC 7.6 9.0  --  6.0   PROCAL  --   --  0.06  --    LACTA  --   --  1.4  --      Estimated Creatinine Clearance: 119 mL/min (based on SCr of 0.88 mg/dL).    Lab Results   Component Value Date/Time    VANCORANDOM 15.2 06/06/2024 04:18 AM       MRSA Nasal Swab: N/A. Non-respiratory infection    Assessment:  Date/Time Dose Concentration AUC   6/6 0418 1000 mg Q12H 15.2 322   Note: Serum concentrations collected for AUC dosing may appear elevated if collected in close proximity to the dose administered, this is not necessarily an indication of toxicity    Plan:  Dosing recommendations based on Bayesian software  Regimen subtherapeutic, so dose adjusted to 1500 mg Q12H  Anticipated AUC of 482 and trough concentration of 11.2 at steady state  Renal labs as indicated   Vancomycin concentrations will be ordered as clinically appropriate  Pharmacy will continue to monitor patient and adjust therapy as indicated    Thank you for the consult,  Tarun Edouard RPH

## 2024-06-08 VITALS
BODY MASS INDEX: 45.99 KG/M2 | OXYGEN SATURATION: 95 % | HEIGHT: 67 IN | HEART RATE: 79 BPM | WEIGHT: 293 LBS | DIASTOLIC BLOOD PRESSURE: 74 MMHG | SYSTOLIC BLOOD PRESSURE: 123 MMHG | TEMPERATURE: 98.2 F | RESPIRATION RATE: 20 BRPM

## 2024-06-08 LAB
GLUCOSE BLD STRIP.AUTO-MCNC: 86 MG/DL (ref 65–100)
MRSA DNA SPEC QL NAA+PROBE: NOT DETECTED
S AUREUS CPE NOSE QL NAA+PROBE: NOT DETECTED
SERVICE CMNT-IMP: NORMAL

## 2024-06-08 PROCEDURE — 82962 GLUCOSE BLOOD TEST: CPT

## 2024-06-08 PROCEDURE — 6370000000 HC RX 637 (ALT 250 FOR IP): Performed by: HOSPITALIST

## 2024-06-08 PROCEDURE — 2580000003 HC RX 258: Performed by: HOSPITALIST

## 2024-06-08 PROCEDURE — 6360000002 HC RX W HCPCS: Performed by: HOSPITALIST

## 2024-06-08 RX ADMIN — SODIUM CHLORIDE, PRESERVATIVE FREE 10 ML: 5 INJECTION INTRAVENOUS at 08:57

## 2024-06-08 RX ADMIN — ASPIRIN 81 MG 81 MG: 81 TABLET ORAL at 08:46

## 2024-06-08 RX ADMIN — PANTOPRAZOLE SODIUM 40 MG: 40 TABLET, DELAYED RELEASE ORAL at 06:33

## 2024-06-08 RX ADMIN — MONTELUKAST 10 MG: 10 TABLET, FILM COATED ORAL at 08:48

## 2024-06-08 RX ADMIN — FUROSEMIDE 40 MG: 40 TABLET ORAL at 08:49

## 2024-06-08 RX ADMIN — LOSARTAN POTASSIUM 50 MG: 50 TABLET, FILM COATED ORAL at 08:49

## 2024-06-08 RX ADMIN — VANCOMYCIN HYDROCHLORIDE 1500 MG: 10 INJECTION, POWDER, LYOPHILIZED, FOR SOLUTION INTRAVENOUS at 08:50

## 2024-06-08 RX ADMIN — OXYCODONE HYDROCHLORIDE 10 MG: 5 TABLET ORAL at 09:03

## 2024-06-08 RX ADMIN — DULOXETINE HYDROCHLORIDE 60 MG: 60 CAPSULE, DELAYED RELEASE ORAL at 08:48

## 2024-06-08 RX ADMIN — PREGABALIN 200 MG: 100 CAPSULE ORAL at 08:48

## 2024-06-08 RX ADMIN — BACLOFEN 10 MG: 10 TABLET ORAL at 08:47

## 2024-06-08 RX ADMIN — METOPROLOL SUCCINATE 25 MG: 25 TABLET, EXTENDED RELEASE ORAL at 08:48

## 2024-06-08 RX ADMIN — NALOXEGOL OXALATE 25 MG: 25 TABLET, FILM COATED ORAL at 06:33

## 2024-06-08 RX ADMIN — MORPHINE SULFATE 2 MG: 2 INJECTION, SOLUTION INTRAMUSCULAR; INTRAVENOUS at 03:03

## 2024-06-08 RX ADMIN — ENOXAPARIN SODIUM 40 MG: 100 INJECTION SUBCUTANEOUS at 08:46

## 2024-06-08 RX ADMIN — Medication: at 08:56

## 2024-06-08 ASSESSMENT — PAIN DESCRIPTION - ORIENTATION
ORIENTATION: RIGHT;LEFT
ORIENTATION: RIGHT;LEFT

## 2024-06-08 ASSESSMENT — PAIN - FUNCTIONAL ASSESSMENT: PAIN_FUNCTIONAL_ASSESSMENT: PREVENTS OR INTERFERES WITH MANY ACTIVE NOT PASSIVE ACTIVITIES

## 2024-06-08 ASSESSMENT — PAIN DESCRIPTION - DESCRIPTORS
DESCRIPTORS: BURNING
DESCRIPTORS: ACHING;BURNING

## 2024-06-08 ASSESSMENT — PAIN SCALES - GENERAL
PAINLEVEL_OUTOF10: 0
PAINLEVEL_OUTOF10: 7
PAINLEVEL_OUTOF10: 8

## 2024-06-08 ASSESSMENT — PAIN DESCRIPTION - LOCATION
LOCATION: LEG
LOCATION: LEG

## 2024-06-08 NOTE — DISCHARGE SUMMARY
Hospitalist Discharge Summary   Admit Date:  2024  7:22 PM   DC Note date: 2024  Name:  Lo Tirado   Age:  48 y.o.  Sex:  female  :  1975   MRN:  [id number]   Room:  [facility]  PCP:  Nenita Beatty APRN - CNP    Presenting Complaint: No chief complaint on file.     Initial Admission Diagnosis: Elevated C-reactive protein (CRP) [R79.82]  Bilateral lower leg cellulitis [L03.116, L03.115]  Cellulitis of both lower extremities [L03.115, L03.116]     Problem List for this Hospitalization (present on admission):    Principal Problem:    Cellulitis of both lower extremities  Active Problems:    Peripheral neuropathy    CKD (chronic kidney disease) stage 3, GFR 30-59 ml/min ([facility])    Hypertension    ERICA (obstructive sleep apnea)  Resolved Problems:    * No resolved hospital problems. *      Hospital Course:    This is a 48 y.o. female with medical history of diabetes mellitus type 2, morbid obesity with BMI of 52, chronic bilateral lower extremity lymphedema, with associated wounds followed at wound clinic, presented to the ER with significant pain and worsening redness of her legs.  She was evaluated by primary care physician earlier this week and started on doxycycline.  However due to significant pain and no improvement in her redness, she presented to the ER.  CRP elevated at 58, on admission.  Patient admitted for further evaluation management of bilateral lower extremity cellulitis with wounds.  During the course of hospitalization, she was treated with cefepime and vancomycin.  Duplex ultrasound bilateral lower extremities negative.  Wound care was consulted.  Blood cultures on admission negative.  With IV antibiotics, patient's redness and tenderness improved.  Antibiotics switched to Augmentin.  Doxycycline was continued.  General surgery was consulted.  No plans for surgical debridement.  Recommends outpatient wound care follow-up and antibiotics.  PT/OT recommended short-term rehab but       DULoxetine (CYMBALTA) 60 MG extended release capsule Take 1 capsule by mouth daily      hydrOXYzine HCl (ATARAX) 25 MG tablet Take 1 tablet by mouth nightly  Qty: 30 tablet, Refills: 1    Associated Diagnoses: Urticaria      pregabalin (LYRICA) 200 MG capsule Take 1 capsule by mouth in the morning, at noon, and at bedtime for 30 days. Max Daily Amount: 600 mg  Qty: 90 capsule, Refills: 3    Associated Diagnoses: Bilateral cellulitis of lower leg; Chronic ulcer of left lower extremity with fat layer exposed (HCC); Chronic ulcer of right ankle with fat layer exposed (HCC)      metoprolol succinate (TOPROL XL) 25 MG extended release tablet Take 1 tablet by mouth daily  Qty: 30 tablet, Refills: 3      pantoprazole (PROTONIX) 40 MG tablet Take 1 tablet by mouth every morning (before breakfast)  Qty: 30 tablet, Refills: 0      aspirin 81 MG chewable tablet Take 1 tablet by mouth daily      montelukast (SINGULAIR) 10 MG tablet Take 1 tablet by mouth daily  Qty: 90 tablet, Refills: 0    Associated Diagnoses: Allergic rhinitis, unspecified seasonality, unspecified trigger      Nutritional Supplements (ENSURE MAX PROTEIN) LIQD Take 1 each by mouth in the morning and at bedtime  Qty: 5 box , Refills: 0      albuterol sulfate HFA (PROVENTIL;VENTOLIN;PROAIR) 108 (90 Base) MCG/ACT inhaler Inhale 2 puffs into the lungs every 6 hours as needed for Wheezing or Shortness of Breath TAKE 2 PUFFS BY MOUTH EVERY 4 HOURS AS NEEDED  Qty: 18 g, Refills: 5    Associated Diagnoses: Moderate persistent asthma without complication      fluticasone (FLONASE) 50 MCG/ACT nasal spray 2 sprays by Nasal route daily           STOP taking these medications       doxycycline hyclate (VIBRA-TABS) 100 MG tablet Comments:   Reason for Stopping:               Some of the medications may be marked as \"stop taking\" by the system; but in reality patient or family reported already being off these meds; defer to outpatient/prescribing

## 2024-06-08 NOTE — CARE COORDINATION
Pt is for discharge home today with family via [employer] transport.  Referral called/faxed to [facility] for follow up home care as ordered.  No additional CM orders received or supportive care needs expressed at this time.     06/08/24 1228   Service Assessment   Patient's Healthcare Decision Maker is: Legal Next of Kin   Social/Functional History   Lives With Family   Occupation On disability   Services At/After Discharge   Transition of Care Consult (CM Consult) Home Health;Discharge Planning   Internal Home Health No   Reason Outside Agency Chosen Patient already serviced by other home care/hospice agency  (referral sent to [facility] last documented  provider)   Services At/After Discharge Home Health    Resource Information Provided? No   Mode of Transport at Discharge BLS  ([employer])   Hospital Transport Time of Discharge 1145   Confirm Follow Up Transport Other (see comment)  (stretcher transport)   Condition of Participation: Discharge Planning   The Plan for Transition of Care is related to the following treatment goals: Pt will returm home with home health and continued [facility].   The Patient and/or Patient Representative was provided with a Choice of Provider? Patient;Patient Representative   Name of the Patient Representative who was provided with the Choice of Provider and agrees with the Discharge Plan?  daughter   The Patient and/Or Patient Representative agree with the Discharge Plan? Yes   Freedom of Choice list was provided with basic dialogue that supports the patient's individualized plan of care/goals, treatment preferences, and shares the quality data associated with the providers?  Yes

## 2024-06-10 ENCOUNTER — CARE COORDINATION (OUTPATIENT)
Dept: CARE COORDINATION | Facility: CLINIC | Age: 49
End: 2024-06-10

## 2024-06-10 NOTE — CARE COORDINATION
Care Transitions Outreach Attempt    Call within 2 business days of discharge: Yes   Attempted to reach patient for transitions of care follow up. Unable to reach patient.    Patient: Lo Tirado Patient : 1975 MRN: [id number]    Last Discharge Facility       Date Complaint Diagnosis Description Type Department Provider    24  Bilateral lower leg cellulitis ... ED to Hosp-Admission (Discharged) (ADMITTED) [id number] Pat Higginbotham MD; Alexi Dunne, ...              Was this an external facility discharge? No Discharge Facility Name: [facility]    Noted following upcoming appointments from discharge chart review:   [facility] follow up appointment(s): No future appointments.  Non-[facility]  follow up appointment(s): na

## 2024-06-11 ENCOUNTER — CARE COORDINATION (OUTPATIENT)
Dept: CARE COORDINATION | Facility: CLINIC | Age: 49
End: 2024-06-11

## 2024-06-11 NOTE — CARE COORDINATION
ROSSANA WILLIAM continues to follow for SW Program.  ROSSANA WILLIAM reached out to pt this day to follow.  Call went right to voicemail and unfortunately, pt's VM is full.  ROSSANA WILLIAM to await return call and/or try again at a later time.

## 2024-06-13 ENCOUNTER — TELEPHONE (OUTPATIENT)
Dept: FAMILY MEDICINE CLINIC | Facility: CLINIC | Age: 49
End: 2024-06-13

## 2024-06-13 NOTE — TELEPHONE ENCOUNTER
----- Message from Ericka Henderson sent at 6/11/2024  2:23 PM EDT -----  Regarding: ECC Message to Provider  ECC Message to Provider    Relationship to Patient: Covered Entity : [facility]    Additional Information: Verbal consent from the PCP Nenita Beatty APRN - CNP  to continue the referral for the home health services and in need of a wound care specialist to see the patient in her home.  --------------------------------------------------------------------------------------------------------------------------    Call Back Information: OK to leave message on voicemail  Preferred Call Back Number: Phone [phone] c/o [facility]

## 2024-06-13 NOTE — TELEPHONE ENCOUNTER
MECHEN notified that provider is out of office until next Tuesday. Unable to give any orders until discussed with provider next week. She verbalized understanding & thanked.

## 2024-06-14 ENCOUNTER — TELEPHONE (OUTPATIENT)
Dept: WOUND CARE | Age: 49
End: 2024-06-14

## 2024-06-14 RX ORDER — AMOXICILLIN AND CLAVULANATE POTASSIUM 875; 125 MG/1; MG/1
1 TABLET, FILM COATED ORAL 2 TIMES DAILY
Qty: 28 TABLET | Refills: 0 | Status: SHIPPED | OUTPATIENT
Start: 2024-06-14 | End: 2024-06-28

## 2024-06-15 ENCOUNTER — HOSPITAL ENCOUNTER (EMERGENCY)
Age: 49
Discharge: HOME | End: 2024-06-16
Attending: EMERGENCY MEDICINE
Payer: MEDICARE

## 2024-06-15 DIAGNOSIS — G89.29 CHRONIC PAIN OF BOTH LOWER EXTREMITIES: Primary | ICD-10-CM

## 2024-06-15 DIAGNOSIS — F41.1 ANXIETY STATE: ICD-10-CM

## 2024-06-15 DIAGNOSIS — M79.605 CHRONIC PAIN OF BOTH LOWER EXTREMITIES: Primary | ICD-10-CM

## 2024-06-15 DIAGNOSIS — I83.019 VENOUS STASIS ULCERS OF BOTH LOWER EXTREMITIES (HCC): ICD-10-CM

## 2024-06-15 DIAGNOSIS — I83.029 VENOUS STASIS ULCERS OF BOTH LOWER EXTREMITIES (HCC): ICD-10-CM

## 2024-06-15 DIAGNOSIS — L97.919 VENOUS STASIS ULCERS OF BOTH LOWER EXTREMITIES (HCC): ICD-10-CM

## 2024-06-15 DIAGNOSIS — M79.604 CHRONIC PAIN OF BOTH LOWER EXTREMITIES: Primary | ICD-10-CM

## 2024-06-15 DIAGNOSIS — I89.0 LYMPHEDEMA: ICD-10-CM

## 2024-06-15 DIAGNOSIS — L97.929 VENOUS STASIS ULCERS OF BOTH LOWER EXTREMITIES (HCC): ICD-10-CM

## 2024-06-15 LAB
ANION GAP SERPL CALC-SCNC: 9 MMOL/L (ref 9–18)
BASOPHILS # BLD: 0.1 K/UL (ref 0–0.2)
BASOPHILS NFR BLD: 1 % (ref 0–2)
BUN SERPL-MCNC: 8 MG/DL (ref 6–23)
CALCIUM SERPL-MCNC: 9 MG/DL (ref 8.8–10.2)
CHLORIDE SERPL-SCNC: 102 MMOL/L (ref 98–107)
CO2 SERPL-SCNC: 27 MMOL/L (ref 20–28)
CREAT SERPL-MCNC: 1.01 MG/DL (ref 0.6–1.1)
CRP SERPL HS-MCNC: 7.4 MG/L (ref 0–3)
DIFFERENTIAL METHOD BLD: ABNORMAL
EOSINOPHIL # BLD: 0.4 K/UL (ref 0–0.8)
EOSINOPHIL NFR BLD: 4 % (ref 0.5–7.8)
ERYTHROCYTE [DISTWIDTH] IN BLOOD BY AUTOMATED COUNT: 17.1 % (ref 11.9–14.6)
GLUCOSE SERPL-MCNC: 100 MG/DL (ref 70–99)
HCT VFR BLD AUTO: 30.6 % (ref 35.8–46.3)
HGB BLD-MCNC: 9.2 G/DL (ref 11.7–15.4)
IMM GRANULOCYTES # BLD AUTO: 0 K/UL (ref 0–0.5)
IMM GRANULOCYTES NFR BLD AUTO: 0 % (ref 0–5)
LYMPHOCYTES # BLD: 1.1 K/UL (ref 0.5–4.6)
LYMPHOCYTES NFR BLD: 11 % (ref 13–44)
MCH RBC QN AUTO: 24.2 PG (ref 26.1–32.9)
MCHC RBC AUTO-ENTMCNC: 30.1 G/DL (ref 31.4–35)
MCV RBC AUTO: 80.5 FL (ref 82–102)
MONOCYTES # BLD: 0.9 K/UL (ref 0.1–1.3)
MONOCYTES NFR BLD: 9 % (ref 4–12)
NEUTS SEG # BLD: 7.7 K/UL (ref 1.7–8.2)
NEUTS SEG NFR BLD: 75 % (ref 43–78)
NRBC # BLD: 0 K/UL (ref 0–0.2)
PLATELET # BLD AUTO: 515 K/UL (ref 150–450)
PMV BLD AUTO: 8.7 FL (ref 9.4–12.3)
POTASSIUM SERPL-SCNC: 4.8 MMOL/L (ref 3.5–5.1)
RBC # BLD AUTO: 3.8 M/UL (ref 4.05–5.2)
SODIUM SERPL-SCNC: 138 MMOL/L (ref 136–145)
WBC # BLD AUTO: 10.2 K/UL (ref 4.3–11.1)

## 2024-06-15 PROCEDURE — 99284 EMERGENCY DEPT VISIT MOD MDM: CPT

## 2024-06-15 PROCEDURE — 80048 BASIC METABOLIC PNL TOTAL CA: CPT

## 2024-06-15 PROCEDURE — 6360000002 HC RX W HCPCS: Performed by: EMERGENCY MEDICINE

## 2024-06-15 PROCEDURE — 96375 TX/PRO/DX INJ NEW DRUG ADDON: CPT

## 2024-06-15 PROCEDURE — 2580000003 HC RX 258: Performed by: EMERGENCY MEDICINE

## 2024-06-15 PROCEDURE — 96374 THER/PROPH/DIAG INJ IV PUSH: CPT

## 2024-06-15 PROCEDURE — 86141 C-REACTIVE PROTEIN HS: CPT

## 2024-06-15 PROCEDURE — 85025 COMPLETE CBC W/AUTO DIFF WBC: CPT

## 2024-06-15 RX ORDER — ONDANSETRON 2 MG/ML
4 INJECTION INTRAMUSCULAR; INTRAVENOUS
Status: COMPLETED | OUTPATIENT
Start: 2024-06-15 | End: 2024-06-15

## 2024-06-15 RX ORDER — MORPHINE SULFATE 4 MG/ML
4 INJECTION INTRAVENOUS ONCE
Status: COMPLETED | OUTPATIENT
Start: 2024-06-15 | End: 2024-06-15

## 2024-06-15 RX ADMIN — ONDANSETRON 4 MG: 2 INJECTION INTRAMUSCULAR; INTRAVENOUS at 21:15

## 2024-06-15 RX ADMIN — MORPHINE SULFATE 4 MG: 4 INJECTION INTRAVENOUS at 21:23

## 2024-06-15 RX ADMIN — SODIUM CHLORIDE 1 MG: 9 INJECTION INTRAMUSCULAR; INTRAVENOUS; SUBCUTANEOUS at 21:20

## 2024-06-15 ASSESSMENT — ENCOUNTER SYMPTOMS
EYE ITCHING: 0
VOMITING: 0
NAUSEA: 0
EYE REDNESS: 0
BACK PAIN: 1
SHORTNESS OF BREATH: 0
COLOR CHANGE: 1
CONSTIPATION: 0
COUGH: 0
TROUBLE SWALLOWING: 0
DIARRHEA: 0
SINUS PAIN: 0
ABDOMINAL PAIN: 0
EYE PAIN: 0
WHEEZING: 0

## 2024-06-15 ASSESSMENT — PAIN - FUNCTIONAL ASSESSMENT: PAIN_FUNCTIONAL_ASSESSMENT: 0-10

## 2024-06-15 ASSESSMENT — PAIN SCALES - GENERAL
PAINLEVEL_OUTOF10: 10
PAINLEVEL_OUTOF10: 10

## 2024-06-15 ASSESSMENT — PAIN DESCRIPTION - LOCATION
LOCATION: LEG
LOCATION: LEG

## 2024-06-15 ASSESSMENT — PAIN DESCRIPTION - DESCRIPTORS
DESCRIPTORS: ACHING
DESCRIPTORS: ACHING

## 2024-06-15 ASSESSMENT — PAIN DESCRIPTION - PAIN TYPE: TYPE: CHRONIC PAIN

## 2024-06-15 ASSESSMENT — PAIN DESCRIPTION - ORIENTATION
ORIENTATION: LEFT;RIGHT
ORIENTATION: RIGHT;LEFT

## 2024-06-16 VITALS
BODY MASS INDEX: 45.99 KG/M2 | SYSTOLIC BLOOD PRESSURE: 120 MMHG | HEART RATE: 92 BPM | DIASTOLIC BLOOD PRESSURE: 80 MMHG | OXYGEN SATURATION: 97 % | RESPIRATION RATE: 13 BRPM | HEIGHT: 67 IN | WEIGHT: 293 LBS | TEMPERATURE: 98 F

## 2024-06-16 NOTE — ED NOTES
Patient mobility status  wheelchair bound. Provider aware     I have reviewed discharge instructions with the patient.  The patient verbalized understanding.    Patient left ED via Discharge Method: stretcher to Home with [facility].    Opportunity for questions and clarification provided.     Patient given 0 scripts.            Coco Spencer RN  06/16/24 0005

## 2024-06-16 NOTE — ED TRIAGE NOTES
Patient arrives via EMS from home c/o chronic pain from wounds on legs, states pain feels like \"legs are on fire\"

## 2024-06-16 NOTE — ED PROVIDER NOTES
Transportation Needs (6/4/2024)    PRAPARE - Transportation     Lack of Transportation (Medical): No     Lack of Transportation (Non-Medical): No   Housing Stability: Low Risk  (6/4/2024)    Housing Stability Vital Sign     Unable to Pay for Housing in the Last Year: No     Number of Places Lived in the Last Year: 1     Unstable Housing in the Last Year: No        Previous Medications    ALBUTEROL SULFATE HFA (PROVENTIL;VENTOLIN;PROAIR) 108 (90 BASE) MCG/ACT INHALER    Inhale 2 puffs into the lungs every 6 hours as needed for Wheezing or Shortness of Breath TAKE 2 PUFFS BY MOUTH EVERY 4 HOURS AS NEEDED    AMOXICILLIN-CLAVULANATE (AUGMENTIN) 875-125 MG PER TABLET    Take 1 tablet by mouth 2 times daily for 14 days    ASPIRIN 81 MG CHEWABLE TABLET    Take 1 tablet by mouth daily    BACLOFEN (LIORESAL) 10 MG TABLET    Take 1 tablet by mouth 3 times daily    DULOXETINE (CYMBALTA) 60 MG EXTENDED RELEASE CAPSULE    Take 1 capsule by mouth daily    FLUTICASONE (FLONASE) 50 MCG/ACT NASAL SPRAY    2 sprays by Nasal route daily    FUROSEMIDE (LASIX) 40 MG TABLET    Take 1 tablet by mouth daily    LOSARTAN (COZAAR) 50 MG TABLET    Take 1 tablet by mouth daily HOLD UNTIL FOLLOW UP WITH PRIMARY CARE DUE TO LOW BP    METOPROLOL SUCCINATE (TOPROL XL) 25 MG EXTENDED RELEASE TABLET    Take 1 tablet by mouth daily    MINERAL OIL-HYDROPHILIC PETROLATUM (AQUAPHOR) OINTMENT    Apply topically as needed.    MONTELUKAST (SINGULAIR) 10 MG TABLET    Take 1 tablet by mouth daily    NALOXONE (NARCAN) 4 MG/0.1ML LIQD NASAL SPRAY    1 spray by Nasal route as needed for Opioid Reversal    NUTRITIONAL SUPPLEMENTS (ENSURE MAX PROTEIN) LIQD    Take 1 each by mouth in the morning and at bedtime    PANTOPRAZOLE (PROTONIX) 40 MG TABLET    Take 1 tablet by mouth every morning (before breakfast)    PREGABALIN (LYRICA) 200 MG CAPSULE    Take 1 capsule by mouth in the morning, at noon, and at bedtime for 30 days. Max Daily Amount: 600 mg        Results

## 2024-06-16 NOTE — DISCHARGE INSTRUCTIONS
Continue Augmentin as prescribed by her wound management doctor  Continue pain medication as previously prescribed  Keep your wounds dressed and covered to minimize her symptoms  Call your wound care team on Monday to discuss close follow-up and any further treatment    Return to ER for any worsening symptoms or new problems which may arise

## 2024-06-17 ENCOUNTER — CARE COORDINATION (OUTPATIENT)
Dept: CARE COORDINATION | Facility: CLINIC | Age: 49
End: 2024-06-17

## 2024-06-17 NOTE — CARE COORDINATION
ROSSANA CM continues to follow for SW Program.  Pt with multiple recent ER visits.  ROSSANA CM continues to monitor for any additional needs.  Will update as needed.

## 2024-06-18 ENCOUNTER — HOSPITAL ENCOUNTER (EMERGENCY)
Age: 49
Discharge: HOME | End: 2024-06-19
Attending: EMERGENCY MEDICINE
Payer: MEDICARE

## 2024-06-18 DIAGNOSIS — R60.0 LEG EDEMA: Primary | ICD-10-CM

## 2024-06-18 LAB
ALBUMIN SERPL-MCNC: 2.5 G/DL (ref 3.5–5)
ALBUMIN/GLOB SERPL: 0.6 (ref 1–1.9)
ALP SERPL-CCNC: 83 U/L (ref 35–104)
ALT SERPL-CCNC: 10 U/L (ref 12–65)
ANION GAP SERPL CALC-SCNC: 7 MMOL/L (ref 9–18)
AST SERPL-CCNC: 18 U/L (ref 15–37)
BASOPHILS # BLD: 0.1 K/UL (ref 0–0.2)
BASOPHILS NFR BLD: 1 % (ref 0–2)
BILIRUB SERPL-MCNC: <0.2 MG/DL (ref 0–1.2)
BUN SERPL-MCNC: 7 MG/DL (ref 6–23)
CALCIUM SERPL-MCNC: 8.9 MG/DL (ref 8.8–10.2)
CHLORIDE SERPL-SCNC: 104 MMOL/L (ref 98–107)
CO2 SERPL-SCNC: 28 MMOL/L (ref 20–28)
CREAT SERPL-MCNC: 0.85 MG/DL (ref 0.6–1.1)
DIFFERENTIAL METHOD BLD: ABNORMAL
EOSINOPHIL # BLD: 0.5 K/UL (ref 0–0.8)
EOSINOPHIL NFR BLD: 7 % (ref 0.5–7.8)
ERYTHROCYTE [DISTWIDTH] IN BLOOD BY AUTOMATED COUNT: 17.5 % (ref 11.9–14.6)
GLOBULIN SER CALC-MCNC: 4.4 G/DL (ref 2.3–3.5)
GLUCOSE SERPL-MCNC: 90 MG/DL (ref 70–99)
HCT VFR BLD AUTO: 28.1 % (ref 35.8–46.3)
HGB BLD-MCNC: 8.6 G/DL (ref 11.7–15.4)
IMM GRANULOCYTES # BLD AUTO: 0 K/UL (ref 0–0.5)
IMM GRANULOCYTES NFR BLD AUTO: 0 % (ref 0–5)
LYMPHOCYTES # BLD: 1.9 K/UL (ref 0.5–4.6)
LYMPHOCYTES NFR BLD: 26 % (ref 13–44)
MCH RBC QN AUTO: 24.5 PG (ref 26.1–32.9)
MCHC RBC AUTO-ENTMCNC: 30.6 G/DL (ref 31.4–35)
MCV RBC AUTO: 80.1 FL (ref 82–102)
MONOCYTES # BLD: 0.7 K/UL (ref 0.1–1.3)
MONOCYTES NFR BLD: 10 % (ref 4–12)
NEUTS SEG # BLD: 4.1 K/UL (ref 1.7–8.2)
NEUTS SEG NFR BLD: 56 % (ref 43–78)
NRBC # BLD: 0 K/UL (ref 0–0.2)
PLATELET # BLD AUTO: 496 K/UL (ref 150–450)
PMV BLD AUTO: 9.1 FL (ref 9.4–12.3)
POTASSIUM SERPL-SCNC: 5 MMOL/L (ref 3.5–5.1)
PROT SERPL-MCNC: 6.9 G/DL (ref 6.3–8.2)
RBC # BLD AUTO: 3.51 M/UL (ref 4.05–5.2)
SODIUM SERPL-SCNC: 139 MMOL/L (ref 136–145)
WBC # BLD AUTO: 7.3 K/UL (ref 4.3–11.1)

## 2024-06-18 PROCEDURE — 99284 EMERGENCY DEPT VISIT MOD MDM: CPT

## 2024-06-18 PROCEDURE — 85025 COMPLETE CBC W/AUTO DIFF WBC: CPT

## 2024-06-18 PROCEDURE — 96374 THER/PROPH/DIAG INJ IV PUSH: CPT

## 2024-06-18 PROCEDURE — 80053 COMPREHEN METABOLIC PANEL: CPT

## 2024-06-18 PROCEDURE — 6360000002 HC RX W HCPCS: Performed by: EMERGENCY MEDICINE

## 2024-06-18 RX ORDER — MORPHINE SULFATE 4 MG/ML
4 INJECTION, SOLUTION INTRAMUSCULAR; INTRAVENOUS
Status: COMPLETED | OUTPATIENT
Start: 2024-06-18 | End: 2024-06-18

## 2024-06-18 RX ADMIN — MORPHINE SULFATE 4 MG: 4 INJECTION INTRAVENOUS at 23:57

## 2024-06-18 ASSESSMENT — PAIN DESCRIPTION - PAIN TYPE: TYPE: ACUTE PAIN

## 2024-06-18 ASSESSMENT — PAIN DESCRIPTION - LOCATION
LOCATION: LEG
LOCATION: LEG

## 2024-06-18 ASSESSMENT — PAIN SCALES - GENERAL
PAINLEVEL_OUTOF10: 10
PAINLEVEL_OUTOF10: 10

## 2024-06-18 ASSESSMENT — PAIN DESCRIPTION - ORIENTATION: ORIENTATION: RIGHT;LEFT

## 2024-06-18 ASSESSMENT — PAIN DESCRIPTION - DESCRIPTORS
DESCRIPTORS: ACHING;BURNING
DESCRIPTORS: ACHING

## 2024-06-18 ASSESSMENT — PAIN - FUNCTIONAL ASSESSMENT
PAIN_FUNCTIONAL_ASSESSMENT: PREVENTS OR INTERFERES SOME ACTIVE ACTIVITIES AND ADLS
PAIN_FUNCTIONAL_ASSESSMENT: 0-10

## 2024-06-19 ENCOUNTER — TELEPHONE (OUTPATIENT)
Dept: FAMILY MEDICINE CLINIC | Facility: CLINIC | Age: 49
End: 2024-06-19

## 2024-06-19 ENCOUNTER — TELEPHONE (OUTPATIENT)
Dept: WOUND CARE | Age: 49
End: 2024-06-19

## 2024-06-19 VITALS
SYSTOLIC BLOOD PRESSURE: 140 MMHG | BODY MASS INDEX: 45.99 KG/M2 | HEIGHT: 67 IN | RESPIRATION RATE: 19 BRPM | TEMPERATURE: 97.6 F | DIASTOLIC BLOOD PRESSURE: 85 MMHG | WEIGHT: 293 LBS | HEART RATE: 78 BPM | OXYGEN SATURATION: 100 %

## 2024-06-19 NOTE — ED PROVIDER NOTES
Housing Stability: Low Risk  (6/4/2024)    Housing Stability Vital Sign     Unable to Pay for Housing in the Last Year: No     Number of Places Lived in the Last Year: 1     Unstable Housing in the Last Year: No        Previous Medications    ALBUTEROL SULFATE HFA (PROVENTIL;VENTOLIN;PROAIR) 108 (90 BASE) MCG/ACT INHALER    Inhale 2 puffs into the lungs every 6 hours as needed for Wheezing or Shortness of Breath TAKE 2 PUFFS BY MOUTH EVERY 4 HOURS AS NEEDED    AMOXICILLIN-CLAVULANATE (AUGMENTIN) 875-125 MG PER TABLET    Take 1 tablet by mouth 2 times daily for 14 days    ASPIRIN 81 MG CHEWABLE TABLET    Take 1 tablet by mouth daily    BACLOFEN (LIORESAL) 10 MG TABLET    Take 1 tablet by mouth 3 times daily    DULOXETINE (CYMBALTA) 60 MG EXTENDED RELEASE CAPSULE    Take 1 capsule by mouth daily    FLUTICASONE (FLONASE) 50 MCG/ACT NASAL SPRAY    2 sprays by Nasal route daily    FUROSEMIDE (LASIX) 40 MG TABLET    Take 1 tablet by mouth daily    LOSARTAN (COZAAR) 50 MG TABLET    Take 1 tablet by mouth daily HOLD UNTIL FOLLOW UP WITH PRIMARY CARE DUE TO LOW BP    METOPROLOL SUCCINATE (TOPROL XL) 25 MG EXTENDED RELEASE TABLET    Take 1 tablet by mouth daily    MINERAL OIL-HYDROPHILIC PETROLATUM (AQUAPHOR) OINTMENT    Apply topically as needed.    MONTELUKAST (SINGULAIR) 10 MG TABLET    Take 1 tablet by mouth daily    NALOXONE (NARCAN) 4 MG/0.1ML LIQD NASAL SPRAY    1 spray by Nasal route as needed for Opioid Reversal    NUTRITIONAL SUPPLEMENTS (ENSURE MAX PROTEIN) LIQD    Take 1 each by mouth in the morning and at bedtime    PANTOPRAZOLE (PROTONIX) 40 MG TABLET    Take 1 tablet by mouth every morning (before breakfast)    PREGABALIN (LYRICA) 200 MG CAPSULE    Take 1 capsule by mouth in the morning, at noon, and at bedtime for 30 days. Max Daily Amount: 600 mg        Results for orders placed or performed during the hospital encounter of 06/18/24   CBC with Auto Differential   Result Value Ref Range    WBC 7.3 4.3 - 11.1

## 2024-06-19 NOTE — ED NOTES
Patient mobility status  wheelchair bound. Provider aware     I have reviewed discharge instructions with the patient.  The patient verbalized understanding.    Patient left ED via Discharge Method: stretcher to Home with  [facility] .    Opportunity for questions and clarification provided.     Patient given 0 scripts.            Alesia Banks RN  06/19/24 0032

## 2024-06-19 NOTE — ED TRIAGE NOTES
Arrived via [facility], Coming from home, C/o BLE pain and weeping edema .     Per EMS Patient has multiple full bottles of Antibiotics that she is not taking.

## 2024-06-19 NOTE — TELEPHONE ENCOUNTER
06/19/2024 [facility] called requesting verbal orders regarding PT.  Patient last seen in wound clinic on 02/05/2024. This RN recommended [facility] contact patient's PCP, ALICJA Almazan for PT evaluation and treatment referral.

## 2024-06-19 NOTE — TELEPHONE ENCOUNTER
Patient called requesting a prescription for a yeast infection, the 2 day pill. Her phone is not working so she will call back this afternoon for the answer.

## 2024-06-19 NOTE — TELEPHONE ENCOUNTER
Anita bell from [facility], requesting verbal orders for continuation of HH with physical therapy.Please advise.    Ph: [phone]

## 2024-06-20 ENCOUNTER — TELEPHONE (OUTPATIENT)
Dept: FAMILY MEDICINE CLINIC | Facility: CLINIC | Age: 49
End: 2024-06-20

## 2024-06-20 NOTE — TELEPHONE ENCOUNTER
Pt called back today 6/20 asking for an appointment on Friday. Nenita does not work on Fridays and Dr. Rodriguez did not have an appointment available.    Per Nenita:      [11:06 AM] Nenita Beatty  She has not had an in office visit since 02/24. She thinks it is a yeast infection but does not know. She has HH, maybe they can let her do a nuswab herself if it is too hard for her to get here. Has wound management had her on an ATB recently? If so, maybe they will give her Diflucan.       Patient was notified of Nenita's instructions.

## 2024-06-21 ENCOUNTER — TELEPHONE (OUTPATIENT)
Dept: FAMILY MEDICINE CLINIC | Facility: CLINIC | Age: 49
End: 2024-06-21

## 2024-06-24 ENCOUNTER — CARE COORDINATION (OUTPATIENT)
Dept: CARE COORDINATION | Facility: CLINIC | Age: 49
End: 2024-06-24

## 2024-06-24 NOTE — CARE COORDINATION
ROSSANA WILLIAM continues to follow for SW Program.  ROSSANA WILLIAM reached out to pt this day but unable to reach at this time.  Left VM with return call details.  Will await return call and/or follow up at a later time.     Will continue to monitor and update as needed.

## 2024-06-25 ENCOUNTER — HOSPITAL ENCOUNTER (EMERGENCY)
Age: 49
Discharge: HOME | End: 2024-06-25
Attending: EMERGENCY MEDICINE
Payer: MEDICARE

## 2024-06-25 VITALS
BODY MASS INDEX: 45.99 KG/M2 | WEIGHT: 293 LBS | HEIGHT: 67 IN | TEMPERATURE: 98.2 F | RESPIRATION RATE: 10 BRPM | DIASTOLIC BLOOD PRESSURE: 92 MMHG | SYSTOLIC BLOOD PRESSURE: 131 MMHG | OXYGEN SATURATION: 99 % | HEART RATE: 86 BPM

## 2024-06-25 DIAGNOSIS — G89.4 CHRONIC PAIN SYNDROME: Primary | ICD-10-CM

## 2024-06-25 LAB
ALBUMIN SERPL-MCNC: 2.9 G/DL (ref 3.5–5)
ALBUMIN/GLOB SERPL: 0.6 (ref 1–1.9)
ALP SERPL-CCNC: 92 U/L (ref 35–104)
ALT SERPL-CCNC: 12 U/L (ref 12–65)
ANION GAP SERPL CALC-SCNC: 9 MMOL/L (ref 9–18)
AST SERPL-CCNC: 23 U/L (ref 15–37)
BASOPHILS # BLD: 0 K/UL (ref 0–0.2)
BASOPHILS NFR BLD: 1 % (ref 0–2)
BILIRUB SERPL-MCNC: 0.4 MG/DL (ref 0–1.2)
BUN SERPL-MCNC: 10 MG/DL (ref 6–23)
CALCIUM SERPL-MCNC: 9.1 MG/DL (ref 8.8–10.2)
CHLORIDE SERPL-SCNC: 99 MMOL/L (ref 98–107)
CO2 SERPL-SCNC: 28 MMOL/L (ref 20–28)
CREAT SERPL-MCNC: 1.08 MG/DL (ref 0.6–1.1)
DIFFERENTIAL METHOD BLD: ABNORMAL
EOSINOPHIL # BLD: 0.5 K/UL (ref 0–0.8)
EOSINOPHIL NFR BLD: 10 % (ref 0.5–7.8)
ERYTHROCYTE [DISTWIDTH] IN BLOOD BY AUTOMATED COUNT: 18.5 % (ref 11.9–14.6)
GLOBULIN SER CALC-MCNC: 4.5 G/DL (ref 2.3–3.5)
GLUCOSE SERPL-MCNC: 93 MG/DL (ref 70–99)
HCT VFR BLD AUTO: 31.6 % (ref 35.8–46.3)
HGB BLD-MCNC: 9.3 G/DL (ref 11.7–15.4)
IMM GRANULOCYTES # BLD AUTO: 0 K/UL (ref 0–0.5)
IMM GRANULOCYTES NFR BLD AUTO: 0 % (ref 0–5)
LYMPHOCYTES # BLD: 1.3 K/UL (ref 0.5–4.6)
LYMPHOCYTES NFR BLD: 25 % (ref 13–44)
MCH RBC QN AUTO: 24.3 PG (ref 26.1–32.9)
MCHC RBC AUTO-ENTMCNC: 29.4 G/DL (ref 31.4–35)
MCV RBC AUTO: 82.7 FL (ref 82–102)
MONOCYTES # BLD: 0.8 K/UL (ref 0.1–1.3)
MONOCYTES NFR BLD: 15 % (ref 4–12)
NEUTS SEG # BLD: 2.6 K/UL (ref 1.7–8.2)
NEUTS SEG NFR BLD: 49 % (ref 43–78)
NRBC # BLD: 0 K/UL (ref 0–0.2)
NT PRO BNP: 65 PG/ML (ref 0–125)
PLATELET # BLD AUTO: 424 K/UL (ref 150–450)
PMV BLD AUTO: 9.3 FL (ref 9.4–12.3)
POTASSIUM SERPL-SCNC: 5.3 MMOL/L (ref 3.5–5.1)
PROT SERPL-MCNC: 7.4 G/DL (ref 6.3–8.2)
RBC # BLD AUTO: 3.82 M/UL (ref 4.05–5.2)
SODIUM SERPL-SCNC: 135 MMOL/L (ref 136–145)
WBC # BLD AUTO: 5.3 K/UL (ref 4.3–11.1)

## 2024-06-25 PROCEDURE — 93005 ELECTROCARDIOGRAM TRACING: CPT | Performed by: EMERGENCY MEDICINE

## 2024-06-25 PROCEDURE — 99284 EMERGENCY DEPT VISIT MOD MDM: CPT

## 2024-06-25 PROCEDURE — 85025 COMPLETE CBC W/AUTO DIFF WBC: CPT

## 2024-06-25 PROCEDURE — 80053 COMPREHEN METABOLIC PANEL: CPT

## 2024-06-25 PROCEDURE — 83880 ASSAY OF NATRIURETIC PEPTIDE: CPT

## 2024-06-25 RX ORDER — HALOPERIDOL 5 MG/ML
5 INJECTION INTRAMUSCULAR
Status: DISCONTINUED | OUTPATIENT
Start: 2024-06-25 | End: 2024-06-25

## 2024-06-25 ASSESSMENT — PAIN DESCRIPTION - LOCATION: LOCATION: LEG

## 2024-06-25 ASSESSMENT — ENCOUNTER SYMPTOMS
VOMITING: 0
SHORTNESS OF BREATH: 0

## 2024-06-25 ASSESSMENT — PAIN - FUNCTIONAL ASSESSMENT: PAIN_FUNCTIONAL_ASSESSMENT: 0-10

## 2024-06-25 ASSESSMENT — PAIN DESCRIPTION - ORIENTATION: ORIENTATION: LEFT;RIGHT

## 2024-06-25 ASSESSMENT — PAIN SCALES - GENERAL: PAINLEVEL_OUTOF10: 10

## 2024-06-25 ASSESSMENT — PAIN DESCRIPTION - DESCRIPTORS: DESCRIPTORS: BURNING

## 2024-06-25 NOTE — ED TRIAGE NOTES
Pt presents to the ED via [facility] from home c/o sharp pain in bilateral legs and right arm. Per daughter gave pt dinner at 1830 - daughter states at 1910 pt had slurred speech. Per fire department, patient appeared normal. Patient is well known to fire dept.

## 2024-06-26 ENCOUNTER — CARE COORDINATION (OUTPATIENT)
Dept: CARE COORDINATION | Facility: CLINIC | Age: 49
End: 2024-06-26

## 2024-06-26 LAB
EKG ATRIAL RATE: 94 BPM
EKG DIAGNOSIS: NORMAL
EKG P AXIS: 53 DEGREES
EKG P-R INTERVAL: 152 MS
EKG Q-T INTERVAL: 367 MS
EKG QRS DURATION: 82 MS
EKG QTC CALCULATION (BAZETT): 459 MS
EKG R AXIS: 50 DEGREES
EKG T AXIS: 42 DEGREES
EKG VENTRICULAR RATE: 94 BPM

## 2024-06-26 PROCEDURE — 93010 ELECTROCARDIOGRAM REPORT: CPT | Performed by: INTERNAL MEDICINE

## 2024-06-26 NOTE — ED NOTES
Attempted to call daughter, Abigail, at this time to give update regarding patient and that patient will  be discharged home. No answer at this time.      Angelina Cornejo, STEWART  06/25/24 5811

## 2024-06-26 NOTE — CARE COORDINATION
Care Transitions Note    Initial Call - Call within 2 business days of discharge: Yes    Attempted to reach patient for transitions of care follow up. Unable to reach patient.    Outreach Attempts:   Multiple attempts to contact patient at phone numbers on file.     Patient: Lo Tirado    Patient : 1975   MRN: [id number]    Reason for Admission: ED visit for slurred speech that resolved  Discharge Date: 24  RURS: Readmission Risk Score: 35.2    Last Discharge Facility       Date Complaint Diagnosis Description Type Department Provider    24 Leg Pain Chronic pain syndrome ED (DISCHARGE) SFBrent Cifuentes MD            Was this an external facility discharge? No    Follow Up Appointment:   Patient has hospital follow up appointment scheduled within 7 days of discharge.    Future Appointments         Provider Specialty Dept Phone    2024 1:50 PM Tequila Hudson RN; Alexi Tabor DO Wound Ostomy [phone]            Plan for follow-up on next business day.      Shivam Boggs RN

## 2024-06-26 NOTE — ED NOTES
Patient's wound dressing were falling off when patient arrived to ER. This RN redressed wounds with non adherent dressing and kerlix prior to patient being discharged.      Angelina Cornejo, STEWART  06/25/24 7703

## 2024-06-26 NOTE — ED PROVIDER NOTES
Comprehensive Metabolic Panel    Brain Natriuretic Peptide    EKG 12 Lead        Medications given during this emergency department visit:  Medications - No data to display    New Prescriptions    No medications on file        Past Medical History:   Diagnosis Date    Anxiety     Arthritis     Bipolar affective disorder ([facility]) 15 yrs old    Cellulitis 2010    denies currently     Cerebrovascular disease     Chronic back pain     CKD (chronic kidney disease) stage 3, GFR 30-59 ml/min ([facility])     Deep vein thrombosis ([facility]) 2022    Depression     Diabetes ([facility]) dx:    Unknown A1C    Diabetes ([facility]) 2010    had multiple TIA's from low bs in  and hasn't been on any meds since    Diabetes mellitus type II, non insulin dependent ([facility]) 2015    Disease of blood and blood forming organ     Edema of both legs 2010    ankles     Femur fracture, left ([facility]) 2015    GERD (gastroesophageal reflux disease)     denies    Gout     Hypertension     no meds    Intractable pain 2024    Moderate persistent asthma without complication 10/19/2021    Morbid obesity ([facility]) lifetime    Non compliance with medical treatment long term    Orthopnea     Osteoarthritis     Other ill-defined conditions(799.89)     cellulitis    Peripheral neuropathy 2 years    Peripheral vascular disease ([facility])      delivery 10/4/1996    Birth of my twins 24wks     labor 10/4/1996    Psychiatric disorder     bipolar disorder    Renal insufficiency 2010    Restless legs syndrome     Stroke ([facility])     multiple TIA's in ; no residual     Unspecified sleep apnea     does not use cpap    Vertigo 2015        Past Surgical History:   Procedure Laterality Date     SECTION      DELIVERY   ,     both preemies    HEENT      Tonsillectomy/SFE/18    HERNIA REPAIR  age 4    JOINT REPLACEMENT      TONSILLECTOMY      TOTAL HIP ARTHROPLASTY Left     Dr. Andersen    TUBAL  AMOXICILLIN-CLAVULANATE (AUGMENTIN) 875-125 MG PER TABLET    Take 1 tablet by mouth 2 times daily for 14 days    ASPIRIN 81 MG CHEWABLE TABLET    Take 1 tablet by mouth daily    BACLOFEN (LIORESAL) 10 MG TABLET    Take 1 tablet by mouth 3 times daily    DULOXETINE (CYMBALTA) 60 MG EXTENDED RELEASE CAPSULE    Take 1 capsule by mouth daily    FLUTICASONE (FLONASE) 50 MCG/ACT NASAL SPRAY    2 sprays by Nasal route daily    FUROSEMIDE (LASIX) 40 MG TABLET    Take 1 tablet by mouth daily    LOSARTAN (COZAAR) 50 MG TABLET    Take 1 tablet by mouth daily HOLD UNTIL FOLLOW UP WITH PRIMARY CARE DUE TO LOW BP    METOPROLOL SUCCINATE (TOPROL XL) 25 MG EXTENDED RELEASE TABLET    Take 1 tablet by mouth daily    MINERAL OIL-HYDROPHILIC PETROLATUM (AQUAPHOR) OINTMENT    Apply topically as needed.    MONTELUKAST (SINGULAIR) 10 MG TABLET    Take 1 tablet by mouth daily    NALOXONE (NARCAN) 4 MG/0.1ML LIQD NASAL SPRAY    1 spray by Nasal route as needed for Opioid Reversal    NUTRITIONAL SUPPLEMENTS (ENSURE MAX PROTEIN) LIQD    Take 1 each by mouth in the morning and at bedtime    PANTOPRAZOLE (PROTONIX) 40 MG TABLET    Take 1 tablet by mouth every morning (before breakfast)    PREGABALIN (LYRICA) 200 MG CAPSULE    Take 1 capsule by mouth in the morning, at noon, and at bedtime for 30 days. Max Daily Amount: 600 mg        Results for orders placed or performed during the hospital encounter of 06/25/24   CBC with Auto Differential   Result Value Ref Range    WBC 5.3 4.3 - 11.1 K/uL    RBC 3.82 (L) 4.05 - 5.2 M/uL    Hemoglobin 9.3 (L) 11.7 - 15.4 g/dL    Hematocrit 31.6 (L) 35.8 - 46.3 %    MCV 82.7 82 - 102 FL    MCH 24.3 (L) 26.1 - 32.9 PG    MCHC 29.4 (L) 31.4 - 35.0 g/dL    RDW 18.5 (H) 11.9 - 14.6 %    Platelets 424 150 - 450 K/uL    MPV 9.3 (L) 9.4 - 12.3 FL    nRBC 0.00 0.0 - 0.2 K/uL    Differential Type AUTOMATED      Neutrophils % 49 43 - 78 %    Lymphocytes % 25 13 - 44 %    Monocytes % 15 (H) 4.0 - 12.0 %    Eosinophils %

## 2024-06-26 NOTE — DISCHARGE INSTRUCTIONS
Continue your current pain medications.  Follow-up with your primary care doctor this week.  Follow-up with your pain management doctor this week

## 2024-06-26 NOTE — ED NOTES
Report given to Garcia QUICK RN. Transfer of care at this time.        Angelina Cornejo RN  06/25/24 9754

## 2024-06-27 ENCOUNTER — CARE COORDINATION (OUTPATIENT)
Dept: CARE COORDINATION | Facility: CLINIC | Age: 49
End: 2024-06-27

## 2024-06-27 NOTE — CARE COORDINATION
CTN attempted KIRBY/ED follow up after recent unsuccessful attempts. Patient declines services and has had numerous 'bad' experiences with [facility] and does not want to follow up with her PCP and does not want to return to [facility]. Patient indicated she was told to seek medical attention at [facility] and be paired with a provider from that system. CTN offered to assist with finding new provider within [facility] and patient declined and will seek medical attention outside of our system. Patient does not agree to any further calls/assistance.

## 2024-07-01 ENCOUNTER — CARE COORDINATION (OUTPATIENT)
Dept: CARE COORDINATION | Facility: CLINIC | Age: 49
End: 2024-07-01

## 2024-07-02 ENCOUNTER — HOSPITAL ENCOUNTER (OUTPATIENT)
Dept: WOUND CARE | Age: 49
Discharge: HOME | End: 2024-07-02
Payer: MEDICARE

## 2024-07-02 VITALS
WEIGHT: 293 LBS | BODY MASS INDEX: 45.99 KG/M2 | SYSTOLIC BLOOD PRESSURE: 148 MMHG | TEMPERATURE: 99 F | DIASTOLIC BLOOD PRESSURE: 95 MMHG | HEART RATE: 115 BPM | RESPIRATION RATE: 16 BRPM | HEIGHT: 67 IN

## 2024-07-02 PROCEDURE — 99213 OFFICE O/P EST LOW 20 MIN: CPT

## 2024-07-02 ASSESSMENT — PAIN DESCRIPTION - ORIENTATION: ORIENTATION: RIGHT;LEFT

## 2024-07-02 ASSESSMENT — PAIN DESCRIPTION - DESCRIPTORS: DESCRIPTORS: BURNING

## 2024-07-02 ASSESSMENT — PAIN DESCRIPTION - LOCATION: LOCATION: FOOT

## 2024-07-02 ASSESSMENT — PAIN SCALES - GENERAL: PAINLEVEL_OUTOF10: 10

## 2024-07-02 NOTE — FLOWSHEET NOTE
07/02/24 1414   Right Leg Edema Point of Measurement   Leg circumference 61 cm   Ankle circumference 32 cm   Foot circumference 27 cm   Compression Therapy Tubular elastic support bandage   Left Leg Edema Point of Measurement   Leg circumference 61 cm   Ankle circumference 32 cm   Foot circumference 27 cm   Compression Therapy Tubular elastic support bandage   RLE Neurovascular Assessment   Capillary Refill Less than/Equal to 3 seconds   Color Appropriate for Ethnicity   Temperature Warm   RLE Sensation  Full sensation   R Pedal Pulse Doppler   LLE Neurovascular Assessment   Capillary Refill Less than/Equal to 3 seconds   Color Appropriate for Ethnicity   Temperature Warm   LLE Sensation  Pain   L Pedal Pulse Doppler   Wound 11/01/23 Leg Left;Posterior #2   Date First Assessed/Time First Assessed: 11/01/23 0954   Present on Original Admission: Yes  Wound Approximate Age at First Assessment (Weeks): 12 weeks  Primary Wound Type: Venous Ulcer  Location: Leg  Wound Location Orientation: Left;Posterior  Woun...   Wound Image     Wound Etiology Venous   Dressing Status Old drainage noted   Wound Cleansed Soap and water   Dressing/Treatment Gauze dressing/dressing sponge   Wound Length (cm) 11 cm   Wound Width (cm) 23 cm   Wound Depth (cm) 0.5 cm   Wound Surface Area (cm^2) 253 cm^2   Change in Wound Size % (l*w) -153   Wound Volume (cm^3) 126.5 cm^3   Wound Healing % -1165   Wound Assessment Slough;Pink/red   Drainage Amount Moderate (25-50%)   Drainage Description Serosanguinous   Odor Mild   Pati-wound Assessment Fragile;Edematous   Wound Thickness Description not for Pressure Injury Full thickness   Wound 11/01/23 Leg Posterior;Right #1   Date First Assessed/Time First Assessed: 11/01/23 0954   Present on Original Admission: Yes  Wound Approximate Age at First Assessment (Weeks): 12 weeks  Primary Wound Type: Venous Ulcer  Location: Leg  Wound Location Orientation: Posterior;Right  Wou...   Wound Image     Wound

## 2024-07-02 NOTE — WOUND CARE
Discharge Instructions for  [facility]  [address] [place] [address]  [address]  [place] [address]  Phone [phone]   Fax [phone]      NAME:  Lo Tirado  YOB: 1975  MEDICAL RECORD NUMBER:  [id number]  DATE:  7/2/2024    Return Appointment:   2 weeks with Alexi Tabor DO    Instructions: Bilateral lower legs:    Vashe Wound Solution (hypochlorous acid) soak placed on wound bed for a minimum of 60 seconds prior to dressing application.   Apply Adaptic to wound beds.  Cover with optilock/super absorber and rolled gauze then tubigrip (G)  Change dressing between HH visits as needed     [facility] to [facility] dressings [facility] times weekly    Elevate legs when sitting to reduce swelling.   Swelling interferes with wound healing.  Elevate legs when sitting.  Avoid prolonged standing or sitting with legs in dependent position.  Be cautious of increased salt intake as this promotes fluid retention and swelling.  Take diuretic (fluid pill) as instructed by your doctor.  Increase protein intake to promote wound healing. Jase and Ensure are examples of protein supplements.  Do not get legs wet, use cast cover to shower.     Please increase dietary protein to at least 100 grams per day to support cell rejuvenation.   May use supplements such as Ensure Max, Jase, & Glucerna (samples & coupons provided at first visit).   Be sure to spread intake throughout the day for improved absorption.     Lymphedema pump therapy 1hr 2x daily    Should you experience increased redness, swelling, pain, foul odor, size of wound(s), or have a temperature over 101 degrees please contact the [facility] Center at [phone] or if after hours contact your primary care physician or go to the hospital emergency department.    PLEASE NOTE: IF YOU ARE UNABLE TO OBTAIN WOUND SUPPLIES, CONTINUE TO USE THE SUPPLIES YOU HAVE AVAILABLE UNTIL YOU ARE ABLE TO REACH US. IT IS MOST IMPORTANT TO KEEP THE

## 2024-07-09 ENCOUNTER — HOSPITAL ENCOUNTER (EMERGENCY)
Age: 49
Discharge: HOME | End: 2024-07-09
Attending: EMERGENCY MEDICINE
Payer: MEDICARE

## 2024-07-09 VITALS
HEART RATE: 90 BPM | RESPIRATION RATE: 18 BRPM | OXYGEN SATURATION: 98 % | HEIGHT: 67 IN | TEMPERATURE: 97.9 F | DIASTOLIC BLOOD PRESSURE: 88 MMHG | BODY MASS INDEX: 45.99 KG/M2 | WEIGHT: 293 LBS | SYSTOLIC BLOOD PRESSURE: 143 MMHG

## 2024-07-09 DIAGNOSIS — E87.5 HYPERKALEMIA: ICD-10-CM

## 2024-07-09 DIAGNOSIS — M79.2 CHRONIC NEUROPATHIC PAIN: Primary | ICD-10-CM

## 2024-07-09 DIAGNOSIS — G89.29 CHRONIC NEUROPATHIC PAIN: Primary | ICD-10-CM

## 2024-07-09 LAB
ANION GAP BLD CALC-SCNC: 3.1 MMOL/L
ANION GAP SERPL CALC-SCNC: 10 MMOL/L (ref 9–18)
BASOPHILS # BLD: 0 K/UL (ref 0–0.2)
BASOPHILS NFR BLD: 1 % (ref 0–2)
BUN BLD-MCNC: 12 MG/DL (ref 8–26)
BUN SERPL-MCNC: 11 MG/DL (ref 6–23)
CALCIUM SERPL-MCNC: 9.4 MG/DL (ref 8.8–10.2)
CHLORIDE BLD-SCNC: 106 MMOL/L (ref 98–107)
CHLORIDE SERPL-SCNC: 101 MMOL/L (ref 98–107)
CO2 BLD-SCNC: 29.9 MMOL/L (ref 21–32)
CO2 SERPL-SCNC: 24 MMOL/L (ref 20–28)
CREAT BLD-MCNC: 0.75 MG/DL (ref 0.8–1.5)
CREAT SERPL-MCNC: 0.91 MG/DL (ref 0.6–1.1)
DIFFERENTIAL METHOD BLD: ABNORMAL
EOSINOPHIL # BLD: 0.4 K/UL (ref 0–0.8)
EOSINOPHIL NFR BLD: 7 % (ref 0.5–7.8)
ERYTHROCYTE [DISTWIDTH] IN BLOOD BY AUTOMATED COUNT: 19.6 % (ref 11.9–14.6)
GLUCOSE BLD-MCNC: 76 MG/DL (ref 65–100)
GLUCOSE SERPL-MCNC: 94 MG/DL (ref 70–99)
HCT VFR BLD AUTO: 32.5 % (ref 35.8–46.3)
HGB BLD-MCNC: 9.7 G/DL (ref 11.7–15.4)
IMM GRANULOCYTES # BLD AUTO: 0 K/UL (ref 0–0.5)
IMM GRANULOCYTES NFR BLD AUTO: 0 % (ref 0–5)
LYMPHOCYTES # BLD: 1.6 K/UL (ref 0.5–4.6)
LYMPHOCYTES NFR BLD: 25 % (ref 13–44)
MCH RBC QN AUTO: 24.7 PG (ref 26.1–32.9)
MCHC RBC AUTO-ENTMCNC: 29.8 G/DL (ref 31.4–35)
MCV RBC AUTO: 82.9 FL (ref 82–102)
MONOCYTES # BLD: 1.1 K/UL (ref 0.1–1.3)
MONOCYTES NFR BLD: 18 % (ref 4–12)
NEUTS SEG # BLD: 3.1 K/UL (ref 1.7–8.2)
NEUTS SEG NFR BLD: 49 % (ref 43–78)
NRBC # BLD: 0 K/UL (ref 0–0.2)
PLATELET # BLD AUTO: 420 K/UL (ref 150–450)
PMV BLD AUTO: 9.3 FL (ref 9.4–12.3)
POTASSIUM BLD-SCNC: 5.8 MMOL/L (ref 3.5–5.1)
POTASSIUM SERPL-SCNC: 6 MMOL/L (ref 3.5–5.1)
RBC # BLD AUTO: 3.92 M/UL (ref 4.05–5.2)
SODIUM BLD-SCNC: 139 MMOL/L (ref 136–145)
SODIUM SERPL-SCNC: 135 MMOL/L (ref 136–145)
WBC # BLD AUTO: 6.3 K/UL (ref 4.3–11.1)

## 2024-07-09 PROCEDURE — 99284 EMERGENCY DEPT VISIT MOD MDM: CPT

## 2024-07-09 PROCEDURE — 96375 TX/PRO/DX INJ NEW DRUG ADDON: CPT

## 2024-07-09 PROCEDURE — 85025 COMPLETE CBC W/AUTO DIFF WBC: CPT

## 2024-07-09 PROCEDURE — 36415 COLL VENOUS BLD VENIPUNCTURE: CPT

## 2024-07-09 PROCEDURE — 96374 THER/PROPH/DIAG INJ IV PUSH: CPT

## 2024-07-09 PROCEDURE — 80047 BASIC METABLC PNL IONIZED CA: CPT

## 2024-07-09 PROCEDURE — 6370000000 HC RX 637 (ALT 250 FOR IP): Performed by: EMERGENCY MEDICINE

## 2024-07-09 PROCEDURE — 80048 BASIC METABOLIC PNL TOTAL CA: CPT

## 2024-07-09 PROCEDURE — 6360000002 HC RX W HCPCS: Performed by: EMERGENCY MEDICINE

## 2024-07-09 RX ORDER — PROCHLORPERAZINE EDISYLATE 5 MG/ML
10 INJECTION INTRAMUSCULAR; INTRAVENOUS
Status: COMPLETED | OUTPATIENT
Start: 2024-07-09 | End: 2024-07-09

## 2024-07-09 RX ORDER — MORPHINE SULFATE 4 MG/ML
4 INJECTION, SOLUTION INTRAMUSCULAR; INTRAVENOUS ONCE
Status: COMPLETED | OUTPATIENT
Start: 2024-07-09 | End: 2024-07-09

## 2024-07-09 RX ORDER — FUROSEMIDE 10 MG/ML
40 INJECTION INTRAMUSCULAR; INTRAVENOUS ONCE
Status: COMPLETED | OUTPATIENT
Start: 2024-07-09 | End: 2024-07-09

## 2024-07-09 RX ADMIN — MORPHINE SULFATE 4 MG: 4 INJECTION INTRAVENOUS at 12:28

## 2024-07-09 RX ADMIN — PROCHLORPERAZINE EDISYLATE 10 MG: 5 INJECTION INTRAMUSCULAR; INTRAVENOUS at 08:38

## 2024-07-09 RX ADMIN — FUROSEMIDE 40 MG: 10 INJECTION, SOLUTION INTRAMUSCULAR; INTRAVENOUS at 11:49

## 2024-07-09 RX ADMIN — MORPHINE SULFATE 4 MG: 4 INJECTION INTRAVENOUS at 08:37

## 2024-07-09 RX ADMIN — SODIUM ZIRCONIUM CYCLOSILICATE 10 G: 10 POWDER, FOR SUSPENSION ORAL at 11:48

## 2024-07-09 ASSESSMENT — PAIN - FUNCTIONAL ASSESSMENT: PAIN_FUNCTIONAL_ASSESSMENT: 0-10

## 2024-07-09 ASSESSMENT — PAIN DESCRIPTION - ORIENTATION
ORIENTATION: RIGHT;LEFT

## 2024-07-09 ASSESSMENT — PAIN DESCRIPTION - DESCRIPTORS
DESCRIPTORS: ACHING
DESCRIPTORS: ACHING

## 2024-07-09 ASSESSMENT — PAIN DESCRIPTION - LOCATION
LOCATION: LEG

## 2024-07-09 ASSESSMENT — PAIN SCALES - GENERAL
PAINLEVEL_OUTOF10: 10
PAINLEVEL_OUTOF10: 5
PAINLEVEL_OUTOF10: 10
PAINLEVEL_OUTOF10: 5

## 2024-07-09 NOTE — ED PROVIDER NOTES
Emergency Department Provider Note       PCP: Nenita Beatty, JOSE - LIBIA   Age: 48 y.o.   Sex: female     DISPOSITION Discharge - Pending Orders Complete 07/09/2024 12:24:45 PM       ICD-10-CM    1. Chronic neuropathic pain  M79.2     G89.29       2. Hyperkalemia  E87.5           Medical Decision Making     Aside from hyperkalemia which is moderate about 5.8 and no other clinically significant normalities noted.  Patient will have the potassium treated with IV Lasix and a dose of Lokelma.  Otherwise stable for discharge with chronic pain.     1 chronic illness with exacerbation.  Shared medical decision making was utilized in creating the patients health plan today.    I independently ordered and reviewed each unique test.                     History     Patient, who is well-known to the emergency department with a history of chronic neuropathic pain of the lower extremities and lymphedema.  She presents complaining of worsening pain of the bilateral feet.  The pain is burning in nature.  It is uncontrolled with her pain medications at home.  She does have pain management evaluation is also seeing wound care due to venous stasis ulcers of the bilateral lower extremities.  She denies any fever or chills.  She denies any falls or trauma.  In review of systems is otherwise negative.    The history is provided by the patient and medical records.       ROS     Review of Systems   Constitutional:  Negative for chills and fever.   Neurological:  Negative for weakness.   All other systems reviewed and are negative.       Physical Exam     Vitals signs and nursing note reviewed:  Vitals:    07/09/24 0900 07/09/24 1018 07/09/24 1028 07/09/24 1058   BP: 118/62 114/65 124/72 138/81   Pulse: 94   83   Resp: 16   16   Temp:       TempSrc:       SpO2:  98% 98% 98%   Weight:       Height:          Physical Exam  Vitals and nursing note reviewed.   Constitutional:       General: She is not in acute distress.

## 2024-07-09 NOTE — ED TRIAGE NOTES
Pt arrived via EMS from home c/o burning in both feet x2 days. Hx of neuropathy in both legs, wounds in both legs. Hx  of DM. Pt was seen by wound care yesterday and had her wounds treated at that time.     EMS vitals: 170/palp, 100 HR, 16 RR, 11 BGL, 97.5 T oral

## 2024-07-09 NOTE — ED NOTES
Patient mobility status  with no difficulty. Provider aware     I have reviewed discharge instructions with the patient.  The patient verbalized understanding.    Patient left ED via Discharge Method: stretcher to Home with  [facility] .    Opportunity for questions and clarification provided.     Patient given 0 scripts.           Rory Perla, RN  07/09/24 4570

## 2024-07-15 ENCOUNTER — TELEPHONE (OUTPATIENT)
Dept: WOUND CARE | Age: 49
End: 2024-07-15

## 2024-07-15 NOTE — TELEPHONE ENCOUNTER
Charisma, nurse with [facility], called to notify that patient is experiencing increased drainage at her left lower leg. She was questioning whether the patient needed an antibiotic (Doxy?). Notified her that patient has an appointment on 7/16/24 and we will address her concerns at that time and send orders as appropriate. Nurse voiced understanding.

## 2024-07-16 ENCOUNTER — HOSPITAL ENCOUNTER (OUTPATIENT)
Dept: WOUND CARE | Age: 49
Discharge: HOME | End: 2024-07-16
Payer: MEDICARE

## 2024-07-16 VITALS
HEART RATE: 109 BPM | WEIGHT: 293 LBS | SYSTOLIC BLOOD PRESSURE: 108 MMHG | HEIGHT: 67 IN | BODY MASS INDEX: 45.99 KG/M2 | DIASTOLIC BLOOD PRESSURE: 65 MMHG | RESPIRATION RATE: 18 BRPM | TEMPERATURE: 98.8 F | OXYGEN SATURATION: 100 %

## 2024-07-16 PROCEDURE — 99213 OFFICE O/P EST LOW 20 MIN: CPT | Performed by: FAMILY MEDICINE

## 2024-07-16 PROCEDURE — 99213 OFFICE O/P EST LOW 20 MIN: CPT

## 2024-07-16 ASSESSMENT — PAIN DESCRIPTION - LOCATION: LOCATION: LEG

## 2024-07-16 ASSESSMENT — PAIN SCALES - GENERAL: PAINLEVEL_OUTOF10: 5

## 2024-07-16 ASSESSMENT — PAIN DESCRIPTION - ORIENTATION: ORIENTATION: RIGHT;LEFT

## 2024-07-16 ASSESSMENT — PAIN DESCRIPTION - DESCRIPTORS: DESCRIPTORS: BURNING

## 2024-07-16 NOTE — WOUND CARE
Discharge Instructions for  [facility]  [address] [facility]  [address] [place] [address]  Phone [phone]   Fax [phone]      NAME:  Lo Tirado  YOB: 1975  MEDICAL RECORD NUMBER:  [id number]  DATE:  7/16/2024    Return Appointment:   2 weeks with Alexi Tabor DO    Instructions: Bilateral lower legs:    Vashe Wound Solution (hypochlorous acid) soak placed on wound bed for a minimum of 60 seconds prior to dressing application.   Apply Vaseline to wound periphery for protection from drainage.  Apply Adaptic to wound beds.  Cover with optilock/super absorber and rolled gauze then wrap with ACE wraps from base of toes to just below knees  Change dressing between HH visits as needed     [facility] to change dressings 3 times weekly    Elevate legs when sitting to reduce swelling.   Swelling interferes with wound healing.  Elevate legs when sitting.  Avoid prolonged standing or sitting with legs in dependent position.  Be cautious of increased salt intake as this promotes fluid retention and swelling.  Take diuretic (fluid pill) as instructed by your doctor.  Increase protein intake to promote wound healing. Jase and Ensure are examples of protein supplements.  Do not get legs wet, use cast cover to shower.     Please increase dietary protein to at least 100 grams per day to support cell rejuvenation.   May use supplements such as Ensure Max, Jase, & Glucerna (samples & coupons provided at first visit).   Be sure to spread intake throughout the day for improved absorption.     Lymphedema pump therapy 1hr 2x daily      Should you experience increased redness, swelling, pain, foul odor, size of wound(s), or have a temperature over 101 degrees please contact the [facility] Center at [phone] or if after hours contact your primary care physician or go to the hospital emergency department.    PLEASE NOTE: IF YOU ARE UNABLE TO OBTAIN WOUND SUPPLIES,

## 2024-07-16 NOTE — DISCHARGE INSTRUCTIONS
Return Appointment:   2 weeks with Alexi Tabor DO     Instructions: Bilateral lower legs:     Vashe Wound Solution (hypochlorous acid) soak placed on wound bed for a minimum of 60 seconds prior to dressing application.   Apply Vaseline to wound periphery for protection from drainage.  Apply Adaptic to wound beds.  Cover with optilock/super absorber and rolled gauze then wrap with ACE wraps from base of toes to just below knees  Change dressing between HH visits as needed     [facility] to change dressings 3 times weekly     Elevate legs when sitting to reduce swelling.   Swelling interferes with wound healing.  Elevate legs when sitting.  Avoid prolonged standing or sitting with legs in dependent position.  Be cautious of increased salt intake as this promotes fluid retention and swelling.  Take diuretic (fluid pill) as instructed by your doctor.  Increase protein intake to promote wound healing. Jase and Ensure are examples of protein supplements.  Do not get legs wet, use cast cover to shower.      Please increase dietary protein to at least 100 grams per day to support cell rejuvenation.   May use supplements such as Ensure Max, Jase, & Glucerna (samples & coupons provided at first visit).   Be sure to spread intake throughout the day for improved absorption.      Lymphedema pump therapy 1hr 2x daily

## 2024-07-16 NOTE — FLOWSHEET NOTE
07/16/24 1430   Right Leg Edema Point of Measurement   Leg circumference 57.5 cm   Ankle circumference 30.5 cm   Foot circumference 25 cm   Compression Therapy Ace wrap   Left Leg Edema Point of Measurement   Leg circumference 54 cm   Ankle circumference 30 cm   Foot circumference 24.5 cm   Compression Therapy Ace wrap   Wound 11/01/23 Leg Left;Posterior #2   Date First Assessed/Time First Assessed: 11/01/23 0954   Present on Original Admission: Yes  Wound Approximate Age at First Assessment (Weeks): 12 weeks  Primary Wound Type: Venous Ulcer  Location: Leg  Wound Location Orientation: Left;Posterior  Woun...   Wound Image     Wound Etiology Venous   Dressing Status Old drainage noted   Wound Cleansed Soap and water;Cleansed with saline   Dressing/Treatment ABD;Gauze dressing/dressing sponge   Wound Length (cm) 11 cm   Wound Width (cm) 21 cm   Wound Depth (cm) 0.3 cm   Wound Surface Area (cm^2) 231 cm^2   Change in Wound Size % (l*w) -131   Wound Volume (cm^3) 69.3 cm^3   Wound Healing % -593   Wound Assessment [place]/red;Slough   Drainage Amount Large (50-75% saturated)   Drainage Description Serosanguinous   Odor Mild   Pati-wound Assessment Edematous   Wound Thickness Description not for Pressure Injury Full thickness   Wound 11/01/23 Leg Posterior;Right #1   Date First Assessed/Time First Assessed: 11/01/23 0954   Present on Original Admission: Yes  Wound Approximate Age at First Assessment (Weeks): 12 weeks  Primary Wound Type: Venous Ulcer  Location: Leg  Wound Location Orientation: Posterior;Right  Wou...   Wound Image    Wound Etiology Venous   Dressing Status Old drainage noted   Wound Cleansed Soap and water;Cleansed with saline   Dressing/Treatment Gauze dressing/dressing sponge;ABD   Wound Length (cm) 8 cm   Wound Width (cm) 14 cm   Wound Depth (cm) 0.3 cm   Wound Surface Area (cm^2) 112 cm^2   Change in Wound Size % (l*w) -100   Wound Volume (cm^3) 33.6 cm^3   Wound Healing % -500   Wound Assessment

## 2024-07-16 NOTE — FLOWSHEET NOTE
07/16/24 1430   Right Leg Edema Point of Measurement   Leg circumference 57.5 cm   Ankle circumference 30.5 cm   Foot circumference 25 cm   Compression Therapy Ace wrap   Left Leg Edema Point of Measurement   Leg circumference 54 cm   Ankle circumference 30 cm   Foot circumference 24.5 cm   Compression Therapy Ace wrap   Wound 11/01/23 Leg Left;Posterior #2   Date First Assessed/Time First Assessed: 11/01/23 0954   Present on Original Admission: Yes  Wound Approximate Age at First Assessment (Weeks): 12 weeks  Primary Wound Type: Venous Ulcer  Location: Leg  Wound Location Orientation: Left;Posterior  Woun...   Wound Image     Wound Etiology Venous   Dressing Status Old drainage noted   Wound Cleansed Soap and water;Cleansed with saline   Dressing/Treatment ABD;Gauze dressing/dressing sponge   Wound Length (cm) 11 cm   Wound Width (cm) 21 cm   Wound Depth (cm) 0.3 cm   Wound Surface Area (cm^2) 231 cm^2   Change in Wound Size % (l*w) -131   Wound Volume (cm^3) 69.3 cm^3   Wound Healing % -593   Wound Assessment Doffing/red;Slough   Drainage Amount Large (50-75% saturated)   Drainage Description Serosanguinous   Odor Mild   Pati-wound Assessment Edematous   Wound Thickness Description not for Pressure Injury Full thickness   Wound 11/01/23 Leg Posterior;Right #1   Date First Assessed/Time First Assessed: 11/01/23 0954   Present on Original Admission: Yes  Wound Approximate Age at First Assessment (Weeks): 12 weeks  Primary Wound Type: Venous Ulcer  Location: Leg  Wound Location Orientation: Posterior;Right  Wou...   Wound Image    Wound Etiology Venous   Dressing Status Old drainage noted   Wound Cleansed Soap and water;Cleansed with saline   Dressing/Treatment Gauze dressing/dressing sponge;ABD   Wound Length (cm) 8 cm   Wound Width (cm) 14 cm   Wound Depth (cm) 0.3 cm   Wound Surface Area (cm^2) 112 cm^2   Change in Wound Size % (l*w) -100   Wound Volume (cm^3) 33.6 cm^3   Wound Healing % -500   Wound Assessment

## 2024-07-17 RX ORDER — LIDOCAINE HYDROCHLORIDE 40 MG/ML
SOLUTION TOPICAL ONCE
OUTPATIENT
Start: 2024-07-17 | End: 2024-07-17

## 2024-07-17 RX ORDER — LIDOCAINE HYDROCHLORIDE 20 MG/ML
JELLY TOPICAL ONCE
OUTPATIENT
Start: 2024-07-17 | End: 2024-07-17

## 2024-07-17 RX ORDER — GENTAMICIN SULFATE 1 MG/G
OINTMENT TOPICAL ONCE
OUTPATIENT
Start: 2024-07-17 | End: 2024-07-17

## 2024-07-17 RX ORDER — GINSENG 100 MG
CAPSULE ORAL ONCE
OUTPATIENT
Start: 2024-07-17 | End: 2024-07-17

## 2024-07-17 RX ORDER — IBUPROFEN 200 MG
TABLET ORAL ONCE
OUTPATIENT
Start: 2024-07-17 | End: 2024-07-17

## 2024-07-17 RX ORDER — BETAMETHASONE DIPROPIONATE 0.5 MG/G
CREAM TOPICAL ONCE
OUTPATIENT
Start: 2024-07-17 | End: 2024-07-17

## 2024-07-17 RX ORDER — BACITRACIN ZINC AND POLYMYXIN B SULFATE 500; 1000 [USP'U]/G; [USP'U]/G
OINTMENT TOPICAL ONCE
OUTPATIENT
Start: 2024-07-17 | End: 2024-07-17

## 2024-07-17 RX ORDER — LIDOCAINE 50 MG/G
OINTMENT TOPICAL ONCE
OUTPATIENT
Start: 2024-07-17 | End: 2024-07-17

## 2024-07-17 RX ORDER — TRIAMCINOLONE ACETONIDE 1 MG/G
OINTMENT TOPICAL ONCE
OUTPATIENT
Start: 2024-07-17 | End: 2024-07-17

## 2024-07-17 RX ORDER — CLOBETASOL PROPIONATE 0.5 MG/G
OINTMENT TOPICAL ONCE
OUTPATIENT
Start: 2024-07-17 | End: 2024-07-17

## 2024-07-17 RX ORDER — LIDOCAINE 40 MG/G
CREAM TOPICAL ONCE
OUTPATIENT
Start: 2024-07-17 | End: 2024-07-17

## 2024-07-17 RX ORDER — SODIUM CHLOR/HYPOCHLOROUS ACID 0.033 %
SOLUTION, IRRIGATION IRRIGATION ONCE
OUTPATIENT
Start: 2024-07-17 | End: 2024-07-17

## 2024-07-17 NOTE — PROGRESS NOTES
HIP ARTHROPLASTY Left 2011    Dr. Andersen    TUBAL LIGATION  1999       FAMILY HISTORY    Family History   Problem Relation Age of Onset    No Known Problems Mother     Heart Attack Father     Other Father         aneurysm age 58, now brain damaged    Heart Disease Father     Cancer Sister         hodgkins    Breast Cancer Sister     Breast Cancer Sister     Diabetes Paternal Grandmother     Other Son         24 week twin, LD    Colon Cancer Other     Other Cousin         ? femur fracture       SOCIAL HISTORY    Social History     Tobacco Use    Smoking status: Never    Smokeless tobacco: Never   Substance Use Topics    Alcohol use: No    Drug use: Yes     Types: Prescription       ALLERGIES    Allergies   Allergen Reactions    Ciprofloxacin Itching    Sulfa Antibiotics Itching    Xarelto [Rivaroxaban] Itching       MEDICATIONS    Current Outpatient Medications on File Prior to Encounter   Medication Sig Dispense Refill    baclofen (LIORESAL) 10 MG tablet Take 1 tablet by mouth 3 times daily      naloxone (NARCAN) 4 MG/0.1ML LIQD nasal spray 1 spray by Nasal route as needed for Opioid Reversal 1 each 1    furosemide (LASIX) 40 MG tablet Take 1 tablet by mouth daily 60 tablet 3    mineral oil-hydrophilic petrolatum (AQUAPHOR) ointment Apply topically as needed. (Patient not taking: Reported on 6/4/2024) 99 g 0    losartan (COZAAR) 50 MG tablet Take 1 tablet by mouth daily HOLD UNTIL FOLLOW UP WITH PRIMARY CARE DUE TO LOW BP (Patient not taking: Reported on 6/4/2024) 90 tablet 0    DULoxetine (CYMBALTA) 60 MG extended release capsule Take 1 capsule by mouth daily      pregabalin (LYRICA) 200 MG capsule Take 1 capsule by mouth in the morning, at noon, and at bedtime for 30 days. Max Daily Amount: 600 mg 90 capsule 3    metoprolol succinate (TOPROL XL) 25 MG extended release tablet Take 1 tablet by mouth daily (Patient not taking: Reported on 6/4/2024) 30 tablet 3    pantoprazole (PROTONIX) 40 MG tablet Take 1 tablet

## 2024-07-18 ENCOUNTER — TELEMEDICINE (OUTPATIENT)
Dept: FAMILY MEDICINE CLINIC | Facility: CLINIC | Age: 49
End: 2024-07-18
Payer: MEDICARE

## 2024-07-18 ENCOUNTER — TELEPHONE (OUTPATIENT)
Dept: FAMILY MEDICINE CLINIC | Facility: CLINIC | Age: 49
End: 2024-07-18

## 2024-07-18 DIAGNOSIS — K21.9 GASTROESOPHAGEAL REFLUX DISEASE WITHOUT ESOPHAGITIS: ICD-10-CM

## 2024-07-18 DIAGNOSIS — J45.40 MODERATE PERSISTENT ASTHMA WITHOUT COMPLICATION: Primary | ICD-10-CM

## 2024-07-18 DIAGNOSIS — I10 PRIMARY HYPERTENSION: ICD-10-CM

## 2024-07-18 DIAGNOSIS — J30.89 ALLERGIC RHINITIS DUE TO OTHER ALLERGIC TRIGGER, UNSPECIFIED SEASONALITY: ICD-10-CM

## 2024-07-18 DIAGNOSIS — G47.10 HYPERSOMNIA: ICD-10-CM

## 2024-07-18 DIAGNOSIS — L50.9 URTICARIA: ICD-10-CM

## 2024-07-18 DIAGNOSIS — I89.0 LYMPHEDEMA OF BOTH LOWER EXTREMITIES: Chronic | ICD-10-CM

## 2024-07-18 PROCEDURE — 99443 PR PHYS/QHP TELEPHONE EVALUATION 21-30 MIN: CPT | Performed by: NURSE PRACTITIONER

## 2024-07-18 RX ORDER — OMEPRAZOLE 20 MG/1
20 CAPSULE, DELAYED RELEASE ORAL
Qty: 30 CAPSULE | Refills: 3 | Status: SHIPPED | OUTPATIENT
Start: 2024-07-18

## 2024-07-18 RX ORDER — METOPROLOL SUCCINATE 25 MG/1
25 TABLET, EXTENDED RELEASE ORAL DAILY
Qty: 30 TABLET | Refills: 3 | Status: SHIPPED | OUTPATIENT
Start: 2024-07-18

## 2024-07-18 RX ORDER — HYDROXYZINE HYDROCHLORIDE 10 MG/1
10 TABLET, FILM COATED ORAL 3 TIMES DAILY PRN
Qty: 30 TABLET | Refills: 1 | Status: SHIPPED | OUTPATIENT
Start: 2024-07-18 | End: 2024-08-07

## 2024-07-18 RX ORDER — FLUTICASONE PROPIONATE 50 MCG
2 SPRAY, SUSPENSION (ML) NASAL DAILY
Qty: 16 G | Refills: 2 | Status: SHIPPED | OUTPATIENT
Start: 2024-07-18

## 2024-07-18 RX ORDER — ALBUTEROL SULFATE 90 UG/1
2 AEROSOL, METERED RESPIRATORY (INHALATION) EVERY 6 HOURS PRN
Qty: 18 G | Refills: 5 | Status: SHIPPED | OUTPATIENT
Start: 2024-07-18

## 2024-07-18 ASSESSMENT — ENCOUNTER SYMPTOMS
GASTROINTESTINAL NEGATIVE: 1
EYES NEGATIVE: 1
RESPIRATORY NEGATIVE: 1
ALLERGIC/IMMUNOLOGIC NEGATIVE: 1

## 2024-07-18 NOTE — PROGRESS NOTES
[facility] [address]  [place] [address]   (ph) [phone] (fax) [phone]  ROLAND Bruce        Lo Tirado is a 48 y.o. female VV who presents as a f/u to pain in her legs. Pt states she has burning to both of her legs. States this has been present for over a year. Denies any fevers. Denies any other complaints. She went to [facility] on 07/15/24 and was given a Morphine injection per pt. Reports pain is better today. She still has [facility] that comes out three times weekly. Reports that she goes to the [facility] every two weeks. Reports that she still has the lymphedema. She reports that she is still seeing nephrology. Pt asking about a [facility] system where she will not have to get up to go to bathroom.         Allergies   Allergen Reactions    Ciprofloxacin Itching    Sulfa Antibiotics Itching    Xarelto [Rivaroxaban] Itching       Past Medical History:   Diagnosis Date    Anxiety     Arthritis     Bipolar affective disorder ([facility]) 15 yrs old    Cellulitis 01/08/2010    denies currently     Cerebrovascular disease     Chronic back pain     CKD (chronic kidney disease) stage 3, GFR 30-59 ml/min ([facility])     Deep vein thrombosis ([facility]) 12/13/2022    Depression     Diabetes ([facility]) dx:1/09    Unknown A1C    Diabetes ([facility]) 01/08/2010    had multiple TIA's from low bs in 2014 and hasn't been on any meds since    Diabetes mellitus type II, non insulin dependent ([facility]) 03/08/2015    Disease of blood and blood forming organ     Edema of both legs 01/08/2010    ankles     Femur fracture, left ([facility]) 03/08/2015    GERD (gastroesophageal reflux disease)     denies    Gout     Hypertension     no meds    Intractable pain 4/25/2024    Moderate persistent asthma without complication 10/19/2021    Morbid obesity ([facility]) lifetime    Non compliance with medical treatment long term    Orthopnea     Osteoarthritis     Other ill-defined

## 2024-07-24 ENCOUNTER — TELEPHONE (OUTPATIENT)
Dept: WOUND CARE | Age: 49
End: 2024-07-24

## 2024-07-24 ENCOUNTER — COMMUNITY OUTREACH (OUTPATIENT)
Dept: FAMILY MEDICINE CLINIC | Facility: CLINIC | Age: 49
End: 2024-07-24

## 2024-07-26 ENCOUNTER — APPOINTMENT (OUTPATIENT)
Dept: GENERAL RADIOLOGY | Age: 49
End: 2024-07-26
Payer: MEDICARE

## 2024-07-26 ENCOUNTER — APPOINTMENT (OUTPATIENT)
Dept: CT IMAGING | Age: 49
End: 2024-07-26
Payer: MEDICARE

## 2024-07-26 ENCOUNTER — HOSPITAL ENCOUNTER (OUTPATIENT)
Age: 49
Setting detail: OBSERVATION
Discharge: HOME | End: 2024-07-29
Attending: EMERGENCY MEDICINE | Admitting: FAMILY MEDICINE
Payer: MEDICARE

## 2024-07-26 DIAGNOSIS — I83.009 CHRONIC CUTANEOUS VENOUS STASIS ULCER (HCC): ICD-10-CM

## 2024-07-26 DIAGNOSIS — T79.6XXA TRAUMATIC RHABDOMYOLYSIS, INITIAL ENCOUNTER (HCC): Primary | ICD-10-CM

## 2024-07-26 DIAGNOSIS — L97.909 CHRONIC CUTANEOUS VENOUS STASIS ULCER (HCC): ICD-10-CM

## 2024-07-26 PROBLEM — M62.82 RHABDOMYOLYSIS: Status: ACTIVE | Noted: 2024-07-26

## 2024-07-26 LAB
ALBUMIN SERPL-MCNC: 2.9 G/DL (ref 3.5–5)
ALBUMIN/GLOB SERPL: 0.6 (ref 1–1.9)
ALP SERPL-CCNC: 85 U/L (ref 35–104)
ALT SERPL-CCNC: 25 U/L (ref 12–65)
ANION GAP SERPL CALC-SCNC: 11 MMOL/L (ref 9–18)
AST SERPL-CCNC: 47 U/L (ref 15–37)
B PERT DNA SPEC QL NAA+PROBE: NOT DETECTED
BASOPHILS # BLD: 0 K/UL (ref 0–0.2)
BASOPHILS NFR BLD: 0 % (ref 0–2)
BILIRUB SERPL-MCNC: 0.7 MG/DL (ref 0–1.2)
BORDETELLA PARAPERTUSSIS BY PCR: NOT DETECTED
BUN SERPL-MCNC: 8 MG/DL (ref 6–23)
C PNEUM DNA SPEC QL NAA+PROBE: NOT DETECTED
CALCIUM SERPL-MCNC: 9.2 MG/DL (ref 8.8–10.2)
CHLORIDE SERPL-SCNC: 100 MMOL/L (ref 98–107)
CK SERPL-CCNC: 912 U/L (ref 21–215)
CO2 SERPL-SCNC: 26 MMOL/L (ref 20–28)
CREAT SERPL-MCNC: 0.83 MG/DL (ref 0.6–1.1)
DIFFERENTIAL METHOD BLD: ABNORMAL
EOSINOPHIL # BLD: 0.1 K/UL (ref 0–0.8)
EOSINOPHIL NFR BLD: 1 % (ref 0.5–7.8)
ERYTHROCYTE [DISTWIDTH] IN BLOOD BY AUTOMATED COUNT: 19.7 % (ref 11.9–14.6)
FLUAV SUBTYP SPEC NAA+PROBE: NOT DETECTED
FLUBV RNA SPEC QL NAA+PROBE: NOT DETECTED
GLOBULIN SER CALC-MCNC: 4.9 G/DL (ref 2.3–3.5)
GLUCOSE BLD STRIP.AUTO-MCNC: 105 MG/DL (ref 65–100)
GLUCOSE SERPL-MCNC: 100 MG/DL (ref 70–99)
HADV DNA SPEC QL NAA+PROBE: NOT DETECTED
HCOV 229E RNA SPEC QL NAA+PROBE: NOT DETECTED
HCOV HKU1 RNA SPEC QL NAA+PROBE: NOT DETECTED
HCOV NL63 RNA SPEC QL NAA+PROBE: NOT DETECTED
HCOV OC43 RNA SPEC QL NAA+PROBE: NOT DETECTED
HCT VFR BLD AUTO: 30.1 % (ref 35.8–46.3)
HGB BLD-MCNC: 9.3 G/DL (ref 11.7–15.4)
HMPV RNA SPEC QL NAA+PROBE: NOT DETECTED
HPIV1 RNA SPEC QL NAA+PROBE: NOT DETECTED
HPIV2 RNA SPEC QL NAA+PROBE: NOT DETECTED
HPIV3 RNA SPEC QL NAA+PROBE: NOT DETECTED
HPIV4 RNA SPEC QL NAA+PROBE: NOT DETECTED
IMM GRANULOCYTES # BLD AUTO: 0 K/UL (ref 0–0.5)
IMM GRANULOCYTES NFR BLD AUTO: 0 % (ref 0–5)
LYMPHOCYTES # BLD: 1.5 K/UL (ref 0.5–4.6)
LYMPHOCYTES NFR BLD: 21 % (ref 13–44)
M PNEUMO DNA SPEC QL NAA+PROBE: NOT DETECTED
MCH RBC QN AUTO: 24.9 PG (ref 26.1–32.9)
MCHC RBC AUTO-ENTMCNC: 30.9 G/DL (ref 31.4–35)
MCV RBC AUTO: 80.5 FL (ref 82–102)
MONOCYTES # BLD: 1 K/UL (ref 0.1–1.3)
MONOCYTES NFR BLD: 13 % (ref 4–12)
NEUTS SEG # BLD: 4.7 K/UL (ref 1.7–8.2)
NEUTS SEG NFR BLD: 65 % (ref 43–78)
NRBC # BLD: 0 K/UL (ref 0–0.2)
NT PRO BNP: 215 PG/ML (ref 0–125)
PLATELET # BLD AUTO: 434 K/UL (ref 150–450)
PMV BLD AUTO: 9.3 FL (ref 9.4–12.3)
POTASSIUM SERPL-SCNC: 4.6 MMOL/L (ref 3.5–5.1)
PROCALCITONIN SERPL-MCNC: 0.07 NG/ML (ref 0–0.1)
PROT SERPL-MCNC: 7.7 G/DL (ref 6.3–8.2)
RBC # BLD AUTO: 3.74 M/UL (ref 4.05–5.2)
RSV RNA SPEC QL NAA+PROBE: NOT DETECTED
RV+EV RNA SPEC QL NAA+PROBE: NOT DETECTED
SARS-COV-2 RNA RESP QL NAA+PROBE: NOT DETECTED
SERVICE CMNT-IMP: ABNORMAL
SODIUM SERPL-SCNC: 137 MMOL/L (ref 136–145)
WBC # BLD AUTO: 7.3 K/UL (ref 4.3–11.1)

## 2024-07-26 PROCEDURE — 96374 THER/PROPH/DIAG INJ IV PUSH: CPT

## 2024-07-26 PROCEDURE — 83880 ASSAY OF NATRIURETIC PEPTIDE: CPT

## 2024-07-26 PROCEDURE — 2580000003 HC RX 258: Performed by: FAMILY MEDICINE

## 2024-07-26 PROCEDURE — 73700 CT LOWER EXTREMITY W/O DYE: CPT

## 2024-07-26 PROCEDURE — 84145 PROCALCITONIN (PCT): CPT

## 2024-07-26 PROCEDURE — 96372 THER/PROPH/DIAG INJ SC/IM: CPT

## 2024-07-26 PROCEDURE — 82962 GLUCOSE BLOOD TEST: CPT

## 2024-07-26 PROCEDURE — 6370000000 HC RX 637 (ALT 250 FOR IP): Performed by: FAMILY MEDICINE

## 2024-07-26 PROCEDURE — 0202U NFCT DS 22 TRGT SARS-COV-2: CPT

## 2024-07-26 PROCEDURE — 99285 EMERGENCY DEPT VISIT HI MDM: CPT

## 2024-07-26 PROCEDURE — 2500000003 HC RX 250 WO HCPCS: Performed by: FAMILY MEDICINE

## 2024-07-26 PROCEDURE — 80053 COMPREHEN METABOLIC PANEL: CPT

## 2024-07-26 PROCEDURE — 6360000002 HC RX W HCPCS: Performed by: EMERGENCY MEDICINE

## 2024-07-26 PROCEDURE — 85025 COMPLETE CBC W/AUTO DIFF WBC: CPT

## 2024-07-26 PROCEDURE — 71045 X-RAY EXAM CHEST 1 VIEW: CPT

## 2024-07-26 PROCEDURE — 96376 TX/PRO/DX INJ SAME DRUG ADON: CPT

## 2024-07-26 PROCEDURE — 6360000002 HC RX W HCPCS: Performed by: FAMILY MEDICINE

## 2024-07-26 PROCEDURE — G0378 HOSPITAL OBSERVATION PER HR: HCPCS

## 2024-07-26 PROCEDURE — 96375 TX/PRO/DX INJ NEW DRUG ADDON: CPT

## 2024-07-26 PROCEDURE — 82550 ASSAY OF CK (CPK): CPT

## 2024-07-26 PROCEDURE — 73502 X-RAY EXAM HIP UNI 2-3 VIEWS: CPT

## 2024-07-26 RX ORDER — HYDROXYZINE HYDROCHLORIDE 10 MG/1
10 TABLET, FILM COATED ORAL 3 TIMES DAILY PRN
Status: DISCONTINUED | OUTPATIENT
Start: 2024-07-26 | End: 2024-07-29 | Stop reason: HOSPADM

## 2024-07-26 RX ORDER — POTASSIUM CHLORIDE 7.45 MG/ML
10 INJECTION INTRAVENOUS PRN
Status: DISCONTINUED | OUTPATIENT
Start: 2024-07-26 | End: 2024-07-29 | Stop reason: HOSPADM

## 2024-07-26 RX ORDER — PANTOPRAZOLE SODIUM 40 MG/1
40 TABLET, DELAYED RELEASE ORAL
Status: DISCONTINUED | OUTPATIENT
Start: 2024-07-27 | End: 2024-07-29 | Stop reason: HOSPADM

## 2024-07-26 RX ORDER — BISACODYL 10 MG
10 SUPPOSITORY, RECTAL RECTAL DAILY PRN
Status: DISCONTINUED | OUTPATIENT
Start: 2024-07-26 | End: 2024-07-29 | Stop reason: HOSPADM

## 2024-07-26 RX ORDER — BACLOFEN 10 MG/1
10 TABLET ORAL 3 TIMES DAILY
Status: DISCONTINUED | OUTPATIENT
Start: 2024-07-26 | End: 2024-07-29 | Stop reason: HOSPADM

## 2024-07-26 RX ORDER — POTASSIUM CHLORIDE 20 MEQ/1
40 TABLET, EXTENDED RELEASE ORAL PRN
Status: DISCONTINUED | OUTPATIENT
Start: 2024-07-26 | End: 2024-07-29 | Stop reason: HOSPADM

## 2024-07-26 RX ORDER — METOPROLOL SUCCINATE 25 MG/1
25 TABLET, EXTENDED RELEASE ORAL DAILY
Status: DISCONTINUED | OUTPATIENT
Start: 2024-07-26 | End: 2024-07-29 | Stop reason: HOSPADM

## 2024-07-26 RX ORDER — ALBUTEROL SULFATE 90 UG/1
2 AEROSOL, METERED RESPIRATORY (INHALATION) EVERY 6 HOURS PRN
Status: DISCONTINUED | OUTPATIENT
Start: 2024-07-26 | End: 2024-07-26 | Stop reason: SDUPTHER

## 2024-07-26 RX ORDER — FLUTICASONE PROPIONATE 50 MCG
2 SPRAY, SUSPENSION (ML) NASAL DAILY
Status: DISCONTINUED | OUTPATIENT
Start: 2024-07-26 | End: 2024-07-29 | Stop reason: HOSPADM

## 2024-07-26 RX ORDER — SODIUM CHLORIDE 9 MG/ML
INJECTION, SOLUTION INTRAVENOUS PRN
Status: DISCONTINUED | OUTPATIENT
Start: 2024-07-26 | End: 2024-07-29 | Stop reason: HOSPADM

## 2024-07-26 RX ORDER — ALBUTEROL SULFATE 2.5 MG/3ML
2.5 SOLUTION RESPIRATORY (INHALATION) EVERY 6 HOURS PRN
Status: DISCONTINUED | OUTPATIENT
Start: 2024-07-26 | End: 2024-07-29 | Stop reason: HOSPADM

## 2024-07-26 RX ORDER — ONDANSETRON 2 MG/ML
4 INJECTION INTRAMUSCULAR; INTRAVENOUS
Status: COMPLETED | OUTPATIENT
Start: 2024-07-26 | End: 2024-07-26

## 2024-07-26 RX ORDER — MAGNESIUM SULFATE IN WATER 40 MG/ML
2000 INJECTION, SOLUTION INTRAVENOUS PRN
Status: DISCONTINUED | OUTPATIENT
Start: 2024-07-26 | End: 2024-07-29 | Stop reason: HOSPADM

## 2024-07-26 RX ORDER — SODIUM CHLORIDE 0.9 % (FLUSH) 0.9 %
5-40 SYRINGE (ML) INJECTION PRN
Status: DISCONTINUED | OUTPATIENT
Start: 2024-07-26 | End: 2024-07-29 | Stop reason: HOSPADM

## 2024-07-26 RX ORDER — DULOXETIN HYDROCHLORIDE 60 MG/1
60 CAPSULE, DELAYED RELEASE ORAL DAILY
Status: DISCONTINUED | OUTPATIENT
Start: 2024-07-26 | End: 2024-07-29 | Stop reason: HOSPADM

## 2024-07-26 RX ORDER — ONDANSETRON 2 MG/ML
4 INJECTION INTRAMUSCULAR; INTRAVENOUS EVERY 6 HOURS PRN
Status: DISCONTINUED | OUTPATIENT
Start: 2024-07-26 | End: 2024-07-26

## 2024-07-26 RX ORDER — LOSARTAN POTASSIUM 50 MG/1
50 TABLET ORAL DAILY
Status: DISCONTINUED | OUTPATIENT
Start: 2024-07-26 | End: 2024-07-29 | Stop reason: HOSPADM

## 2024-07-26 RX ORDER — SODIUM CHLORIDE 9 MG/ML
INJECTION, SOLUTION INTRAVENOUS CONTINUOUS
Status: ACTIVE | OUTPATIENT
Start: 2024-07-26 | End: 2024-07-27

## 2024-07-26 RX ORDER — MONTELUKAST SODIUM 10 MG/1
10 TABLET ORAL DAILY
Status: DISCONTINUED | OUTPATIENT
Start: 2024-07-26 | End: 2024-07-29 | Stop reason: HOSPADM

## 2024-07-26 RX ORDER — POLYETHYLENE GLYCOL 3350 17 G/17G
17 POWDER, FOR SOLUTION ORAL DAILY PRN
Status: DISCONTINUED | OUTPATIENT
Start: 2024-07-26 | End: 2024-07-29 | Stop reason: HOSPADM

## 2024-07-26 RX ORDER — ENOXAPARIN SODIUM 100 MG/ML
30 INJECTION SUBCUTANEOUS 2 TIMES DAILY
Status: DISCONTINUED | OUTPATIENT
Start: 2024-07-26 | End: 2024-07-29

## 2024-07-26 RX ORDER — MORPHINE SULFATE 4 MG/ML
4 INJECTION, SOLUTION INTRAMUSCULAR; INTRAVENOUS ONCE
Status: COMPLETED | OUTPATIENT
Start: 2024-07-26 | End: 2024-07-26

## 2024-07-26 RX ORDER — HYDROMORPHONE HYDROCHLORIDE 1 MG/ML
1 INJECTION, SOLUTION INTRAMUSCULAR; INTRAVENOUS; SUBCUTANEOUS ONCE
Status: COMPLETED | OUTPATIENT
Start: 2024-07-26 | End: 2024-07-26

## 2024-07-26 RX ORDER — ACETAMINOPHEN 325 MG/1
650 TABLET ORAL EVERY 6 HOURS PRN
Status: DISCONTINUED | OUTPATIENT
Start: 2024-07-26 | End: 2024-07-29 | Stop reason: HOSPADM

## 2024-07-26 RX ORDER — ASPIRIN 81 MG/1
81 TABLET, CHEWABLE ORAL DAILY
Status: DISCONTINUED | OUTPATIENT
Start: 2024-07-27 | End: 2024-07-29 | Stop reason: HOSPADM

## 2024-07-26 RX ORDER — BACLOFEN 10 MG/1
10 TABLET ORAL ONCE
Status: COMPLETED | OUTPATIENT
Start: 2024-07-26 | End: 2024-07-26

## 2024-07-26 RX ORDER — FAMOTIDINE 20 MG/1
10 TABLET, FILM COATED ORAL DAILY PRN
Status: DISCONTINUED | OUTPATIENT
Start: 2024-07-26 | End: 2024-07-29 | Stop reason: HOSPADM

## 2024-07-26 RX ORDER — SODIUM CHLORIDE 0.9 % (FLUSH) 0.9 %
5-40 SYRINGE (ML) INJECTION EVERY 12 HOURS SCHEDULED
Status: DISCONTINUED | OUTPATIENT
Start: 2024-07-26 | End: 2024-07-29 | Stop reason: HOSPADM

## 2024-07-26 RX ORDER — MAGNESIUM HYDROXIDE/ALUMINUM HYDROXICE/SIMETHICONE 120; 1200; 1200 MG/30ML; MG/30ML; MG/30ML
30 SUSPENSION ORAL EVERY 6 HOURS PRN
Status: DISCONTINUED | OUTPATIENT
Start: 2024-07-26 | End: 2024-07-29 | Stop reason: HOSPADM

## 2024-07-26 RX ORDER — ACETAMINOPHEN 650 MG/1
650 SUPPOSITORY RECTAL EVERY 6 HOURS PRN
Status: DISCONTINUED | OUTPATIENT
Start: 2024-07-26 | End: 2024-07-29 | Stop reason: HOSPADM

## 2024-07-26 RX ORDER — ONDANSETRON 4 MG/1
4 TABLET, ORALLY DISINTEGRATING ORAL EVERY 8 HOURS PRN
Status: DISCONTINUED | OUTPATIENT
Start: 2024-07-26 | End: 2024-07-26

## 2024-07-26 RX ADMIN — MICONAZOLE NITRATE: 20 POWDER TOPICAL at 20:56

## 2024-07-26 RX ADMIN — BACLOFEN 10 MG: 10 TABLET ORAL at 16:27

## 2024-07-26 RX ADMIN — MONTELUKAST 10 MG: 10 TABLET, FILM COATED ORAL at 16:28

## 2024-07-26 RX ADMIN — MORPHINE SULFATE 4 MG: 4 INJECTION INTRAVENOUS at 13:02

## 2024-07-26 RX ADMIN — SODIUM CHLORIDE: 9 INJECTION, SOLUTION INTRAVENOUS at 16:25

## 2024-07-26 RX ADMIN — SODIUM CHLORIDE, PRESERVATIVE FREE 10 ML: 5 INJECTION INTRAVENOUS at 20:49

## 2024-07-26 RX ADMIN — LOSARTAN POTASSIUM 50 MG: 50 TABLET, FILM COATED ORAL at 16:25

## 2024-07-26 RX ADMIN — MORPHINE SULFATE 4 MG: 4 INJECTION INTRAVENOUS at 14:06

## 2024-07-26 RX ADMIN — DULOXETINE HYDROCHLORIDE 60 MG: 60 CAPSULE, DELAYED RELEASE ORAL at 16:25

## 2024-07-26 RX ADMIN — PREGABALIN 200 MG: 150 CAPSULE ORAL at 20:46

## 2024-07-26 RX ADMIN — BACLOFEN 10 MG: 10 TABLET ORAL at 20:44

## 2024-07-26 RX ADMIN — HYDROMORPHONE HYDROCHLORIDE 1 MG: 1 INJECTION, SOLUTION INTRAMUSCULAR; INTRAVENOUS; SUBCUTANEOUS at 16:53

## 2024-07-26 RX ADMIN — ONDANSETRON 4 MG: 2 INJECTION INTRAMUSCULAR; INTRAVENOUS at 13:02

## 2024-07-26 RX ADMIN — FLUTICASONE PROPIONATE 2 SPRAY: 50 SPRAY, METERED NASAL at 16:28

## 2024-07-26 RX ADMIN — METOPROLOL SUCCINATE 25 MG: 25 TABLET, EXTENDED RELEASE ORAL at 16:26

## 2024-07-26 RX ADMIN — PREGABALIN 200 MG: 150 CAPSULE ORAL at 16:26

## 2024-07-26 RX ADMIN — ENOXAPARIN SODIUM 30 MG: 100 INJECTION SUBCUTANEOUS at 20:46

## 2024-07-26 RX ADMIN — HYDROXYZINE HYDROCHLORIDE 10 MG: 10 TABLET ORAL at 20:57

## 2024-07-26 ASSESSMENT — PAIN DESCRIPTION - DESCRIPTORS
DESCRIPTORS: BURNING
DESCRIPTORS: ACHING
DESCRIPTORS: ACHING
DESCRIPTORS: TINGLING

## 2024-07-26 ASSESSMENT — PAIN DESCRIPTION - ORIENTATION
ORIENTATION: RIGHT;LEFT

## 2024-07-26 ASSESSMENT — PAIN SCALES - GENERAL
PAINLEVEL_OUTOF10: 10
PAINLEVEL_OUTOF10: 7
PAINLEVEL_OUTOF10: 10
PAINLEVEL_OUTOF10: 8

## 2024-07-26 ASSESSMENT — PAIN DESCRIPTION - LOCATION
LOCATION: LEG

## 2024-07-26 ASSESSMENT — PAIN DESCRIPTION - FREQUENCY: FREQUENCY: CONTINUOUS

## 2024-07-26 ASSESSMENT — PAIN DESCRIPTION - PAIN TYPE: TYPE: ACUTE PAIN;CHRONIC PAIN

## 2024-07-26 ASSESSMENT — PAIN DESCRIPTION - ONSET: ONSET: ON-GOING

## 2024-07-26 ASSESSMENT — PAIN - FUNCTIONAL ASSESSMENT: PAIN_FUNCTIONAL_ASSESSMENT: PREVENTS OR INTERFERES SOME ACTIVE ACTIVITIES AND ADLS

## 2024-07-26 NOTE — ED PROVIDER NOTES
Emergency Department Provider Note       PCP: Nenita Beatty, APRN - [facility]   Age: 48 y.o.   Sex: female     DISPOSITION Decision To Admit 07/26/2024 02:54:19 PM       ICD-10-CM    1. Traumatic rhabdomyolysis, initial encounter ([facility])  T79.6XXA       2. Chronic cutaneous venous stasis ulcer ([facility])  I83.009     L97.909           Medical Decision Making     Evaluation for cellulitis, possible right hip fracture and possible rhabdomyolysis from prolonged immobilization on the floor.    Laboratory data shows a newly clinically significant abnormality of CK of 912 with a chronic anemia and hypoalbuminemia.  Chest x-ray was reported as interstitial edema but BNP is negligible at 215, and x-ray of the right hip does not show evidence of fracture.  Patient remains tender in that location and then states that she really feels like she is having pain in the right lower quadrant or right side of the pelvis.  Patient returned to x-ray for CT of the right hip and pelvis.  Case discussed with Dr. Rowley hospitalist who is willing to admit the patient for observation due to the elevated CK for rhabdomyolysis and monitor for potential JOSEE.     1 acute complicated illness or injury.  Shared medical decision making was utilized in creating the patients health plan today.    I independently ordered and reviewed each unique test.           The patient was admitted and I have discussed patient management with the admitting provider.          History     Patient arrived by EMS from home after a mechanical fall.  She states she was trying to get out of her chair sometime last night and fell to the floor and complains of pain in the right hip.  She is unsure how long she has been laying on the floor but it has been before sunrise.  She complains of chronic pain of the lower extremities and has lymphedema with venous stasis changes.  Pain to the right hip is new and sharp in nature.  She denies head injury or loss of consciousness.

## 2024-07-26 NOTE — ED NOTES
Ultrasound was used to find the vein which was compressible and does not have any features of an artery or nerve bundle. Skin was cleaned and disinfected prior to IV puncture. Under real-time ultrasound guidance, peripheral access was obtained in the L Antecubital 20g Peripheral IV catheter x 1 attempt/s. Blood return was present and IV flushed without difficulty. IV dressing applied, no immediate complications noted, and patient tolerated the procedure well.       Camryn Andrew, RN  07/26/24 7051

## 2024-07-26 NOTE — ED NOTES
TRANSFER - OUT REPORT:    Verbal report given to RN 5th floor on Lo Tirado  being transferred to [facility] for routine progression of patient care       Report consisted of patient's Situation, Background, Assessment and   Recommendations(SBAR).     Information from the following report(s) Nurse Handoff Report was reviewed with the receiving nurse.    Lenka Fall Assessment:    Presents to emergency department  because of falls (Syncope, seizure, or loss of consciousness): Yes  Age > 70: No  Altered Mental Status, Intoxication with alcohol or substance confusion (Disorientation, impaired judgment, poor safety awaremess, or inability to follow instructions): No  Impaired Mobility: Ambulates or transfers with assistive devices or assistance; Unable to ambulate or transer.: Yes  Nursing Judgement: Yes          Lines:   Peripheral IV 07/26/24 Left Antecubital (Active)        Opportunity for questions and clarification was provided.      Patient transported with:  Camryn Franco RN  07/26/24 9929

## 2024-07-26 NOTE — ACP (ADVANCE CARE PLANNING)
Advance Care Planning   Healthcare Decision Maker:    Primary Decision Maker: CandidaAbigail daley - Child - [phone]    Secondary Decision Maker: Cristina Cervantes - Friend - [phone]    Click here to complete Healthcare Decision Makers including selection of the Healthcare Decision Maker Relationship (ie \"Primary\").            
Normal for race

## 2024-07-26 NOTE — H&P
1 spray, Nasal, PRN    Nutritional Supplements (ENSURE MAX PROTEIN) LIQD 1 each, Oral, 2 times daily    omeprazole (PRILOSEC) 20 mg, Oral, DAILY BEFORE BREAKFAST    pregabalin (LYRICA) 200 mg, Oral, 3 times daily       I have personally reviewed labs and tests:  Recent Results (from the past 24 hour(s))   CBC with Auto Differential    Collection Time: 07/26/24 12:43 PM   Result Value Ref Range    WBC 7.3 4.3 - 11.1 K/uL    RBC 3.74 (L) 4.05 - 5.2 M/uL    Hemoglobin 9.3 (L) 11.7 - 15.4 g/dL    Hematocrit 30.1 (L) 35.8 - 46.3 %    MCV 80.5 (L) 82 - 102 FL    MCH 24.9 (L) 26.1 - 32.9 PG    MCHC 30.9 (L) 31.4 - 35.0 g/dL    RDW 19.7 (H) 11.9 - 14.6 %    Platelets 434 150 - 450 K/uL    MPV 9.3 (L) 9.4 - 12.3 FL    nRBC 0.00 0.0 - 0.2 K/uL    Differential Type AUTOMATED      Neutrophils % 65 43 - 78 %    Lymphocytes % 21 13 - 44 %    Monocytes % 13 (H) 4.0 - 12.0 %    Eosinophils % 1 0.5 - 7.8 %    Basophils % 0 0.0 - 2.0 %    Immature Granulocytes % 0 0.0 - 5.0 %    Neutrophils Absolute 4.7 1.7 - 8.2 K/UL    Lymphocytes Absolute 1.5 0.5 - 4.6 K/UL    Monocytes Absolute 1.0 0.1 - 1.3 K/UL    Eosinophils Absolute 0.1 0.0 - 0.8 K/UL    Basophils Absolute 0.0 0.0 - 0.2 K/UL    Immature Granulocytes Absolute 0.0 0.0 - 0.5 K/UL   Comprehensive Metabolic Panel    Collection Time: 07/26/24 12:43 PM   Result Value Ref Range    Sodium 137 136 - 145 mmol/L    Potassium 4.6 3.5 - 5.1 mmol/L    Chloride 100 98 - 107 mmol/L    CO2 26 20 - 28 mmol/L    Anion Gap 11 9 - 18 mmol/L    Glucose 100 (H) 70 - 99 mg/dL    BUN 8 6 - 23 MG/DL    Creatinine 0.83 0.60 - 1.10 MG/DL    Est, Glom Filt Rate 86 >60 ml/min/1.73m2    Calcium 9.2 8.8 - 10.2 MG/DL    Total Bilirubin 0.7 0.0 - 1.2 MG/DL    ALT 25 12 - 65 U/L    AST 47 (H) 15 - 37 U/L    Alk Phosphatase 85 35 - 104 U/L    Total Protein 7.7 6.3 - 8.2 g/dL    Albumin 2.9 (L) 3.5 - 5.0 g/dL    Globulin 4.9 (H) 2.3 - 3.5 g/dL    Albumin/Globulin Ratio 0.6 (L) 1.0 - 1.9     CK    Collection Time:

## 2024-07-27 LAB
ANION GAP SERPL CALC-SCNC: 9 MMOL/L (ref 9–18)
BASOPHILS # BLD: 0 K/UL (ref 0–0.2)
BASOPHILS NFR BLD: 1 % (ref 0–2)
BUN SERPL-MCNC: 7 MG/DL (ref 6–23)
CALCIUM SERPL-MCNC: 8.6 MG/DL (ref 8.8–10.2)
CHLORIDE SERPL-SCNC: 103 MMOL/L (ref 98–107)
CK SERPL-CCNC: 944 U/L (ref 21–215)
CO2 SERPL-SCNC: 27 MMOL/L (ref 20–28)
CREAT SERPL-MCNC: 0.85 MG/DL (ref 0.6–1.1)
DIFFERENTIAL METHOD BLD: ABNORMAL
EOSINOPHIL # BLD: 0.2 K/UL (ref 0–0.8)
EOSINOPHIL NFR BLD: 4 % (ref 0.5–7.8)
ERYTHROCYTE [DISTWIDTH] IN BLOOD BY AUTOMATED COUNT: 19.8 % (ref 11.9–14.6)
FERRITIN SERPL-MCNC: 108 NG/ML (ref 8–388)
FOLATE SERPL-MCNC: 11.1 NG/ML (ref 3.1–17.5)
GLUCOSE SERPL-MCNC: 90 MG/DL (ref 70–99)
HCT VFR BLD AUTO: 29 % (ref 35.8–46.3)
HGB BLD-MCNC: 8.8 G/DL (ref 11.7–15.4)
IMM GRANULOCYTES # BLD AUTO: 0 K/UL (ref 0–0.5)
IMM GRANULOCYTES NFR BLD AUTO: 0 % (ref 0–5)
IRON SATN MFR SERPL: 5 % (ref 20–50)
IRON SERPL-MCNC: 14 UG/DL (ref 35–100)
LYMPHOCYTES # BLD: 1.5 K/UL (ref 0.5–4.6)
LYMPHOCYTES NFR BLD: 23 % (ref 13–44)
MCH RBC QN AUTO: 24.5 PG (ref 26.1–32.9)
MCHC RBC AUTO-ENTMCNC: 30.3 G/DL (ref 31.4–35)
MCV RBC AUTO: 80.8 FL (ref 82–102)
MONOCYTES # BLD: 1.2 K/UL (ref 0.1–1.3)
MONOCYTES NFR BLD: 18 % (ref 4–12)
MRSA DNA SPEC QL NAA+PROBE: NOT DETECTED
NEUTS SEG # BLD: 3.6 K/UL (ref 1.7–8.2)
NEUTS SEG NFR BLD: 54 % (ref 43–78)
NRBC # BLD: 0 K/UL (ref 0–0.2)
PLATELET # BLD AUTO: 372 K/UL (ref 150–450)
PMV BLD AUTO: 9.4 FL (ref 9.4–12.3)
POTASSIUM SERPL-SCNC: 4.2 MMOL/L (ref 3.5–5.1)
RBC # BLD AUTO: 3.59 M/UL (ref 4.05–5.2)
S AUREUS CPE NOSE QL NAA+PROBE: DETECTED
SODIUM SERPL-SCNC: 138 MMOL/L (ref 136–145)
TIBC SERPL-MCNC: 272 UG/DL (ref 240–450)
UIBC SERPL-MCNC: 258 UG/DL (ref 112–347)
VIT B12 SERPL-MCNC: 525 PG/ML (ref 193–986)
WBC # BLD AUTO: 6.6 K/UL (ref 4.3–11.1)

## 2024-07-27 PROCEDURE — 6360000002 HC RX W HCPCS: Performed by: FAMILY MEDICINE

## 2024-07-27 PROCEDURE — 83550 IRON BINDING TEST: CPT

## 2024-07-27 PROCEDURE — 87205 SMEAR GRAM STAIN: CPT

## 2024-07-27 PROCEDURE — G0378 HOSPITAL OBSERVATION PER HR: HCPCS

## 2024-07-27 PROCEDURE — 97112 NEUROMUSCULAR REEDUCATION: CPT

## 2024-07-27 PROCEDURE — 97535 SELF CARE MNGMENT TRAINING: CPT

## 2024-07-27 PROCEDURE — 6370000000 HC RX 637 (ALT 250 FOR IP): Performed by: FAMILY MEDICINE

## 2024-07-27 PROCEDURE — 87040 BLOOD CULTURE FOR BACTERIA: CPT

## 2024-07-27 PROCEDURE — 87075 CULTR BACTERIA EXCEPT BLOOD: CPT

## 2024-07-27 PROCEDURE — 82746 ASSAY OF FOLIC ACID SERUM: CPT

## 2024-07-27 PROCEDURE — 85025 COMPLETE CBC W/AUTO DIFF WBC: CPT

## 2024-07-27 PROCEDURE — 87077 CULTURE AEROBIC IDENTIFY: CPT

## 2024-07-27 PROCEDURE — 87070 CULTURE OTHR SPECIMN AEROBIC: CPT

## 2024-07-27 PROCEDURE — 80048 BASIC METABOLIC PNL TOTAL CA: CPT

## 2024-07-27 PROCEDURE — 82550 ASSAY OF CK (CPK): CPT

## 2024-07-27 PROCEDURE — 83540 ASSAY OF IRON: CPT

## 2024-07-27 PROCEDURE — 2580000003 HC RX 258: Performed by: FAMILY MEDICINE

## 2024-07-27 PROCEDURE — 97165 OT EVAL LOW COMPLEX 30 MIN: CPT

## 2024-07-27 PROCEDURE — 82607 VITAMIN B-12: CPT

## 2024-07-27 PROCEDURE — 96376 TX/PRO/DX INJ SAME DRUG ADON: CPT

## 2024-07-27 PROCEDURE — 87641 MR-STAPH DNA AMP PROBE: CPT

## 2024-07-27 PROCEDURE — 36415 COLL VENOUS BLD VENIPUNCTURE: CPT

## 2024-07-27 PROCEDURE — 96375 TX/PRO/DX INJ NEW DRUG ADDON: CPT

## 2024-07-27 PROCEDURE — 96372 THER/PROPH/DIAG INJ SC/IM: CPT

## 2024-07-27 PROCEDURE — 97530 THERAPEUTIC ACTIVITIES: CPT

## 2024-07-27 PROCEDURE — 87186 SC STD MICRODIL/AGAR DIL: CPT

## 2024-07-27 PROCEDURE — 82728 ASSAY OF FERRITIN: CPT

## 2024-07-27 PROCEDURE — 97161 PT EVAL LOW COMPLEX 20 MIN: CPT

## 2024-07-27 RX ORDER — MORPHINE SULFATE 2 MG/ML
2 INJECTION, SOLUTION INTRAMUSCULAR; INTRAVENOUS
Status: DISCONTINUED | OUTPATIENT
Start: 2024-07-27 | End: 2024-07-28

## 2024-07-27 RX ORDER — SENNA AND DOCUSATE SODIUM 50; 8.6 MG/1; MG/1
2 TABLET, FILM COATED ORAL 2 TIMES DAILY
Status: DISCONTINUED | OUTPATIENT
Start: 2024-07-27 | End: 2024-07-29 | Stop reason: HOSPADM

## 2024-07-27 RX ORDER — LACTOBACILLUS RHAMNOSUS GG 10B CELL
1 CAPSULE ORAL
Status: DISCONTINUED | OUTPATIENT
Start: 2024-07-27 | End: 2024-07-29 | Stop reason: HOSPADM

## 2024-07-27 RX ORDER — POLYETHYLENE GLYCOL 3350 17 G/17G
17 POWDER, FOR SOLUTION ORAL DAILY
Status: DISCONTINUED | OUTPATIENT
Start: 2024-07-27 | End: 2024-07-29 | Stop reason: HOSPADM

## 2024-07-27 RX ORDER — MORPHINE SULFATE 2 MG/ML
4 INJECTION, SOLUTION INTRAMUSCULAR; INTRAVENOUS EVERY 4 HOURS PRN
Status: DISCONTINUED | OUTPATIENT
Start: 2024-07-27 | End: 2024-07-27

## 2024-07-27 RX ORDER — SODIUM CHLORIDE 9 MG/ML
INJECTION, SOLUTION INTRAVENOUS CONTINUOUS
Status: ACTIVE | OUTPATIENT
Start: 2024-07-27 | End: 2024-07-27

## 2024-07-27 RX ORDER — MORPHINE SULFATE 2 MG/ML
2 INJECTION, SOLUTION INTRAMUSCULAR; INTRAVENOUS ONCE
Status: COMPLETED | OUTPATIENT
Start: 2024-07-27 | End: 2024-07-27

## 2024-07-27 RX ORDER — OXYCODONE AND ACETAMINOPHEN 7.5; 325 MG/1; MG/1
1 TABLET ORAL EVERY 6 HOURS PRN
Status: DISCONTINUED | OUTPATIENT
Start: 2024-07-27 | End: 2024-07-29 | Stop reason: HOSPADM

## 2024-07-27 RX ORDER — KETOROLAC TROMETHAMINE 15 MG/ML
15 INJECTION, SOLUTION INTRAMUSCULAR; INTRAVENOUS EVERY 6 HOURS PRN
Status: DISCONTINUED | OUTPATIENT
Start: 2024-07-27 | End: 2024-07-29 | Stop reason: HOSPADM

## 2024-07-27 RX ORDER — FERROUS SULFATE 325(65) MG
325 TABLET ORAL
Status: DISCONTINUED | OUTPATIENT
Start: 2024-07-28 | End: 2024-07-29 | Stop reason: HOSPADM

## 2024-07-27 RX ADMIN — SODIUM CHLORIDE, PRESERVATIVE FREE 10 ML: 5 INJECTION INTRAVENOUS at 07:52

## 2024-07-27 RX ADMIN — KETOROLAC TROMETHAMINE 15 MG: 15 INJECTION, SOLUTION INTRAMUSCULAR; INTRAVENOUS at 09:07

## 2024-07-27 RX ADMIN — LOSARTAN POTASSIUM 50 MG: 50 TABLET, FILM COATED ORAL at 07:48

## 2024-07-27 RX ADMIN — FLUTICASONE PROPIONATE 2 SPRAY: 50 SPRAY, METERED NASAL at 07:50

## 2024-07-27 RX ADMIN — ENOXAPARIN SODIUM 30 MG: 100 INJECTION SUBCUTANEOUS at 08:56

## 2024-07-27 RX ADMIN — SODIUM CHLORIDE, PRESERVATIVE FREE 10 ML: 5 INJECTION INTRAVENOUS at 19:45

## 2024-07-27 RX ADMIN — MONTELUKAST 10 MG: 10 TABLET, FILM COATED ORAL at 07:48

## 2024-07-27 RX ADMIN — PREGABALIN 200 MG: 150 CAPSULE ORAL at 14:30

## 2024-07-27 RX ADMIN — MICONAZOLE NITRATE: 20 POWDER TOPICAL at 19:46

## 2024-07-27 RX ADMIN — ENOXAPARIN SODIUM 30 MG: 100 INJECTION SUBCUTANEOUS at 19:59

## 2024-07-27 RX ADMIN — DULOXETINE HYDROCHLORIDE 60 MG: 60 CAPSULE, DELAYED RELEASE ORAL at 07:48

## 2024-07-27 RX ADMIN — ASPIRIN 81 MG 81 MG: 81 TABLET ORAL at 07:48

## 2024-07-27 RX ADMIN — POLYETHYLENE GLYCOL 3350 17 G: 17 POWDER, FOR SOLUTION ORAL at 15:04

## 2024-07-27 RX ADMIN — BACLOFEN 10 MG: 10 TABLET ORAL at 19:45

## 2024-07-27 RX ADMIN — Medication 1 CAPSULE: at 10:21

## 2024-07-27 RX ADMIN — BACLOFEN 10 MG: 10 TABLET ORAL at 07:48

## 2024-07-27 RX ADMIN — HYDROXYZINE HYDROCHLORIDE 10 MG: 10 TABLET ORAL at 19:45

## 2024-07-27 RX ADMIN — METOPROLOL SUCCINATE 25 MG: 25 TABLET, EXTENDED RELEASE ORAL at 07:47

## 2024-07-27 RX ADMIN — NYSTATIN 500000 UNITS: 100000 SUSPENSION ORAL at 17:08

## 2024-07-27 RX ADMIN — PANTOPRAZOLE SODIUM 40 MG: 40 TABLET, DELAYED RELEASE ORAL at 06:22

## 2024-07-27 RX ADMIN — MORPHINE SULFATE 2 MG: 2 INJECTION, SOLUTION INTRAMUSCULAR; INTRAVENOUS at 19:43

## 2024-07-27 RX ADMIN — MORPHINE SULFATE 2 MG: 2 INJECTION, SOLUTION INTRAMUSCULAR; INTRAVENOUS at 06:22

## 2024-07-27 RX ADMIN — PREGABALIN 200 MG: 150 CAPSULE ORAL at 19:45

## 2024-07-27 RX ADMIN — PREGABALIN 200 MG: 150 CAPSULE ORAL at 08:47

## 2024-07-27 RX ADMIN — NYSTATIN 500000 UNITS: 100000 SUSPENSION ORAL at 13:55

## 2024-07-27 RX ADMIN — SODIUM CHLORIDE: 9 INJECTION, SOLUTION INTRAVENOUS at 09:13

## 2024-07-27 RX ADMIN — MICONAZOLE NITRATE: 20 POWDER TOPICAL at 07:51

## 2024-07-27 RX ADMIN — HYDROXYZINE HYDROCHLORIDE 10 MG: 10 TABLET ORAL at 09:07

## 2024-07-27 RX ADMIN — OXYCODONE AND ACETAMINOPHEN 1 TABLET: 7.5; 325 TABLET ORAL at 09:07

## 2024-07-27 RX ADMIN — CEFTRIAXONE 1000 MG: 1 INJECTION, POWDER, FOR SOLUTION INTRAMUSCULAR; INTRAVENOUS at 09:39

## 2024-07-27 RX ADMIN — DOCUSATE SODIUM 50 MG AND SENNOSIDES 8.6 MG 2 TABLET: 8.6; 5 TABLET, FILM COATED ORAL at 19:44

## 2024-07-27 RX ADMIN — BACLOFEN 10 MG: 10 TABLET ORAL at 13:56

## 2024-07-27 ASSESSMENT — PAIN DESCRIPTION - DESCRIPTORS
DESCRIPTORS: ACHING;BURNING
DESCRIPTORS: ACHING

## 2024-07-27 ASSESSMENT — PAIN SCALES - GENERAL
PAINLEVEL_OUTOF10: 10
PAINLEVEL_OUTOF10: 7
PAINLEVEL_OUTOF10: 7
PAINLEVEL_OUTOF10: 10

## 2024-07-27 ASSESSMENT — PAIN DESCRIPTION - ORIENTATION
ORIENTATION: LEFT;RIGHT;LOWER
ORIENTATION: RIGHT;LEFT
ORIENTATION: RIGHT;LEFT;LOWER
ORIENTATION: RIGHT;LEFT
ORIENTATION: RIGHT;LEFT

## 2024-07-27 ASSESSMENT — PAIN DESCRIPTION - LOCATION
LOCATION: LEG;NECK
LOCATION: LEG
LOCATION: FOOT;LEG
LOCATION: LEG;NECK
LOCATION: FOOT;LEG;NECK

## 2024-07-27 ASSESSMENT — PAIN - FUNCTIONAL ASSESSMENT
PAIN_FUNCTIONAL_ASSESSMENT: PREVENTS OR INTERFERES WITH MANY ACTIVE NOT PASSIVE ACTIVITIES
PAIN_FUNCTIONAL_ASSESSMENT: ACTIVITIES ARE NOT PREVENTED
PAIN_FUNCTIONAL_ASSESSMENT: PREVENTS OR INTERFERES WITH MANY ACTIVE NOT PASSIVE ACTIVITIES

## 2024-07-27 ASSESSMENT — PAIN DESCRIPTION - PAIN TYPE: TYPE: ACUTE PAIN;CHRONIC PAIN;NEUROPATHIC PAIN

## 2024-07-27 ASSESSMENT — PAIN DESCRIPTION - ONSET: ONSET: ON-GOING

## 2024-07-27 ASSESSMENT — PAIN DESCRIPTION - FREQUENCY: FREQUENCY: CONTINUOUS

## 2024-07-27 NOTE — PLAN OF CARE
Problem: Pain  Goal: Verbalizes/displays adequate comfort level or baseline comfort level  7/26/2024 2345 by Breonna Rushing, RN  Outcome: Progressing  7/26/2024 1817 by Bethanie Metcalf RN  Outcome: Progressing     Problem: Safety - Adult  Goal: Free from fall injury  7/26/2024 2345 by Breonna Rushing, RN  Outcome: Progressing  7/26/2024 1817 by Bethanie Metcalf, RN  Outcome: Progressing

## 2024-07-28 LAB
ANION GAP SERPL CALC-SCNC: 9 MMOL/L (ref 9–18)
BASOPHILS # BLD: 0 K/UL (ref 0–0.2)
BASOPHILS NFR BLD: 1 % (ref 0–2)
BUN SERPL-MCNC: 10 MG/DL (ref 6–23)
CALCIUM SERPL-MCNC: 8.3 MG/DL (ref 8.8–10.2)
CHLORIDE SERPL-SCNC: 103 MMOL/L (ref 98–107)
CK SERPL-CCNC: 511 U/L (ref 21–215)
CO2 SERPL-SCNC: 24 MMOL/L (ref 20–28)
CREAT SERPL-MCNC: 0.79 MG/DL (ref 0.6–1.1)
DIFFERENTIAL METHOD BLD: ABNORMAL
EOSINOPHIL # BLD: 0.3 K/UL (ref 0–0.8)
EOSINOPHIL NFR BLD: 8 % (ref 0.5–7.8)
ERYTHROCYTE [DISTWIDTH] IN BLOOD BY AUTOMATED COUNT: 19.4 % (ref 11.9–14.6)
GLUCOSE SERPL-MCNC: 94 MG/DL (ref 70–99)
HCT VFR BLD AUTO: 23.9 % (ref 35.8–46.3)
HGB BLD-MCNC: 7.3 G/DL (ref 11.7–15.4)
IMM GRANULOCYTES # BLD AUTO: 0 K/UL (ref 0–0.5)
IMM GRANULOCYTES NFR BLD AUTO: 0 % (ref 0–5)
LYMPHOCYTES # BLD: 1.3 K/UL (ref 0.5–4.6)
LYMPHOCYTES NFR BLD: 30 % (ref 13–44)
MCH RBC QN AUTO: 24.5 PG (ref 26.1–32.9)
MCHC RBC AUTO-ENTMCNC: 30.5 G/DL (ref 31.4–35)
MCV RBC AUTO: 80.2 FL (ref 82–102)
MONOCYTES # BLD: 0.5 K/UL (ref 0.1–1.3)
MONOCYTES NFR BLD: 12 % (ref 4–12)
NEUTS SEG # BLD: 2.2 K/UL (ref 1.7–8.2)
NEUTS SEG NFR BLD: 49 % (ref 43–78)
NRBC # BLD: 0 K/UL (ref 0–0.2)
PLATELET # BLD AUTO: 348 K/UL (ref 150–450)
PMV BLD AUTO: 9.5 FL (ref 9.4–12.3)
POTASSIUM SERPL-SCNC: 3.9 MMOL/L (ref 3.5–5.1)
RBC # BLD AUTO: 2.98 M/UL (ref 4.05–5.2)
SODIUM SERPL-SCNC: 136 MMOL/L (ref 136–145)
WBC # BLD AUTO: 4.3 K/UL (ref 4.3–11.1)

## 2024-07-28 PROCEDURE — 82550 ASSAY OF CK (CPK): CPT

## 2024-07-28 PROCEDURE — G0378 HOSPITAL OBSERVATION PER HR: HCPCS

## 2024-07-28 PROCEDURE — 85025 COMPLETE CBC W/AUTO DIFF WBC: CPT

## 2024-07-28 PROCEDURE — 2580000003 HC RX 258: Performed by: FAMILY MEDICINE

## 2024-07-28 PROCEDURE — 6360000002 HC RX W HCPCS: Performed by: FAMILY MEDICINE

## 2024-07-28 PROCEDURE — 96372 THER/PROPH/DIAG INJ SC/IM: CPT

## 2024-07-28 PROCEDURE — 80048 BASIC METABOLIC PNL TOTAL CA: CPT

## 2024-07-28 PROCEDURE — 6370000000 HC RX 637 (ALT 250 FOR IP): Performed by: FAMILY MEDICINE

## 2024-07-28 PROCEDURE — 36415 COLL VENOUS BLD VENIPUNCTURE: CPT

## 2024-07-28 PROCEDURE — 96376 TX/PRO/DX INJ SAME DRUG ADON: CPT

## 2024-07-28 RX ORDER — MORPHINE SULFATE 2 MG/ML
2 INJECTION, SOLUTION INTRAMUSCULAR; INTRAVENOUS EVERY 4 HOURS PRN
Status: DISCONTINUED | OUTPATIENT
Start: 2024-07-28 | End: 2024-07-29 | Stop reason: HOSPADM

## 2024-07-28 RX ADMIN — PREGABALIN 200 MG: 150 CAPSULE ORAL at 19:35

## 2024-07-28 RX ADMIN — DOCUSATE SODIUM 50 MG AND SENNOSIDES 8.6 MG 2 TABLET: 8.6; 5 TABLET, FILM COATED ORAL at 07:50

## 2024-07-28 RX ADMIN — MICONAZOLE NITRATE: 20 POWDER TOPICAL at 07:54

## 2024-07-28 RX ADMIN — CEFTRIAXONE 1000 MG: 1 INJECTION, POWDER, FOR SOLUTION INTRAMUSCULAR; INTRAVENOUS at 08:58

## 2024-07-28 RX ADMIN — NYSTATIN 500000 UNITS: 100000 SUSPENSION ORAL at 07:53

## 2024-07-28 RX ADMIN — FERROUS SULFATE TAB 325 MG (65 MG ELEMENTAL FE) 325 MG: 325 (65 FE) TAB at 07:50

## 2024-07-28 RX ADMIN — PREGABALIN 200 MG: 150 CAPSULE ORAL at 15:13

## 2024-07-28 RX ADMIN — MORPHINE SULFATE 2 MG: 2 INJECTION, SOLUTION INTRAMUSCULAR; INTRAVENOUS at 19:34

## 2024-07-28 RX ADMIN — HYDROXYZINE HYDROCHLORIDE 10 MG: 10 TABLET ORAL at 19:35

## 2024-07-28 RX ADMIN — SODIUM CHLORIDE, PRESERVATIVE FREE 10 ML: 5 INJECTION INTRAVENOUS at 07:56

## 2024-07-28 RX ADMIN — PREGABALIN 200 MG: 150 CAPSULE ORAL at 09:00

## 2024-07-28 RX ADMIN — ENOXAPARIN SODIUM 30 MG: 100 INJECTION SUBCUTANEOUS at 19:35

## 2024-07-28 RX ADMIN — ENOXAPARIN SODIUM 30 MG: 100 INJECTION SUBCUTANEOUS at 08:56

## 2024-07-28 RX ADMIN — MORPHINE SULFATE 2 MG: 2 INJECTION, SOLUTION INTRAMUSCULAR; INTRAVENOUS at 10:45

## 2024-07-28 RX ADMIN — MICONAZOLE NITRATE: 20 POWDER TOPICAL at 19:36

## 2024-07-28 RX ADMIN — PANTOPRAZOLE SODIUM 40 MG: 40 TABLET, DELAYED RELEASE ORAL at 07:53

## 2024-07-28 RX ADMIN — NYSTATIN 500000 UNITS: 100000 SUSPENSION ORAL at 14:10

## 2024-07-28 RX ADMIN — METOPROLOL SUCCINATE 25 MG: 25 TABLET, EXTENDED RELEASE ORAL at 07:52

## 2024-07-28 RX ADMIN — POLYETHYLENE GLYCOL 3350 17 G: 17 POWDER, FOR SOLUTION ORAL at 07:54

## 2024-07-28 RX ADMIN — MONTELUKAST 10 MG: 10 TABLET, FILM COATED ORAL at 07:52

## 2024-07-28 RX ADMIN — BACLOFEN 10 MG: 10 TABLET ORAL at 19:35

## 2024-07-28 RX ADMIN — DULOXETINE HYDROCHLORIDE 60 MG: 60 CAPSULE, DELAYED RELEASE ORAL at 07:50

## 2024-07-28 RX ADMIN — SODIUM CHLORIDE, PRESERVATIVE FREE 10 ML: 5 INJECTION INTRAVENOUS at 19:35

## 2024-07-28 RX ADMIN — FLUTICASONE PROPIONATE 2 SPRAY: 50 SPRAY, METERED NASAL at 07:53

## 2024-07-28 RX ADMIN — ASPIRIN 81 MG 81 MG: 81 TABLET ORAL at 07:53

## 2024-07-28 RX ADMIN — BACLOFEN 10 MG: 10 TABLET ORAL at 07:50

## 2024-07-28 RX ADMIN — Medication 1 CAPSULE: at 07:52

## 2024-07-28 RX ADMIN — BACLOFEN 10 MG: 10 TABLET ORAL at 14:13

## 2024-07-28 RX ADMIN — OXYCODONE AND ACETAMINOPHEN 1 TABLET: 7.5; 325 TABLET ORAL at 07:51

## 2024-07-28 RX ADMIN — HYDROXYZINE HYDROCHLORIDE 10 MG: 10 TABLET ORAL at 10:45

## 2024-07-28 ASSESSMENT — PAIN DESCRIPTION - DESCRIPTORS
DESCRIPTORS: BURNING;DISCOMFORT
DESCRIPTORS: ACHING;DISCOMFORT;CRAMPING
DESCRIPTORS: SHOOTING;THROBBING;BURNING
DESCRIPTORS: ACHING;BURNING

## 2024-07-28 ASSESSMENT — PAIN SCALES - GENERAL
PAINLEVEL_OUTOF10: 10
PAINLEVEL_OUTOF10: 6
PAINLEVEL_OUTOF10: 8
PAINLEVEL_OUTOF10: 10
PAINLEVEL_OUTOF10: 9

## 2024-07-28 ASSESSMENT — PAIN DESCRIPTION - LOCATION
LOCATION: LEG
LOCATION: LEG
LOCATION: FOOT
LOCATION: LEG

## 2024-07-28 ASSESSMENT — PAIN DESCRIPTION - ORIENTATION
ORIENTATION: RIGHT;LEFT
ORIENTATION: LEFT;RIGHT
ORIENTATION: RIGHT;LEFT;POSTERIOR
ORIENTATION: RIGHT;LEFT

## 2024-07-28 ASSESSMENT — PAIN - FUNCTIONAL ASSESSMENT
PAIN_FUNCTIONAL_ASSESSMENT: ACTIVITIES ARE NOT PREVENTED
PAIN_FUNCTIONAL_ASSESSMENT: PREVENTS OR INTERFERES WITH MANY ACTIVE NOT PASSIVE ACTIVITIES

## 2024-07-28 ASSESSMENT — PAIN DESCRIPTION - PAIN TYPE: TYPE: ACUTE PAIN;CHRONIC PAIN;NEUROPATHIC PAIN

## 2024-07-28 ASSESSMENT — PAIN DESCRIPTION - ONSET: ONSET: ON-GOING

## 2024-07-28 ASSESSMENT — PAIN DESCRIPTION - FREQUENCY: FREQUENCY: CONTINUOUS

## 2024-07-29 VITALS
HEIGHT: 67 IN | DIASTOLIC BLOOD PRESSURE: 62 MMHG | WEIGHT: 293 LBS | RESPIRATION RATE: 18 BRPM | TEMPERATURE: 97.9 F | SYSTOLIC BLOOD PRESSURE: 105 MMHG | BODY MASS INDEX: 45.99 KG/M2 | OXYGEN SATURATION: 95 % | HEART RATE: 97 BPM

## 2024-07-29 LAB
ANION GAP SERPL CALC-SCNC: 7 MMOL/L (ref 9–18)
BASOPHILS # BLD: 0 K/UL (ref 0–0.2)
BASOPHILS NFR BLD: 1 % (ref 0–2)
BUN SERPL-MCNC: 7 MG/DL (ref 6–23)
CALCIUM SERPL-MCNC: 8.4 MG/DL (ref 8.8–10.2)
CHLORIDE SERPL-SCNC: 106 MMOL/L (ref 98–107)
CK SERPL-CCNC: 271 U/L (ref 21–215)
CO2 SERPL-SCNC: 26 MMOL/L (ref 20–28)
CREAT SERPL-MCNC: 0.75 MG/DL (ref 0.6–1.1)
DIFFERENTIAL METHOD BLD: ABNORMAL
EOSINOPHIL # BLD: 0.4 K/UL (ref 0–0.8)
EOSINOPHIL NFR BLD: 8 % (ref 0.5–7.8)
ERYTHROCYTE [DISTWIDTH] IN BLOOD BY AUTOMATED COUNT: 19.6 % (ref 11.9–14.6)
GLUCOSE SERPL-MCNC: 90 MG/DL (ref 70–99)
HCT VFR BLD AUTO: 25 % (ref 35.8–46.3)
HGB BLD-MCNC: 7.8 G/DL (ref 11.7–15.4)
IMM GRANULOCYTES # BLD AUTO: 0 K/UL (ref 0–0.5)
IMM GRANULOCYTES NFR BLD AUTO: 0 % (ref 0–5)
LYMPHOCYTES # BLD: 1.5 K/UL (ref 0.5–4.6)
LYMPHOCYTES NFR BLD: 34 % (ref 13–44)
MCH RBC QN AUTO: 25 PG (ref 26.1–32.9)
MCHC RBC AUTO-ENTMCNC: 31.2 G/DL (ref 31.4–35)
MCV RBC AUTO: 80.1 FL (ref 82–102)
MONOCYTES # BLD: 0.5 K/UL (ref 0.1–1.3)
MONOCYTES NFR BLD: 12 % (ref 4–12)
NEUTS SEG # BLD: 2 K/UL (ref 1.7–8.2)
NEUTS SEG NFR BLD: 45 % (ref 43–78)
NRBC # BLD: 0 K/UL (ref 0–0.2)
PLATELET # BLD AUTO: 383 K/UL (ref 150–450)
PMV BLD AUTO: 9.5 FL (ref 9.4–12.3)
POTASSIUM SERPL-SCNC: 4 MMOL/L (ref 3.5–5.1)
RBC # BLD AUTO: 3.12 M/UL (ref 4.05–5.2)
SODIUM SERPL-SCNC: 139 MMOL/L (ref 136–145)
WBC # BLD AUTO: 4.5 K/UL (ref 4.3–11.1)

## 2024-07-29 PROCEDURE — 80048 BASIC METABOLIC PNL TOTAL CA: CPT

## 2024-07-29 PROCEDURE — 6370000000 HC RX 637 (ALT 250 FOR IP): Performed by: FAMILY MEDICINE

## 2024-07-29 PROCEDURE — 82550 ASSAY OF CK (CPK): CPT

## 2024-07-29 PROCEDURE — 6360000002 HC RX W HCPCS: Performed by: FAMILY MEDICINE

## 2024-07-29 PROCEDURE — 2580000003 HC RX 258: Performed by: FAMILY MEDICINE

## 2024-07-29 PROCEDURE — 36415 COLL VENOUS BLD VENIPUNCTURE: CPT

## 2024-07-29 PROCEDURE — 85025 COMPLETE CBC W/AUTO DIFF WBC: CPT

## 2024-07-29 PROCEDURE — 96376 TX/PRO/DX INJ SAME DRUG ADON: CPT

## 2024-07-29 PROCEDURE — G0378 HOSPITAL OBSERVATION PER HR: HCPCS

## 2024-07-29 PROCEDURE — 96372 THER/PROPH/DIAG INJ SC/IM: CPT

## 2024-07-29 RX ORDER — SENNA AND DOCUSATE SODIUM 50; 8.6 MG/1; MG/1
2 TABLET, FILM COATED ORAL 2 TIMES DAILY
Qty: 30 TABLET | Refills: 0 | Status: SHIPPED | OUTPATIENT
Start: 2024-07-29

## 2024-07-29 RX ORDER — ENOXAPARIN SODIUM 100 MG/ML
40 INJECTION SUBCUTANEOUS 2 TIMES DAILY
Status: DISCONTINUED | OUTPATIENT
Start: 2024-07-29 | End: 2024-07-29 | Stop reason: HOSPADM

## 2024-07-29 RX ORDER — CEFPODOXIME PROXETIL 200 MG/1
200 TABLET, FILM COATED ORAL 2 TIMES DAILY
Qty: 14 TABLET | Refills: 0 | Status: SHIPPED | OUTPATIENT
Start: 2024-07-29 | End: 2024-08-05

## 2024-07-29 RX ORDER — MINERAL OIL/HYDROPHIL PETROLAT
OINTMENT (GRAM) TOPICAL
Status: DISCONTINUED | OUTPATIENT
Start: 2024-07-29 | End: 2024-07-29 | Stop reason: HOSPADM

## 2024-07-29 RX ORDER — OXYCODONE AND ACETAMINOPHEN 7.5; 325 MG/1; MG/1
1 TABLET ORAL EVERY 6 HOURS PRN
COMMUNITY
Start: 2024-07-19

## 2024-07-29 RX ORDER — CEFPODOXIME PROXETIL 200 MG/1
200 TABLET, FILM COATED ORAL 2 TIMES DAILY
Qty: 14 TABLET | Refills: 0 | Status: SHIPPED | OUTPATIENT
Start: 2024-07-29 | End: 2024-07-29

## 2024-07-29 RX ORDER — FERROUS SULFATE 325(65) MG
325 TABLET ORAL
Qty: 30 TABLET | Refills: 1 | Status: SHIPPED | OUTPATIENT
Start: 2024-07-30 | End: 2024-07-29

## 2024-07-29 RX ORDER — FERROUS SULFATE 325(65) MG
325 TABLET ORAL
Qty: 30 TABLET | Refills: 1 | Status: SHIPPED | OUTPATIENT
Start: 2024-07-30

## 2024-07-29 RX ORDER — SENNA AND DOCUSATE SODIUM 50; 8.6 MG/1; MG/1
2 TABLET, FILM COATED ORAL 2 TIMES DAILY
Qty: 30 TABLET | Refills: 0 | Status: SHIPPED | OUTPATIENT
Start: 2024-07-29 | End: 2024-07-29

## 2024-07-29 RX ORDER — LACTOBACILLUS RHAMNOSUS GG 10B CELL
1 CAPSULE ORAL
Qty: 30 CAPSULE | Refills: 1 | Status: SHIPPED | OUTPATIENT
Start: 2024-07-30

## 2024-07-29 RX ORDER — LACTOBACILLUS RHAMNOSUS GG 10B CELL
1 CAPSULE ORAL
Qty: 30 CAPSULE | Refills: 1 | Status: SHIPPED | OUTPATIENT
Start: 2024-07-30 | End: 2024-07-29

## 2024-07-29 RX ADMIN — FLUTICASONE PROPIONATE 2 SPRAY: 50 SPRAY, METERED NASAL at 07:55

## 2024-07-29 RX ADMIN — HYDROXYZINE HYDROCHLORIDE 10 MG: 10 TABLET ORAL at 05:05

## 2024-07-29 RX ADMIN — Medication: at 13:37

## 2024-07-29 RX ADMIN — KETOROLAC TROMETHAMINE 15 MG: 15 INJECTION, SOLUTION INTRAMUSCULAR; INTRAVENOUS at 11:15

## 2024-07-29 RX ADMIN — ASPIRIN 81 MG 81 MG: 81 TABLET ORAL at 07:38

## 2024-07-29 RX ADMIN — PANTOPRAZOLE SODIUM 40 MG: 40 TABLET, DELAYED RELEASE ORAL at 05:05

## 2024-07-29 RX ADMIN — PREGABALIN 200 MG: 150 CAPSULE ORAL at 07:38

## 2024-07-29 RX ADMIN — FERROUS SULFATE TAB 325 MG (65 MG ELEMENTAL FE) 325 MG: 325 (65 FE) TAB at 07:38

## 2024-07-29 RX ADMIN — MORPHINE SULFATE 2 MG: 2 INJECTION, SOLUTION INTRAMUSCULAR; INTRAVENOUS at 07:50

## 2024-07-29 RX ADMIN — BACLOFEN 10 MG: 10 TABLET ORAL at 07:38

## 2024-07-29 RX ADMIN — Medication 1 CAPSULE: at 07:38

## 2024-07-29 RX ADMIN — SODIUM CHLORIDE, PRESERVATIVE FREE 10 ML: 5 INJECTION INTRAVENOUS at 08:00

## 2024-07-29 RX ADMIN — CEFTRIAXONE 1000 MG: 1 INJECTION, POWDER, FOR SOLUTION INTRAMUSCULAR; INTRAVENOUS at 07:57

## 2024-07-29 RX ADMIN — DULOXETINE HYDROCHLORIDE 60 MG: 60 CAPSULE, DELAYED RELEASE ORAL at 07:38

## 2024-07-29 RX ADMIN — KETOROLAC TROMETHAMINE 15 MG: 15 INJECTION, SOLUTION INTRAMUSCULAR; INTRAVENOUS at 05:01

## 2024-07-29 RX ADMIN — MORPHINE SULFATE 2 MG: 2 INJECTION, SOLUTION INTRAMUSCULAR; INTRAVENOUS at 01:52

## 2024-07-29 RX ADMIN — ENOXAPARIN SODIUM 30 MG: 100 INJECTION SUBCUTANEOUS at 07:56

## 2024-07-29 RX ADMIN — PREGABALIN 200 MG: 150 CAPSULE ORAL at 15:25

## 2024-07-29 RX ADMIN — OXYCODONE AND ACETAMINOPHEN 1 TABLET: 7.5; 325 TABLET ORAL at 15:25

## 2024-07-29 RX ADMIN — MORPHINE SULFATE 2 MG: 2 INJECTION, SOLUTION INTRAMUSCULAR; INTRAVENOUS at 13:34

## 2024-07-29 RX ADMIN — BACLOFEN 10 MG: 10 TABLET ORAL at 13:34

## 2024-07-29 RX ADMIN — MICONAZOLE NITRATE: 20 POWDER TOPICAL at 08:00

## 2024-07-29 RX ADMIN — MONTELUKAST 10 MG: 10 TABLET, FILM COATED ORAL at 07:38

## 2024-07-29 RX ADMIN — HYDROXYZINE HYDROCHLORIDE 10 MG: 10 TABLET ORAL at 11:17

## 2024-07-29 ASSESSMENT — PAIN - FUNCTIONAL ASSESSMENT
PAIN_FUNCTIONAL_ASSESSMENT: PREVENTS OR INTERFERES WITH MANY ACTIVE NOT PASSIVE ACTIVITIES
PAIN_FUNCTIONAL_ASSESSMENT: PREVENTS OR INTERFERES SOME ACTIVE ACTIVITIES AND ADLS
PAIN_FUNCTIONAL_ASSESSMENT: PREVENTS OR INTERFERES WITH MANY ACTIVE NOT PASSIVE ACTIVITIES
PAIN_FUNCTIONAL_ASSESSMENT: PREVENTS OR INTERFERES SOME ACTIVE ACTIVITIES AND ADLS
PAIN_FUNCTIONAL_ASSESSMENT: ACTIVITIES ARE NOT PREVENTED

## 2024-07-29 ASSESSMENT — PAIN DESCRIPTION - DESCRIPTORS
DESCRIPTORS: BURNING
DESCRIPTORS: THROBBING;BURNING
DESCRIPTORS: BURNING;ACHING
DESCRIPTORS: BURNING

## 2024-07-29 ASSESSMENT — PAIN SCALES - GENERAL
PAINLEVEL_OUTOF10: 10
PAINLEVEL_OUTOF10: 8
PAINLEVEL_OUTOF10: 7
PAINLEVEL_OUTOF10: 10
PAINLEVEL_OUTOF10: 6
PAINLEVEL_OUTOF10: 10

## 2024-07-29 ASSESSMENT — PAIN DESCRIPTION - ORIENTATION
ORIENTATION: LEFT;RIGHT
ORIENTATION: RIGHT;LEFT
ORIENTATION: RIGHT;LEFT
ORIENTATION: RIGHT;LEFT;LOWER
ORIENTATION: RIGHT;LEFT;LOWER
ORIENTATION: RIGHT;LEFT

## 2024-07-29 ASSESSMENT — PAIN DESCRIPTION - LOCATION
LOCATION: LEG
LOCATION: LEG;ARM
LOCATION: LEG
LOCATION: LEG;FOOT
LOCATION: LEG
LOCATION: LEG

## 2024-07-29 ASSESSMENT — PAIN DESCRIPTION - PAIN TYPE
TYPE: ACUTE PAIN;CHRONIC PAIN;NEUROPATHIC PAIN
TYPE: ACUTE PAIN;CHRONIC PAIN;NEUROPATHIC PAIN

## 2024-07-29 ASSESSMENT — PAIN DESCRIPTION - FREQUENCY
FREQUENCY: CONTINUOUS
FREQUENCY: CONTINUOUS

## 2024-07-29 ASSESSMENT — PAIN DESCRIPTION - ONSET
ONSET: ON-GOING
ONSET: ON-GOING

## 2024-07-29 NOTE — PLAN OF CARE
Patient has remained A&O x 4. VS stable.  -Bilateral leg wounds changed by wound RN, pt given some extra wound care supplies.   -percocet x1  -atarax x2  -morphine x2    Patient rounded on hourly by myself or patient assistant and encouraged to call with any needs. No signs of distress noted. Bed low, locked, call bell within reach.     Danni Choudhury RN      Problem: Chronic Conditions and Co-morbidities  Goal: Patient's chronic conditions and co-morbidity symptoms are monitored and maintained or improved  Outcome: Adequate for Discharge     Problem: Pain  Goal: Verbalizes/displays adequate comfort level or baseline comfort level  Outcome: Adequate for Discharge     Problem: Safety - Adult  Goal: Free from fall injury  Outcome: Adequate for Discharge     Problem: ABCDS Injury Assessment  Goal: Absence of physical injury  Outcome: Adequate for Discharge

## 2024-07-29 NOTE — WOUND CARE
Consulted for BLE lymphedema. Familiar to Wound team from previous admission. Following [facility], last seen 7/16 with appt tomorrow 7/30. Has lymphedema pump and should be doing it for 1 hr BID, but Pt states noncompliance d/t being too painful.    MD Tabor has been dressing with Aquaphor, Adaptic, absorbing gauze, kerlex, and ACE wrap. Will continue with current wound care regimen.    LLE (medial) [id number], bleeding, pink/red, granular, slough, fluid filled blister, copious yellow/green drainage, mild odor.    LLE posterior    LLE lateral      RLE (lateral) [id number], pink/red, granular, slough, large serous drainage, mild odor.    RLE posterior      R heel possibly mix of DM, pressure, and lymphedema; 2x2x0.3cm, slough, undefined/flat edges, small serosanguinous drainage. Recommend xeroform, ABD, wrap with rolled gauze, ACE wrap every other day/PRN.

## 2024-07-29 NOTE — DISCHARGE SUMMARY
--        LEFT  HAND       Culture NO GROWTH 2 DAYS       Respiratory Panel, Molecular, with COVID-19 (Restricted: peds pts or suitable admitted adults) [[id number]] Collected: 07/26/24 1245    Order Status: Completed Specimen: Nasopharyngeal Updated: 07/26/24 1400     Adenovirus by PCR NOT DETECTED        Coronavirus 229E by PCR NOT DETECTED        Coronavirus HKU1 by PCR NOT DETECTED        Coronavirus NL63 by PCR NOT DETECTED        Coronavirus OC43 by PCR NOT DETECTED        SARS-CoV-2, PCR NOT DETECTED        Human Metapneumovirus by PCR NOT DETECTED        Rhinovirus Enterovirus PCR NOT DETECTED        Influenza A by PCR NOT DETECTED        Influenza B PCR NOT DETECTED        Parainfluenza 1 PCR NOT DETECTED        Parainfluenza 2 PCR NOT DETECTED        Parainfluenza 3 PCR NOT DETECTED        Parainfluenza 4 PCR NOT DETECTED        Respiratory Syncytial Virus by PCR NOT DETECTED        Bordetella parapertussis by PCR NOT DETECTED        Bordetella pertussis by PCR NOT DETECTED        Chlamydophila Pneumonia PCR NOT DETECTED        Mycoplasma pneumo by PCR NOT DETECTED               All Labs from Last 24 Hrs:  Recent Results (from the past 24 hour(s))   Basic Metabolic Panel w/ Reflex to MG    Collection Time: 07/29/24  5:42 AM   Result Value Ref Range    Sodium 139 136 - 145 mmol/L    Potassium 4.0 3.5 - 5.1 mmol/L    Chloride 106 98 - 107 mmol/L    CO2 26 20 - 28 mmol/L    Anion Gap 7 (L) 9 - 18 mmol/L    Glucose 90 70 - 99 mg/dL    BUN 7 6 - 23 MG/DL    Creatinine 0.75 0.60 - 1.10 MG/DL    Est, Glom Filt Rate >90 >60 ml/min/1.73m2    Calcium 8.4 (L) 8.8 - 10.2 MG/DL   CBC with Auto Differential    Collection Time: 07/29/24  5:42 AM   Result Value Ref Range    WBC 4.5 4.3 - 11.1 K/uL    RBC 3.12 (L) 4.05 - 5.2 M/uL    Hemoglobin 7.8 (L) 11.7 - 15.4 g/dL    Hematocrit 25.0 (L) 35.8 - 46.3 %    MCV 80.1 (L) 82 - 102 FL    MCH 25.0 (L) 26.1 - 32.9 PG    MCHC 31.2 (L) 31.4 - 35.0 g/dL    RDW 19.6 (H) 11.9 - 14.6

## 2024-07-29 NOTE — CARE COORDINATION
Pt is for discharge home today with her family who will pick her up.  PT/OT recommended STR. Pt refused and wants to go home with HH. Referral called/faxed to [facility] for resumption of care with  for wound care and PT services.  No additional CM orders received or supportive care needs expressed at this time. MOON letter given. Pt is in agreement with this discharge plan.    KIRBY: Home [facility] and [facility] with SN and PT.   07/29/24 0102   Services At/After Discharge   Transition of Care Consult (CM Consult) Discharge Planning;Home Health   Internal Home Health No   Reason Outside Agency Chosen Patient already serviced by other home care/hospice agency   Services At/After Discharge Nursing services;PT    Resource Information Provided? No   Confirm Follow Up Transport Family   Condition of Participation: Discharge Planning   The Plan for Transition of Care is related to the following treatment goals: Pt will discharge home with her family and [facility]   The Patient and/or Patient Representative was provided with a Choice of Provider? Patient   The Patient and/Or Patient Representative agree with the Discharge Plan? Yes   Freedom of Choice list was provided with basic dialogue that supports the patient's individualized plan of care/goals, treatment preferences, and shares the quality data associated with the providers?  Yes       
Admission Durable Medical Equipment;Home Care   Current DME Prior to Arrival Bedside Commode;Cane;Walker   Potential Assistance Needed N/A   DME Ordered? No   Potential Assistance Purchasing Medications No   Type of Home Care Services Nursing Services   Patient expects to be discharged to: House   One/Two Story Residence One story   History of falls? 1   Services At/After Discharge   Transition of Care Consult (CM Consult) Discharge Planning   Services At/After Discharge Home Health   [place] Resource Information Provided? No   Confirm Follow Up Transport Family   Condition of Participation: Discharge Planning   The Plan for Transition of Care is related to the following treatment goals: based on clinical course   The Patient and/or Patient Representative was provided with a Choice of Provider? Patient   The Patient and/Or Patient Representative agree with the Discharge Plan? Yes   Freedom of Choice list was provided with basic dialogue that supports the patient's individualized plan of care/goals, treatment preferences, and shares the quality data associated with the providers?  Yes

## 2024-07-29 NOTE — PROGRESS NOTES
Hospitalist Progress Note   Admit Date:  2024 12:13 PM   Name:  Lo Tirado   Age:  48 y.o.  Sex:  female  :  1975   MRN:  [id number]   Room:  [facility]    Presenting/Chief Complaint: Fall     Reason(s) for Admission: Rhabdomyolysis [M62.82]  Traumatic rhabdomyolysis, initial encounter ([facility]) [T79.6XXA]  Chronic cutaneous venous stasis ulcer ([facility]) [I83.009, L97.909]     Hospital Course:   Lo Tirado is a 48 y.o. female with medical history of asthma, chronic lower extremity wounds, lymphedema, chronic pain/neuropathy, ERICA, bipolar who presented with pain in her legs with cramping after falling  after walking around her house.  She was not found in 2 AM  and was brought to the ED.    Of note, patient has had multiple admissions/ED visits due to chronic pain of lower extremities and cellulitis of lower extremities.  She follows pain management outpatient.    In ED, VSS.  .  BNP only 215.  Procalcitonin negative.  VRP/COVID-19 negative.  CXR shows interstitial edema pattern.  X-ray right hip shows no evidence of fracture.  CT right hip also shows no acute fracture or dislocation; intact left hip arthroplasty.    Patient was admitted with rhabdomyolysis and also concerns for cellulitis of lower extremities.  She spiked Tmax 100.8 on  and was started on broad-spectrum antibiotic with Rocephin.        Subjective & 24hr Events:     Afebrile in past 24h. CPK improving to 511 from 912 yesterday.  BP low-normal this morning.  Hold home losartan/metoprolol.    Pt eating lunch. Emotional stated she was aggravated because her wound dressing changes are not done like how she does it at home. Reported severe neuropathy pain if dressing not changed on time.     Discussed PT/OT recommended STR and pt declined. Stated she would like to go home.    Denies SOB or chest pain.       Assessment & Plan:     Rhabdomyolysis  CPK on admission 912>>511  S/p fall and being found on the ground. XR 
ACUTE OCCUPATIONAL THERAPY GOALS:   (Developed with and agreed upon by patient and/or caregiver.)  1. Patient will complete lower body bathing and dressing with MOD A and adaptive equipment as needed.   2.Patient will complete upper body bathing and dressing with SUPERVISION and adaptive equipment as needed.  3. Patient will complete toileting with MOD A.   4. Patient will tolerate 30 minutes of OT treatment with 1-2 rest breaks to increase activity tolerance for ADLs.   5. Patient will complete functional transfers with MIN A and adaptive equipment as needed.   6. Patient will complete functional activity with SUPERVISION and adaptive equipment as needed.  7. Patient will complete simple grooming task sitting unsupported with SUPERVISION.    Timeframe: 7 visits      OCCUPATIONAL THERAPY Initial Assessment and Daily Note       OT Visit Days: 1  Acknowledge Orders  Time  OT Charge Capture  Rehab Caseload Tracker      Lo Tirado is a 48 y.o. female   PRIMARY DIAGNOSIS: Rhabdomyolysis  Rhabdomyolysis [M62.82]  Traumatic rhabdomyolysis, initial encounter ([facility]) [T79.6XXA]  Chronic cutaneous venous stasis ulcer ([facility]) [I83.009, L97.909]       Reason for Referral: Generalized Muscle Weakness (M62.81)  Other lack of cordination (R27.8)  Difficulty in walking, Not elsewhere classified (R26.2)  Observation: Payor: [employer] MEDICARE / Plan: [employer] GOLD PLUS HMO / Product Type: *No Product type* /     ASSESSMENT:     REHAB RECOMMENDATIONS:   Recommendation to date pending progress:  Setting:  Short-term Rehab    Equipment:    To Be Determined     ASSESSMENT:  Ms. Tirado presented to the hospital with pain after falling at home--admitted with rhabdomyolysis. At baseline, pt primarily remains in her lift chair. Pt stated she does ambulate to the bathroom with her rollator. Requires assist for all bADLs, mobility, and IADLs from family.  Today, pt was received supine in bed. Completed bed mobility with maxA x2. Sit>stand 
Change of shift report given to Breonna Rushing RN.   
Discharge education completed with patient and family. pt given opportunity to ask questions. Pt made aware of upcoming appointment and educated on new med medications. Pt educated on when to seek medical attention if needed.      . Pt discharged home with family. Taken out to car via wheelchair.     
Patient has had a non-stagable wound to the right heel that appears to have broken open on ambulation today. Wound was cleaned with wound cleanser.   
Patient requested provider to evaluate foot wounds  Dr. Quinones kindly agreeable to assess  Desiree Morris MD   
Pt refused dressing changes of BLE due to pain. MD ordered one time dose of 2mg of morphine. Morphine was given and pt still refused dressing change.   
Pt was observed walking without assistance to restroom. Mother was at bedside.   
TRANSFER - IN REPORT:    Verbal report received from STEWART Fernandez on Lo Tirado  being received from ED for routine progression of patient care      Report consisted of patient's Situation, Background, Assessment and   Recommendations(SBAR).     Information from the following report(s) ED Encounter Summary, ED SBAR, MAR, Recent Results, and Neuro Assessment was reviewed with the receiving nurse.    Opportunity for questions and clarification was provided.      Assessment completed upon patient's arrival to unit and care assumed.     
This morning tech and nurse changed bed and wound dressings. After morning rounds patient demanded the doctor prescribe purewick placement so she could urinate while lying in the bed. Pt was refusing to get up although she had ambulated yesterday. Prior to getting purewick placed patient urinated on self. Tech replaced all linens and helped with wound dressing a second time and placed purewick per doctors verbal orders.  A hour later the tech observed the patient walking to restroom without any assistance. Nurse returned to room and observed patient had removed all wound dressings and was touching her wounds with her bare hands refusing assistance and wanting to do her own wound care.   
of Assistance [] [] [] [] [] [] [] [] [] [x] []    Distance   feet    DME N/A    Gait Quality N/A    Weightbearing Status Restrictions/Precautions  Restrictions/Precautions: Fall Risk    Stairs      I=Independent, Mod I=Modified Independent, S=Supervision, SBA=Standby Assistance, CGA=Contact Guard Assistance,   Min=Minimal Assistance, Mod=Moderate Assistance, Max=Maximal Assistance, Total=Total Assistance, NT=Not Tested    PLAN:   FREQUENCY AND DURATION: 2 times/week for duration of hospital stay or until stated goals are met, whichever comes first.    THERAPY PROGNOSIS: Fair    PROBLEM LIST:   (Skilled intervention is medically necessary to address:)  Decreased Activity Tolerance  Decreased AROM/PROM  Decreased Balance  Decreased Coordination  Decreased Gait Ability  Decreased Safety Awareness  Decreased Strength  Decreased Transfer Abilities  Increased Pain INTERVENTIONS PLANNED:   (Benefits and precautions of physical therapy have been discussed with the patient.)  Therapeutic Activity  Therapeutic Exercise/HEP  Neuromuscular Re-education  Gait Training  Education       TREATMENT:   EVALUATION: LOW COMPLEXITY: (Untimed Charge)  The initial evaluation charge encompasses clinical chart review, objective assessment, interpretation of assessment, and skilled monitoring of the patient's response to treatment in order to develop a plan of care.     TREATMENT:   Co-Treatment PT/OT necessary due to patient's decreased overall endurance/tolerance levels, as well as need for high level skilled assistance to complete functional transfers/mobility and functional tasks  Therapeutic Activity (50 Minutes): Therapeutic activity included Rolling, Supine to Sit, Sit to Supine, Scooting, Transfer Training, Sitting balance , and Standing balance to improve functional Activity tolerance, Balance, Coordination, Mobility, and Strength.    TREATMENT GRID:  N/A    AFTER TREATMENT PRECAUTIONS: Bed, Bed/Chair Locked, Call light within 
injection 5-40 mL  5-40 mL IntraVENous PRN    0.9 % sodium chloride infusion   IntraVENous PRN    potassium chloride (KLOR-CON M) extended release tablet 40 mEq  40 mEq Oral PRN    Or    potassium bicarb-citric acid (EFFER-K) effervescent tablet 40 mEq  40 mEq Oral PRN    Or    potassium chloride 10 mEq/100 mL IVPB (Peripheral Line)  10 mEq IntraVENous PRN    magnesium sulfate 2000 mg in 50 mL IVPB premix  2,000 mg IntraVENous PRN    polyethylene glycol (GLYCOLAX) packet 17 g  17 g Oral Daily PRN    bisacodyl (DULCOLAX) suppository 10 mg  10 mg Rectal Daily PRN    famotidine (PEPCID) tablet 10 mg  10 mg Oral Daily PRN    aluminum & magnesium hydroxide-simethicone (MAALOX) 200-200-20 MG/5ML suspension 30 mL  30 mL Oral Q6H PRN    acetaminophen (TYLENOL) tablet 650 mg  650 mg Oral Q6H PRN    Or    acetaminophen (TYLENOL) suppository 650 mg  650 mg Rectal Q6H PRN    enoxaparin Sodium (LOVENOX) injection 30 mg  30 mg SubCUTAneous BID    losartan (COZAAR) tablet 50 mg  50 mg Oral Daily    nystatin (MYCOSTATIN) 253303 UNIT/ML suspension 500,000 Units  5 mL Oral 4x Daily    miconazole (MICOTIN) 2 % powder   Topical BID    albuterol (PROVENTIL) (2.5 MG/3ML) 0.083% nebulizer solution 2.5 mg  2.5 mg Nebulization Q6H PRN       Signed:  Helen Bauman DO    Part of this note may have been written by using a voice dictation software.  The note has been proof read but may still contain some grammatical/other typographical errors.

## 2024-07-30 ENCOUNTER — CARE COORDINATION (OUTPATIENT)
Dept: CARE COORDINATION | Facility: CLINIC | Age: 49
End: 2024-07-30

## 2024-07-30 ENCOUNTER — HOSPITAL ENCOUNTER (OUTPATIENT)
Dept: WOUND CARE | Age: 49
Discharge: HOME | End: 2024-07-30
Payer: MEDICARE

## 2024-07-30 VITALS
TEMPERATURE: 98.4 F | BODY MASS INDEX: 45.99 KG/M2 | RESPIRATION RATE: 18 BRPM | WEIGHT: 293 LBS | SYSTOLIC BLOOD PRESSURE: 155 MMHG | HEIGHT: 67 IN | DIASTOLIC BLOOD PRESSURE: 93 MMHG | HEART RATE: 103 BPM

## 2024-07-30 DIAGNOSIS — L97.922 CHRONIC ULCER OF LEFT LOWER EXTREMITY WITH FAT LAYER EXPOSED (HCC): Primary | ICD-10-CM

## 2024-07-30 LAB
BACTERIA SPEC CULT: ABNORMAL
GRAM STN SPEC: ABNORMAL
SERVICE CMNT-IMP: ABNORMAL
SERVICE CMNT-IMP: ABNORMAL

## 2024-07-30 PROCEDURE — 11042 DBRDMT SUBQ TIS 1ST 20SQCM/<: CPT

## 2024-07-30 RX ORDER — GENTAMICIN SULFATE 1 MG/G
OINTMENT TOPICAL ONCE
OUTPATIENT
Start: 2024-07-30 | End: 2024-07-30

## 2024-07-30 RX ORDER — IBUPROFEN 200 MG
TABLET ORAL ONCE
OUTPATIENT
Start: 2024-07-30 | End: 2024-07-30

## 2024-07-30 RX ORDER — TRIAMCINOLONE ACETONIDE 1 MG/G
OINTMENT TOPICAL ONCE
OUTPATIENT
Start: 2024-07-30 | End: 2024-07-30

## 2024-07-30 RX ORDER — GINSENG 100 MG
CAPSULE ORAL ONCE
OUTPATIENT
Start: 2024-07-30 | End: 2024-07-30

## 2024-07-30 RX ORDER — LIDOCAINE 50 MG/G
OINTMENT TOPICAL ONCE
OUTPATIENT
Start: 2024-07-30 | End: 2024-07-30

## 2024-07-30 RX ORDER — LIDOCAINE HYDROCHLORIDE 20 MG/ML
JELLY TOPICAL ONCE
OUTPATIENT
Start: 2024-07-30 | End: 2024-07-30

## 2024-07-30 RX ORDER — LIDOCAINE 40 MG/G
CREAM TOPICAL ONCE
OUTPATIENT
Start: 2024-07-30 | End: 2024-07-30

## 2024-07-30 RX ORDER — LIDOCAINE HYDROCHLORIDE 40 MG/ML
SOLUTION TOPICAL ONCE
OUTPATIENT
Start: 2024-07-30 | End: 2024-07-30

## 2024-07-30 RX ORDER — BACITRACIN ZINC AND POLYMYXIN B SULFATE 500; 1000 [USP'U]/G; [USP'U]/G
OINTMENT TOPICAL ONCE
OUTPATIENT
Start: 2024-07-30 | End: 2024-07-30

## 2024-07-30 RX ORDER — CLOBETASOL PROPIONATE 0.5 MG/G
OINTMENT TOPICAL ONCE
OUTPATIENT
Start: 2024-07-30 | End: 2024-07-30

## 2024-07-30 RX ORDER — BETAMETHASONE DIPROPIONATE 0.5 MG/G
CREAM TOPICAL ONCE
OUTPATIENT
Start: 2024-07-30 | End: 2024-07-30

## 2024-07-30 RX ORDER — SODIUM CHLOR/HYPOCHLOROUS ACID 0.033 %
SOLUTION, IRRIGATION IRRIGATION ONCE
OUTPATIENT
Start: 2024-07-30 | End: 2024-07-30

## 2024-07-30 ASSESSMENT — PAIN DESCRIPTION - LOCATION: LOCATION: LEG

## 2024-07-30 ASSESSMENT — PAIN DESCRIPTION - DESCRIPTORS: DESCRIPTORS: ACHING

## 2024-07-30 ASSESSMENT — PAIN DESCRIPTION - ORIENTATION: ORIENTATION: RIGHT;LEFT

## 2024-07-30 NOTE — FLOWSHEET NOTE
07/30/24 1419   Right Leg Edema Point of Measurement   Leg circumference 58 cm   Ankle circumference 32 cm   Foot circumference 27 cm   Compression Therapy Tubular elastic support bandage   Left Leg Edema Point of Measurement   Leg circumference 56 cm   Ankle circumference 31 cm   Foot circumference 25 cm   Compression Therapy Tubular elastic support bandage   Wound 07/29/24 Heel Right   Date First Assessed/Time First Assessed: 07/29/24 1230   Location: Heel  Wound Location Orientation: Right   Wound Image     Wound Etiology Venous   Dressing Status Old drainage noted   Wound Cleansed Cleansed with saline   Dressing/Treatment Xeroform;Gauze dressing/dressing sponge;Roll gauze;Ace wrap   Wound Length (cm) 2 cm   Wound Width (cm) 2 cm   Wound Depth (cm) 0.1 cm   Wound Surface Area (cm^2) 4 cm^2   Change in Wound Size % (l*w) 0   Wound Volume (cm^3) 0.4 cm^3   Wound Healing % 67   Post-Procedure Length (cm) 2 cm   Post-Procedure Width (cm) 2 cm   Post-Procedure Depth (cm) 0.1 cm   Post-Procedure Surface Area (cm^2) 4 cm^2   Post-Procedure Volume (cm^3) 0.4 cm^3   Wound Assessment Slough   Drainage Amount Small (< 25%)   Drainage Description Serosanguinous   Odor None   Pati-wound Assessment Dry/flaky;Maceration   Margins Undefined edges;Flat/open edges   Wound 11/01/23 Leg Left;Posterior #2   Date First Assessed/Time First Assessed: 11/01/23 0954   Present on Original Admission: Yes  Wound Approximate Age at First Assessment (Weeks): 12 weeks  Primary Wound Type: Venous Ulcer  Location: Leg  Wound Location Orientation: Left;Posterior  Woun...   Wound Image     Wound Etiology Venous   Dressing Status Old drainage noted   Wound Cleansed Soap and water   Dressing/Treatment Other (comment)  (adaptic)   Wound Length (cm) 17 cm   Wound Width (cm) 12 cm   Wound Depth (cm) 0.1 cm   Wound Surface Area (cm^2) 204 cm^2   Change in Wound Size % (l*w) -104   Wound Volume (cm^3) 20.4 cm^3   Wound Healing % -104   Wound Assessment

## 2024-07-30 NOTE — WOUND CARE
Multilayer Compression Wrap   (Not Unna) Below the Knee    NAME:  Lo Tirado  YOB: 1975  MEDICAL RECORD NUMBER:  [id number]  DATE:  7/30/2024    Multilayer compression wrap: Removed old Multilayer wrap if indicated and wash leg with mild soap/water.  Applied moisturizing agent to dry skin as needed.   Applied primary and secondary dressing as ordered.  Applied multilayered dressing below the knee to right lower leg.  Applied multilayered dressing below the knee to left lower leg.  Instructed patient/caregiver not to remove dressing and to keep it clean and dry.   Instructed patient/caregiver on complications to report to provider, such as pain, numbness in toes, heavy drainage, and slippage of dressing.  Instructed patient on purpose of compression dressing and on activity and exercise recommendations.      Electronically signed by Tequila Hudson RN on 7/30/2024 at 3:34 PM  
over 101 degrees please contact the Wound Healing Center at [phone] or if after hours contact your primary care physician or go to the hospital emergency department.    PLEASE NOTE: IF YOU ARE UNABLE TO OBTAIN WOUND SUPPLIES, CONTINUE TO USE THE SUPPLIES YOU HAVE AVAILABLE UNTIL YOU ARE ABLE TO REACH US. IT IS MOST IMPORTANT TO KEEP THE WOUND COVERED AT ALL TIMES.    Electronically signed Tequila Hudson RN, [facility] on 7/30/2024 at 3:35 PM    
please contact the Wound Healing Center at [phone] or if after hours contact your primary care physician or go to the hospital emergency department.    PLEASE NOTE: IF YOU ARE UNABLE TO OBTAIN WOUND SUPPLIES, CONTINUE TO USE THE SUPPLIES YOU HAVE AVAILABLE UNTIL YOU ARE ABLE TO REACH US. IT IS MOST IMPORTANT TO KEEP THE WOUND COVERED AT ALL TIMES.    Electronically signed Tequila Hudson RN, [facility] on 7/30/2024 at 2:59 PM

## 2024-07-31 ENCOUNTER — CARE COORDINATION (OUTPATIENT)
Dept: CARE COORDINATION | Facility: CLINIC | Age: 49
End: 2024-07-31

## 2024-07-31 DIAGNOSIS — T79.6XXA TRAUMATIC RHABDOMYOLYSIS, INITIAL ENCOUNTER (HCC): Primary | ICD-10-CM

## 2024-07-31 PROCEDURE — 1111F DSCHRG MED/CURRENT MED MERGE: CPT | Performed by: NURSE PRACTITIONER

## 2024-07-31 NOTE — CARE COORDINATION
Care Transitions Note    Initial Call - Call within 2 business days of discharge: Yes    Patient Current Location:  Home: [address] [place] [address]    Care Transition Nurse contacted the patient by telephone to perform post hospital discharge assessment, verified name and  as identifiers. Provided introduction to self, and explanation of the Care Transition Nurse role.     Patient: Lo Tirado    Patient : 1975   MRN: [id number]    Reason for Admission: Traumatic rhabdomyolysis  Discharge Date: 24  RURS: Readmission Risk Score: 35.2      Last Discharge Facility       Date Complaint Diagnosis Description Type Department Provider    24 Fall Traumatic rhabdomyolysis, initial encounter (HCC) ... ED to Hosp-Admission (Discharged) (ADMITTED) [id number] Helen Bauman, DO; Ahmet Fair D...            Was this an external facility discharge? No    Additional needs identified to be addressed with provider   No needs identified             Method of communication with provider: none.    Patients top risk factors for readmission: Traumatic rhabdomyolysis    Interventions to address risk factors:   Reviewed discharge instructions and offered opportunity to ask any questions regarding discharge instructions.  Confirmed medications obtained and taking as ordered: Patient obtained all medications except for her iron. She agrees to obtain as soon as possible.  Patient attended Wound Care appointment-2024 with next appointment scheduled 2024  Reviewed upcoming appointment with [facility] on 2024  CTN offered to schedule PCP follow up appointment. Patient prefers to schedule via my chart due to having 2 deaths in the family and she prefers to schedule herself.  CTN spoke with [facility]. Nurse to call patient to schedule visit for today. Patient informed to be expecting a call.    Care Transition Nurse reviewed discharge instructions, medical action plan, and red

## 2024-08-06 ENCOUNTER — TELEPHONE (OUTPATIENT)
Dept: FAMILY MEDICINE CLINIC | Facility: CLINIC | Age: 49
End: 2024-08-06

## 2024-08-06 NOTE — TELEPHONE ENCOUNTER
----- Message from Dylan Voss sent at 8/6/2024  2:54 PM EDT -----  Regarding: ECC Message to Provider  ECC Message to Provider    Relationship to Patient: Covered Entity , Home health nurse ann Orellana    Additional Information caller needs a verbal consent to continue the home health for the patient.  --------------------------------------------------------------------------------------------------------------------------    Call Back Information: OK to leave message on voicemail  Preferred Call Back Number: Phone : [phone]

## 2024-08-07 ENCOUNTER — TELEMEDICINE (OUTPATIENT)
Dept: FAMILY MEDICINE CLINIC | Facility: CLINIC | Age: 49
End: 2024-08-07

## 2024-08-07 ENCOUNTER — CARE COORDINATION (OUTPATIENT)
Dept: CARE COORDINATION | Facility: CLINIC | Age: 49
End: 2024-08-07

## 2024-08-07 ENCOUNTER — TELEPHONE (OUTPATIENT)
Dept: FAMILY MEDICINE CLINIC | Facility: CLINIC | Age: 49
End: 2024-08-07

## 2024-08-07 DIAGNOSIS — N39.3 STRESS INCONTINENCE OF URINE: ICD-10-CM

## 2024-08-07 DIAGNOSIS — D64.9 ANEMIA, UNSPECIFIED TYPE: ICD-10-CM

## 2024-08-07 DIAGNOSIS — L50.9 URTICARIA: ICD-10-CM

## 2024-08-07 DIAGNOSIS — E83.51 HYPOCALCEMIA: ICD-10-CM

## 2024-08-07 DIAGNOSIS — Z09 HOSPITAL DISCHARGE FOLLOW-UP: Primary | ICD-10-CM

## 2024-08-07 RX ORDER — HYDROXYZINE HYDROCHLORIDE 10 MG/1
10 TABLET, FILM COATED ORAL 3 TIMES DAILY PRN
Qty: 30 TABLET | Refills: 1 | Status: SHIPPED | OUTPATIENT
Start: 2024-08-07 | End: 2024-08-27

## 2024-08-07 RX ORDER — METOPROLOL SUCCINATE 25 MG/1
25 TABLET, EXTENDED RELEASE ORAL DAILY
COMMUNITY
Start: 2024-04-15

## 2024-08-07 RX ORDER — HYDROXYZINE HYDROCHLORIDE 25 MG/1
25 TABLET, FILM COATED ORAL EVERY 4 HOURS PRN
COMMUNITY
Start: 2024-04-15

## 2024-08-07 RX ORDER — FLUCONAZOLE 150 MG/1
TABLET ORAL
Qty: 2 TABLET | Refills: 0 | Status: SHIPPED | OUTPATIENT
Start: 2024-08-07

## 2024-08-07 RX ORDER — BACLOFEN 10 MG/1
10 TABLET ORAL 3 TIMES DAILY
COMMUNITY
Start: 2024-04-23

## 2024-08-07 RX ORDER — HYDROCODONE BITARTRATE AND ACETAMINOPHEN 7.5; 325 MG/1; MG/1
1 TABLET ORAL EVERY 6 HOURS PRN
COMMUNITY
Start: 2024-04-15

## 2024-08-07 ASSESSMENT — PATIENT HEALTH QUESTIONNAIRE - PHQ9
2. FEELING DOWN, DEPRESSED OR HOPELESS: NOT AT ALL
10. IF YOU CHECKED OFF ANY PROBLEMS, HOW DIFFICULT HAVE THESE PROBLEMS MADE IT FOR YOU TO DO YOUR WORK, TAKE CARE OF THINGS AT HOME, OR GET ALONG WITH OTHER PEOPLE: SOMEWHAT DIFFICULT
SUM OF ALL RESPONSES TO PHQ9 QUESTIONS 1 & 2: 0
SUM OF ALL RESPONSES TO PHQ QUESTIONS 1-9: 0
7. TROUBLE CONCENTRATING ON THINGS, SUCH AS READING THE NEWSPAPER OR WATCHING TELEVISION: NOT AT ALL
SUM OF ALL RESPONSES TO PHQ QUESTIONS 1-9: 5
SUM OF ALL RESPONSES TO PHQ9 QUESTIONS 1 & 2: 0
1. LITTLE INTEREST OR PLEASURE IN DOING THINGS: NOT AT ALL
SUM OF ALL RESPONSES TO PHQ QUESTIONS 1-9: 5
SUM OF ALL RESPONSES TO PHQ QUESTIONS 1-9: 5
5. POOR APPETITE OR OVEREATING: NOT AT ALL
3. TROUBLE FALLING OR STAYING ASLEEP: NEARLY EVERY DAY
6. FEELING BAD ABOUT YOURSELF - OR THAT YOU ARE A FAILURE OR HAVE LET YOURSELF OR YOUR FAMILY DOWN: NOT AT ALL
SUM OF ALL RESPONSES TO PHQ QUESTIONS 1-9: 0
1. LITTLE INTEREST OR PLEASURE IN DOING THINGS: NOT AT ALL
9. THOUGHTS THAT YOU WOULD BE BETTER OFF DEAD, OR OF HURTING YOURSELF: NOT AT ALL
2. FEELING DOWN, DEPRESSED OR HOPELESS: NOT AT ALL
SUM OF ALL RESPONSES TO PHQ QUESTIONS 1-9: 5
SUM OF ALL RESPONSES TO PHQ QUESTIONS 1-9: 0
SUM OF ALL RESPONSES TO PHQ QUESTIONS 1-9: 0
4. FEELING TIRED OR HAVING LITTLE ENERGY: NOT AT ALL
8. MOVING OR SPEAKING SO SLOWLY THAT OTHER PEOPLE COULD HAVE NOTICED. OR THE OPPOSITE, BEING SO FIGETY OR RESTLESS THAT YOU HAVE BEEN MOVING AROUND A LOT MORE THAN USUAL: MORE THAN HALF THE DAYS

## 2024-08-07 ASSESSMENT — ENCOUNTER SYMPTOMS
EYES NEGATIVE: 1
ALLERGIC/IMMUNOLOGIC NEGATIVE: 1
GASTROINTESTINAL NEGATIVE: 1
RESPIRATORY NEGATIVE: 1

## 2024-08-07 NOTE — CARE COORDINATION
Care Transitions Note    Follow Up Call     Attempted to reach patient for transitions of care follow up.  Unable to reach patient.      Outreach Attempts:   Unable to leave message. Pt voicemail box is full.    Follow Up Appointment:   Future Appointments         Provider Specialty Dept Phone    8/8/2024 1:50 PM Jen Flores, RN; Alexi Tabor, DO Wound Ostomy [phone]            Plan for follow-up call in 6-10 days based on severity of symptoms and risk factors. Plan for next call: symptom management    Camila Greenberg LPN

## 2024-08-07 NOTE — TELEPHONE ENCOUNTER
[place],  I sent an order for a Purewick to [facility]. Can we print and fax to appropriate supply store please?  Thanks

## 2024-08-07 NOTE — PROGRESS NOTES
[address]  [place] [address]   (cx) [phone] (fax) [phone]  ROLAND Bruce    Post-Discharge Transitional Care Follow Up      Lo Tirado   YOB: 1975    Date of Office Visit:  8/7/2024  Date of Hospital Admission: 7/26/24  Date of Hospital Discharge: 7/29/24  Readmission Risk Score (high >=14%. Medium >=10%):Readmission Risk Score: 35.2      Care management risk score Rising risk (score 2-5) and Complex Care (Scores >=6): No Risk Score On File     Non face to face  following discharge, date last encounter closed (first attempt may have been earlier): 07/31/2024     Call initiated 2 business days of discharge: Yes     Hospital discharge follow-up  -     [place] DISCHARGE MEDS RECONCILED W/ CURRENT OUTPATIENT MED LIST      Medical Decision Making: moderate complexity  No follow-ups on file.           Subjective:   HPI    48 y.o. female with medical history of asthma, chronic lower extremity wounds, lymphedema, chronic pain/neuropathy, ERICA, bipolar who presented to ER on 07/26  with pain in her legs with cramping after falling 7/25 after walking around her house. She was not found in 2 AM 7/26 and was brought to the ED. This is per record.   \"Patient was admitted with rhabdomyolysis and also concerns for cellulitis of lower extremities.  She spiked temp of 100.8 on 7/27 and was started on broad-spectrum antibiotic with Rocephin. Wound culture grew GNR. MRSA negative.  Patient remained afebrile during rest of hospital course.  Blood cultures remained negative.  Her IV Rocephin was transitioned to Vantin to complete a 10-day course total.  Wound team assessed patient during hospital course and assisted with wound care.\"  \"Her rhabdomyolysis resolved and patient progressed well during hospital course.  She went home on 7/29 and  declined STR. She was discharged in medically stable condition. Today is a

## 2024-08-08 ENCOUNTER — HOSPITAL ENCOUNTER (OUTPATIENT)
Dept: WOUND CARE | Age: 49
Discharge: HOME | End: 2024-08-08
Payer: MEDICARE

## 2024-08-08 VITALS
WEIGHT: 293 LBS | RESPIRATION RATE: 16 BRPM | TEMPERATURE: 97.7 F | BODY MASS INDEX: 45.99 KG/M2 | HEART RATE: 89 BPM | SYSTOLIC BLOOD PRESSURE: 144 MMHG | HEIGHT: 67 IN | DIASTOLIC BLOOD PRESSURE: 102 MMHG

## 2024-08-08 PROCEDURE — 99214 OFFICE O/P EST MOD 30 MIN: CPT

## 2024-08-08 RX ORDER — AMOXICILLIN AND CLAVULANATE POTASSIUM 875; 125 MG/1; MG/1
1 TABLET, FILM COATED ORAL 2 TIMES DAILY
Qty: 28 TABLET | Refills: 0 | Status: SHIPPED | OUTPATIENT
Start: 2024-08-08 | End: 2024-08-22

## 2024-08-08 ASSESSMENT — PAIN DESCRIPTION - DESCRIPTORS: DESCRIPTORS: ACHING

## 2024-08-08 ASSESSMENT — PAIN DESCRIPTION - ORIENTATION: ORIENTATION: RIGHT

## 2024-08-08 ASSESSMENT — PAIN SCALES - GENERAL: PAINLEVEL_OUTOF10: 7

## 2024-08-08 ASSESSMENT — PAIN DESCRIPTION - LOCATION: LOCATION: FOOT

## 2024-08-08 NOTE — TELEPHONE ENCOUNTER
Reyna called back, requesting verbal orders. Verbal orders given, as directed by provider. She verbalized understanding & thanked.

## 2024-08-08 NOTE — WOUND CARE
increased redness, swelling, pain, foul odor, size of wound(s), or have a temperature over 101 degrees please contact the Wound Healing Center at [phone] or if after hours contact your primary care physician or go to the hospital emergency department.    PLEASE NOTE: IF YOU ARE UNABLE TO OBTAIN WOUND SUPPLIES, CONTINUE TO USE THE SUPPLIES YOU HAVE AVAILABLE UNTIL YOU ARE ABLE TO REACH US. IT IS MOST IMPORTANT TO KEEP THE WOUND COVERED AT ALL TIMES.    Electronically signed Tequila Ortez RN, [facility] on 8/8/2024 at 2:40 PM

## 2024-08-09 ENCOUNTER — HOSPITAL ENCOUNTER (EMERGENCY)
Age: 49
Discharge: HOME | End: 2024-08-09
Attending: EMERGENCY MEDICINE
Payer: MEDICARE

## 2024-08-09 VITALS
RESPIRATION RATE: 15 BRPM | BODY MASS INDEX: 45.99 KG/M2 | TEMPERATURE: 98.1 F | OXYGEN SATURATION: 97 % | HEART RATE: 78 BPM | WEIGHT: 293 LBS | DIASTOLIC BLOOD PRESSURE: 77 MMHG | SYSTOLIC BLOOD PRESSURE: 115 MMHG | HEIGHT: 67 IN

## 2024-08-09 DIAGNOSIS — M79.605 LEFT LEG PAIN: Primary | ICD-10-CM

## 2024-08-09 PROCEDURE — 96374 THER/PROPH/DIAG INJ IV PUSH: CPT

## 2024-08-09 PROCEDURE — 96376 TX/PRO/DX INJ SAME DRUG ADON: CPT

## 2024-08-09 PROCEDURE — 2500000003 HC RX 250 WO HCPCS: Performed by: EMERGENCY MEDICINE

## 2024-08-09 PROCEDURE — 99284 EMERGENCY DEPT VISIT MOD MDM: CPT

## 2024-08-09 RX ORDER — HYDROMORPHONE HYDROCHLORIDE 1 MG/ML
0.5 INJECTION, SOLUTION INTRAMUSCULAR; INTRAVENOUS; SUBCUTANEOUS EVERY 4 HOURS PRN
Status: DISCONTINUED | OUTPATIENT
Start: 2024-08-09 | End: 2024-08-09 | Stop reason: HOSPADM

## 2024-08-09 RX ORDER — HYDROMORPHONE HYDROCHLORIDE 1 MG/ML
1 INJECTION, SOLUTION INTRAMUSCULAR; INTRAVENOUS; SUBCUTANEOUS ONCE
Status: COMPLETED | OUTPATIENT
Start: 2024-08-09 | End: 2024-08-09

## 2024-08-09 RX ADMIN — HYDROMORPHONE HYDROCHLORIDE 1 MG: 1 INJECTION, SOLUTION INTRAMUSCULAR; INTRAVENOUS; SUBCUTANEOUS at 07:25

## 2024-08-09 RX ADMIN — HYDROMORPHONE HYDROCHLORIDE 0.5 MG: 1 INJECTION, SOLUTION INTRAMUSCULAR; INTRAVENOUS; SUBCUTANEOUS at 09:25

## 2024-08-09 NOTE — ED TRIAGE NOTES
Pt c/o burning to lower left leg, wound to bottom of right foot causing burning on right heel.  Pt took Lyrica prior to arrival, saw wound care yesterday and was placed on antibiotics.

## 2024-08-09 NOTE — TELEPHONE ENCOUNTER
Called [facility] [place]. They do carry PureWick external catheters; however, they do not accept insurance. It would cost the patient $665 plus tax out of pocket.     Called [facility] - they do not carry any catheters or urinary systems.

## 2024-08-14 ENCOUNTER — HOSPITAL ENCOUNTER (OUTPATIENT)
Dept: WOUND CARE | Age: 49
Discharge: HOME | End: 2024-08-14
Payer: MEDICARE

## 2024-08-14 ENCOUNTER — TELEPHONE (OUTPATIENT)
Dept: FAMILY MEDICINE CLINIC | Facility: CLINIC | Age: 49
End: 2024-08-14

## 2024-08-14 ENCOUNTER — CARE COORDINATION (OUTPATIENT)
Dept: CARE COORDINATION | Facility: CLINIC | Age: 49
End: 2024-08-14

## 2024-08-14 VITALS
WEIGHT: 293 LBS | SYSTOLIC BLOOD PRESSURE: 126 MMHG | DIASTOLIC BLOOD PRESSURE: 68 MMHG | HEART RATE: 87 BPM | TEMPERATURE: 98.2 F | RESPIRATION RATE: 16 BRPM | HEIGHT: 67 IN | BODY MASS INDEX: 45.99 KG/M2

## 2024-08-14 DIAGNOSIS — S91.301S NON-HEALING OPEN WOUND OF HEEL, RIGHT, SEQUELA: Primary | ICD-10-CM

## 2024-08-14 PROCEDURE — 11042 DBRDMT SUBQ TIS 1ST 20SQCM/<: CPT

## 2024-08-14 PROCEDURE — 99214 OFFICE O/P EST MOD 30 MIN: CPT

## 2024-08-14 NOTE — WOUND CARE
Discharge Instructions for  [facility]  [address]  [address] [place] [address]  Phone [phone]   Fax [phone]      NAME:  Lo Tirado  YOB: 1975  MEDICAL RECORD NUMBER:  [id number]  DATE:  8/14/2024    Return Appointment:   1 week with Alexi Tabor DO    Instructions: Bilateral lower legs:    Vashe Wound Solution (hypochlorous acid) soak placed on wound bed for a minimum of 60 seconds prior to dressing application.   Apply Vaseline to wound periphery for protection from drainage.  Apply Adaptic to wound beds  Apply alginate with silver. Cut product to wound size and apply to wound bed.   Cover with optilock/super absorber and rolled gauze  Apply Tubigrip,  then wrap with ACE wraps from base of toes to just below knees  Change dressing between HH visits as needed    Right heel:  Apply alginate. Cut alginate sheet to wound size and apply to wound bed.   Cover with Foam adhesive  Change 3 times a week.    [facility]   antibiotics and start taking ASAP.  Used size G tubigrips today in wound center.    Elevate legs when sitting to reduce swelling.   Swelling interferes with wound healing.  Elevate legs when sitting.  Avoid prolonged standing or sitting with legs in dependent position.  Be cautious of increased salt intake as this promotes fluid retention and swelling.  Take diuretic (fluid pill) as instructed by your doctor.  Increase protein intake to promote wound healing. Jase and Ensure are examples of protein supplements.  Do not get legs wet, use cast cover to shower.     Please increase dietary protein to at least 100 grams per day to support cell rejuvenation.   May use supplements such as Ensure Max, Jase, & Glucerna (samples & coupons provided at first visit).   Be sure to spread intake throughout the day for improved absorption.     Lymphedema pump therapy 1hr 2x daily    Should you

## 2024-08-14 NOTE — WOUND CARE
Patient refused completion of dressing on left leg at today's visit. She refused tubigrip and ACE wrapping. Explained to the patient the importance of these steps of the dressing; for example: to decrease edema, decrease drainage. Patient continued to refuse.

## 2024-08-14 NOTE — FLOWSHEET NOTE
saturated)   Drainage Description Serous   Odor None   Pati-wound Assessment Edematous   Wound Thickness Description not for Pressure Injury Full thickness   Wound 11/01/23 Leg Left;Posterior #2   Date First Assessed/Time First Assessed: 11/01/23 0954   Present on Original Admission: Yes  Wound Approximate Age at First Assessment (Weeks): 12 weeks  Primary Wound Type: Venous Ulcer  Location: Leg  Wound Location Orientation: Left;Posterior  Woun...   Wound Image    Wound Etiology Venous   Dressing Status New drainage noted   Wound Cleansed Vashe   Dressing/Treatment ABD   Wound Length (cm) 11 cm   Wound Width (cm) 20 cm   Wound Depth (cm) 0.1 cm   Wound Surface Area (cm^2) 220 cm^2   Change in Wound Size % (l*w) -120   Wound Volume (cm^3) 22 cm^3   Wound Healing % -120   Wound Assessment [place]/red;Slough   Drainage Amount Large (50-75% saturated)   Drainage Description Serous   Odor None   Pati-wound Assessment Edematous   Wound Thickness Description not for Pressure Injury Full thickness   Pain Assessment   Pain Assessment 0-10

## 2024-08-14 NOTE — CARE COORDINATION
Care Transitions Note    Follow Up Call     Attempted to reach patient for transitions of care follow up.  Unable to reach patient.      Outreach Attempts:   Unable to leave message. Mailbox full        Follow Up Appointment:   Future Appointments         Provider Specialty Dept Phone    8/14/2024 1:20 PM Patricia Cornejo, STEWART; Alexi Tabor, DO Wound Ostomy [phone]            Plan for follow-up call in 6-10 days based on severity of symptoms and risk factors. Plan for next call: symptom management    Cyn Davies RN

## 2024-08-15 ENCOUNTER — HOSPITAL ENCOUNTER (OUTPATIENT)
Dept: GENERAL RADIOLOGY | Age: 49
Discharge: HOME | End: 2024-08-15
Payer: MEDICARE

## 2024-08-15 DIAGNOSIS — M25.561 PAIN IN JOINT OF RIGHT KNEE: ICD-10-CM

## 2024-08-15 PROBLEM — E11.621 DIABETIC ULCER OF RIGHT HEEL ASSOCIATED WITH TYPE 2 DIABETES MELLITUS, WITH FAT LAYER EXPOSED (HCC): Status: ACTIVE | Noted: 2024-08-15

## 2024-08-15 PROBLEM — L97.412 DIABETIC ULCER OF RIGHT HEEL ASSOCIATED WITH TYPE 2 DIABETES MELLITUS, WITH FAT LAYER EXPOSED (HCC): Chronic | Status: ACTIVE | Noted: 2024-08-15

## 2024-08-15 PROBLEM — L97.412 DIABETIC ULCER OF RIGHT HEEL ASSOCIATED WITH TYPE 2 DIABETES MELLITUS, WITH FAT LAYER EXPOSED (HCC): Status: ACTIVE | Noted: 2024-08-15

## 2024-08-15 PROBLEM — E11.621 DIABETIC ULCER OF RIGHT HEEL ASSOCIATED WITH TYPE 2 DIABETES MELLITUS, WITH FAT LAYER EXPOSED (HCC): Chronic | Status: ACTIVE | Noted: 2024-08-15

## 2024-08-15 PROCEDURE — 73562 X-RAY EXAM OF KNEE 3: CPT

## 2024-08-16 ENCOUNTER — TELEPHONE (OUTPATIENT)
Dept: WOUND CARE | Age: 49
End: 2024-08-16

## 2024-08-16 NOTE — TELEPHONE ENCOUNTER
Telephone message received from patient. RN returned call to patient who c/o foul odor earlier this week of wound, increased pain. States she is almost out of antibiotics. RN conferenced with Dr. Tabor and no new order for antibiotic received as patient has another week of antibiotics. Instructed patient in need to cleanse with VASHE with every dressing change and to seek care in ER if severe unrelieved pain or red streaks up leg.   Patient reported that home health nurse changed dressing yesterday and no odor noted

## 2024-08-21 ENCOUNTER — TELEPHONE (OUTPATIENT)
Dept: WOUND CARE | Age: 49
End: 2024-08-21

## 2024-08-21 ENCOUNTER — CARE COORDINATION (OUTPATIENT)
Dept: CARE COORDINATION | Facility: CLINIC | Age: 49
End: 2024-08-21

## 2024-08-21 NOTE — TELEPHONE ENCOUNTER
Spoke with patient concerning every 3 day dressing changes.  Patient was concerned about the drainage and burning sensation to heel wound.  Patient wanted to know if she could change the dressing daily.  Instructed patient to change heel dressing daily and to call back if she had any more concerns.

## 2024-08-21 NOTE — CARE COORDINATION
Care Transitions Note    Follow Up Call     Attempted to reach patient for transitions of care follow up.  Unable to reach patient.      Outreach Attempts:   HIPAA compliant voicemail left for patient.       Follow Up Appointment:   Future Appointments         Provider Specialty Dept Phone    8/28/2024 1:50 PM Tequila Ortez, STEWART; Alexi Tabor, DO Wound Ostomy [phone]            Plan for follow-up call in 6-10 days based on severity of symptoms and risk factors. Plan for next call: symptom management    Camila Greenberg LPN

## 2024-08-22 NOTE — TELEPHONE ENCOUNTER
Pt called wanting an external referral to wound clinic after bad experience with current one. Referral sent by Nenita 8/14

## 2024-08-28 ENCOUNTER — CARE COORDINATION (OUTPATIENT)
Dept: CARE COORDINATION | Facility: CLINIC | Age: 49
End: 2024-08-28

## 2024-08-28 NOTE — CARE COORDINATION
Care Transitions Note    Follow Up Call     Patient Current Location:  Home: [address] [place] [id number]    LPN Care Coordinator contacted the patient by telephone. Verified name and  as identifiers.    Additional needs identified to be addressed with provider   No needs identified                 Method of communication with provider: none.    Care Summary Note: Patient reports pain both legs, right heel.  Patient was seen by Dr. Tabor on 24.  He recommended debridement and compression therapy bilat lower extremities.  Debridement both lower legs scheduled with [facility] on 24.  Was unable to complete call due to patient being with her RN. Will outreach on 24.    Plan of care updates since last contact:  Noted in summary note       Advance Care Planning:   Does patient have an Advance Directive: reviewed during previous call, see note. .    Medication Review:  No changes since last call.     Assessments:   Goals Addressed                   This Visit's Progress     Returns to baseline activity level.   On track     Patient/Family able to obtain medicine after d/c     Patient/Family able to verbalize medicine changes     Patient/Family aware and attends follow up appointments s/p d/c.     Patient/Family agrees to notify provider of any barriers to plan of care.    Patient/Family agrees to notify provider of any symptoms that indicate a worsening of condition               Follow Up Appointment:   Reviewed upcoming appointment(s).      LPN Care Coordinator provided contact information.  Plan for follow-up call in 2-5 days based on severity of symptoms and risk factors.  Plan for next call: symptom management    Camila Greenberg LPN

## 2024-08-29 ENCOUNTER — CARE COORDINATION (OUTPATIENT)
Dept: CARE COORDINATION | Facility: CLINIC | Age: 49
End: 2024-08-29

## 2024-08-29 NOTE — CARE COORDINATION
Patient has graduated from the Care Transitions program on 8/29/24.  Patient/family has the ability to self-manage at this time. Patient declined referral to the AC team for further management.   Patient reports feeling better today.   Patient reports having a bad experience with the RN who provided patient's wound care on 8/8/24 and 8/14/24.  Patient reports choosing to leave [facility] due to this issue and was seen for new patient visit at [facility] on 8/22/24.  She is scheduled for Debridement with Dr. Ahmet Kam at [facility] on 8/30/24.  Apologized for patient's experience.  Patient voices no further questions or concerns at this time.     Patient has Care Transition Nurse's contact information for any further questions, concerns, or needs.  Patients upcoming visits:  No future appointments.

## 2024-10-09 ENCOUNTER — HOSPITAL ENCOUNTER (EMERGENCY)
Age: 49
Discharge: HOME | End: 2024-10-09
Attending: EMERGENCY MEDICINE
Payer: MEDICARE

## 2024-10-09 ENCOUNTER — APPOINTMENT (OUTPATIENT)
Dept: GENERAL RADIOLOGY | Age: 49
End: 2024-10-09
Payer: MEDICARE

## 2024-10-09 VITALS
BODY MASS INDEX: 45.99 KG/M2 | HEART RATE: 89 BPM | SYSTOLIC BLOOD PRESSURE: 136 MMHG | HEIGHT: 67 IN | DIASTOLIC BLOOD PRESSURE: 80 MMHG | RESPIRATION RATE: 12 BRPM | WEIGHT: 293 LBS | OXYGEN SATURATION: 98 % | TEMPERATURE: 98.9 F

## 2024-10-09 DIAGNOSIS — M79.605 CHRONIC PAIN OF BOTH LOWER EXTREMITIES: Primary | ICD-10-CM

## 2024-10-09 DIAGNOSIS — S81.809A MULTIPLE OPENS WOUND OF LOWER EXTREMITY, UNSPECIFIED LATERALITY, INITIAL ENCOUNTER: ICD-10-CM

## 2024-10-09 DIAGNOSIS — M79.604 CHRONIC PAIN OF BOTH LOWER EXTREMITIES: Primary | ICD-10-CM

## 2024-10-09 DIAGNOSIS — G89.29 CHRONIC PAIN OF BOTH LOWER EXTREMITIES: Primary | ICD-10-CM

## 2024-10-09 LAB
ALBUMIN SERPL-MCNC: 2.9 G/DL (ref 3.5–5)
ALBUMIN/GLOB SERPL: 0.6 (ref 1–1.9)
ALP SERPL-CCNC: 119 U/L (ref 35–104)
ALT SERPL-CCNC: 12 U/L (ref 8–45)
ANION GAP SERPL CALC-SCNC: 8 MMOL/L (ref 9–18)
AST SERPL-CCNC: 18 U/L (ref 15–37)
BASOPHILS # BLD: 0 K/UL (ref 0–0.2)
BASOPHILS NFR BLD: 0 % (ref 0–2)
BILIRUB SERPL-MCNC: 0.4 MG/DL (ref 0–1.2)
BUN SERPL-MCNC: 8 MG/DL (ref 6–23)
CALCIUM SERPL-MCNC: 9.1 MG/DL (ref 8.8–10.2)
CHLORIDE SERPL-SCNC: 105 MMOL/L (ref 98–107)
CO2 SERPL-SCNC: 27 MMOL/L (ref 20–28)
CREAT SERPL-MCNC: 1.05 MG/DL (ref 0.6–1.1)
DIFFERENTIAL METHOD BLD: ABNORMAL
EOSINOPHIL # BLD: 0.6 K/UL (ref 0–0.8)
EOSINOPHIL NFR BLD: 7 % (ref 0.5–7.8)
ERYTHROCYTE [DISTWIDTH] IN BLOOD BY AUTOMATED COUNT: 19.4 % (ref 11.9–14.6)
GLOBULIN SER CALC-MCNC: 5.1 G/DL (ref 2.3–3.5)
GLUCOSE SERPL-MCNC: 88 MG/DL (ref 70–99)
HCT VFR BLD AUTO: 30.2 % (ref 35.8–46.3)
HGB BLD-MCNC: 9 G/DL (ref 11.7–15.4)
IMM GRANULOCYTES # BLD AUTO: 0 K/UL (ref 0–0.5)
IMM GRANULOCYTES NFR BLD AUTO: 0 % (ref 0–5)
LACTATE SERPL-SCNC: 0.5 MMOL/L (ref 0.5–2)
LYMPHOCYTES # BLD: 1.3 K/UL (ref 0.5–4.6)
LYMPHOCYTES NFR BLD: 14 % (ref 13–44)
MCH RBC QN AUTO: 23.1 PG (ref 26.1–32.9)
MCHC RBC AUTO-ENTMCNC: 29.8 G/DL (ref 31.4–35)
MCV RBC AUTO: 77.6 FL (ref 82–102)
MONOCYTES # BLD: 0.7 K/UL (ref 0.1–1.3)
MONOCYTES NFR BLD: 8 % (ref 4–12)
NEUTS SEG # BLD: 6.4 K/UL (ref 1.7–8.2)
NEUTS SEG NFR BLD: 71 % (ref 43–78)
NRBC # BLD: 0 K/UL (ref 0–0.2)
PLATELET # BLD AUTO: 493 K/UL (ref 150–450)
PMV BLD AUTO: 9.4 FL (ref 9.4–12.3)
POTASSIUM SERPL-SCNC: 4 MMOL/L (ref 3.5–5.1)
PROCALCITONIN SERPL-MCNC: 0.08 NG/ML (ref 0–0.1)
PROT SERPL-MCNC: 8.1 G/DL (ref 6.3–8.2)
RBC # BLD AUTO: 3.89 M/UL (ref 4.05–5.2)
SODIUM SERPL-SCNC: 140 MMOL/L (ref 136–145)
WBC # BLD AUTO: 9 K/UL (ref 4.3–11.1)

## 2024-10-09 PROCEDURE — 80053 COMPREHEN METABOLIC PANEL: CPT

## 2024-10-09 PROCEDURE — 83605 ASSAY OF LACTIC ACID: CPT

## 2024-10-09 PROCEDURE — 87040 BLOOD CULTURE FOR BACTERIA: CPT

## 2024-10-09 PROCEDURE — 85025 COMPLETE CBC W/AUTO DIFF WBC: CPT

## 2024-10-09 PROCEDURE — 71045 X-RAY EXAM CHEST 1 VIEW: CPT

## 2024-10-09 PROCEDURE — 99285 EMERGENCY DEPT VISIT HI MDM: CPT

## 2024-10-09 PROCEDURE — 84145 PROCALCITONIN (PCT): CPT

## 2024-10-09 PROCEDURE — 36415 COLL VENOUS BLD VENIPUNCTURE: CPT

## 2024-10-09 RX ORDER — DOXYCYCLINE HYCLATE 100 MG
100 TABLET ORAL 2 TIMES DAILY
Qty: 20 TABLET | Refills: 0 | Status: SHIPPED | OUTPATIENT
Start: 2024-10-09 | End: 2024-10-19

## 2024-10-09 RX ORDER — HYDROCODONE BITARTRATE AND ACETAMINOPHEN 10; 325 MG/1; MG/1
1 TABLET ORAL
Status: DISCONTINUED | OUTPATIENT
Start: 2024-10-09 | End: 2024-10-09 | Stop reason: HOSPADM

## 2024-10-09 ASSESSMENT — PAIN SCALES - GENERAL: PAINLEVEL_OUTOF10: 10

## 2024-10-09 NOTE — ED PROVIDER NOTES
Abused: No     Sexually Abused: No   Housing Stability: Not At Risk (9/1/2024)    Received from [facility]    Housing Stability     Was there a time when you did not have a steady place to sleep: No     Worried that the place you are staying is making you sick: No        Previous Medications    ALBUTEROL SULFATE HFA (PROVENTIL;VENTOLIN;PROAIR) 108 (90 BASE) MCG/ACT INHALER    Inhale 2 puffs into the lungs every 6 hours as needed for Wheezing or Shortness of Breath TAKE 2 PUFFS BY MOUTH EVERY 4 HOURS AS NEEDED    ASPIRIN 81 MG CHEWABLE TABLET    Take 1 tablet by mouth daily    BACLOFEN (LIORESAL) 10 MG TABLET    Take 1 tablet by mouth 3 times daily    DULOXETINE (CYMBALTA) 60 MG EXTENDED RELEASE CAPSULE    Take 1 capsule by mouth daily    FERROUS SULFATE (IRON 325) 325 (65 FE) MG TABLET    Take 1 tablet by mouth daily (with breakfast)    FLUCONAZOLE (DIFLUCAN) 150 MG TABLET    Repeat one after 3 days    FLUTICASONE (FLONASE) 50 MCG/ACT NASAL SPRAY    2 sprays by Nasal route daily    FUROSEMIDE (LASIX) 40 MG TABLET    Take 1 tablet by mouth daily    HYDROCODONE-ACETAMINOPHEN (NORCO) 7.5-325 MG PER TABLET    Take 1 tablet by mouth every 6 hours as needed. Max Daily Amount: 4 tablets    HYDROXYZINE HCL (ATARAX) 25 MG TABLET    Take 1 tablet by mouth every 4 hours as needed    LACTOBACILLUS (CULTURELLE) CAPSULE    Take 1 capsule by mouth daily (with breakfast)    METOPROLOL SUCCINATE (TOPROL XL) 25 MG EXTENDED RELEASE TABLET    Take 1 tablet by mouth daily    MINERAL OIL-HYDROPHILIC PETROLATUM (AQUAPHOR) OINTMENT    Apply topically as needed.    MONTELUKAST (SINGULAIR) 10 MG TABLET    Take 1 tablet by mouth daily    NALOXONE (NARCAN) 4 MG/0.1ML LIQD NASAL SPRAY    1 spray by Nasal route as needed for Opioid Reversal    NUTRITIONAL SUPPLEMENTS (ENSURE MAX PROTEIN) LIQD    Take 1 each by mouth in the morning and at bedtime    OMEPRAZOLE (PRILOSEC) 20 MG DELAYED RELEASE CAPSULE    Take 1 capsule by mouth every morning

## 2024-10-09 NOTE — ED NOTES
Pt becoming verbally aggressive with staff and refusing any care.   Pt educated multiple times on importance of medication adherence to antibiotics given at discharge. Pt multiple complaints about pain, seen by pain management, patient educated on NORCO med order that is available to be given, pt refused oral pain medication stating \"its not in the vein\" educated on what type of medication (narcotic opioid) NORCO is and how medication works in the body, verbalized understanding, still refused oral pain medication. Attempted multiple times to wrap and clean wounds to BLE, pt refused, education provided with pt verbalizing understanding and still refusing cleaning and dressing of wounds. MD made aware.   Pt calling MD debbie castillo.   Charge notified. Pt given patient relation phone number     Rachelle Lozada RN  10/09/24 [id number]

## 2024-10-09 NOTE — ED TRIAGE NOTES
Pt arrives via [facility] from home  Chronic BLE wounds. Open wounds noted. Drainage noted to from bilateral wounds was seen ar [facility] and discharged with no antibiotics. Redness and warmth and noted to BL wounds. Pt states severe pain 10/10  burning pain.

## 2024-10-10 ENCOUNTER — CARE COORDINATION (OUTPATIENT)
Dept: CARE COORDINATION | Facility: CLINIC | Age: 49
End: 2024-10-10

## 2024-10-10 NOTE — CARE COORDINATION
Ambulatory Care Management Outreach Attempt    This patient was received as a referral from  Daily assignment for case management of ed utilizer.    Attempted to reach patient for ED follow up. Unable to reach patient, I will try again tomorrow.      Patient: Lo Tirado Patient : 1975 MRN: [id number]    Last Discharge Facility       Date Complaint Diagnosis Description Type Department Provider    10/9/24 Wound Infection Chronic pain of both lower extremities ... ED (DISCHARGE) Deysi Allison MD                Noted following upcoming appointments from discharge chart review:   [facility] follow up appointment(s): No future appointments.  Non-[facility] follow up appointment(s):

## 2024-10-11 ENCOUNTER — CARE COORDINATION (OUTPATIENT)
Dept: CARE COORDINATION | Facility: CLINIC | Age: 49
End: 2024-10-11

## 2024-10-18 ENCOUNTER — CARE COORDINATION (OUTPATIENT)
Dept: CARE COORDINATION | Facility: CLINIC | Age: 49
End: 2024-10-18

## 2024-10-18 NOTE — CARE COORDINATION
Attempted f/u call with pt and in reviewing chart and pt is admitted to [facility]. I will be signing off at this time.

## 2025-03-05 ENCOUNTER — HOSPITAL ENCOUNTER (OUTPATIENT)
Dept: GENERAL RADIOLOGY | Age: 50
Discharge: HOME | End: 2025-03-07
Payer: MEDICARE

## 2025-03-05 DIAGNOSIS — R52 PAIN: ICD-10-CM

## 2025-03-05 PROCEDURE — 73562 X-RAY EXAM OF KNEE 3: CPT

## 2025-03-05 PROCEDURE — 73552 X-RAY EXAM OF FEMUR 2/>: CPT

## 2025-03-05 PROCEDURE — 73590 X-RAY EXAM OF LOWER LEG: CPT

## 2025-04-09 ENCOUNTER — COMMUNITY OUTREACH (OUTPATIENT)
Dept: FAMILY MEDICINE CLINIC | Facility: CLINIC | Age: 50
End: 2025-04-09

## 2025-07-07 ENCOUNTER — HOSPITAL ENCOUNTER (EMERGENCY)
Age: 50
Discharge: HOME | End: 2025-07-07
Attending: STUDENT IN AN ORGANIZED HEALTH CARE EDUCATION/TRAINING PROGRAM
Payer: MEDICARE

## 2025-07-07 ENCOUNTER — APPOINTMENT (OUTPATIENT)
Dept: GENERAL RADIOLOGY | Age: 50
End: 2025-07-07
Payer: MEDICARE

## 2025-07-07 VITALS
TEMPERATURE: 97.9 F | HEART RATE: 94 BPM | WEIGHT: 293 LBS | OXYGEN SATURATION: 94 % | SYSTOLIC BLOOD PRESSURE: 125 MMHG | DIASTOLIC BLOOD PRESSURE: 87 MMHG | BODY MASS INDEX: 45.99 KG/M2 | RESPIRATION RATE: 19 BRPM | HEIGHT: 67 IN

## 2025-07-07 DIAGNOSIS — L03.119 CELLULITIS OF LOWER EXTREMITY, UNSPECIFIED LATERALITY: Primary | ICD-10-CM

## 2025-07-07 LAB
ALBUMIN SERPL-MCNC: 2.4 G/DL (ref 3.5–5)
ALBUMIN/GLOB SERPL: 0.5 (ref 1–1.9)
ALP SERPL-CCNC: 113 U/L (ref 35–104)
ALT SERPL-CCNC: 14 U/L (ref 8–45)
ANION GAP SERPL CALC-SCNC: 12 MMOL/L (ref 7–16)
APPEARANCE UR: CLEAR
AST SERPL-CCNC: 22 U/L (ref 15–37)
BACTERIA URNS QL MICRO: ABNORMAL /HPF
BASOPHILS # BLD: 0.03 K/UL (ref 0–0.2)
BASOPHILS NFR BLD: 0.2 % (ref 0–2)
BILIRUB SERPL-MCNC: 0.4 MG/DL (ref 0–1.2)
BILIRUB UR QL: NEGATIVE
BUN SERPL-MCNC: 11 MG/DL (ref 6–23)
CALCIUM SERPL-MCNC: 8.9 MG/DL (ref 8.8–10.2)
CHLORIDE SERPL-SCNC: 101 MMOL/L (ref 98–107)
CO2 SERPL-SCNC: 25 MMOL/L (ref 20–29)
COLOR UR: ABNORMAL
CREAT SERPL-MCNC: 0.86 MG/DL (ref 0.6–1.1)
DIFFERENTIAL METHOD BLD: ABNORMAL
EOSINOPHIL # BLD: 0.23 K/UL (ref 0–0.8)
EOSINOPHIL NFR BLD: 1.7 % (ref 0.5–7.8)
EPI CELLS #/AREA URNS HPF: ABNORMAL /HPF
ERYTHROCYTE [DISTWIDTH] IN BLOOD BY AUTOMATED COUNT: 17.8 % (ref 11.9–14.6)
GLOBULIN SER CALC-MCNC: 4.9 G/DL (ref 2.3–3.5)
GLUCOSE SERPL-MCNC: 98 MG/DL (ref 70–99)
GLUCOSE UR STRIP.AUTO-MCNC: NEGATIVE MG/DL
HCT VFR BLD AUTO: 30.7 % (ref 35.8–46.3)
HGB BLD-MCNC: 9.5 G/DL (ref 11.7–15.4)
HGB UR QL STRIP: NEGATIVE
HYALINE CASTS URNS QL MICRO: ABNORMAL /LPF
IMM GRANULOCYTES # BLD AUTO: 0.05 K/UL (ref 0–0.5)
IMM GRANULOCYTES NFR BLD AUTO: 0.4 % (ref 0–5)
KETONES UR QL STRIP.AUTO: NEGATIVE MG/DL
LACTATE SERPL-SCNC: 0.7 MMOL/L (ref 0.5–2)
LEUKOCYTE ESTERASE UR QL STRIP.AUTO: ABNORMAL
LYMPHOCYTES # BLD: 1.9 K/UL (ref 0.5–4.6)
LYMPHOCYTES NFR BLD: 13.8 % (ref 13–44)
MCH RBC QN AUTO: 25.3 PG (ref 26.1–32.9)
MCHC RBC AUTO-ENTMCNC: 30.9 G/DL (ref 31.4–35)
MCV RBC AUTO: 81.6 FL (ref 82–102)
MONOCYTES # BLD: 0.92 K/UL (ref 0.1–1.3)
MONOCYTES NFR BLD: 6.7 % (ref 4–12)
NEUTS SEG # BLD: 10.61 K/UL (ref 1.7–8.2)
NEUTS SEG NFR BLD: 77.2 % (ref 43–78)
NITRITE UR QL STRIP.AUTO: NEGATIVE
NRBC # BLD: 0 K/UL (ref 0–0.2)
PH UR STRIP: >=9 (ref 5–9)
PLATELET # BLD AUTO: 552 K/UL (ref 150–450)
PMV BLD AUTO: 8.5 FL (ref 9.4–12.3)
POTASSIUM SERPL-SCNC: 5.1 MMOL/L (ref 3.5–5.1)
PROCALCITONIN SERPL-MCNC: 0.2 NG/ML (ref 0–0.1)
PROT SERPL-MCNC: 7.3 G/DL (ref 6.3–8.2)
PROT UR STRIP-MCNC: ABNORMAL MG/DL
RBC # BLD AUTO: 3.76 M/UL (ref 4.05–5.2)
RBC #/AREA URNS HPF: ABNORMAL /HPF
SODIUM SERPL-SCNC: 138 MMOL/L (ref 136–145)
SP GR UR REFRACTOMETRY: 1.02 (ref 1–1.02)
UROBILINOGEN UR QL STRIP.AUTO: 1 EU/DL (ref 0.2–1)
WBC # BLD AUTO: 13.7 K/UL (ref 4.3–11.1)
WBC URNS QL MICRO: ABNORMAL /HPF

## 2025-07-07 PROCEDURE — 96365 THER/PROPH/DIAG IV INF INIT: CPT

## 2025-07-07 PROCEDURE — 2500000003 HC RX 250 WO HCPCS: Performed by: STUDENT IN AN ORGANIZED HEALTH CARE EDUCATION/TRAINING PROGRAM

## 2025-07-07 PROCEDURE — 85025 COMPLETE CBC W/AUTO DIFF WBC: CPT

## 2025-07-07 PROCEDURE — 96376 TX/PRO/DX INJ SAME DRUG ADON: CPT

## 2025-07-07 PROCEDURE — 80053 COMPREHEN METABOLIC PANEL: CPT

## 2025-07-07 PROCEDURE — 36415 COLL VENOUS BLD VENIPUNCTURE: CPT

## 2025-07-07 PROCEDURE — 6360000002 HC RX W HCPCS: Performed by: STUDENT IN AN ORGANIZED HEALTH CARE EDUCATION/TRAINING PROGRAM

## 2025-07-07 PROCEDURE — 99285 EMERGENCY DEPT VISIT HI MDM: CPT

## 2025-07-07 PROCEDURE — 81001 URINALYSIS AUTO W/SCOPE: CPT

## 2025-07-07 PROCEDURE — 84145 PROCALCITONIN (PCT): CPT

## 2025-07-07 PROCEDURE — 87040 BLOOD CULTURE FOR BACTERIA: CPT

## 2025-07-07 PROCEDURE — 83605 ASSAY OF LACTIC ACID: CPT

## 2025-07-07 PROCEDURE — 71045 X-RAY EXAM CHEST 1 VIEW: CPT

## 2025-07-07 PROCEDURE — 93005 ELECTROCARDIOGRAM TRACING: CPT | Performed by: STUDENT IN AN ORGANIZED HEALTH CARE EDUCATION/TRAINING PROGRAM

## 2025-07-07 PROCEDURE — 96375 TX/PRO/DX INJ NEW DRUG ADDON: CPT

## 2025-07-07 RX ORDER — ONDANSETRON 2 MG/ML
4 INJECTION INTRAMUSCULAR; INTRAVENOUS
Status: COMPLETED | OUTPATIENT
Start: 2025-07-07 | End: 2025-07-07

## 2025-07-07 RX ORDER — CLINDAMYCIN HYDROCHLORIDE 150 MG/1
450 CAPSULE ORAL 3 TIMES DAILY
Qty: 63 CAPSULE | Refills: 0 | Status: SHIPPED | OUTPATIENT
Start: 2025-07-07 | End: 2025-07-14

## 2025-07-07 RX ORDER — HYDROMORPHONE HYDROCHLORIDE 1 MG/ML
1 INJECTION, SOLUTION INTRAMUSCULAR; INTRAVENOUS; SUBCUTANEOUS
Refills: 0 | Status: COMPLETED | OUTPATIENT
Start: 2025-07-07 | End: 2025-07-07

## 2025-07-07 RX ORDER — CLINDAMYCIN PHOSPHATE 900 MG/50ML
900 INJECTION, SOLUTION INTRAVENOUS
Status: COMPLETED | OUTPATIENT
Start: 2025-07-07 | End: 2025-07-07

## 2025-07-07 RX ADMIN — HYDROMORPHONE HYDROCHLORIDE 1 MG: 1 INJECTION, SOLUTION INTRAMUSCULAR; INTRAVENOUS; SUBCUTANEOUS at 22:01

## 2025-07-07 RX ADMIN — HYDROMORPHONE HYDROCHLORIDE 1 MG: 1 INJECTION, SOLUTION INTRAMUSCULAR; INTRAVENOUS; SUBCUTANEOUS at 20:57

## 2025-07-07 RX ADMIN — CLINDAMYCIN PHOSPHATE 900 MG: 900 INJECTION, SOLUTION INTRAVENOUS at 21:01

## 2025-07-07 RX ADMIN — ONDANSETRON 4 MG: 2 INJECTION, SOLUTION INTRAMUSCULAR; INTRAVENOUS at 20:58

## 2025-07-07 ASSESSMENT — PAIN DESCRIPTION - LOCATION: LOCATION: LEG

## 2025-07-07 ASSESSMENT — PAIN DESCRIPTION - ORIENTATION: ORIENTATION: LEFT

## 2025-07-07 ASSESSMENT — PAIN SCALES - GENERAL
PAINLEVEL_OUTOF10: 2
PAINLEVEL_OUTOF10: 8
PAINLEVEL_OUTOF10: 6
PAINLEVEL_OUTOF10: 10
PAINLEVEL_OUTOF10: 5
PAINLEVEL_OUTOF10: 10

## 2025-07-07 ASSESSMENT — PAIN - FUNCTIONAL ASSESSMENT
PAIN_FUNCTIONAL_ASSESSMENT: 0-10

## 2025-07-07 ASSESSMENT — PAIN DESCRIPTION - DESCRIPTORS: DESCRIPTORS: SHARP;SHOOTING

## 2025-07-07 NOTE — ED TRIAGE NOTES
Pt BIB EMS from home with wounds to bilateral lower extremities that are draining. Pt unable to ambulate today. Rating pain 10/10 HR 110s  /90; temp 101

## 2025-07-08 LAB
EKG ATRIAL RATE: 118 BPM
EKG DIAGNOSIS: NORMAL
EKG P AXIS: 58 DEGREES
EKG P-R INTERVAL: 141 MS
EKG Q-T INTERVAL: 312 MS
EKG QRS DURATION: 70 MS
EKG QTC CALCULATION (BAZETT): 438 MS
EKG R AXIS: 47 DEGREES
EKG T AXIS: 43 DEGREES
EKG VENTRICULAR RATE: 118 BPM

## 2025-07-08 PROCEDURE — 93010 ELECTROCARDIOGRAM REPORT: CPT | Performed by: INTERNAL MEDICINE

## 2025-07-08 NOTE — ED PROVIDER NOTES
1 tablet by mouth 3 times daily    DULOXETINE (CYMBALTA) 60 MG EXTENDED RELEASE CAPSULE    Take 1 capsule by mouth daily    FERROUS SULFATE (IRON 325) 325 (65 FE) MG TABLET    Take 1 tablet by mouth daily (with breakfast)    FLUCONAZOLE (DIFLUCAN) 150 MG TABLET    Repeat one after 3 days    FLUTICASONE (FLONASE) 50 MCG/ACT NASAL SPRAY    2 sprays by Nasal route daily    FUROSEMIDE (LASIX) 40 MG TABLET    Take 1 tablet by mouth daily    HYDROCODONE-ACETAMINOPHEN (NORCO) 7.5-325 MG PER TABLET    Take 1 tablet by mouth every 6 hours as needed. Max Daily Amount: 4 tablets    HYDROXYZINE HCL (ATARAX) 25 MG TABLET    Take 1 tablet by mouth every 4 hours as needed    LACTOBACILLUS (CULTURELLE) CAPSULE    Take 1 capsule by mouth daily (with breakfast)    METOPROLOL SUCCINATE (TOPROL XL) 25 MG EXTENDED RELEASE TABLET    Take 1 tablet by mouth daily    MINERAL OIL-HYDROPHILIC PETROLATUM (AQUAPHOR) OINTMENT    Apply topically as needed.    MONTELUKAST (SINGULAIR) 10 MG TABLET    Take 1 tablet by mouth daily    NALOXONE (NARCAN) 4 MG/0.1ML LIQD NASAL SPRAY    1 spray by Nasal route as needed for Opioid Reversal    NUTRITIONAL SUPPLEMENTS (ENSURE MAX PROTEIN) LIQD    Take 1 each by mouth in the morning and at bedtime    OMEPRAZOLE (PRILOSEC) 20 MG DELAYED RELEASE CAPSULE    Take 1 capsule by mouth every morning (before breakfast)    OXYCODONE-ACETAMINOPHEN (PERCOCET) 7.5-325 MG PER TABLET    Take 1 tablet by mouth every 6 hours as needed.    PREGABALIN (LYRICA) 200 MG CAPSULE    Take 1 capsule by mouth in the morning, at noon, and at bedtime for 30 days. Max Daily Amount: 600 mg    SENNOSIDES-DOCUSATE SODIUM (SENOKOT-S) 8.6-50 MG TABLET    Take 2 tablets by mouth in the morning and at bedtime        Results from this emergency department visit:      Results for orders placed or performed during the hospital encounter of 07/07/25   XR CHEST PORTABLE    Narrative    Chest X-ray    INDICATION: Suspected Infection    COMPARISON:

## 2025-07-08 NOTE — ED NOTES
Patient alert and oriented x4 but refuses to keep blood pressure cuff on. MD notified.     Leela Hickey, RN  07/07/25 [phone]

## 2025-07-08 NOTE — DISCHARGE INSTRUCTIONS
Take antibiotics prescribed as directed.  Continue your current pain medication and keep follow-up with your primary pain management provider tomorrow as discussed.  Return to this department for send symptoms, concerns or questions.

## 2025-07-12 LAB
BACTERIA SPEC CULT: NORMAL
BACTERIA SPEC CULT: NORMAL
SERVICE CMNT-IMP: NORMAL
SERVICE CMNT-IMP: NORMAL

## (undated) DEVICE — SOLUTION IV 1000ML 0.9% SOD CHL

## (undated) DEVICE — SPONGE: SPECIALTY TONSIL XR MED 100/CS: Brand: MEDICAL ACTION INDUSTRIES

## (undated) DEVICE — MEDI-VAC YANKAUER SUCTION HANDLE W/BULBOUS TIP: Brand: CARDINAL HEALTH

## (undated) DEVICE — BUTTON SWITCH PENCIL BLADE ELECTRODE, HOLSTER: Brand: EDGE

## (undated) DEVICE — 2000CC GUARDIAN II: Brand: GUARDIAN

## (undated) DEVICE — REM POLYHESIVE ADULT PATIENT RETURN ELECTRODE: Brand: VALLEYLAB

## (undated) DEVICE — INSULATED BLADE ELECTRODE: Brand: EDGE

## (undated) DEVICE — SOL ANTI-FOG 6ML MEDC -- MEDICHOICE - CONVERT TO 358427

## (undated) DEVICE — VINYL URETHRAL CATHETER: Brand: DOVER

## (undated) DEVICE — T AND A ORAL: Brand: MEDLINE INDUSTRIES, INC.

## (undated) DEVICE — ELECTROSURGICAL SUCTION COAGULATOR 10FR